# Patient Record
Sex: FEMALE | Race: WHITE | HISPANIC OR LATINO
[De-identification: names, ages, dates, MRNs, and addresses within clinical notes are randomized per-mention and may not be internally consistent; named-entity substitution may affect disease eponyms.]

---

## 2017-02-06 ENCOUNTER — APPOINTMENT (OUTPATIENT)
Dept: PULMONOLOGY | Facility: CLINIC | Age: 82
End: 2017-02-06

## 2017-02-06 VITALS
SYSTOLIC BLOOD PRESSURE: 180 MMHG | WEIGHT: 182 LBS | OXYGEN SATURATION: 95 % | RESPIRATION RATE: 14 BRPM | DIASTOLIC BLOOD PRESSURE: 74 MMHG | BODY MASS INDEX: 35.73 KG/M2 | HEART RATE: 90 BPM | HEIGHT: 60 IN

## 2017-03-30 ENCOUNTER — APPOINTMENT (OUTPATIENT)
Dept: PULMONOLOGY | Facility: CLINIC | Age: 82
End: 2017-03-30

## 2017-03-30 VITALS
DIASTOLIC BLOOD PRESSURE: 72 MMHG | RESPIRATION RATE: 14 BRPM | OXYGEN SATURATION: 97 % | HEART RATE: 80 BPM | WEIGHT: 179 LBS | SYSTOLIC BLOOD PRESSURE: 168 MMHG | HEIGHT: 60 IN | BODY MASS INDEX: 35.14 KG/M2

## 2017-07-20 ENCOUNTER — APPOINTMENT (OUTPATIENT)
Dept: PULMONOLOGY | Facility: CLINIC | Age: 82
End: 2017-07-20

## 2017-07-20 VITALS
DIASTOLIC BLOOD PRESSURE: 76 MMHG | SYSTOLIC BLOOD PRESSURE: 158 MMHG | BODY MASS INDEX: 34.75 KG/M2 | WEIGHT: 177 LBS | TEMPERATURE: 98 F | HEART RATE: 67 BPM | HEIGHT: 60 IN | OXYGEN SATURATION: 96 % | RESPIRATION RATE: 13 BRPM

## 2017-11-20 ENCOUNTER — APPOINTMENT (OUTPATIENT)
Dept: PULMONOLOGY | Facility: CLINIC | Age: 82
End: 2017-11-20

## 2017-12-27 ENCOUNTER — MEDICATION RENEWAL (OUTPATIENT)
Age: 82
End: 2017-12-27

## 2018-01-04 ENCOUNTER — APPOINTMENT (OUTPATIENT)
Dept: PULMONOLOGY | Facility: CLINIC | Age: 83
End: 2018-01-04

## 2018-05-07 ENCOUNTER — NON-APPOINTMENT (OUTPATIENT)
Age: 83
End: 2018-05-07

## 2018-05-07 ENCOUNTER — APPOINTMENT (OUTPATIENT)
Dept: PULMONOLOGY | Facility: CLINIC | Age: 83
End: 2018-05-07
Payer: MEDICARE

## 2018-05-07 VITALS
HEIGHT: 60 IN | BODY MASS INDEX: 36.52 KG/M2 | OXYGEN SATURATION: 95 % | TEMPERATURE: 98 F | HEART RATE: 57 BPM | WEIGHT: 186 LBS | DIASTOLIC BLOOD PRESSURE: 76 MMHG | SYSTOLIC BLOOD PRESSURE: 180 MMHG | RESPIRATION RATE: 12 BRPM

## 2018-05-07 PROCEDURE — 99214 OFFICE O/P EST MOD 30 MIN: CPT | Mod: 25

## 2018-05-07 PROCEDURE — 94060 EVALUATION OF WHEEZING: CPT

## 2019-06-03 ENCOUNTER — MEDICATION RENEWAL (OUTPATIENT)
Age: 84
End: 2019-06-03

## 2019-06-12 ENCOUNTER — APPOINTMENT (OUTPATIENT)
Dept: PULMONOLOGY | Facility: CLINIC | Age: 84
End: 2019-06-12
Payer: MEDICARE

## 2019-06-12 VITALS
OXYGEN SATURATION: 98 % | WEIGHT: 172 LBS | HEART RATE: 60 BPM | DIASTOLIC BLOOD PRESSURE: 74 MMHG | BODY MASS INDEX: 33.77 KG/M2 | HEIGHT: 60 IN | SYSTOLIC BLOOD PRESSURE: 183 MMHG | RESPIRATION RATE: 12 BRPM

## 2019-06-12 PROCEDURE — 94726 PLETHYSMOGRAPHY LUNG VOLUMES: CPT

## 2019-06-12 PROCEDURE — 94060 EVALUATION OF WHEEZING: CPT

## 2019-06-12 PROCEDURE — 99214 OFFICE O/P EST MOD 30 MIN: CPT | Mod: 25

## 2019-06-12 PROCEDURE — 94729 DIFFUSING CAPACITY: CPT

## 2019-06-12 NOTE — ASSESSMENT
[FreeTextEntry1] : In summary the patient is an 83 -year-old female past medical history significant for chronic obstructive pulmonary disease who presents today for followup. The patient's physical exam is significant for mild expiratory wheezing bilaterally.\par \par The patient underwent a Pulmonary function test  which revealed restrictive lung disease.\par \par Prescription renewal performed. Patient instructed to continue current medications and followup in 3 months

## 2019-06-12 NOTE — PHYSICAL EXAM
[Normal Appearance] : normal appearance [General Appearance - Well Developed] : well developed [Well Groomed] : well groomed [No Deformities] : no deformities [General Appearance - Well Nourished] : well nourished [Normal Conjunctiva] : the conjunctiva exhibited no abnormalities [General Appearance - In No Acute Distress] : no acute distress [Eyelids - No Xanthelasma] : the eyelids demonstrated no xanthelasmas [Normal Oropharynx] : normal oropharynx [Neck Appearance] : the appearance of the neck was normal [II] : II [Heart Rate And Rhythm] : heart rate and rhythm were normal [Jugular Venous Distention Increased] : there was no jugular-venous distention [] : no respiratory distress [Heart Sounds] : normal S1 and S2 [Respiration, Rhythm And Depth] : normal respiratory rhythm and effort [Bowel Sounds] : normal bowel sounds [Exaggerated Use Of Accessory Muscles For Inspiration] : no accessory muscle use [Auscultation Breath Sounds / Voice Sounds] : lungs were clear to auscultation bilaterally [Abdomen Soft] : soft [Nail Clubbing] : no clubbing of the fingernails [Abnormal Walk] : normal gait [No Venous Stasis] : no venous stasis [Cyanosis, Localized] : no localized cyanosis [No Focal Deficits] : no focal deficits [Impaired Insight] : insight and judgment were intact [Oriented To Time, Place, And Person] : oriented to person, place, and time [Affect] : the affect was normal [Mood] : the mood was normal

## 2019-06-12 NOTE — REVIEW OF SYSTEMS
[Cough] : no cough [Hemoptysis] : no hemoptysis [Sputum] : not coughing up ~M sputum [Dyspnea] : dyspnea [Chest Tightness] : no chest tightness [Pleuritic Pain] : no pleuritic pain [Frequent URIs] : no frequent upper respiratory infections [Wheezing] : no wheezing [Negative] : Sleep Disorder

## 2019-06-12 NOTE — REASON FOR VISIT
[COPD] : COPD [Follow-Up] : a follow-up visit [Cough] : cough [Shortness of Breath] : shortness of Breath [Wheezing] : wheezing [Spouse] : spouse

## 2019-06-12 NOTE — HISTORY OF PRESENT ILLNESS
[FreeTextEntry1] : Patient is an 83-year-old female with past medical history is significant for asbestosis, chronic obstructive pulmonary disease, hypertension who presents today for followup. She states that her respiratory distress and shortness of breath have improved significantly since the initiation of her bronchodilator therapy.\par \par She denies any fevers chills chest pain weight loss or hemoptysis

## 2019-07-08 ENCOUNTER — APPOINTMENT (OUTPATIENT)
Dept: PULMONOLOGY | Facility: CLINIC | Age: 84
End: 2019-07-08

## 2019-09-26 ENCOUNTER — TRANSCRIPTION ENCOUNTER (OUTPATIENT)
Age: 84
End: 2019-09-26

## 2020-02-03 ENCOUNTER — APPOINTMENT (OUTPATIENT)
Dept: PULMONOLOGY | Facility: CLINIC | Age: 85
End: 2020-02-03

## 2020-06-15 ENCOUNTER — APPOINTMENT (OUTPATIENT)
Dept: PULMONOLOGY | Facility: CLINIC | Age: 85
End: 2020-06-15

## 2020-11-02 ENCOUNTER — APPOINTMENT (OUTPATIENT)
Dept: PULMONOLOGY | Facility: CLINIC | Age: 85
End: 2020-11-02
Payer: MEDICARE

## 2020-11-02 VITALS
OXYGEN SATURATION: 97 % | RESPIRATION RATE: 14 BRPM | WEIGHT: 167 LBS | HEIGHT: 60 IN | HEART RATE: 64 BPM | TEMPERATURE: 97.3 F | DIASTOLIC BLOOD PRESSURE: 82 MMHG | SYSTOLIC BLOOD PRESSURE: 192 MMHG | BODY MASS INDEX: 32.79 KG/M2

## 2020-11-02 DIAGNOSIS — G47.8 OTHER SLEEP DISORDERS: ICD-10-CM

## 2020-11-02 DIAGNOSIS — R06.83 SNORING: ICD-10-CM

## 2020-11-02 PROCEDURE — 99214 OFFICE O/P EST MOD 30 MIN: CPT

## 2020-11-02 PROCEDURE — 99072 ADDL SUPL MATRL&STAF TM PHE: CPT

## 2020-11-02 NOTE — REVIEW OF SYSTEMS
[Fatigue] : fatigue [Cough] : cough [Hemoptysis] : no hemoptysis [Chest Tightness] : no chest tightness [Frequent URIs] : no frequent URIs [Sputum] : no sputum [Dyspnea] : dyspnea [Pleuritic Pain] : pleuritic pain [Wheezing] : wheezing [A.M. Dry Mouth] : a.m. dry mouth [SOB on Exertion] : sob on exertion [Negative] : Endocrine

## 2020-11-02 NOTE — REASON FOR VISIT
[Follow-Up] : a follow-up visit [Sleep Apnea] : sleep apnea [COPD] : COPD [Edema] : edema [Shortness of Breath] : shortness of breath [Wheezing] : wheezing [Family Member] : family member

## 2020-11-02 NOTE — HISTORY OF PRESENT ILLNESS
[TextBox_4] : Patient is an 85-year-old morbidly obese female with past medical history significant for diabetes, hypertension, COPD currently on Anoro, congestive heart failure and pulmonary hypertension who presents today for follow-up.  Patient states that she ran out of her bronchodilator therapy and has developed worsening shortness of breath.  She also complains of dyspnea on exertion.  The daughter states that she snores so loudly that her father needs to sleep in the other room.  The patient also complains of nonrestorative sleep.  She denies fevers chills chest pain weight loss or hemoptysis

## 2020-11-02 NOTE — PHYSICAL EXAM
[No Acute Distress] : no acute distress [Normal Oropharynx] : normal oropharynx [Normal Appearance] : normal appearance [No Neck Mass] : no neck mass [Normal Rate/Rhythm] : normal rate/rhythm [Normal S1, S2] : normal s1, s2 [No Murmurs] : no murmurs [No Resp Distress] : no resp distress [Wheeze] : wheeze [Rales] : rales [No Abnormalities] : no abnormalities [Benign] : benign [Normal Gait] : normal gait [No Clubbing] : no clubbing [No Cyanosis] : no cyanosis [No Edema] : no edema [FROM] : FROM [3+ Pitting] : 3+ pitting [Normal Color/ Pigmentation] : normal color/ pigmentation [No Focal Deficits] : no focal deficits [Oriented x3] : oriented x3 [Normal Affect] : normal affect

## 2020-11-02 NOTE — ASSESSMENT
[FreeTextEntry1] : In summary the patient is an 85-year-old obese female past medical history significant for hypertension, diabetes, high cholesterol, COPD who presents for follow-up.  The patient history and physical is consistent with worsening congestive heart failure and probable obstructive sleep apnea.  A prescription renewal for her bronchodilator therapy has been given.  A home sleep study has been ordered and the patient is instructed to follow-up in 1 month

## 2020-12-02 ENCOUNTER — APPOINTMENT (OUTPATIENT)
Dept: PULMONOLOGY | Facility: CLINIC | Age: 85
End: 2020-12-02
Payer: MEDICARE

## 2020-12-02 VITALS
HEART RATE: 62 BPM | RESPIRATION RATE: 14 BRPM | SYSTOLIC BLOOD PRESSURE: 161 MMHG | OXYGEN SATURATION: 98 % | DIASTOLIC BLOOD PRESSURE: 65 MMHG | TEMPERATURE: 96.9 F

## 2020-12-02 PROCEDURE — 99214 OFFICE O/P EST MOD 30 MIN: CPT

## 2020-12-02 PROCEDURE — 99072 ADDL SUPL MATRL&STAF TM PHE: CPT

## 2020-12-02 NOTE — HISTORY OF PRESENT ILLNESS
[Never] : never [TextBox_4] : Patient is an 85-year-old female with past medical history significant for diabetes, COPD, hypertension, pulmonary hypertension, who presents today for follow-up.  The patient states that her cough shortness of breath and dyspnea on exertion have resolved with the use of Anoro.  He currently denies any fevers chills chest pain weight loss or hemoptysis

## 2020-12-02 NOTE — REASON FOR VISIT
[Follow-Up] : a follow-up visit [Cough] : cough [COPD] : COPD [Shortness of Breath] : shortness of breath [Family Member] : family member

## 2020-12-02 NOTE — ASSESSMENT
[FreeTextEntry1] : In summary the patient is an 85-year-old obese female past medical history significant for hypertension, diabetes, high cholesterol, COPD who presents for follow-up.  Physical exam is significant for good air entry.  Prescription renewal performed and the patient is instructed to follow-up in 3 months

## 2021-03-03 ENCOUNTER — APPOINTMENT (OUTPATIENT)
Dept: PULMONOLOGY | Facility: CLINIC | Age: 86
End: 2021-03-03

## 2021-04-10 ENCOUNTER — INPATIENT (INPATIENT)
Facility: HOSPITAL | Age: 86
LOS: 5 days | Discharge: HOME HEALTH | DRG: 682 | End: 2021-04-16
Attending: STUDENT IN AN ORGANIZED HEALTH CARE EDUCATION/TRAINING PROGRAM | Admitting: HOSPITALIST
Payer: COMMERCIAL

## 2021-04-10 PROCEDURE — 99292 CRITICAL CARE ADDL 30 MIN: CPT

## 2021-04-10 PROCEDURE — 99291 CRITICAL CARE FIRST HOUR: CPT

## 2021-04-10 PROCEDURE — 93010 ELECTROCARDIOGRAM REPORT: CPT

## 2021-04-11 VITALS — RESPIRATION RATE: 26 BRPM | HEART RATE: 74 BPM

## 2021-04-11 DIAGNOSIS — Z90.710 ACQUIRED ABSENCE OF BOTH CERVIX AND UTERUS: Chronic | ICD-10-CM

## 2021-04-11 DIAGNOSIS — D64.9 ANEMIA, UNSPECIFIED: ICD-10-CM

## 2021-04-11 DIAGNOSIS — N17.9 ACUTE KIDNEY FAILURE, UNSPECIFIED: ICD-10-CM

## 2021-04-11 DIAGNOSIS — R00.1 BRADYCARDIA, UNSPECIFIED: ICD-10-CM

## 2021-04-11 DIAGNOSIS — R74.8 ABNORMAL LEVELS OF OTHER SERUM ENZYMES: ICD-10-CM

## 2021-04-11 DIAGNOSIS — E87.5 HYPERKALEMIA: ICD-10-CM

## 2021-04-11 DIAGNOSIS — E87.2 ACIDOSIS: ICD-10-CM

## 2021-04-11 DIAGNOSIS — E11.9 TYPE 2 DIABETES MELLITUS WITHOUT COMPLICATIONS: ICD-10-CM

## 2021-04-11 DIAGNOSIS — R42 DIZZINESS AND GIDDINESS: ICD-10-CM

## 2021-04-11 DIAGNOSIS — I10 ESSENTIAL (PRIMARY) HYPERTENSION: ICD-10-CM

## 2021-04-11 DIAGNOSIS — Z29.9 ENCOUNTER FOR PROPHYLACTIC MEASURES, UNSPECIFIED: ICD-10-CM

## 2021-04-11 DIAGNOSIS — N39.0 URINARY TRACT INFECTION, SITE NOT SPECIFIED: ICD-10-CM

## 2021-04-11 LAB
A1C WITH ESTIMATED AVERAGE GLUCOSE RESULT: 6 % — HIGH (ref 4–5.6)
A1C WITH ESTIMATED AVERAGE GLUCOSE RESULT: 6.3 % — HIGH (ref 4–5.6)
ALBUMIN SERPL ELPH-MCNC: 3.8 G/DL — SIGNIFICANT CHANGE UP (ref 3.3–5)
ALBUMIN SERPL ELPH-MCNC: 4 G/DL — SIGNIFICANT CHANGE UP (ref 3.3–5)
ALP SERPL-CCNC: 193 U/L — HIGH (ref 40–120)
ALP SERPL-CCNC: 201 U/L — HIGH (ref 40–120)
ALT FLD-CCNC: 171 U/L — HIGH (ref 10–45)
ALT FLD-CCNC: 79 U/L — HIGH (ref 10–45)
ANION GAP SERPL CALC-SCNC: 11 MMOL/L — SIGNIFICANT CHANGE UP (ref 5–17)
ANION GAP SERPL CALC-SCNC: 12 MMOL/L — SIGNIFICANT CHANGE UP (ref 5–17)
ANION GAP SERPL CALC-SCNC: 14 MMOL/L — SIGNIFICANT CHANGE UP (ref 5–17)
ANION GAP SERPL CALC-SCNC: 14 MMOL/L — SIGNIFICANT CHANGE UP (ref 5–17)
ANION GAP SERPL CALC-SCNC: 15 MMOL/L — SIGNIFICANT CHANGE UP (ref 5–17)
APPEARANCE UR: ABNORMAL
APTT BLD: 33.9 SEC — SIGNIFICANT CHANGE UP (ref 27.5–35.5)
AST SERPL-CCNC: 211 U/L — HIGH (ref 10–40)
AST SERPL-CCNC: 97 U/L — HIGH (ref 10–40)
BACTERIA # UR AUTO: ABNORMAL
BASE EXCESS BLDV CALC-SCNC: -14 MMOL/L — LOW (ref -2–2)
BASE EXCESS BLDV CALC-SCNC: -6.8 MMOL/L — LOW (ref -2–2)
BASOPHILS # BLD AUTO: 0.04 K/UL — SIGNIFICANT CHANGE UP (ref 0–0.2)
BASOPHILS NFR BLD AUTO: 0.5 % — SIGNIFICANT CHANGE UP (ref 0–2)
BILIRUB SERPL-MCNC: 0.4 MG/DL — SIGNIFICANT CHANGE UP (ref 0.2–1.2)
BILIRUB SERPL-MCNC: 0.4 MG/DL — SIGNIFICANT CHANGE UP (ref 0.2–1.2)
BILIRUB UR-MCNC: NEGATIVE — SIGNIFICANT CHANGE UP
BUN SERPL-MCNC: 66 MG/DL — HIGH (ref 7–23)
BUN SERPL-MCNC: 68 MG/DL — HIGH (ref 7–23)
BUN SERPL-MCNC: 68 MG/DL — HIGH (ref 7–23)
BUN SERPL-MCNC: 72 MG/DL — HIGH (ref 7–23)
BUN SERPL-MCNC: 74 MG/DL — HIGH (ref 7–23)
BURR CELLS BLD QL SMEAR: PRESENT — SIGNIFICANT CHANGE UP
CA-I SERPL-SCNC: 1.22 MMOL/L — SIGNIFICANT CHANGE UP (ref 1.12–1.3)
CA-I SERPL-SCNC: 1.26 MMOL/L — SIGNIFICANT CHANGE UP (ref 1.12–1.3)
CALCIUM SERPL-MCNC: 8.9 MG/DL — SIGNIFICANT CHANGE UP (ref 8.4–10.5)
CALCIUM SERPL-MCNC: 9.1 MG/DL — SIGNIFICANT CHANGE UP (ref 8.4–10.5)
CALCIUM SERPL-MCNC: 9.3 MG/DL — SIGNIFICANT CHANGE UP (ref 8.4–10.5)
CALCIUM SERPL-MCNC: 9.4 MG/DL — SIGNIFICANT CHANGE UP (ref 8.4–10.5)
CALCIUM SERPL-MCNC: 9.7 MG/DL — SIGNIFICANT CHANGE UP (ref 8.4–10.5)
CHLORIDE BLDV-SCNC: 113 MMOL/L — HIGH (ref 96–108)
CHLORIDE BLDV-SCNC: 115 MMOL/L — HIGH (ref 96–108)
CHLORIDE SERPL-SCNC: 106 MMOL/L — SIGNIFICANT CHANGE UP (ref 96–108)
CHLORIDE SERPL-SCNC: 108 MMOL/L — SIGNIFICANT CHANGE UP (ref 96–108)
CHLORIDE SERPL-SCNC: 109 MMOL/L — HIGH (ref 96–108)
CO2 BLDV-SCNC: 14 MMOL/L — LOW (ref 22–30)
CO2 BLDV-SCNC: 18 MMOL/L — LOW (ref 22–30)
CO2 SERPL-SCNC: 11 MMOL/L — LOW (ref 22–31)
CO2 SERPL-SCNC: 13 MMOL/L — LOW (ref 22–31)
CO2 SERPL-SCNC: 14 MMOL/L — LOW (ref 22–31)
CO2 SERPL-SCNC: 17 MMOL/L — LOW (ref 22–31)
CO2 SERPL-SCNC: 17 MMOL/L — LOW (ref 22–31)
COLOR SPEC: SIGNIFICANT CHANGE UP
CREAT ?TM UR-MCNC: 51 MG/DL — SIGNIFICANT CHANGE UP
CREAT SERPL-MCNC: 2.44 MG/DL — HIGH (ref 0.5–1.3)
CREAT SERPL-MCNC: 2.52 MG/DL — HIGH (ref 0.5–1.3)
CREAT SERPL-MCNC: 2.53 MG/DL — HIGH (ref 0.5–1.3)
CREAT SERPL-MCNC: 2.58 MG/DL — HIGH (ref 0.5–1.3)
CREAT SERPL-MCNC: 2.72 MG/DL — HIGH (ref 0.5–1.3)
DIFF PNL FLD: ABNORMAL
ELLIPTOCYTES BLD QL SMEAR: SLIGHT — SIGNIFICANT CHANGE UP
EOSINOPHIL # BLD AUTO: 0.09 K/UL — SIGNIFICANT CHANGE UP (ref 0–0.5)
EOSINOPHIL NFR BLD AUTO: 1.2 % — SIGNIFICANT CHANGE UP (ref 0–6)
EPI CELLS # UR: 4 — SIGNIFICANT CHANGE UP
ESTIMATED AVERAGE GLUCOSE: 126 MG/DL — HIGH (ref 68–114)
ESTIMATED AVERAGE GLUCOSE: 134 MG/DL — HIGH (ref 68–114)
FOLATE SERPL-MCNC: 12.7 NG/ML — SIGNIFICANT CHANGE UP
GAS PNL BLDV: 134 MMOL/L — LOW (ref 135–145)
GAS PNL BLDV: 134 MMOL/L — LOW (ref 135–145)
GAS PNL BLDV: SIGNIFICANT CHANGE UP
GLUCOSE BLDC GLUCOMTR-MCNC: 120 MG/DL — HIGH (ref 70–99)
GLUCOSE BLDC GLUCOMTR-MCNC: 131 MG/DL — HIGH (ref 70–99)
GLUCOSE BLDC GLUCOMTR-MCNC: 137 MG/DL — HIGH (ref 70–99)
GLUCOSE BLDC GLUCOMTR-MCNC: 170 MG/DL — HIGH (ref 70–99)
GLUCOSE BLDV-MCNC: 121 MG/DL — HIGH (ref 70–99)
GLUCOSE BLDV-MCNC: 218 MG/DL — HIGH (ref 70–99)
GLUCOSE SERPL-MCNC: 126 MG/DL — HIGH (ref 70–99)
GLUCOSE SERPL-MCNC: 152 MG/DL — HIGH (ref 70–99)
GLUCOSE SERPL-MCNC: 169 MG/DL — HIGH (ref 70–99)
GLUCOSE SERPL-MCNC: 194 MG/DL — HIGH (ref 70–99)
GLUCOSE SERPL-MCNC: 222 MG/DL — HIGH (ref 70–99)
GLUCOSE UR QL: NEGATIVE — SIGNIFICANT CHANGE UP
HCO3 BLDV-SCNC: 13 MMOL/L — LOW (ref 21–29)
HCO3 BLDV-SCNC: 18 MMOL/L — LOW (ref 21–29)
HCT VFR BLD CALC: 29.4 % — LOW (ref 34.5–45)
HCT VFR BLD CALC: 31.4 % — LOW (ref 34.5–45)
HCT VFR BLDA CALC: 26 % — LOW (ref 39–50)
HCT VFR BLDA CALC: 31 % — LOW (ref 39–50)
HGB BLD CALC-MCNC: 10.1 G/DL — LOW (ref 11.5–15.5)
HGB BLD CALC-MCNC: 8.3 G/DL — LOW (ref 11.5–15.5)
HGB BLD-MCNC: 9.3 G/DL — LOW (ref 11.5–15.5)
HGB BLD-MCNC: 9.6 G/DL — LOW (ref 11.5–15.5)
HOROWITZ INDEX BLDV+IHG-RTO: SIGNIFICANT CHANGE UP
HYALINE CASTS # UR AUTO: 0 /LPF — SIGNIFICANT CHANGE UP (ref 0–7)
IMM GRANULOCYTES NFR BLD AUTO: 0.7 % — SIGNIFICANT CHANGE UP (ref 0–1.5)
INR BLD: 0.96 RATIO — SIGNIFICANT CHANGE UP (ref 0.88–1.16)
KETONES UR-MCNC: NEGATIVE — SIGNIFICANT CHANGE UP
LACTATE BLDV-MCNC: 1.2 MMOL/L — SIGNIFICANT CHANGE UP (ref 0.7–2)
LACTATE BLDV-MCNC: 3.3 MMOL/L — HIGH (ref 0.7–2)
LEUKOCYTE ESTERASE UR-ACNC: ABNORMAL
LYMPHOCYTES # BLD AUTO: 1.28 K/UL — SIGNIFICANT CHANGE UP (ref 1–3.3)
LYMPHOCYTES # BLD AUTO: 17.5 % — SIGNIFICANT CHANGE UP (ref 13–44)
MACROCYTES BLD QL: SIGNIFICANT CHANGE UP
MAGNESIUM SERPL-MCNC: 2.2 MG/DL — SIGNIFICANT CHANGE UP (ref 1.6–2.6)
MANUAL SMEAR VERIFICATION: SIGNIFICANT CHANGE UP
MCHC RBC-ENTMCNC: 30.6 GM/DL — LOW (ref 32–36)
MCHC RBC-ENTMCNC: 31.6 GM/DL — LOW (ref 32–36)
MCHC RBC-ENTMCNC: 32.6 PG — SIGNIFICANT CHANGE UP (ref 27–34)
MCHC RBC-ENTMCNC: 32.7 PG — SIGNIFICANT CHANGE UP (ref 27–34)
MCV RBC AUTO: 103.2 FL — HIGH (ref 80–100)
MCV RBC AUTO: 106.8 FL — HIGH (ref 80–100)
MONOCYTES # BLD AUTO: 0.64 K/UL — SIGNIFICANT CHANGE UP (ref 0–0.9)
MONOCYTES NFR BLD AUTO: 8.7 % — SIGNIFICANT CHANGE UP (ref 2–14)
NEUTROPHILS # BLD AUTO: 5.22 K/UL — SIGNIFICANT CHANGE UP (ref 1.8–7.4)
NEUTROPHILS NFR BLD AUTO: 71.4 % — SIGNIFICANT CHANGE UP (ref 43–77)
NITRITE UR-MCNC: NEGATIVE — SIGNIFICANT CHANGE UP
NRBC # BLD: 0 /100 WBCS — SIGNIFICANT CHANGE UP (ref 0–0)
NRBC # BLD: 0 /100 WBCS — SIGNIFICANT CHANGE UP (ref 0–0)
OSMOLALITY UR: 335 MOS/KG — SIGNIFICANT CHANGE UP (ref 300–900)
OTHER CELLS CSF MANUAL: 6 ML/DL — LOW (ref 18–22)
OVALOCYTES BLD QL SMEAR: SLIGHT — SIGNIFICANT CHANGE UP
PCO2 BLDV: 32 MMHG — LOW (ref 39–42)
PCO2 BLDV: 34 MMHG — LOW (ref 39–42)
PH BLDV: 7.2 — LOW (ref 7.35–7.45)
PH BLDV: 7.35 — SIGNIFICANT CHANGE UP (ref 7.35–7.45)
PH UR: 6 — SIGNIFICANT CHANGE UP (ref 5–8)
PLAT MORPH BLD: NORMAL — SIGNIFICANT CHANGE UP
PLATELET # BLD AUTO: 211 K/UL — SIGNIFICANT CHANGE UP (ref 150–400)
PLATELET # BLD AUTO: 233 K/UL — SIGNIFICANT CHANGE UP (ref 150–400)
PO2 BLDV: 30 MMHG — SIGNIFICANT CHANGE UP (ref 25–45)
PO2 BLDV: 38 MMHG — SIGNIFICANT CHANGE UP (ref 25–45)
POLYCHROMASIA BLD QL SMEAR: SLIGHT — SIGNIFICANT CHANGE UP
POTASSIUM BLDV-SCNC: 4.8 MMOL/L — SIGNIFICANT CHANGE UP (ref 3.5–5.3)
POTASSIUM BLDV-SCNC: 6.4 MMOL/L — CRITICAL HIGH (ref 3.5–5.3)
POTASSIUM SERPL-MCNC: 5 MMOL/L — SIGNIFICANT CHANGE UP (ref 3.5–5.3)
POTASSIUM SERPL-MCNC: 5.1 MMOL/L — SIGNIFICANT CHANGE UP (ref 3.5–5.3)
POTASSIUM SERPL-MCNC: 5.5 MMOL/L — HIGH (ref 3.5–5.3)
POTASSIUM SERPL-MCNC: 6.1 MMOL/L — HIGH (ref 3.5–5.3)
POTASSIUM SERPL-MCNC: 6.6 MMOL/L — CRITICAL HIGH (ref 3.5–5.3)
POTASSIUM SERPL-SCNC: 5 MMOL/L — SIGNIFICANT CHANGE UP (ref 3.5–5.3)
POTASSIUM SERPL-SCNC: 5.1 MMOL/L — SIGNIFICANT CHANGE UP (ref 3.5–5.3)
POTASSIUM SERPL-SCNC: 5.5 MMOL/L — HIGH (ref 3.5–5.3)
POTASSIUM SERPL-SCNC: 6.1 MMOL/L — HIGH (ref 3.5–5.3)
POTASSIUM SERPL-SCNC: 6.6 MMOL/L — CRITICAL HIGH (ref 3.5–5.3)
PROT SERPL-MCNC: 7.2 G/DL — SIGNIFICANT CHANGE UP (ref 6–8.3)
PROT SERPL-MCNC: 7.8 G/DL — SIGNIFICANT CHANGE UP (ref 6–8.3)
PROT UR-MCNC: ABNORMAL
PROTHROM AB SERPL-ACNC: 11.5 SEC — SIGNIFICANT CHANGE UP (ref 10.6–13.6)
RBC # BLD: 2.85 M/UL — LOW (ref 3.8–5.2)
RBC # BLD: 2.94 M/UL — LOW (ref 3.8–5.2)
RBC # FLD: 13.9 % — SIGNIFICANT CHANGE UP (ref 10.3–14.5)
RBC # FLD: 14 % — SIGNIFICANT CHANGE UP (ref 10.3–14.5)
RBC BLD AUTO: ABNORMAL
RBC CASTS # UR COMP ASSIST: 5 /HPF — HIGH (ref 0–4)
SAO2 % BLDV: 42 % — LOW (ref 67–88)
SAO2 % BLDV: 69 % — SIGNIFICANT CHANGE UP (ref 67–88)
SARS-COV-2 RNA SPEC QL NAA+PROBE: SIGNIFICANT CHANGE UP
SMUDGE CELLS # BLD: PRESENT — SIGNIFICANT CHANGE UP
SODIUM SERPL-SCNC: 132 MMOL/L — LOW (ref 135–145)
SODIUM SERPL-SCNC: 135 MMOL/L — SIGNIFICANT CHANGE UP (ref 135–145)
SODIUM SERPL-SCNC: 136 MMOL/L — SIGNIFICANT CHANGE UP (ref 135–145)
SODIUM SERPL-SCNC: 136 MMOL/L — SIGNIFICANT CHANGE UP (ref 135–145)
SODIUM SERPL-SCNC: 138 MMOL/L — SIGNIFICANT CHANGE UP (ref 135–145)
SODIUM UR-SCNC: 57 MMOL/L — SIGNIFICANT CHANGE UP
SP GR SPEC: 1.01 — SIGNIFICANT CHANGE UP (ref 1.01–1.02)
TROPONIN T, HIGH SENSITIVITY RESULT: 28 NG/L — SIGNIFICANT CHANGE UP (ref 0–51)
TROPONIN T, HIGH SENSITIVITY RESULT: 29 NG/L — SIGNIFICANT CHANGE UP (ref 0–51)
TSH SERPL-MCNC: 2.71 UIU/ML — SIGNIFICANT CHANGE UP (ref 0.27–4.2)
UROBILINOGEN FLD QL: NEGATIVE — SIGNIFICANT CHANGE UP
VIT B12 SERPL-MCNC: 437 PG/ML — SIGNIFICANT CHANGE UP (ref 232–1245)
WBC # BLD: 6.73 K/UL — SIGNIFICANT CHANGE UP (ref 3.8–10.5)
WBC # BLD: 7.32 K/UL — SIGNIFICANT CHANGE UP (ref 3.8–10.5)
WBC # FLD AUTO: 6.73 K/UL — SIGNIFICANT CHANGE UP (ref 3.8–10.5)
WBC # FLD AUTO: 7.32 K/UL — SIGNIFICANT CHANGE UP (ref 3.8–10.5)
WBC UR QL: >50 /HPF — HIGH (ref 0–5)

## 2021-04-11 PROCEDURE — 12345: CPT | Mod: NC,GC

## 2021-04-11 PROCEDURE — 99222 1ST HOSP IP/OBS MODERATE 55: CPT

## 2021-04-11 PROCEDURE — 99223 1ST HOSP IP/OBS HIGH 75: CPT

## 2021-04-11 PROCEDURE — 71045 X-RAY EXAM CHEST 1 VIEW: CPT | Mod: 26

## 2021-04-11 RX ORDER — DEXTROSE 50 % IN WATER 50 %
12.5 SYRINGE (ML) INTRAVENOUS ONCE
Refills: 0 | Status: DISCONTINUED | OUTPATIENT
Start: 2021-04-11 | End: 2021-04-16

## 2021-04-11 RX ORDER — INSULIN LISPRO 100/ML
VIAL (ML) SUBCUTANEOUS AT BEDTIME
Refills: 0 | Status: DISCONTINUED | OUTPATIENT
Start: 2021-04-11 | End: 2021-04-16

## 2021-04-11 RX ORDER — CALCIUM GLUCONATE 100 MG/ML
1 VIAL (ML) INTRAVENOUS ONCE
Refills: 0 | Status: COMPLETED | OUTPATIENT
Start: 2021-04-11 | End: 2021-04-11

## 2021-04-11 RX ORDER — ALBUTEROL 90 UG/1
2.5 AEROSOL, METERED ORAL ONCE
Refills: 0 | Status: COMPLETED | OUTPATIENT
Start: 2021-04-11 | End: 2021-04-11

## 2021-04-11 RX ORDER — SODIUM CHLORIDE 9 MG/ML
1000 INJECTION, SOLUTION INTRAVENOUS
Refills: 0 | Status: DISCONTINUED | OUTPATIENT
Start: 2021-04-11 | End: 2021-04-13

## 2021-04-11 RX ORDER — SODIUM CHLORIDE 9 MG/ML
500 INJECTION, SOLUTION INTRAVENOUS ONCE
Refills: 0 | Status: COMPLETED | OUTPATIENT
Start: 2021-04-11 | End: 2021-04-11

## 2021-04-11 RX ORDER — HEPARIN SODIUM 5000 [USP'U]/ML
5000 INJECTION INTRAVENOUS; SUBCUTANEOUS EVERY 8 HOURS
Refills: 0 | Status: DISCONTINUED | OUTPATIENT
Start: 2021-04-11 | End: 2021-04-16

## 2021-04-11 RX ORDER — CEFTRIAXONE 500 MG/1
1000 INJECTION, POWDER, FOR SOLUTION INTRAMUSCULAR; INTRAVENOUS EVERY 24 HOURS
Refills: 0 | Status: COMPLETED | OUTPATIENT
Start: 2021-04-11 | End: 2021-04-13

## 2021-04-11 RX ORDER — SODIUM CHLORIDE 9 MG/ML
1000 INJECTION, SOLUTION INTRAVENOUS
Refills: 0 | Status: DISCONTINUED | OUTPATIENT
Start: 2021-04-11 | End: 2021-04-11

## 2021-04-11 RX ORDER — DEXTROSE 50 % IN WATER 50 %
15 SYRINGE (ML) INTRAVENOUS ONCE
Refills: 0 | Status: DISCONTINUED | OUTPATIENT
Start: 2021-04-11 | End: 2021-04-16

## 2021-04-11 RX ORDER — SODIUM ZIRCONIUM CYCLOSILICATE 10 G/10G
5 POWDER, FOR SUSPENSION ORAL ONCE
Refills: 0 | Status: COMPLETED | OUTPATIENT
Start: 2021-04-11 | End: 2021-04-11

## 2021-04-11 RX ORDER — INSULIN LISPRO 100/ML
VIAL (ML) SUBCUTANEOUS
Refills: 0 | Status: DISCONTINUED | OUTPATIENT
Start: 2021-04-11 | End: 2021-04-16

## 2021-04-11 RX ORDER — INSULIN HUMAN 100 [IU]/ML
5 INJECTION, SOLUTION SUBCUTANEOUS ONCE
Refills: 0 | Status: COMPLETED | OUTPATIENT
Start: 2021-04-11 | End: 2021-04-11

## 2021-04-11 RX ORDER — SODIUM BICARBONATE 1 MEQ/ML
650 SYRINGE (ML) INTRAVENOUS THREE TIMES A DAY
Refills: 0 | Status: DISCONTINUED | OUTPATIENT
Start: 2021-04-11 | End: 2021-04-16

## 2021-04-11 RX ORDER — DEXTROSE 50 % IN WATER 50 %
50 SYRINGE (ML) INTRAVENOUS ONCE
Refills: 0 | Status: COMPLETED | OUTPATIENT
Start: 2021-04-11 | End: 2021-04-11

## 2021-04-11 RX ORDER — GLUCAGON INJECTION, SOLUTION 0.5 MG/.1ML
1 INJECTION, SOLUTION SUBCUTANEOUS ONCE
Refills: 0 | Status: DISCONTINUED | OUTPATIENT
Start: 2021-04-11 | End: 2021-04-16

## 2021-04-11 RX ORDER — SODIUM ZIRCONIUM CYCLOSILICATE 10 G/10G
10 POWDER, FOR SUSPENSION ORAL ONCE
Refills: 0 | Status: COMPLETED | OUTPATIENT
Start: 2021-04-11 | End: 2021-04-11

## 2021-04-11 RX ORDER — SODIUM BICARBONATE 1 MEQ/ML
50 SYRINGE (ML) INTRAVENOUS ONCE
Refills: 0 | Status: COMPLETED | OUTPATIENT
Start: 2021-04-11 | End: 2021-04-11

## 2021-04-11 RX ORDER — DEXTROSE 50 % IN WATER 50 %
25 SYRINGE (ML) INTRAVENOUS ONCE
Refills: 0 | Status: DISCONTINUED | OUTPATIENT
Start: 2021-04-11 | End: 2021-04-16

## 2021-04-11 RX ORDER — ALBUTEROL 90 UG/1
10 AEROSOL, METERED ORAL ONCE
Refills: 0 | Status: COMPLETED | OUTPATIENT
Start: 2021-04-11 | End: 2021-04-11

## 2021-04-11 RX ADMIN — SODIUM CHLORIDE 1000 MILLILITER(S): 9 INJECTION, SOLUTION INTRAVENOUS at 01:19

## 2021-04-11 RX ADMIN — Medication 650 MILLIGRAM(S): at 22:07

## 2021-04-11 RX ADMIN — Medication 50 MILLILITER(S): at 00:51

## 2021-04-11 RX ADMIN — HEPARIN SODIUM 5000 UNIT(S): 5000 INJECTION INTRAVENOUS; SUBCUTANEOUS at 15:14

## 2021-04-11 RX ADMIN — SODIUM CHLORIDE 500 MILLILITER(S): 9 INJECTION, SOLUTION INTRAVENOUS at 02:51

## 2021-04-11 RX ADMIN — SODIUM CHLORIDE 100 MILLILITER(S): 9 INJECTION, SOLUTION INTRAVENOUS at 02:46

## 2021-04-11 RX ADMIN — ALBUTEROL 2.5 MILLIGRAM(S): 90 AEROSOL, METERED ORAL at 08:15

## 2021-04-11 RX ADMIN — Medication 1 GRAM(S): at 01:19

## 2021-04-11 RX ADMIN — SODIUM CHLORIDE 1000 MILLILITER(S): 9 INJECTION, SOLUTION INTRAVENOUS at 00:12

## 2021-04-11 RX ADMIN — HEPARIN SODIUM 5000 UNIT(S): 5000 INJECTION INTRAVENOUS; SUBCUTANEOUS at 22:07

## 2021-04-11 RX ADMIN — Medication 100 GRAM(S): at 00:12

## 2021-04-11 RX ADMIN — CEFTRIAXONE 100 MILLIGRAM(S): 500 INJECTION, POWDER, FOR SOLUTION INTRAMUSCULAR; INTRAVENOUS at 07:18

## 2021-04-11 RX ADMIN — Medication 50 MILLIEQUIVALENT(S): at 00:52

## 2021-04-11 RX ADMIN — ALBUTEROL 10 MILLIGRAM(S): 90 AEROSOL, METERED ORAL at 01:33

## 2021-04-11 RX ADMIN — SODIUM ZIRCONIUM CYCLOSILICATE 5 GRAM(S): 10 POWDER, FOR SUSPENSION ORAL at 08:28

## 2021-04-11 RX ADMIN — SODIUM CHLORIDE 1000 MILLILITER(S): 9 INJECTION, SOLUTION INTRAVENOUS at 01:57

## 2021-04-11 RX ADMIN — SODIUM ZIRCONIUM CYCLOSILICATE 10 GRAM(S): 10 POWDER, FOR SUSPENSION ORAL at 00:52

## 2021-04-11 RX ADMIN — SODIUM CHLORIDE 500 MILLILITER(S): 9 INJECTION, SOLUTION INTRAVENOUS at 01:20

## 2021-04-11 RX ADMIN — INSULIN HUMAN 5 UNIT(S): 100 INJECTION, SOLUTION SUBCUTANEOUS at 00:51

## 2021-04-11 RX ADMIN — Medication 100 GRAM(S): at 00:29

## 2021-04-11 NOTE — PATIENT PROFILE ADULT - FUNCTIONAL SCREEN CURRENT LEVEL: COMMUNICATION, MLM
[facility] [address]    [address]    [address]    [facility] [address]    Phone:  [phone]    Fax:  [phone]       Thank You for choosing us for your health care visit. We are glad to serve you and happy to provide you with this summary of your visit. Please help us to ensure we have accurate records. If you find anything that needs to be changed, please let our staff know as soon as possible.          Your Demographic Information     Patient Name Sex Carrie Underwood Female 1955       Ethnic Group Patient Race    Not of  or  Origin White      Your Visit Details     Date & Time Provider Department    2017 2:00 PM Poly Cheng DPM [facility] [address]      Your Upcoming Appointment*(Max 10)     2017  1:00 PM CST   Behavioral Health Extended Follow-Up with May Torres, PHD   [facility] ([facility])    [address]  [facility] [address] [phone]            2017 11:30 AM CDT   Urodynamic test with Nino Bryson MD   [facility] ([facility])    [address] [facility] [address]   [phone]            2017  1:00 PM CDT   Behavioral Health Extended Follow-Up with May Torres PHD   [facility] ([facility])    [address]  [facility] [address] [phone]            Wednesday May 17, 2017 11:30 AM CDT   Office Visit with Barry Galan MD   [facility] [address] ([facility])    [address]  [address]  [facility] [address]   [phone]            2017 10:00 AM CDT   Office Visit with Vicente Villa MD   [facility]  [address] [facility]    [address]  [address]  [facility] [address]   [phone]            Monday July 10, 2017  3:30 PM CDT   Follow-up Visit with Poly Cheng DPM   [facility] [address] ([facility])    [address]  [address]  [facility] [address]   [phone]              Your To Do List     Follow-Up    Return in about 3 months (around 5/20/2017).      We Ordered or Performed the Following     DEBRIDEMENT OF NAILS, 6 OR MORE       Conditions Discussed Today or Order-Related Diagnoses        Comments    OM (onychomycosis)    -  Primary     Bilateral foot pain           Your Vitals Were     Smoking Status                   Never Smoker           Medications Prescribed or Re-Ordered Today     ammonium lactate (AMLACTIN) 12 % lotion    Sig: Apply to feet daily after bathing.  Avoid putting between toes.    Class: Eprescribe    Pharmacy: [id number] - [place], [place] - [address] #: [phone]      Your Current Medications Are        Disp Refills Start End    ammonium lactate (AMLACTIN) 12 % lotion 400 g 12 2/20/2017     Sig: Apply to feet daily after bathing.  Avoid putting between toes.    Class: Eprescribe    DISPENSE 1 each 1 2/13/2017     Sig: Compression stockings below knee 15 mm hg    cephalexin (KEFLEX) 250 MG capsule 30 capsule 1 2/2/2017     Sig - Route: Take 1 capsule by mouth nightly. - Oral    Class: Eprescribe    Notes to Pharmacy: Pt prefers Rx to be mailed. Start this when finished with Augmentin Rx    Cosign for Ordering: Accepted by Krystal Dorsey PA-C on 2/2/2017  1:36 PM    MetFORMIN ER, osmotic, (FORTAMET) 500 MG extended release 24 hr tablet 30 tablet 2 12/5/2016     Sig - Route: Take 1 tablet by mouth daily (with breakfast). - Oral    Class: Eprescribe    blood glucose test strips 50 strip 12 11/16/2016     Sig: Test blood sugars one time per day. NIDDM, E11.9  One Touch Mini    Class: Eprescribe    rosuvastatin (CRESTOR) 10 MG tablet 30 tablet 3 10/24/2016     Sig - Route: Take 1 tablet by mouth daily. - Oral    Class: Eprescribe    metoPROLOL (TOPROL-XL) 25 MG 24 hr tablet 90 tablet 4 10/24/2016     Sig - Route: Take 1 tablet by mouth daily. - Oral    Class: Eprescribe    traMADOL (ULTRAM) 50 MG tablet 60 tablet 0 10/24/2016     Sig - Route: Take 1-2 tablets by mouth every 6 hours as needed for Pain. - Oral    ONETOUCH DELICA LANCETS 33G Misc 100 each 11 7/20/2016     Sig: Test blood sugars one time per day. NIDDM, E11.9    Class: Eprescribe    Cosign for Ordering: Accepted by Barry Galan MD on 7/20/2016  1:32 PM    lubiprostone (AMITIZA) 8 MCG capsule 60 capsule 2 6/1/2016     Sig - Route: Take 1 capsule by mouth 2 times daily (with meals). - Oral    Class: Eprescribe    ibuprofen (MOTRIN) 200 MG tablet        Sig - Route: Take 200 mg by mouth every 6 hours as needed for Pain. - Oral    Class: Historical Med    Multiple Vitamins-Minerals (MULTIVITAMIN PO)        Sig - Route: Take by mouth every other day. - Oral    Class: Historical Med    polyethylene glycol (MIRALAX) powder 255 g 2 2/23/2015     Sig - Route: Take 17 g by mouth daily. - Oral    Class: Eprescribe    Calcium Carbonate-Vitamin D (CALCIUM 500+D PO)        Sig - Route: Take by mouth every other day. 600 mg - Oral    Class: Historical Med    aspirin 81 MG tablet        Sig - Route: Take 162 mg by mouth daily. 2 tabs = 162 mg  - Oral    Class: Historical Med      Allergies     Bactrim [Sulfamethoxazole W/trimethoprim] Other (See Comments)    Itchy feeling on her chest and hands    Latex RASH      Immunizations History as of 2/20/2017     Name Date    Influenza 12/19/2011, 12/19/2011, 11/22/2004, 11/22/2004, 11/12/2003, 11/12/2003, 11/27/2002, 11/27/2002, 11/14/2001, 11/14/2001      Problem List as of 2/20/2017     Hyperlipidemia on atorvastatin from dr khitha    Constipation and gas, on prn simethicone     Leg edema with stasis dermatitis, cant tolerate compression stockings as cant wear them    Coronary artery disease due to calcified coronary lesion    Obesity    Palpitations    Varicose veins    urinary incontinence & neurogenic blaader, s/p interstim placement which did not help, follows with Dr Webster    Hearing loss, seen ent,audiology eval, normal VNG, thought to be nasal congestion and on fluticasone nose spray, sees dr longo    Left knee DJD s/p cortisone by dr schwartz    Morbid obesity with BMI of 40.0-44.9, adult    Cervical spondylosis without myelopathy    Arthritis of right hip    Arthritis of left knee    Onychomycosis    Onychocryptosis    Toe pain, right    Toe pain, left    Neuroma of second interspace of right foot    Bilateral carpal tunnel syndrome    Depression    Diabetes mellitus type 2, controlled, without complications    Urge incontinence of urine            Patient Instructions     None       0 = understands/communicates without difficulty

## 2021-04-11 NOTE — H&P ADULT - PROBLEM SELECTOR PLAN 7
Hold atenolol and diltiazem due to bradycardia.  Hold losartan for now The patient has lactic acidosis with CKD contributing to acidosis. The patient has lactic acidosis with CKD contributing to acidosis.  Correct renal function as noted above. Treat UTI.

## 2021-04-11 NOTE — PROGRESS NOTE ADULT - PROBLEM SELECTOR PLAN 2
This patient takes atenolol and diltiazem, which could have contributed to her bradycardia. Hold both for now.  Monitor on telemetry. Keeps pads on  Check TTE.  Follow up TSH. s/p  calcium gluconate, sodium bicarbonate, lokelma, humulin 5units + D50 and albuterol nebulizer x1 with improvement  Will monitor K q12

## 2021-04-11 NOTE — ED PROVIDER NOTE - CLINICAL SUMMARY MEDICAL DECISION MAKING FREE TEXT BOX
see MD note    *pt speaks Indonesian primarily, granddaughter easily translating, both decline additional services Pt p/w bradycardia, but BP and mentation ok. Cardiology called on her arrival to ER. EKG sinus emmanuel. WIll obtain labs, put on defib in case of pacing needed, will need admission to hospital. suspicion is for hyperkalemia vs BB or CCB toxicity.     see MD note    *pt speaks Azeri primarily, granddaughter easily translating, both decline additional services

## 2021-04-11 NOTE — CONSULT NOTE ADULT - ASSESSMENT
85F with HTN, CKD here for lightheadedness. Cardiology consulted for bradycardia.    Bradycardia, improved: Patient initially symptomatic with her bradycardia with lightheadedness, however while seated resolved, and HR also improved from 30 to 50s. Likely 2/2 hyperkalemia, however possible also some iatrogenic from too high doses of BB/CCB  - would hold home dilt 240 and atenolol 100 for now  - agree with medical management of hyperkalemia / possible BB/CCB overdose with calcium gluconate, insulin, dextrose, bicarb, lokelma - Neph also consulted for possible HD if refractory hyperkalemia  - TSH pending    Patient can transfer to telemetry floor given improvement of her bradycardia to the 50s    Molly Mays MD  Cardiology Fellow, PGY-4  For all other Cardiology service contact information, go to [email or web address] and use "[email or web address]" to login.

## 2021-04-11 NOTE — H&P ADULT - NSHPLABSRESULTS_GEN_ALL_CORE
Labs, imaging  personally reviewed by me.     This patient is an 86yo lady with PMH of T2DM, COPD, htn, pulmonary htn who presents ot the ED with complaint of dizziness     CBC found anemia with Hgb of 9.6, with macrocytosis MCV of 106.8. Pt has notable abnormal RBC morphology- with bonnie cells, smudge cells, elliptocytes.  Coags are WNL.  Initial CMP found mild hyponatremia 132, severe hyperkalemia 6.6, and LUIS FERNANDO with SCr of 2.72, and elevated BUN of 74. Pt has acidosis with HCO3 of only 11.    Her troponin was 29, repeat of 28.   The patient has elevated liver enzymes- alk phos 201, AST of 97, ALT 79.  Previous labs are not available for comparison.  UA is positive for WBC, bacteria, leukocyte esterase and turbid appearance.    CXR-  ******PRELIMINARY REPORT******          INTERPRETATION:  Diffuse irregular right lung opacification and faint left upper lobe opacification. May represent pleural calcifications.    Follow up official report.    LABS:                        9.6    7.32  )-----------( 233      ( 2021 00:14 )             31.4     Hgb Trend: 9.6<--  04-11    135  |  108  |  72<H>  ----------------------------<  194<H>  6.1<H>   |  13<L>  |  2.58<H>    Ca    9.4      2021 02:19  Mg     2.2     -11    TPro  7.8  /  Alb  4.0  /  TBili  0.4  /  DBili  x   /  AST  97<H>  /  ALT  79<H>  /  AlkPhos  201<H>  04-11    Creatinine Trend: 2.58<--, 2.72<--  PT/INR - ( 2021 00:14 )   PT: 11.5 sec;   INR: 0.96 ratio         PTT - ( 2021 00:14 )  PTT:33.9 sec  Urinalysis Basic - ( 2021 02:11 )    Color: Light Yellow / Appearance: Turbid / S.011 / pH: x  Gluc: x / Ketone: Negative  / Bili: Negative / Urobili: Negative   Blood: x / Protein: 30 mg/dL / Nitrite: Negative   Leuk Esterase: Large / RBC: 5 /hpf / WBC >50 /HPF   Sq Epi: x / Non Sq Epi: 4 / Bacteria: Moderate        Venous Blood Gas:   @ 02:19  7.22/40/20/16/23  VBG Lactate: 3.9  Venous Blood Gas:   @ 00:14  7.20/34/30/13/42  VBG Lactate: 3.3 Labs, imaging  personally reviewed by me.     This patient is an 86yo lady with PMH of T2DM, COPD, htn, pulmonary htn who presents ot the ED with complaint of dizziness     CBC found anemia with Hgb of 9.6, with macrocytosis MCV of 106.8. Pt has notable abnormal RBC morphology- with bonnie cells, smudge cells, elliptocytes.  Coags are WNL.  Initial CMP found mild hyponatremia 132, severe hyperkalemia 6.6, and LUIS FERNANDO with SCr of 2.72, and elevated BUN of 74. Pt has acidosis with HCO3 of only 11.    Her troponin was 29, repeat of 28.   The patient has elevated liver enzymes- alk phos 201, AST of 97, ALT 79.  Previous labs are not available for comparison.  UA is positive for WBC, bacteria, leukocyte esterase and turbid appearance.    EKG - junctional rhythm HR 27, normal axis    CXR-  ******PRELIMINARY REPORT******          INTERPRETATION:  Diffuse irregular right lung opacification and faint left upper lobe opacification. May represent pleural calcifications.    Follow up official report.    LABS:                        9.6    7.32  )-----------( 233      ( 2021 00:14 )             31.4     Hgb Trend: 9.6<--  04-11    135  |  108  |  72<H>  ----------------------------<  194<H>  6.1<H>   |  13<L>  |  2.58<H>    Ca    9.4      2021 02:19  Mg     2.2     -11    TPro  7.8  /  Alb  4.0  /  TBili  0.4  /  DBili  x   /  AST  97<H>  /  ALT  79<H>  /  AlkPhos  201<H>  -11    Creatinine Trend: 2.58<--, 2.72<--  PT/INR - ( 2021 00:14 )   PT: 11.5 sec;   INR: 0.96 ratio         PTT - ( 2021 00:14 )  PTT:33.9 sec  Urinalysis Basic - ( 2021 02:11 )    Color: Light Yellow / Appearance: Turbid / S.011 / pH: x  Gluc: x / Ketone: Negative  / Bili: Negative / Urobili: Negative   Blood: x / Protein: 30 mg/dL / Nitrite: Negative   Leuk Esterase: Large / RBC: 5 /hpf / WBC >50 /HPF   Sq Epi: x / Non Sq Epi: 4 / Bacteria: Moderate        Venous Blood Gas:         @ 02:19           7.22/40/20/16/23           VBG Lactate: 3.9  Venous Blood Gas:        @ 00:14           7.20/34/30//42             VBG Lactate: 3.3

## 2021-04-11 NOTE — H&P ADULT - NSHPPHYSICALEXAM_GEN_ALL_CORE
T(C): 36.4 (04-11-21 @ 05:06), Max: 36.4 (04-11-21 @ 01:00)  HR: 61 (04-11-21 @ 05:06) (32 - 74)  BP: 163/56 (04-11-21 @ 05:06) (90/50 - 163/56)  RR: 20 (04-11-21 @ 05:06) (15 - 26)  SpO2: 96% (04-11-21 @ 05:06) (96% - 100%)  Wt(kg): --    PHYSICAL EXAM:  GENERAL: NAD, well-groomed, well-developed  HEAD:  Atraumatic, Normocephalic  EYES: EOMI, PERRLA, conjunctiva and sclera clear  ENMT: No oropharyngeal exudates, erythema or lesions,  Moist mucous membranes, good dentition  NECK: Supple, no cervical lymphadenopathy, no JVD  NERVOUS SYSTEM:  Alert & Oriented X3, CN II-XII intact, 5/5 BUE and BLE motor strength, full sensation to light touch   CHEST/LUNG: Clear to auscultation bilaterally; No rales, no  rhonchi, no wheezing  HEART: Regular rate and rhythm; No murmurs, rubs, or gallops  ABDOMEN: Soft, Nontender, Nondistended  EXTREMITIES:  2+ radial Pulses, No cyanosis or edema  SKIN: warm, dry T(C): 36.4 (04-11-21 @ 05:06), Max: 36.4 (04-11-21 @ 01:00)  HR: 61 (04-11-21 @ 05:06) (32 - 74)  BP: 163/56 (04-11-21 @ 05:06) (90/50 - 163/56)  RR: 20 (04-11-21 @ 05:06) (15 - 26)  SpO2: 96% (04-11-21 @ 05:06) (96% - 100%)  Wt(kg): --    PHYSICAL EXAM:  GENERAL: NAD, well-groomed, well-developed  HEAD:  Atraumatic, Normocephalic  EYES: EOMI, PERRLA, conjunctiva and sclera clear  ENMT: Moist mucous membranes, good dentition  NECK: Supple, no cervical lymphadenopathy, no JVD  NERVOUS SYSTEM:  Alert & Oriented X3, follows commands appropriately, CN II-XII intact, 5/5 BUE and BLE motor strength, full sensation to light touch   CHEST/LUNG: trace crackles bilaterally; No rhonchi, no wheezing, 3+ bilateral pitting edema  HEART: Regular rate and rhythm; No murmurs  ABDOMEN: Soft, Nontender, Nondistended  EXTREMITIES:  2+ radial Pulses, No cyanosis   SKIN: warm, dry, bilateral lower extremity hemosiderosis with stasis dermatitis and pitting edema

## 2021-04-11 NOTE — CONSULT NOTE ADULT - SUBJECTIVE AND OBJECTIVE BOX
Patient seen and evaluated at bedside    Chief Complaint: lightheadedness    HPI: 85F with HTN, CKD here for lightheadedness.    The patient states that around 9:30 PM on 4/10 she was walking to the bathroom when she started feeling dizzy, lightheaded, and nauseated. Her granddaughter, a [facility] ED RN took her pulse, and it was in the 30s, so called EMS. The patient was feeling fine earlier that day except for a headache in the morning, but the rest of the day she was asymptomatic until nighttime. She does take atenolol 100 QD and dilt 240 QD, which was uptitrated last about 3 months ago for BP control per granddaughter. There is no concern for possible intentional or unintentional overdose of her medications. The patient also denies any chest pain at all preceding or during this episode. She hasn't eaten or drank much today.     In the ED, lowest BP was 90/50, however multiple repeat BPs were in the 120s/70s. Patient's EKG and tele showed sinus bradycardia with HR in the 30s. She was denied any lightheadedness, nausea while on the stretcher. She did however have R shoulder pain, but no back or chest pain.         PMHx:   Hypertension        PSHx:       Allergies:  No Known Allergies      Home Meds:  atenolol 100 QD  dilt 240 QD  lasix 40  metolazone 2.5  losartan 100    Current Medications:       FAMILY HISTORY:      Social History:  Smoking History:  Alcohol Use:  Drug Use:    REVIEW OF SYSTEMS:  CONSTITUTIONAL: No weakness, fevers or chills  EYES/ENT: No visual changes;  No dysphagia  NECK: No pain or stiffness  RESPIRATORY: No cough, wheezing, hemoptysis; No shortness of breath  CARDIOVASCULAR: No chest pain or palpitations; No lower extremity edema  GASTROINTESTINAL: No abdominal or epigastric pain. No nausea, vomiting, or hematemesis; No diarrhea or constipation. No melena or hematochezia.  BACK: No back pain  GENITOURINARY: No dysuria, frequency or hematuria  NEUROLOGICAL: No numbness or weakness  SKIN: No itching, burning, rashes, or lesions   All other review of systems is negative unless indicated above.    Physical Exam:  T(F): 97.5 (04-11), Max: 97.5 (04-11)  HR: 32 (04-11) (32 - 74)  BP: 125/72 (04-11) (90/50 - 125/72)  RR: 16 (04-11)  SpO2: 100% (04-11)  GENERAL: No acute distress, well-developed  HEAD:  Atraumatic, Normocephalic  ENT: EOMI, PERRLA, conjunctiva and sclera clear, Neck supple, No JVD, moist mucosa  CHEST/LUNG: Clear to auscultation bilaterally; No wheeze, equal breath sounds bilaterally   BACK: No spinal tenderness  HEART: Regular rate and rhythm; No murmurs, rubs, or gallops  ABDOMEN: Soft, Nontender, Nondistended; Bowel sounds present  EXTREMITIES:  No clubbing, cyanosis, or edema  PSYCH: Nl behavior, nl affect  NEUROLOGY: AAOx3, non-focal, cranial nerves intact  SKIN: Normal color, No rashes or lesions  LINES:    Cardiovascular Diagnostic Testing:    ECG: Personally reviewed:    Echo: Personally reviewed:    Stress Testing:    Cath:    Imaging:    CXR: Personally reviewed    Labs: Personally reviewed                        9.6    7.32  )-----------( 233      ( 11 Apr 2021 00:14 )             31.4     04-11    132<L>  |  106  |  74<H>  ----------------------------<  222<H>  6.6<HH>   |  11<L>  |  2.72<H>    Ca    9.7      11 Apr 2021 00:14  Mg     2.2     04-11    TPro  7.8  /  Alb  4.0  /  TBili  0.4  /  DBili  x   /  AST  97<H>  /  ALT  79<H>  /  AlkPhos  201<H>  04-11    PT/INR - ( 11 Apr 2021 00:14 )   PT: 11.5 sec;   INR: 0.96 ratio         PTT - ( 11 Apr 2021 00:14 )  PTT:33.9 sec    CARDIAC MARKERS ( 11 Apr 2021 00:14 )  29 ng/L / x     / x     / x     / x     / x                     Patient seen and evaluated at bedside    Chief Complaint: lightheadedness    HPI: 85F with HTN, CKD here for lightheadedness.    The patient states that around 9:30 PM on 4/10 she was walking to the bathroom when she started feeling dizzy, lightheaded, and nauseated. Her granddaughter, a [facility] ED RN took her pulse, and it was in the 30s, so called EMS. The patient was feeling fine earlier that day except for a headache in the morning, but the rest of the day she was asymptomatic until nighttime. She does take atenolol 100 QD and dilt 240 QD, which was uptitrated last about 3 months ago for BP control per granddaughter. There is no concern for possible intentional or unintentional overdose of her medications. The patient also denies any chest pain at all preceding or during this episode. She hasn't eaten or drank much today.     In the ED, lowest BP was 90/50, however multiple repeat BPs were in the 120s/70s. Patient's EKG and tele showed sinus bradycardia with HR in the 30s. She was denied any lightheadedness, nausea while on the stretcher. She did however have R shoulder pain, but no back or chest pain. K in ED initially 6.6. Cr 2.72 (unknown baseline from granddaughter). Meds given: calcium gluconate, insulin, dextrose, bicarb push, lokelma, 1.5 L IVF, and 1/2 NS with 50 mEQ bicarb MIVF. Repeat K 6.1. Neph consulted. HR then improved to the 50s after K improved to 6.1.     PMHx:   Hypertension        PSHx:       Allergies:  No Known Allergies      Home Meds:  atenolol 100 QD  dilt 240 QD  lasix 40  metolazone 2.5  losartan 100    Current Medications:       FAMILY HISTORY:      Social History:  Smoking History:   Alcohol Use:  Drug Use:    REVIEW OF SYSTEMS:  CONSTITUTIONAL: No weakness, fevers or chills  EYES/ENT: No visual changes;  No dysphagia  NECK: No pain or stiffness  RESPIRATORY: No cough, wheezing, hemoptysis; No shortness of breath  CARDIOVASCULAR: No chest pain or palpitations; No lower extremity edema  GASTROINTESTINAL: No abdominal or epigastric pain. No nausea, vomiting, or hematemesis; No diarrhea or constipation. No melena or hematochezia.  BACK: No back pain. + R shoulder pain  GENITOURINARY: No dysuria, frequency or hematuria  NEUROLOGICAL: No numbness or weakness  SKIN: No itching, burning, rashes, or lesions   All other review of systems is negative unless indicated above.    Physical Exam:  T(F): 97.5 (04-11), Max: 97.5 (04-11)  HR: 32 (04-11) (32 - 74)  BP: 125/72 (04-11) (90/50 - 125/72)  RR: 16 (04-11)  SpO2: 100% (04-11)  GENERAL: No acute distress, well-developed  HEAD:  Atraumatic, Normocephalic  ENT: EOMI, PERRLA, conjunctiva and sclera clear, Neck supple, No JVD, moist mucosa  CHEST/LUNG: Clear to auscultation bilaterally; No wheeze, equal breath sounds bilaterally   BACK: No spinal tenderness  HEART: bradycardic; No murmurs, rubs, or gallops  ABDOMEN: Soft, Nontender, Nondistended; Bowel sounds present  EXTREMITIES:  No clubbing, cyanosis. 2+ LE edema bilaterally  PSYCH: Nl behavior, nl affect  NEUROLOGY: AAOx3, non-focal, cranial nerves intact  SKIN: Normal color, No rashes or lesions  LINES:    Cardiovascular Diagnostic Testing:    ECG: Personally reviewed:    sinus bradycardia rate 27, normal intervals. No ST/T wave abnormalities    Echo: Personally reviewed:    Stress Testing:    Cath:    Imaging:    CXR: Personally reviewed    Labs: Personally reviewed                        9.6    7.32  )-----------( 233      ( 11 Apr 2021 00:14 )             31.4     04-11    132<L>  |  106  |  74<H>  ----------------------------<  222<H>  6.6<HH>   |  11<L>  |  2.72<H>    Ca    9.7      11 Apr 2021 00:14  Mg     2.2     04-11    TPro  7.8  /  Alb  4.0  /  TBili  0.4  /  DBili  x   /  AST  97<H>  /  ALT  79<H>  /  AlkPhos  201<H>  04-11    PT/INR - ( 11 Apr 2021 00:14 )   PT: 11.5 sec;   INR: 0.96 ratio         PTT - ( 11 Apr 2021 00:14 )  PTT:33.9 sec    CARDIAC MARKERS ( 11 Apr 2021 00:14 )  29 ng/L / x     / x     / x     / x     / x

## 2021-04-11 NOTE — PROGRESS NOTE ADULT - PROBLEM SELECTOR PLAN 5
Pt has macrocytic anemia with Smudge cells noted.  Repeat CBC  Iron study, folate, B12, haptoglobin, LDH The patient has a positive UA with moderate bacteria, large LE, over 50 WBC suspicious for UTI.  Will start ceftriaxone 1g qdaily x 3 days.  Follow up urine culture result.

## 2021-04-11 NOTE — ED PROVIDER NOTE - PROGRESS NOTE DETAILS
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 Cardiology has seen pt at bedside, given pt is improved in terms of HR, BP, and K levels does not need CCU. Nephro also aware of pt, stated they would not dialyze if pt's numbers are improving which appears to be the case.

## 2021-04-11 NOTE — ED PROVIDER NOTE - CARE PLAN
Principal Discharge DX:	Symptomatic bradycardia  Secondary Diagnosis:	Dehydration, mild   Principal Discharge DX:	Symptomatic bradycardia  Secondary Diagnosis:	Dehydration, mild  Secondary Diagnosis:	Hyperkalemia  Secondary Diagnosis:	ARF (acute renal failure)

## 2021-04-11 NOTE — ED ADULT NURSE NOTE - OBJECTIVE STATEMENT
85F bibems from home, aaox3 ambulatory with h/o HTN and CRF, p/w c/o dizziness, nausea and was bradycardic at home. Patient awake and verbally responsive, bradycardic at 25 bpm, dizziness, resolved, no nausea at the moment. No chills or fever, sob, diaphoresis. Placed on CM with O2 at 3LNC. IV access inserted, labs sent, meds given as ordered. Pending to be seen by Cardiology. 85F bibems from home, aaox3 ambulatory with h/o HTN and CRF, p/w c/o dizziness, nausea and was bradycardic at home. Patient awake and verbally responsive, bradycardic at 25 bpm, dizziness, resolved, no nausea at the moment. No chills or fever, sob, diaphoresis. Placed on CM with O2 at 3LNC. IV access inserted, labs sent, meds given as ordered. Pending to be seen by Cardiology.  On exam, noted chronic ble swelling. No skin breakdown noted.

## 2021-04-11 NOTE — H&P ADULT - PROBLEM SELECTOR PLAN 4
The patient has a positive UA with moderate bacteria, large LE, over 50 WBC suspicious for UTI.  Will start ceftriaxone 1g qdaily x 3 days.  Follow up urine culture result.

## 2021-04-11 NOTE — H&P ADULT - NSHPREVIEWOFSYSTEMS_GEN_ALL_CORE
REVIEW OF SYSTEMS  CONSTITUTIONAL: No fever, no chills,   EYES: No eye pain, wears glasses  ENMT:  No difficulty hearing, no throat pain  RESPIRATORY: occasional cough with clear sputum, no wheezing, No shortness of breath  CARDIOVASCULAR: No chest pain, no palpitations, + leg swelling  GASTROINTESTINAL: No abdominal pain, no nausea, no vomiting, no diarrhea, no constipation, no bloody stool  GENITOURINARY: + occasional dysuria, + frequent urination due to diuretics  NEUROLOGICAL: + dizziness, + headache earlier this morning now resolved, no numbness  SKIN: no rashes, + itching  MUSCULOSKELETAL: No joint pain, no joint swelling; No muscle pain  HEME/LYMPH: No easy bruising, bleeding

## 2021-04-11 NOTE — CONSULT NOTE ADULT - ASSESSMENT
85F PMHx CKD, DM2/HTN who admitted for bradycardic, hyperkalemia, LUIS FERNANDO and UTI.  Nephrology consulted for LUIS FERNANDO on CKD.        # LUIS FERNANDO on CKD  Pt with non-oliguric LUIS FERNANDO on CKD likely 2/2 hemodynamically mediated pre renal in the setting hypotension, bradycardia in the setting taking 2 CCB, overdiuresis?, infection UTI.  On admission sCr elevated at 2.7 (reported baseline 2.4, last known sCr 1.15 in 2018) which improved with IVF to 2.4 on 2/11.  UA showed protein, trace blood, rbc.  Last sCr 2.4  - currently no absolute/urgent indication for dialysis, continue hold losartan/lasix/metolazone for now, monitor volume status closely.  If eGFR<30 then metformin contraindication will likely need change anti-glycemic agent  - f/u urine protein/cr ratio to quantify proteinuria  - please check kidney/bladder ultrasound to assess chronicity, structural and reversible etiology  - monitor BMP, strict I/O, avoid nephrotoxic agents (NSAIDs, PPI, contrast), renally dose medications per GFR. Antibiotic per primary team      # Hyperkalemia  likely 2/2 CCB toxicity, hyperkalemia related.  Triage HR 30-50s, took diltiazem/atenolol, serum K 6.6 s/p medical management w/ improving of bradycardic and hyperkalemia.  Last serum K 5.1  - response well to medical management, recommend continue monitor serum K and if serum K>5.4 can give lokelma 10g x 1 dose then repeat BMP in 2 hours.  Low K diet, monitor serum K       # Acidosis  likely 2/2 LUIS FERNANDO on CKD.  On admission serum bicarb 11, pH 7.22/pCO2 34, pt received sodium bicarb IV for hyperkalemia then 1/2 NS w/ NaHCO3 drip w/ improving acidosis.  Last serum bicarbonate 17  - start sodium bicarbonate  TID for now, can titrate as needed (goal serum bicarb >22), monitor serum bicarb daily

## 2021-04-11 NOTE — ED PROVIDER NOTE - OBJECTIVE STATEMENT
85 F hx of HTN, ?Afib, ?CKD, presenting for light headedness and nausea without vomiting, granddaughter took her pulse was in the 30s so called EMS. Per granddaughter who is a [facility] ED RN pt is reliable at taking her medications and administers them herself. Pt normally urinates by herself, but notes decreased UOP lately. Pt takes metoprolol, diltiazem, metolazone, lasix.    Granddaughter at bedside translating for Armenian. 85 F hx of HTN, PVD w/ chronic LE edema CKD baseline Cr 2.4 DM presenting for light headedness and nausea without vomiting, granddaughter took her pulse was in the 30s so called EMS. Per granddaughter who is a [facility] ED RN pt is reliable at taking her medications and administers them herself. Pt normally urinates by herself, but notes decreased UOP lately. Pt takes metoprolol, diltiazem, metolazone, lasix.    Granddaughter at bedside translating for Guamanian.

## 2021-04-11 NOTE — ED PROVIDER NOTE - ATTENDING CONTRIBUTION TO CARE
------------ATTENDING NOTE------------  pt w/ granddaughter brought to ED by EMSc/o feeling lightheaded / near syncope, found to be bradycardic in 30's but HD stable and mentating well, dehydrated appearing, concerns of dehydration LUIS FERNANDO on CKD and CCB/BB toxicity, IVF and will empiric add Ca for hyperkalemia concerns, emergent Cards consult at presentation, awaiting labs/imaging and close reassessments -->  - Rufino Fournier MD   ----------------------------------------------- ------------ATTENDING NOTE------------  pt w/ granddaughter brought to ED by EMSc/o feeling lightheaded / near syncope, found to be bradycardic in 30's but HD stable and mentating well, dehydrated appearing, concerns of dehydration LUIS FERNANDO on CKD and CCB/BB toxicity, IVF and will empiric add Ca for hyperkalemia concerns, emergent Cards consult at presentation, awaiting labs/imaging and close reassessments --> labs c/w ARF/hyperkalemia, cardiology at bedside, continue tx / close recs, awaiting Nephro consult -->  - Rufino Fournier MD   -----------------------------------------------

## 2021-04-11 NOTE — PROGRESS NOTE ADULT - PROBLEM SELECTOR PLAN 1
s/p  calcium gluconate, sodium bicarbonate, lokelma, humulin 5units + D50 and albuterol nebulizer x1 with improvement  Will monitor K q12 Patient presented with dizziness, likely multifactorial: bradycardia, LUIS FERNANDO  Improved with improvement of bradycardia

## 2021-04-11 NOTE — PROGRESS NOTE ADULT - ATTENDING COMMENTS
84yo lady with PMH of T2DM, COPD not on home 02, HTN presents with lightheaded, dizziness found to be in sinus bradycardia to 37, pacers on patient, improved without any intervention, admitted for LUIS FERNANDO on CKD stage III in the setting of overdiuresis/CCB/BB toxicity?, HAGMA.  pH much improved to 7.35 and lactic acidosis resolved, monitor K, improving likely 2/2 to renal failure, check pvr/ renal and abd sono also with elevated LFTs. LUIS FERNANDO improving s/p 3L bolus. hold off on IVF for now. check 2d echo. patient has had hx of TB so perhaps CXR findings consistent. f/u cards, renal recs. patient much improved this AM. no complaints

## 2021-04-11 NOTE — H&P ADULT - PROBLEM SELECTOR PLAN 2
This patient takes atenolol and diltiazem, which could have contributed to her bradycardia. Hold both for now.  Monitor on telemetry.  Check TTE. This patient takes atenolol and diltiazem, which could have contributed to her bradycardia. Hold both for now.  Monitor on telemetry. Keeps pads on  Check TTE.  Follow up TSH.

## 2021-04-11 NOTE — H&P ADULT - PROBLEM SELECTOR PLAN 1
The patient was medically managed for hyperkalemia with calcium gluconate 1gx 2, sodium bicarbonate 50mEq, lokelma x1, humulin 5units + D50 and albuterol nebulizer x1 with improvement of serum K from 6.6 to 6.1.  Will give additional dose of lokelma The patient was medically managed for hyperkalemia with calcium gluconate 1gx 2, sodium bicarbonate 50mEq, lokelma x1, humulin 5units + D50 and albuterol nebulizer x1 with improvement of serum K from 6.6 to 6.1.  Will give additional dose of lokelma and albuterol.

## 2021-04-11 NOTE — H&P ADULT - PROBLEM SELECTOR PLAN 3
Pt reportedly has baseline SCr of 2.4, however had slightly higher Scr of 2.72 today.   She still produces urine. Pt reportedly has baseline SCr of 2.4, however had slightly higher Scr of 2.72 today.   She still produces urine.   After receiving 3L LR, this patient's SCr is beginning to downtrend.   Monitor UO. Avoid nephrotoxic agents. Trend SCr.   If SCr does not normalize, check renal and bladder US. Patient has SCr of 2.72 today. Her baseline Scr is unknown, per granddaughter.  She still produces urine.   After receiving 3L LR, this patient's SCr is beginning to downtrend.   Monitor UO. Avoid nephrotoxic agents. Trend SCr.   If SCr does not normalize, check renal and bladder US. Patient has SCr of 2.72 today. Her baseline Scr is unknown, per granddaughter.  She still produces urine.   After receiving 3L LR, this patient's SCr is beginning to downtrend.   Monitor UO. Avoid nephrotoxic agents. Trend SCr.   If SCr does not normalize, check renal and bladder US.  Hold losartan due to LUIS FERNANDO. Monitor VS q4h. If hypertensive, start hydralazine. Patient has SCr of 2.72 today. Her baseline Scr is unknown, per granddaughter.  She still produces urine.   After receiving 3L LR, this patient's SCr is beginning to downtrend.   Monitor UO. Avoid nephrotoxic agents. Hold diuretics for now. Trend SCr.   If SCr does not normalize, check renal and bladder US.  Hold losartan due to LUIS FERNANDO. Monitor VS q4h. If hypertensive, start hydralazine.

## 2021-04-11 NOTE — H&P ADULT - PROBLEM SELECTOR PLAN 6
The patient has lactic acidosis with CKD contributing to acidosis. Will hold oral hypoglycemic agents.  Start premeal and qhs fingerstick blood sugar checks.    Start premeal and qhs sliding scale lispro. Adjust based on glycemic requirements.

## 2021-04-11 NOTE — H&P ADULT - PROBLEM SELECTOR PLAN 8
VTE ppx: heparin subQ  Activity: OOB with assistance, fall precautions  Diet:  renal, DASH, consistent carbohydrate diet Hold atenolol and diltiazem due to bradycardia.  Hold losartan for now No recent previous values available for comparison.  Differential includes but is not limited to NAFLD, viral hepatitis, less likely  hypoperfusion.  Will repeat liver enzymes. If persistently elevated, obtain hepatic US.

## 2021-04-11 NOTE — ED ADULT NURSE REASSESSMENT NOTE - NS ED NURSE REASSESS COMMENT FT1
16 Fr Indwelling Urinary Catheter placed with 2 RNs using sterile technique. Patient tolerated well; 30 cc of cloudy sedimented urine drained.

## 2021-04-11 NOTE — PROGRESS NOTE ADULT - PROBLEM SELECTOR PLAN 6
Will hold oral hypoglycemic agents.  Start premeal and qhs fingerstick blood sugar checks.    Start premeal and qhs sliding scale lispro. Adjust based on glycemic requirements. Pt has macrocytic anemia with Smudge cells noted.  Repeat CBC  Iron study, folate, B12, haptoglobin, LDH

## 2021-04-11 NOTE — H&P ADULT - PROBLEM SELECTOR PLAN 10
VTE ppx: heparin subQ  Activity: OOB with assistance, fall precautions  Diet:  renal, DASH, consistent carbohydrate diet

## 2021-04-11 NOTE — PROGRESS NOTE ADULT - PROBLEM SELECTOR PLAN 7
The patient has lactic acidosis with CKD contributing to acidosis.  Correct renal function as noted above. Treat UTI. Will hold oral hypoglycemic agents.  Moderate SSI.

## 2021-04-11 NOTE — PROGRESS NOTE ADULT - PROBLEM SELECTOR PLAN 8
Hold atenolol and diltiazem due to bradycardia.  Hold losartan for now The patient has lactic acidosis with CKD contributing to acidosis.  Correct renal function as noted above. Treat UTI.  trend VBG

## 2021-04-11 NOTE — H&P ADULT - ASSESSMENT
This patient is an 86yo lady with PMH of T2DM, COPD, htn, PVD with bilateral LE edema, CKD who presents to the ED with complaint of dizziness. The patient states that at about 8pm today, she had new onset of dizziness and felt that she was about to fall. Her granddaughter, a [facility] ED RN found the patient to have a heart rate in 30s, thus called EMS. The patient reports she had recently been started on a diuretic.   She denied having any chest pain, palpitations, dyspnea, N/V or abdominal pain.    This patient is an 86yo lady with PMH of T2DM, COPD, htn, PVD with bilateral LE edema, CKD who presents to the ED with complaint of dizziness, found to have bradycardia, LUIS FERNANDO and hyperkalemia.

## 2021-04-11 NOTE — PROGRESS NOTE ADULT - PROBLEM SELECTOR PLAN 3
Patient has SCr of 2.72 today. Cr in 2015 was at 1.13  After receiving 3L LR, this patient's SCr is beginning to downtrend.   Monitor UO. Avoid nephrotoxic agents. Trend SCr.   If SCr does not normalize, check renal and bladder US.  Hold losartan due to LUIS FERNANDO. Monitor VS q4h. If hypertensive, start hydralazine.  Hold IVF for now pending TTE This patient takes atenolol and diltiazem, which could have contributed to her bradycardia. Hold both for now.  Monitor on telemetry. Keeps pads on  Check TTE.  Follow up TSH.

## 2021-04-11 NOTE — H&P ADULT - HISTORY OF PRESENT ILLNESS
This patient is an 86yo lady with PMH of T2DM, COPD, htn, PVD with bilateral LE edema, CKD who presents to the ED with complaint of dizziness. The patient states that at about 8pm today, she had new onset of dizziness and felt that she was about to fall. Her granddaughter, a [facility] ED RN found the patient to have a heart rate in 30s, thus called EMS. The patient reports she had recently been started on a diuretic.   She denied having any chest pain, palpitations, dyspnea, N/V or abdominal pain.  This patient is an 84yo lady with PMH of T2DM, COPD, htn, PVD with bilateral LE edema, CKD who presents to the ED with complaint of dizziness. She communicated with me in Urdu and stated that at about 8pm today, she had new onset of dizziness and felt that she was about to fall. She denied having any chest pain, palpitations, dyspnea, N/V or abdominal pain.  The patient reports she had recently been started on a diuretic.  Her granddaughter, a [facility] ED provided additional information over the phone. The patient had complained of feeling dizzy to her son. Because the patient has a tendency to wear multiple layers and overheat, they helped her remove some extra layers. When the granddaughter came home, she found the patient was too weak to walk and had a heart rate in the 30s. EMS was thus called.    Of note, the patient had an episode of hyperkalemia in 2015, and required hospitalization at [facility]. She had visited her PMD within the last week, and had been asymptomatic at the time. The patient does not have a nephrologist. She was started on a new medication several months ago for persistent hypertension.

## 2021-04-11 NOTE — PROGRESS NOTE ADULT - PROBLEM SELECTOR PLAN 9
VTE ppx: heparin subQ  Activity: OOB with assistance, fall precautions  Diet:  renal, DASH, consistent carbohydrate diet Hold atenolol and diltiazem due to bradycardia.  Hold losartan for now  Lasix held due to LUIS FERNANDO

## 2021-04-11 NOTE — H&P ADULT - PROBLEM SELECTOR PLAN 9
VTE ppx: heparin subQ  Activity: OOB with assistance, fall precautions  Diet:  renal, DASH, consistent carbohydrate diet Hold atenolol and diltiazem due to bradycardia.  Hold losartan for now

## 2021-04-11 NOTE — PROGRESS NOTE ADULT - ASSESSMENT
This patient is an 86yo lady with PMH of T2DM, COPD, htn, PVD with bilateral LE edema, CKD who presents to the ED with complaint of dizziness. The patient states that at about 8pm today, she had new onset of dizziness and felt that she was about to fall. Her granddaughter, a [facility] ED RN found the patient to have a heart rate in 30s, thus called EMS. The patient reports she had recently been started on a diuretic.   She denied having any chest pain, palpitations, dyspnea, N/V or abdominal pain.

## 2021-04-11 NOTE — PROGRESS NOTE ADULT - PROBLEM SELECTOR PLAN 4
The patient has a positive UA with moderate bacteria, large LE, over 50 WBC suspicious for UTI.  Will start ceftriaxone 1g qdaily x 3 days.  Follow up urine culture result. Patient has SCr of 2.72 today. Cr in 2015 was at 1.13  After receiving 3L LR, this patient's SCr is beginning to downtrend.   Monitor UO. Avoid nephrotoxic agents. Trend SCr.   If SCr does not normalize, check renal and bladder US.  Hold losartan due to LUIS FERNANDO. Monitor VS q4h. If hypertensive, start hydralazine.  Hold IVF for now pending TTE, hold lasix metolazone.

## 2021-04-11 NOTE — CONSULT NOTE ADULT - ATTENDING COMMENTS
Patient with sinus bradycardia likely mediated by electrolyte abnormality (hyperkalemia) and medications. With correction of K HR has improved. Patient is on beta blocker and calcium channel blocker at home. Currently on hold. These are for BP management. Monitor BP and resume cautiously. No signs of ischemia or significant conduction system abnormalities. Check echo. Will follow.
I have seen this patient with the fellow and agree with their assessment and plan. In addition,    # LUIS FERNANDO on CKD - likely secondary to volume depletion. Serum creatinine improved from 2.7 to 2.4 after IV LR.     # Hyperkalemia - secondary to LUIS FERNANDO and metabolic acidosis. Improved with lokelma and bicarb drip.     # Metabolic acidosis - secondary to LUIS FERNANDO. Improved with IV bicarb drip. We can switch to oral sodium bicarb now (as per renal fellow's note).     # Medication toxicity Monitoring: medication dose reviewed. Please dose medications to CrCl<15    The rest of the recommendations as per fellow's note.    Shellie Briones MD  Attending Nephrologist  [phone]

## 2021-04-11 NOTE — CONSULT NOTE ADULT - SUBJECTIVE AND OBJECTIVE BOX
[facility] DIVISION OF KIDNEY DISEASES AND HYPERTENSION   -- INITIAL CONSULT NOTE --  Waleska Guillen, Nephrology Fellow     NS Pager: [phone] / ROX Pager: [phone]  After 5pm or on weekend, please page the on-call fellow via [email or web address].  --------------------------------------------------------------------------------    HPI: 85F PMHx CKD, DM2, HTN, COPD, PVD w/ b/l edema LExt who present to ED for dizziness.  Pt report it started prior day in evening, new onset and had presyncope episode. Pt report starting new diuretic recently for hypertension.  Pt denies any chest pain, sob, palpitation, nausea, vomiting, fever, chills, sore throat.      Nephrology consulted for LUIS FERNANDO on CKD.  Upon review of [facility], last known serum creatinine is 1.15 in 2018 however report baseline 2.4.  On admission triage vitals note for hypotensive 90s/50, bradycardic 30-50s, labs noted for sCr elevated at 2.7, hyperkalemia 6.6, bicarb 11, pH 7.22 and UA showed protein/trace blood/rbc, LE/bacteria and Cee 57.  CXR showed probable moderate R pneumothorax with increase airspace R opacities possibly unilateral vascular congestion.  Pt treated for hyperkalemia w/ calcium gluconate IV, insulin/d50, albuterol, bicarb IV and lokelma, LR 500x2 then 1/2 NS w/ bicarb maintenance fluid. Repeat serum K today is 5.1 with improving bicarb level and sCr 2.4. Pt seen and examined at bedside who endorse overall feels much better today.      PAST HISTORY  --------------------------------------------------------------------------------  PAST MEDICAL & SURGICAL HISTORY:  Hypertension  Chronic kidney disease (CKD)  T2DM (type 2 diabetes mellitus)  PVD (peripheral vascular disease)  History of hysterectomy    FAMILY HISTORY:  No pertinent family history in first degree relatives    PAST SOCIAL HISTORY:  ALLERGIES & MEDICATIONS  --------------------------------------------------------------------------------  Allergies  No Known Allergies    Intolerances    Standing Inpatient Medications  cefTRIAXone   IVPB 1000 milliGRAM(s) IV Intermittent every 24 hours  dextrose 40% Gel 15 Gram(s) Oral once  dextrose 5%. 1000 milliLiter(s) IV Continuous <Continuous>  dextrose 5%. 1000 milliLiter(s) IV Continuous <Continuous>  dextrose 50% Injectable 25 Gram(s) IV Push once  dextrose 50% Injectable 12.5 Gram(s) IV Push once  dextrose 50% Injectable 25 Gram(s) IV Push once  glucagon  Injectable 1 milliGRAM(s) IntraMuscular once  heparin   Injectable 5000 Unit(s) SubCutaneous every 8 hours  insulin lispro (ADMELOG) corrective regimen sliding scale   SubCutaneous three times a day before meals  insulin lispro (ADMELOG) corrective regimen sliding scale   SubCutaneous at bedtime  sodium chloride 0.45% 1000 milliLiter(s) IV Continuous <Continuous>    PRN Inpatient Medications    REVIEW OF SYSTEMS  Gen: no fever, chills, weakness  Respiratory: No dyspnea  CV: No chest pain  GI: No abdominal pain, nausea, vomiting, diarrhea  MSK: no edema  Neuro: +Dizziness (resolved)  All other systems were reviewed and are negative, except as noted.    VITALS/PHYSICAL EXAM  --------------------------------------------------------------------------------  T(C): 36.4 (04-11-21 @ 08:23), Max: 36.4 (04-11-21 @ 01:00)  HR: 66 (04-11-21 @ 08:23) (32 - 74)  BP: 148/66 (04-11-21 @ 08:23) (90/50 - 163/56)  RR: 19 (04-11-21 @ 08:23) (15 - 26)  SpO2: 96% (04-11-21 @ 08:23) (96% - 100%)  Wt(kg): --    04-10-21 @ 07:01  -  04-11-21 @ 07:00  --------------------------------------------------------  IN: 0 mL / OUT: 30 mL / NET: -30 mL    04-11-21 @ 07:01  -  04-11-21 @ 14:00  --------------------------------------------------------  IN: 0 mL / OUT: 550 mL / NET: -550 mL    Physical Exam:              Gen: NAD, well-appearing on room air  	Pulm: CTA B/L, no crackles  	CV: RRR, S1S2; no rub/murmur  	GI: +BS, soft, nontender/nondistended  	: douglas catheter in place  	MSK: no edema              Neuro: AAOx3  	Psych: Normal affect and mood  	Skin: chronic venous changes lower ext, warm    LABS/STUDIES  --------------------------------------------------------------------------------                9.3    6.73  >-----------<  211      [04-11-21 @ 08:05]              29.4     136  |  108  |  68  ----------------------------<  126      [04-11-21 @ 12:40]  5.1   |  17  |  2.44        Ca     8.9     [04-11-21 @ 12:40]      Mg     2.2     [04-11-21 @ 00:14]    TPro  7.2  /  Alb  3.8  /  TBili  0.4  /  DBili  x   /  AST  211  /  ALT  171  /  AlkPhos  193  [04-11-21 @ 08:05]    PT/INR: PT 11.5 , INR 0.96       [04-11-21 @ 00:14]  PTT: 33.9       [04-11-21 @ 00:14]    Creatinine Trend:  SCr 2.44 [04-11 @ 12:40]  SCr 2.53 [04-11 @ 08:05]  SCr 2.58 [04-11 @ 02:19]  SCr 2.72 [04-11 @ 00:14]    Urinalysis - [04-11-21 @ 02:11]      Color Light Yellow / Appearance Turbid / SG 1.011 / pH 6.0      Gluc Negative / Ketone Negative  / Bili Negative / Urobili Negative       Blood Trace / Protein 30 mg/dL / Leuk Est Large / Nitrite Negative      RBC 5 / WBC >50 / Hyaline 0 / Gran  / Sq Epi  / Non Sq Epi 4 / Bacteria Moderate    Urine Creatinine 51      [04-11-21 @ 12:40]  Urine Sodium 57      [04-11-21 @ 12:40]  Urine Osmolality 335      [04-11-21 @ 12:40]    TSH 2.71      [04-11-21 @ 04:22]

## 2021-04-11 NOTE — H&P ADULT - NSICDXPASTMEDICALHX_GEN_ALL_CORE_FT
PAST MEDICAL HISTORY:  Chronic kidney disease (CKD)     Hypertension     PVD (peripheral vascular disease)     T2DM (type 2 diabetes mellitus)

## 2021-04-12 LAB
ALBUMIN SERPL ELPH-MCNC: 3.5 G/DL — SIGNIFICANT CHANGE UP (ref 3.3–5)
ALP SERPL-CCNC: 179 U/L — HIGH (ref 40–120)
ALT FLD-CCNC: 109 U/L — HIGH (ref 10–45)
ANION GAP SERPL CALC-SCNC: 13 MMOL/L — SIGNIFICANT CHANGE UP (ref 5–17)
AST SERPL-CCNC: 77 U/L — HIGH (ref 10–40)
BILIRUB SERPL-MCNC: 0.4 MG/DL — SIGNIFICANT CHANGE UP (ref 0.2–1.2)
BUN SERPL-MCNC: 61 MG/DL — HIGH (ref 7–23)
CALCIUM SERPL-MCNC: 9.2 MG/DL — SIGNIFICANT CHANGE UP (ref 8.4–10.5)
CHLORIDE SERPL-SCNC: 110 MMOL/L — HIGH (ref 96–108)
CO2 SERPL-SCNC: 17 MMOL/L — LOW (ref 22–31)
COVID-19 SPIKE DOMAIN AB INTERP: POSITIVE
COVID-19 SPIKE DOMAIN ANTIBODY RESULT: >250 U/ML — HIGH
CREAT ?TM UR-MCNC: 30 MG/DL — SIGNIFICANT CHANGE UP
CREAT SERPL-MCNC: 2.28 MG/DL — HIGH (ref 0.5–1.3)
GLUCOSE BLDC GLUCOMTR-MCNC: 136 MG/DL — HIGH (ref 70–99)
GLUCOSE BLDC GLUCOMTR-MCNC: 144 MG/DL — HIGH (ref 70–99)
GLUCOSE BLDC GLUCOMTR-MCNC: 145 MG/DL — HIGH (ref 70–99)
GLUCOSE BLDC GLUCOMTR-MCNC: 158 MG/DL — HIGH (ref 70–99)
GLUCOSE SERPL-MCNC: 134 MG/DL — HIGH (ref 70–99)
HCT VFR BLD CALC: 27.7 % — LOW (ref 34.5–45)
HGB BLD-MCNC: 9 G/DL — LOW (ref 11.5–15.5)
MAGNESIUM SERPL-MCNC: 2.2 MG/DL — SIGNIFICANT CHANGE UP (ref 1.6–2.6)
MCHC RBC-ENTMCNC: 32.5 GM/DL — SIGNIFICANT CHANGE UP (ref 32–36)
MCHC RBC-ENTMCNC: 33 PG — SIGNIFICANT CHANGE UP (ref 27–34)
MCV RBC AUTO: 101.5 FL — HIGH (ref 80–100)
NRBC # BLD: 0 /100 WBCS — SIGNIFICANT CHANGE UP (ref 0–0)
PHOSPHATE SERPL-MCNC: 3.5 MG/DL — SIGNIFICANT CHANGE UP (ref 2.5–4.5)
PLATELET # BLD AUTO: 208 K/UL — SIGNIFICANT CHANGE UP (ref 150–400)
POTASSIUM SERPL-MCNC: 4.7 MMOL/L — SIGNIFICANT CHANGE UP (ref 3.5–5.3)
POTASSIUM SERPL-SCNC: 4.7 MMOL/L — SIGNIFICANT CHANGE UP (ref 3.5–5.3)
PROT ?TM UR-MCNC: 59 MG/DL — HIGH (ref 0–12)
PROT SERPL-MCNC: 6.9 G/DL — SIGNIFICANT CHANGE UP (ref 6–8.3)
PROT/CREAT UR-RTO: 2 RATIO — HIGH (ref 0–0.2)
RBC # BLD: 2.73 M/UL — LOW (ref 3.8–5.2)
RBC # FLD: 13.9 % — SIGNIFICANT CHANGE UP (ref 10.3–14.5)
SARS-COV-2 IGG+IGM SERPL QL IA: >250 U/ML — HIGH
SARS-COV-2 IGG+IGM SERPL QL IA: POSITIVE
SODIUM SERPL-SCNC: 140 MMOL/L — SIGNIFICANT CHANGE UP (ref 135–145)
WBC # BLD: 6.46 K/UL — SIGNIFICANT CHANGE UP (ref 3.8–10.5)
WBC # FLD AUTO: 6.46 K/UL — SIGNIFICANT CHANGE UP (ref 3.8–10.5)

## 2021-04-12 PROCEDURE — 99233 SBSQ HOSP IP/OBS HIGH 50: CPT

## 2021-04-12 PROCEDURE — 71250 CT THORAX DX C-: CPT | Mod: 26

## 2021-04-12 PROCEDURE — 71045 X-RAY EXAM CHEST 1 VIEW: CPT | Mod: 26

## 2021-04-12 PROCEDURE — 93306 TTE W/DOPPLER COMPLETE: CPT | Mod: 26

## 2021-04-12 RX ORDER — TIOTROPIUM BROMIDE 18 UG/1
1 CAPSULE ORAL; RESPIRATORY (INHALATION) DAILY
Refills: 0 | Status: DISCONTINUED | OUTPATIENT
Start: 2021-04-12 | End: 2021-04-16

## 2021-04-12 RX ORDER — IPRATROPIUM/ALBUTEROL SULFATE 18-103MCG
3 AEROSOL WITH ADAPTER (GRAM) INHALATION EVERY 6 HOURS
Refills: 0 | Status: DISCONTINUED | OUTPATIENT
Start: 2021-04-12 | End: 2021-04-13

## 2021-04-12 RX ORDER — FUROSEMIDE 40 MG
40 TABLET ORAL ONCE
Refills: 0 | Status: COMPLETED | OUTPATIENT
Start: 2021-04-12 | End: 2021-04-12

## 2021-04-12 RX ORDER — HYDRALAZINE HCL 50 MG
5 TABLET ORAL ONCE
Refills: 0 | Status: COMPLETED | OUTPATIENT
Start: 2021-04-12 | End: 2021-04-12

## 2021-04-12 RX ORDER — HYDRALAZINE HCL 50 MG
10 TABLET ORAL ONCE
Refills: 0 | Status: COMPLETED | OUTPATIENT
Start: 2021-04-12 | End: 2021-04-12

## 2021-04-12 RX ORDER — ALBUTEROL 90 UG/1
1 AEROSOL, METERED ORAL EVERY 4 HOURS
Refills: 0 | Status: DISCONTINUED | OUTPATIENT
Start: 2021-04-12 | End: 2021-04-16

## 2021-04-12 RX ADMIN — Medication 650 MILLIGRAM(S): at 06:10

## 2021-04-12 RX ADMIN — Medication 3 MILLILITER(S): at 18:06

## 2021-04-12 RX ADMIN — Medication 40 MILLIGRAM(S): at 13:50

## 2021-04-12 RX ADMIN — Medication 650 MILLIGRAM(S): at 21:24

## 2021-04-12 RX ADMIN — HEPARIN SODIUM 5000 UNIT(S): 5000 INJECTION INTRAVENOUS; SUBCUTANEOUS at 13:50

## 2021-04-12 RX ADMIN — Medication 5 MILLIGRAM(S): at 12:57

## 2021-04-12 RX ADMIN — HEPARIN SODIUM 5000 UNIT(S): 5000 INJECTION INTRAVENOUS; SUBCUTANEOUS at 06:10

## 2021-04-12 RX ADMIN — Medication 650 MILLIGRAM(S): at 13:50

## 2021-04-12 RX ADMIN — Medication 1: at 13:48

## 2021-04-12 RX ADMIN — Medication 10 MILLIGRAM(S): at 09:04

## 2021-04-12 RX ADMIN — HEPARIN SODIUM 5000 UNIT(S): 5000 INJECTION INTRAVENOUS; SUBCUTANEOUS at 21:23

## 2021-04-12 RX ADMIN — CEFTRIAXONE 100 MILLIGRAM(S): 500 INJECTION, POWDER, FOR SOLUTION INTRAMUSCULAR; INTRAVENOUS at 08:05

## 2021-04-12 NOTE — PROGRESS NOTE ADULT - ATTENDING COMMENTS
86 yo female w/pmh HTN, HLD, ?atrial fibrillation, diabetes, anemia, COPD, ?asbestosis, presents to [facility] for symptomatic bradycardia and LUIS FERNANDO with hyperkalemia. LUIS FERNANDO thought to be pre-renal in etiology. Cardiology, nephrology consulted.     -per PCP patient has hx paroxysmal atrial fibrillation - not currently on AC for unclear reasons - CHADsVASc = 6  -holding atenolol, diltiazem for bradycardia to 30s upon arrival - has been in NSR  -home inhalers: ventolin and anoro ellipta - here will continue spiriva and symbicort  -continue bicarb supplementation, potassium levels normal and Cr trending down  -f/u TTE, renal US, chest CT  -start amlodipine for BP control

## 2021-04-12 NOTE — PROGRESS NOTE ADULT - PROBLEM SELECTOR PLAN 2
Patient has SCr of 2.72 today. Cr in 2015 was at 1.13  After receiving 3L LR, this patient's SCr is beginning to downtrend.   Monitor UO. Avoid nephrotoxic agents. Trend SCr.   If SCr does not normalize, check renal and bladder US.  Hold losartan due to LUIS FERNANDO. Monitor VS q4h. If hypertensive, start hydralazine.  Hold IVF for now pending TTE, hold lasix metolazone.

## 2021-04-12 NOTE — PROGRESS NOTE ADULT - PROBLEM SELECTOR PLAN 9
Hold atenolol and diltiazem due to bradycardia.  Hold losartan for now  Lasix held due to LUIS FERNANDO

## 2021-04-12 NOTE — PROGRESS NOTE ADULT - ASSESSMENT
This patient is an 84yo lady with PMH of T2DM, COPD, htn, PVD with bilateral LE edema, CKD who presents to the ED with complaint of dizziness. The patient states that at about 8pm today, she had new onset of dizziness and felt that she was about to fall. Her granddaughter, a [facility] ED RN found the patient to have a heart rate in 30s, thus called EMS. The patient reports she had recently been started on a diuretic.   She denied having any chest pain, palpitations, dyspnea, N/V or abdominal pain.

## 2021-04-12 NOTE — PROGRESS NOTE ADULT - PROBLEM SELECTOR PLAN 6
Pt has macrocytic anemia with Smudge cells noted.  Repeat CBC  Iron study, folate, B12, haptoglobin, LDH

## 2021-04-12 NOTE — PROGRESS NOTE ADULT - ASSESSMENT
85F with HTN, CKD (unknown baseline) here for lightheadedness, found to be bradycardic to the 30s due to likely hyperkalemia, now resolved.    Bradycardia (resolved): most likely related to hyperkalemia, and somewhat from potential BB/CCB toxicity. On atenolol and dilt at home   - continue to monitor K and ensure patient is not hyperkalemic  - no indication for PPM    HTN: uncontrolled, but limited due to potential bradycardia  - ok to start amlodipine 2.5 today and monitor BP    Molly Mays MD  Cardiology Fellow, PGY-4  [phone]  For all other Cardiology service contact information, go to [email or web address] and use "[email or web address]" to login.

## 2021-04-12 NOTE — PROGRESS NOTE ADULT - PROBLEM SELECTOR PLAN 8
The patient has lactic acidosis with CKD contributing to acidosis.  Correct renal function as noted above. Treat UTI.  trend VBG

## 2021-04-12 NOTE — PROGRESS NOTE ADULT - PROBLEM SELECTOR PLAN 1
Patient presented with dizziness, likely multifactorial: bradycardia, LUIS FERNANDO  Improved with improvement of bradycardia

## 2021-04-12 NOTE — PROGRESS NOTE ADULT - PROBLEM SELECTOR PLAN 5
This patient takes atenolol and diltiazem, which could have contributed to her bradycardia. Hold both for now.  Monitor on telemetry. Will talk to PCP regarding medication choice.  Check TTE.  Follow up TSH.

## 2021-04-12 NOTE — PROGRESS NOTE ADULT - PROBLEM SELECTOR PLAN 4
s/p  calcium gluconate, sodium bicarbonate, lokelma, humulin 5units + D50 and albuterol nebulizer x1 with improvement  Will monitor K q12

## 2021-04-13 DIAGNOSIS — I48.0 PAROXYSMAL ATRIAL FIBRILLATION: ICD-10-CM

## 2021-04-13 DIAGNOSIS — J81.0 ACUTE PULMONARY EDEMA: ICD-10-CM

## 2021-04-13 LAB
-  AMIKACIN: SIGNIFICANT CHANGE UP
-  AMIKACIN: SIGNIFICANT CHANGE UP
-  AMOXICILLIN/CLAVULANIC ACID: SIGNIFICANT CHANGE UP
-  AMOXICILLIN/CLAVULANIC ACID: SIGNIFICANT CHANGE UP
-  AMPICILLIN/SULBACTAM: SIGNIFICANT CHANGE UP
-  AMPICILLIN/SULBACTAM: SIGNIFICANT CHANGE UP
-  AMPICILLIN: SIGNIFICANT CHANGE UP
-  AMPICILLIN: SIGNIFICANT CHANGE UP
-  AZTREONAM: SIGNIFICANT CHANGE UP
-  AZTREONAM: SIGNIFICANT CHANGE UP
-  CEFAZOLIN: SIGNIFICANT CHANGE UP
-  CEFAZOLIN: SIGNIFICANT CHANGE UP
-  CEFEPIME: SIGNIFICANT CHANGE UP
-  CEFEPIME: SIGNIFICANT CHANGE UP
-  CEFOXITIN: SIGNIFICANT CHANGE UP
-  CEFOXITIN: SIGNIFICANT CHANGE UP
-  CEFTRIAXONE: SIGNIFICANT CHANGE UP
-  CEFTRIAXONE: SIGNIFICANT CHANGE UP
-  CIPROFLOXACIN: SIGNIFICANT CHANGE UP
-  CIPROFLOXACIN: SIGNIFICANT CHANGE UP
-  ERTAPENEM: SIGNIFICANT CHANGE UP
-  ERTAPENEM: SIGNIFICANT CHANGE UP
-  GENTAMICIN: SIGNIFICANT CHANGE UP
-  GENTAMICIN: SIGNIFICANT CHANGE UP
-  IMIPENEM: SIGNIFICANT CHANGE UP
-  IMIPENEM: SIGNIFICANT CHANGE UP
-  LEVOFLOXACIN: SIGNIFICANT CHANGE UP
-  LEVOFLOXACIN: SIGNIFICANT CHANGE UP
-  MEROPENEM: SIGNIFICANT CHANGE UP
-  MEROPENEM: SIGNIFICANT CHANGE UP
-  NITROFURANTOIN: SIGNIFICANT CHANGE UP
-  NITROFURANTOIN: SIGNIFICANT CHANGE UP
-  PIPERACILLIN/TAZOBACTAM: SIGNIFICANT CHANGE UP
-  PIPERACILLIN/TAZOBACTAM: SIGNIFICANT CHANGE UP
-  TIGECYCLINE: SIGNIFICANT CHANGE UP
-  TIGECYCLINE: SIGNIFICANT CHANGE UP
-  TOBRAMYCIN: SIGNIFICANT CHANGE UP
-  TOBRAMYCIN: SIGNIFICANT CHANGE UP
-  TRIMETHOPRIM/SULFAMETHOXAZOLE: SIGNIFICANT CHANGE UP
-  TRIMETHOPRIM/SULFAMETHOXAZOLE: SIGNIFICANT CHANGE UP
A1AT SERPL-MCNC: 169 MG/DL — SIGNIFICANT CHANGE UP (ref 90–200)
ALBUMIN SERPL ELPH-MCNC: 3.4 G/DL — SIGNIFICANT CHANGE UP (ref 3.3–5)
ALP SERPL-CCNC: 161 U/L — HIGH (ref 40–120)
ALT FLD-CCNC: 71 U/L — HIGH (ref 10–45)
ANION GAP SERPL CALC-SCNC: 13 MMOL/L — SIGNIFICANT CHANGE UP (ref 5–17)
AST SERPL-CCNC: 35 U/L — SIGNIFICANT CHANGE UP (ref 10–40)
BASE EXCESS BLDV CALC-SCNC: -3.6 MMOL/L — LOW (ref -2–2)
BILIRUB SERPL-MCNC: 0.5 MG/DL — SIGNIFICANT CHANGE UP (ref 0.2–1.2)
BUN SERPL-MCNC: 52 MG/DL — HIGH (ref 7–23)
CA-I SERPL-SCNC: 1.17 MMOL/L — SIGNIFICANT CHANGE UP (ref 1.12–1.3)
CALCIUM SERPL-MCNC: 8.7 MG/DL — SIGNIFICANT CHANGE UP (ref 8.4–10.5)
CHLORIDE BLDV-SCNC: 115 MMOL/L — HIGH (ref 96–108)
CHLORIDE SERPL-SCNC: 110 MMOL/L — HIGH (ref 96–108)
CO2 BLDV-SCNC: 22 MMOL/L — SIGNIFICANT CHANGE UP (ref 22–30)
CO2 SERPL-SCNC: 19 MMOL/L — LOW (ref 22–31)
CREAT SERPL-MCNC: 1.92 MG/DL — HIGH (ref 0.5–1.3)
CULTURE RESULTS: SIGNIFICANT CHANGE UP
GAS PNL BLDV: 140 MMOL/L — SIGNIFICANT CHANGE UP (ref 135–145)
GAS PNL BLDV: SIGNIFICANT CHANGE UP
GAS PNL BLDV: SIGNIFICANT CHANGE UP
GLUCOSE BLDC GLUCOMTR-MCNC: 182 MG/DL — HIGH (ref 70–99)
GLUCOSE BLDC GLUCOMTR-MCNC: 197 MG/DL — HIGH (ref 70–99)
GLUCOSE BLDC GLUCOMTR-MCNC: 202 MG/DL — HIGH (ref 70–99)
GLUCOSE BLDV-MCNC: 125 MG/DL — HIGH (ref 70–99)
GLUCOSE SERPL-MCNC: 125 MG/DL — HIGH (ref 70–99)
HCO3 BLDV-SCNC: 20 MMOL/L — LOW (ref 21–29)
HCT VFR BLD CALC: 27.7 % — LOW (ref 34.5–45)
HCT VFR BLDA CALC: 30 % — LOW (ref 39–50)
HGB BLD CALC-MCNC: 9.8 G/DL — LOW (ref 11.5–15.5)
HGB BLD-MCNC: 9 G/DL — LOW (ref 11.5–15.5)
LACTATE BLDV-MCNC: 1.3 MMOL/L — SIGNIFICANT CHANGE UP (ref 0.7–2)
MAGNESIUM SERPL-MCNC: 2.1 MG/DL — SIGNIFICANT CHANGE UP (ref 1.6–2.6)
MCHC RBC-ENTMCNC: 32.5 GM/DL — SIGNIFICANT CHANGE UP (ref 32–36)
MCHC RBC-ENTMCNC: 33.3 PG — SIGNIFICANT CHANGE UP (ref 27–34)
MCV RBC AUTO: 102.6 FL — HIGH (ref 80–100)
METHOD TYPE: SIGNIFICANT CHANGE UP
METHOD TYPE: SIGNIFICANT CHANGE UP
NRBC # BLD: 0 /100 WBCS — SIGNIFICANT CHANGE UP (ref 0–0)
ORGANISM # SPEC MICROSCOPIC CNT: SIGNIFICANT CHANGE UP
OTHER CELLS CSF MANUAL: 13 ML/DL — LOW (ref 18–22)
PCO2 BLDV: 35 MMHG — LOW (ref 39–42)
PH BLDV: 7.38 — SIGNIFICANT CHANGE UP (ref 7.35–7.45)
PHOSPHATE SERPL-MCNC: 3.9 MG/DL — SIGNIFICANT CHANGE UP (ref 2.5–4.5)
PLATELET # BLD AUTO: 185 K/UL — SIGNIFICANT CHANGE UP (ref 150–400)
PO2 BLDV: 70 MMHG — HIGH (ref 25–45)
POTASSIUM BLDV-SCNC: 3.8 MMOL/L — SIGNIFICANT CHANGE UP (ref 3.5–5.3)
POTASSIUM SERPL-MCNC: 4.1 MMOL/L — SIGNIFICANT CHANGE UP (ref 3.5–5.3)
POTASSIUM SERPL-SCNC: 4.1 MMOL/L — SIGNIFICANT CHANGE UP (ref 3.5–5.3)
PROT SERPL-MCNC: 6.7 G/DL — SIGNIFICANT CHANGE UP (ref 6–8.3)
RBC # BLD: 2.7 M/UL — LOW (ref 3.8–5.2)
RBC # FLD: 13.6 % — SIGNIFICANT CHANGE UP (ref 10.3–14.5)
SAO2 % BLDV: 91 % — HIGH (ref 67–88)
SODIUM SERPL-SCNC: 142 MMOL/L — SIGNIFICANT CHANGE UP (ref 135–145)
SPECIMEN SOURCE: SIGNIFICANT CHANGE UP
WBC # BLD: 7.53 K/UL — SIGNIFICANT CHANGE UP (ref 3.8–10.5)
WBC # FLD AUTO: 7.53 K/UL — SIGNIFICANT CHANGE UP (ref 3.8–10.5)

## 2021-04-13 PROCEDURE — 99233 SBSQ HOSP IP/OBS HIGH 50: CPT

## 2021-04-13 PROCEDURE — 93975 VASCULAR STUDY: CPT | Mod: 26

## 2021-04-13 RX ORDER — FUROSEMIDE 40 MG
40 TABLET ORAL DAILY
Refills: 0 | Status: DISCONTINUED | OUTPATIENT
Start: 2021-04-13 | End: 2021-04-13

## 2021-04-13 RX ORDER — FUROSEMIDE 40 MG
40 TABLET ORAL DAILY
Refills: 0 | Status: DISCONTINUED | OUTPATIENT
Start: 2021-04-14 | End: 2021-04-14

## 2021-04-13 RX ORDER — IPRATROPIUM/ALBUTEROL SULFATE 18-103MCG
3 AEROSOL WITH ADAPTER (GRAM) INHALATION EVERY 6 HOURS
Refills: 0 | Status: DISCONTINUED | OUTPATIENT
Start: 2021-04-13 | End: 2021-04-16

## 2021-04-13 RX ORDER — AMLODIPINE BESYLATE 2.5 MG/1
5 TABLET ORAL DAILY
Refills: 0 | Status: DISCONTINUED | OUTPATIENT
Start: 2021-04-13 | End: 2021-04-16

## 2021-04-13 RX ADMIN — Medication 650 MILLIGRAM(S): at 21:56

## 2021-04-13 RX ADMIN — Medication 40 MILLIGRAM(S): at 12:41

## 2021-04-13 RX ADMIN — Medication 1: at 17:44

## 2021-04-13 RX ADMIN — HEPARIN SODIUM 5000 UNIT(S): 5000 INJECTION INTRAVENOUS; SUBCUTANEOUS at 21:56

## 2021-04-13 RX ADMIN — Medication 650 MILLIGRAM(S): at 05:11

## 2021-04-13 RX ADMIN — Medication 1: at 13:19

## 2021-04-13 RX ADMIN — CEFTRIAXONE 100 MILLIGRAM(S): 500 INJECTION, POWDER, FOR SOLUTION INTRAMUSCULAR; INTRAVENOUS at 06:47

## 2021-04-13 RX ADMIN — Medication 650 MILLIGRAM(S): at 14:10

## 2021-04-13 RX ADMIN — Medication 3 MILLILITER(S): at 00:27

## 2021-04-13 RX ADMIN — HEPARIN SODIUM 5000 UNIT(S): 5000 INJECTION INTRAVENOUS; SUBCUTANEOUS at 14:10

## 2021-04-13 RX ADMIN — AMLODIPINE BESYLATE 5 MILLIGRAM(S): 2.5 TABLET ORAL at 12:42

## 2021-04-13 RX ADMIN — HEPARIN SODIUM 5000 UNIT(S): 5000 INJECTION INTRAVENOUS; SUBCUTANEOUS at 05:11

## 2021-04-13 RX ADMIN — Medication 3 MILLILITER(S): at 05:10

## 2021-04-13 NOTE — PROGRESS NOTE ADULT - PROBLEM SELECTOR PLAN 3
The patient has a positive UA with moderate bacteria, large LE, over 50 WBC suspicious for UTI.  Will start ceftriaxone 1g qdaily x 3 days.  Follow up urine culture result. At presentation, patient with bradycardia, LUIS FERNANDO, hyperkalemia, consistent with BRASH syndrome  held atenolol and diltiazem as patient is rate controlled CKD stage 4  per PCP baseline Cr ~2.2, here has gone down to 1.92  LUIS FERNANDO due to volume depletion, improved after getting IVF

## 2021-04-13 NOTE — PROGRESS NOTE ADULT - PROBLEM SELECTOR PLAN 6
Will hold oral hypoglycemic agents.  Moderate SSI. Pt has macrocytic anemia with Smudge cells noted.  Repeat CBC  Iron study, folate, B12, haptoglobin, LDH s/p  calcium gluconate, sodium bicarbonate, lokelma, humulin 5units + D50 and albuterol nebulizer x1 with improvement  was due to LUIS FERNANDO, resolved

## 2021-04-13 NOTE — PROGRESS NOTE ADULT - PROBLEM SELECTOR PLAN 5
Pt has macrocytic anemia with Smudge cells noted.  Repeat CBC  Iron study, folate, B12, haptoglobin, LDH s/p  calcium gluconate, sodium bicarbonate, lokelma, humulin 5units + D50 and albuterol nebulizer x1 with improvement  Will monitor K q12 The patient has a positive UA with moderate bacteria, large LE, over 50 WBC suspicious for UTI.  Will start ceftriaxone 1g qdaily x 3 days.  urine culture growing E coli

## 2021-04-13 NOTE — PROGRESS NOTE ADULT - ATTENDING COMMENTS
84 yo female w/pmh HTN, HLD, atrial fibrillation, diabetes, anemia, COPD, ?asbestosis, presents to [facility] for symptomatic bradycardia and LUIS FERNANDO on CKD with hyperkalemia. LUIS FERNANDO thought to be pre-renal in etiology. Cardiology, nephrology consulted.     -per PCP patient has hx paroxysmal atrial fibrillation - not currently on AC but has no C/I - CHADsVASc = 5 (age, female, HTN, DM), will see what DOAC is covered by insurance  -holding atenolol, diltiazem for bradycardia to 30s upon arrival, is rate controlled on her own  -home inhalers: ventolin and anoro ellipta - here will continue spiriva, symbicort, and prn albuterol  -continue bicarb supplementation, potassium levels normal and Cr trending down to baseline  -f/u renal US to look for renal artery stenosis  -start amlodipine for BP control  -restart home dose diuretics  -remove JOYCE douglas today 86 yo female w/pmh HTN, HLD, atrial fibrillation, diabetes, anemia, CKD stage 4, COPD, ?asbestosis, presents to [facility] for symptomatic bradycardia and LUIS FERNANDO on CKD with hyperkalemia. LUIS FERNANDO thought to be pre-renal in etiology. Cardiology, nephrology consulted.     -per PCP patient has hx paroxysmal atrial fibrillation - not currently on AC but has no C/I - CHADsVASc = 5 (age, female, HTN, DM), will see what DOAC is covered by insurance  -holding atenolol, diltiazem for bradycardia to 30s upon arrival, is rate controlled on her own  -home inhalers: ventolin and anoro ellipta - here will continue spiriva, symbicort, and prn albuterol  -continue bicarb supplementation, potassium levels normal and Cr trending down to baseline  -f/u renal US to look for renal artery stenosis  -start amlodipine for BP control  -restart home dose diuretics  -remove JOYCE douglas today

## 2021-04-13 NOTE — PROGRESS NOTE ADULT - ASSESSMENT
85F with HTN, CKD (unknown baseline) here for lightheadedness, found to be bradycardic to the 30s due to likely hyperkalemia, now resolved.    Bradycardia (resolved): most likely related to hyperkalemia, and somewhat from potential BB/CCB toxicity. On atenolol and dilt at home   - continue to monitor K and ensure patient is not hyperkalemic  - no indication for PPM at this time    Afib: [id number] (HTN, agex2, female) however not on AC  - no AC  - no BB/CCB for now given bradycardia    HTN: uncontrolled, but limited due to potential bradycardia  - would hold off on any BB/CCB due to bradycardia  - for BP control, can start hydralazine 25 TID for now    Molly Mays MD  Cardiology Fellow, PGY-4  [phone]  For all other Cardiology service contact information, go to [email or web address] and use "cardfellows" to login.

## 2021-04-13 NOTE — PROGRESS NOTE ADULT - ASSESSMENT
This patient is an 84yo lady with PMH of T2DM, COPD, htn, PVD with bilateral LE edema, CKD who presents to the ED with complaint of dizziness. The patient states that at about 8pm today, she had new onset of dizziness and felt that she was about to fall. Her granddaughter, a [facility] ED RN found the patient to have a heart rate in 30s, thus called EMS. The patient reports she had recently been started on a diuretic.   She denied having any chest pain, palpitations, dyspnea, N/V or abdominal pain.

## 2021-04-13 NOTE — PROGRESS NOTE ADULT - PROBLEM SELECTOR PLAN 7
Hold atenolol and diltiazem due to bradycardia.  Hold losartan for now  Lasix held due to LUIS FERNANDO Will hold oral hypoglycemic agents.  Moderate SSI. Pt has macrocytic anemia with Smudge cells noted.  Repeat CBC  Iron study, folate, B12, haptoglobin, LDH

## 2021-04-13 NOTE — PROGRESS NOTE ADULT - PROBLEM SELECTOR PLAN 1
2/2 HTN and pulmonary hypertension 2/2 COPD  BP control, with amlodipine 5, restart home lasix and metolazone. 2/2 HTN and pulmonary hypertension 2/2 COPD/emphysema  BP control, with amlodipine 5, restart home lasix and metolazone.

## 2021-04-13 NOTE — PROGRESS NOTE ADULT - PROBLEM SELECTOR PLAN 4
s/p  calcium gluconate, sodium bicarbonate, lokelma, humulin 5units + D50 and albuterol nebulizer x1 with improvement  Will monitor K q12 The patient has a positive UA with moderate bacteria, large LE, over 50 WBC suspicious for UTI.  Will start ceftriaxone 1g qdaily x 3 days.  Follow up urine culture result. At presentation, patient with bradycardia, LUIS FERNANDO, hyperkalemia, consistent with BRASH syndrome  held atenolol and diltiazem as patient is rate controlled

## 2021-04-13 NOTE — PROGRESS NOTE ADULT - PROBLEM SELECTOR PLAN 2
At presentation, patient with bradycardia, LUIS FERNANDO, hyperkalemia, consistent with BRASH syndrome  held atenolol and diltiazem as patient is rate controlled Per PCP, patient with afib  most time in sinus on tele. Brenton paroxysmal  rate controlled, CHADSVASc score 6  Will start on eliquis Per PCP, patient with afib  most time in sinus on tele. Brenton paroxysmal  rate controlled, CHADSVASc score 6  Will see if we can start DOAC renally dosed since there are no contraindications - no history of GI bleeding. No falls.

## 2021-04-13 NOTE — PROGRESS NOTE ADULT - PROBLEM SELECTOR PLAN 8
VTE ppx: heparin subQ  Activity: OOB with assistance, fall precautions  Diet:  renal, DASH, consistent carbohydrate diet Hold atenolol and diltiazem due to bradycardia.  Hold losartan for now  Lasix held due to LUIS FERNANDO Will hold oral hypoglycemic agents.  Moderate SSI.

## 2021-04-14 ENCOUNTER — TRANSCRIPTION ENCOUNTER (OUTPATIENT)
Age: 86
End: 2021-04-14

## 2021-04-14 LAB
ALBUMIN SERPL ELPH-MCNC: 3.1 G/DL — LOW (ref 3.3–5)
ALP SERPL-CCNC: 143 U/L — HIGH (ref 40–120)
ALT FLD-CCNC: 49 U/L — HIGH (ref 10–45)
ANION GAP SERPL CALC-SCNC: 13 MMOL/L — SIGNIFICANT CHANGE UP (ref 5–17)
AST SERPL-CCNC: 26 U/L — SIGNIFICANT CHANGE UP (ref 10–40)
BILIRUB SERPL-MCNC: 0.4 MG/DL — SIGNIFICANT CHANGE UP (ref 0.2–1.2)
BUN SERPL-MCNC: 49 MG/DL — HIGH (ref 7–23)
CALCIUM SERPL-MCNC: 8.3 MG/DL — LOW (ref 8.4–10.5)
CHLORIDE SERPL-SCNC: 110 MMOL/L — HIGH (ref 96–108)
CO2 SERPL-SCNC: 21 MMOL/L — LOW (ref 22–31)
CREAT SERPL-MCNC: 2.11 MG/DL — HIGH (ref 0.5–1.3)
GLUCOSE BLDC GLUCOMTR-MCNC: 153 MG/DL — HIGH (ref 70–99)
GLUCOSE BLDC GLUCOMTR-MCNC: 190 MG/DL — HIGH (ref 70–99)
GLUCOSE BLDC GLUCOMTR-MCNC: 218 MG/DL — HIGH (ref 70–99)
GLUCOSE BLDC GLUCOMTR-MCNC: 237 MG/DL — HIGH (ref 70–99)
GLUCOSE SERPL-MCNC: 140 MG/DL — HIGH (ref 70–99)
HCT VFR BLD CALC: 26.4 % — LOW (ref 34.5–45)
HGB BLD-MCNC: 8.4 G/DL — LOW (ref 11.5–15.5)
MAGNESIUM SERPL-MCNC: 2.1 MG/DL — SIGNIFICANT CHANGE UP (ref 1.6–2.6)
MCHC RBC-ENTMCNC: 31.8 GM/DL — LOW (ref 32–36)
MCHC RBC-ENTMCNC: 32.6 PG — SIGNIFICANT CHANGE UP (ref 27–34)
MCV RBC AUTO: 102.3 FL — HIGH (ref 80–100)
NRBC # BLD: 0 /100 WBCS — SIGNIFICANT CHANGE UP (ref 0–0)
PHOSPHATE SERPL-MCNC: 3.6 MG/DL — SIGNIFICANT CHANGE UP (ref 2.5–4.5)
PLATELET # BLD AUTO: 188 K/UL — SIGNIFICANT CHANGE UP (ref 150–400)
POTASSIUM SERPL-MCNC: 3.5 MMOL/L — SIGNIFICANT CHANGE UP (ref 3.5–5.3)
POTASSIUM SERPL-SCNC: 3.5 MMOL/L — SIGNIFICANT CHANGE UP (ref 3.5–5.3)
PROT SERPL-MCNC: 6.3 G/DL — SIGNIFICANT CHANGE UP (ref 6–8.3)
RBC # BLD: 2.58 M/UL — LOW (ref 3.8–5.2)
RBC # FLD: 13.5 % — SIGNIFICANT CHANGE UP (ref 10.3–14.5)
SARS-COV-2 RNA SPEC QL NAA+PROBE: SIGNIFICANT CHANGE UP
SODIUM SERPL-SCNC: 144 MMOL/L — SIGNIFICANT CHANGE UP (ref 135–145)
WBC # BLD: 7.34 K/UL — SIGNIFICANT CHANGE UP (ref 3.8–10.5)
WBC # FLD AUTO: 7.34 K/UL — SIGNIFICANT CHANGE UP (ref 3.8–10.5)

## 2021-04-14 PROCEDURE — 99233 SBSQ HOSP IP/OBS HIGH 50: CPT

## 2021-04-14 PROCEDURE — 99232 SBSQ HOSP IP/OBS MODERATE 35: CPT | Mod: GC

## 2021-04-14 RX ORDER — APIXABAN 2.5 MG/1
1 TABLET, FILM COATED ORAL
Qty: 60 | Refills: 0
Start: 2021-04-14 | End: 2021-05-13

## 2021-04-14 RX ORDER — DILTIAZEM HCL 120 MG
1 CAPSULE, EXT RELEASE 24 HR ORAL
Qty: 0 | Refills: 0 | DISCHARGE

## 2021-04-14 RX ORDER — ATENOLOL 25 MG/1
1 TABLET ORAL
Qty: 0 | Refills: 0 | DISCHARGE

## 2021-04-14 RX ORDER — POTASSIUM CHLORIDE 20 MEQ
40 PACKET (EA) ORAL ONCE
Refills: 0 | Status: COMPLETED | OUTPATIENT
Start: 2021-04-14 | End: 2021-04-14

## 2021-04-14 RX ORDER — SODIUM BICARBONATE 1 MEQ/ML
1 SYRINGE (ML) INTRAVENOUS
Qty: 0 | Refills: 0 | DISCHARGE
Start: 2021-04-14

## 2021-04-14 RX ORDER — LOSARTAN POTASSIUM 100 MG/1
1 TABLET, FILM COATED ORAL
Qty: 0 | Refills: 0 | DISCHARGE

## 2021-04-14 RX ORDER — AMLODIPINE BESYLATE 2.5 MG/1
1 TABLET ORAL
Qty: 0 | Refills: 0 | DISCHARGE
Start: 2021-04-14

## 2021-04-14 RX ADMIN — AMLODIPINE BESYLATE 5 MILLIGRAM(S): 2.5 TABLET ORAL at 05:52

## 2021-04-14 RX ADMIN — HEPARIN SODIUM 5000 UNIT(S): 5000 INJECTION INTRAVENOUS; SUBCUTANEOUS at 05:51

## 2021-04-14 RX ADMIN — Medication 650 MILLIGRAM(S): at 21:52

## 2021-04-14 RX ADMIN — HEPARIN SODIUM 5000 UNIT(S): 5000 INJECTION INTRAVENOUS; SUBCUTANEOUS at 21:52

## 2021-04-14 RX ADMIN — Medication 1: at 14:14

## 2021-04-14 RX ADMIN — HEPARIN SODIUM 5000 UNIT(S): 5000 INJECTION INTRAVENOUS; SUBCUTANEOUS at 14:41

## 2021-04-14 RX ADMIN — Medication 1: at 09:38

## 2021-04-14 RX ADMIN — Medication 650 MILLIGRAM(S): at 05:51

## 2021-04-14 RX ADMIN — Medication 650 MILLIGRAM(S): at 14:41

## 2021-04-14 RX ADMIN — Medication 40 MILLIGRAM(S): at 05:52

## 2021-04-14 RX ADMIN — Medication 40 MILLIEQUIVALENT(S): at 08:18

## 2021-04-14 RX ADMIN — Medication 2: at 17:55

## 2021-04-14 NOTE — PHYSICAL THERAPY INITIAL EVALUATION ADULT - GAIT DEVIATIONS NOTED, PT EVAL
decreased sari/increased time in double stance/decreased step length/decreased weight-shifting ability

## 2021-04-14 NOTE — PHYSICAL THERAPY INITIAL EVALUATION ADULT - PRECAUTIONS/LIMITATIONS, REHAB EVAL
CXR 4/11/21 Probable moderate sized right pneumothorax. Diffuse increased airspace opacities in the right lung, may represent unilateral pulmonary vascular congestion. CT scan of the chest is suggested for further evaluation.EKG junctional bradycardia 4/11/21; CT CHEST 4/12/21;B interlobular septal thickening w/small bilateral pleural effusions, findings likely representing pulmonary edema.CXR 4/12/21 Extensive R-sided pleural calcifications and overlying external cardiac pacemaker precluding evaluation of the underlying right lung for pathology./fall precautions

## 2021-04-14 NOTE — DISCHARGE NOTE PROVIDER - CARE PROVIDERS DIRECT ADDRESSES
,DirectAddress_Unknown ,DirectAddress_Unknown,[email or web address]

## 2021-04-14 NOTE — PROGRESS NOTE ADULT - PROBLEM SELECTOR PLAN 4
At presentation, patient with bradycardia, LUIS FERNANDO, hyperkalemia, consistent with BRASH syndrome  held atenolol and diltiazem as patient is rate controlled

## 2021-04-14 NOTE — PROGRESS NOTE ADULT - ASSESSMENT
85F with HTN, CKD (unknown baseline) here for lightheadedness, found to be bradycardic to the 30s due to likely hyperkalemia, now resolved.    Bradycardia (resolved): most likely related to hyperkalemia, and somewhat from potential BB/CCB toxicity. On atenolol and dilt at home   - continue to monitor K and ensure patient is not hyperkalemic  - no indication for PPM at this time    Afib: [id number] 4 (HTN, agex2, female) however not on AC  - no AC    HTN: uncontrolled, but limited due to potential bradycardia  - pt on amlod 5, tolerating well  - for BP control, can start hydralazine 25 TID if needed    Cardiology will sign off. Please reach out if further questions.     Molly Mays MD  Cardiology Fellow, PGY-4  [phone]  For all other Cardiology service contact information, go to [email or web address] and use "cardfellows" to login. 85F with HTN, CKD (unknown baseline) here for lightheadedness, found to be bradycardic to the 30s due to likely hyperkalemia, now resolved.    Bradycardia (resolved): most likely related to hyperkalemia, and somewhat from potential BB/CCB toxicity. On atenolol and dilt at home   - continue to monitor K and ensure patient is not hyperkalemic  - no indication for PPM at this time    Afib: [id number] 4 (HTN, agex2, female) however not on AC  - no AC    HTN: uncontrolled, but limited due to potential bradycardia  - pt on amlod 5, tolerating well  - for BP control, can start hydralazine 25 TID if needed        Molly Mays MD  Cardiology Fellow, PGY-4  [phone]  For all other Cardiology service contact information, go to [email or web address] and use "cardfellows" to login.

## 2021-04-14 NOTE — DISCHARGE NOTE PROVIDER - NSFOLLOWUPCLINICS_GEN_ALL_ED_FT
[facility]  Nephrology  [address] [place] [address], [address] [place] [address]  Phone: [phone]  Fax:

## 2021-04-14 NOTE — PROGRESS NOTE ADULT - ASSESSMENT
85F PMHx CKD, DM2/HTN who admitted for bradycardic, hyperkalemia, LUIS FERNANDO and UTI.  Nephrology consulted for LUIS FERNANDO on CKD.        # LUIS FERNANDO on CKD  Pt with non-oliguric LUIS FERNANDO on CKD likely 2/2 hemodynamically mediated pre renal in the setting hypotension, bradycardia in the setting taking 2 CCB, overdiuresis?, infection UTI.  On admission sCr elevated at 2.7 (reported baseline 2.4, last known sCr 1.15 in 2018) which improved with IVF to 2.4 on 2/11.  UA showed protein/trace blood/rbc  with 1.9g proteinuria. Duplex kidney show patent vessels w/ CKD changes. Last sCr 2.1  - consider resuming home med losartan given sCr at baseline.  Upon discharge avoid metformin given GFR<30.   - continue hold lasix/metolazone for now as pt is euvolemic on exam, will need to be resume outpatient   - monitor BMP, strict I/O, avoid nephrotoxic agents (NSAIDs, PPI, contrast), renally dose medications per GFR. Antibiotic per primary team    # Hyperkalemia  likely 2/2 CCB toxicity, hyperkalemia related.  Triage HR 30-50s, took diltiazem/atenolol, serum K 6.6 s/p medical management w/ improving of bradycardic and hyperkalemia.  Last serum K 3.5  - Low K diet, monitor serum K     # Acidosis  likely 2/2 LUIS FERNANDO on CKD.  On admission serum bicarb 11, pH 7.22/pCO2 34, pt received sodium bicarb IV then bicarb drip with resolve acidosis.  Last serum bicarbonate 21  - continue sodium bicarbonate  TID, monitor serum bicarb daily    # HTN  BP in acceptable range.   - Monitor BP on current BP medications. Low salt diet advised

## 2021-04-14 NOTE — PHYSICAL THERAPY INITIAL EVALUATION ADULT - TRANSFER SKILLS, REHAB EVAL
There are no preventive care reminders to display for this patient.    Patient is up to date, no discussion needed.             std cane/independent/needs device

## 2021-04-14 NOTE — PROGRESS NOTE ADULT - PROBLEM SELECTOR PLAN 1
2/2 HTN and pulmonary hypertension 2/2 COPD/emphysema  BP control, with amlodipine 5, restart home lasix and metolazone.

## 2021-04-14 NOTE — DISCHARGE NOTE PROVIDER - PROVIDER TOKENS
PROVIDER:[TOKEN:[[id number]],FOLLOWUP:[1 week],ESTABLISHEDPATIENT:[T]] PROVIDER:[TOKEN:[[id number]],FOLLOWUP:[1 week],ESTABLISHEDPATIENT:[T]],PROVIDER:[TOKEN:[[id number]],ESTABLISHEDPATIENT:[T]]

## 2021-04-14 NOTE — PROGRESS NOTE ADULT - PROBLEM SELECTOR PLAN 5
The patient has a positive UA with moderate bacteria, large LE, over 50 WBC suspicious for UTI.  Will start ceftriaxone 1g qdaily x 3 days.  urine culture growing E coli

## 2021-04-14 NOTE — PROGRESS NOTE ADULT - ATTENDING COMMENTS
84 yo female w/pmh HTN, HLD, atrial fibrillation, diabetes, anemia, CKD stage 4, COPD, ?asbestosis, presents to [facility] for symptomatic bradycardia and LUIS FERNANDO on CKD with hyperkalemia. LUIS FERNANDO thought to be pre-renal in etiology. Cardiology, nephrology consulted.     -patient has hx paroxysmal atrial fibrillation - not currently on AC but has no C/I - CHADsVASc = 5 (age, female, HTN, DM), will see what DOAC is covered by insurance  -holding atenolol, diltiazem for bradycardia to 30s upon arrival, is rate controlled on her own  -home inhalers: ventolin and anoro ellipta - here will continue spiriva, symbicort, and prn albuterol  -continue bicarb supplementation, potassium levels normal and Cr trending down to baseline  -amlodipine for BP control  -restart home dose diuretics    Likely discharge tomorrow. Pending PT recs today. Check ambulatory SpO2.

## 2021-04-14 NOTE — DISCHARGE NOTE PROVIDER - CARE PROVIDER_API CALL
FELICITAS BANKSARD  Internal Medicine  [address] [id number]  Phone: [phone]  Fax: [phone]  Established Patient  Follow Up Time: 1 week   ANA BANKS  Internal Medicine  [address] [place] [address]  Phone: [phone]  Fax: [phone]  Established Patient  Follow Up Time: 1 week    Shanell Spencer)  Critical Care Medicine  [address] [place] [address], [address] [place] [address]  Phone: [phone]  Fax: [phone]  Established Patient  Follow Up Time:

## 2021-04-14 NOTE — PROGRESS NOTE ADULT - ASSESSMENT
This patient is an 86yo lady with PMH of T2DM, COPD, htn, PVD with bilateral LE edema, CKD who presents to the ED with complaint of dizziness. The patient states that at about 8pm today, she had new onset of dizziness and felt that she was about to fall. Her granddaughter, a [facility] ED RN found the patient to have a heart rate in 30s, thus called EMS. The patient reports she had recently been started on a diuretic.   She denied having any chest pain, palpitations, dyspnea, N/V or abdominal pain.

## 2021-04-14 NOTE — PHYSICAL THERAPY INITIAL EVALUATION ADULT - PERTINENT HX OF CURRENT PROBLEM, REHAB EVAL
86yo lady with PMH of T2DM, COPD, htn, PVD with bilateral LE edema, CKD who presents to the ED with complaint of dizziness, found to have bradycardia, LUIS FERNANDO and hyperkalemia. + UTI 4/11/21 , COVID antibody + 4/12/21 ; H/h 8.4/26.4  recent 4/14/21 ; EKG 4/11/21 junctional bradycardia

## 2021-04-14 NOTE — PHYSICAL THERAPY INITIAL EVALUATION ADULT - ADDITIONAL COMMENTS
pt lives with family in house 15 steps with HR inside ; pt has a std cane uses for amb per cm note independent , independent in ADL"s , has functioning glucometer and indep in its use ; has GRand dtr Aylin [phone] is emergency contact

## 2021-04-14 NOTE — PROGRESS NOTE ADULT - PROBLEM SELECTOR PLAN 6
s/p  calcium gluconate, sodium bicarbonate, lokelma, humulin 5units + D50 and albuterol nebulizer x1 with improvement  was due to LUIS FERNANDO, resolved

## 2021-04-14 NOTE — DISCHARGE NOTE PROVIDER - NSDCMRMEDTOKEN_GEN_ALL_CORE_FT
amLODIPine 5 mg oral tablet: 1 tab(s) orally once a day  ANORO ELLIPTA 62.5-25 MCG INH: TAKE 1 PUFF BY MOUTH EVERY DAY  furosemide 40 mg oral tablet: 1 tab(s) orally once a day  metFORMIN 1000 mg oral tablet: 1 tab(s) orally 2 times a day  metOLazone 2.5 mg oral tablet: 1 tab(s) orally once a day  sodium bicarbonate 650 mg oral tablet: 1 tab(s) orally 3 times a day   amLODIPine 5 mg oral tablet: 1 tab(s) orally once a day  ANORO ELLIPTA 62.5-25 MCG INH: TAKE 1 PUFF BY MOUTH EVERY DAY  apixaban 2.5 mg oral tablet: 1 tab(s) orally 2 times a day   furosemide 40 mg oral tablet: 1 tab(s) orally once a day  metFORMIN 1000 mg oral tablet: 1 tab(s) orally 2 times a day  metOLazone 2.5 mg oral tablet: 1 tab(s) orally every 48 hours  rolling walker: Rolling Walker  ICD10: J44.1 and I73.9  sodium bicarbonate 650 mg oral tablet: 1 tab(s) orally 3 times a day

## 2021-04-14 NOTE — DISCHARGE NOTE PROVIDER - HOSPITAL COURSE
This patient is an 86yo lady with PMH of T2DM, COPD, htn, PVD with bilateral LE edema, CKD who presents to the ED with complaint of dizziness. She communicated with me in [place]iny[place] and stated that at about 8pm today, she had new onset of dizziness and felt that she was about to fall. She denied having any chest pain, palpitations, dyspnea, N/V or abdominal pain.  The patient reports she had recently been started on a diuretic.  Her granddaughter, a [facility] ED provided additional information over the phone. The patient had complained of feeling dizzy to her son. Because the patient has a tendency to wear multiple layers and overheat, they helped her remove some extra layers. When the granddaughter came home, she found the patient was too weak to walk and had a heart rate in the 30s. EMS was thus called.    Of note, the patient had an episode of hyperkalemia in 2015, and required hospitalization at [facility]. She had visited her PMD within the last week, and had been asymptomatic at the time. The patient does not have a nephrologist. She was started on a new medication several months ago for persistent hypertension.     Hospital course: Patient's diltiazem and atenolol was held. Temporizing measures including lokelma, insulin with D50, was given. Patient's potassium went back to normal. HR went back to 60s.    This patient is an 86yo lady with PMH of T2DM, COPD, htn, PVD with bilateral LE edema, CKD who presents to the ED with complaint of dizziness. She communicated with me in Nepali and stated that at about 8pm today, she had new onset of dizziness and felt that she was about to fall. She denied having any chest pain, palpitations, dyspnea, N/V or abdominal pain.  The patient reports she had recently been started on a diuretic.  Her granddaughter, a [facility] ED provided additional information over the phone. The patient had complained of feeling dizzy to her son. Because the patient has a tendency to wear multiple layers and overheat, they helped her remove some extra layers. When the granddaughter came home, she found the patient was too weak to walk and had a heart rate in the 30s. EMS was thus called.    Of note, the patient had an episode of hyperkalemia in 2015, and required hospitalization at [facility]. She had visited her PMD within the last week, and had been asymptomatic at the time. The patient does not have a nephrologist. She was started on a new medication several months ago for persistent hypertension.     Hospital course: Pt with constellation of hyperkalemia, bradycardia, while on beta blocker, with renal failure consistent with BRASH syndrome while on diltiazem, losartan, and atenolol, unclear etiology of inciting factor. Patient's diltiazem and atenolol was held. Temporizing measures including lokelma, insulin with D50, was given.  Diuretics held due to LUIS FERNANDO on likely CKD. Patient's potassium normalized, bradycardia resolved, renal function improved. Pt’s course complicated by hypertension and flash pulmonary edema, requiring bipap. Pt was restarted on home diuretics with improvement and setting well on RA.     Pt medically stable for discharge with close PCP followup.

## 2021-04-14 NOTE — PROGRESS NOTE ADULT - ATTENDING COMMENTS
Agree with plan as outlined above.  Impression and plan as outlined previously with any changes from initial plan as outlined by fellow's note.

## 2021-04-14 NOTE — PROGRESS NOTE ADULT - PROBLEM SELECTOR PLAN 2
Per PCP, patient with afib  most time in sinus on tele. Brenton paroxysmal  rate controlled, CHADSVASc score 6  Will see if we can start DOAC renally dosed since there are no contraindications - no history of GI bleeding. No falls.

## 2021-04-14 NOTE — DISCHARGE NOTE PROVIDER - NSDCCPCAREPLAN_GEN_ALL_CORE_FT
PRINCIPAL DISCHARGE DIAGNOSIS  Diagnosis: Symptomatic bradycardia  Assessment and Plan of Treatment:       SECONDARY DISCHARGE DIAGNOSES  Diagnosis: Dehydration, mild  Assessment and Plan of Treatment:     Diagnosis: Hyperkalemia  Assessment and Plan of Treatment:     Diagnosis: ARF (acute renal failure)  Assessment and Plan of Treatment:      PRINCIPAL DISCHARGE DIAGNOSIS  Diagnosis: Symptomatic bradycardia  Assessment and Plan of Treatment: You came in with very low heart rate and renal failure while on certain medications. This is called BRASH syndrome. We stopped the offending medications and have placed you on alternate blood pressure medications. Please follow-up with your PCP regarding controlling your blood pressure.      SECONDARY DISCHARGE DIAGNOSES  Diagnosis: Hyperkalemia  Assessment and Plan of Treatment: You developed    Diagnosis: ARF (acute renal failure)  Assessment and Plan of Treatment:      PRINCIPAL DISCHARGE DIAGNOSIS  Diagnosis: Symptomatic bradycardia  Assessment and Plan of Treatment: You came in with very low heart rate and renal failure while on certain medications. This is called BRASH syndrome. We stopped the offending medications and have placed you on alternate blood pressure medications. Please follow-up with your PCP regarding controlling your blood pressure.      SECONDARY DISCHARGE DIAGNOSES  Diagnosis: Hyperkalemia  Assessment and Plan of Treatment: You came in with very low heart rate and renal failure with high potassum while on certain medications. This is called BRASH syndrome. We stopped the offending medications and have placed you on alternate blood pressure medications. Please follow-up with your PCP and see a kidney doctor.    Diagnosis: Pulmonary hypertension  Assessment and Plan of Treatment: We did a ultrasound on your heart and you were noticed to have a high blood pressure. It is likely from your COPD. Please follow up with your lung doctor for that.    Diagnosis: Acute pulmonary edema  Assessment and Plan of Treatment: You had an episode of pulmonary edema, which is fluid accumulate in your lung. We restart your water pills, and it is improved. Please continue to take your diuretics (water pill) as prescribed.    Diagnosis: ARF (acute renal failure)  Assessment and Plan of Treatment:      PRINCIPAL DISCHARGE DIAGNOSIS  Diagnosis: Symptomatic bradycardia  Assessment and Plan of Treatment: You came in with very low heart rate and renal failure while on certain medications. This is called BRASH syndrome. We stopped the offending medications and have placed you on alternate blood pressure medications. Please follow-up with your PCP regarding controlling your blood pressure.      SECONDARY DISCHARGE DIAGNOSES  Diagnosis: Hyperkalemia  Assessment and Plan of Treatment: You came in with very low heart rate and renal failure with high potassum while on certain medications. This is called BRASH syndrome. We stopped the offending medications and have placed you on alternate blood pressure medications. Please follow-up with your PCP and see a kidney doctor.    Diagnosis: Pulmonary hypertension  Assessment and Plan of Treatment: We did a ultrasound on your heart and you were noticed to have a high blood pressure. It is likely from your COPD. Please follow up with your lung doctor for that.    Diagnosis: Acute pulmonary edema  Assessment and Plan of Treatment: You had an episode of pulmonary edema, which is fluid accumulate in your lung. We restart your water pills, and it is improved. Please continue to take your diuretics (water pill) as prescribed.    Diagnosis: Paroxysmal atrial fibrillation  Assessment and Plan of Treatment: You have atrial fibrillation. We started a blood thinner called eliquis for you. Please continue to take it and follow up with your primary care doctor. if you notice excessive bleeding, please come to ER.     PRINCIPAL DISCHARGE DIAGNOSIS  Diagnosis: Symptomatic bradycardia  Assessment and Plan of Treatment: You came in with very low heart rate and renal failure while on certain medications. This is called BRASH syndrome. We stopped the offending medications and have placed you on alternate blood pressure medications. Please continue taking these medications and please follow-up with your PCP regarding controlling your blood pressure.      SECONDARY DISCHARGE DIAGNOSES  Diagnosis: Hyperkalemia  Assessment and Plan of Treatment: You came in with very low heart rate and renal failure with high potassum while on certain medications. This is called BRASH syndrome. We stopped the offending medications and have placed you on alternate blood pressure medications. Please follow-up with your PCP and see a kidney doctor.    Diagnosis: Acute pulmonary edema  Assessment and Plan of Treatment: You had an episode of pulmonary edema, which is fluid accumulate in your lung. We restart your water pills, and it is improved. Please continue to take your diuretics (water pill) as prescribed.    Diagnosis: Pulmonary hypertension  Assessment and Plan of Treatment: We did a ultrasound on your heart and you were noticed to have a high blood pressure. It is likely from your COPD. Please follow up with your lung doctor for that.    Diagnosis: Paroxysmal atrial fibrillation  Assessment and Plan of Treatment: You have atrial fibrillation. We started a blood thinner called eliquis for you to reduce your risk of stroke from afib. Please continue to take it and follow up with your primary care doctor. If you notice any significant bleeding, please stop taking this medication and come to ER immediately.     PRINCIPAL DISCHARGE DIAGNOSIS  Diagnosis: Symptomatic bradycardia  Assessment and Plan of Treatment: You came in with very low heart rate and renal failure while on certain medications. This is called BRASH syndrome. We stopped the offending medications and have placed you on alternate blood pressure medications. Please continue taking these medications and please follow-up with your PCP regarding controlling your blood pressure.      SECONDARY DISCHARGE DIAGNOSES  Diagnosis: Hyperkalemia  Assessment and Plan of Treatment: You came in with very low heart rate and renal failure with high potassum while on certain medications. This is called BRASH syndrome. We stopped the offending medications and have placed you on alternate blood pressure medications. Please follow-up with your PCP and see a kidney doctor.    Diagnosis: Acute pulmonary edema  Assessment and Plan of Treatment: You had an episode of pulmonary edema, which is fluid accumulate in your lung. We restart your water pills, and it is improved. Please continue to take your diuretics (water pill) as prescribed.    Diagnosis: Pulmonary hypertension  Assessment and Plan of Treatment: We did a ultrasound on your heart and you were noticed to have a high blood pressure. It is likely from your COPD. Please follow up with your lung doctor for that.    Diagnosis: Paroxysmal atrial fibrillation  Assessment and Plan of Treatment: You have atrial fibrillation. We started a blood thinner called eliquis for you to reduce your risk of stroke from afib. Please continue to take it and follow up with your primary care doctor. If you notice any significant bleeding, please stop taking this medication and come to ER immediately.    Diagnosis: Elevated liver enzymes  Assessment and Plan of Treatment: You had intermittent elevated liver enzymes. This is likely due to gallstones (seen previously in your records). Please follow-up with your PCP in 1-2 weeks to repeat a blood test called a comprehensive metabolic panel (CMP) to ensure that this has resolved. Please discuss with your PCP what can be done about your gallstones.

## 2021-04-14 NOTE — PROGRESS NOTE ADULT - PROBLEM SELECTOR PLAN 3
CKD stage 4  per PCP baseline Cr ~2.2, now at baseline  LUIS FERNANDO due to volume depletion, improved after getting IVF wife

## 2021-04-15 DIAGNOSIS — N17.9 ACUTE KIDNEY FAILURE, UNSPECIFIED: ICD-10-CM

## 2021-04-15 LAB
ALBUMIN SERPL ELPH-MCNC: 3.2 G/DL — LOW (ref 3.3–5)
ALP SERPL-CCNC: 195 U/L — HIGH (ref 40–120)
ALT FLD-CCNC: 134 U/L — HIGH (ref 10–45)
ANION GAP SERPL CALC-SCNC: 13 MMOL/L — SIGNIFICANT CHANGE UP (ref 5–17)
AST SERPL-CCNC: 127 U/L — HIGH (ref 10–40)
BILIRUB SERPL-MCNC: 0.5 MG/DL — SIGNIFICANT CHANGE UP (ref 0.2–1.2)
BUN SERPL-MCNC: 52 MG/DL — HIGH (ref 7–23)
CALCIUM SERPL-MCNC: 8.8 MG/DL — SIGNIFICANT CHANGE UP (ref 8.4–10.5)
CHLORIDE SERPL-SCNC: 108 MMOL/L — SIGNIFICANT CHANGE UP (ref 96–108)
CO2 SERPL-SCNC: 22 MMOL/L — SIGNIFICANT CHANGE UP (ref 22–31)
CREAT SERPL-MCNC: 1.9 MG/DL — HIGH (ref 0.5–1.3)
GLUCOSE BLDC GLUCOMTR-MCNC: 143 MG/DL — HIGH (ref 70–99)
GLUCOSE BLDC GLUCOMTR-MCNC: 189 MG/DL — HIGH (ref 70–99)
GLUCOSE BLDC GLUCOMTR-MCNC: 223 MG/DL — HIGH (ref 70–99)
GLUCOSE BLDC GLUCOMTR-MCNC: 262 MG/DL — HIGH (ref 70–99)
GLUCOSE SERPL-MCNC: 132 MG/DL — HIGH (ref 70–99)
HCT VFR BLD CALC: 29.5 % — LOW (ref 34.5–45)
HGB BLD-MCNC: 9.4 G/DL — LOW (ref 11.5–15.5)
MAGNESIUM SERPL-MCNC: 2.1 MG/DL — SIGNIFICANT CHANGE UP (ref 1.6–2.6)
MCHC RBC-ENTMCNC: 31.9 GM/DL — LOW (ref 32–36)
MCHC RBC-ENTMCNC: 32.6 PG — SIGNIFICANT CHANGE UP (ref 27–34)
MCV RBC AUTO: 102.4 FL — HIGH (ref 80–100)
NRBC # BLD: 0 /100 WBCS — SIGNIFICANT CHANGE UP (ref 0–0)
PHOSPHATE SERPL-MCNC: 3.9 MG/DL — SIGNIFICANT CHANGE UP (ref 2.5–4.5)
PLATELET # BLD AUTO: 208 K/UL — SIGNIFICANT CHANGE UP (ref 150–400)
POTASSIUM SERPL-MCNC: 3.6 MMOL/L — SIGNIFICANT CHANGE UP (ref 3.5–5.3)
POTASSIUM SERPL-SCNC: 3.6 MMOL/L — SIGNIFICANT CHANGE UP (ref 3.5–5.3)
PROT SERPL-MCNC: 6.7 G/DL — SIGNIFICANT CHANGE UP (ref 6–8.3)
RBC # BLD: 2.88 M/UL — LOW (ref 3.8–5.2)
RBC # FLD: 13.4 % — SIGNIFICANT CHANGE UP (ref 10.3–14.5)
SODIUM SERPL-SCNC: 143 MMOL/L — SIGNIFICANT CHANGE UP (ref 135–145)
WBC # BLD: 5.99 K/UL — SIGNIFICANT CHANGE UP (ref 3.8–10.5)
WBC # FLD AUTO: 5.99 K/UL — SIGNIFICANT CHANGE UP (ref 3.8–10.5)

## 2021-04-15 PROCEDURE — 99232 SBSQ HOSP IP/OBS MODERATE 35: CPT | Mod: GC

## 2021-04-15 RX ORDER — POTASSIUM CHLORIDE 20 MEQ
40 PACKET (EA) ORAL ONCE
Refills: 0 | Status: COMPLETED | OUTPATIENT
Start: 2021-04-15 | End: 2021-04-15

## 2021-04-15 RX ORDER — AMLODIPINE BESYLATE 2.5 MG/1
1 TABLET ORAL
Qty: 30 | Refills: 0
Start: 2021-04-15 | End: 2021-05-14

## 2021-04-15 RX ORDER — SODIUM BICARBONATE 1 MEQ/ML
1 SYRINGE (ML) INTRAVENOUS
Qty: 90 | Refills: 0
Start: 2021-04-15 | End: 2021-05-14

## 2021-04-15 RX ORDER — FUROSEMIDE 40 MG
40 TABLET ORAL DAILY
Refills: 0 | Status: DISCONTINUED | OUTPATIENT
Start: 2021-04-15 | End: 2021-04-16

## 2021-04-15 RX ORDER — APIXABAN 2.5 MG/1
1 TABLET, FILM COATED ORAL
Qty: 60 | Refills: 0
Start: 2021-04-15 | End: 2021-05-14

## 2021-04-15 RX ADMIN — Medication 650 MILLIGRAM(S): at 21:30

## 2021-04-15 RX ADMIN — HEPARIN SODIUM 5000 UNIT(S): 5000 INJECTION INTRAVENOUS; SUBCUTANEOUS at 05:45

## 2021-04-15 RX ADMIN — Medication 40 MILLIEQUIVALENT(S): at 11:07

## 2021-04-15 RX ADMIN — Medication 650 MILLIGRAM(S): at 13:27

## 2021-04-15 RX ADMIN — Medication 40 MILLIGRAM(S): at 13:52

## 2021-04-15 RX ADMIN — Medication 650 MILLIGRAM(S): at 05:44

## 2021-04-15 RX ADMIN — Medication 3: at 13:28

## 2021-04-15 RX ADMIN — Medication 1: at 18:25

## 2021-04-15 RX ADMIN — HEPARIN SODIUM 5000 UNIT(S): 5000 INJECTION INTRAVENOUS; SUBCUTANEOUS at 21:30

## 2021-04-15 RX ADMIN — HEPARIN SODIUM 5000 UNIT(S): 5000 INJECTION INTRAVENOUS; SUBCUTANEOUS at 13:28

## 2021-04-15 RX ADMIN — AMLODIPINE BESYLATE 5 MILLIGRAM(S): 2.5 TABLET ORAL at 05:44

## 2021-04-15 NOTE — CHART NOTE - NSCHARTNOTEFT_GEN_A_CORE
Talked with Dr. Vipin Porter [phone], patient's PCP. Per PCP, patient on diltiazem and atenolol the same time due to afib. Patient Cr is at baseline.  During patient's this hospital course, patient has been in sinus rhythm. CHADSVASc score is 6. Will defer start of AC to PCP.   Dawit Zarate, PGY1  Internal Medicine  [id number]/[phone]
Nephrology signing off the case given resolve LUIS FERNANDO and now patient is back to baseline sCr 1.9-2.0  please re-consult if needed  thank you

## 2021-04-15 NOTE — PROGRESS NOTE ADULT - PROBLEM SELECTOR PLAN 9
Hold atenolol and diltiazem due to bradycardia.  Hold losartan for now  Lasix held due to LUIS FERNANDO Hold atenolol and diltiazem due to bradycardia.  Hold losartan for now

## 2021-04-15 NOTE — PROVIDER CONTACT NOTE (OTHER) - ACTION/TREATMENT ORDERED:
None ordered, will continue to monitor
Dr. Piedra aware. No immediate interventions at this time. Will continue to monitor pt.

## 2021-04-15 NOTE — PROGRESS NOTE ADULT - ATTENDING COMMENTS
86 yo female w/pmh HTN, HLD, atrial fibrillation, diabetes, anemia, CKD stage 4, COPD, ?asbestosis, presents to [facility] for symptomatic bradycardia and LUIS FERNANDO on CKD with hyperkalemia. LUIS FERNANDO thought to be pre-renal in etiology. Cardiology, nephrology consulted.     -patient has hx paroxysmal atrial fibrillation - not currently on AC but has no C/I - CHADsVASc = 5 (age, female, HTN, DM), eliquis is covered by insurance  -holding atenolol, diltiazem for bradycardia to 30s upon arrival, is rate controlled on her own  -home inhalers: ventolin and anoro ellipta - here will continue spiriva, symbicort, and prn albuterol  -continue bicarb supplementation  -amlodipine for BP control  -restart home dose diuretics  -rise in AST/ALT likely due to recent ceftriaxone administration, needs f/u with outpatient PCP for repeat CMP    Awaiting bed at [facility]. Medically stable for discharge. 35 minutes spent coordinating discharge.

## 2021-04-15 NOTE — PROGRESS NOTE ADULT - ASSESSMENT
This patient is an 86yo lady with PMH of T2DM, COPD, htn, PVD with bilateral LE edema, CKD who presents to the ED with complaint of dizziness. The patient states that at about 8pm today, she had new onset of dizziness and felt that she was about to fall. Her granddaughter, a [facility] ED RN found the patient to have a heart rate in 30s, thus called EMS. The patient reports she had recently been started on a diuretic.   She denied having any chest pain, palpitations, dyspnea, N/V or abdominal pain.

## 2021-04-15 NOTE — PROGRESS NOTE ADULT - PROBLEM SELECTOR PLAN 2
Per PCP, patient with afib  most time in sinus on tele. Brenton paroxysmal  rate controlled, CHADSVASc score 6  Will see if we can start DOAC renally dosed since there are no contraindications - no history of GI bleeding. No falls. Per PCP, patient with afib  most time in sinus on tele. Brenton paroxysmal  rate controlled, CHADSVASc score 5  will start eliquis renally dosed since there are no contraindications - no history of GI bleeding. No falls.

## 2021-04-15 NOTE — PROGRESS NOTE ADULT - PROBLEM SELECTOR PLAN 3
CKD stage 4  per PCP baseline Cr ~2.2, now at baseline  LUIS FERNANDO due to volume depletion, improved after getting IVF

## 2021-04-15 NOTE — PROVIDER CONTACT NOTE (OTHER) - ASSESSMENT
pt is asleep; On BIPAP
Pt is A&O x 4, denies chest pain, SOB, dizziness. VS: Temp 97.9, /70, RR 19, O2 sat 97%. On tele pt Afib in 50s.

## 2021-04-15 NOTE — PROVIDER CONTACT NOTE (OTHER) - BACKGROUND
Patient with admitting diagnosis of bradycardia. History of COPD, HTN, PVD, T2DM
admitted for bradycardia , ARF, UTI, HTN, pulmonary edema. pleural effusion

## 2021-04-16 ENCOUNTER — TRANSCRIPTION ENCOUNTER (OUTPATIENT)
Age: 86
End: 2021-04-16

## 2021-04-16 VITALS — WEIGHT: 151.46 LBS

## 2021-04-16 LAB
ALBUMIN SERPL ELPH-MCNC: 2.9 G/DL — LOW (ref 3.3–5)
ALP SERPL-CCNC: 184 U/L — HIGH (ref 40–120)
ALT FLD-CCNC: 105 U/L — HIGH (ref 10–45)
ANION GAP SERPL CALC-SCNC: 14 MMOL/L — SIGNIFICANT CHANGE UP (ref 5–17)
AST SERPL-CCNC: 71 U/L — HIGH (ref 10–40)
BASOPHILS # BLD AUTO: 0.03 K/UL — SIGNIFICANT CHANGE UP (ref 0–0.2)
BASOPHILS NFR BLD AUTO: 0.5 % — SIGNIFICANT CHANGE UP (ref 0–2)
BILIRUB SERPL-MCNC: 0.4 MG/DL — SIGNIFICANT CHANGE UP (ref 0.2–1.2)
BUN SERPL-MCNC: 59 MG/DL — HIGH (ref 7–23)
CALCIUM SERPL-MCNC: 8.8 MG/DL — SIGNIFICANT CHANGE UP (ref 8.4–10.5)
CHLORIDE SERPL-SCNC: 108 MMOL/L — SIGNIFICANT CHANGE UP (ref 96–108)
CO2 SERPL-SCNC: 21 MMOL/L — LOW (ref 22–31)
CREAT SERPL-MCNC: 1.98 MG/DL — HIGH (ref 0.5–1.3)
EOSINOPHIL # BLD AUTO: 0.23 K/UL — SIGNIFICANT CHANGE UP (ref 0–0.5)
EOSINOPHIL NFR BLD AUTO: 3.9 % — SIGNIFICANT CHANGE UP (ref 0–6)
GLUCOSE BLDC GLUCOMTR-MCNC: 178 MG/DL — HIGH (ref 70–99)
GLUCOSE SERPL-MCNC: 147 MG/DL — HIGH (ref 70–99)
HCT VFR BLD CALC: 29 % — LOW (ref 34.5–45)
HGB BLD-MCNC: 9.3 G/DL — LOW (ref 11.5–15.5)
IMM GRANULOCYTES NFR BLD AUTO: 0.2 % — SIGNIFICANT CHANGE UP (ref 0–1.5)
LYMPHOCYTES # BLD AUTO: 1.13 K/UL — SIGNIFICANT CHANGE UP (ref 1–3.3)
LYMPHOCYTES # BLD AUTO: 19.3 % — SIGNIFICANT CHANGE UP (ref 13–44)
MAGNESIUM SERPL-MCNC: 2.1 MG/DL — SIGNIFICANT CHANGE UP (ref 1.6–2.6)
MCHC RBC-ENTMCNC: 32.1 GM/DL — SIGNIFICANT CHANGE UP (ref 32–36)
MCHC RBC-ENTMCNC: 33.1 PG — SIGNIFICANT CHANGE UP (ref 27–34)
MCV RBC AUTO: 103.2 FL — HIGH (ref 80–100)
MONOCYTES # BLD AUTO: 0.77 K/UL — SIGNIFICANT CHANGE UP (ref 0–0.9)
MONOCYTES NFR BLD AUTO: 13.1 % — SIGNIFICANT CHANGE UP (ref 2–14)
NEUTROPHILS # BLD AUTO: 3.7 K/UL — SIGNIFICANT CHANGE UP (ref 1.8–7.4)
NEUTROPHILS NFR BLD AUTO: 63 % — SIGNIFICANT CHANGE UP (ref 43–77)
NRBC # BLD: 0 /100 WBCS — SIGNIFICANT CHANGE UP (ref 0–0)
PHOSPHATE SERPL-MCNC: 4.2 MG/DL — SIGNIFICANT CHANGE UP (ref 2.5–4.5)
PLATELET # BLD AUTO: 221 K/UL — SIGNIFICANT CHANGE UP (ref 150–400)
POTASSIUM SERPL-MCNC: 3.8 MMOL/L — SIGNIFICANT CHANGE UP (ref 3.5–5.3)
POTASSIUM SERPL-SCNC: 3.8 MMOL/L — SIGNIFICANT CHANGE UP (ref 3.5–5.3)
PROT SERPL-MCNC: 6.6 G/DL — SIGNIFICANT CHANGE UP (ref 6–8.3)
RBC # BLD: 2.81 M/UL — LOW (ref 3.8–5.2)
RBC # FLD: 13.1 % — SIGNIFICANT CHANGE UP (ref 10.3–14.5)
SARS-COV-2 RNA SPEC QL NAA+PROBE: SIGNIFICANT CHANGE UP
SODIUM SERPL-SCNC: 143 MMOL/L — SIGNIFICANT CHANGE UP (ref 135–145)
WBC # BLD: 5.87 K/UL — SIGNIFICANT CHANGE UP (ref 3.8–10.5)
WBC # FLD AUTO: 5.87 K/UL — SIGNIFICANT CHANGE UP (ref 3.8–10.5)

## 2021-04-16 PROCEDURE — 82330 ASSAY OF CALCIUM: CPT

## 2021-04-16 PROCEDURE — 96365 THER/PROPH/DIAG IV INF INIT: CPT

## 2021-04-16 PROCEDURE — 84100 ASSAY OF PHOSPHORUS: CPT

## 2021-04-16 PROCEDURE — 71250 CT THORAX DX C-: CPT

## 2021-04-16 PROCEDURE — 82103 ALPHA-1-ANTITRYPSIN TOTAL: CPT

## 2021-04-16 PROCEDURE — 83036 HEMOGLOBIN GLYCOSYLATED A1C: CPT

## 2021-04-16 PROCEDURE — 94660 CPAP INITIATION&MGMT: CPT

## 2021-04-16 PROCEDURE — 82746 ASSAY OF FOLIC ACID SERUM: CPT

## 2021-04-16 PROCEDURE — 94640 AIRWAY INHALATION TREATMENT: CPT

## 2021-04-16 PROCEDURE — 96375 TX/PRO/DX INJ NEW DRUG ADDON: CPT

## 2021-04-16 PROCEDURE — 86769 SARS-COV-2 COVID-19 ANTIBODY: CPT

## 2021-04-16 PROCEDURE — 99233 SBSQ HOSP IP/OBS HIGH 50: CPT

## 2021-04-16 PROCEDURE — 81001 URINALYSIS AUTO W/SCOPE: CPT

## 2021-04-16 PROCEDURE — U0005: CPT

## 2021-04-16 PROCEDURE — 99291 CRITICAL CARE FIRST HOUR: CPT

## 2021-04-16 PROCEDURE — 85730 THROMBOPLASTIN TIME PARTIAL: CPT

## 2021-04-16 PROCEDURE — 80048 BASIC METABOLIC PNL TOTAL CA: CPT

## 2021-04-16 PROCEDURE — 93306 TTE W/DOPPLER COMPLETE: CPT

## 2021-04-16 PROCEDURE — 83935 ASSAY OF URINE OSMOLALITY: CPT

## 2021-04-16 PROCEDURE — 83735 ASSAY OF MAGNESIUM: CPT

## 2021-04-16 PROCEDURE — U0003: CPT

## 2021-04-16 PROCEDURE — 85610 PROTHROMBIN TIME: CPT

## 2021-04-16 PROCEDURE — 87635 SARS-COV-2 COVID-19 AMP PRB: CPT

## 2021-04-16 PROCEDURE — 87077 CULTURE AEROBIC IDENTIFY: CPT

## 2021-04-16 PROCEDURE — 84132 ASSAY OF SERUM POTASSIUM: CPT

## 2021-04-16 PROCEDURE — 85025 COMPLETE CBC W/AUTO DIFF WBC: CPT

## 2021-04-16 PROCEDURE — 87086 URINE CULTURE/COLONY COUNT: CPT

## 2021-04-16 PROCEDURE — 71045 X-RAY EXAM CHEST 1 VIEW: CPT

## 2021-04-16 PROCEDURE — 80053 COMPREHEN METABOLIC PANEL: CPT

## 2021-04-16 PROCEDURE — 85027 COMPLETE CBC AUTOMATED: CPT

## 2021-04-16 PROCEDURE — 99239 HOSP IP/OBS DSCHRG MGMT >30: CPT | Mod: GC

## 2021-04-16 PROCEDURE — 82803 BLOOD GASES ANY COMBINATION: CPT

## 2021-04-16 PROCEDURE — 87186 SC STD MICRODIL/AGAR DIL: CPT

## 2021-04-16 PROCEDURE — 84295 ASSAY OF SERUM SODIUM: CPT

## 2021-04-16 PROCEDURE — 97161 PT EVAL LOW COMPLEX 20 MIN: CPT

## 2021-04-16 PROCEDURE — 93975 VASCULAR STUDY: CPT

## 2021-04-16 PROCEDURE — 85018 HEMOGLOBIN: CPT

## 2021-04-16 PROCEDURE — 82962 GLUCOSE BLOOD TEST: CPT

## 2021-04-16 PROCEDURE — 82435 ASSAY OF BLOOD CHLORIDE: CPT

## 2021-04-16 PROCEDURE — 82607 VITAMIN B-12: CPT

## 2021-04-16 PROCEDURE — 84300 ASSAY OF URINE SODIUM: CPT

## 2021-04-16 PROCEDURE — 84156 ASSAY OF PROTEIN URINE: CPT

## 2021-04-16 PROCEDURE — 84484 ASSAY OF TROPONIN QUANT: CPT

## 2021-04-16 PROCEDURE — 99292 CRITICAL CARE ADDL 30 MIN: CPT

## 2021-04-16 PROCEDURE — 96361 HYDRATE IV INFUSION ADD-ON: CPT

## 2021-04-16 PROCEDURE — 82570 ASSAY OF URINE CREATININE: CPT

## 2021-04-16 PROCEDURE — 82947 ASSAY GLUCOSE BLOOD QUANT: CPT

## 2021-04-16 PROCEDURE — 84443 ASSAY THYROID STIM HORMONE: CPT

## 2021-04-16 PROCEDURE — 85014 HEMATOCRIT: CPT

## 2021-04-16 PROCEDURE — 83605 ASSAY OF LACTIC ACID: CPT

## 2021-04-16 RX ADMIN — AMLODIPINE BESYLATE 5 MILLIGRAM(S): 2.5 TABLET ORAL at 06:11

## 2021-04-16 RX ADMIN — Medication 650 MILLIGRAM(S): at 06:11

## 2021-04-16 RX ADMIN — Medication 40 MILLIGRAM(S): at 06:11

## 2021-04-16 RX ADMIN — Medication 1: at 09:36

## 2021-04-16 RX ADMIN — HEPARIN SODIUM 5000 UNIT(S): 5000 INJECTION INTRAVENOUS; SUBCUTANEOUS at 06:12

## 2021-04-16 NOTE — DIETITIAN INITIAL EVALUATION ADULT. - OTHER INFO
pt speaks Egyptian and refused translation services/interview  Intake : % of meals as per flow sheet  Denies nausea/vomit :?  Denies difficulty chewing /swallow :?  Denies diarrhea/constipation:?  Last BM : 11/13  NKFA  IBW +/- 10%= 110pounds  Ht: 62"  Ht taken from EMR  Dosing ht: 157.5cm  Usual Weight PTA: 11/2/2020: 167pounds  Dosing wt: N/A  BMI: 27.6  BMI calculated using wt from flow sheet  BMI calculated using ht from EMR  wt used to calculate needs: current  Education Provided : N/A  pressure injury: none  edema: +1 generalized, +3 left leg, right leg

## 2021-04-16 NOTE — DIETITIAN INITIAL EVALUATION ADULT. - PROBLEM SELECTOR PLAN 8
No recent previous values available for comparison.  Differential includes but is not limited to NAFLD, viral hepatitis, less likely  hypoperfusion.  Will repeat liver enzymes. If persistently elevated, obtain hepatic US.

## 2021-04-16 NOTE — DIETITIAN INITIAL EVALUATION ADULT. - PROBLEM SELECTOR PLAN 3
Patient has SCr of 2.72 today. Her baseline Scr is unknown, per granddaughter.  She still produces urine.   After receiving 3L LR, this patient's SCr is beginning to downtrend.   Monitor UO. Avoid nephrotoxic agents. Hold diuretics for now. Trend SCr.   If SCr does not normalize, check renal and bladder US.  Hold losartan due to LUIS FERNANDO. Monitor VS q4h. If hypertensive, start hydralazine.

## 2021-04-16 NOTE — PROGRESS NOTE ADULT - PROBLEM SELECTOR PLAN 1
2/2 HTN and pulmonary hypertension 2/2 COPD/emphysema  BP control, with amlodipine 5, restart home lasix and metolazone.  Now sating well on RA

## 2021-04-16 NOTE — DIETITIAN INITIAL EVALUATION ADULT. - PROBLEM SELECTOR PLAN 7
The patient has lactic acidosis with CKD contributing to acidosis.  Correct renal function as noted above. Treat UTI.

## 2021-04-16 NOTE — DIETITIAN INITIAL EVALUATION ADULT. - PROBLEM SELECTOR PLAN 2
This patient takes atenolol and diltiazem, which could have contributed to her bradycardia. Hold both for now.  Monitor on telemetry. Keeps pads on  Check TTE.  Follow up TSH.

## 2021-04-16 NOTE — PROGRESS NOTE ADULT - PROBLEM SELECTOR PLAN 3
RESOLVED  CKD stage 4  per PCP baseline Cr ~2.2, now at baseline  LUIS FERNANDO due to volume depletion, improved after getting IVF

## 2021-04-16 NOTE — DIETITIAN INITIAL EVALUATION ADULT. - PROBLEM SELECTOR PLAN 1
The patient was medically managed for hyperkalemia with calcium gluconate 1gx 2, sodium bicarbonate 50mEq, lokelma x1, humulin 5units + D50 and albuterol nebulizer x1 with improvement of serum K from 6.6 to 6.1.  Will give additional dose of lokelma and albuterol.

## 2021-04-16 NOTE — PROGRESS NOTE ADULT - ASSESSMENT
This patient is an 84yo lady with PMH of T2DM, COPD, htn, PVD with bilateral LE edema, CKD who presents to the ED with complaint of dizziness. The patient states that at about 8pm today, she had new onset of dizziness and felt that she was about to fall. Her granddaughter, a [facility] ED RN found the patient to have a heart rate in 30s, thus called EMS. The patient reports she had recently been started on a diuretic.   She denied having any chest pain, palpitations, dyspnea, N/V or abdominal pain.

## 2021-04-16 NOTE — DIETITIAN INITIAL EVALUATION ADULT. - PERTINENT MEDS FT
MEDICATIONS  (STANDING):  ALBUTerol    90 MICROgram(s) HFA Inhaler 1 Puff(s) Inhalation every 4 hours  amLODIPine   Tablet 5 milliGRAM(s) Oral daily  dextrose 40% Gel 15 Gram(s) Oral once  dextrose 50% Injectable 25 Gram(s) IV Push once  dextrose 50% Injectable 12.5 Gram(s) IV Push once  dextrose 50% Injectable 25 Gram(s) IV Push once  furosemide    Tablet 40 milliGRAM(s) Oral daily  glucagon  Injectable 1 milliGRAM(s) IntraMuscular once  heparin   Injectable 5000 Unit(s) SubCutaneous every 8 hours  insulin lispro (ADMELOG) corrective regimen sliding scale   SubCutaneous three times a day before meals  insulin lispro (ADMELOG) corrective regimen sliding scale   SubCutaneous at bedtime  metolazone 2.5 milliGRAM(s) Oral once  sodium bicarbonate 650 milliGRAM(s) Oral three times a day  tiotropium 18 MICROgram(s) Capsule 1 Capsule(s) Inhalation daily    MEDICATIONS  (PRN):  albuterol/ipratropium for Nebulization 3 milliLiter(s) Nebulizer every 6 hours PRN Shortness of Breath and/or Wheezing

## 2021-04-16 NOTE — PROGRESS NOTE ADULT - PROVIDER SPECIALTY LIST ADULT
Patient:   OPHELIA ROSA            MRN: [facility]-[id number]            FIN: [id number]              Age:   66 years     Sex:  MALE     :  55   Associated Diagnoses:   None   Author:   SEYMOUR STEVEN     Cardiology Service New Consult Note  HPI:  Patient is a 66Mwith history of ascending aortic aneurysm s/p repair, 1v CABG (SVG-LCx), and JOSÉ ligation 10/8/2019 c/b post-op AF on xarelto, AAA s/p repair 3/2018, COPD, HTN, HLD who presented from HF clinic yesterday due to worsening lower extremity edema, dyspnea and orthopnea. Patient was recently hospitalized for PNA and was discharged on . At time of discharge, patient was on Lasix 20mg daily. He had increased leg swelling and his  diuretics were increased to 40mg BID without improvement. Patient was seen in HF clinic and was sent to ER for decompensated heart failure. In the ER, patient tachycardic and mildly hypertensive. Labs notable for BUN 20, Cr 0.77, Bicarb 41, BNP 6500, Troponin <0.02. CXR with small to moderate right pleural effusion and trace left pleural effusion. He was given IV Bumex in the ED and susbequently started on IV Lasix 80mg BID and admitted to  medicine service. Patient reports improvement in dyspnea and leg swelling since yesterday. He denies any chest pain, palpitations, dizziness. No new complaints today.  ROS  Gen: No weight loss, fevers, chills, weakness or fatigue  HEENT: No visual loss, blurred vision, double vision, cough, congestion, runny nose, sore throat  CV: No chest pain, chest pressure, palpitations, + edema  Resp: + shortness of breath, cough, sputum production, hemoptysis  GI: No nausea, vomiting, diarrhea, abdominal pain or hematochezia/melena  :No hematuria, burning, frequency, urgency  Neuro: No headache, dizzines, syncope, paralysis, hx of CVA, numbness or tingling  Heme: No hx of easy bruising, bleeding  Psych: No hx of depression  Past Medical History  Abdominal aortic aneurysm  Aneurysm of thoracic 
Nephrology
aorta  Anxiety  Asthenia  Asthma  Bronchitis  COPD (chronic obstructive pulmonary disease)  Chronic pain  Depression  Elevated cholesterol/high density lipoprotein ratio  Emphysema lung  Esophageal reflux  GERD - Gastro-esophageal reflux disease  H/O: substance abuse  HLD (hyperlipidemia)  HTN (hypertension)  Hemorrhoids  Hernia  Hypertension  Osteoarthritis  PAD (peripheral artery disease)  PVD (peripheral vascular disease)  Pulmonary nodule  Risk factors for obstructive sleep apnea  Rotator cuff tear, left  Rotator cuff tear, right  Snoring  Syncope  Visual impairment  Past Surgical History  AAA - Repair of abdominal aortic aneurysm using straight graft: 10/08/19  Bronchoscopy: 08/30/18  Insertion of abdominal aorta stent: 03/22/18  Hip replacement, Left  Rotator cuff repair, Bilateral  Carpal tunnel release, Bilateral  Arthroscopy of knee, Left  Nerve transposition, Right  Hemorrhoidectomy  Testicular ablation, Left  Medications (9) Active  Medications (12) Active  Scheduled: (9)  AMIODArone 200 mg tab  200 mg 1 tab, Oral, QAM  Aspirin 81 mg chew tab  81 mg 1 tab, Oral, Daily  Atorvastatin 80 mg tab  80 mg 1 tab, Oral, Q Bedtime  DULoxetine 20 mg DR cap  20 mg 1 cap, Oral, QAM  Ferrous sulfate 325 mg tab [Fe 65 mg]  325 mg 1 tab, Oral, BID  Furosemide 40 mg/4 mL inj  80 mg 8 mL, Slow IV Push, BID  Potassium CHLORIDE 20 mEq ER tab  20 mEq 1 tab, Oral, BID  Rivaroxaban 20 mg tab  20 mg 1 tab, Oral, Q Evening  Senna-docusate sodium 8.6-50 mg tab  1 tab, Oral, Daily  Continuous: (0)  PRN: (3)  Acetaminophen 325 mg tab  650 mg 2 tab, Oral, Q6H  Albuterol-ipratropium 2.5-0.5 mg/3 mL nebulizer soln  3 mL, Nebulizer, Q4H  Hydrocodone-acetaminophen  mg tab  1 tab, Oral, Q4H  Home Medications (12) Active  AMIODArone oral 200 mg tablet (Cordarone) 200 mg = 1 tab, Oral, QAM  aspirin 81 mg oral tablet 81 mg = 1 tab, Oral, Daily  atorvastatin oral 80 mg tablet 80 mg = 1 tab, Oral, Q Bedtime  diazePAM oral 5 mg tablet 5 
mg = 1 tab, Oral, BID  DULoxetine oral 20 mg DR capsule 20 mg = 1 cap, Oral, QAM  ferrous sulfate 325 mg (65 mg elemental iron) oral delayed release tablet 325 mg = 1 tab, Oral, BID  furosemide oral 20 mg tablet 20 mg = 1 tab, Oral, Daily  Incruse Ellipta inhaler oral 62.5 mcg/puff powder 1 puff, Inhaled, Daily  metoprolol succinate oral 50 mg XL tablet 150 mg = 3 tab, Oral, Daily  omeprazole oral 20 mg DR capsule 20 mg = 1 cap, Oral, QAM  ProAir HFA 90 mcg/inh inhalation aerosol 2 puff, PRN, Inhaled, Q4H  Xarelto oral 20 mg tablet 20 mg = 1 tab, Oral, Q Evening  Allergies (1) Active Reaction  NKA None Documented  MOTHER: Hypertension  FATHER: Alcoholism; Hypertension  Social History  Alcohol  Details: Alcohol Abuse in Household: No.  Use: None.  If current Alcohol user: More than 5 (M) or 4 (F) drinks within a couple of hours? No.  IF YES, have you consumed this quantity 5 times or more in the last 30 Days? No.  Details: Alcohol Abuse in Household: No.  Details: Use: Past.  Exercise  Details: Exercise: Never.  Sexual  Details: Sexual orientation: Straight or heterosexual.  Gender Identity: Identifies as male.  Gender on Ins: Male.  Substance Abuse  Details: Use: Current.  Type: Marijuana.  Details: Substance Abuse in Household: Yes.  Use: cocaine in the past .  Type: Marijuana.  Details: Substance Abuse in Household: No.  Use: Current.  Type: Marijuana.  Details: Use: Current.  Type: Marijuana.  Route: Inhaled.  Frequency: Daily.  Tobacco  Details: Smoked/Smokeless Tobacco Last 30 Days: Yes.  Smoking Tobacco Use: Current some day smoker.  Smokeless Tobacco Use Never.  Type: Cigarettes.  Cigarette Packs/Day: 4 Cigarettes or Less Per Day.  Details: Smoked/Smokeless Tobacco Last 30 Days: Yes.  Smoking Tobacco Use: Current every day smoker.  Smokeless Tobacco Use Never.  Type: Cigarettes.  Cigarette Packs/Day: 1 Pack Per Day.; Comment(s): 10-12 cigarettes /day  Details: Smoker in [place]: Yes.  Smoked/Smokeless 
Cardiology
Tobacco Last 30 Days: Yes.  Smoking Tobacco Use: Current every day smoker.  Smokeless Tobacco Use Never.  Type: Cigarettes.; Comment(s): 5-8 cigarettes/day 50 year history  Details: Smoker in [facility]: Yes.  Smoked/Smokeless Tobacco Last 30 Days: Yes.  Smoking Tobacco Use: Current every day smoker.  Smokeless Tobacco Use Never.  Type: Cigarettes.  Cigars/Pipes How Often: Daily.  Details: Smoker in [facility]: No.  Smoked/Smokeless Tobacco Last 30 Days: No.  Smoking Tobacco Use: Former smoker.  Smokeless Tobacco Use Never.  Details: Smoked/Smokeless Tobacco Last 30 Days: Yes.  Use: Current every day smoker.  Type: Cigarettes.  Cigarette Packs/Day: 5 or More Cigarettes <1 Pack Per Day.  Cultural/Cheondoism Practices  Details: Cheondoism or Cultural Practices While in Hospital: No.  Details: Cheondoism or Cultural Practices: None.  Physical Examination:  Vitals between:   29-NOV-2019 18:21:53   TO   30-NOV-2019 18:21:53                   LAST RESULT MINIMUM MAXIMUM  Temperature 36.7 36.5 37.0  Heart Rate 101 81 110  Respiratory Rate 18 18 20  NISBP           111 108 132  NIDBP           65 64 90  NIMBP           80 79 102  SpO2                    93 91 97  I & O between:  29-NOV-2019 18:21 TO 30-NOV-2019 18:21  Med Dosing Weight:  82.6  kg   30-NOV-2019  24 Hour Intake:   322.00  ( 3.90 mL/kg )  24 Hour Output:   3850.00           24 Hour Urine/Stool Output:   0.0  24 Hour Balance:   -3528.00           24 Hour Urine Output:   3850.00  ( 1.94 mL/kg/hr )  Exam:   GEN: in no apparent distress  HEENT: Pupils equal, no icterus  CARDIAC: Irregularly irregular, normal S1, S2, no murmurs or rubs  VASCULAR: no JVD  RESP: Absent breath sounds R lung base, crackles in left lung base  ABDOMEN:  Soft, non-distended  EXTREMITIES: 2+ LE edema  NEUROLOGIC: No focal weakness or tremor  Laboratory Evaluation  CBC  WBC: 12.4 THOUSAND/mcL High (11/30/19 05:10:11)  Hemoglobin: 9.7 gm/dL Low (11/30/19 05:10:11)  Hematocrit: 34 % Low (11/30/19 
05:10:11)  Platelet: 283 THOUSAND/mcL (11/30/19 05:10:11)  MCV: 89.7 fL (11/30/19 05:10:11)  RDW-CV: 23.9 % High (11/30/19 05:10:11)  Chemistry  Sodium: 143 mmol/L (11/30/19 05:32:33)  Chloride: 97 mmol/L Low (11/30/19 05:32:33)  BUN: 18 mg/dL (11/30/19 05:32:33)  Glucose Level: 105 mg/dL High (11/30/19 05:32:33)  Potassium: 3.2 mmol/L Low (11/30/19 05:32:33)  Carbon Dioxide (CO2): 43 mmol/L Critical (11/30/19 05:32:33)  Creatinine: 0.78 mg/dL (11/30/19 05:32:33)  Calcium: 8.8 mg/dL (11/30/19 05:32:33)  Magnesium: 1.8 mg/dL (11/30/19 05:32:34)  No qualifying data available.  Cardiovascular Labs    NT proBNP 6598 pg/mL (11/29/19 13:39)    Troponin <0.02 ng/mL (11/29/19 13:39)   Echocardiogram Results  STUDY CONCLUSIONSSUMMARY:1. Left ventricle: The cavity size is normal. Wall thickness is mildly   increased. Hypertrophy is noted. Systolic function is normal. The   estimated ejection fraction is 55-60%, by visual assessment. Although   no diagnostic regional wall motion abnormality is identified, this   possibility cannot be completely excluded on the basis of this study.2. Aortic valve: Mild regurgitation.3. Aorta: The aortic root internal  end-diastolic diameter is 4.7cm.4. Aortic root: The aortic root is moderately dilated.5. Ascending aorta: The ascending aorta is moderately dilated.6. Right ventricle: Systolic pressure is mildly increased. The estimated   peak pressure is 41mm Hg.7. Right atrium: The atrium is moderately dilated.      Result title:  CT ABDOMEN AND PELVIS WO CON  Result status:  Final  Verified by:  YFN SANDOVAL on 11/17/2019 9:01  IMPRESSION:Status post infrarenal abdominal aortic aneurysm repair with stent graft placement.  Native aneurysm sac is decreased in size now measuring 3.8 cm as compared to 4.3 cm as previously.  No retroperitoneal hematoma.Gallbladder sludge.Diffuse body wall edema with mild nonspecific thickening/fluid throughout the abdomen most pronounced along the janae 
Cardiology
Internal Medicine
hepatis and presacral space, suggestive of anasarca and 3rd spacing.  Result title:  CT CHEST WO CON  Result status:  Final  Verified by:  YFN SANDOVAL on 11/17/2019 9:37  IMPRESSION:Consolidations with air bronchograms in the right middle and lower lobes with endoluminal debris in the lower lobe bronchi suggestive of aspiration and/or infectious pneumonia. Interval increase in size of moderate right pleural effusion.Multifocal dilation of the thoracic aorta, unchanged.*Please see separately dictated CT of abdomen and pelvis report for findings.IYFN MD, have reviewed the images and report and  concur with these findings interpreted by KARTHIKEYAN REYES MD.   Assessment:  Decompensated HFpEF  Paroxysmal AF/AFL - post- op, currently AFL rate controlled, s/p JOSÉ ligation 10/8/2019, on xarelto, CHADSVasc (chf, htn, age-1, cad)  R pleural effusion  s/p AAA repair 3/2018  s/p ascending aortic aneurysm repair 10/8/2019   s/p 1v CABG (SVG-LCx)   COPD  HTN  HLD  Metabolic Alkalosis  Recommendations:  Continue diuresis with IV Lasix 80mg BID, will closely monitor alkalosis with repeat BMP in AM  Continue to monitor daily weights and strict I/O's  Please replete K to >4 and Mag to >2 during diuresis  For AFL resume amiodarone and Xarelto  Continue home BB, asa and atorvastatin  Patient seen and discussed with attending, Dr. Tripp Mas MD  Cardiology Fellow, PGY4  Patient was seen, examined and interviewed by me personally along with the CV team. Agree with above. Data is accurate and updated. My recommendations are incorporated. Case discussed with the primary team and other consultants.  Cedric Almaguer MD  Int Cardiology  
Internal Medicine

## 2021-04-16 NOTE — DIETITIAN INITIAL EVALUATION ADULT. - PROBLEM SELECTOR PLAN 6
Will hold oral hypoglycemic agents.  Start premeal and qhs fingerstick blood sugar checks.    Start premeal and qhs sliding scale lispro. Adjust based on glycemic requirements.

## 2021-04-16 NOTE — DIETITIAN INITIAL EVALUATION ADULT. - OTHER CALCULATIONS
needs based on current weight. lower range of calorie needs due to overweight BMI. fluid deferred to Nephrology due to LUIS FERNANDO on CKD. 0.8grams protein/kg due to LUIS FERNANDO on CKD.

## 2021-04-16 NOTE — PROGRESS NOTE ADULT - PROBLEM SELECTOR PLAN 2
Per PCP, patient with afib  most time in sinus on tele. Brenton paroxysmal  rate controlled, CHADSVASc score 5  will start eliquis renally dosed since there are no contraindications - no history of GI bleeding. No falls.

## 2021-04-16 NOTE — PROGRESS NOTE ADULT - NSICDXPILOT_GEN_ALL_CORE
[place]

## 2021-04-16 NOTE — DISCHARGE NOTE NURSING/CASE MANAGEMENT/SOCIAL WORK - PATIENT PORTAL LINK FT
You can access the FollowMyHealth Patient Portal offered by [facility] by registering at the following website: [email or web address]. By joining [employer]’s FollowMyHealth portal, you will also be able to view your health information using other applications (apps) compatible with our system.

## 2021-04-16 NOTE — PROGRESS NOTE ADULT - SUBJECTIVE AND OBJECTIVE BOX
Overnight Events: Patient denies CP or SOB. Norv 5 started yesterday, pt tolerated.     Current Meds:  ALBUTerol    90 MICROgram(s) HFA Inhaler 1 Puff(s) Inhalation every 4 hours  albuterol/ipratropium for Nebulization 3 milliLiter(s) Nebulizer every 6 hours PRN  amLODIPine   Tablet 5 milliGRAM(s) Oral daily  dextrose 40% Gel 15 Gram(s) Oral once  dextrose 50% Injectable 25 Gram(s) IV Push once  dextrose 50% Injectable 12.5 Gram(s) IV Push once  dextrose 50% Injectable 25 Gram(s) IV Push once  furosemide    Tablet 40 milliGRAM(s) Oral daily  glucagon  Injectable 1 milliGRAM(s) IntraMuscular once  heparin   Injectable 5000 Unit(s) SubCutaneous every 8 hours  insulin lispro (ADMELOG) corrective regimen sliding scale   SubCutaneous three times a day before meals  insulin lispro (ADMELOG) corrective regimen sliding scale   SubCutaneous at bedtime  metolazone 2.5 milliGRAM(s) Oral daily  sodium bicarbonate 650 milliGRAM(s) Oral three times a day  tiotropium 18 MICROgram(s) Capsule 1 Capsule(s) Inhalation daily      Vitals:  T(F): 98.5 (04-14), Max: 99 (04-13)  HR: 86 (04-14) (75 - 94)  BP: 163/63 (04-14) (140/77 - 163/63)  RR: 18 (04-14)  SpO2: 93% (04-14)  I&O's Summary    13 Apr 2021 07:01  -  14 Apr 2021 07:00  --------------------------------------------------------  IN: 540 mL / OUT: 3150 mL / NET: -2610 mL    Physical Exam:  Appearance: No acute distress; well appearing  Eyes:  EOMI, pink conjunctiva  HENT: Normal oral mucosa  Cardiovascular: bradycardic, irregular, S1, S2, no murmurs, rubs, or gallops; no edema; no JVD  Respiratory: Clear to auscultation bilaterally  Gastrointestinal: soft, non-tender, non-distended with normal bowel sounds  Musculoskeletal: No clubbing; no joint deformity   Neurologic: Non-focal  Lymphatic: No lymphadenopathy  Psychiatry: AAOx3, mood & affect appropriate  Skin: No rashes                          8.4    7.34  )-----------( 188      ( 14 Apr 2021 06:46 )             26.4     04-14    144  |  110<H>  |  49<H>  ----------------------------<  140<H>  3.5   |  21<L>  |  2.11<H>    Ca    8.3<L>      14 Apr 2021 06:46  Phos  3.6     04-14  Mg     2.1     04-14    TPro  6.3  /  Alb  3.1<L>  /  TBili  0.4  /  DBili  x   /  AST  26  /  ALT  49<H>  /  AlkPhos  143<H>  04-14    CARDIAC MARKERS ( 11 Apr 2021 08:05 )  28 ng/L / x     / x     / x     / x     / x      CARDIAC MARKERS ( 11 Apr 2021 02:19 )  28 ng/L / x     / x     / x     / x     / x      CARDIAC MARKERS ( 11 Apr 2021 00:14 )  29 ng/L / x     / x     / x     / x     / x        New ECG(s): Personally reviewed    Echo:  < from: Transthoracic Echocardiogram (09.11.13 @ 14:34) >  CONCLUSIONS:  Technically difficult study.  1. Endocardium not well visualized; grossly preserved left  ventricular systolic function.  2. Mild diastolic dysfunction (Stage I).  3. The right ventricle is not well visualized; grossly  normal right ventricular systolic function.  *** Compared with echocardiogram of 6/10/2012, no  significant changes noted.  ------------------------------------------------------------------------  Confirmed on  9/11/2013 - 17:26:41 by Bipin Deutsch MD, [facility]  ------------------------------------------------------------------------    < end of copied text >    Stress Testing:     Cath:    Imaging:    Interpretation of Telemetry: emmanuel with PACs    Assessment and Plan:   · Assessment	  85F with HTN, CKD (unknown baseline) here for lightheadedness, found to be bradycardic to the 30s due to likely hyperkalemia, now resolved.    Bradycardia (resolved): most likely related to hyperkalemia, and somewhat from potential BB/CCB toxicity. On atenolol and dilt at home   - continue to monitor K and ensure patient is not hyperkalemic  - no indication for PPM at this time    Afib: [id number] 4 (HTN, agex2, female) however not on AC  - no AC    HTN: uncontrolled, but limited due to potential bradycardia  - pt on amlod 5, tolerating well  - for BP control, can start hydralazine 25 TID if needed    'ok to dc 
Patient seen and examined at bedside.    Overnight Events: patient without any chest pain, SOB. Tele showed NSR in the 80s    Current Meds:  cefTRIAXone   IVPB 1000 milliGRAM(s) IV Intermittent every 24 hours  dextrose 40% Gel 15 Gram(s) Oral once  dextrose 5%. 1000 milliLiter(s) IV Continuous <Continuous>  dextrose 5%. 1000 milliLiter(s) IV Continuous <Continuous>  dextrose 50% Injectable 25 Gram(s) IV Push once  dextrose 50% Injectable 12.5 Gram(s) IV Push once  dextrose 50% Injectable 25 Gram(s) IV Push once  glucagon  Injectable 1 milliGRAM(s) IntraMuscular once  heparin   Injectable 5000 Unit(s) SubCutaneous every 8 hours  insulin lispro (ADMELOG) corrective regimen sliding scale   SubCutaneous three times a day before meals  insulin lispro (ADMELOG) corrective regimen sliding scale   SubCutaneous at bedtime  sodium bicarbonate 650 milliGRAM(s) Oral three times a day  sodium chloride 0.45% 1000 milliLiter(s) IV Continuous <Continuous>      Vitals:  T(F): 98.7 (04-12), Max: 98.7 (04-12)  HR: 81 (04-12) (68 - 84)  BP: 177/72 (04-12) (147/67 - 179/75)  RR: 18 (04-12)  SpO2: 92% (04-12)  I&O's Summary    11 Apr 2021 07:01  -  12 Apr 2021 07:00  --------------------------------------------------------  IN: 840 mL / OUT: 2150 mL / NET: -1310 mL    Physical Exam:  Appearance: No acute distress; well appearing  Eyes:  EOMI, pink conjunctiva  HENT: Normal oral mucosa  Cardiovascular: RRR, S1, S2, no murmurs, rubs, or gallops; no edema; no JVD  Respiratory: Clear to auscultation bilaterally  Gastrointestinal: soft, non-tender, non-distended with normal bowel sounds  Musculoskeletal: No clubbing; no joint deformity   Neurologic: Non-focal  Lymphatic: No lymphadenopathy  Psychiatry: AAOx3, mood & affect appropriate  Skin: No rashes                  9.0    6.46  )-----------( 208      ( 12 Apr 2021 07:28 )             27.7     04-12    140  |  110<H>  |  61<H>  ----------------------------<  134<H>  4.7   |  17<L>  |  2.28<H>    Ca    9.2      12 Apr 2021 07:27  Phos  3.5     04-12  Mg     2.2     04-12    TPro  6.9  /  Alb  3.5  /  TBili  0.4  /  DBili  x   /  AST  77<H>  /  ALT  109<H>  /  AlkPhos  179<H>  04-12    PT/INR - ( 11 Apr 2021 00:14 )   PT: 11.5 sec;   INR: 0.96 ratio      PTT - ( 11 Apr 2021 00:14 )  PTT:33.9 sec  CARDIAC MARKERS ( 11 Apr 2021 08:05 )  28 ng/L / x     / x     / x     / x     / x      CARDIAC MARKERS ( 11 Apr 2021 02:19 )  28 ng/L / x     / x     / x     / x     / x      CARDIAC MARKERS ( 11 Apr 2021 00:14 )  29 ng/L / x     / x     / x     / x     / x        New ECG(s): Personally reviewed    Echo:  < from: Transthoracic Echocardiogram (09.11.13 @ 14:34) >  CONCLUSIONS:  Technically difficult study.  1. Endocardium not well visualized; grossly preserved left  ventricular systolic function.  2. Mild diastolic dysfunction (Stage I).  3. The right ventricle is not well visualized; grossly  normal right ventricular systolic function.  *** Compared with echocardiogram of 6/10/2012, no  significant changes noted.  ------------------------------------------------------------------------  Confirmed on  9/11/2013 - 17:26:41 by Bipin Deutsch MD, [facility]  ------------------------------------------------------------------------    < end of copied text >      Stress Testing:     Cath:    Imaging:    Interpretation of Telemetry: NSR 80s, PVC    
Patient seen and examined at bedside.    Overnight Events: Patient asymptomatic, no lightheadedness. Of note, primary team spoke with PCP, and patient does have afib, but not on AC, and was on dilt and atenolol for rate control. Patient confirms this.      Current Meds:  ALBUTerol    90 MICROgram(s) HFA Inhaler 1 Puff(s) Inhalation every 4 hours  albuterol/ipratropium for Nebulization 3 milliLiter(s) Nebulizer every 6 hours PRN  dextrose 40% Gel 15 Gram(s) Oral once  dextrose 50% Injectable 25 Gram(s) IV Push once  dextrose 50% Injectable 12.5 Gram(s) IV Push once  dextrose 50% Injectable 25 Gram(s) IV Push once  glucagon  Injectable 1 milliGRAM(s) IntraMuscular once  heparin   Injectable 5000 Unit(s) SubCutaneous every 8 hours  insulin lispro (ADMELOG) corrective regimen sliding scale   SubCutaneous three times a day before meals  insulin lispro (ADMELOG) corrective regimen sliding scale   SubCutaneous at bedtime  sodium bicarbonate 650 milliGRAM(s) Oral three times a day  tiotropium 18 MICROgram(s) Capsule 1 Capsule(s) Inhalation daily      Vitals:  T(F): 97.8 (04-13), Max: 99.8 (04-12)  HR: 87 (04-13) (62 - 92)  BP: 158/69 (04-13) (152/56 - 180/76)  RR: 18 (04-13)  SpO2: 100% (04-13)  I&O's Summary    12 Apr 2021 07:01  -  13 Apr 2021 07:00  --------------------------------------------------------  IN: 270 mL / OUT: 3300 mL / NET: -3030 mL        Physical Exam:  Appearance: No acute distress; well appearing  Eyes:  EOMI, pink conjunctiva  HENT: Normal oral mucosa  Cardiovascular: bradycardic, irregular, S1, S2, no murmurs, rubs, or gallops; no edema; no JVD  Respiratory: Clear to auscultation bilaterally  Gastrointestinal: soft, non-tender, non-distended with normal bowel sounds  Musculoskeletal: No clubbing; no joint deformity   Neurologic: Non-focal  Lymphatic: No lymphadenopathy  Psychiatry: AAOx3, mood & affect appropriate  Skin: No rashes                          9.0    7.53  )-----------( 185      ( 13 Apr 2021 09:29 )             27.7     04-13    142  |  110<H>  |  52<H>  ----------------------------<  125<H>  4.1   |  19<L>  |  1.92<H>    Ca    8.7      13 Apr 2021 06:55  Phos  3.9     04-13  Mg     2.1     04-13    TPro  6.7  /  Alb  3.4  /  TBili  0.5  /  DBili  x   /  AST  35  /  ALT  71<H>  /  AlkPhos  161<H>  04-13      CARDIAC MARKERS ( 11 Apr 2021 08:05 )  28 ng/L / x     / x     / x     / x     / x      CARDIAC MARKERS ( 11 Apr 2021 02:19 )  28 ng/L / x     / x     / x     / x     / x      CARDIAC MARKERS ( 11 Apr 2021 00:14 )  29 ng/L / x     / x     / x     / x     / x        New ECG(s): Personally reviewed    Echo:    < from: Transthoracic Echocardiogram (09.11.13 @ 14:34) >  CONCLUSIONS:  Technically difficult study.  1. Endocardium not well visualized; grossly preserved left  ventricular systolic function.  2. Mild diastolic dysfunction (Stage I).  3. The right ventricle is not well visualized; grossly  normal right ventricular systolic function.  *** Compared with echocardiogram of 6/10/2012, no  significant changes noted.  ------------------------------------------------------------------------  Confirmed on  9/11/2013 - 17:26:41 by Bipin Deutsch MD, [facility]  ------------------------------------------------------------------------    < end of copied text >    Stress Testing:     Cath:    Imaging:    Interpretation of Telemetry: Sinus with PACs and minimal compensatory pauses, HR in 40s      
PROGRESS NOTE:   Authored by Dr. Dawit Zarate MD  Pager # [phone]    Patient is a 85y old  Female who presents with a chief complaint of dizziness (14 Apr 2021 14:30)      SUBJECTIVE / OVERNIGHT EVENTS:   NAEO. Patient feels fine, sating 100% on 1L o2    REVIEW OF SYSTEMS:    CONSTITUTIONAL: No weakness, fevers or chills  EYES/ENT: No visual changes;  No vertigo or throat pain   NECK: No pain or stiffness  RESPIRATORY: No cough, wheezing, hemoptysis; No shortness of breath  CARDIOVASCULAR: No chest pain or palpitations  GASTROINTESTINAL: No abdominal or epigastric pain. No nausea, vomiting, or hematemesis; No diarrhea or constipation. No melena or hematochezia.  GENITOURINARY: No dysuria, frequency or hematuria  NEUROLOGICAL: No numbness or weakness  SKIN: No itching, rashes    MEDICATIONS  (STANDING):  ALBUTerol    90 MICROgram(s) HFA Inhaler 1 Puff(s) Inhalation every 4 hours  amLODIPine   Tablet 5 milliGRAM(s) Oral daily  dextrose 40% Gel 15 Gram(s) Oral once  dextrose 50% Injectable 25 Gram(s) IV Push once  dextrose 50% Injectable 12.5 Gram(s) IV Push once  dextrose 50% Injectable 25 Gram(s) IV Push once  glucagon  Injectable 1 milliGRAM(s) IntraMuscular once  heparin   Injectable 5000 Unit(s) SubCutaneous every 8 hours  insulin lispro (ADMELOG) corrective regimen sliding scale   SubCutaneous three times a day before meals  insulin lispro (ADMELOG) corrective regimen sliding scale   SubCutaneous at bedtime  sodium bicarbonate 650 milliGRAM(s) Oral three times a day  tiotropium 18 MICROgram(s) Capsule 1 Capsule(s) Inhalation daily    MEDICATIONS  (PRN):  albuterol/ipratropium for Nebulization 3 milliLiter(s) Nebulizer every 6 hours PRN Shortness of Breath and/or Wheezing      CAPILLARY BLOOD GLUCOSE      POCT Blood Glucose.: 143 mg/dL (15 Apr 2021 09:01)  POCT Blood Glucose.: 218 mg/dL (14 Apr 2021 21:30)  POCT Blood Glucose.: 237 mg/dL (14 Apr 2021 17:24)  POCT Blood Glucose.: 190 mg/dL (14 Apr 2021 13:51)    I&O's Summary    14 Apr 2021 07:01  -  15 Apr 2021 07:00  --------------------------------------------------------  IN: 720 mL / OUT: 2150 mL / NET: -1430 mL    15 Apr 2021 07:01  -  15 Apr 2021 11:46  --------------------------------------------------------  IN: 480 mL / OUT: 0 mL / NET: 480 mL        PHYSICAL EXAM:  Vital Signs Last 24 Hrs  T(C): 36.6 (15 Apr 2021 05:04), Max: 36.8 (14 Apr 2021 20:24)  T(F): 97.9 (15 Apr 2021 05:04), Max: 98.2 (14 Apr 2021 20:24)  HR: 68 (15 Apr 2021 05:04) (68 - 88)  BP: 150/75 (15 Apr 2021 05:04) (119/63 - 158/70)  BP(mean): --  RR: 18 (15 Apr 2021 05:04) (18 - 19)  SpO2: 98% (15 Apr 2021 05:04) (92% - 100%)    CONSTITUTIONAL: NAD, well-developed  RESPIRATORY: Normal respiratory effort; occasional bibasilar crackles.  CARDIOVASCULAR: Regular rate and rhythm, normal S1 and S2, no murmur/rub/gallop; No lower extremity edema; Peripheral pulses are 2+ bilaterally  ABDOMEN: Nontender to palpation, normoactive bowel sounds, no rebound/guarding; No hepatosplenomegaly  MUSCLOSKELETAL: no clubbing or cyanosis of digits; no joint swelling or tenderness to palpation  PSYCH: A+O to person, place, and time; affect appropriate  NEURO: Non-focal, no tremors  SKIN: No rashes    LABS:                        9.4    5.99  )-----------( 208      ( 15 Apr 2021 06:28 )             29.5     04-15    143  |  108  |  52<H>  ----------------------------<  132<H>  3.6   |  22  |  1.90<H>    Ca    8.8      15 Apr 2021 06:28  Phos  3.9     04-15  Mg     2.1     04-15    TPro  6.7  /  Alb  3.2<L>  /  TBili  0.5  /  DBili  x   /  AST  127<H>  /  ALT  134<H>  /  AlkPhos  195<H>  04-15                RADIOLOGY & ADDITIONAL TESTS:  No new imaging or tests    COORDINATION OF CARE:  Care Discussed with Consultants/Other Providers [Y/N]:  Prior or Outpatient Records Reviewed [Y/N]:  
Patient seen and examined at bedside.    Overnight Events: Patient denies CP or SOB. Norv 5 started yesterday, pt tolerated.     Current Meds:  ALBUTerol    90 MICROgram(s) HFA Inhaler 1 Puff(s) Inhalation every 4 hours  albuterol/ipratropium for Nebulization 3 milliLiter(s) Nebulizer every 6 hours PRN  amLODIPine   Tablet 5 milliGRAM(s) Oral daily  dextrose 40% Gel 15 Gram(s) Oral once  dextrose 50% Injectable 25 Gram(s) IV Push once  dextrose 50% Injectable 12.5 Gram(s) IV Push once  dextrose 50% Injectable 25 Gram(s) IV Push once  furosemide    Tablet 40 milliGRAM(s) Oral daily  glucagon  Injectable 1 milliGRAM(s) IntraMuscular once  heparin   Injectable 5000 Unit(s) SubCutaneous every 8 hours  insulin lispro (ADMELOG) corrective regimen sliding scale   SubCutaneous three times a day before meals  insulin lispro (ADMELOG) corrective regimen sliding scale   SubCutaneous at bedtime  metolazone 2.5 milliGRAM(s) Oral daily  sodium bicarbonate 650 milliGRAM(s) Oral three times a day  tiotropium 18 MICROgram(s) Capsule 1 Capsule(s) Inhalation daily      Vitals:  T(F): 98.5 (04-14), Max: 99 (04-13)  HR: 86 (04-14) (75 - 94)  BP: 163/63 (04-14) (140/77 - 163/63)  RR: 18 (04-14)  SpO2: 93% (04-14)  I&O's Summary    13 Apr 2021 07:01  -  14 Apr 2021 07:00  --------------------------------------------------------  IN: 540 mL / OUT: 3150 mL / NET: -2610 mL    Physical Exam:  Appearance: No acute distress; well appearing  Eyes:  EOMI, pink conjunctiva  HENT: Normal oral mucosa  Cardiovascular: bradycardic, irregular, S1, S2, no murmurs, rubs, or gallops; no edema; no JVD  Respiratory: Clear to auscultation bilaterally  Gastrointestinal: soft, non-tender, non-distended with normal bowel sounds  Musculoskeletal: No clubbing; no joint deformity   Neurologic: Non-focal  Lymphatic: No lymphadenopathy  Psychiatry: AAOx3, mood & affect appropriate  Skin: No rashes                          8.4    7.34  )-----------( 188      ( 14 Apr 2021 06:46 )             26.4     04-14    144  |  110<H>  |  49<H>  ----------------------------<  140<H>  3.5   |  21<L>  |  2.11<H>    Ca    8.3<L>      14 Apr 2021 06:46  Phos  3.6     04-14  Mg     2.1     04-14    TPro  6.3  /  Alb  3.1<L>  /  TBili  0.4  /  DBili  x   /  AST  26  /  ALT  49<H>  /  AlkPhos  143<H>  04-14    CARDIAC MARKERS ( 11 Apr 2021 08:05 )  28 ng/L / x     / x     / x     / x     / x      CARDIAC MARKERS ( 11 Apr 2021 02:19 )  28 ng/L / x     / x     / x     / x     / x      CARDIAC MARKERS ( 11 Apr 2021 00:14 )  29 ng/L / x     / x     / x     / x     / x        New ECG(s): Personally reviewed    Echo:  < from: Transthoracic Echocardiogram (09.11.13 @ 14:34) >  CONCLUSIONS:  Technically difficult study.  1. Endocardium not well visualized; grossly preserved left  ventricular systolic function.  2. Mild diastolic dysfunction (Stage I).  3. The right ventricle is not well visualized; grossly  normal right ventricular systolic function.  *** Compared with echocardiogram of 6/10/2012, no  significant changes noted.  ------------------------------------------------------------------------  Confirmed on  9/11/2013 - 17:26:41 by Bipin Deutsch MD, [facility]  ------------------------------------------------------------------------    < end of copied text >    Stress Testing:     Cath:    Imaging:    Interpretation of Telemetry: emmanuel with PACs
[facility] DIVISION OF KIDNEY DISEASES AND HYPERTENSION   -- FOLLOW UP NOTE --   Waleska Guillen  Nephrology Fellow  Pager [facility]: [phone]   /  Pager [facility]: [phone]  (after 5pm or weekend please page the on-call fellow via [email or web address])  ======================================================  24 hour events/subjective:  - overnight no events reported, vitals afebrile no hypotensive episode, total UOP 3.1 liters in the past 24hr  - patient seen and examined at bedside this morning without complaints report no shortness of breath, chest pain, nausea, vomiting  - vitals/lab/medications reviewed, noted for sCr stable between 1.9 to 2.1      PAST HISTORY  --------------------------------------------------------------------------------  No significant changes to PMH, PSH, FHx, SHx, unless otherwise noted    ALLERGIES & MEDICATIONS  --------------------------------------------------------------------------------  Allergies  Levaquin (Rash)    Intolerances    Standing Inpatient Medications  ALBUTerol    90 MICROgram(s) HFA Inhaler 1 Puff(s) Inhalation every 4 hours  amLODIPine   Tablet 5 milliGRAM(s) Oral daily  dextrose 40% Gel 15 Gram(s) Oral once  dextrose 50% Injectable 25 Gram(s) IV Push once  dextrose 50% Injectable 12.5 Gram(s) IV Push once  dextrose 50% Injectable 25 Gram(s) IV Push once  glucagon  Injectable 1 milliGRAM(s) IntraMuscular once  heparin   Injectable 5000 Unit(s) SubCutaneous every 8 hours  insulin lispro (ADMELOG) corrective regimen sliding scale   SubCutaneous three times a day before meals  insulin lispro (ADMELOG) corrective regimen sliding scale   SubCutaneous at bedtime  sodium bicarbonate 650 milliGRAM(s) Oral three times a day  tiotropium 18 MICROgram(s) Capsule 1 Capsule(s) Inhalation daily    PRN Inpatient Medications  albuterol/ipratropium for Nebulization 3 milliLiter(s) Nebulizer every 6 hours PRN    REVIEW OF SYSTEMS  --------------------------------------------------------------------------------  Gen: no fever, chills, weakness  Respiratory: No dyspnea, cough  CV: No chest pain  GI: No abdominal pain, nausea, vomiting, diarrhea  MSK: no edema  Neuro: No dizziness, lightheadedness  All other systems were reviewed and are negative, except as noted.    VITALS/PHYSICAL EXAM  --------------------------------------------------------------------------------  T(C): 36.9 (04-14-21 @ 09:00), Max: 37.2 (04-13-21 @ 16:52)  HR: 91 (04-14-21 @ 09:00) (75 - 94)  BP: 147/65 (04-14-21 @ 09:00) (140/77 - 163/63)  RR: 18 (04-14-21 @ 09:00) (18 - 18)  SpO2: 92% (04-14-21 @ 09:00) (91% - 94%)  Wt(kg): --    04-13-21 @ 07:01  -  04-14-21 @ 07:00  --------------------------------------------------------  IN: 540 mL / OUT: 3150 mL / NET: -2610 mL    Physical Exam:  	Gen: NAD, well-appearing on room air  	Pulm: CTA B/L, no crackles  	CV: RRR, S1S2; no rub/murmur  	GI: +BS, soft, nontender/nondistended  	: ext female catheter  	MSK: chronic venous skin changes lower ext              Neuro: AAOx3  	Psych: Normal affect and mood  	Skin: Warm    LABS/STUDIES  --------------------------------------------------------------------------------              8.4    7.34  >-----------<  188      [04-14-21 @ 06:46]              26.4     144  |  110  |  49  ----------------------------<  140      [04-14-21 @ 06:46]  3.5   |  21  |  2.11        Ca     8.3     [04-14-21 @ 06:46]      Mg     2.1     [04-14-21 @ 06:46]      Phos  3.6     [04-14-21 @ 06:46]    TPro  6.3  /  Alb  3.1  /  TBili  0.4  /  DBili  x   /  AST  26  /  ALT  49  /  AlkPhos  143  [04-14-21 @ 06:46]    Creatinine Trend:  SCr 2.11 [04-14 @ 06:46]  SCr 1.92 [04-13 @ 06:55]  SCr 2.28 [04-12 @ 07:27]  SCr 2.52 [04-11 @ 19:43]  SCr 2.44 [04-11 @ 12:40]    Urinalysis - [04-11-21 @ 02:11]      Color Light Yellow / Appearance Turbid / SG 1.011 / pH 6.0      Gluc Negative / Ketone Negative  / Bili Negative / Urobili Negative       Blood Trace / Protein 30 mg/dL / Leuk Est Large / Nitrite Negative      RBC 5 / WBC >50 / Hyaline 0 / Gran  / Sq Epi  / Non Sq Epi 4 / Bacteria Moderate    Urine Creatinine 30      [04-12-21 @ 20:34]  Urine Protein 59      [04-12-21 @ 20:34]  Urine Sodium 57      [04-11-21 @ 12:40]  Urine Osmolality 335      [04-11-21 @ 12:40]    TSH 2.71      [04-11-21 @ 04:22]      
PROGRESS NOTE:   Authored by Dr. Dawit Zarate MD  Pager # [phone]    Patient is a 85y old  Female who presents with a chief complaint of dizziness (13 Apr 2021 09:57)      SUBJECTIVE / OVERNIGHT EVENTS:   NAEO. Patient felt less SOB    REVIEW OF SYSTEMS:    CONSTITUTIONAL: No weakness, fevers or chills  EYES/ENT: No visual changes;  No vertigo or throat pain   NECK: No pain or stiffness  RESPIRATORY: No cough, wheezing, hemoptysis; No shortness of breath  CARDIOVASCULAR: No chest pain or palpitations  GASTROINTESTINAL: No abdominal or epigastric pain. No nausea, vomiting, or hematemesis; No diarrhea or constipation. No melena or hematochezia.  GENITOURINARY: No dysuria, frequency or hematuria  NEUROLOGICAL: No numbness or weakness  SKIN: No itching, rashes    MEDICATIONS  (STANDING):  ALBUTerol    90 MICROgram(s) HFA Inhaler 1 Puff(s) Inhalation every 4 hours  dextrose 40% Gel 15 Gram(s) Oral once  dextrose 50% Injectable 25 Gram(s) IV Push once  dextrose 50% Injectable 12.5 Gram(s) IV Push once  dextrose 50% Injectable 25 Gram(s) IV Push once  glucagon  Injectable 1 milliGRAM(s) IntraMuscular once  heparin   Injectable 5000 Unit(s) SubCutaneous every 8 hours  insulin lispro (ADMELOG) corrective regimen sliding scale   SubCutaneous three times a day before meals  insulin lispro (ADMELOG) corrective regimen sliding scale   SubCutaneous at bedtime  sodium bicarbonate 650 milliGRAM(s) Oral three times a day  tiotropium 18 MICROgram(s) Capsule 1 Capsule(s) Inhalation daily    MEDICATIONS  (PRN):  albuterol/ipratropium for Nebulization 3 milliLiter(s) Nebulizer every 6 hours PRN Shortness of Breath and/or Wheezing      CAPILLARY BLOOD GLUCOSE      POCT Blood Glucose.: 136 mg/dL (12 Apr 2021 21:31)  POCT Blood Glucose.: 145 mg/dL (12 Apr 2021 17:49)  POCT Blood Glucose.: 158 mg/dL (12 Apr 2021 13:06)    I&O's Summary    12 Apr 2021 07:01  -  13 Apr 2021 07:00  --------------------------------------------------------  IN: 270 mL / OUT: 3300 mL / NET: -3030 mL    13 Apr 2021 07:01  -  13 Apr 2021 11:46  --------------------------------------------------------  IN: 0 mL / OUT: 700 mL / NET: -700 mL        PHYSICAL EXAM:  Vital Signs Last 24 Hrs  T(C): 36.6 (13 Apr 2021 04:46), Max: 37.7 (12 Apr 2021 20:28)  T(F): 97.8 (13 Apr 2021 04:46), Max: 99.8 (12 Apr 2021 20:28)  HR: 87 (13 Apr 2021 06:07) (62 - 92)  BP: 158/69 (13 Apr 2021 06:07) (152/56 - 180/76)  BP(mean): --  RR: 18 (13 Apr 2021 04:46) (18 - 30)  SpO2: 100% (13 Apr 2021 06:02) (95% - 100%)    CONSTITUTIONAL: NAD, well-developed  RESPIRATORY: Bilateral basilar crackles  CARDIOVASCULAR: bilateral 2+ edema with discoloration  ABDOMEN: Nontender to palpation, normoactive bowel sounds, no rebound/guarding; No hepatosplenomegaly  MUSCLOSKELETAL: no clubbing or cyanosis of digits; no joint swelling or tenderness to palpation  PSYCH: A+O to person, place, and time; affect appropriate  NEURO: Non-focal, no tremors  SKIN: No rashes    LABS:                        9.0    7.53  )-----------( 185      ( 13 Apr 2021 09:29 )             27.7     04-13    142  |  110<H>  |  52<H>  ----------------------------<  125<H>  4.1   |  19<L>  |  1.92<H>    Ca    8.7      13 Apr 2021 06:55  Phos  3.9     04-13  Mg     2.1     04-13    TPro  6.7  /  Alb  3.4  /  TBili  0.5  /  DBili  x   /  AST  35  /  ALT  71<H>  /  AlkPhos  161<H>  04-13              Culture - Urine (collected 11 Apr 2021 16:30)  Source: .Urine Catheterized  Preliminary Report (12 Apr 2021 17:43):    >100,000 CFU/ml Escherichia coli        RADIOLOGY & ADDITIONAL TESTS:  No new imaging or tests    COORDINATION OF CARE:  Care Discussed with Consultants/Other Providers [Y/N]:  Prior or Outpatient Records Reviewed [Y/N]:  
PROGRESS NOTE:   Authored by Dr. Dawit Zarate MD  Pager # [phone]    Patient is a 85y old  Female who presents with a chief complaint of dizziness (2021 09:58)      SUBJECTIVE / OVERNIGHT EVENTS:     NAEO. Patient feels fine, no cp, sob, n/v/d.    REVIEW OF SYSTEMS:    CONSTITUTIONAL: No weakness, fevers or chills  EYES/ENT: No visual changes;  No vertigo or throat pain   NECK: No pain or stiffness  RESPIRATORY: No cough, wheezing, hemoptysis; No shortness of breath  CARDIOVASCULAR: No chest pain or palpitations  GASTROINTESTINAL: No abdominal or epigastric pain. No nausea, vomiting, or hematemesis; No diarrhea or constipation. No melena or hematochezia.  GENITOURINARY: No dysuria, frequency or hematuria  NEUROLOGICAL: No numbness or weakness  SKIN: No itching, rashes    MEDICATIONS  (STANDING):  cefTRIAXone   IVPB 1000 milliGRAM(s) IV Intermittent every 24 hours  dextrose 40% Gel 15 Gram(s) Oral once  dextrose 5%. 1000 milliLiter(s) (50 mL/Hr) IV Continuous <Continuous>  dextrose 5%. 1000 milliLiter(s) (100 mL/Hr) IV Continuous <Continuous>  dextrose 50% Injectable 25 Gram(s) IV Push once  dextrose 50% Injectable 12.5 Gram(s) IV Push once  dextrose 50% Injectable 25 Gram(s) IV Push once  glucagon  Injectable 1 milliGRAM(s) IntraMuscular once  heparin   Injectable 5000 Unit(s) SubCutaneous every 8 hours  insulin lispro (ADMELOG) corrective regimen sliding scale   SubCutaneous three times a day before meals  insulin lispro (ADMELOG) corrective regimen sliding scale   SubCutaneous at bedtime  sodium bicarbonate 650 milliGRAM(s) Oral three times a day  sodium chloride 0.45% 1000 milliLiter(s) (100 mL/Hr) IV Continuous <Continuous>    MEDICATIONS  (PRN):      CAPILLARY BLOOD GLUCOSE      POCT Blood Glucose.: 144 mg/dL (2021 09:04)  POCT Blood Glucose.: 170 mg/dL (2021 21:30)  POCT Blood Glucose.: 137 mg/dL (2021 17:49)  POCT Blood Glucose.: 120 mg/dL (2021 12:58)    I&O's Summary    2021 07:01  -  2021 07:00  --------------------------------------------------------  IN: 840 mL / OUT: 2150 mL / NET: -1310 mL        PHYSICAL EXAM:  Vital Signs Last 24 Hrs  T(C): 37.1 (2021 08:37), Max: 37.1 (2021 08:37)  T(F): 98.7 (2021 08:37), Max: 98.7 (2021 08:37)  HR: 81 (2021 08:37) (68 - 84)  BP: 177/72 (2021 08:37) (147/67 - 179/75)  BP(mean): --  RR: 18 (2021 08:37) (17 - 19)  SpO2: 92% (2021 08:37) (91% - 98%)    CONSTITUTIONAL: NAD, well-developed  RESPIRATORY: Normal respiratory effort; lungs are clear to auscultation bilaterally  CARDIOVASCULAR: Regular rate and rhythm, normal S1 and S2, no murmur/rub/gallop; No lower extremity edema; Peripheral pulses are 2+ bilaterally  ABDOMEN: Nontender to palpation, normoactive bowel sounds, no rebound/guarding; No hepatosplenomegaly  MUSCLOSKELETAL: no clubbing or cyanosis of digits; no joint swelling or tenderness to palpation  PSYCH: A+O to person, place, and time; affect appropriate  NEURO: Non-focal, no tremors  SKIN: No rashes    LABS:                        9.0    6.46  )-----------( 208      ( 2021 07:28 )             27.7     04-12    140  |  110<H>  |  61<H>  ----------------------------<  134<H>  4.7   |  17<L>  |  2.28<H>    Ca    9.2      2021 07:27  Phos  3.5     04-12  Mg     2.2     04-12    TPro  6.9  /  Alb  3.5  /  TBili  0.4  /  DBili  x   /  AST  77<H>  /  ALT  109<H>  /  AlkPhos  179<H>  04-12    PT/INR - ( 2021 00:14 )   PT: 11.5 sec;   INR: 0.96 ratio         PTT - ( 2021 00:14 )  PTT:33.9 sec      Urinalysis Basic - ( 2021 02:11 )    Color: Light Yellow / Appearance: Turbid / S.011 / pH: x  Gluc: x / Ketone: Negative  / Bili: Negative / Urobili: Negative   Blood: x / Protein: 30 mg/dL / Nitrite: Negative   Leuk Esterase: Large / RBC: 5 /hpf / WBC >50 /HPF   Sq Epi: x / Non Sq Epi: 4 / Bacteria: Moderate          RADIOLOGY & ADDITIONAL TESTS:  No new imaging or tests    COORDINATION OF CARE:  Care Discussed with Consultants/Other Providers [Y/N]:  Prior or Outpatient Records Reviewed [Y/N]:  
PROGRESS NOTE:   Authored by Dr. Dawit Zarate MD  Pager # [phone]    Patient is a 85y old  Female who presents with a chief complaint of dizziness (2021 04:22)      SUBJECTIVE / OVERNIGHT EVENTS:   NAEO. Patient feels fine this morning. Still has mild generalized weakness    REVIEW OF SYSTEMS:    CONSTITUTIONAL: No weakness, fevers or chills  EYES/ENT: No visual changes;  No vertigo or throat pain   NECK: No pain or stiffness  RESPIRATORY: No cough, wheezing, hemoptysis; No shortness of breath  CARDIOVASCULAR: No chest pain or palpitations  GASTROINTESTINAL: No abdominal or epigastric pain. No nausea, vomiting, or hematemesis; No diarrhea or constipation. No melena or hematochezia.  GENITOURINARY: +dysuria, frequency  NEUROLOGICAL: No numbness or weakness  SKIN: No itching, rashes    MEDICATIONS  (STANDING):  cefTRIAXone   IVPB 1000 milliGRAM(s) IV Intermittent every 24 hours  dextrose 40% Gel 15 Gram(s) Oral once  dextrose 5%. 1000 milliLiter(s) (50 mL/Hr) IV Continuous <Continuous>  dextrose 5%. 1000 milliLiter(s) (100 mL/Hr) IV Continuous <Continuous>  dextrose 50% Injectable 25 Gram(s) IV Push once  dextrose 50% Injectable 12.5 Gram(s) IV Push once  dextrose 50% Injectable 25 Gram(s) IV Push once  glucagon  Injectable 1 milliGRAM(s) IntraMuscular once  heparin   Injectable 5000 Unit(s) SubCutaneous every 8 hours  insulin lispro (ADMELOG) corrective regimen sliding scale   SubCutaneous three times a day before meals  insulin lispro (ADMELOG) corrective regimen sliding scale   SubCutaneous at bedtime  sodium chloride 0.45% 1000 milliLiter(s) (100 mL/Hr) IV Continuous <Continuous>    MEDICATIONS  (PRN):      CAPILLARY BLOOD GLUCOSE      POCT Blood Glucose.: 270 mg/dL (2021 01:18)  POCT Blood Glucose.: 189 mg/dL (2021 00:06)    I&O's Summary    10 Apr 2021 07:01  -  2021 07:00  --------------------------------------------------------  IN: 0 mL / OUT: 30 mL / NET: -30 mL        PHYSICAL EXAM:  Vital Signs Last 24 Hrs  T(C): 36.4 (2021 08:23), Max: 36.4 (2021 01:00)  T(F): 97.6 (2021 08:23), Max: 97.6 (2021 05:06)  HR: 66 (:) (32 - 74)  BP: 148/66 (2021 08:23) (90/50 - 163/56)  BP(mean): 75 (2021 03:15) (75 - 87)  RR: 19 (:) (15 - 26)  SpO2: 96% (:) (96% - 100%)    CONSTITUTIONAL: NAD, well-developed  RESPIRATORY: Normal respiratory effort; trace crackles bilaterally  CARDIOVASCULAR: Regular rate and rhythm, normal S1 and S2, no murmur/rub/gallop; 2+ edema bilaterally  ABDOMEN: Nontender to palpation, normoactive bowel sounds, no rebound/guarding; No hepatosplenomegaly  MUSCLOSKELETAL: no clubbing or cyanosis of digits; no joint swelling or tenderness to palpation  PSYCH: A+O to person, place, and time; affect appropriate  NEURO: Non-focal, no tremors  SKIN: No rashes    LABS:                        9.3    6.73  )-----------( 211      ( 2021 08:05 )             29.4     04-11    136  |  108  |  68<H>  ----------------------------<  152<H>  5.5<H>   |  14<L>  |  2.53<H>    Ca    9.1      2021 08:05  Mg     2.2     04-11    TPro  7.2  /  Alb  3.8  /  TBili  0.4  /  DBili  x   /  AST  211<H>  /  ALT  171<H>  /  AlkPhos  193<H>  04-11    PT/INR - ( 2021 00:14 )   PT: 11.5 sec;   INR: 0.96 ratio         PTT - ( 2021 00:14 )  PTT:33.9 sec      Urinalysis Basic - ( 2021 02:11 )    Color: Light Yellow / Appearance: Turbid / S.011 / pH: x  Gluc: x / Ketone: Negative  / Bili: Negative / Urobili: Negative   Blood: x / Protein: 30 mg/dL / Nitrite: Negative   Leuk Esterase: Large / RBC: 5 /hpf / WBC >50 /HPF   Sq Epi: x / Non Sq Epi: 4 / Bacteria: Moderate          RADIOLOGY & ADDITIONAL TESTS:  No new imaging or tests    COORDINATION OF CARE:  Care Discussed with Consultants/Other Providers [Y/N]:  Prior or Outpatient Records Reviewed [Y/N]:  
PROGRESS NOTE:   Authored by Dr. Dawit Zarate MD  Pager # [phone]    Patient is a 85y old  Female who presents with a chief complaint of dizziness (16 Apr 2021 09:27)      SUBJECTIVE / OVERNIGHT EVENTS:   NAEO. Patient sating well on RA, planning for DC today    REVIEW OF SYSTEMS:    CONSTITUTIONAL: No weakness, fevers or chills  EYES/ENT: No visual changes;  No vertigo or throat pain   NECK: No pain or stiffness  RESPIRATORY: No cough, wheezing, hemoptysis; No shortness of breath  CARDIOVASCULAR: No chest pain or palpitations  GASTROINTESTINAL: No abdominal or epigastric pain. No nausea, vomiting, or hematemesis; No diarrhea or constipation. No melena or hematochezia.  GENITOURINARY: No dysuria, frequency or hematuria  NEUROLOGICAL: No numbness or weakness  SKIN: No itching, rashes    MEDICATIONS  (STANDING):  ALBUTerol    90 MICROgram(s) HFA Inhaler 1 Puff(s) Inhalation every 4 hours  amLODIPine   Tablet 5 milliGRAM(s) Oral daily  dextrose 40% Gel 15 Gram(s) Oral once  dextrose 50% Injectable 25 Gram(s) IV Push once  dextrose 50% Injectable 12.5 Gram(s) IV Push once  dextrose 50% Injectable 25 Gram(s) IV Push once  furosemide    Tablet 40 milliGRAM(s) Oral daily  glucagon  Injectable 1 milliGRAM(s) IntraMuscular once  heparin   Injectable 5000 Unit(s) SubCutaneous every 8 hours  insulin lispro (ADMELOG) corrective regimen sliding scale   SubCutaneous three times a day before meals  insulin lispro (ADMELOG) corrective regimen sliding scale   SubCutaneous at bedtime  metolazone 2.5 milliGRAM(s) Oral once  sodium bicarbonate 650 milliGRAM(s) Oral three times a day  tiotropium 18 MICROgram(s) Capsule 1 Capsule(s) Inhalation daily    MEDICATIONS  (PRN):  albuterol/ipratropium for Nebulization 3 milliLiter(s) Nebulizer every 6 hours PRN Shortness of Breath and/or Wheezing      CAPILLARY BLOOD GLUCOSE      POCT Blood Glucose.: 178 mg/dL (16 Apr 2021 09:11)  POCT Blood Glucose.: 223 mg/dL (15 Apr 2021 21:40)  POCT Blood Glucose.: 189 mg/dL (15 Apr 2021 17:23)  POCT Blood Glucose.: 262 mg/dL (15 Apr 2021 12:58)    I&O's Summary    15 Apr 2021 07:01  -  16 Apr 2021 07:00  --------------------------------------------------------  IN: 1080 mL / OUT: 1500 mL / NET: -420 mL        PHYSICAL EXAM:  Vital Signs Last 24 Hrs  T(C): 36.6 (16 Apr 2021 08:33), Max: 37.3 (15 Apr 2021 23:53)  T(F): 97.9 (16 Apr 2021 08:33), Max: 99.1 (15 Apr 2021 23:53)  HR: 98 (16 Apr 2021 08:33) (64 - 98)  BP: 152/76 (16 Apr 2021 08:33) (137/77 - 152/76)  BP(mean): --  RR: 18 (16 Apr 2021 08:33) (18 - 18)  SpO2: 92% (16 Apr 2021 08:33) (91% - 97%)    CONSTITUTIONAL: NAD, well-developed  RESPIRATORY: Normal respiratory effort; bibasliar crackles  CARDIOVASCULAR: Regular rate and rhythm, normal S1 and S2, no murmur/rub/gallop; No lower extremity edema; Peripheral pulses are 2+ bilaterally  ABDOMEN: Nontender to palpation, normoactive bowel sounds, no rebound/guarding; No hepatosplenomegaly  MUSCLOSKELETAL: no clubbing or cyanosis of digits; no joint swelling or tenderness to palpation  PSYCH: A+O to person, place, and time; affect appropriate  NEURO: Non-focal, no tremors  SKIN: No rashes    LABS:                        9.3    5.87  )-----------( 221      ( 16 Apr 2021 06:29 )             29.0     04-16    143  |  108  |  59<H>  ----------------------------<  147<H>  3.8   |  21<L>  |  1.98<H>    Ca    8.8      16 Apr 2021 06:29  Phos  4.2     04-16  Mg     2.1     04-16    TPro  6.6  /  Alb  2.9<L>  /  TBili  0.4  /  DBili  x   /  AST  71<H>  /  ALT  105<H>  /  AlkPhos  184<H>  04-16                RADIOLOGY & ADDITIONAL TESTS:  No new imaging or tests    COORDINATION OF CARE:  Care Discussed with Consultants/Other Providers [Y/N]:  Prior or Outpatient Records Reviewed [Y/N]:  
PROGRESS NOTE:   Authored by Dr. Dawit Zarate MD  Pager # [phone]    Patient is a 85y old  Female who presents with a chief complaint of dizziness (14 Apr 2021 13:29)      SUBJECTIVE / OVERNIGHT EVENTS:   NAEO. Patient sating well on RA. no sob.    REVIEW OF SYSTEMS:    CONSTITUTIONAL: No weakness, fevers or chills  EYES/ENT: No visual changes;  No vertigo or throat pain   NECK: No pain or stiffness  RESPIRATORY: No cough, wheezing, hemoptysis; No shortness of breath  CARDIOVASCULAR: No chest pain or palpitations  GASTROINTESTINAL: No abdominal or epigastric pain. No nausea, vomiting, or hematemesis; No diarrhea or constipation. No melena or hematochezia.  GENITOURINARY: No dysuria, frequency or hematuria  NEUROLOGICAL: No numbness or weakness  SKIN: No itching, rashes    MEDICATIONS  (STANDING):  ALBUTerol    90 MICROgram(s) HFA Inhaler 1 Puff(s) Inhalation every 4 hours  amLODIPine   Tablet 5 milliGRAM(s) Oral daily  dextrose 40% Gel 15 Gram(s) Oral once  dextrose 50% Injectable 25 Gram(s) IV Push once  dextrose 50% Injectable 12.5 Gram(s) IV Push once  dextrose 50% Injectable 25 Gram(s) IV Push once  glucagon  Injectable 1 milliGRAM(s) IntraMuscular once  heparin   Injectable 5000 Unit(s) SubCutaneous every 8 hours  insulin lispro (ADMELOG) corrective regimen sliding scale   SubCutaneous three times a day before meals  insulin lispro (ADMELOG) corrective regimen sliding scale   SubCutaneous at bedtime  sodium bicarbonate 650 milliGRAM(s) Oral three times a day  tiotropium 18 MICROgram(s) Capsule 1 Capsule(s) Inhalation daily    MEDICATIONS  (PRN):  albuterol/ipratropium for Nebulization 3 milliLiter(s) Nebulizer every 6 hours PRN Shortness of Breath and/or Wheezing      CAPILLARY BLOOD GLUCOSE      POCT Blood Glucose.: 190 mg/dL (14 Apr 2021 13:51)  POCT Blood Glucose.: 153 mg/dL (14 Apr 2021 08:44)  POCT Blood Glucose.: 202 mg/dL (13 Apr 2021 21:52)  POCT Blood Glucose.: 197 mg/dL (13 Apr 2021 17:15)    I&O's Summary    13 Apr 2021 07:01  -  14 Apr 2021 07:00  --------------------------------------------------------  IN: 540 mL / OUT: 3150 mL / NET: -2610 mL    14 Apr 2021 07:01  -  14 Apr 2021 14:30  --------------------------------------------------------  IN: 360 mL / OUT: 1100 mL / NET: -740 mL        PHYSICAL EXAM:  Vital Signs Last 24 Hrs  T(C): 36.7 (14 Apr 2021 13:05), Max: 37.2 (13 Apr 2021 16:52)  T(F): 98.1 (14 Apr 2021 13:05), Max: 99 (13 Apr 2021 21:01)  HR: 84 (14 Apr 2021 13:05) (75 - 94)  BP: 119/63 (14 Apr 2021 13:05) (119/63 - 163/63)  BP(mean): --  RR: 18 (14 Apr 2021 13:05) (18 - 18)  SpO2: 92% (14 Apr 2021 13:05) (91% - 94%)    CONSTITUTIONAL: NAD, well-developed  RESPIRATORY: Normal respiratory effort; lungs are clear to auscultation bilaterally  CARDIOVASCULAR: Regular rate and rhythm, normal S1 and S2, no murmur/rub/gallop; No lower extremity edema; Peripheral pulses are 2+ bilaterally  ABDOMEN: Nontender to palpation, normoactive bowel sounds, no rebound/guarding; No hepatosplenomegaly  MUSCLOSKELETAL: no clubbing or cyanosis of digits; no joint swelling or tenderness to palpation  PSYCH: A+O to person, place, and time; affect appropriate  NEURO: Non-focal, no tremors  SKIN: No rashes    LABS:                        8.4    7.34  )-----------( 188      ( 14 Apr 2021 06:46 )             26.4     04-14    144  |  110<H>  |  49<H>  ----------------------------<  140<H>  3.5   |  21<L>  |  2.11<H>    Ca    8.3<L>      14 Apr 2021 06:46  Phos  3.6     04-14  Mg     2.1     04-14    TPro  6.3  /  Alb  3.1<L>  /  TBili  0.4  /  DBili  x   /  AST  26  /  ALT  49<H>  /  AlkPhos  143<H>  04-14              Culture - Urine (collected 11 Apr 2021 16:30)  Source: .Urine Catheterized  Final Report (13 Apr 2021 15:33):    >100,000 CFU/ml Escherichia coli Multiple Morphological Strains  Organism: Escherichia coli  Escherichia coli (13 Apr 2021 15:34)  Organism: Escherichia coli (13 Apr 2021 15:34)  Organism: Escherichia coli (13 Apr 2021 15:34)        RADIOLOGY & ADDITIONAL TESTS:  No new imaging or tests    COORDINATION OF CARE:  Care Discussed with Consultants/Other Providers [Y/N]:  Prior or Outpatient Records Reviewed [Y/N]:

## 2021-04-16 NOTE — PROGRESS NOTE ADULT - ATTENDING COMMENTS
86 yo female w/pmh HTN, HLD, atrial fibrillation, diabetes, anemia, CKD stage 4, COPD, ?asbestosis, presents to [facility] for symptomatic bradycardia and LUIS FERNANDO on CKD with hyperkalemia. LUIS FERNANDO thought to be pre-renal in etiology. Cardiology, nephrology consulted.     -patient has hx paroxysmal atrial fibrillation - not currently on AC but has no C/I - CHADsVASc = 5 (age, female, HTN, DM), eliquis is covered by insurance  -holding atenolol, diltiazem for bradycardia to 30s upon arrival, is rate controlled on her own  -home inhalers: ventolin and anoro ellipta - resume upon DC  -continue bicarb supplementation  -amlodipine for BP control  -restart home dose diuretics  -rise in AST/ALT likely due to recent ceftriaxone administration, needs f/u with outpatient PCP for repeat CMP and work-up of chronic liver disease (has had elevated AST/ALT for years)    Recommended for TAI however patient and family prefer to go home. Medically stable for discharge. 35 minutes spent coordinating discharge.

## 2021-04-16 NOTE — DIETITIAN INITIAL EVALUATION ADULT. - PERTINENT LABORATORY DATA
04-16 @ 06:29: Na 143, BUN 59<H>, Cr 1.98<H>, <H>, K+ 3.8, Phos 4.2, Mg 2.1, Alk Phos 184<H>, ALT/SGPT 105<H>, AST/SGOT 71<H>  4/11: A1c 6.0

## 2021-06-18 ENCOUNTER — INPATIENT (INPATIENT)
Facility: HOSPITAL | Age: 86
LOS: 3 days | Discharge: HOME HEALTH | DRG: 641 | End: 2021-06-22
Attending: HOSPITALIST | Admitting: HOSPITALIST
Payer: MEDICARE

## 2021-06-18 VITALS
DIASTOLIC BLOOD PRESSURE: 80 MMHG | RESPIRATION RATE: 19 BRPM | SYSTOLIC BLOOD PRESSURE: 175 MMHG | OXYGEN SATURATION: 100 % | TEMPERATURE: 98 F | HEART RATE: 71 BPM | WEIGHT: 153 LBS | HEIGHT: 62 IN

## 2021-06-18 DIAGNOSIS — Z90.710 ACQUIRED ABSENCE OF BOTH CERVIX AND UTERUS: Chronic | ICD-10-CM

## 2021-06-18 LAB
ALBUMIN SERPL ELPH-MCNC: 3.9 G/DL — SIGNIFICANT CHANGE UP (ref 3.3–5)
ALP SERPL-CCNC: 188 U/L — HIGH (ref 40–120)
ALT FLD-CCNC: 11 U/L — SIGNIFICANT CHANGE UP (ref 10–45)
ANION GAP SERPL CALC-SCNC: 15 MMOL/L — SIGNIFICANT CHANGE UP (ref 5–17)
APPEARANCE UR: CLEAR — SIGNIFICANT CHANGE UP
AST SERPL-CCNC: 17 U/L — SIGNIFICANT CHANGE UP (ref 10–40)
BACTERIA # UR AUTO: NEGATIVE — SIGNIFICANT CHANGE UP
BASOPHILS # BLD AUTO: 0.01 K/UL — SIGNIFICANT CHANGE UP (ref 0–0.2)
BASOPHILS NFR BLD AUTO: 0.1 % — SIGNIFICANT CHANGE UP (ref 0–2)
BILIRUB SERPL-MCNC: 0.4 MG/DL — SIGNIFICANT CHANGE UP (ref 0.2–1.2)
BILIRUB UR-MCNC: NEGATIVE — SIGNIFICANT CHANGE UP
BUN SERPL-MCNC: 82 MG/DL — HIGH (ref 7–23)
CALCIUM SERPL-MCNC: 9.2 MG/DL — SIGNIFICANT CHANGE UP (ref 8.4–10.5)
CHLORIDE SERPL-SCNC: 89 MMOL/L — LOW (ref 96–108)
CO2 SERPL-SCNC: 17 MMOL/L — LOW (ref 22–31)
COLOR SPEC: COLORLESS — SIGNIFICANT CHANGE UP
CREAT ?TM UR-MCNC: 12 MG/DL — SIGNIFICANT CHANGE UP
CREAT SERPL-MCNC: 1.95 MG/DL — HIGH (ref 0.5–1.3)
DIFF PNL FLD: NEGATIVE — SIGNIFICANT CHANGE UP
EOSINOPHIL # BLD AUTO: 0.14 K/UL — SIGNIFICANT CHANGE UP (ref 0–0.5)
EOSINOPHIL NFR BLD AUTO: 1.8 % — SIGNIFICANT CHANGE UP (ref 0–6)
EPI CELLS # UR: 0 /HPF — SIGNIFICANT CHANGE UP
GLUCOSE SERPL-MCNC: 193 MG/DL — HIGH (ref 70–99)
GLUCOSE UR QL: NEGATIVE — SIGNIFICANT CHANGE UP
HCT VFR BLD CALC: 29 % — LOW (ref 34.5–45)
HGB BLD-MCNC: 10 G/DL — LOW (ref 11.5–15.5)
IMM GRANULOCYTES NFR BLD AUTO: 0.7 % — SIGNIFICANT CHANGE UP (ref 0–1.5)
KETONES UR-MCNC: NEGATIVE — SIGNIFICANT CHANGE UP
LEUKOCYTE ESTERASE UR-ACNC: NEGATIVE — SIGNIFICANT CHANGE UP
LYMPHOCYTES # BLD AUTO: 0.99 K/UL — LOW (ref 1–3.3)
LYMPHOCYTES # BLD AUTO: 12.9 % — LOW (ref 13–44)
MAGNESIUM SERPL-MCNC: 2 MG/DL — SIGNIFICANT CHANGE UP (ref 1.6–2.6)
MCHC RBC-ENTMCNC: 32.6 PG — SIGNIFICANT CHANGE UP (ref 27–34)
MCHC RBC-ENTMCNC: 34.5 GM/DL — SIGNIFICANT CHANGE UP (ref 32–36)
MCV RBC AUTO: 94.5 FL — SIGNIFICANT CHANGE UP (ref 80–100)
MONOCYTES # BLD AUTO: 0.92 K/UL — HIGH (ref 0–0.9)
MONOCYTES NFR BLD AUTO: 12 % — SIGNIFICANT CHANGE UP (ref 2–14)
NEUTROPHILS # BLD AUTO: 5.58 K/UL — SIGNIFICANT CHANGE UP (ref 1.8–7.4)
NEUTROPHILS NFR BLD AUTO: 72.5 % — SIGNIFICANT CHANGE UP (ref 43–77)
NITRITE UR-MCNC: NEGATIVE — SIGNIFICANT CHANGE UP
NRBC # BLD: 0 /100 WBCS — SIGNIFICANT CHANGE UP (ref 0–0)
PH UR: 6 — SIGNIFICANT CHANGE UP (ref 5–8)
PHOSPHATE SERPL-MCNC: 4.1 MG/DL — SIGNIFICANT CHANGE UP (ref 2.5–4.5)
PLATELET # BLD AUTO: 269 K/UL — SIGNIFICANT CHANGE UP (ref 150–400)
POTASSIUM SERPL-MCNC: 5.3 MMOL/L — SIGNIFICANT CHANGE UP (ref 3.5–5.3)
POTASSIUM SERPL-SCNC: 5.3 MMOL/L — SIGNIFICANT CHANGE UP (ref 3.5–5.3)
PROT SERPL-MCNC: 7.5 G/DL — SIGNIFICANT CHANGE UP (ref 6–8.3)
PROT UR-MCNC: SIGNIFICANT CHANGE UP
RBC # BLD: 3.07 M/UL — LOW (ref 3.8–5.2)
RBC # FLD: 12 % — SIGNIFICANT CHANGE UP (ref 10.3–14.5)
RBC CASTS # UR COMP ASSIST: 1 /HPF — SIGNIFICANT CHANGE UP (ref 0–4)
SODIUM SERPL-SCNC: 121 MMOL/L — LOW (ref 135–145)
SODIUM UR-SCNC: 58 MMOL/L — SIGNIFICANT CHANGE UP
SP GR SPEC: 1.01 — LOW (ref 1.01–1.02)
UROBILINOGEN FLD QL: NEGATIVE — SIGNIFICANT CHANGE UP
WBC # BLD: 7.69 K/UL — SIGNIFICANT CHANGE UP (ref 3.8–10.5)
WBC # FLD AUTO: 7.69 K/UL — SIGNIFICANT CHANGE UP (ref 3.8–10.5)
WBC UR QL: 0 /HPF — SIGNIFICANT CHANGE UP (ref 0–5)

## 2021-06-18 PROCEDURE — 99285 EMERGENCY DEPT VISIT HI MDM: CPT

## 2021-06-18 PROCEDURE — 93010 ELECTROCARDIOGRAM REPORT: CPT

## 2021-06-18 PROCEDURE — 71045 X-RAY EXAM CHEST 1 VIEW: CPT | Mod: 26

## 2021-06-18 NOTE — ED PROVIDER NOTE - ATTENDING CONTRIBUTION TO CARE
85F hx Afib, DM, HTN, chronic le edema on lasix and metolazone presents to ED with low Na on OP labs. Las done per routine by PCP yesterday. Called today and told low sodium. Last labs drawn ~1 month ago and were reportedly normal. Pt has been eating and drinking normally, no notable weight loss or gain. Taking meds as Rx'd. No inc in BL LE edema. Pt does have mild cough. No fevers or SOB. No abd pain or distension. No dizziness, unsteadiness or falls. PE Well appearing NAD, irreg, lungs w faint crackle at BL bases, abd soft nt, ext wwp, w chronic venous changes and BL LE pitting edema. Suspect low sodium is subacute in nature as pt asx. Pt does not appear grossly volume overloaded on exam, has LE edema but no worse than baseline. Will repeat labs, check urine, pt stable at this time, hold off on IVF awaiting labs. Will maria need admission for electrolyte mgmt.

## 2021-06-18 NOTE — ED PROVIDER NOTE - PHYSICAL EXAMINATION
General: well-appearing elderly woman in no acute distress  Head: normocephalic, atraumatic  Eyes: PERRL  Mouth: moist mucous membranes  Neck: supple neck  CV: normal rate and rhythm, +bilateral LE pitting edema, peripheral pulses 2+ bilateral LE  Respiratory: clear to auscultation bilaterally, no crackles  Abdomen: soft, nontender, nondistended  MSK: no joint deformities  Neuro: alert and oriented x3, speech clear, moving all extremities without difficulty  Skin: chronic skin changes 2/2 venous stasis/edema on bilateral LE with no warmth or tenderness to palpation

## 2021-06-18 NOTE — ED ADULT TRIAGE NOTE - BP NONINVASIVE DIASTOLIC (MM HG)
CC:  Caro Butterfield is here today for right foot fracture.    Referring MD   PCP Jennifer Smith MD  Medications: medications verified, no change  Refills needed today?  NO  denies known Latex allergy or symptoms of Latex sensitivity.  Patient would like communication of their results via:    Home Phone: [phone] (home)  Okay to leave a message containing results? Yes  Tobacco history: verified                 80

## 2021-06-18 NOTE — ED ADULT NURSE NOTE - OBJECTIVE STATEMENT
85 year old female presents to ED via walk-in complaining of abnormal lab result. PMH type 2 diabetes on metformin, HTN, peripheral edema. Per daughter at bedside pt went for primary care routine appointment and received a call today with high BUN and low sodium and was told to come back into emergency room. Upon assessment pt ambulatory with walker, [place] and well appearing. Pt denies pain, and denies any new symptoms that she has noticed in the past week. denies chest pian, dizziness, lightheadedness, HA. Baseline pitting edema noted on bilateral lower extremities. Abdomen distended and soft, normal for pt. IV placed, blood work sent to lab. Bed locked and lowered. Comfort and safety measures maintained.

## 2021-06-18 NOTE — ED ADULT NURSE REASSESSMENT NOTE - NS ED NURSE REASSESS COMMENT FT1
Pt resting comfortably in bed. Provided with pillows and blankets. VS as documented. Pending lab results. Bed locked and lowered. Comfort and safety measures maintained. Family at bedside.

## 2021-06-18 NOTE — ED PROVIDER NOTE - RAPID ASSESSMENT
85y F presents to the ED c/o abnormal labs. Sodium was 118. Pt well appearing, nontoxic.     IJessica (Darryl), have documented this rapid assessment note under the dictation of Kayli Mendez (PA) , which has been reviewed and affirmed to be accurate.  Patient was seen as a QPA patient. The patient will be seen and further worked up in the main emergency department and their care will be completed by the main emergency department team along with a thorough physical exam. Receiving team will follow up on labs, analgesia, any clinical imaging, reassess and disposition as clinically indicated, all decisions regarding the progression of care will be made at their discretion. 85y F presents to the ED c/o abnormal labs. Sodium was 118. Pt well appearing, nontoxic.     I, Jessica Szymanski (Darryl), have documented this rapid assessment note under the dictation of Kayli Mendez (PA) , which has been reviewed and affirmed to be accurate.  Patient was seen as a QPA patient. The patient will be seen and further worked up in the main emergency department and their care will be completed by the main emergency department team along with a thorough physical exam. Receiving team will follow up on labs, analgesia, any clinical imaging, reassess and disposition as clinically indicated, all decisions regarding the progression of care will be made at their discretion.    Rapid assessment by Kayli Mendez PA-C full eval to be performed in ED. Above documentation completed by scribe above. I was present for and agree with documentation.   Kayli Mendez PA-C

## 2021-06-18 NOTE — ED PROVIDER NOTE - CLINICAL SUMMARY MEDICAL DECISION MAKING FREE TEXT BOX
84yo woman presents for evaluation of hyponatremia found on routine outpatient blood work with no associated symptoms. Pt appears to have chronic edema and is on diuretics. Possibly 2/2 diuretic use vs fluid overload. Blood work rechecked and interventions were held until results were back as pt is asymptomatic. Will require admission for further monitoring or sodium.

## 2021-06-18 NOTE — ED ADULT NURSE NOTE - NSIMPLEMENTINTERV_GEN_ALL_ED
Implemented All Fall Risk Interventions:  [place] to call system. Call bell, personal items and telephone within reach. Instruct patient to call for assistance. Room bathroom lighting operational. Non-slip footwear when patient is off stretcher. Physically safe environment: no spills, clutter or unnecessary equipment. Stretcher in lowest position, wheels locked, appropriate side rails in place. Provide visual cue, wrist band, yellow gown, etc. Monitor gait and stability. Monitor for mental status changes and reorient to person, place, and time. Review medications for side effects contributing to fall risk. Reinforce activity limits and safety measures with patient and family.

## 2021-06-18 NOTE — ED ADULT TRIAGE NOTE - BRAND OF COVID-19 VACCINATION
Assessment/Plan:             Problem List Items Addressed This Visit        Digestive    Chronic superficial gastritis - Primary     Using omeprazole  - had prior ercp previously            Cardiovascular and Mediastinum    Benign essential HTN     Doing well on current amlodipine            Musculoskeletal and Integument    Spondylosis of lumbar region without myelopathy or radiculopathy     Doing new job  At [employer]- does ok with walking with her job  Continue on gabapentin and meloxicam            Other    Anxiety     Owes significant amount for her hospital bills         Hiatal hernia    Tobacco abuse     Still smoking 1ppd- discussed trying the patch again           Other Visit Diagnoses     Acute pharyngitis, unspecified etiology                Subjective:      Patient ID: Grady Daley is a 63 y o  female    1 here for annual physical- on new insurance and working at [employer]-  To set up for dental care in near future- has  Plate with some irritation  Walking 15- 30,000 [place] [facility]  Sleep- getting 4-6 hours at night- feels wound up on getting home- uses occasional melatonin 10 mg- discussed avoiding caffeine at work    2  [place] Started over a week a go with sore throat and then had drainage from left ear  No fever but has chills at times  Has some yellow disocoloration to phlegmn      The following portions of the patient's history were reviewed and updated as appropriate: allergies, current medications and problem list      Review of Systems   Constitutional: Positive for chills  Negative for fever  HENT: Positive for congestion and postnasal drip  Respiratory: Positive for cough and shortness of breath  Gastrointestinal: Positive for diarrhea  Genitourinary: Negative for dysuria  All other systems reviewed and are negative          Objective:      Current Outpatient Medications:     amLODIPine (NORVASC) 10 mg tablet, Take 1 tablet (10 mg total) by mouth daily Decreased dose, Disp: 90 tablet, Rfl: 3    Cholecalciferol (VITAMIN D3) 5000 units CAPS, Take by mouth daily, Disp: , Rfl:     ciclopirox (PENLAC) 8 % solution, Apply topically daily at bedtime, Disp: 6 6 mL, Rfl: 5    clobetasol (TEMOVATE) 0 05 % cream, , Disp: , Rfl: 0    Cranberry 200 MG CAPS, Take 1 capsule by mouth daily, Disp: , Rfl:     fluocinonide (LIDEX) 0 05 % ointment, Apply topically 2 (two) times a day, Disp: , Rfl:     fluticasone (FLONASE) 50 mcg/act nasal spray, 2 Squirts into each nostril, Disp: , Rfl:     gabapentin (NEURONTIN) 300 mg capsule, take 1 capsule by mouth daily in AM 1 at noon  and 2 capsules by mouth every PM, Disp: 120 capsule, Rfl: 1    lisinopril (ZESTRIL) 10 mg tablet, Take 1 tablet (10 mg total) by mouth daily, Disp: 90 tablet, Rfl: 3    loratadine-pseudoephedrine (CLARITIN-D 12 HOUR) 5-120 mg per tablet, Take 1 tablet by mouth every 12 (twelve) hours as needed, Disp: , Rfl:     meloxicam (MOBIC) 15 mg tablet, Take 1 tablet (15 mg total) by mouth daily, Disp: 30 tablet, Rfl: 5    Multiple Vitamin tablet, Take 1 tablet by mouth daily, Disp: , Rfl:     omeprazole (PriLOSEC) 40 MG capsule, take 1 capsule by mouth daily, Disp: 30 capsule, Rfl: 5    Blood pressure 128/70, pulse 80, temperature (!) 97 2 °F (36 2 °C), temperature source Tympanic, height 5' 1" (1 549 m), weight 62 9 kg (138 lb 9 6 oz), SpO2 97 %, not currently breastfeeding  Physical Exam   Constitutional: She appears well-developed and well-nourished  She appears distressed  Harsh cough at times and hoarseness   HENT:   Head: Normocephalic  Right Ear: External ear normal    Scarred left tm- no active drainage noted- some redness and dullness  Mild pharyngeal injection   Eyes: Right eye exhibits no discharge  Left eye exhibits no discharge  No scleral icterus  Neck: Normal range of motion  Neck supple  No thyromegaly present  Cardiovascular: Normal rate and regular rhythm  Exam reveals no friction rub  No murmur heard  Pulmonary/Chest: Effort normal  No respiratory distress  She has wheezes  Harsh upper airway cough  -end exp wheezes   Abdominal: Soft  Bowel sounds are normal  She exhibits no distension  There is no tenderness  There is no guarding  Musculoskeletal: She exhibits no edema or tenderness  Lymphadenopathy:     She has no cervical adenopathy  Skin:   Cracking and dried skin on fingertips and palmar region  Minimal redness on lateral lower legs   Psychiatric: She has a normal mood and affect  Her behavior is normal  Judgment and thought content normal    Nursing note and vitals reviewed  BMI Counseling: Body mass index is 26 19 kg/m²  The BMI is above normal  Nutrition recommendations include 3-5 servings of fruits/vegetables daily  Exercise recommendations include exercising 3-5 times per week  continue to follow with [facility] pt unsure which vaccine pt unsure which vaccine/Moderna dose 1 and 2

## 2021-06-18 NOTE — ED PROVIDER NOTE - PROGRESS NOTE DETAILS
Marianne Burton, resident MD: pt with hyponatremia but asymptomatic on exam. uosms are low with high urine sodium. will give small dose of fluids and recheck blood work. will admit for further monitoring.

## 2021-06-18 NOTE — ED PROVIDER NOTE - PMH
Benign essential hypertension    Chronic kidney disease (CKD)    Degenerative arthritis of left knee    Degenerative arthritis of right knee    Diabetes mellitus    DM (diabetes mellitus)    HTN (hypertension)    Hypertension    KENNETH (obstructive sleep apnea)    Osteoporosis    Peripheral edema    Peripheral vascular disease    Personal history of asbestosis    Pulmonary fibrosis    PVD (peripheral vascular disease)    Spinal stenosis    T2DM (type 2 diabetes mellitus)    TIA (transient ischemic attack)

## 2021-06-18 NOTE — ED PROVIDER NOTE - OBJECTIVE STATEMENT
85y F w/ PMHx of T2DM, HTN, peripheral edema, spinal stenosis presents to the ED w/ daughter at bedside c/o abnormal labs. As per daughter, pt went to PCP for routine checkup when told to come to the ED today because pt's sodium was 118. Pt doesn't feel different from baseline. Endorses "having a cold", usual SOB with walking and coughing at baseline. Denies diarrhea, vomiting, burning w/ urination. Allergic to Levaquin w/ rash.    Rx: Metformin, Eliquis, Furosemide, Atenolol, Metolazone, Anoro.

## 2021-06-18 NOTE — ED PROVIDER NOTE - PSH
H/O abdominal hysterectomy    History of hysterectomy    S/P cataract surgery  Right eye  S/P hysterectomy    S/P spinal fusion

## 2021-06-19 DIAGNOSIS — E87.1 HYPO-OSMOLALITY AND HYPONATREMIA: ICD-10-CM

## 2021-06-19 DIAGNOSIS — I48.0 PAROXYSMAL ATRIAL FIBRILLATION: ICD-10-CM

## 2021-06-19 PROBLEM — I10 ESSENTIAL (PRIMARY) HYPERTENSION: Chronic | Status: ACTIVE | Noted: 2021-04-11

## 2021-06-19 PROBLEM — E11.9 TYPE 2 DIABETES MELLITUS WITHOUT COMPLICATIONS: Chronic | Status: ACTIVE | Noted: 2021-04-11

## 2021-06-19 PROBLEM — N18.9 CHRONIC KIDNEY DISEASE, UNSPECIFIED: Chronic | Status: ACTIVE | Noted: 2021-04-11

## 2021-06-19 PROBLEM — I73.9 PERIPHERAL VASCULAR DISEASE, UNSPECIFIED: Chronic | Status: ACTIVE | Noted: 2021-04-11

## 2021-06-19 LAB
ALBUMIN SERPL ELPH-MCNC: 3.4 G/DL — SIGNIFICANT CHANGE UP (ref 3.3–5)
ALP SERPL-CCNC: 153 U/L — HIGH (ref 40–120)
ALT FLD-CCNC: 11 U/L — SIGNIFICANT CHANGE UP (ref 10–45)
ANION GAP SERPL CALC-SCNC: 15 MMOL/L — SIGNIFICANT CHANGE UP (ref 5–17)
ANION GAP SERPL CALC-SCNC: 16 MMOL/L — SIGNIFICANT CHANGE UP (ref 5–17)
AST SERPL-CCNC: 14 U/L — SIGNIFICANT CHANGE UP (ref 10–40)
BASOPHILS # BLD AUTO: 0.01 K/UL — SIGNIFICANT CHANGE UP (ref 0–0.2)
BASOPHILS NFR BLD AUTO: 0.1 % — SIGNIFICANT CHANGE UP (ref 0–2)
BILIRUB SERPL-MCNC: 0.4 MG/DL — SIGNIFICANT CHANGE UP (ref 0.2–1.2)
BUN SERPL-MCNC: 77 MG/DL — HIGH (ref 7–23)
BUN SERPL-MCNC: 79 MG/DL — HIGH (ref 7–23)
CALCIUM SERPL-MCNC: 9.1 MG/DL — SIGNIFICANT CHANGE UP (ref 8.4–10.5)
CALCIUM SERPL-MCNC: 9.6 MG/DL — SIGNIFICANT CHANGE UP (ref 8.4–10.5)
CHLORIDE SERPL-SCNC: 93 MMOL/L — LOW (ref 96–108)
CHLORIDE SERPL-SCNC: 95 MMOL/L — LOW (ref 96–108)
CO2 SERPL-SCNC: 15 MMOL/L — LOW (ref 22–31)
CO2 SERPL-SCNC: 17 MMOL/L — LOW (ref 22–31)
CREAT SERPL-MCNC: 1.78 MG/DL — HIGH (ref 0.5–1.3)
CREAT SERPL-MCNC: 1.85 MG/DL — HIGH (ref 0.5–1.3)
EOSINOPHIL # BLD AUTO: 0.14 K/UL — SIGNIFICANT CHANGE UP (ref 0–0.5)
EOSINOPHIL NFR BLD AUTO: 2.1 % — SIGNIFICANT CHANGE UP (ref 0–6)
GLUCOSE BLDC GLUCOMTR-MCNC: 143 MG/DL — HIGH (ref 70–99)
GLUCOSE BLDC GLUCOMTR-MCNC: 150 MG/DL — HIGH (ref 70–99)
GLUCOSE BLDC GLUCOMTR-MCNC: 162 MG/DL — HIGH (ref 70–99)
GLUCOSE BLDC GLUCOMTR-MCNC: 212 MG/DL — HIGH (ref 70–99)
GLUCOSE SERPL-MCNC: 157 MG/DL — HIGH (ref 70–99)
GLUCOSE SERPL-MCNC: 165 MG/DL — HIGH (ref 70–99)
HCT VFR BLD CALC: 28.4 % — LOW (ref 34.5–45)
HGB BLD-MCNC: 9.7 G/DL — LOW (ref 11.5–15.5)
IMM GRANULOCYTES NFR BLD AUTO: 0.3 % — SIGNIFICANT CHANGE UP (ref 0–1.5)
LYMPHOCYTES # BLD AUTO: 1.18 K/UL — SIGNIFICANT CHANGE UP (ref 1–3.3)
LYMPHOCYTES # BLD AUTO: 17.5 % — SIGNIFICANT CHANGE UP (ref 13–44)
MCHC RBC-ENTMCNC: 32.3 PG — SIGNIFICANT CHANGE UP (ref 27–34)
MCHC RBC-ENTMCNC: 34.2 GM/DL — SIGNIFICANT CHANGE UP (ref 32–36)
MCV RBC AUTO: 94.7 FL — SIGNIFICANT CHANGE UP (ref 80–100)
MONOCYTES # BLD AUTO: 1.1 K/UL — HIGH (ref 0–0.9)
MONOCYTES NFR BLD AUTO: 16.3 % — HIGH (ref 2–14)
NEUTROPHILS # BLD AUTO: 4.31 K/UL — SIGNIFICANT CHANGE UP (ref 1.8–7.4)
NEUTROPHILS NFR BLD AUTO: 63.7 % — SIGNIFICANT CHANGE UP (ref 43–77)
NRBC # BLD: 0 /100 WBCS — SIGNIFICANT CHANGE UP (ref 0–0)
OSMOLALITY SERPL: 292 MOSMOL/KG — SIGNIFICANT CHANGE UP (ref 280–301)
OSMOLALITY UR: 219 MOS/KG — LOW (ref 300–900)
PLATELET # BLD AUTO: 250 K/UL — SIGNIFICANT CHANGE UP (ref 150–400)
POTASSIUM SERPL-MCNC: 4.6 MMOL/L — SIGNIFICANT CHANGE UP (ref 3.5–5.3)
POTASSIUM SERPL-MCNC: 4.7 MMOL/L — SIGNIFICANT CHANGE UP (ref 3.5–5.3)
POTASSIUM SERPL-SCNC: 4.6 MMOL/L — SIGNIFICANT CHANGE UP (ref 3.5–5.3)
POTASSIUM SERPL-SCNC: 4.7 MMOL/L — SIGNIFICANT CHANGE UP (ref 3.5–5.3)
PROT SERPL-MCNC: 6.7 G/DL — SIGNIFICANT CHANGE UP (ref 6–8.3)
RBC # BLD: 3 M/UL — LOW (ref 3.8–5.2)
RBC # FLD: 11.9 % — SIGNIFICANT CHANGE UP (ref 10.3–14.5)
SARS-COV-2 RNA SPEC QL NAA+PROBE: SIGNIFICANT CHANGE UP
SODIUM SERPL-SCNC: 125 MMOL/L — LOW (ref 135–145)
SODIUM SERPL-SCNC: 126 MMOL/L — LOW (ref 135–145)
WBC # BLD: 6.76 K/UL — SIGNIFICANT CHANGE UP (ref 3.8–10.5)
WBC # FLD AUTO: 6.76 K/UL — SIGNIFICANT CHANGE UP (ref 3.8–10.5)

## 2021-06-19 PROCEDURE — 99223 1ST HOSP IP/OBS HIGH 75: CPT

## 2021-06-19 PROCEDURE — 12345: CPT | Mod: NC

## 2021-06-19 RX ORDER — GLUCAGON INJECTION, SOLUTION 0.5 MG/.1ML
1 INJECTION, SOLUTION SUBCUTANEOUS ONCE
Refills: 0 | Status: DISCONTINUED | OUTPATIENT
Start: 2021-06-19 | End: 2021-06-22

## 2021-06-19 RX ORDER — DEXTROSE 50 % IN WATER 50 %
15 SYRINGE (ML) INTRAVENOUS ONCE
Refills: 0 | Status: DISCONTINUED | OUTPATIENT
Start: 2021-06-19 | End: 2021-06-22

## 2021-06-19 RX ORDER — SODIUM CHLORIDE 9 MG/ML
1000 INJECTION, SOLUTION INTRAVENOUS
Refills: 0 | Status: DISCONTINUED | OUTPATIENT
Start: 2021-06-19 | End: 2021-06-22

## 2021-06-19 RX ORDER — SODIUM CHLORIDE 9 MG/ML
250 INJECTION INTRAMUSCULAR; INTRAVENOUS; SUBCUTANEOUS ONCE
Refills: 0 | Status: COMPLETED | OUTPATIENT
Start: 2021-06-19 | End: 2021-06-19

## 2021-06-19 RX ORDER — ATENOLOL 25 MG/1
25 TABLET ORAL DAILY
Refills: 0 | Status: DISCONTINUED | OUTPATIENT
Start: 2021-06-19 | End: 2021-06-21

## 2021-06-19 RX ORDER — DEXTROSE 50 % IN WATER 50 %
25 SYRINGE (ML) INTRAVENOUS ONCE
Refills: 0 | Status: DISCONTINUED | OUTPATIENT
Start: 2021-06-19 | End: 2021-06-22

## 2021-06-19 RX ORDER — ACETAMINOPHEN 500 MG
650 TABLET ORAL EVERY 4 HOURS
Refills: 0 | Status: DISCONTINUED | OUTPATIENT
Start: 2021-06-19 | End: 2021-06-22

## 2021-06-19 RX ORDER — DEXTROSE 50 % IN WATER 50 %
12.5 SYRINGE (ML) INTRAVENOUS ONCE
Refills: 0 | Status: DISCONTINUED | OUTPATIENT
Start: 2021-06-19 | End: 2021-06-22

## 2021-06-19 RX ORDER — BUDESONIDE AND FORMOTEROL FUMARATE DIHYDRATE 160; 4.5 UG/1; UG/1
2 AEROSOL RESPIRATORY (INHALATION)
Refills: 0 | Status: DISCONTINUED | OUTPATIENT
Start: 2021-06-19 | End: 2021-06-22

## 2021-06-19 RX ORDER — INSULIN LISPRO 100/ML
VIAL (ML) SUBCUTANEOUS AT BEDTIME
Refills: 0 | Status: DISCONTINUED | OUTPATIENT
Start: 2021-06-19 | End: 2021-06-22

## 2021-06-19 RX ORDER — APIXABAN 2.5 MG/1
2.5 TABLET, FILM COATED ORAL
Refills: 0 | Status: DISCONTINUED | OUTPATIENT
Start: 2021-06-19 | End: 2021-06-22

## 2021-06-19 RX ORDER — INSULIN LISPRO 100/ML
VIAL (ML) SUBCUTANEOUS
Refills: 0 | Status: DISCONTINUED | OUTPATIENT
Start: 2021-06-19 | End: 2021-06-22

## 2021-06-19 RX ORDER — SENNA PLUS 8.6 MG/1
2 TABLET ORAL AT BEDTIME
Refills: 0 | Status: DISCONTINUED | OUTPATIENT
Start: 2021-06-19 | End: 2021-06-22

## 2021-06-19 RX ORDER — SODIUM BICARBONATE 1 MEQ/ML
650 SYRINGE (ML) INTRAVENOUS EVERY 8 HOURS
Refills: 0 | Status: DISCONTINUED | OUTPATIENT
Start: 2021-06-19 | End: 2021-06-22

## 2021-06-19 RX ADMIN — Medication 650 MILLIGRAM(S): at 21:56

## 2021-06-19 RX ADMIN — BUDESONIDE AND FORMOTEROL FUMARATE DIHYDRATE 2 PUFF(S): 160; 4.5 AEROSOL RESPIRATORY (INHALATION) at 06:45

## 2021-06-19 RX ADMIN — Medication 650 MILLIGRAM(S): at 13:07

## 2021-06-19 RX ADMIN — ATENOLOL 25 MILLIGRAM(S): 25 TABLET ORAL at 06:44

## 2021-06-19 RX ADMIN — SODIUM CHLORIDE 250 MILLILITER(S): 9 INJECTION INTRAMUSCULAR; INTRAVENOUS; SUBCUTANEOUS at 01:43

## 2021-06-19 RX ADMIN — APIXABAN 2.5 MILLIGRAM(S): 2.5 TABLET, FILM COATED ORAL at 06:44

## 2021-06-19 RX ADMIN — APIXABAN 2.5 MILLIGRAM(S): 2.5 TABLET, FILM COATED ORAL at 17:44

## 2021-06-19 RX ADMIN — Medication 2: at 17:52

## 2021-06-19 RX ADMIN — SENNA PLUS 2 TABLET(S): 8.6 TABLET ORAL at 21:56

## 2021-06-19 RX ADMIN — BUDESONIDE AND FORMOTEROL FUMARATE DIHYDRATE 2 PUFF(S): 160; 4.5 AEROSOL RESPIRATORY (INHALATION) at 17:44

## 2021-06-19 NOTE — H&P ADULT - PROBLEM SELECTOR PLAN 2
- Hold oral hypoglycemics  - insulin correction scale  - check fsg qac/qhs  - check a1c in am  - consistent carb diet

## 2021-06-19 NOTE — H&P ADULT - NSHPPHYSICALEXAM_GEN_ALL_CORE
Vital Signs Last 24 Hrs  T(C): 36.7 (19 Jun 2021 02:31), Max: 36.8 (18 Jun 2021 21:33)  T(F): 98 (19 Jun 2021 02:31), Max: 98.2 (18 Jun 2021 21:33)  HR: 66 (19 Jun 2021 02:31) (66 - 71)  BP: 158/55 (19 Jun 2021 02:31) (158/55 - 175/80)  BP(mean): --  RR: 19 (19 Jun 2021 02:31) (17 - 19)  SpO2: 100% (19 Jun 2021 02:31) (100% - 100%) Vital Signs Last 24 Hrs  T(C): 36.7 (19 Jun 2021 02:31), Max: 36.8 (18 Jun 2021 21:33)  T(F): 98 (19 Jun 2021 02:31), Max: 98.2 (18 Jun 2021 21:33)  HR: 66 (19 Jun 2021 02:31) (66 - 71)  BP: 158/55 (19 Jun 2021 02:31) (158/55 - 175/80)  BP(mean): --  RR: 19 (19 Jun 2021 02:31) (17 - 19)  SpO2: 100% (19 Jun 2021 02:31) (100% - 100%)    GENERAL: No acute distress, well-developed, obese , mild resting tremor   HEAD:  Atraumatic, Normocephalic  ENT: EOMI, PERRLA, conjunctiva and sclera clear,  moist mucosa no pharyngeal erythema or exudates   NECK: supple , no JVD   CHEST/LUNG: Clear to auscultation bilaterally; No wheeze, equal breath sounds bilaterally   BACK: No spinal tenderness,  No CVA tenderness   HEART: Regular rate and rhythm; No murmurs, rubs, or gallops  ABDOMEN: Soft, Nontender, Nondistended; Bowel sounds present  EXTREMITIES:  No clubbing, cyanosis, + pitting edema b/l LE in compression wraps  MSK: No joint swelling or effusions, ROM intact   PSYCH: Normal behavior/affect  NEUROLOGY: AAOx3, non-focal, cranial nerves intact  SKIN: Normal color, No rashes or lesions

## 2021-06-19 NOTE — H&P ADULT - NSHPLABSRESULTS_GEN_ALL_CORE
Labs personally reviewed:                          10.0   7.69  )-----------( 269      ( 2021 22:50 )             29.0     06-19    125<L>  |  93<L>  |  77<H>  ----------------------------<  165<H>  4.7   |  17<L>  |  1.78<H>    Ca    9.6      2021 02:48  Phos  4.1     -  Mg     2.0     -    TPro  7.5  /  Alb  3.9  /  TBili  0.4  /  DBili  x   /  AST  17  /  ALT  11  /  AlkPhos  188<H>          LIVER FUNCTIONS - ( 2021 22:50 )  Alb: 3.9 g/dL / Pro: 7.5 g/dL / ALK PHOS: 188 U/L / ALT: 11 U/L / AST: 17 U/L / GGT: x             Urinalysis Basic - ( 2021 23:21 )    Color: Colorless / Appearance: Clear / S.007 / pH: x  Gluc: x / Ketone: Negative  / Bili: Negative / Urobili: Negative   Blood: x / Protein: Trace / Nitrite: Negative   Leuk Esterase: Negative / RBC: 1 /hpf / WBC 0 /HPF   Sq Epi: x / Non Sq Epi: 0 /hpf / Bacteria: Negative      CAPILLARY BLOOD GLUCOSE          Imaging:  CXR personally reviewed: no focal opacity    EKG personally reviewed: Normal sinus rhythm at 69 bpm ,  APCs present , no s-t elevation or depressions

## 2021-06-19 NOTE — H&P ADULT - PROBLEM SELECTOR PLAN 5
- holding metolazone and lasix in setting of hyponatremia, can resume lasix once serum sodium improves

## 2021-06-19 NOTE — H&P ADULT - HISTORY OF PRESENT ILLNESS
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   Patient reports she followed up with her PMD on 6/18 , labs at the visit returned with sodium around 118 , and she was notified to com to the hospital. She reports no specific symptoms at this time. She reports no headache , no visual changes , no seizures. She reports eating and drinking a normal amount. No excessive water. She reports taking her medications as prescribed.

## 2021-06-19 NOTE — H&P ADULT - NSICDXPASTMEDICALHX_GEN_ALL_CORE_FT
77m s/p recent extensive thoracic surgery at [facility] on 9/12 by Dr. 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 Post-op course complicated by A.fib and CHF requiring initiation of Amiodarone and Lasix. No Lasix prescribed on discharge, now p/w SOB.   CXR shows congestion and small effusions.  In ER, given Lasix 40 iv, Solumedrol and nebs.  Seen by cardio/pulm.  Clinically improved with diuretics and right thoracentesis.  A.fib recurred requiring cardioversion 9/26.  Meds adjusted in d/w Dr. Palla.  Patient feels a lot better since admission and is eager to go home.  INR monitoring Dr. Lopez's office. PAST MEDICAL HISTORY:  Chronic kidney disease (CKD)     Hypertension     PVD (peripheral vascular disease)     T2DM (type 2 diabetes mellitus)

## 2021-06-19 NOTE — H&P ADULT - PROBLEM SELECTOR PLAN 1
decreased urine osmol and increased urine specific gravity , however, difficult to interpret as patient is on lasix  and therefore excretion of urine sodium is increased , patient edematous on exam , and does not appear hypovolemic , hyponatremia may be multifactorial and may have a component of polydipsia  possibly from free water replacement of diuresis ,  and possibly 2/2 to metolazone  - monitor urine sodium   - free water restrict   - Hold lasix and metolazone

## 2021-06-19 NOTE — ED ADULT NURSE REASSESSMENT NOTE - NS ED NURSE REASSESS COMMENT FT1
Pt taken to bathroom in wheelchair. Updated on plan of care. Admitted to medicine. Plan to repeat blood work after fluid bolus of 250cc. Bed locked and lowered. Comfort and safety measures maintained.

## 2021-06-19 NOTE — H&P ADULT - ASSESSMENT
85F w/PMH of T2DM, COPD, htn, PVD with bilateral LE edema, CKD,  afib on eliquis , recently admitted ( 4/11/21 – 4/16/21) for BRASH syndrome ,  presents for hyponatremia , seen on outpatient labs.

## 2021-06-20 DIAGNOSIS — N18.4 CHRONIC KIDNEY DISEASE, STAGE 4 (SEVERE): ICD-10-CM

## 2021-06-20 LAB
A1C WITH ESTIMATED AVERAGE GLUCOSE RESULT: 6.5 % — HIGH (ref 4–5.6)
ANION GAP SERPL CALC-SCNC: 12 MMOL/L — SIGNIFICANT CHANGE UP (ref 5–17)
BUN SERPL-MCNC: 74 MG/DL — HIGH (ref 7–23)
CALCIUM SERPL-MCNC: 9.5 MG/DL — SIGNIFICANT CHANGE UP (ref 8.4–10.5)
CHLORIDE SERPL-SCNC: 100 MMOL/L — SIGNIFICANT CHANGE UP (ref 96–108)
CO2 SERPL-SCNC: 18 MMOL/L — LOW (ref 22–31)
COVID-19 SPIKE DOMAIN AB INTERP: POSITIVE
COVID-19 SPIKE DOMAIN ANTIBODY RESULT: >250 U/ML — HIGH
CREAT SERPL-MCNC: 1.93 MG/DL — HIGH (ref 0.5–1.3)
ESTIMATED AVERAGE GLUCOSE: 140 MG/DL — HIGH (ref 68–114)
GLUCOSE BLDC GLUCOMTR-MCNC: 142 MG/DL — HIGH (ref 70–99)
GLUCOSE BLDC GLUCOMTR-MCNC: 244 MG/DL — HIGH (ref 70–99)
GLUCOSE BLDC GLUCOMTR-MCNC: 261 MG/DL — HIGH (ref 70–99)
GLUCOSE BLDC GLUCOMTR-MCNC: 270 MG/DL — HIGH (ref 70–99)
GLUCOSE SERPL-MCNC: 140 MG/DL — HIGH (ref 70–99)
POTASSIUM SERPL-MCNC: 4.6 MMOL/L — SIGNIFICANT CHANGE UP (ref 3.5–5.3)
POTASSIUM SERPL-SCNC: 4.6 MMOL/L — SIGNIFICANT CHANGE UP (ref 3.5–5.3)
SARS-COV-2 IGG+IGM SERPL QL IA: >250 U/ML — HIGH
SARS-COV-2 IGG+IGM SERPL QL IA: POSITIVE
SODIUM SERPL-SCNC: 130 MMOL/L — LOW (ref 135–145)

## 2021-06-20 PROCEDURE — 99233 SBSQ HOSP IP/OBS HIGH 50: CPT

## 2021-06-20 RX ORDER — FUROSEMIDE 40 MG
40 TABLET ORAL DAILY
Refills: 0 | Status: DISCONTINUED | OUTPATIENT
Start: 2021-06-20 | End: 2021-06-22

## 2021-06-20 RX ADMIN — Medication 40 MILLIGRAM(S): at 17:35

## 2021-06-20 RX ADMIN — APIXABAN 2.5 MILLIGRAM(S): 2.5 TABLET, FILM COATED ORAL at 17:35

## 2021-06-20 RX ADMIN — Medication 650 MILLIGRAM(S): at 13:07

## 2021-06-20 RX ADMIN — Medication 3: at 13:07

## 2021-06-20 RX ADMIN — APIXABAN 2.5 MILLIGRAM(S): 2.5 TABLET, FILM COATED ORAL at 06:42

## 2021-06-20 RX ADMIN — Medication 3: at 17:36

## 2021-06-20 RX ADMIN — SENNA PLUS 2 TABLET(S): 8.6 TABLET ORAL at 21:45

## 2021-06-20 RX ADMIN — ATENOLOL 25 MILLIGRAM(S): 25 TABLET ORAL at 06:42

## 2021-06-20 RX ADMIN — BUDESONIDE AND FORMOTEROL FUMARATE DIHYDRATE 2 PUFF(S): 160; 4.5 AEROSOL RESPIRATORY (INHALATION) at 17:35

## 2021-06-20 RX ADMIN — Medication 650 MILLIGRAM(S): at 21:45

## 2021-06-20 RX ADMIN — Medication 650 MILLIGRAM(S): at 06:42

## 2021-06-20 RX ADMIN — BUDESONIDE AND FORMOTEROL FUMARATE DIHYDRATE 2 PUFF(S): 160; 4.5 AEROSOL RESPIRATORY (INHALATION) at 06:43

## 2021-06-21 DIAGNOSIS — Z29.9 ENCOUNTER FOR PROPHYLACTIC MEASURES, UNSPECIFIED: ICD-10-CM

## 2021-06-21 LAB
ANION GAP SERPL CALC-SCNC: 14 MMOL/L — SIGNIFICANT CHANGE UP (ref 5–17)
BUN SERPL-MCNC: 66 MG/DL — HIGH (ref 7–23)
CALCIUM SERPL-MCNC: 9.3 MG/DL — SIGNIFICANT CHANGE UP (ref 8.4–10.5)
CHLORIDE SERPL-SCNC: 101 MMOL/L — SIGNIFICANT CHANGE UP (ref 96–108)
CO2 SERPL-SCNC: 18 MMOL/L — LOW (ref 22–31)
CREAT SERPL-MCNC: 1.74 MG/DL — HIGH (ref 0.5–1.3)
GLUCOSE BLDC GLUCOMTR-MCNC: 187 MG/DL — HIGH (ref 70–99)
GLUCOSE BLDC GLUCOMTR-MCNC: 222 MG/DL — HIGH (ref 70–99)
GLUCOSE BLDC GLUCOMTR-MCNC: 250 MG/DL — HIGH (ref 70–99)
GLUCOSE BLDC GLUCOMTR-MCNC: 264 MG/DL — HIGH (ref 70–99)
GLUCOSE SERPL-MCNC: 157 MG/DL — HIGH (ref 70–99)
POTASSIUM SERPL-MCNC: 4.1 MMOL/L — SIGNIFICANT CHANGE UP (ref 3.5–5.3)
POTASSIUM SERPL-SCNC: 4.1 MMOL/L — SIGNIFICANT CHANGE UP (ref 3.5–5.3)
SODIUM SERPL-SCNC: 133 MMOL/L — LOW (ref 135–145)

## 2021-06-21 PROCEDURE — 99232 SBSQ HOSP IP/OBS MODERATE 35: CPT

## 2021-06-21 RX ORDER — ATENOLOL 25 MG/1
25 TABLET ORAL ONCE
Refills: 0 | Status: COMPLETED | OUTPATIENT
Start: 2021-06-21 | End: 2021-06-21

## 2021-06-21 RX ORDER — ATENOLOL 25 MG/1
50 TABLET ORAL DAILY
Refills: 0 | Status: DISCONTINUED | OUTPATIENT
Start: 2021-06-22 | End: 2021-06-22

## 2021-06-21 RX ADMIN — APIXABAN 2.5 MILLIGRAM(S): 2.5 TABLET, FILM COATED ORAL at 05:50

## 2021-06-21 RX ADMIN — ATENOLOL 25 MILLIGRAM(S): 25 TABLET ORAL at 05:50

## 2021-06-21 RX ADMIN — BUDESONIDE AND FORMOTEROL FUMARATE DIHYDRATE 2 PUFF(S): 160; 4.5 AEROSOL RESPIRATORY (INHALATION) at 17:18

## 2021-06-21 RX ADMIN — ATENOLOL 25 MILLIGRAM(S): 25 TABLET ORAL at 14:02

## 2021-06-21 RX ADMIN — Medication 40 MILLIGRAM(S): at 05:58

## 2021-06-21 RX ADMIN — BUDESONIDE AND FORMOTEROL FUMARATE DIHYDRATE 2 PUFF(S): 160; 4.5 AEROSOL RESPIRATORY (INHALATION) at 05:51

## 2021-06-21 RX ADMIN — Medication 650 MILLIGRAM(S): at 14:13

## 2021-06-21 RX ADMIN — Medication 650 MILLIGRAM(S): at 22:10

## 2021-06-21 RX ADMIN — Medication 3: at 12:38

## 2021-06-21 RX ADMIN — APIXABAN 2.5 MILLIGRAM(S): 2.5 TABLET, FILM COATED ORAL at 17:18

## 2021-06-21 RX ADMIN — Medication 650 MILLIGRAM(S): at 05:50

## 2021-06-21 RX ADMIN — Medication 2: at 17:18

## 2021-06-21 RX ADMIN — Medication 1: at 08:29

## 2021-06-21 NOTE — PHYSICAL THERAPY INITIAL EVALUATION ADULT - GENERAL OBSERVATIONS, REHAB EVAL
pt received in bed nad all siderails up pt [preference HOB 30 bed alarm active bed in lowest position , rn Zbigniew in with pt when received assessing pt , heels pink L > R but blanchable per rn , gluteal fold stage II rn aware

## 2021-06-21 NOTE — PHYSICAL THERAPY INITIAL EVALUATION ADULT - IMPAIRMENTS CONTRIBUTING TO GAIT DEVIATIONS, PT EVAL
decreased endurance tires easily , pt intermittent min unsteady min to cg of 1/impaired balance/impaired postural control/decreased strength

## 2021-06-21 NOTE — PHYSICAL THERAPY INITIAL EVALUATION ADULT - PLANNED THERAPY INTERVENTIONS, PT EVAL
GOAL stair train : pt will negotiate a FOS with HR with cg of 1 in 2 weeks/balance training/bed mobility training/gait training/strengthening/transfer training

## 2021-06-21 NOTE — PHYSICAL THERAPY INITIAL EVALUATION ADULT - BED MOBILITY LIMITATIONS, REHAB EVAL
therex seated : KF/KE x 10 arom , marching in sitting arom x 10/decreased ability to use arms for pushing/pulling/decreased ability to use legs for bridging/pushing

## 2021-06-21 NOTE — DIETITIAN INITIAL EVALUATION ADULT. - PERTINENT MEDS FT
MEDICATIONS  (STANDING):  apixaban 2.5 milliGRAM(s) Oral two times a day  ATENolol  Tablet 25 milliGRAM(s) Oral once  budesonide 160 MICROgram(s)/formoterol 4.5 MICROgram(s) Inhaler 2 Puff(s) Inhalation two times a day  dextrose 40% Gel 15 Gram(s) Oral once  dextrose 5%. 1000 milliLiter(s) (50 mL/Hr) IV Continuous <Continuous>  dextrose 5%. 1000 milliLiter(s) (100 mL/Hr) IV Continuous <Continuous>  dextrose 50% Injectable 25 Gram(s) IV Push once  dextrose 50% Injectable 12.5 Gram(s) IV Push once  furosemide    Tablet 40 milliGRAM(s) Oral daily  glucagon  Injectable 1 milliGRAM(s) IntraMuscular once  insulin lispro (ADMELOG) corrective regimen sliding scale   SubCutaneous three times a day before meals  insulin lispro (ADMELOG) corrective regimen sliding scale   SubCutaneous at bedtime  sodium bicarbonate 650 milliGRAM(s) Oral every 8 hours

## 2021-06-21 NOTE — DIETITIAN INITIAL EVALUATION ADULT. - OTHER INFO
patient counseled on Fluid restriction, 1500ml daily, related to hyponatremia upon admission. Examples of container sizes discussed. Patient does not drink any supplement s or take MVI at home.

## 2021-06-21 NOTE — PHYSICAL THERAPY INITIAL EVALUATION ADULT - IMPAIRMENTS CONTRIBUTING IMPAIRED BED MOBILITY, REHAB EVAL
sat EOB x 6 min close supervision balance fair with feet support on floor and using ue's on bed/impaired postural control/decreased strength

## 2021-06-21 NOTE — PHYSICAL THERAPY INITIAL EVALUATION ADULT - ASR EQUIP NEEDS DISCH PT EVAL
pt owns a tripod std cane , rolling walker with a seat , shower seat with grab bars in shower ,fxl glucometer ; pt will need transport w/c for longer distances if does not own and transportation into home as pt has 15 steps and is unable to perform at this time

## 2021-06-21 NOTE — PHYSICAL THERAPY INITIAL EVALUATION ADULT - DISCHARGE DISPOSITION, PT EVAL
PT recommend [facility] pt decline pt wish to return home with Home PT and assist  , home PT for increase fxl mobility , strength, balance, endurance, fall prevention education , pt report dtr can assist as needed; pt understood needs assist all fxl activity and adl's/rehabilitation facility

## 2021-06-21 NOTE — DIETITIAN INITIAL EVALUATION ADULT. - EDUCATION DIETARY MODIFICATIONS
Consistent carbohydratre diet with 1500ml fluid restriction/teach back/(2) meets goals/outcomes/verbalization

## 2021-06-21 NOTE — PHYSICAL THERAPY INITIAL EVALUATION ADULT - IMPAIRED TRANSFERS: SIT/STAND, REHAB EVAL
decreased endurance , sit-stand x2 at RW min to cg of1 then cg of 1 vc for proper hand placement with transfer to and from walker pt self correct/impaired balance/impaired postural control/decreased strength

## 2021-06-21 NOTE — DIETITIAN INITIAL EVALUATION ADULT. - ORAL INTAKE PTA/DIET HISTORY
Pt requested medication for anxiety. Pt given Atarax 50mg PO at 0855.    patient follows a consistent carbohydrate diet at home. She lives with her daughter who shops and cooks meals. Patient checks her fingersticks x2 daily, usually 130mg/dl in am as stated. Patient denies weight loss and expresses surprise that on admission she had a    st II pressure ulcer. Pt states she has upper partial dentures and denies chewing or swallowing difficulty, denies food allergies, no n,v,d. Patient states her last BM was Friday, 3 days ago. Will inform RNc

## 2021-06-21 NOTE — DIETITIAN INITIAL EVALUATION ADULT. - REASON INDICATOR FOR ASSESSMENT
Pressure Ulcer > st II   "85F w/PMH of T2DM, COPD, htn, PVD with bilateral LE edema, CKD,  afib on eliquis , recently admitted ( 4/11/21 – 4/16/21) for BRASH syndrome ,  presents for hyponatremia , seen on outpatient labs."

## 2021-06-21 NOTE — PHYSICAL THERAPY INITIAL EVALUATION ADULT - ADDITIONAL COMMENTS
pt lives with daughter in house with 15 steps to enter with HR ; pt uses Rolling walker or cane to amb PTA ; pt owns a fxl glucometer and is independent in its use ; jeaneth Johnson assist with personal care and adl's ; jeaneth Szymanski ID as caregiver [phone] pt lives with daughter Elizabeth in house with 15 steps to enter with HR ; pt uses Rolling walker or cane to amb PTA mostly Rolling walker per pt  ; pt owns a fxl glucometer and is independent in its use ; dtr Elizabeth assist with personal care and adl's ; jeaneth Szymanski ID as caregiver [phone]; pt has a walk-in shower with shower seat and 2 grab bars

## 2021-06-21 NOTE — DIETITIAN INITIAL EVALUATION ADULT. - ADD RECOMMEND
add MVI, Vitamin C; Reinforce diet adherence/diet education. add MVI, Vitamin C; Add glucerna x1 daily; Reinforce diet adherence/diet education.

## 2021-06-21 NOTE — PHYSICAL THERAPY INITIAL EVALUATION ADULT - GAIT DEVIATIONS NOTED, PT EVAL
decreased sari/increased time in double stance/decreased step length/decreased stride length/decreased weight-shifting ability

## 2021-06-21 NOTE — PHYSICAL THERAPY INITIAL EVALUATION ADULT - PERTINENT HX OF CURRENT PROBLEM, REHAB EVAL
86yo lady with PMH of T2DM, COPD, htn, PVD with bilateral LE edema, CKD, afib on eliquis , recently admitted ( 4/11/21 – 4/16/21) for BRASH syndrome ,  presents for hyponatremia , seen on outpatient labs.,  now Na 133 ; + UA ; EKG : SR with PAC's ; COVID 19(-) 6/18/21 but COVID antibody + > 250 6/20/21

## 2021-06-22 ENCOUNTER — TRANSCRIPTION ENCOUNTER (OUTPATIENT)
Age: 86
End: 2021-06-22

## 2021-06-22 VITALS — SYSTOLIC BLOOD PRESSURE: 132 MMHG | DIASTOLIC BLOOD PRESSURE: 61 MMHG

## 2021-06-22 LAB
-  AMIKACIN: SIGNIFICANT CHANGE UP
-  AMOXICILLIN/CLAVULANIC ACID: SIGNIFICANT CHANGE UP
-  AMPICILLIN/SULBACTAM: SIGNIFICANT CHANGE UP
-  AMPICILLIN: SIGNIFICANT CHANGE UP
-  AZTREONAM: SIGNIFICANT CHANGE UP
-  CEFAZOLIN: SIGNIFICANT CHANGE UP
-  CEFEPIME: SIGNIFICANT CHANGE UP
-  CEFOXITIN: SIGNIFICANT CHANGE UP
-  CEFTRIAXONE: SIGNIFICANT CHANGE UP
-  CIPROFLOXACIN: SIGNIFICANT CHANGE UP
-  ERTAPENEM: SIGNIFICANT CHANGE UP
-  GENTAMICIN: SIGNIFICANT CHANGE UP
-  IMIPENEM: SIGNIFICANT CHANGE UP
-  LEVOFLOXACIN: SIGNIFICANT CHANGE UP
-  MEROPENEM: SIGNIFICANT CHANGE UP
-  NITROFURANTOIN: SIGNIFICANT CHANGE UP
-  PIPERACILLIN/TAZOBACTAM: SIGNIFICANT CHANGE UP
-  TIGECYCLINE: SIGNIFICANT CHANGE UP
-  TOBRAMYCIN: SIGNIFICANT CHANGE UP
-  TRIMETHOPRIM/SULFAMETHOXAZOLE: SIGNIFICANT CHANGE UP
ANION GAP SERPL CALC-SCNC: 16 MMOL/L — SIGNIFICANT CHANGE UP (ref 5–17)
BUN SERPL-MCNC: 64 MG/DL — HIGH (ref 7–23)
CALCIUM SERPL-MCNC: 9.1 MG/DL — SIGNIFICANT CHANGE UP (ref 8.4–10.5)
CHLORIDE SERPL-SCNC: 99 MMOL/L — SIGNIFICANT CHANGE UP (ref 96–108)
CO2 SERPL-SCNC: 18 MMOL/L — LOW (ref 22–31)
CREAT SERPL-MCNC: 1.86 MG/DL — HIGH (ref 0.5–1.3)
CULTURE RESULTS: SIGNIFICANT CHANGE UP
GLUCOSE BLDC GLUCOMTR-MCNC: 188 MG/DL — HIGH (ref 70–99)
GLUCOSE BLDC GLUCOMTR-MCNC: 267 MG/DL — HIGH (ref 70–99)
GLUCOSE BLDC GLUCOMTR-MCNC: 376 MG/DL — HIGH (ref 70–99)
GLUCOSE SERPL-MCNC: 157 MG/DL — HIGH (ref 70–99)
HCT VFR BLD CALC: 28.7 % — LOW (ref 34.5–45)
HGB BLD-MCNC: 9.7 G/DL — LOW (ref 11.5–15.5)
MCHC RBC-ENTMCNC: 32.3 PG — SIGNIFICANT CHANGE UP (ref 27–34)
MCHC RBC-ENTMCNC: 33.8 GM/DL — SIGNIFICANT CHANGE UP (ref 32–36)
MCV RBC AUTO: 95.7 FL — SIGNIFICANT CHANGE UP (ref 80–100)
METHOD TYPE: SIGNIFICANT CHANGE UP
NRBC # BLD: 0 /100 WBCS — SIGNIFICANT CHANGE UP (ref 0–0)
ORGANISM # SPEC MICROSCOPIC CNT: SIGNIFICANT CHANGE UP
ORGANISM # SPEC MICROSCOPIC CNT: SIGNIFICANT CHANGE UP
PLATELET # BLD AUTO: 234 K/UL — SIGNIFICANT CHANGE UP (ref 150–400)
POTASSIUM SERPL-MCNC: 4.6 MMOL/L — SIGNIFICANT CHANGE UP (ref 3.5–5.3)
POTASSIUM SERPL-SCNC: 4.6 MMOL/L — SIGNIFICANT CHANGE UP (ref 3.5–5.3)
RBC # BLD: 3 M/UL — LOW (ref 3.8–5.2)
RBC # FLD: 12.3 % — SIGNIFICANT CHANGE UP (ref 10.3–14.5)
SODIUM SERPL-SCNC: 133 MMOL/L — LOW (ref 135–145)
SPECIMEN SOURCE: SIGNIFICANT CHANGE UP
WBC # BLD: 8.56 K/UL — SIGNIFICANT CHANGE UP (ref 3.8–10.5)
WBC # FLD AUTO: 8.56 K/UL — SIGNIFICANT CHANGE UP (ref 3.8–10.5)

## 2021-06-22 PROCEDURE — 84300 ASSAY OF URINE SODIUM: CPT

## 2021-06-22 PROCEDURE — 99239 HOSP IP/OBS DSCHRG MGMT >30: CPT

## 2021-06-22 PROCEDURE — 80053 COMPREHEN METABOLIC PANEL: CPT

## 2021-06-22 PROCEDURE — U0003: CPT

## 2021-06-22 PROCEDURE — 99285 EMERGENCY DEPT VISIT HI MDM: CPT | Mod: 25

## 2021-06-22 PROCEDURE — 97162 PT EVAL MOD COMPLEX 30 MIN: CPT

## 2021-06-22 PROCEDURE — 83935 ASSAY OF URINE OSMOLALITY: CPT

## 2021-06-22 PROCEDURE — 85025 COMPLETE CBC W/AUTO DIFF WBC: CPT

## 2021-06-22 PROCEDURE — 83930 ASSAY OF BLOOD OSMOLALITY: CPT

## 2021-06-22 PROCEDURE — 81001 URINALYSIS AUTO W/SCOPE: CPT

## 2021-06-22 PROCEDURE — 82962 GLUCOSE BLOOD TEST: CPT

## 2021-06-22 PROCEDURE — 83036 HEMOGLOBIN GLYCOSYLATED A1C: CPT

## 2021-06-22 PROCEDURE — 80048 BASIC METABOLIC PNL TOTAL CA: CPT

## 2021-06-22 PROCEDURE — 71045 X-RAY EXAM CHEST 1 VIEW: CPT

## 2021-06-22 PROCEDURE — 97530 THERAPEUTIC ACTIVITIES: CPT

## 2021-06-22 PROCEDURE — 94640 AIRWAY INHALATION TREATMENT: CPT

## 2021-06-22 PROCEDURE — 87186 SC STD MICRODIL/AGAR DIL: CPT

## 2021-06-22 PROCEDURE — 97116 GAIT TRAINING THERAPY: CPT

## 2021-06-22 PROCEDURE — 86769 SARS-COV-2 COVID-19 ANTIBODY: CPT

## 2021-06-22 PROCEDURE — U0005: CPT

## 2021-06-22 PROCEDURE — 83735 ASSAY OF MAGNESIUM: CPT

## 2021-06-22 PROCEDURE — 85027 COMPLETE CBC AUTOMATED: CPT

## 2021-06-22 PROCEDURE — 82570 ASSAY OF URINE CREATININE: CPT

## 2021-06-22 PROCEDURE — 87086 URINE CULTURE/COLONY COUNT: CPT

## 2021-06-22 PROCEDURE — 84100 ASSAY OF PHOSPHORUS: CPT

## 2021-06-22 RX ORDER — SENNA PLUS 8.6 MG/1
2 TABLET ORAL
Qty: 0 | Refills: 0 | DISCHARGE
Start: 2021-06-22

## 2021-06-22 RX ORDER — ATENOLOL 25 MG/1
1 TABLET ORAL
Qty: 30 | Refills: 0
Start: 2021-06-22 | End: 2021-07-21

## 2021-06-22 RX ORDER — SODIUM BICARBONATE 1 MEQ/ML
1 SYRINGE (ML) INTRAVENOUS
Qty: 0 | Refills: 0 | DISCHARGE
Start: 2021-06-22

## 2021-06-22 RX ORDER — HYDRALAZINE HCL 50 MG
5 TABLET ORAL
Refills: 0 | Status: COMPLETED | OUTPATIENT
Start: 2021-06-22 | End: 2021-06-22

## 2021-06-22 RX ORDER — ATENOLOL 25 MG/1
1 TABLET ORAL
Qty: 0 | Refills: 0 | DISCHARGE

## 2021-06-22 RX ORDER — ACETAMINOPHEN 500 MG
2 TABLET ORAL
Qty: 0 | Refills: 0 | DISCHARGE
Start: 2021-06-22

## 2021-06-22 RX ORDER — APIXABAN 2.5 MG/1
1 TABLET, FILM COATED ORAL
Qty: 0 | Refills: 0 | DISCHARGE
Start: 2021-06-22

## 2021-06-22 RX ORDER — ATENOLOL 25 MG/1
1 TABLET ORAL
Qty: 0 | Refills: 0 | DISCHARGE
Start: 2021-06-22

## 2021-06-22 RX ADMIN — APIXABAN 2.5 MILLIGRAM(S): 2.5 TABLET, FILM COATED ORAL at 05:14

## 2021-06-22 RX ADMIN — BUDESONIDE AND FORMOTEROL FUMARATE DIHYDRATE 2 PUFF(S): 160; 4.5 AEROSOL RESPIRATORY (INHALATION) at 18:09

## 2021-06-22 RX ADMIN — ATENOLOL 50 MILLIGRAM(S): 25 TABLET ORAL at 05:14

## 2021-06-22 RX ADMIN — Medication 3: at 13:01

## 2021-06-22 RX ADMIN — Medication 1: at 08:37

## 2021-06-22 RX ADMIN — Medication 5: at 18:09

## 2021-06-22 RX ADMIN — Medication 5 MILLIGRAM(S): at 18:08

## 2021-06-22 RX ADMIN — APIXABAN 2.5 MILLIGRAM(S): 2.5 TABLET, FILM COATED ORAL at 18:09

## 2021-06-22 RX ADMIN — Medication 650 MILLIGRAM(S): at 13:13

## 2021-06-22 RX ADMIN — Medication 5 MILLIGRAM(S): at 18:10

## 2021-06-22 RX ADMIN — Medication 650 MILLIGRAM(S): at 05:14

## 2021-06-22 RX ADMIN — BUDESONIDE AND FORMOTEROL FUMARATE DIHYDRATE 2 PUFF(S): 160; 4.5 AEROSOL RESPIRATORY (INHALATION) at 05:15

## 2021-06-22 RX ADMIN — Medication 40 MILLIGRAM(S): at 05:14

## 2021-06-22 NOTE — PROGRESS NOTE ADULT - SUBJECTIVE AND OBJECTIVE BOX
Patient is a 85y old  Female who presents with a chief complaint of hyponatremia on outpatient labs (22 Jun 2021 12:47)      SUBJECTIVE / OVERNIGHT EVENTS: Pt seen and examined. No acute events overnight. She denies dizziness, lethargy, cp, sob.      MEDICATIONS  (STANDING):  apixaban 2.5 milliGRAM(s) Oral two times a day  ATENolol  Tablet 50 milliGRAM(s) Oral daily  budesonide 160 MICROgram(s)/formoterol 4.5 MICROgram(s) Inhaler 2 Puff(s) Inhalation two times a day  dextrose 40% Gel 15 Gram(s) Oral once  dextrose 5%. 1000 milliLiter(s) (50 mL/Hr) IV Continuous <Continuous>  dextrose 5%. 1000 milliLiter(s) (100 mL/Hr) IV Continuous <Continuous>  dextrose 50% Injectable 25 Gram(s) IV Push once  dextrose 50% Injectable 12.5 Gram(s) IV Push once  furosemide    Tablet 40 milliGRAM(s) Oral daily  glucagon  Injectable 1 milliGRAM(s) IntraMuscular once  insulin lispro (ADMELOG) corrective regimen sliding scale   SubCutaneous three times a day before meals  insulin lispro (ADMELOG) corrective regimen sliding scale   SubCutaneous at bedtime  sodium bicarbonate 650 milliGRAM(s) Oral every 8 hours    MEDICATIONS  (PRN):  acetaminophen   Tablet .. 650 milliGRAM(s) Oral every 4 hours PRN Mild Pain (1 - 3)  senna 2 Tablet(s) Oral at bedtime PRN Constipation      Vital Signs Last 24 Hrs  T(C): 37.1 (22 Jun 2021 12:10), Max: 37.3 (21 Jun 2021 15:50)  T(F): 98.7 (22 Jun 2021 12:10), Max: 99.1 (21 Jun 2021 15:50)  HR: 68 (22 Jun 2021 12:38) (58 - 68)  BP: 146/61 (22 Jun 2021 12:38) (144/70 - 162/69)  BP(mean): --  RR: 18 (22 Jun 2021 12:38) (18 - 19)  SpO2: 96% (22 Jun 2021 12:38) (95% - 98%)  CAPILLARY BLOOD GLUCOSE      POCT Blood Glucose.: 267 mg/dL (22 Jun 2021 12:03)  POCT Blood Glucose.: 188 mg/dL (22 Jun 2021 08:17)  POCT Blood Glucose.: 222 mg/dL (21 Jun 2021 22:09)  POCT Blood Glucose.: 250 mg/dL (21 Jun 2021 17:07)    I&O's Summary    21 Jun 2021 07:01  -  22 Jun 2021 07:00  --------------------------------------------------------  IN: 1200 mL / OUT: 1700 mL / NET: -500 mL    22 Jun 2021 07:01  -  22 Jun 2021 15:03  --------------------------------------------------------  IN: 480 mL / OUT: 1650 mL / NET: -1170 mL        PHYSICAL EXAM:  GENERAL: NAD,anicteric, afebrile  HEAD:  Atraumatic, Normocephalic  EYES: conjunctiva and sclera clear  NECK: Supple, No JVD  CHEST/LUNG: Clear to auscultation bilaterally; No wheeze  HEART: Regular rate and rhythm; No murmurs, rubs, or gallops  ABDOMEN: Soft, Nontender, Nondistended; Bowel sounds present  EXTREMITIES:  2+ Peripheral Pulses, No clubbing, cyanosis, or edema  PSYCH: AAOx3  NEUROLOGY: non-focal  SKIN: No rashes or lesions    LABS:                        9.7    8.56  )-----------( 234      ( 22 Jun 2021 06:56 )             28.7     06-22    133<L>  |  99  |  64<H>  ----------------------------<  157<H>  4.6   |  18<L>  |  1.86<H>    Ca    9.1      22 Jun 2021 06:56                
Fortunato Park MD  Division of Hospital Medicine  Pager [phone]  If no response or off hours page: [phone]  ---------------------------------------------------------    ANI CHESTER  85y  Female      Patient is a 85y old  Female who presents with a chief complaint of hyponatremia on outpatient labs (2021 03:36)      INTERVAL HPI/OVERNIGHT EVENTS:  Seen at bedside. Overall feels okay. Denies nausea, vomiting, headache. Sodium improving      REVIEW OF SYSTEMS: 10 point ROS negative unless listed above    T(C): 36.7 (21 @ 04:36), Max: 36.7 (21 @ 04:36)  HR: 79 (21 @ 04:36) (64 - 79)  BP: 162/78 (21 @ 04:36) (137/61 - 162/78)  RR: 18 (21 @ 04:36) (18 - 18)  SpO2: 99% (21 @ 04:36) (99% - 100%)  Wt(kg): --Vital Signs Last 24 Hrs  T(C): 36.7 (2021 04:36), Max: 36.7 (2021 04:36)  T(F): 98.1 (2021 04:36), Max: 98.1 (2021 04:36)  HR: 79 (2021 04:36) (64 - 79)  BP: 162/78 (2021 04:36) (137/61 - 162/78)  BP(mean): --  RR: 18 (2021 04:36) (18 - 18)  SpO2: 99% (2021 04:36) (99% - 100%)    PHYSICAL EXAM:  GENERAL: No acute distress, well-developed, obese , mild resting tremor   HEAD:  Atraumatic, Normocephalic  NECK: supple , no JVD   CHEST/LUNG: Clear to auscultation bilaterally; No wheeze, equal breath sounds bilaterally   BACK: No spinal tenderness,  No CVA tenderness   HEART: Regular rate and rhythm; No murmurs, rubs, or gallops  ABDOMEN: Soft, Nontender, Nondistended; Bowel sounds present  EXTREMITIES:  No clubbing, cyanosis, + pitting edema b/l LE in compression wraps  NEUROLOGY: AAOx3, non-focal, cranial nerves intact  SKIN: Normal color, No rashes or lesions    Consultant(s) Notes Reviewed:  [x ] YES  [ ] NO  Care Discussed with Consultants/Other Providers [ x] YES  [ ] NO    LABS:                        9.7    6.76  )-----------( 250      ( 2021 07:01 )             28.4     06-20    130<L>  |  100  |  74<H>  ----------------------------<  140<H>  4.6   |  18<L>  |  1.93<H>    Ca    9.5      2021 07:22  Phos  4.1     06-18  Mg     2.0     06-18    TPro  6.7  /  Alb  3.4  /  TBili  0.4  /  DBili  x   /  AST  14  /  ALT  11  /  AlkPhos  153<H>  06-19      Urinalysis Basic - ( 2021 23:21 )    Color: Colorless / Appearance: Clear / S.007 / pH: x  Gluc: x / Ketone: Negative  / Bili: Negative / Urobili: Negative   Blood: x / Protein: Trace / Nitrite: Negative   Leuk Esterase: Negative / RBC: 1 /hpf / WBC 0 /HPF   Sq Epi: x / Non Sq Epi: 0 /hpf / Bacteria: Negative      CAPILLARY BLOOD GLUCOSE      POCT Blood Glucose.: 142 mg/dL (2021 08:34)  POCT Blood Glucose.: 162 mg/dL (2021 21:54)  POCT Blood Glucose.: 212 mg/dL (2021 17:11)  POCT Blood Glucose.: 150 mg/dL (2021 12:22)        Urinalysis Basic - ( 2021 23:21 )    Color: Colorless / Appearance: Clear / S.007 / pH: x  Gluc: x / Ketone: Negative  / Bili: Negative / Urobili: Negative   Blood: x / Protein: Trace / Nitrite: Negative   Leuk Esterase: Negative / RBC: 1 /hpf / WBC 0 /HPF   Sq Epi: x / Non Sq Epi: 0 /hpf / Bacteria: Negative        RADIOLOGY & ADDITIONAL TESTS:    Imaging Personally Reviewed:  [x ] YES  [ ] NO
Patient is a 85y old  Female who presents with a chief complaint of hyponatremia on outpatient labs (21 Jun 2021 12:27)      SUBJECTIVE / OVERNIGHT EVENTS: Pt seen and examined. No acute events overnight. She denies dizziness, lethargy, cp, sob.    MEDICATIONS  (STANDING):  apixaban 2.5 milliGRAM(s) Oral two times a day  ATENolol  Tablet 25 milliGRAM(s) Oral once  budesonide 160 MICROgram(s)/formoterol 4.5 MICROgram(s) Inhaler 2 Puff(s) Inhalation two times a day  dextrose 40% Gel 15 Gram(s) Oral once  dextrose 5%. 1000 milliLiter(s) (50 mL/Hr) IV Continuous <Continuous>  dextrose 5%. 1000 milliLiter(s) (100 mL/Hr) IV Continuous <Continuous>  dextrose 50% Injectable 25 Gram(s) IV Push once  dextrose 50% Injectable 12.5 Gram(s) IV Push once  furosemide    Tablet 40 milliGRAM(s) Oral daily  glucagon  Injectable 1 milliGRAM(s) IntraMuscular once  insulin lispro (ADMELOG) corrective regimen sliding scale   SubCutaneous three times a day before meals  insulin lispro (ADMELOG) corrective regimen sliding scale   SubCutaneous at bedtime  sodium bicarbonate 650 milliGRAM(s) Oral every 8 hours    MEDICATIONS  (PRN):  acetaminophen   Tablet .. 650 milliGRAM(s) Oral every 4 hours PRN Mild Pain (1 - 3)  senna 2 Tablet(s) Oral at bedtime PRN Constipation      Vital Signs Last 24 Hrs  T(C): 36.9 (21 Jun 2021 10:47), Max: 37.2 (20 Jun 2021 17:34)  T(F): 98.4 (21 Jun 2021 10:47), Max: 99 (20 Jun 2021 17:34)  HR: 70 (21 Jun 2021 11:20) (61 - 79)  BP: 173/67 (21 Jun 2021 11:20) (163/67 - 173/67)  BP(mean): --  RR: 18 (21 Jun 2021 11:20) (18 - 18)  SpO2: 98% (21 Jun 2021 11:20) (96% - 99%)  CAPILLARY BLOOD GLUCOSE      POCT Blood Glucose.: 264 mg/dL (21 Jun 2021 12:25)  POCT Blood Glucose.: 187 mg/dL (21 Jun 2021 08:13)  POCT Blood Glucose.: 244 mg/dL (20 Jun 2021 21:46)  POCT Blood Glucose.: 270 mg/dL (20 Jun 2021 17:06)    I&O's Summary    20 Jun 2021 07:01  -  21 Jun 2021 07:00  --------------------------------------------------------  IN: 1210 mL / OUT: 2350 mL / NET: -1140 mL    21 Jun 2021 07:01  -  21 Jun 2021 13:17  --------------------------------------------------------  IN: 240 mL / OUT: 900 mL / NET: -660 mL        PHYSICAL EXAM:  GENERAL: NAD,anicteric, afebrile  HEAD:  Atraumatic, Normocephalic  EYES: conjunctiva and sclera clear  NECK: Supple, No JVD  CHEST/LUNG: Clear to auscultation bilaterally; No wheeze  HEART: Regular rate and rhythm; No murmurs, rubs, or gallops  ABDOMEN: Soft, Nontender, Nondistended; Bowel sounds present  EXTREMITIES:  2+ Peripheral Pulses, No clubbing, cyanosis, or edema  PSYCH: AAOx3  NEUROLOGY: non-focal  SKIN: No rashes or lesions    LABS:    06-21    133<L>  |  101  |  66<H>  ----------------------------<  157<H>  4.1   |  18<L>  |  1.74<H>    Ca    9.3      21 Jun 2021 07:04

## 2021-06-22 NOTE — PROGRESS NOTE ADULT - TIME BILLING
case complexity and discharge planning. Pt is clinically stable for discharge home to follow up with PCP within 1 week.    Pt's daughter Elizabeth updated on plan of care.    Case d/w MARY Hackett.

## 2021-06-22 NOTE — PROGRESS NOTE ADULT - PROBLEM SELECTOR PLAN 5
- Resume Lasix  - holding metolazone in setting of hyponatremia
- c/w  Lasix  - holding metolazone in setting of hyponatremia
- c/w  Lasix  - holding metolazone in setting of hyponatremia

## 2021-06-22 NOTE — DISCHARGE NOTE PROVIDER - HOSPITAL COURSE
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     Course: Patient is an 86yo lady with PMH of T2DM, COPD, htn, PVD with bilateral LE edema, CKD, afib on eliquis , recently admitted ( 4/11/21 – 4/16/21) for BRASH syndrome ,  presents for hyponatremia , seen on outpatient labs.     Course:  Hyponatremia: Hyponatremia may be multifactorial and may have a component of polydipsia  possibly from free water replacement of diuresis and possibly 2/2 to metolazone; Na+ improved; Metolazone restarted on dc.  Medically cleared for dc today by Dr Leo

## 2021-06-22 NOTE — DISCHARGE NOTE PROVIDER - NSDCCPCAREPLAN_GEN_ALL_CORE_FT
PRINCIPAL DISCHARGE DIAGNOSIS  Diagnosis: Hyponatremia  Assessment and Plan of Treatment: Your sodium level today 133 (122 on admission); Normal range 135-145  Restrict your fluid intake to 1500ML daily until follow up  Hyponatremia means blood sodium level is below the normal range.  Follow up with your healthcare provider in 1 week.  Call for an appointment.  You should have your sodium level checked at your next appointment.  Your last sodium level is   Signs of hyponatremia includes headache, nausea, vomiting, lethargy, and confusion.  If dehydration is associated with hyponatremia you may have generalized weakness, muscle aches, and cramps.      SECONDARY DISCHARGE DIAGNOSES  Diagnosis: Acute kidney injury superimposed on CKD  Assessment and Plan of Treatment: Condition back to baseline.  Avoid taking (NSAIDs) - (ex: Ibuprofen, Advil, Celebrex, Naprosyn)  Avoid taking any nephrotoxic agents (can harm kidneys) - Intravenous contrast for diagnostic testing, combination cold medications.  Have all medications adjusted for your renal function by your Health Care Provider.  Blood pressure control is important.  Take all medication as prescribed.    Diagnosis: Paroxysmal atrial fibrillation  Assessment and Plan of Treatment: Continue with your blood thinner (Eliquis) as prescribed     PRINCIPAL DISCHARGE DIAGNOSIS  Diagnosis: Hyponatremia  Assessment and Plan of Treatment: Your sodium level today 133 (122 on admission); Normal range 135-145  Restrict your fluid intake to 1500ML daily until follow up  Hyponatremia means blood sodium level is below the normal range.  Follow up with your healthcare provider in 1 week.  Call for an appointment.  You should have your sodium level checked at your next appointment.    Signs of hyponatremia includes headache, nausea, vomiting, lethargy, and confusion.  If dehydration is associated with hyponatremia you may have generalized weakness, muscle aches, and cramps.      SECONDARY DISCHARGE DIAGNOSES  Diagnosis: Acute kidney injury superimposed on CKD  Assessment and Plan of Treatment: Condition back to baseline.  Avoid taking (NSAIDs) - (ex: Ibuprofen, Advil, Celebrex, Naprosyn)  Avoid taking any nephrotoxic agents (can harm kidneys) - Intravenous contrast for diagnostic testing, combination cold medications.  Have all medications adjusted for your renal function by your Health Care Provider.  Blood pressure control is important.  Take all medication as prescribed.    Diagnosis: Paroxysmal atrial fibrillation  Assessment and Plan of Treatment: Continue with your blood thinner (Eliquis) as prescribed

## 2021-06-22 NOTE — PROGRESS NOTE ADULT - PROBLEM SELECTOR PLAN 2
- Hold oral hypoglycemics  - insulin correction scale  - Monitor fsg qac/qhs  - f/u a1c  - consistent carb diet
- Hold oral hypoglycemics  - insulin correction scale  - Monitor fsg qac/qhs  - A1C 6.5  - consistent carb diet
-  oral hypoglycemics on hold  - insulin correction scale  - Monitor fsg qac/qhs  - A1C 6.5  - consistent carb diet

## 2021-06-22 NOTE — DISCHARGE NOTE NURSING/CASE MANAGEMENT/SOCIAL WORK - PATIENT PORTAL LINK FT
You can access the FollowMyHealth Patient Portal offered by [facility] by registering at the following website: [email or web address]. By joining [facility]’s FollowMyHealth portal, you will also be able to view your health information using other applications (apps) compatible with our system. complains of pain/discomfort

## 2021-06-22 NOTE — PROGRESS NOTE ADULT - NSICDXPILOT_GEN_ALL_CORE
"Saeid Gray's goals for this visit include: 4 month NICU  He requests these members of his care team be copied on today's visit information: yes    PCP: Justin Escamilla    Referring Provider:  Justin Escamilla DO  [facility]  [address] [place] [address]    Chief Complaint   Patient presents with     Consult     4 month NICU       Initial BP 93/80  Pulse 140  Resp 30  Ht 0.695 m (2' 3.36\")  Wt 8.945 kg (19 lb 11.5 oz)  HC 16.85\" (42.8 cm)  BMI 18.52 kg/m2 Estimated body mass index is 18.52 kg/(m^2) as calculated from the following:    Height as of this encounter: 0.695 m (2' 3.36\").    Weight as of this encounter: 8.945 kg (19 lb 11.5 oz).  Medication Reconciliation: complete    "
[place]

## 2021-06-22 NOTE — DISCHARGE NOTE PROVIDER - CARE PROVIDER_API CALL
DARREN ANA  Internal Medicine  [address] [id number]  Phone: [phone]  Fax: [phone]  Follow Up Time:

## 2021-06-22 NOTE — PROGRESS NOTE ADULT - PROBLEM SELECTOR PLAN 3
Patient reports she is no longer taking amlodipine   - continue Atenolol  - Monitor BP
Patient reports she is no longer taking amlodipine   - BP uptending; SBP 170s  - will increase Atenolol to 50mg po daily  - Monitor BP
- increased Atenolol to 50mg po daily ; BP control improving  - Monitor BP

## 2021-06-22 NOTE — PROGRESS NOTE ADULT - PROBLEM SELECTOR PLAN 4
- Symbicort for Anoro Ellipta (therapeutic equivalent)

## 2021-06-22 NOTE — DISCHARGE NOTE PROVIDER - NSDCMRMEDTOKEN_GEN_ALL_CORE_FT
acetaminophen 325 mg oral tablet: 2 tab(s) orally every 6 hours, As Needed for Mild and Moderate pain  amLODIPine 5 mg oral tablet: 1 tab(s) orally once a day  ANORO ELLIPTA 62.5-25 MCG INH: TAKE 1 PUFF BY MOUTH EVERY DAY  apixaban 2.5 mg oral tablet: 1 tab(s) orally 2 times a day  atenolol 50 mg oral tablet: 1 tab(s) orally once a day  furosemide 40 mg oral tablet: 1 tab(s) orally once a day  metFORMIN 1000 mg oral tablet: 1 tab(s) orally 2 times a day  metOLazone 2.5 mg oral tablet: 1 tab(s) orally every 48 hours  senna oral tablet: 2 tab(s) orally once a day (at bedtime), As needed, Constipation  sodium bicarbonate 650 mg oral tablet: 1 tab(s) orally every 8 hours   acetaminophen 325 mg oral tablet: 2 tab(s) orally every 6 hours, As Needed for Mild and Moderate pain  ANORO ELLIPTA 62.5-25 MCG INH: TAKE 1 PUFF BY MOUTH EVERY DAY  apixaban 2.5 mg oral tablet: 1 tab(s) orally 2 times a day  atenolol 50 mg oral tablet: 1 tab(s) orally once a day  furosemide 40 mg oral tablet: 1 tab(s) orally once a day  metFORMIN 1000 mg oral tablet: 1 tab(s) orally 2 times a day  senna oral tablet: 2 tab(s) orally once a day (at bedtime), As needed, Constipation  sodium bicarbonate 650 mg oral tablet: 1 tab(s) orally every 8 hours

## 2021-06-22 NOTE — PROGRESS NOTE ADULT - PROBLEM SELECTOR PLAN 8
DVT ppx : On Eliquis  Dispo : PT reccs [facility] ; family would prefer home ; CM to facilitate with home services, DME etc
DVT ppx : On Eliquis  Dispo : PT reccs [facility] ; family would prefer home ; CM to facilitate with home services. Pt is clinically stable for discharge home.

## 2021-06-22 NOTE — PROGRESS NOTE ADULT - PROBLEM SELECTOR PLAN 7
- On Atenolol for rate control  - c/w Eliquis

## 2021-06-22 NOTE — CHART NOTE - NSCHARTNOTEFT_GEN_A_CORE
85F w/PMH of T2DM, COPD, htn, PVD with bilateral LE edema, CKD,  afib on eliquis , recently admitted ( 4/11/21 – 4/16/21) for BRASH syndrome ,  presents for hyponatremia , seen on outpatient labs.   Patient states that she feels well, denies any complaints at this time. States that she took NaHCo3 after d/c from the hospital but didn't follow up with nephrologist for refills subsequently.   vitals stable     - hyponatremia thought to be multifactorial   - continue to hold lasix & metolazone for today,   - restart lasix tomorrow if Na continues to improve as tolerated pt has PHTN w/ hx of acute pulmonary edema  - Na improving 126,   - monitor urine sodium   - free water restrict   - CKD: noted to have acidemia bicarb 15, restart NaBicarb 650 mg TID   - continue Atenolol.   - c/w Symbicort for Anoro Ellipta (therapeutic equivalent).   - c/w Eliquis.    d/w Medicine ACP Sheri Vo   Division of Hospital Medicine     Pager: [phone]
Patient for dc today; s/p tx for hyponatremia & CKD which are now resolved/ stable  Await transport but found to have elevated BP as follow:    Vital Signs Last 24 Hrs  T(C): 37.1 (22 Jun 2021 16:16), Max: 37.3 (22 Jun 2021 00:34)  T(F): 98.8 (22 Jun 2021 16:16), Max: 99.1 (22 Jun 2021 00:34)  HR: 70 (22 Jun 2021 17:45) (58 - 70)  BP: 132/61 (22 Jun 2021 18:33) (132/61 - 196/84)  BP(mean): --  RR: 18 (22 Jun 2021 16:16) (18 - 19)  SpO2: 100% (22 Jun 2021 17:45) (95% - 100%)    Patient asymptomatic; responded well to 2 doses 5mg hydralazine IVP; BP now 132/61  Ok for dc home    DAVID Bravo [id number]

## 2021-06-22 NOTE — PROGRESS NOTE ADULT - PROBLEM SELECTOR PLAN 6
baseline Cr ~2.2 at baseline per chart review  - Renally dose medications  - Trend BMP

## 2021-06-22 NOTE — PROGRESS NOTE ADULT - PROBLEM SELECTOR PLAN 1
Hyponatremia may be multifactorial and may have a component of polydipsia  possibly from free water replacement of diuresis and possibly 2/2 to metolazone  - Sodium improving, 133 today  - c/w free water restrict   - c/w Lasix  - Metolazone on hold for now ; can resume on discharge
Hyponatremia may be multifactorial and may have a component of polydipsia  possibly from free water replacement of diuresis and possibly 2/2 to metolazone  - Sodium improving, stable at 133 today  - c/w free water restrict   - c/w Lasix  - Metolazone on hold for now ; can resume at follow up with PCP
Hyponatremia may be multifactorial and may have a component of polydipsia  possibly from free water replacement of diuresis and possibly 2/2 to metolazone  - Sodium improving, 130 today  - c/w free water restrict   - Resume lasix  - Hold metolazone for now

## 2021-09-17 ENCOUNTER — APPOINTMENT (OUTPATIENT)
Dept: INTERNAL MEDICINE | Facility: CLINIC | Age: 86
End: 2021-09-17
Payer: MEDICARE

## 2021-09-17 ENCOUNTER — NON-APPOINTMENT (OUTPATIENT)
Age: 86
End: 2021-09-17

## 2021-09-17 VITALS
BODY MASS INDEX: 32.03 KG/M2 | SYSTOLIC BLOOD PRESSURE: 131 MMHG | HEIGHT: 60 IN | HEART RATE: 70 BPM | OXYGEN SATURATION: 95 % | TEMPERATURE: 96.5 F | WEIGHT: 163.14 LBS | DIASTOLIC BLOOD PRESSURE: 67 MMHG

## 2021-09-17 PROCEDURE — G0439: CPT

## 2021-09-17 PROCEDURE — 99214 OFFICE O/P EST MOD 30 MIN: CPT | Mod: 25

## 2021-09-17 PROCEDURE — 36415 COLL VENOUS BLD VENIPUNCTURE: CPT

## 2021-09-17 RX ORDER — AZITHROMYCIN 250 MG/1
250 TABLET, FILM COATED ORAL
Qty: 6 | Refills: 0 | Status: DISCONTINUED | COMMUNITY
Start: 2021-06-01

## 2021-09-17 RX ORDER — PREDNISONE 20 MG/1
20 TABLET ORAL
Qty: 20 | Refills: 0 | Status: DISCONTINUED | COMMUNITY
Start: 2021-06-09

## 2021-09-17 RX ORDER — METOLAZONE 2.5 MG/1
2.5 TABLET ORAL
Qty: 30 | Refills: 0 | Status: DISCONTINUED | COMMUNITY
Start: 2021-05-05

## 2021-09-20 NOTE — PHYSICAL EXAM
[Normal] : no joint swelling and grossly normal strength and tone [de-identified] : lower extremity non pitting edema

## 2021-09-20 NOTE — HISTORY OF PRESENT ILLNESS
[FreeTextEntry1] : Patient presents for subsequent Medicare annual wellness visit and chronic disease management [de-identified] : Has history of hyponatremia was at the hospital receiving IV sodium treatment\par Has history of CKD unsure of previous creatinine results denies any decreased urination abdominal pain\par Denies any orthopnea paroxysmal nocturnal dyspnea\par Does have chronic lower extremity swelling following with wound care in the past secondary to venous insufficiency\par Denies any worsening shortness of breath

## 2021-09-20 NOTE — ASSESSMENT
[FreeTextEntry1] : Pressure is adequate today\par Not follow-up with cardiology for A. fib, name provided\par We will check electrolytes kidney function considering EGFR will probably discontinue Metformin\par Check hemoglobin A1c urine microalbumin

## 2021-09-20 NOTE — HEALTH RISK ASSESSMENT
[Fair] :  ~his/her~ mood as fair [FreeTextEntry1] : Chronic kidney disease [] : No [No] : No [No falls in past year] : Patient reported no falls in the past year [0] : 2) Feeling down, depressed, or hopeless: Not at all (0) [Change in mental status noted] : No change in mental status noted [Language] : denies difficulty with language [Learning/Retaining New Information] : denies difficulty learning/retaining new information [Handling Complex Tasks] : denies difficulty handling complex tasks [Reasoning] : denies difficulty with reasoning [Spatial Ability and Orientation] : denies difficulty with spatial ability and orientation [None] : None [With Family] : lives with family [Some assistance needed] : feeding [Full assistance needed] : managing finances [Reports changes in hearing] : Reports changes in hearing [Reports changes in vision] : Reports changes in vision [Reports normal functional visual acuity (ie: able to read med bottle)] : Reports normal functional visual acuity [de-identified] : follows for hearing test [de-identified] : follows with opthalmology [With Patient/Caregiver] : , with patient/caregiver [Relationship: ___] : Relationship: [unfilled] [AdvancecareDate] : 9/21

## 2021-09-22 ENCOUNTER — NON-APPOINTMENT (OUTPATIENT)
Age: 86
End: 2021-09-22

## 2021-09-22 LAB
24R-OH-CALCIDIOL SERPL-MCNC: 42.8 PG/ML
25(OH)D3 SERPL-MCNC: 30.9 NG/ML
ALBUMIN SERPL ELPH-MCNC: 4 G/DL
ALP BLD-CCNC: 211 U/L
ALT SERPL-CCNC: 18 U/L
ANION GAP SERPL CALC-SCNC: 14 MMOL/L
APPEARANCE: CLEAR
APPEARANCE: CLEAR
AST SERPL-CCNC: 18 U/L
BACTERIA: NEGATIVE
BASOPHILS # BLD AUTO: 0.05 K/UL
BASOPHILS NFR BLD AUTO: 1 %
BILIRUB SERPL-MCNC: 0.3 MG/DL
BILIRUBIN URINE: NEGATIVE
BILIRUBIN URINE: NEGATIVE
BLOOD URINE: NEGATIVE
BLOOD URINE: NEGATIVE
BUN SERPL-MCNC: 52 MG/DL
CALCIUM SERPL-MCNC: 9.3 MG/DL
CALCIUM SERPL-MCNC: 9.3 MG/DL
CHLORIDE SERPL-SCNC: 105 MMOL/L
CHOLEST SERPL-MCNC: 168 MG/DL
CO2 SERPL-SCNC: 15 MMOL/L
COLOR: NORMAL
COLOR: NORMAL
CREAT SERPL-MCNC: 2.35 MG/DL
CREAT SPEC-SCNC: 55 MG/DL
EOSINOPHIL # BLD AUTO: 0.18 K/UL
EOSINOPHIL NFR BLD AUTO: 3.6 %
ESTIMATED AVERAGE GLUCOSE: 143 MG/DL
GLUCOSE QUALITATIVE U: NEGATIVE
GLUCOSE QUALITATIVE U: NEGATIVE
GLUCOSE SERPL-MCNC: 146 MG/DL
HBA1C MFR BLD HPLC: 6.6 %
HCT VFR BLD CALC: 30.5 %
HDLC SERPL-MCNC: 69 MG/DL
HGB BLD-MCNC: 10.1 G/DL
HYALINE CASTS: 2 /LPF
IMM GRANULOCYTES NFR BLD AUTO: 0.4 %
KETONES URINE: NEGATIVE
KETONES URINE: NEGATIVE
LDLC SERPL CALC-MCNC: 87 MG/DL
LEUKOCYTE ESTERASE URINE: NEGATIVE
LEUKOCYTE ESTERASE URINE: NEGATIVE
LYMPHOCYTES # BLD AUTO: 0.89 K/UL
LYMPHOCYTES NFR BLD AUTO: 17.6 %
MAGNESIUM SERPL-MCNC: 2 MG/DL
MAN DIFF?: NORMAL
MCHC RBC-ENTMCNC: 33.1 GM/DL
MCHC RBC-ENTMCNC: 33.9 PG
MCV RBC AUTO: 102.3 FL
MICROALBUMIN 24H UR DL<=1MG/L-MCNC: 6.5 MG/DL
MICROALBUMIN/CREAT 24H UR-RTO: 117 MG/G
MICROSCOPIC-UA: NORMAL
MONOCYTES # BLD AUTO: 0.66 K/UL
MONOCYTES NFR BLD AUTO: 13 %
NEUTROPHILS # BLD AUTO: 3.26 K/UL
NEUTROPHILS NFR BLD AUTO: 64.4 %
NITRITE URINE: NEGATIVE
NITRITE URINE: NEGATIVE
NONHDLC SERPL-MCNC: 100 MG/DL
PARATHYROID HORMONE INTACT: 116 PG/ML
PH URINE: 6
PH URINE: 6
PHOSPHATE SERPL-MCNC: 4.3 MG/DL
PLATELET # BLD AUTO: 306 K/UL
POTASSIUM SERPL-SCNC: 5.7 MMOL/L
PROT SERPL-MCNC: 7.6 G/DL
PROTEIN URINE: NORMAL
PROTEIN URINE: NORMAL
RBC # BLD: 2.98 M/UL
RBC # FLD: 13.2 %
RED BLOOD CELLS URINE: 2 /HPF
SODIUM SERPL-SCNC: 134 MMOL/L
SPECIFIC GRAVITY URINE: 1.01
SPECIFIC GRAVITY URINE: 1.01
SQUAMOUS EPITHELIAL CELLS: 3 /HPF
TRIGL SERPL-MCNC: 67 MG/DL
TSH SERPL-ACNC: 1.3 UIU/ML
URATE SERPL-MCNC: 7.4 MG/DL
UROBILINOGEN URINE: NORMAL
UROBILINOGEN URINE: NORMAL
VIT B12 SERPL-MCNC: 233 PG/ML
WBC # FLD AUTO: 5.06 K/UL
WHITE BLOOD CELLS URINE: 1 /HPF

## 2021-09-27 ENCOUNTER — APPOINTMENT (OUTPATIENT)
Dept: INTERNAL MEDICINE | Facility: CLINIC | Age: 86
End: 2021-09-27
Payer: MEDICARE

## 2021-09-27 PROCEDURE — 36415 COLL VENOUS BLD VENIPUNCTURE: CPT

## 2021-10-01 LAB
ANION GAP SERPL CALC-SCNC: 14 MMOL/L
BUN SERPL-MCNC: 46 MG/DL
CALCIUM SERPL-MCNC: 9.4 MG/DL
CHLORIDE SERPL-SCNC: 107 MMOL/L
CO2 SERPL-SCNC: 18 MMOL/L
CREAT SERPL-MCNC: 1.88 MG/DL
GLUCOSE SERPL-MCNC: 177 MG/DL
POTASSIUM SERPL-SCNC: 4.4 MMOL/L
SODIUM SERPL-SCNC: 140 MMOL/L

## 2021-10-11 ENCOUNTER — INPATIENT (INPATIENT)
Facility: HOSPITAL | Age: 86
LOS: 11 days | Discharge: NURSING, REHAB OR LONG-TERM CARE | DRG: 286 | End: 2021-10-23
Attending: HOSPITALIST | Admitting: STUDENT IN AN ORGANIZED HEALTH CARE EDUCATION/TRAINING PROGRAM
Payer: MEDICARE

## 2021-10-11 VITALS
OXYGEN SATURATION: 92 % | TEMPERATURE: 98 F | HEIGHT: 62 IN | SYSTOLIC BLOOD PRESSURE: 177 MMHG | RESPIRATION RATE: 22 BRPM | DIASTOLIC BLOOD PRESSURE: 87 MMHG | WEIGHT: 154.98 LBS | HEART RATE: 75 BPM

## 2021-10-11 DIAGNOSIS — I10 ESSENTIAL (PRIMARY) HYPERTENSION: ICD-10-CM

## 2021-10-11 DIAGNOSIS — N18.30 CHRONIC KIDNEY DISEASE, STAGE 3 UNSPECIFIED: ICD-10-CM

## 2021-10-11 DIAGNOSIS — R09.89 OTHER SPECIFIED SYMPTOMS AND SIGNS INVOLVING THE CIRCULATORY AND RESPIRATORY SYSTEMS: ICD-10-CM

## 2021-10-11 DIAGNOSIS — E11.9 TYPE 2 DIABETES MELLITUS WITHOUT COMPLICATIONS: ICD-10-CM

## 2021-10-11 DIAGNOSIS — I73.9 PERIPHERAL VASCULAR DISEASE, UNSPECIFIED: ICD-10-CM

## 2021-10-11 DIAGNOSIS — Z29.9 ENCOUNTER FOR PROPHYLACTIC MEASURES, UNSPECIFIED: ICD-10-CM

## 2021-10-11 DIAGNOSIS — E55.9 VITAMIN D DEFICIENCY, UNSPECIFIED: ICD-10-CM

## 2021-10-11 DIAGNOSIS — D64.9 ANEMIA, UNSPECIFIED: ICD-10-CM

## 2021-10-11 DIAGNOSIS — I48.0 PAROXYSMAL ATRIAL FIBRILLATION: ICD-10-CM

## 2021-10-11 DIAGNOSIS — Z90.710 ACQUIRED ABSENCE OF BOTH CERVIX AND UTERUS: Chronic | ICD-10-CM

## 2021-10-11 DIAGNOSIS — I50.9 HEART FAILURE, UNSPECIFIED: ICD-10-CM

## 2021-10-11 DIAGNOSIS — J90 PLEURAL EFFUSION, NOT ELSEWHERE CLASSIFIED: ICD-10-CM

## 2021-10-11 LAB
ALBUMIN SERPL ELPH-MCNC: 4 G/DL — SIGNIFICANT CHANGE UP (ref 3.3–5)
ALP SERPL-CCNC: 184 U/L — HIGH (ref 40–120)
ALT FLD-CCNC: 14 U/L — SIGNIFICANT CHANGE UP (ref 10–45)
ANION GAP SERPL CALC-SCNC: 15 MMOL/L — SIGNIFICANT CHANGE UP (ref 5–17)
AST SERPL-CCNC: 20 U/L — SIGNIFICANT CHANGE UP (ref 10–40)
BASE EXCESS BLDV CALC-SCNC: -3 MMOL/L — LOW (ref -2–2)
BASOPHILS # BLD AUTO: 0.04 K/UL — SIGNIFICANT CHANGE UP (ref 0–0.2)
BASOPHILS NFR BLD AUTO: 0.8 % — SIGNIFICANT CHANGE UP (ref 0–2)
BILIRUB SERPL-MCNC: 0.3 MG/DL — SIGNIFICANT CHANGE UP (ref 0.2–1.2)
BUN SERPL-MCNC: 39 MG/DL — HIGH (ref 7–23)
CA-I SERPL-SCNC: 1.22 MMOL/L — SIGNIFICANT CHANGE UP (ref 1.15–1.33)
CALCIUM SERPL-MCNC: 9.4 MG/DL — SIGNIFICANT CHANGE UP (ref 8.4–10.5)
CHLORIDE BLDV-SCNC: 107 MMOL/L — SIGNIFICANT CHANGE UP (ref 96–108)
CHLORIDE SERPL-SCNC: 105 MMOL/L — SIGNIFICANT CHANGE UP (ref 96–108)
CO2 BLDV-SCNC: 24 MMOL/L — SIGNIFICANT CHANGE UP (ref 22–26)
CO2 SERPL-SCNC: 19 MMOL/L — LOW (ref 22–31)
CREAT SERPL-MCNC: 1.93 MG/DL — HIGH (ref 0.5–1.3)
EOSINOPHIL # BLD AUTO: 0.19 K/UL — SIGNIFICANT CHANGE UP (ref 0–0.5)
EOSINOPHIL NFR BLD AUTO: 4 % — SIGNIFICANT CHANGE UP (ref 0–6)
GAS PNL BLDV: 139 MMOL/L — SIGNIFICANT CHANGE UP (ref 136–145)
GAS PNL BLDV: SIGNIFICANT CHANGE UP
GLUCOSE BLDV-MCNC: 170 MG/DL — HIGH (ref 70–99)
GLUCOSE SERPL-MCNC: 167 MG/DL — HIGH (ref 70–99)
HCO3 BLDV-SCNC: 23 MMOL/L — SIGNIFICANT CHANGE UP (ref 22–29)
HCT VFR BLD CALC: 33 % — LOW (ref 34.5–45)
HCT VFR BLDA CALC: 32 % — LOW (ref 34.5–46.5)
HGB BLD CALC-MCNC: 10.8 G/DL — LOW (ref 11.7–16.1)
HGB BLD-MCNC: 10.3 G/DL — LOW (ref 11.5–15.5)
IMM GRANULOCYTES NFR BLD AUTO: 0.2 % — SIGNIFICANT CHANGE UP (ref 0–1.5)
LACTATE BLDV-MCNC: 1 MMOL/L — SIGNIFICANT CHANGE UP (ref 0.7–2)
LIDOCAIN IGE QN: 38 U/L — SIGNIFICANT CHANGE UP (ref 7–60)
LYMPHOCYTES # BLD AUTO: 0.89 K/UL — LOW (ref 1–3.3)
LYMPHOCYTES # BLD AUTO: 18.5 % — SIGNIFICANT CHANGE UP (ref 13–44)
MAGNESIUM SERPL-MCNC: 2.1 MG/DL — SIGNIFICANT CHANGE UP (ref 1.6–2.6)
MCHC RBC-ENTMCNC: 31.2 GM/DL — LOW (ref 32–36)
MCHC RBC-ENTMCNC: 31.7 PG — SIGNIFICANT CHANGE UP (ref 27–34)
MCV RBC AUTO: 101.5 FL — HIGH (ref 80–100)
MONOCYTES # BLD AUTO: 0.62 K/UL — SIGNIFICANT CHANGE UP (ref 0–0.9)
MONOCYTES NFR BLD AUTO: 12.9 % — SIGNIFICANT CHANGE UP (ref 2–14)
NEUTROPHILS # BLD AUTO: 3.06 K/UL — SIGNIFICANT CHANGE UP (ref 1.8–7.4)
NEUTROPHILS NFR BLD AUTO: 63.6 % — SIGNIFICANT CHANGE UP (ref 43–77)
NRBC # BLD: 0 /100 WBCS — SIGNIFICANT CHANGE UP (ref 0–0)
NT-PROBNP SERPL-SCNC: 1760 PG/ML — HIGH (ref 0–300)
PCO2 BLDV: 42 MMHG — SIGNIFICANT CHANGE UP (ref 39–42)
PH BLDV: 7.34 — SIGNIFICANT CHANGE UP (ref 7.32–7.43)
PLATELET # BLD AUTO: 230 K/UL — SIGNIFICANT CHANGE UP (ref 150–400)
PO2 BLDV: 37 MMHG — SIGNIFICANT CHANGE UP (ref 25–45)
POTASSIUM BLDV-SCNC: 3.6 MMOL/L — SIGNIFICANT CHANGE UP (ref 3.5–5.1)
POTASSIUM SERPL-MCNC: 3.7 MMOL/L — SIGNIFICANT CHANGE UP (ref 3.5–5.3)
POTASSIUM SERPL-SCNC: 3.7 MMOL/L — SIGNIFICANT CHANGE UP (ref 3.5–5.3)
PROT SERPL-MCNC: 7.8 G/DL — SIGNIFICANT CHANGE UP (ref 6–8.3)
RBC # BLD: 3.25 M/UL — LOW (ref 3.8–5.2)
RBC # FLD: 13.3 % — SIGNIFICANT CHANGE UP (ref 10.3–14.5)
SAO2 % BLDV: 64.1 % — LOW (ref 67–88)
SARS-COV-2 RNA SPEC QL NAA+PROBE: SIGNIFICANT CHANGE UP
SODIUM SERPL-SCNC: 139 MMOL/L — SIGNIFICANT CHANGE UP (ref 135–145)
TROPONIN T, HIGH SENSITIVITY RESULT: 27 NG/L — SIGNIFICANT CHANGE UP (ref 0–51)
WBC # BLD: 4.81 K/UL — SIGNIFICANT CHANGE UP (ref 3.8–10.5)
WBC # FLD AUTO: 4.81 K/UL — SIGNIFICANT CHANGE UP (ref 3.8–10.5)

## 2021-10-11 PROCEDURE — 93010 ELECTROCARDIOGRAM REPORT: CPT

## 2021-10-11 PROCEDURE — 99285 EMERGENCY DEPT VISIT HI MDM: CPT

## 2021-10-11 PROCEDURE — 93308 TTE F-UP OR LMTD: CPT | Mod: 26

## 2021-10-11 PROCEDURE — 99223 1ST HOSP IP/OBS HIGH 75: CPT

## 2021-10-11 PROCEDURE — 71045 X-RAY EXAM CHEST 1 VIEW: CPT | Mod: 26

## 2021-10-11 RX ORDER — CEPHALEXIN 500 MG
1 CAPSULE ORAL
Qty: 0 | Refills: 0 | DISCHARGE

## 2021-10-11 RX ORDER — APIXABAN 2.5 MG/1
2.5 TABLET, FILM COATED ORAL
Refills: 0 | Status: DISCONTINUED | OUTPATIENT
Start: 2021-10-11 | End: 2021-10-14

## 2021-10-11 RX ORDER — ONDANSETRON 8 MG/1
4 TABLET, FILM COATED ORAL EVERY 8 HOURS
Refills: 0 | Status: DISCONTINUED | OUTPATIENT
Start: 2021-10-11 | End: 2021-10-23

## 2021-10-11 RX ORDER — FUROSEMIDE 40 MG
40 TABLET ORAL ONCE
Refills: 0 | Status: COMPLETED | OUTPATIENT
Start: 2021-10-11 | End: 2021-10-11

## 2021-10-11 RX ORDER — FUROSEMIDE 40 MG
40 TABLET ORAL
Refills: 0 | Status: DISCONTINUED | OUTPATIENT
Start: 2021-10-11 | End: 2021-10-16

## 2021-10-11 RX ORDER — LANOLIN ALCOHOL/MO/W.PET/CERES
3 CREAM (GRAM) TOPICAL AT BEDTIME
Refills: 0 | Status: DISCONTINUED | OUTPATIENT
Start: 2021-10-11 | End: 2021-10-23

## 2021-10-11 RX ORDER — ATENOLOL 25 MG/1
25 TABLET ORAL DAILY
Refills: 0 | Status: DISCONTINUED | OUTPATIENT
Start: 2021-10-11 | End: 2021-10-15

## 2021-10-11 RX ORDER — CHOLECALCIFEROL (VITAMIN D3) 125 MCG
1000 CAPSULE ORAL
Refills: 0 | Status: DISCONTINUED | OUTPATIENT
Start: 2021-10-11 | End: 2021-10-23

## 2021-10-11 RX ORDER — ACETAMINOPHEN 500 MG
650 TABLET ORAL EVERY 6 HOURS
Refills: 0 | Status: DISCONTINUED | OUTPATIENT
Start: 2021-10-11 | End: 2021-10-23

## 2021-10-11 RX ORDER — METFORMIN HYDROCHLORIDE 850 MG/1
1 TABLET ORAL
Qty: 0 | Refills: 0 | DISCHARGE

## 2021-10-11 RX ADMIN — Medication 40 MILLIGRAM(S): at 18:50

## 2021-10-11 NOTE — ED PROVIDER NOTE - NSICDXPASTSURGICALHX_GEN_ALL_CORE_FT
PAST SURGICAL HISTORY:  H/O abdominal hysterectomy     History of hysterectomy     S/P cataract surgery Right eye    S/P hysterectomy     S/P spinal fusion

## 2021-10-11 NOTE — H&P ADULT - PROBLEM SELECTOR PLAN 5
Has weeping bandaged area in RLE in shin area.   -Wound care consult  -Cont. empiric keflex? Has weeping bandaged area in RLE in shin area.   -Wound care consult  -Pt states she was on keflex in past for leg wound but stopped long time ago Has weeping bandaged area in RLE in shin area.   -Wound care consult  -Pt states she was on keflex in past for leg wound but stopped long time ago  -Compression stockings as needed

## 2021-10-11 NOTE — H&P ADULT - PROBLEM SELECTOR PLAN 2
Crt relatively stable compared to prior admission. Known hx of CKD  -Trend BMP  -Renal dose meds  -Needs f/u with nephrology outpatient

## 2021-10-11 NOTE — ED PROVIDER NOTE - OBJECTIVE STATEMENT
85yo lady with PMH of T2DM, COPD, htn, PVD with bilateral LE edema, CKD, afib on eliquis , recently admitted ( 4/11/21 – 4/16/21) for BRASH syndrome coming in with Sob. 85yo lady with PMH of T2DM, htn, PVD with bilateral LE edema, CKD, afib on eliquis , CHF takes 40 of lasix daily coming in with worsening swelling of LE and SoB / Orthopnea over the past 24 hours.  Has been compliant with all of her medications.  NO cough, no fevers, no chills.  Normal appetite bowel and bladder habits.  No chest pain, no palp.  No other complaints at this time.  Symptoms worse with laying flat, better with sitting upright.

## 2021-10-11 NOTE — ED PROVIDER NOTE - ATTENDING CONTRIBUTION TO CARE
Attending Statement (RIC Casas MD):    HPI: 85y/o F HTN, CKD, DM, HTN, KENNETH, pulmonary fibrosis, prior TTE showing pulmonary htn,     Review of Systems:  -General: no fever or chills  -ENT: no congestion, no difficulty swallowing  -Pulmonary: no cough, no shortness of breath  -Cardiac: no chest pain, no palpitations  -Gastrointestinal: no abdominal pain, no nausea, no vomiting, and no diarrhea.  -Genitourinary: no blood or pain with urination  -Musculoskeletal: no back or neck pain  -Skin: no rashes  -Endocrine: No h/o diabetes or thyroid disease  -Neurologic: No new weakness or numbness in extremities    All else negative unless otherwise specified elsewhere in this note.    PSH/PMH as noted above    On Physical Exam:  General: well appearing, in NAD, speaking clearly in full sentences and without difficulty; cooperative with exam  HEENT: PERRL, MMM  Neck: no neck tenderness, no nuchal rigidity  Cardiac: normal s1, s2; RRR; no MGR  Lungs: CTABL  Abdomen: soft nontender/nondistended  : no bladder tenderness or distension  Skin: intact, no rash  Extremities: no peripheral edema, no gross deformities  Neuro: no gross neurologic deficits    MDM: Attending Statement (RIC Casas MD):    HPI: 85y/o F HTN, CKD, DM, HTN, KENNETH, pulmonary fibrosis, prior TTE showing pulmonary htn, who presents with worsening shortness of breath and lower extremity swelling (both legs) over the past 2 days; no chest pain, no fever, no cough, no abdominal pain.  On eliquis.    Review of Systems:  -General: no fever  -ENT: no congestion  -Pulmonary: no cough, +shortness of breath  -Cardiac: no chest pain  -Gastrointestinal: no abdominal pain, no nausea, no vomiting, and no diarrhea.  -Genitourinary: no blood or pain with urination  -Musculoskeletal: no back or neck pain  -Skin: no rashes  -Endocrine: +h/o diabetes  -Neurologic: No new weakness or numbness in extremities    All else negative unless otherwise specified elsewhere in this note.    PSH/PMH as noted above    On Physical Exam:  General: awake/alert, O2 sat on 2L oxygen 96% (reportedly decreasing to 88% on RA)  HEENT: anicteric sclera; airway patent  Neck: no neck tenderness, no nuchal rigidity  Cardiac: irregular, s1s2 + mumur  Lungs: diffusely decreased obi at bases bilaterally; mild accessory muscle use but able to speak in full sentences  Abdomen: soft nontender  : no bladder tenderness or distension  Skin: intact, no rash  Extremities: 3+ pitting peripheral edema    ECG not consistent with acute ischemia/infarct  POCUS shows b lines (L>R) and bilateral pleural effusions; LV systolic function does not appear depressed; RV appears enlarged    MDM: Worsening LE edema and shortness of breath; concern for possible chf, no obvious signs/symptoms that suggest an acute infectious etiology; start iv lasix, obtain screening labs: cbc (to evaluate for leukocytosis or anemia), CMP (to evaluate for electrolyte abnormalities or renal/liver dysfunction), troponin/proBNP; check screening CXR and covid pcr, likely need for admission given ongoing oxygen requirement.

## 2021-10-11 NOTE — H&P ADULT - NSHPPHYSICALEXAM_GEN_ALL_CORE
Vital Signs Last 24 Hrs  T(C): 36.4 (10-11-21 @ 18:58), Max: 36.9 (10-11-21 @ 18:10)  T(F): 97.6 (10-11-21 @ 18:58), Max: 98.5 (10-11-21 @ 18:10)  HR: 75 (10-11-21 @ 21:05) (75 - 82)  BP: 147/65 (10-11-21 @ 21:05) (147/65 - 177/87)  BP(mean): 94 (10-11-21 @ 18:58) (94 - 94)  RR: 24 (10-11-21 @ 21:05) (22 - 26)  SpO2: 96% (10-11-21 @ 21:05) (92% - 96%)

## 2021-10-11 NOTE — ED PROVIDER NOTE - CARE PLAN
Principal Discharge DX:	Pleural effusion  Secondary Diagnosis:	Acute respiratory failure with hypoxia   1

## 2021-10-11 NOTE — H&P ADULT - HISTORY OF PRESENT ILLNESS
86F w/ hx of afib on eliquis, CKD, PVD, CHF, PVD, DM2, HTN, BRASH syndrome p/w SOB and LE edema. Pt started to experience significant SOB last night which was much worse while lying down. Pt's daughter state pulse oximeter at home showed readings in 80s. Also noticed worsening LE edema over the past 4 days with increased weeping from old wound in RLE. Denies any fevers, chills or cough. Has chronic palpitations unchanged in frequency or severity. Denies any recent chest pain. Pt denies missing any medications but daughter has noticed some extra pills in pill box recently.    In ER: Given lasix 40mg IV

## 2021-10-11 NOTE — ED PROVIDER NOTE - PROGRESS NOTE DETAILS
Attending note (Augusto): patient awaiting bed; appears much more comfortable; urinated >1L s/p lasix. O2 sat now 100% on 2L.  respiratory rate (~18).

## 2021-10-11 NOTE — ED PROVIDER NOTE - NSICDXPASTMEDICALHX_GEN_ALL_CORE_FT
PAST MEDICAL HISTORY:  Benign essential hypertension     Chronic kidney disease (CKD)     Degenerative arthritis of left knee     Degenerative arthritis of right knee     Diabetes mellitus     DM (diabetes mellitus)     HTN (hypertension)     Hypertension     KENNETH (obstructive sleep apnea)     Osteoporosis     Peripheral edema     Peripheral vascular disease     Personal history of asbestosis     Pulmonary fibrosis     PVD (peripheral vascular disease)     Spinal stenosis     T2DM (type 2 diabetes mellitus)     TIA (transient ischemic attack)

## 2021-10-11 NOTE — H&P ADULT - PROBLEM SELECTOR PLAN 1
Symptoms suspicious for HF. elevated BNP in setting of CKD. TTE 06/2021 with preserved EF. Pt's daughter states they are trying to follow up with Dr. Fall for cardiology outpt at request of PMD but not yet able to make appointment. maybe missed some meds recently? not sure.  -Cont. lasix 40mg IV BID  -Telemetry  -Consider cardiology consult inpatient  -Strict I/Os and daily weights

## 2021-10-11 NOTE — H&P ADULT - NEGATIVE GENERAL SYMPTOMS
2019    [address]  [facility] [address]      [facility]  Clinic Note    Polo Matthew  : 1964  PCP: Eliezer Goodrich MD    Reason for Visit:     Chief Complaint   Patient presents with   • Follow-up     6 month       History of Present Illness:   Polo Matthew is a 55 year old man with PMHx of coronary artery disease, Alport syndrome, end-stage renal disease, dialysis through a left AV fistula.    He has undergone PCI of the obtuse marginal and left posterior lateral branch with recent negative stress testing as part of his renal transplant evaluation.    He has no symptoms of chest pain.  His LDL is at goal.      PMHx:     Past Medical History:   Diagnosis Date   • Anemia    • Back pain    • Blood transfusion without reported diagnosis    • Chronic kidney disease    • Colitis    • Coronary artery disease    • Essential (primary) hypertension    • Hearing aid worn     bilaterally   • MRSA (methicillin resistant Staphylococcus aureus)    • Rheumatoid arthritis (CMS/HCC)    • Thyroid cancer (CMS/HCC)        PSHx:     Past Surgical History:   Procedure Laterality Date   • Anterior cervical discectomy w/ fusion     • Appendectomy     • Carpal tunnel release     • Dialysis fistula creation     • Dorsal compartment release     • Finger surgery     • Gallbladder surgery     • Hb stent insertion cardiac     • Kidney transplant      10/97   • Knee surgery     • Minimally invasive foraminotomy cervical spine     • Permacath     • Plantar fascia surgery     • Ptca with stent     • Thyroidectomy     • Total knee replacement         Family Hx:     Family History   Problem Relation Age of Onset   • Cancer Mother    • Kidney disease Mother    • Coronary Artery Disease Neg Hx         Negative for premature CAD   • Aneurysm Neg Hx         Negative for AAA       Social Hx:   he  reports that he has never smoked. He has never used smokeless tobacco. He reports that he does not drink alcohol  or use drugs.    Allergies:      ALLERGIES:   Allergen Reactions   • Carvedilol HIVES, RASH and NAUSEA   • Ciprofloxacin HIVES, RASH and NAUSEA   • Duloxetine HIVES, RASH and NAUSEA   • Fosinopril HIVES, RASH and NAUSEA   • Gabapentin HIVES, RASH and NAUSEA   • Hydralazine HIVES, RASH and NAUSEA   • Metoprolol Succinate Er HIVES     Oral   • Minoxidil HIVES, RASH and NAUSEA   • Nortriptyline HIVES, RASH and NAUSEA   • Pravastatin HIVES, RASH and NAUSEA       Medications:     Current Outpatient Medications   Medication Sig Dispense Refill   • oxyCODONE HCl 10 MG immediate release tablet   0   • levothyroxine (SYNTHROID, LEVOTHROID) 175 MCG tablet Take 175 mcg by mouth.     • TACROlimus (PROGRAF) 1 MG capsule Take 2 mg by mouth.     • atorvastatin (LIPITOR) 10 MG tablet TAKE 1 TABLET BY MOUTH DAILY AT BEDTIME 90 tablet 3   • losartan-hydrochlorothiazide (HYZAAR) 100-25 MG per tablet Take 1 tablet by mouth daily.     • Buprenorphine HCl-Naloxone HCl (ZUBSOLV SL) Place under the tongue 3 times daily.      • LABETALOL HCL PO Take 200 mg by mouth 2 times daily.      • ALLOPURINOL PO Take 100 mg by mouth daily.      • DOXAZOSIN MESYLATE PO      • POTASSIUM CHLORIDE PO Take 20 mEq by mouth 2 times daily.      • RANITIDINE HCL PO Take 150 mg by mouth daily.      • FUROSEMIDE PO Take 80 mg by mouth 2 times daily.      • PREDNISONE PO Take 7.5 mg by mouth daily.      • DOCUSATE SODIUM PO Take 100 mg by mouth 2 times daily as needed.      • BABY ASPIRIN PO Take 81 mg by mouth daily.      • Ergocalciferol (VITAMIN D2 PO) Take 50,000 Units by mouth every 30 days.      • OXYCODONE HCL PO Take 5 mg by mouth 3 times daily.        No current facility-administered medications for this visit.        Review of Systems:   Review of Systems   Constitution: Negative for chills, fever, weight gain and weight loss.   HENT: Negative for hearing loss.    Eyes:        Patient denies significant visual changes   Cardiovascular: Negative for  chest pain and claudication.        Negative except for what's indicated in HPI   Respiratory: Negative for cough and hemoptysis.    Hematologic/Lymphatic: Does not bruise/bleed easily.   Skin: Negative for rash and suspicious lesions.   Musculoskeletal: Negative for arthritis.   Gastrointestinal: Negative for hematochezia and melena.   Genitourinary: Negative for hematuria.   Neurological:        No localized deficits   Allergic/Immunologic: Negative for environmental allergies.        No new food allergies       Physical Exam:   Vitals:   Visit Vitals  /76   Pulse 78   Ht 6' 2\" (1.88 m)   Wt 110.2 kg (243 lb)   BMI 31.20 kg/m²      General:  No acute distress.   HEENT: Mucosa moist, no scleral icterus   Lungs: Cear to auscultation   Cardiac: JVP normal. No carotid bruit.    RRR, will S1-S2 no murmur no rub   No edema   Pulses:  Pedal pulses nl     Femoral pulses nl    Carotid pulses nl   Abdomen: Soft, non tender, difficult to assess abd aorta   Musculoskeletal: Gait normal. No tendon xanthoma.    Skin: Warm, no cyanosis.   Neuro: A & O   Psychiatric: Normal affect.  Left AV fistula  Labs:     CHOLESTEROL (no units)   Date Value   07/16/2019 156        CALCULATED LDL (no units)   Date Value   07/16/2019 85        HDL (no units)   Date Value   07/16/2019 53        TRIGLYCERIDE (no units)   Date Value   07/16/2019 88        AST/SGOT (no units)   Date Value   07/16/2019 13      No results found for: ALT    No results for input(s): CHOLESTEROL, CALCLDL, HDL, TRIGLYCERIDE, AST, ALT in the last 72 hours.      Impression:         1. Presence of stent in coronary artery    2. Pericardial effusion    3. Preoperative cardiovascular examination    4. Dialysis patient (CMS/[facility])         Very nice 55-year-old gentleman with coronary artery disease, end-stage renal disease, Alport syndrome.    Status post multivessel PCI with recent negative stress test as part of his transplant evaluation    Left AV fistula for  dialysis    Plan:     1.  Continue current medical regimen for coronary disease  2.  Follow-up with us in 6 months,, follow-up with Dr. Shay Heath MD  08/06/19   no fever/no chills

## 2021-10-11 NOTE — H&P ADULT - NSHPLABSRESULTS_GEN_ALL_CORE
I have reviewed the labs, imaging and ekg. EKG with sinus arrhythima, HR 78, QTc 446 non-specific ST segment findings

## 2021-10-11 NOTE — ED ADULT NURSE NOTE - NSIMPLEMENTINTERV_GEN_ALL_ED
Implemented All Fall with Harm Risk Interventions:  [place] to call system. Call bell, personal items and telephone within reach. Instruct patient to call for assistance. Room bathroom lighting operational. Non-slip footwear when patient is off stretcher. Physically safe environment: no spills, clutter or unnecessary equipment. Stretcher in lowest position, wheels locked, appropriate side rails in place. Provide visual cue, wrist band, yellow gown, etc. Monitor gait and stability. Monitor for mental status changes and reorient to person, place, and time. Review medications for side effects contributing to fall risk. Reinforce activity limits and safety measures with patient and family. Provide visual clues: red socks.

## 2021-10-11 NOTE — ED ADULT NURSE NOTE - OBJECTIVE STATEMENT
86yF presents to the ED from home with complaints of increased SOB and increased BL LE swelling. 86yF presents to the ED from home with complaints of increased SOB and increased BL LE swelling. PMH of T2DM, htn, PVD with bilateral LE edema, CKD, afib on eliquis , CHF takes 40 of lasix daily. Pt reports x2days worsening BL LE swelling and SOB. SOB worse with exertion and lying flat. Pt reports she has taken all of her medications. Pt AAOx4, VS as documented. Pt gross neuro intact. Pt is tachypneic, has increased WOB and retractions. Pt abdomen soft and nontender to palpation. Pt has + pulses in UE and LE BL. Pt has no edema in LE BL. Pt able to ambulate with steady gait %. Pt denies chest pain, HA, N/V/D, abdominal pain, back pain, fever/chills, urinary symptoms, hematuria, weakness, dizziness, numbness, tinging, loss of taste, loss of smell. Pt continued on cardiac and spo2 monitor. Pt placed in position of comfort. Pt educated on call bell system and provided call bell. Bed in lowest position, wheels locked, appropriate side rails raised. Pt denies needs at this time. Pt aware of plan to await lab results. 86yF presents to the ED from home with complaints of increased SOB and increased BL LE swelling. PMH of T2DM, htn, PVD with bilateral LE edema, CKD, afib on eliquis , CHF takes 40 of lasix daily. Pt reports x2days worsening BL LE swelling and SOB. SOB worse with exertion and lying flat. Pt reports she has taken all of her medications. Pt AAOx4, VS as documented. Pt gross neuro intact. Pt is tachypneic, has increased WOB and retractions. Pt abdomen soft and nontender to palpation. Pt has + pulses in UE and LE BL. Pt has +3 edema of BL LE with compression stockings in place. Pt able to ambulate with steady gait %. Pt denies chest pain, HA, N/V/D, abdominal pain, back pain, fever/chills, urinary symptoms, hematuria, weakness, dizziness, numbness, tinging, loss of taste, loss of smell. Pt continued on cardiac and spo2 monitor. Pt placed in position of comfort. Pt educated on call bell system and provided call bell. Bed in lowest position, wheels locked, appropriate side rails raised. Pt denies needs at this time. Pt aware of plan to await lab results.

## 2021-10-12 ENCOUNTER — NON-APPOINTMENT (OUTPATIENT)
Age: 86
End: 2021-10-12

## 2021-10-12 DIAGNOSIS — N18.4 CHRONIC KIDNEY DISEASE, STAGE 4 (SEVERE): ICD-10-CM

## 2021-10-12 DIAGNOSIS — I50.33 ACUTE ON CHRONIC DIASTOLIC (CONGESTIVE) HEART FAILURE: ICD-10-CM

## 2021-10-12 LAB
ALBUMIN SERPL ELPH-MCNC: 3.5 G/DL — SIGNIFICANT CHANGE UP (ref 3.3–5)
ALP SERPL-CCNC: 154 U/L — HIGH (ref 40–120)
ALT FLD-CCNC: 11 U/L — SIGNIFICANT CHANGE UP (ref 10–45)
ANION GAP SERPL CALC-SCNC: 13 MMOL/L — SIGNIFICANT CHANGE UP (ref 5–17)
ANION GAP SERPL CALC-SCNC: 13 MMOL/L — SIGNIFICANT CHANGE UP (ref 5–17)
APPEARANCE UR: CLEAR — SIGNIFICANT CHANGE UP
APTT BLD: 40.1 SEC — HIGH (ref 27.5–35.5)
AST SERPL-CCNC: 16 U/L — SIGNIFICANT CHANGE UP (ref 10–40)
BACTERIA # UR AUTO: NEGATIVE — SIGNIFICANT CHANGE UP
BASOPHILS # BLD AUTO: 0.03 K/UL — SIGNIFICANT CHANGE UP (ref 0–0.2)
BASOPHILS NFR BLD AUTO: 0.7 % — SIGNIFICANT CHANGE UP (ref 0–2)
BILIRUB SERPL-MCNC: 0.2 MG/DL — SIGNIFICANT CHANGE UP (ref 0.2–1.2)
BILIRUB UR-MCNC: NEGATIVE — SIGNIFICANT CHANGE UP
BUN SERPL-MCNC: 39 MG/DL — HIGH (ref 7–23)
BUN SERPL-MCNC: 40 MG/DL — HIGH (ref 7–23)
CALCIUM SERPL-MCNC: 9.1 MG/DL — SIGNIFICANT CHANGE UP (ref 8.4–10.5)
CALCIUM SERPL-MCNC: 9.5 MG/DL — SIGNIFICANT CHANGE UP (ref 8.4–10.5)
CHLORIDE SERPL-SCNC: 106 MMOL/L — SIGNIFICANT CHANGE UP (ref 96–108)
CHLORIDE SERPL-SCNC: 107 MMOL/L — SIGNIFICANT CHANGE UP (ref 96–108)
CO2 SERPL-SCNC: 21 MMOL/L — LOW (ref 22–31)
CO2 SERPL-SCNC: 22 MMOL/L — SIGNIFICANT CHANGE UP (ref 22–31)
COLOR SPEC: COLORLESS — SIGNIFICANT CHANGE UP
COVID-19 SPIKE DOMAIN AB INTERP: POSITIVE
COVID-19 SPIKE DOMAIN ANTIBODY RESULT: >250 U/ML — HIGH
CREAT SERPL-MCNC: 1.84 MG/DL — HIGH (ref 0.5–1.3)
CREAT SERPL-MCNC: 1.9 MG/DL — HIGH (ref 0.5–1.3)
DIFF PNL FLD: NEGATIVE — SIGNIFICANT CHANGE UP
EOSINOPHIL # BLD AUTO: 0.25 K/UL — SIGNIFICANT CHANGE UP (ref 0–0.5)
EOSINOPHIL NFR BLD AUTO: 5.9 % — SIGNIFICANT CHANGE UP (ref 0–6)
EPI CELLS # UR: 1 /HPF — SIGNIFICANT CHANGE UP
GLUCOSE BLDC GLUCOMTR-MCNC: 142 MG/DL — HIGH (ref 70–99)
GLUCOSE BLDC GLUCOMTR-MCNC: 195 MG/DL — HIGH (ref 70–99)
GLUCOSE SERPL-MCNC: 125 MG/DL — HIGH (ref 70–99)
GLUCOSE SERPL-MCNC: 181 MG/DL — HIGH (ref 70–99)
GLUCOSE UR QL: NEGATIVE — SIGNIFICANT CHANGE UP
HCT VFR BLD CALC: 28.8 % — LOW (ref 34.5–45)
HGB BLD-MCNC: 9 G/DL — LOW (ref 11.5–15.5)
INR BLD: 1.31 RATIO — HIGH (ref 0.88–1.16)
KETONES UR-MCNC: NEGATIVE — SIGNIFICANT CHANGE UP
LACTATE SERPL-SCNC: 0.9 MMOL/L — SIGNIFICANT CHANGE UP (ref 0.7–2)
LEUKOCYTE ESTERASE UR-ACNC: ABNORMAL
LYMPHOCYTES # BLD AUTO: 1.16 K/UL — SIGNIFICANT CHANGE UP (ref 1–3.3)
LYMPHOCYTES # BLD AUTO: 27.2 % — SIGNIFICANT CHANGE UP (ref 13–44)
MAGNESIUM SERPL-MCNC: 2.2 MG/DL — SIGNIFICANT CHANGE UP (ref 1.6–2.6)
MCHC RBC-ENTMCNC: 31.3 GM/DL — LOW (ref 32–36)
MCHC RBC-ENTMCNC: 32 PG — SIGNIFICANT CHANGE UP (ref 27–34)
MCV RBC AUTO: 102.5 FL — HIGH (ref 80–100)
MONOCYTES # BLD AUTO: 0.57 K/UL — SIGNIFICANT CHANGE UP (ref 0–0.9)
MONOCYTES NFR BLD AUTO: 13.4 % — SIGNIFICANT CHANGE UP (ref 2–14)
NEUTROPHILS # BLD AUTO: 2.25 K/UL — SIGNIFICANT CHANGE UP (ref 1.8–7.4)
NEUTROPHILS NFR BLD AUTO: 52.8 % — SIGNIFICANT CHANGE UP (ref 43–77)
NITRITE UR-MCNC: NEGATIVE — SIGNIFICANT CHANGE UP
NRBC # BLD: 0 /100 WBCS — SIGNIFICANT CHANGE UP (ref 0–0)
NT-PROBNP SERPL-SCNC: 2239 PG/ML — HIGH (ref 0–300)
PH UR: 6.5 — SIGNIFICANT CHANGE UP (ref 5–8)
PLATELET # BLD AUTO: 208 K/UL — SIGNIFICANT CHANGE UP (ref 150–400)
POTASSIUM SERPL-MCNC: 3.4 MMOL/L — LOW (ref 3.5–5.3)
POTASSIUM SERPL-MCNC: 4 MMOL/L — SIGNIFICANT CHANGE UP (ref 3.5–5.3)
POTASSIUM SERPL-SCNC: 3.4 MMOL/L — LOW (ref 3.5–5.3)
POTASSIUM SERPL-SCNC: 4 MMOL/L — SIGNIFICANT CHANGE UP (ref 3.5–5.3)
PROT SERPL-MCNC: 6.8 G/DL — SIGNIFICANT CHANGE UP (ref 6–8.3)
PROT UR-MCNC: NEGATIVE — SIGNIFICANT CHANGE UP
PROTHROM AB SERPL-ACNC: 15.5 SEC — HIGH (ref 10.6–13.6)
RBC # BLD: 2.81 M/UL — LOW (ref 3.8–5.2)
RBC # FLD: 13.4 % — SIGNIFICANT CHANGE UP (ref 10.3–14.5)
RBC CASTS # UR COMP ASSIST: 0 /HPF — SIGNIFICANT CHANGE UP (ref 0–4)
SARS-COV-2 IGG+IGM SERPL QL IA: >250 U/ML — HIGH
SARS-COV-2 IGG+IGM SERPL QL IA: POSITIVE
SODIUM SERPL-SCNC: 141 MMOL/L — SIGNIFICANT CHANGE UP (ref 135–145)
SODIUM SERPL-SCNC: 141 MMOL/L — SIGNIFICANT CHANGE UP (ref 135–145)
SP GR SPEC: 1.01 — LOW (ref 1.01–1.02)
UROBILINOGEN FLD QL: NEGATIVE — SIGNIFICANT CHANGE UP
WBC # BLD: 4.26 K/UL — SIGNIFICANT CHANGE UP (ref 3.8–10.5)
WBC # FLD AUTO: 4.26 K/UL — SIGNIFICANT CHANGE UP (ref 3.8–10.5)
WBC UR QL: 7 /HPF — HIGH (ref 0–5)

## 2021-10-12 PROCEDURE — 99291 CRITICAL CARE FIRST HOUR: CPT | Mod: 25

## 2021-10-12 PROCEDURE — 93306 TTE W/DOPPLER COMPLETE: CPT | Mod: 26

## 2021-10-12 PROCEDURE — 71045 X-RAY EXAM CHEST 1 VIEW: CPT | Mod: 26

## 2021-10-12 PROCEDURE — 99233 SBSQ HOSP IP/OBS HIGH 50: CPT

## 2021-10-12 RX ORDER — INSULIN LISPRO 100/ML
VIAL (ML) SUBCUTANEOUS
Refills: 0 | Status: DISCONTINUED | OUTPATIENT
Start: 2021-10-12 | End: 2021-10-23

## 2021-10-12 RX ORDER — INSULIN LISPRO 100/ML
VIAL (ML) SUBCUTANEOUS AT BEDTIME
Refills: 0 | Status: DISCONTINUED | OUTPATIENT
Start: 2021-10-12 | End: 2021-10-23

## 2021-10-12 RX ORDER — SODIUM CHLORIDE 9 MG/ML
1000 INJECTION, SOLUTION INTRAVENOUS
Refills: 0 | Status: DISCONTINUED | OUTPATIENT
Start: 2021-10-12 | End: 2021-10-23

## 2021-10-12 RX ORDER — DEXTROSE 50 % IN WATER 50 %
12.5 SYRINGE (ML) INTRAVENOUS ONCE
Refills: 0 | Status: DISCONTINUED | OUTPATIENT
Start: 2021-10-12 | End: 2021-10-23

## 2021-10-12 RX ORDER — DEXTROSE 50 % IN WATER 50 %
15 SYRINGE (ML) INTRAVENOUS ONCE
Refills: 0 | Status: DISCONTINUED | OUTPATIENT
Start: 2021-10-12 | End: 2021-10-23

## 2021-10-12 RX ORDER — POTASSIUM CHLORIDE 20 MEQ
40 PACKET (EA) ORAL ONCE
Refills: 0 | Status: COMPLETED | OUTPATIENT
Start: 2021-10-12 | End: 2021-10-12

## 2021-10-12 RX ORDER — DEXTROSE 50 % IN WATER 50 %
25 SYRINGE (ML) INTRAVENOUS ONCE
Refills: 0 | Status: DISCONTINUED | OUTPATIENT
Start: 2021-10-12 | End: 2021-10-23

## 2021-10-12 RX ORDER — ALBUTEROL 90 UG/1
2.5 AEROSOL, METERED ORAL ONCE
Refills: 0 | Status: COMPLETED | OUTPATIENT
Start: 2021-10-12 | End: 2021-10-12

## 2021-10-12 RX ORDER — FUROSEMIDE 40 MG
40 TABLET ORAL ONCE
Refills: 0 | Status: COMPLETED | OUTPATIENT
Start: 2021-10-12 | End: 2021-10-12

## 2021-10-12 RX ORDER — HYDRALAZINE HCL 50 MG
10 TABLET ORAL EVERY 8 HOURS
Refills: 0 | Status: DISCONTINUED | OUTPATIENT
Start: 2021-10-12 | End: 2021-10-12

## 2021-10-12 RX ORDER — POTASSIUM CHLORIDE 20 MEQ
40 PACKET (EA) ORAL ONCE
Refills: 0 | Status: DISCONTINUED | OUTPATIENT
Start: 2021-10-12 | End: 2021-10-12

## 2021-10-12 RX ORDER — HYDRALAZINE HCL 50 MG
25 TABLET ORAL EVERY 8 HOURS
Refills: 0 | Status: DISCONTINUED | OUTPATIENT
Start: 2021-10-12 | End: 2021-10-13

## 2021-10-12 RX ORDER — INFLUENZA VIRUS VACCINE 15; 15; 15; 15 UG/.5ML; UG/.5ML; UG/.5ML; UG/.5ML
0.5 SUSPENSION INTRAMUSCULAR ONCE
Refills: 0 | Status: DISCONTINUED | OUTPATIENT
Start: 2021-10-12 | End: 2021-10-23

## 2021-10-12 RX ORDER — HYDRALAZINE HCL 50 MG
5 TABLET ORAL ONCE
Refills: 0 | Status: COMPLETED | OUTPATIENT
Start: 2021-10-12 | End: 2021-10-12

## 2021-10-12 RX ORDER — GLUCAGON INJECTION, SOLUTION 0.5 MG/.1ML
1 INJECTION, SOLUTION SUBCUTANEOUS ONCE
Refills: 0 | Status: DISCONTINUED | OUTPATIENT
Start: 2021-10-12 | End: 2021-10-23

## 2021-10-12 RX ADMIN — APIXABAN 2.5 MILLIGRAM(S): 2.5 TABLET, FILM COATED ORAL at 05:56

## 2021-10-12 RX ADMIN — ATENOLOL 25 MILLIGRAM(S): 25 TABLET ORAL at 05:56

## 2021-10-12 RX ADMIN — ALBUTEROL 2.5 MILLIGRAM(S): 90 AEROSOL, METERED ORAL at 16:08

## 2021-10-12 RX ADMIN — Medication 2: at 17:18

## 2021-10-12 RX ADMIN — Medication 5 MILLIGRAM(S): at 17:39

## 2021-10-12 RX ADMIN — Medication 1000 UNIT(S): at 06:48

## 2021-10-12 RX ADMIN — Medication 40 MILLIGRAM(S): at 05:56

## 2021-10-12 RX ADMIN — Medication 0: at 21:17

## 2021-10-12 RX ADMIN — Medication 40 MILLIGRAM(S): at 17:38

## 2021-10-12 RX ADMIN — Medication 25 MILLIGRAM(S): at 21:51

## 2021-10-12 RX ADMIN — Medication 40 MILLIGRAM(S): at 17:17

## 2021-10-12 RX ADMIN — APIXABAN 2.5 MILLIGRAM(S): 2.5 TABLET, FILM COATED ORAL at 17:18

## 2021-10-12 RX ADMIN — Medication 40 MILLIEQUIVALENT(S): at 15:52

## 2021-10-12 NOTE — PROVIDER CONTACT NOTE (OTHER) - ACTION/TREATMENT ORDERED:
Increase to 6L nc sat 94%
NP assessed patient at bedside. Continuous bipap ordered. Total of 80mg IV lasix given. 5mg Iv push hydralazine ordered. CXR, labs. Repeat /83

## 2021-10-12 NOTE — CONSULT NOTE ADULT - SUBJECTIVE AND OBJECTIVE BOX
CCU consult for Acute Decompensated Heart Failure     This is a 87 y/o F with PMHx HTN, DM II, COPD, pHTN, Afib on Eliquis, CHF, PVD, CKD, admission for BRASH syndrome (4/2021) who presented w/ shortness of breath and worsening LE edema. History obtained from patient and family at bedside. Report two days of worsening shortness of breath with laying flat, improves with sitting up. Her daughter noted her pulse oximetry to be in 80s on room air at home. This prompted then to bring her in to the ED yesterday for further evaluation. Patient also reports worsening lower extremity swelling for past 3-4 days and a nonproductive cough. Admission labs Hgb 10 (at baseline), Cr 1.93 (at baseline), LFTs wnl, lactate 1.0. On arrival to ED, pt was noted to be hypertensive 170s/80s, HR 75, RR 22, 92% on RA. CXR with pulmonary edema. Pt was given Lasix 40mg IV x 1 and admitted to floors, started on Lasix 40mg IV BID. Seen by General Cardiology team earlier today,      Echo from 4/2021, nml LV systolic function (EF 70%), no segmental wall motion abnormalities, elevated PA systolic pressure (49) consistent w/ mild pulmonary HTN.  Conclusions:  1. Normal left ventricular internal dimensions and wall  thicknesses.  2. Normal left ventricular systolic function. No segmental  wall motion abnormalities.  3. Normal right ventricular size and function.  4. Estimated pulmonary artery systolic pressure equals 49  mm Hg,assuming right atrial pressure equals 8 mm Hg,  consistent with mild pulmonary pressures.  *** No previous Echo exam.    Asked by Medicine team this evening to evaluate for CCU transfer. Patient with worsening hypoxia to 80s requiring 6L O2 via non-rebreather, tachypneic, BPs 180s/90s    Patient evaluated at bedside, family at bedside. She reports that her shortness of breath at the present moment is a little worse from past few days but she is feeling better after placed on the nonrebreather mask. She denies chest pain, palpitations. Attempted to verify code status and patient and family want her to be DNR/DNI, she is currently full code. Discussed this with the medicine NP to please re-discuss code status/goals of care with patient and family given there seems to be some confusion.    Physical Exam:   Gen: NAD, sitting up in bed  HEENT: EOMI, anicteric sclera, elevated JVD  CV: RRR, S1 S2 nl, no m/r/g  Resp: crackles bilaterally, no wheezing  Abd: soft, non-distended, non-tender, normoactive bowel sounds   Ext: warm, 2+ pitting edema bilaterally, wrapped in compression bandage   Neuro: Awake, alert, oriented to person and place, hard of hearing, no gross focal deficits  Psych: pleasant, cooperative with exam     Telemetry: sinus rhythm, rates 70s    This is a 87 y/o F with PMHx HTN, DM II, COPD, pHTN, Afib on Eliquis, CHF, PVD, CKD, admission for BRASH syndrome (4/2021) who presented w/ shortness of breath and worsening LE edema, admitted for CHF exacerbation. CCU consult this evening for worsening hypoxia and tachypnea.    CCU consult for Acute Decompensated Heart Failure     This is a 85 y/o F with PMHx HTN, DM II, COPD, pHTN, Afib on Eliquis, CHF, PVD, CKD, admission for BRASH syndrome (4/2021) who presented w/ shortness of breath and worsening LE edema. History obtained from patient and family at bedside. Report two days of worsening shortness of breath with laying flat, improves with sitting up. Her daughter noted her pulse oximetry to be in 80s on room air at home. This prompted then to bring her in to the ED yesterday for further evaluation. Patient also reports worsening lower extremity swelling for past 3-4 days and a nonproductive cough. Admission labs Hgb 10 (at baseline), Cr 1.93 (at baseline), LFTs wnl, lactate 1.0. On arrival to ED, pt was noted to be hypertensive 170s/80s, HR 75, RR 22, 92% on RA. CXR with pulmonary edema. Pt was given Lasix 40mg IV x 1 and admitted to floors, started on Lasix 40mg IV BID. Seen by General Cardiology team earlier today,      Echo from 4/2021, nml LV systolic function (EF 70%), no segmental wall motion abnormalities, elevated PA systolic pressure (49) consistent w/ mild pulmonary HTN.  Conclusions:  1. Normal left ventricular internal dimensions and wall  thicknesses.  2. Normal left ventricular systolic function. No segmental  wall motion abnormalities.  3. Normal right ventricular size and function.  4. Estimated pulmonary artery systolic pressure equals 49  mm Hg,assuming right atrial pressure equals 8 mm Hg,  consistent with mild pulmonary pressures.  *** No previous Echo exam.    Asked by Medicine team this evening to evaluate for CCU transfer. Patient with worsening hypoxia to 80s requiring 6L O2 via non-rebreather, tachypneic, BPs 180s/90s    Patient evaluated at bedside, family at bedside. She reports that her shortness of breath at the present moment is a little worse from past few days but she is feeling better after placed on the nonrebreather mask. She denies chest pain, palpitations. Attempted to verify code status and patient and family want her to be DNR/DNI, she is currently full code. Discussed this with the medicine NP to please re-discuss code status/goals of care with patient and family given there seems to be some confusion.    Physical Exam:   Gen: NAD, sitting up in bed  HEENT: EOMI, anicteric sclera, elevated JVD  CV: RRR, S1 S2 nl, no m/r/g  Resp: crackles bilaterally, no wheezing  Abd: soft, non-distended, non-tender, normoactive bowel sounds   Ext: warm, 2+ pitting edema bilaterally, wrapped in compression bandage   Neuro: Awake, alert, oriented to person and place, hard of hearing, no gross focal deficits  Psych: pleasant, cooperative with exam     Telemetry: sinus rhythm, rates 70s    Assessment:  This is a 85 y/o F with PMHx HTN, DM II, COPD, pHTN, Afib on Eliquis, CHF, PVD, CKD, admission for BRASH syndrome (4/2021) who presented w/ shortness of breath and worsening LE edema, admitted for CHF exacerbation. CCU consult this evening for worsening hypoxia and tachypnea. She is requiring 6L on non-rebreather with SpO2 94%, dyspnea has improved a little with the non-rebreather. Her elevated BPs are likely due to her acute heart failure. Warm on exam. Her change in clinical status is likely due to her underlying CHF.      Plan:  - Give additional Lasix 40mg IV for total dose this evening of 80mg IV  - Place on BIPAP, make NPO  - Repeat CXR, CBC, CMP, Lactate  - Please clarify code status with patient family (discussed with medicine NP). Patient and family had mentioned DNR/DNI and she is currently FULL code   - TTE tonight (discussed with echo tech)   - No indication for CCU transfer at this time    Jorgito Cisneros MD  Cardiology Fellow - PGY 4  Text or Call: [phone]  For all New Consults and Questions:  [email or web address]   Login: [email or web address]   CCU consult for Acute Decompensated Heart Failure     This is a 87 y/o F with PMHx HTN, DM II, COPD, pHTN, Afib on Eliquis, CHF, PVD, CKD, admission for BRASH syndrome (4/2021) who presented w/ shortness of breath and worsening LE edema. History obtained from patient and family at bedside. Report two days of worsening shortness of breath with laying flat, improves with sitting up. Her daughter noted her pulse oximetry to be in 80s on room air at home. This prompted then to bring her in to the ED yesterday for further evaluation. Patient also reports worsening lower extremity swelling for past 3-4 days and a nonproductive cough. Admission labs Hgb 10 (at baseline), Cr 1.93 (at baseline), LFTs wnl, lactate 1.0. On arrival to ED, pt was noted to be hypertensive 170s/80s, HR 75, RR 22, 92% on RA. CXR with pulmonary edema. Pt was given Lasix 40mg IV x 1 and admitted to floors, started on Lasix 40mg IV BID. Seen by General Cardiology team earlier today,      Echo from 4/2021, nml LV systolic function (EF 70%), no segmental wall motion abnormalities, elevated PA systolic pressure (49) consistent w/ mild pulmonary HTN.  Conclusions:  1. Normal left ventricular internal dimensions and wall  thicknesses.  2. Normal left ventricular systolic function. No segmental  wall motion abnormalities.  3. Normal right ventricular size and function.  4. Estimated pulmonary artery systolic pressure equals 49  mm Hg,assuming right atrial pressure equals 8 mm Hg,  consistent with mild pulmonary pressures.  *** No previous Echo exam.    Asked by Medicine team this evening to evaluate for CCU transfer. Patient with worsening hypoxia to 80s requiring 6L O2 via non-rebreather, tachypneic, BPs 180s/90s    Patient evaluated at bedside, family at bedside. She reports that her shortness of breath at the present moment is a little worse from past few days but she is feeling better after placed on the nonrebreather mask. She denies chest pain, palpitations. Attempted to verify code status and patient and family want her to be DNR/DNI, she is currently full code. Discussed this with the medicine NP to please re-discuss code status/goals of care with patient and family given there seems to be some confusion.    Physical Exam:   Gen: NAD, sitting up in bed  HEENT: EOMI, anicteric sclera, elevated JVD  CV: RRR, S1 S2 nl, no m/r/g  Resp: crackles bilaterally, no wheezing  Abd: soft, non-distended, non-tender, normoactive bowel sounds   Ext: warm, 2+ pitting edema bilaterally, wrapped in compression bandage   Neuro: Awake, alert, oriented to person and place, hard of hearing, no gross focal deficits  Psych: pleasant, cooperative with exam     Telemetry: sinus rhythm, rates 70s    Assessment:  This is a 87 y/o F with PMHx HTN, DM II, COPD, pHTN, Afib on Eliquis, CHF, PVD, CKD, admission for BRASH syndrome (4/2021) who presented w/ shortness of breath and worsening LE edema, admitted for CHF exacerbation. CCU consult this evening for worsening hypoxia and tachypnea. She is requiring 6L on non-rebreather with SpO2 94%, dyspnea has improved a little with the non-rebreather. Warm on exam. Her change in clinical status is likely due to her underlying CHF. It is possible that she had some flash pulmonary edema from her elevated BPs.     Plan:  - Give additional Lasix 40mg IV for total dose this evening of 80mg IV  - Place on BIPAP, make NPO  - Repeat CXR, CBC, CMP, Lactate  - Please clarify code status with patient family (discussed with medicine NP). Patient and family had mentioned DNR/DNI and she is currently FULL code   - TTE tonight (discussed with echo tech)   - Continue Atenolol and Hydralazine for her HTN. Reassess her BPs after BIPAP (suspect they are elevated at the present moment due to her dyspnea)  - No indication for CCU transfer at this time    Jorgito Cisneros MD  Cardiology Fellow - PGY 4  Text or Call: [phone]  For all New Consults and Questions:  [email or web address]   Login: [email or web address]   CCU consult for Acute Decompensated Heart Failure     This is a 87 y/o F with PMHx HTN, DM II, COPD, pHTN, Afib on Eliquis, CHF, PVD, CKD, admission for BRASH syndrome (4/2021) who presented w/ shortness of breath and worsening LE edema. History obtained from patient and family at bedside. Report two days of worsening shortness of breath with laying flat, improves with sitting up. Her daughter noted her pulse oximetry to be in 80s on room air at home. This prompted then to bring her in to the ED yesterday for further evaluation. Patient also reports worsening lower extremity swelling for past 3-4 days and a nonproductive cough. Admission labs Hgb 10 (at baseline), Cr 1.93 (at baseline), LFTs wnl, lactate 1.0. On arrival to ED, pt was noted to be hypertensive 170s/80s, HR 75, RR 22, 92% on RA. CXR with pulmonary edema. Pt was given Lasix 40mg IV x 1 and admitted to floors, started on Lasix 40mg IV BID. Seen by General Cardiology team earlier today,      Echo from 4/2021, nml LV systolic function (EF 70%), no segmental wall motion abnormalities, elevated PA systolic pressure (49) consistent w/ mild pulmonary HTN.  Conclusions:  1. Normal left ventricular internal dimensions and wall  thicknesses.  2. Normal left ventricular systolic function. No segmental  wall motion abnormalities.  3. Normal right ventricular size and function.  4. Estimated pulmonary artery systolic pressure equals 49  mm Hg,assuming right atrial pressure equals 8 mm Hg,  consistent with mild pulmonary pressures.  *** No previous Echo exam.    Asked by Medicine team this evening to evaluate for CCU transfer. Patient with worsening hypoxia to 80s requiring 6L O2 via non-rebreather, tachypneic, BPs 180s/90s    Patient evaluated at bedside, family at bedside. She reports that her shortness of breath at the present moment is a little worse from past few days but she is feeling better after placed on the nonrebreather mask. She denies chest pain, palpitations. Attempted to verify code status and patient and family want her to be DNR/DNI, she is currently full code. Discussed this with the medicine NP to please re-discuss code status/goals of care with patient and family given there seems to be some confusion.    Physical Exam:   Gen: NAD, sitting up in bed  HEENT: EOMI, anicteric sclera, elevated JVD  CV: RRR, S1 S2 nl, no m/r/g  Resp: crackles bilaterally, no wheezing  Abd: soft, non-distended, non-tender, normoactive bowel sounds   Ext: warm, 2+ pitting edema bilaterally, wrapped in compression bandage   Neuro: Awake, alert, oriented to person and place, hard of hearing, no gross focal deficits  Psych: pleasant, cooperative with exam     Telemetry: sinus rhythm, rates 70s    Assessment:  This is a 87 y/o F with PMHx HTN, DM II, COPD, pHTN, Afib on Eliquis, CHF, PVD, CKD, admission for BRASH syndrome (4/2021) who presented w/ shortness of breath and worsening LE edema, admitted for CHF exacerbation. CCU consult this evening for worsening hypoxia and tachypnea. She is requiring 6L on non-rebreather with SpO2 94%, dyspnea has improved a little with the non-rebreather. Warm on exam. Her change in clinical status is likely due to her underlying CHF and elevated BPs. Suspect her respiratory symptoms and hypoxia will improve with better control of her BPs.     Plan:  - Give additional Lasix 40mg IV for total dose this evening of 80mg IV  - Place on BIPAP, NPO while on BIPAP  - Please clarify code status with patient family (discussed with medicine NP). Patient and family had mentioned DNR/DNI and she is currently FULL code   - TTE tonight   - Continue Atenolol and increase Hydralazine to 25mg q8h  - No indication for CCU transfer at this time    Jorgito Cisneros MD  Cardiology Fellow - PGY 4  Text or Call: [phone]  For all New Consults and Questions:  [email or web address]   Login: [email or web address]   CCU consult for Acute Decompensated Heart Failure     This is a 85 y/o F with PMHx HTN, DM II, COPD, pHTN, Afib on Eliquis, CHF, PVD, CKD, admission for BRASH syndrome (4/2021) who presented w/ shortness of breath and worsening LE edema. History obtained from patient and family at bedside. Report two days of worsening shortness of breath with laying flat, improves with sitting up. Her daughter noted her pulse oximetry to be in 80s on room air at home. This prompted then to bring her in to the ED yesterday for further evaluation. Patient also reports worsening lower extremity swelling for past 3-4 days and a nonproductive cough. Admission labs Hgb 10 (at baseline), Cr 1.93 (at baseline), LFTs wnl, lactate 1.0. On arrival to ED, pt was noted to be hypertensive 170s/80s, HR 75, RR 22, 92% on RA. CXR with pulmonary edema. Pt was given Lasix 40mg IV x 1 and admitted to floors, started on Lasix 40mg IV BID. Seen by General Cardiology team earlier today,      Echo from 4/2021, nml LV systolic function (EF 70%), no segmental wall motion abnormalities, elevated PA systolic pressure (49) consistent w/ mild pulmonary HTN.  Conclusions:  1. Normal left ventricular internal dimensions and wall  thicknesses.  2. Normal left ventricular systolic function. No segmental  wall motion abnormalities.  3. Normal right ventricular size and function.  4. Estimated pulmonary artery systolic pressure equals 49  mm Hg,assuming right atrial pressure equals 8 mm Hg,  consistent with mild pulmonary pressures.  *** No previous Echo exam.    Asked by Medicine team this evening to evaluate for CCU transfer. Patient with worsening hypoxia to 80s requiring 6L O2 via non-rebreather, tachypneic, BPs 180s/90s    Patient evaluated at bedside, family at bedside. She reports that her shortness of breath at the present moment is a little worse from past few days but she is feeling better after placed on the nonrebreather mask. She denies chest pain, palpitations.    Physical Exam:   Gen: NAD, sitting up in bed  HEENT: EOMI, anicteric sclera, elevated JVD  CV: RRR, S1 S2 nl, no m/r/g  Resp: crackles bilaterally, no wheezing  Abd: soft, non-distended, non-tender, normoactive bowel sounds   Ext: warm, 2+ pitting edema bilaterally, wrapped in compression bandage   Neuro: Awake, alert, oriented to person and place, hard of hearing, no gross focal deficits  Psych: pleasant, cooperative with exam     Telemetry: sinus rhythm, rates 70s    Assessment:  This is a 85 y/o F with PMHx HTN, DM II, COPD, pHTN, Afib on Eliquis, CHF, PVD, CKD, admission for BRASH syndrome (4/2021) who presented w/ shortness of breath and worsening LE edema, admitted for CHF exacerbation. CCU consult this evening for worsening hypoxia and tachypnea. She is requiring 6L on non-rebreather with SpO2 94%, dyspnea has improved a little with the non-rebreather. Warm on exam. Her change in clinical status is likely due to her underlying CHF and elevated BPs. Suspect her respiratory symptoms and hypoxia will improve with better control of her BPs.     Plan:  - Give additional Lasix 40mg IV for total dose this evening of 80mg IV  - Place on BIPAP, NPO while on BIPAP  - Please clarify code status with patient and family (discussed with medicine NP to please go discuss with family given there seems to be confusion from patient and family).   - TTE tonight   - Continue Atenolol and increase Hydralazine to 25mg q8h. She needs better BP management   - No indication for CCU transfer at this time    Jorgito Cisneros MD  Cardiology Fellow - PGY 4  Text or Call: [phone]  For all New Consults and Questions:  [email or web address]   Login: [email or web address]   CCU consult for Acute Decompensated Heart Failure     This is a 87 y/o F with PMHx HTN, DM II, COPD, pHTN, Afib on Eliquis, CHF, PVD, CKD, admission for BRASH syndrome (4/2021) who presented w/ shortness of breath and worsening LE edema. History obtained from patient and family at bedside. Report two days of worsening shortness of breath with laying flat, improves with sitting up. Her daughter noted her pulse oximetry to be in 80s on room air at home. This prompted then to bring her in to the ED yesterday for further evaluation. Patient also reports worsening lower extremity swelling for past 3-4 days and a nonproductive cough. Admission labs Hgb 10 (at baseline), Cr 1.93 (at baseline), LFTs wnl, lactate 1.0. On arrival to ED, pt was noted to be hypertensive 170s/80s, HR 75, RR 22, 92% on RA. CXR with pulmonary edema. Pt was given Lasix 40mg IV x 1 and admitted to floors, started on Lasix 40mg IV BID. Seen by General Cardiology team earlier today,      Echo from 4/2021, nml LV systolic function (EF 70%), no segmental wall motion abnormalities, elevated PA systolic pressure (49) consistent w/ mild pulmonary HTN.  Conclusions:  1. Normal left ventricular internal dimensions and wall  thicknesses.  2. Normal left ventricular systolic function. No segmental  wall motion abnormalities.  3. Normal right ventricular size and function.  4. Estimated pulmonary artery systolic pressure equals 49  mm Hg,assuming right atrial pressure equals 8 mm Hg,  consistent with mild pulmonary pressures.  *** No previous Echo exam.    Asked by Medicine team this evening to evaluate for CCU transfer. Patient with worsening hypoxia to 80s requiring 6L O2 via non-rebreather, tachypneic, BPs 180s/90s    Patient evaluated at bedside, family at bedside. She reports that her shortness of breath at the present moment is a little worse from past few days but she is feeling better after placed on the nonrebreather mask. She denies chest pain, palpitations.    Physical Exam:   Gen: NAD, sitting up in bed  HEENT: EOMI, anicteric sclera, elevated JVD  CV: RRR, S1 S2 nl, no m/r/g  Resp: crackles bilaterally, no wheezing  Abd: soft, non-distended, non-tender, normoactive bowel sounds   Ext: warm, 2+ pitting edema bilaterally, wrapped in compression bandage   Neuro: Awake, alert, oriented to person and place, hard of hearing, no gross focal deficits  Psych: pleasant, cooperative with exam     Telemetry: sinus rhythm, rates 70s    Assessment:  This is a 87 y/o F with PMHx HTN, DM II, COPD, pHTN, Afib on Eliquis, CHF, PVD, CKD, admission for BRASH syndrome (4/2021) who presented w/ shortness of breath and worsening LE edema, admitted for CHF exacerbation. CCU consult this evening for worsening hypoxia and tachypnea. She is requiring 6L on non-rebreather with SpO2 94%, dyspnea has improved a little with the non-rebreather. Warm on exam. Her change in clinical status is likely due to her underlying CHF and elevated BPs. Suspect her respiratory symptoms and hypoxia will improve with better control of her BPs.     Plan:  - Give additional Lasix 40mg IV for total dose this evening of 80mg IV  - Place on BIPAP, NPO while on BIPAP  - Please clarify code status (spoke to medicine NP to please go discuss with patient and family given there seems to be confusion)  - TTE tonight   - Continue Atenolol and increase Hydralazine to 25mg q8h. She needs better BP management   - No indication for CCU transfer at this time    Jorgito Cisneros MD  Cardiology Fellow - PGY 4  Text or Call: [phone]  For all New Consults and Questions:  [email or web address]   Login: [email or web address]   CCU consult for Acute Decompensated Heart Failure     This is a 87 y/o F with PMHx HTN, DM II, COPD, pHTN, Afib on Eliquis, CHF, PVD, CKD, admission for BRASH syndrome (4/2021) who presented w/ shortness of breath and worsening LE edema. History obtained from patient and family at bedside. Report two days of worsening shortness of breath with laying flat, improves with sitting up. Her daughter noted her pulse oximetry to be in 80s on room air at home. This prompted then to bring her in to the ED yesterday for further evaluation. Patient also reports worsening lower extremity swelling for past 3-4 days and a nonproductive cough. Admission labs Hgb 10 (at baseline), Cr 1.93 (at baseline), LFTs wnl, lactate 1.0. On arrival to ED, pt was noted to be hypertensive 170s/80s, HR 75, RR 22, 92% on RA. CXR with pulmonary edema. Pt was given Lasix 40mg IV x 1 and admitted to floors, started on Lasix 40mg IV BID. Seen by General Cardiology team earlier today,      Echo from 4/2021, nml LV systolic function (EF 70%), no segmental wall motion abnormalities, elevated PA systolic pressure (49) consistent w/ mild pulmonary HTN.  Conclusions:  1. Normal left ventricular internal dimensions and wall  thicknesses.  2. Normal left ventricular systolic function. No segmental  wall motion abnormalities.  3. Normal right ventricular size and function.  4. Estimated pulmonary artery systolic pressure equals 49  mm Hg,assuming right atrial pressure equals 8 mm Hg,  consistent with mild pulmonary pressures.  *** No previous Echo exam.    Asked by Medicine team this evening to evaluate for CCU transfer. Patient with worsening hypoxia to 80s requiring 6L O2 via non-rebreather, tachypneic, BPs 180s/90s    Patient evaluated at bedside, family at bedside. She reports that her shortness of breath at the present moment is a little worse from past few days but she is feeling better after placed on the nonrebreather mask. She denies chest pain, palpitations.    Physical Exam:   Gen: NAD, sitting up in bed  HEENT: EOMI, anicteric sclera, elevated JVD  CV: RRR, S1 S2 nl, no m/r/g  Resp: crackles bilaterally, no wheezing  Abd: soft, non-distended, non-tender, normoactive bowel sounds   Ext: warm, 2+ pitting edema bilaterally, wrapped in compression bandage   Neuro: Awake, alert, oriented to person and place, hard of hearing, no gross focal deficits  Psych: pleasant, cooperative with exam     Telemetry: sinus rhythm, rates 70s    Assessment:  This is a 87 y/o F with PMHx HTN, DM II, COPD, pHTN, Afib on Eliquis, CHF, PVD, CKD, admission for BRASH syndrome (4/2021) who presented w/ shortness of breath and worsening LE edema, admitted for CHF exacerbation. CCU consult this evening for worsening hypoxia and tachypnea. She is requiring 6L on non-rebreather with SpO2 94%, dyspnea has improved a little with the non-rebreather. Warm on exam. Her change in clinical status is likely due to her underlying CHF and elevated BPs. Suspect her respiratory symptoms and hypoxia will improve with better control of her BPs.     Plan:  - Give additional Lasix 40mg IV for total dose this evening of 80mg IV  - Place on BIPAP, NPO while on BIPAP  - Please clarify code status (spoke to medicine NP to please go discuss with patient and family given there seems to be confusion)  - TTE   - Continue Atenolol and increase Hydralazine to 25mg q8h. She needs better BP management   - No indication for CCU transfer at this time    Jorgito Cisneros MD  Cardiology Fellow - PGY 4  Text or Call: [phone]  For all New Consults and Questions:  [email or web address]   Login: missael

## 2021-10-12 NOTE — PROVIDER CONTACT NOTE (OTHER) - SITUATION
Pt. was on 2L nc desating at 86% increased to 3, 4, and 5 L O2sat 88%
/66 Increased WOB tachypneic O2 sat 86% on 6L

## 2021-10-12 NOTE — CHART NOTE - NSCHARTNOTEFT_GEN_A_CORE
Medicine NP : worsening HF, hypoxia and hypertensive urgency.    CCU consulted for worsening hypoxia and tachypnea. On 6L NC saturating 85-86%.  She was placed on  non-rebreather with SpO2 94%, dyspnea has improved a little with the non-rebreather.   As per CCU fellow Her elevated BPs are likely due to her acute heart failure.     Plan:  - Give additional Lasix 40mg IV for total dose this evening of 80mg IV  - Placed on BIPAP, make NPO  - Repeat CXR, CBC, CMP, Lactate  - clarified code status with patients daughter Antoinette Szymanski and grand daughter who works as an RN in ED ([employer]).  discussed the patients condition in length, wants patient to be full code and if conditions worsens to contact Antoinette ( [phone])  to discuss on DNR/DNI.  - TTE tonight (discussed with echo tech)   - Not a candidate for CCU transfer at this time as per CCU fellow. Medicine NP : worsening HF, hypoxia and hypertensive urgency.    CCU consulted for worsening hypoxia and tachypnea. On 6L NC saturating 85-86%.  She was placed on  non-rebreather with SpO2 94%, dyspnea has improved a little with the non-rebreather.   As per CCU fellow Her elevated BPs are likely due to her acute heart failure.     Plan:  - Give additional Lasix 40mg IV for total dose this evening of 80mg IV  - Placed on BIPAP, make NPO  - Repeat CXR, CBC, CMP, Lactate  - clarified code status with patients daughter Antoinette Szymanski and grand daughter who works as an RN in ED ([facility]).  discussed the patients condition in length, wants patient to be full code and if conditions worsens to contact Antoinette ( [phone])  to discuss on DNR/DNI.  - TTE tonight (discussed with echo tech)   - Not a candidate for CCU transfer at this time as per CCU fellow.  - made aware. Medicine NP : worsening HF, hypoxia and hypertensive urgency.    CCU consulted for worsening hypoxia and tachypnea. On 6L NC saturating 85-86%.  She was placed on  non-rebreather with SpO2 94%, dyspnea has improved a little with the non-rebreather.   As per CCU fellow Her elevated BPs are likely due to her acute heart failure.     Plan:  - Give additional Lasix 40mg IV for total dose this evening of 80mg IV  - Placed on BIPAP, make NPO  - Repeat CXR, CBC, CMP, Lactate  - clarified code status with patients daughter Antoinette Szymanski and grand daughter who works as an RN in ED ([facility]).  discussed the patients condition in length, wants patient to be full code for now and if conditions worsens overnight to contact Antoinette ( [phone])  to discuss on DNR/DNI.  - TTE tonight (discussed with echo tech)   - Not a candidate for CCU transfer at this time as per CCU fellow.  - made aware.

## 2021-10-12 NOTE — PROGRESS NOTE ADULT - SUBJECTIVE AND OBJECTIVE BOX
[facility] Division of Hospital Medicine  Valentin Allen MD, MICHAEL  I'm reachable on [employer]   Off hours:  [phone] ([facility] pager)    Patient is a 86y old  Female who presents with a chief complaint of Shortness of breath (12 Oct 2021 11:42)      SUBJECTIVE / OVERNIGHT EVENTS:  Complaining of dyspnea after having standing weights done this morning.     MEDICATIONS  (STANDING):  apixaban 2.5 milliGRAM(s) Oral two times a day  ATENolol  Tablet 25 milliGRAM(s) Oral daily  cholecalciferol 1000 Unit(s) Oral two times a day  furosemide   Injectable 40 milliGRAM(s) IV Push two times a day  influenza   Vaccine 0.5 milliLiter(s) IntraMuscular once    MEDICATIONS  (PRN):  acetaminophen   Tablet .. 650 milliGRAM(s) Oral every 6 hours PRN Temp greater or equal to 38C (100.4F), Mild Pain (1 - 3)  melatonin 3 milliGRAM(s) Oral at bedtime PRN Insomnia  ondansetron Injectable 4 milliGRAM(s) IV Push every 8 hours PRN Nausea and/or Vomiting    CAPILLARY BLOOD GLUCOSE        I&O's Summary    11 Oct 2021 07:  -  12 Oct 2021 07:00  --------------------------------------------------------  IN: 600 mL / OUT: 1000 mL / NET: -400 mL    12 Oct 2021 07:01  -  12 Oct 2021 14:54  --------------------------------------------------------  IN: 120 mL / OUT: 1200 mL / NET: -1080 mL        PHYSICAL EXAM:  Vital Signs Last 24 Hrs  T(C): 36.6 (12 Oct 2021 11:46), Max: 36.9 (11 Oct 2021 18:10)  T(F): 97.9 (12 Oct 2021 11:46), Max: 98.5 (11 Oct 2021 18:10)  HR: 69 (12 Oct 2021 11:46) (63 - 86)  BP: 164/76 (12 Oct 2021 11:46) (147/62 - 177/87)  BP(mean): 94 (11 Oct 2021 18:58) (94 - 94)  RR: 16 (12 Oct 2021 11:55) (16 - 26)  SpO2: 94% (12 Oct 2021 11:55) (89% - 96%)    CONSTITUTIONAL: Mild respiratory distress, elderly female  EYES: EOMI, conjunctiva and sclera clear  NECK: Supple, JVD  RESPIRATORY: bilateral rales and wheezing present  CARDIOVASCULAR: Regular rate and rhythm, normal S1 and S2, no murmur/rub/gallop; 2+ pitting edema  ABDOMEN: normoactive bowel sounds, soft, nontender to palpation, no rebound/guarding; no distension   PSYCH: A+O to person, place, and time; affect appropriate      LABS:                        9.0    4.26  )-----------( 208      ( 12 Oct 2021 07:15 )             28.8     10-12    141  |  107  |  39<H>  ----------------------------<  125<H>  3.4<L>   |  21<L>  |  1.90<H>    Ca    9.1      12 Oct 2021 07:15  Mg     2.2     10-12    TPro  6.8  /  Alb  3.5  /  TBili  0.2  /  DBili  x   /  AST  16  /  ALT  11  /  AlkPhos  154<H>  10-12    PT/INR - ( 12 Oct 2021 07:15 )   PT: 15.5 sec;   INR: 1.31 ratio         PTT - ( 12 Oct 2021 07:15 )  PTT:40.1 sec      Urinalysis Basic - ( 12 Oct 2021 06:40 )    Color: Colorless / Appearance: Clear / S.009 / pH: x  Gluc: x / Ketone: Negative  / Bili: Negative / Urobili: Negative   Blood: x / Protein: Negative / Nitrite: Negative   Leuk Esterase: Moderate / RBC: 0 /hpf / WBC 7 /HPF   Sq Epi: x / Non Sq Epi: 1 /hpf / Bacteria: Negative          RADIOLOGY & ADDITIONAL TESTS:    Lab Results Reviewed: Cr 1.9 (CKD stage 4)- around baseline. proBNP elevated, hypokalemia    Imaging Personally Reviewed: reviewed old echo (no systolic disfunction)      COORDINATION OF CARE:  Care Discussed with Consultants/Other Providers:

## 2021-10-12 NOTE — CONSULT NOTE ADULT - SUBJECTIVE AND OBJECTIVE BOX
Patient seen and evaluated at bedside    Chief Complaint:    HPI:  85 y/o F with PMHx HTN, DM II, COPD, pHTN, Afib on Eliquis, CHF, PVD, CKD, admission for BRASH syndrome (4/2021) presenting w/ shortness of breat and worsening LE edema. The pt states yesterday AM while doing work around the house she started to feel short of breath which progressively worsened throughout the course of the day, worse when lying down. The pt's daughter checked her O2 saturation on pulse ox last night which was reportedly in the 80s prompting pt's daughter to bring her to the ED for evaluation. The pt also notes worsening b/l LE edema over the past 3 days as well. Also reporting new nonproductive cough this AM. Denies chest pain, dizziness or lightheadedness syncope. Reports intermittent palpitations d/t Afib. Of note, per admission HPI pt's daughter reports extra Lasix pills in her pill box but pt herself reports medication adherence.     On arrival to ED, pt was noted to be hypertensive 170s/80s, HR 75, RR 22, 92% on RA. Pt was given Lasix 40mg IV x 1 and admitted to floors, started on Lasix 40mg IV BID.     Cardiology consulted for management of acute CHF exacerbation.    Echo from 4/2021, nml LV systolic function (EF 70%), no segmental wall motion abnormalities, elevated PA systolic pressure (49) consistent w/ mild pulmonary HTN      PMHx:   Benign essential hypertension  Diabetes mellitus  Peripheral vascular disease  Chronic kidney disease (CKD)  COPD  Atrial Fibrillation   CHF    PSHx:   Abdominal hysterectomy  Spinal fusion  Cataract surgery    Allergies:  Levaquin (Rash)    Home Meds:    Current Medications:   acetaminophen   Tablet .. 650 milliGRAM(s) Oral every 6 hours PRN  apixaban 2.5 milliGRAM(s) Oral two times a day  ATENolol  Tablet 25 milliGRAM(s) Oral daily  cholecalciferol 1000 Unit(s) Oral two times a day  furosemide   Injectable 40 milliGRAM(s) IV Push two times a day  influenza   Vaccine 0.5 milliLiter(s) IntraMuscular once  melatonin 3 milliGRAM(s) Oral at bedtime PRN  ondansetron Injectable 4 milliGRAM(s) IV Push every 8 hours PRN    Family History:      Social History:  Smoking History: Denies  Alcohol Use: Denies  Drug Use: Denies    REVIEW OF SYSTEMS:  Constitutional:     [x ] negative [ ] fevers [ ] chills [ ] weight loss [ ] weight gain  HEENT:               [x ] negative [ ] dry eyes [ ] eye irritation [ ] postnasal drip [ ] nasal congestion  CV:                     [ ] negative  [ ] chest pain [X] orthopnea [X] palpitations [ ] murmur  Resp:                  [ ] negative [X] cough [X] shortness of breath [ ] dyspnea [ ] wheezing [ ] sputum [ ]hemoptysis  GI:                       [ x] negative [ ] nausea [ ] vomiting [ ] diarrhea [ ] constipation [ ] abd pain [ ] dysphagia   :                     [ x] negative [ ] dysuria [ ] nocturia [ ] hematuria [ ] increased urinary frequency  Musculoskeletal:   [x ] negative [ ] back pain [ ] myalgias [ ] arthralgias [ ] fracture  Skin:                    [ x] negative [ ] rash [ ] itch  Neurological:       [ x] negative [ ] headache [ ] dizziness [ ] syncope [ ] weakness [ ] numbness  Psychiatric:           [ x] negative [ ] anxiety [ ] depression  Endocrine:           [ x] negative [ ] diabetes [ ] thyroid problem  Heme/Lymph:      [ x] negative [ ] anemia [ ] bleeding problem  Allergic/Immune:  [ x] negative [ ] itchy eyes [ ] nasal discharge [ ] hives [ ] angioedema    [ x] All other systems negative      Physical Exam:  T(F): 97.6 (10-12), Max: 98.5 (10-11)  HR: 63 (10-12) (63 - 86)  BP: 170/51 (10-12) (147/62 - 177/87)  RR: 20 (10-12)  SpO2: 92% (10-12)  GENERAL: Lying upright in bed, appears in mild respiratory distress  HEENT:  EOMI, PERRLA, conjunctiva and sclera clear,   NECK: (+) JVD  CHEST/LUNG: Appears in mild respiratory distress, equal breath sounds b/l, fine crackles at bases b/l, diffuse wheezing  HEART: Irregularly irregular; No murmurs, rubs, or gallops  ABDOMEN: Soft, Nontender, Nondistended; Bowel sounds present  EXTREMITIES: B/l LE edema up to hips, lower legs in compression stockings   SKIN: B/l LE chronic venous stasis changes  PSYCH: Nl behavior, nl affect  NEUROLOGY: AAOx3, non-focal, cranial nerves intact      Cardiovascular Diagnostic Testing:    ECG: Personally reviewed:      LABS:                    9.0    4.26  )-----------( 208      ( 12 Oct 2021 07:15 )             28.8     10-12    141  |  107  |  39<H>  ----------------------------<  125<H>  3.4<L>   |  21<L>  |  1.90<H>    Ca    9.1      12 Oct 2021 07:15  Mg     2.2     10-12    TPro  6.8  /  Alb  3.5  /  TBili  0.2  /  DBili  x   /  AST  16  /  ALT  11  /  AlkPhos  154<H>  10-12    PT/INR - ( 12 Oct 2021 07:15 )   PT: 15.5 sec;   INR: 1.31 ratio    PTT - ( 12 Oct 2021 07:15 )  PTT:40.1 sec    Serum Pro-Brain Natriuretic Peptide: 2239 pg/mL (10-12 @ 07:15)  Serum Pro-Brain Natriuretic Peptide: 1760 pg/mL (10-11 @ 18:50)         Patient seen and evaluated at bedside    Chief Complaint:    HPI:  87 y/o F with PMHx HTN, DM II, COPD, pHTN, Afib on Eliquis, CHF, PVD, CKD, admission for BRASH syndrome (4/2021) presenting w/ shortness of breat and worsening LE edema. The pt states yesterday AM while doing work around the house she started to feel short of breath which progressively worsened throughout the course of the day, worse when lying down. The pt's daughter checked her O2 saturation on pulse ox last night which was reportedly in the 80s prompting pt's daughter to bring her to the ED for evaluation. The pt also notes worsening b/l LE edema over the past 3 days as well. Also reporting new nonproductive cough this AM. Denies chest pain, dizziness or lightheadedness syncope. Reports intermittent palpitations d/t Afib. Of note, per admission HPI pt's daughter reports extra Lasix pills in her pill box but pt herself reports medication adherence.     On arrival to ED, pt was noted to be hypertensive 170s/80s, HR 75, RR 22, 92% on RA. Pt was given Lasix 40mg IV x 1 and admitted to floors, started on Lasix 40mg IV BID.     Cardiology consulted for management of acute CHF exacerbation. Echo from 4/2021, nml LV systolic function (EF 70%), no segmental wall motion abnormalities, elevated PA systolic pressure (49) consistent w/ mild pulmonary HTN      PMHx:   Benign essential hypertension  Diabetes mellitus  Peripheral vascular disease  Chronic kidney disease (CKD)  COPD  Atrial Fibrillation   CHF    PSHx:   Abdominal hysterectomy  Spinal fusion  Cataract surgery    Allergies:  Levaquin (Rash)    Home Meds:  Anoro Ellipta 62.5-25 Mcg, 1 puff daily  Apixaban 2.5mg oral two times a day  Atenolol 25mg oral daily  Ferrous Sulfate 324mg oral daily  Furosemide 40mg oral daily  Senna 2 tabs oral at bedtime PRN  Vitamin B12  Vitamin D3    Current Medications:   acetaminophen   Tablet .. 650 milliGRAM(s) Oral every 6 hours PRN  apixaban 2.5 milliGRAM(s) Oral two times a day  ATENolol  Tablet 25 milliGRAM(s) Oral daily  cholecalciferol 1000 Unit(s) Oral two times a day  furosemide   Injectable 40 milliGRAM(s) IV Push two times a day  influenza   Vaccine 0.5 milliLiter(s) IntraMuscular once  melatonin 3 milliGRAM(s) Oral at bedtime PRN  ondansetron Injectable 4 milliGRAM(s) IV Push every 8 hours PRN    Family History:      Social History:  Smoking History: Denies  Alcohol Use: Denies  Drug Use: Denies    REVIEW OF SYSTEMS:  Constitutional:     [x ] negative [ ] fevers [ ] chills [ ] weight loss [ ] weight gain  HEENT:               [x ] negative [ ] dry eyes [ ] eye irritation [ ] postnasal drip [ ] nasal congestion  CV:                     [ ] negative  [ ] chest pain [X] orthopnea [X] palpitations [ ] murmur  Resp:                  [ ] negative [X] cough [X] shortness of breath [ ] dyspnea [ ] wheezing [ ] sputum [ ]hemoptysis  GI:                       [ x] negative [ ] nausea [ ] vomiting [ ] diarrhea [ ] constipation [ ] abd pain [ ] dysphagia   :                     [ x] negative [ ] dysuria [ ] nocturia [ ] hematuria [ ] increased urinary frequency  Musculoskeletal:   [x ] negative [ ] back pain [ ] myalgias [ ] arthralgias [ ] fracture  Skin:                    [ x] negative [ ] rash [ ] itch  Neurological:       [ x] negative [ ] headache [ ] dizziness [ ] syncope [ ] weakness [ ] numbness  Psychiatric:           [ x] negative [ ] anxiety [ ] depression  Endocrine:           [ x] negative [ ] diabetes [ ] thyroid problem  Heme/Lymph:      [ x] negative [ ] anemia [ ] bleeding problem  Allergic/Immune:  [ x] negative [ ] itchy eyes [ ] nasal discharge [ ] hives [ ] angioedema    [ x] All other systems negative      Physical Exam:  T(F): 97.6 (10-12), Max: 98.5 (10-11)  HR: 63 (10-12) (63 - 86)  BP: 170/51 (10-12) (147/62 - 177/87)  RR: 20 (10-12)  SpO2: 92% (10-12)  GENERAL: Lying upright in bed, appears in mild respiratory distress  HEENT:  EOMI, PERRLA, conjunctiva and sclera clear,   NECK: (+) JVD  CHEST/LUNG: Appears in mild respiratory distress, equal breath sounds b/l, fine crackles at bases b/l, diffuse wheezing  HEART: Irregularly irregular; No murmurs, rubs, or gallops  ABDOMEN: Soft, Nontender, Nondistended; Bowel sounds present  EXTREMITIES: B/l LE edema up to hips, lower legs in compression stockings   SKIN: B/l LE chronic venous stasis changes  PSYCH: Nl behavior, nl affect  NEUROLOGY: AAOx3, non-focal, cranial nerves intact      ECG: Personally reviewed:      LABS:                    9.0    4.26  )-----------( 208      ( 12 Oct 2021 07:15 )             28.8     10-12    141  |  107  |  39<H>  ----------------------------<  125<H>  3.4<L>   |  21<L>  |  1.90<H>    Ca    9.1      12 Oct 2021 07:15  Mg     2.2     10-12    TPro  6.8  /  Alb  3.5  /  TBili  0.2  /  DBili  x   /  AST  16  /  ALT  11  /  AlkPhos  154<H>  10-12    PT/INR - ( 12 Oct 2021 07:15 )   PT: 15.5 sec;   INR: 1.31 ratio    PTT - ( 12 Oct 2021 07:15 )  PTT:40.1 sec    Serum Pro-Brain Natriuretic Peptide: 2239 pg/mL (10-12 @ 07:15)  Serum Pro-Brain Natriuretic Peptide: 1760 pg/mL (10-11 @ 18:50)         Patient seen and evaluated at bedside    Chief Complaint:    HPI:  87 y/o F with PMHx HTN, DM II, COPD, pHTN, Afib on Eliquis, CHF, PVD, CKD, admission for BRASH syndrome (4/2021) presenting w/ shortness of breat and worsening LE edema. The pt states yesterday AM while doing work around the house she started to feel short of breath which progressively worsened throughout the course of the day, worse when lying down. The pt's daughter checked her O2 saturation on pulse ox last night which was reportedly in the 80s prompting pt's daughter to bring her to the ED for evaluation. The pt also notes worsening b/l LE edema over the past 3 days as well. Also reporting new nonproductive cough this AM. Denies chest pain, dizziness or lightheadedness syncope. Reports intermittent palpitations d/t Afib. Of note, per admission HPI pt's daughter reports extra Lasix pills in her pill box but pt herself reports medication adherence.     On arrival to ED, pt was noted to be hypertensive 170s/80s, HR 75, RR 22, 92% on RA. Pt was given Lasix 40mg IV x 1 and admitted to floors, started on Lasix 40mg IV BID.     Cardiology consulted for management of acute CHF exacerbation. Echo from 4/2021, nml LV systolic function (EF 70%), no segmental wall motion abnormalities, elevated PA systolic pressure (49) consistent w/ mild pulmonary HTN    PMHx:   Benign essential hypertension  Diabetes mellitus  Peripheral vascular disease  Chronic kidney disease (CKD)  COPD  Atrial Fibrillation   CHF    PSHx:   Abdominal hysterectomy  Spinal fusion  Cataract surgery    Allergies:  Levaquin (Rash)    Home Meds:  Anoro Ellipta 62.5-25 Mcg, 1 puff daily  Apixaban 2.5mg oral two times a day  Atenolol 25mg oral daily  Ferrous Sulfate 324mg oral daily  Furosemide 40mg oral daily  Senna 2 tabs oral at bedtime PRN  Vitamin B12  Vitamin D3    Current Medications:   acetaminophen   Tablet .. 650 milliGRAM(s) Oral every 6 hours PRN  apixaban 2.5 milliGRAM(s) Oral two times a day  ATENolol  Tablet 25 milliGRAM(s) Oral daily  cholecalciferol 1000 Unit(s) Oral two times a day  furosemide   Injectable 40 milliGRAM(s) IV Push two times a day  influenza   Vaccine 0.5 milliLiter(s) IntraMuscular once  melatonin 3 milliGRAM(s) Oral at bedtime PRN  ondansetron Injectable 4 milliGRAM(s) IV Push every 8 hours PRN    Family History:  noncontributory     Social History:  Smoking History: Denies  Alcohol Use: Denies  Drug Use: Denies    REVIEW OF SYSTEMS:  Constitutional:     [x ] negative [ ] fevers [ ] chills [ ] weight loss [ ] weight gain  HEENT:               [x ] negative [ ] dry eyes [ ] eye irritation [ ] postnasal drip [ ] nasal congestion  CV:                     [ ] negative  [ ] chest pain [X] orthopnea [X] palpitations [ ] murmur  Resp:                  [ ] negative [X] cough [X] shortness of breath [ ] dyspnea [ ] wheezing [ ] sputum [ ]hemoptysis  GI:                       [ x] negative [ ] nausea [ ] vomiting [ ] diarrhea [ ] constipation [ ] abd pain [ ] dysphagia   :                     [ x] negative [ ] dysuria [ ] nocturia [ ] hematuria [ ] increased urinary frequency  Musculoskeletal:   [x ] negative [ ] back pain [ ] myalgias [ ] arthralgias [ ] fracture  Skin:                    [ x] negative [ ] rash [ ] itch  Neurological:       [ x] negative [ ] headache [ ] dizziness [ ] syncope [ ] weakness [ ] numbness  Psychiatric:           [ x] negative [ ] anxiety [ ] depression  Endocrine:           [ x] negative [ ] diabetes [ ] thyroid problem  Heme/Lymph:      [ x] negative [ ] anemia [ ] bleeding problem  Allergic/Immune:  [ x] negative [ ] itchy eyes [ ] nasal discharge [ ] hives [ ] angioedema    [ x] All other systems negative    Physical Exam:  T(F): 97.6 (10-12), Max: 98.5 (10-11)  HR: 63 (10-12) (63 - 86)  BP: 170/51 (10-12) (147/62 - 177/87)  RR: 20 (10-12)  SpO2: 92% (10-12)    GENERAL: Lying upright in bed, appears in mild respiratory distress  HEENT: conjunctiva and sclera clear,   NECK: (+) JVD  CHEST/LUNG: Appears in mild respiratory distress, equal breath sounds b/l, fine crackles at bases b/l, diffuse wheezing  HEART: Irregularly irregular; No murmurs, rubs, or gallops  ABDOMEN: Soft, Nontender, Nondistended; Bowel sounds present  EXTREMITIES: B/l LE edema up to hips, lower legs in compression stockings   SKIN: B/l LE chronic venous stasis changes  PSYCH: Nl behavior, nl affect  NEUROLOGY: AAOx3, non-focal    ECG: Personally reviewed:    LABS:                    9.0    4.26  )-----------( 208      ( 12 Oct 2021 07:15 )             28.8     10-12    141  |  107  |  39<H>  ----------------------------<  125<H>  3.4<L>   |  21<L>  |  1.90<H>    Ca    9.1      12 Oct 2021 07:15  Mg     2.2     10-12    TPro  6.8  /  Alb  3.5  /  TBili  0.2  /  DBili  x   /  AST  16  /  ALT  11  /  AlkPhos  154<H>  10-12    PT/INR - ( 12 Oct 2021 07:15 )   PT: 15.5 sec;   INR: 1.31 ratio    PTT - ( 12 Oct 2021 07:15 )  PTT:40.1 sec    Serum Pro-Brain Natriuretic Peptide: 2239 pg/mL (10-12 @ 07:15)  Serum Pro-Brain Natriuretic Peptide: 1760 pg/mL (10-11 @ 18:50)

## 2021-10-12 NOTE — PROGRESS NOTE ADULT - SUBJECTIVE AND OBJECTIVE BOX
[facility] Division of Hospital Medicine  Valentin Allne MD, MICHAEL  I'm reachable on [facility] Teams   Off hours:  [phone] ([facility] pager)    Patient is a 86y old  Female who presents with a chief complaint of Shortness of breath (12 Oct 2021 14:54)      SUBJECTIVE / OVERNIGHT EVENTS:  No events overnight. This morning she was complaining of acute dyspnea after having done standing weights. Improving now that she's back in bed.     MEDICATIONS  (STANDING):  apixaban 2.5 milliGRAM(s) Oral two times a day  ATENolol  Tablet 25 milliGRAM(s) Oral daily  cholecalciferol 1000 Unit(s) Oral two times a day  furosemide   Injectable 40 milliGRAM(s) IV Push two times a day  influenza   Vaccine 0.5 milliLiter(s) IntraMuscular once  potassium chloride    Tablet ER 40 milliEquivalent(s) Oral once    MEDICATIONS  (PRN):  acetaminophen   Tablet .. 650 milliGRAM(s) Oral every 6 hours PRN Temp greater or equal to 38C (100.4F), Mild Pain (1 - 3)  melatonin 3 milliGRAM(s) Oral at bedtime PRN Insomnia  ondansetron Injectable 4 milliGRAM(s) IV Push every 8 hours PRN Nausea and/or Vomiting    CAPILLARY BLOOD GLUCOSE        I&O's Summary    11 Oct 2021 07:  -  12 Oct 2021 07:00  --------------------------------------------------------  IN: 600 mL / OUT: 1000 mL / NET: -400 mL    12 Oct 2021 07:01  -  12 Oct 2021 15:17  --------------------------------------------------------  IN: 120 mL / OUT: 1200 mL / NET: -1080 mL        PHYSICAL EXAM:  Vital Signs Last 24 Hrs  T(C): 36.6 (12 Oct 2021 11:46), Max: 36.9 (11 Oct 2021 18:10)  T(F): 97.9 (12 Oct 2021 11:46), Max: 98.5 (11 Oct 2021 18:10)  HR: 69 (12 Oct 2021 11:46) (63 - 86)  BP: 164/76 (12 Oct 2021 11:46) (147/62 - 177/87)  BP(mean): 94 (11 Oct 2021 18:58) (94 - 94)  RR: 16 (12 Oct 2021 11:55) (16 - 26)  SpO2: 94% (12 Oct 2021 11:55) (89% - 96%)    CONSTITUTIONAL: mild respiratory distress, elderly female  EYES: EOMI, conjunctiva and sclera clear  ENMT: Moist oral mucosa  NECK: Supple, +JVD  RESPIRATORY: bilateral rales with scant wheezing  CARDIOVASCULAR: Regular rate and rhythm, normal S1 and S2, no murmur/rub/gallop; 2+ pitting edema  ABDOMEN: normoactive bowel sounds, soft, nontender to palpation, no rebound/guarding; no distension   MUSCULOSKELETAL:  Normal gait; no clubbing or cyanosis of digits; no joint swelling or tenderness to palpation  PSYCH: A+O to person, place, and time; affect appropriate      LABS:                        9.0    4.26  )-----------( 208      ( 12 Oct 2021 07:15 )             28.8     10-12    141  |  107  |  39<H>  ----------------------------<  125<H>  3.4<L>   |  21<L>  |  1.90<H>    Ca    9.1      12 Oct 2021 07:15  Mg     2.2     10-12    TPro  6.8  /  Alb  3.5  /  TBili  0.2  /  DBili  x   /  AST  16  /  ALT  11  /  AlkPhos  154<H>  10-12    PT/INR - ( 12 Oct 2021 07:15 )   PT: 15.5 sec;   INR: 1.31 ratio    PTT - ( 12 Oct 2021 07:15 )  PTT:40.1 sec    Urinalysis Basic - ( 12 Oct 2021 06:40 )    Color: Colorless / Appearance: Clear / S.009 / pH: x  Gluc: x / Ketone: Negative  / Bili: Negative / Urobili: Negative   Blood: x / Protein: Negative / Nitrite: Negative   Leuk Esterase: Moderate / RBC: 0 /hpf / WBC 7 /HPF   Sq Epi: x / Non Sq Epi: 1 /hpf / Bacteria: Negative          RADIOLOGY & ADDITIONAL TESTS:    Lab Results Reviewed: mild anemia (macrocytic), Cr 1.9 (stable baseline, CKD stage 4), hypokalemia increased proBNP

## 2021-10-12 NOTE — CONSULT NOTE ADULT - ASSESSMENT
87 y/o F with PMHx HTN, DM II, COPD, pHTN, Afib on Eliquis, CHF, PVD, CKD, admission for BRASH syndrome (4/2021) presenting w/ shortness of breat and worsening LE edema    RECS: 85 y/o F with PMHx HTN, DM II, COPD, pHTN, Afib on Eliquis, CHF, PVD, CKD, admission for BRASH syndrome (4/2021) presenting w/ shortness of breath and worsening LE edema. Appears in mild respiratory distress w/ elevated BNP but not clearly volume overloaded on exam and imaging. Cardiology consulted for guidance regarding possible acute heart failure exacerbation.    RECS:    #1: Heart failure   Echo 4/2021 nml LV systolic function (EF 70%), no segmental wall motion abnormalities  -Continue monitoring on telemetry  -Maintain K >4, Mg >2, Phos >3  -Continue w/ Lasix 40mg IV BID, titrate as necessary for goal UOP neg 500-1000cc  -Strict IOs and daily weights  -Repeat echo  -Consider RHC to further assess hemodynamic status and pressures    #2: Afib  -Continue monitoring on telemetry  -C/w home Eliquis 2.5mg PO daily  -C/w home Atenolol 25mg PO daily           87 y/o F with PMHx HTN, DM II, COPD, pHTN, Afib on Eliquis, CHF, PVD, CKD, admission for BRASH syndrome (4/2021) presenting w/ shortness of breath and worsening LE edema. Appears in mild respiratory distress w/ elevated BNP but not clearly volume overloaded on exam and imaging. Cardiology consulted for guidance regarding possible acute heart failure exacerbation.    RECS:    #1: Heart failure   Echo 4/2021 nml LV systolic function (EF 70%), no segmental wall motion abnormalities  -Continue monitoring on telemetry  -Maintain K >4, Mg >2, Phos >3  -Continue w/ Lasix 40mg IV BID, titrate as necessary for goal UOP neg 500-1000cc  -Strict IOs and daily weights  -Repeat echo  -Pending results of the above, consider RHC to further assess hemodynamic status and cardiac pressures    #2: Afib  -Continue monitoring on telemetry  -C/w home Eliquis 2.5mg PO daily  -C/w home Atenolol 25mg PO daily    #3: HTN   -C/w home Atenolol 25mg PO daily as above  -Can add Hydralazine 10mg TID, titrate as necessary to maintain stable BP

## 2021-10-13 LAB
A1C WITH ESTIMATED AVERAGE GLUCOSE RESULT: 6.4 % — HIGH (ref 4–5.6)
ANION GAP SERPL CALC-SCNC: 11 MMOL/L — SIGNIFICANT CHANGE UP (ref 5–17)
BASOPHILS # BLD AUTO: 0.04 K/UL — SIGNIFICANT CHANGE UP (ref 0–0.2)
BASOPHILS NFR BLD AUTO: 0.8 % — SIGNIFICANT CHANGE UP (ref 0–2)
BUN SERPL-MCNC: 39 MG/DL — HIGH (ref 7–23)
CALCIUM SERPL-MCNC: 9.2 MG/DL — SIGNIFICANT CHANGE UP (ref 8.4–10.5)
CHLORIDE SERPL-SCNC: 108 MMOL/L — SIGNIFICANT CHANGE UP (ref 96–108)
CO2 SERPL-SCNC: 22 MMOL/L — SIGNIFICANT CHANGE UP (ref 22–31)
CREAT SERPL-MCNC: 1.94 MG/DL — HIGH (ref 0.5–1.3)
EOSINOPHIL # BLD AUTO: 0.23 K/UL — SIGNIFICANT CHANGE UP (ref 0–0.5)
EOSINOPHIL NFR BLD AUTO: 4.4 % — SIGNIFICANT CHANGE UP (ref 0–6)
ESTIMATED AVERAGE GLUCOSE: 137 MG/DL — HIGH (ref 68–114)
FOLATE SERPL-MCNC: 14.2 NG/ML — SIGNIFICANT CHANGE UP
GLUCOSE BLDC GLUCOMTR-MCNC: 116 MG/DL — HIGH (ref 70–99)
GLUCOSE BLDC GLUCOMTR-MCNC: 194 MG/DL — HIGH (ref 70–99)
GLUCOSE BLDC GLUCOMTR-MCNC: 199 MG/DL — HIGH (ref 70–99)
GLUCOSE BLDC GLUCOMTR-MCNC: 223 MG/DL — HIGH (ref 70–99)
GLUCOSE SERPL-MCNC: 99 MG/DL — SIGNIFICANT CHANGE UP (ref 70–99)
HCT VFR BLD CALC: 31.3 % — LOW (ref 34.5–45)
HGB BLD-MCNC: 10.1 G/DL — LOW (ref 11.5–15.5)
IMM GRANULOCYTES NFR BLD AUTO: 0.2 % — SIGNIFICANT CHANGE UP (ref 0–1.5)
LYMPHOCYTES # BLD AUTO: 1.45 K/UL — SIGNIFICANT CHANGE UP (ref 1–3.3)
LYMPHOCYTES # BLD AUTO: 27.5 % — SIGNIFICANT CHANGE UP (ref 13–44)
MCHC RBC-ENTMCNC: 32.3 GM/DL — SIGNIFICANT CHANGE UP (ref 32–36)
MCHC RBC-ENTMCNC: 33.1 PG — SIGNIFICANT CHANGE UP (ref 27–34)
MCV RBC AUTO: 102.6 FL — HIGH (ref 80–100)
MONOCYTES # BLD AUTO: 0.72 K/UL — SIGNIFICANT CHANGE UP (ref 0–0.9)
MONOCYTES NFR BLD AUTO: 13.7 % — SIGNIFICANT CHANGE UP (ref 2–14)
NEUTROPHILS # BLD AUTO: 2.82 K/UL — SIGNIFICANT CHANGE UP (ref 1.8–7.4)
NEUTROPHILS NFR BLD AUTO: 53.4 % — SIGNIFICANT CHANGE UP (ref 43–77)
NRBC # BLD: 0 /100 WBCS — SIGNIFICANT CHANGE UP (ref 0–0)
PLATELET # BLD AUTO: 213 K/UL — SIGNIFICANT CHANGE UP (ref 150–400)
POTASSIUM SERPL-MCNC: 3.9 MMOL/L — SIGNIFICANT CHANGE UP (ref 3.5–5.3)
POTASSIUM SERPL-SCNC: 3.9 MMOL/L — SIGNIFICANT CHANGE UP (ref 3.5–5.3)
RBC # BLD: 3.05 M/UL — LOW (ref 3.8–5.2)
RBC # FLD: 13.3 % — SIGNIFICANT CHANGE UP (ref 10.3–14.5)
SODIUM SERPL-SCNC: 141 MMOL/L — SIGNIFICANT CHANGE UP (ref 135–145)
TSH SERPL-MCNC: 2.8 UIU/ML — SIGNIFICANT CHANGE UP (ref 0.27–4.2)
VIT B12 SERPL-MCNC: 397 PG/ML — SIGNIFICANT CHANGE UP (ref 232–1245)
WBC # BLD: 5.27 K/UL — SIGNIFICANT CHANGE UP (ref 3.8–10.5)
WBC # FLD AUTO: 5.27 K/UL — SIGNIFICANT CHANGE UP (ref 3.8–10.5)

## 2021-10-13 PROCEDURE — 99233 SBSQ HOSP IP/OBS HIGH 50: CPT

## 2021-10-13 RX ORDER — HYDRALAZINE HCL 50 MG
50 TABLET ORAL EVERY 8 HOURS
Refills: 0 | Status: DISCONTINUED | OUTPATIENT
Start: 2021-10-13 | End: 2021-10-14

## 2021-10-13 RX ADMIN — Medication 25 MILLIGRAM(S): at 11:40

## 2021-10-13 RX ADMIN — ATENOLOL 25 MILLIGRAM(S): 25 TABLET ORAL at 06:47

## 2021-10-13 RX ADMIN — Medication 2: at 11:55

## 2021-10-13 RX ADMIN — Medication 1000 UNIT(S): at 17:10

## 2021-10-13 RX ADMIN — Medication 40 MILLIGRAM(S): at 17:10

## 2021-10-13 RX ADMIN — Medication 1000 UNIT(S): at 06:47

## 2021-10-13 RX ADMIN — Medication 25 MILLIGRAM(S): at 06:49

## 2021-10-13 RX ADMIN — Medication 0: at 21:38

## 2021-10-13 RX ADMIN — APIXABAN 2.5 MILLIGRAM(S): 2.5 TABLET, FILM COATED ORAL at 06:49

## 2021-10-13 RX ADMIN — Medication 2: at 17:04

## 2021-10-13 RX ADMIN — Medication 40 MILLIGRAM(S): at 06:46

## 2021-10-13 RX ADMIN — Medication 50 MILLIGRAM(S): at 21:31

## 2021-10-13 RX ADMIN — APIXABAN 2.5 MILLIGRAM(S): 2.5 TABLET, FILM COATED ORAL at 17:10

## 2021-10-13 NOTE — PROGRESS NOTE ADULT - SUBJECTIVE AND OBJECTIVE BOX
Patient seen and examined at bedside.    OVERNIGHT EVENTS: Overnight pt noted to be in worsening respiratory distress w/ desaturations to 80s on NC, placed on NRB w/ persistent difficulty breathing. CICU consulted for acute hypoxic respiratory failure, patient placed on BiPAP. Given additional Lasix 40mg IVP, Hydralazine also increased to 25mg q8hr given BPs in systolic 180s. This AM pt still on BiPAP, breathing feels better than last night. No chest pain, palpiations.            VITALS:  T(F): 97.6 (10-13), Max: 97.9 (10-12)  HR: 69 (10-13) (65 - 83)  BP: 161/64 (10-13) (161/64 - 188/97)  RR: 16 (10-13)  SpO2: 96% (10-13)  I&O's Summary    12 Oct 2021 07:01  -  13 Oct 2021 07:00  --------------------------------------------------------  IN: 360 mL / OUT: 3900 mL / NET: -3540 mL      PHYSICAL EXAM:  GENERAL: Lying upright in bed, in BiPAP  HEENT:  EOMI, PERRLA, conjunctiva and sclera clear,   NECK: (+) JVD  CHEST/LUNG: Increased WOB, fine bibasilar crackles, no wheezes  HEART: RRR; No murmurs, rubs, or gallops  ABDOMEN: Soft, Nontender, Nondistended; Bowel sounds present  EXTREMITIES: B/l LE edema up to hips, lower legs in compression stockings   SKIN: B/l LE chronic venous stasis changes  PSYCH: Nl behavior, nl affect  NEUROLOGY: AAOx3, non-focal, cranial nerves intact    LABS:                        10.1   5.27  )-----------( 213      ( 13 Oct 2021 06:18 )             31.3     10-13    141  |  108  |  39<H>  ----------------------------<  99  3.9   |  22  |  1.94<H>    Ca    9.2      13 Oct 2021 06:20  Mg     2.2     10-12    TPro  6.8  /  Alb  3.5  /  TBili  0.2  /  DBili  x   /  AST  16  /  ALT  11  /  AlkPhos  154<H>  10-12    PT/INR - ( 12 Oct 2021 07:15 )   PT: 15.5 sec;   INR: 1.31 ratio         PTT - ( 12 Oct 2021 07:15 )  PTT:40.1 sec  CARDIAC MARKERS ( 11 Oct 2021 18:50 )  27 ng/L / x     / x     / x     / x     / x          Serum Pro-Brain Natriuretic Peptide: 2239 pg/mL (10-12 @ 07:15)  Serum Pro-Brain Natriuretic Peptide: 1760 pg/mL (10-11 @ 18:50)      Echo: Pending  Interpretation of Telemetry: Sinus 60s, occasional PACs and atrial trigeminy     CURRENT MEDS:  acetaminophen   Tablet .. 650 milliGRAM(s) Oral every 6 hours PRN  apixaban 2.5 milliGRAM(s) Oral two times a day  ATENolol  Tablet 25 milliGRAM(s) Oral daily  cholecalciferol 1000 Unit(s) Oral two times a day  dextrose 40% Gel 15 Gram(s) Oral once  dextrose 5%. 1000 milliLiter(s) IV Continuous <Continuous>  dextrose 5%. 1000 milliLiter(s) IV Continuous <Continuous>  dextrose 50% Injectable 25 Gram(s) IV Push once  dextrose 50% Injectable 12.5 Gram(s) IV Push once  dextrose 50% Injectable 25 Gram(s) IV Push once  furosemide   Injectable 40 milliGRAM(s) IV Push two times a day  glucagon  Injectable 1 milliGRAM(s) IntraMuscular once  hydrALAZINE 25 milliGRAM(s) Oral every 8 hours  influenza   Vaccine 0.5 milliLiter(s) IntraMuscular once  insulin lispro (ADMELOG) corrective regimen sliding scale   SubCutaneous three times a day before meals  insulin lispro (ADMELOG) corrective regimen sliding scale   SubCutaneous at bedtime  melatonin 3 milliGRAM(s) Oral at bedtime PRN  ondansetron Injectable 4 milliGRAM(s) IV Push every 8 hours PRN     Patient seen and examined at bedside.    OVERNIGHT EVENTS: Overnight pt noted to be in worsening respiratory distress w/ desaturations to 80s on NC, placed on NRB w/ persistent difficulty breathing. CICU consulted for acute hypoxic respiratory failure, patient placed on BiPAP. Given additional Lasix 40mg IVP, Hydralazine also increased to 25mg q8hr given BPs in systolic 180s. This AM pt still on BiPAP, breathing feels better than last night. No chest pain, palpiations.    VITALS:  T(F): 97.6 (10-13), Max: 97.9 (10-12)  HR: 69 (10-13) (65 - 83)  BP: 161/64 (10-13) (161/64 - 188/97)  RR: 16 (10-13)  SpO2: 96% (10-13)  I&O's Summary    12 Oct 2021 07:01  -  13 Oct 2021 07:00  --------------------------------------------------------  IN: 360 mL / OUT: 3900 mL / NET: -3540 mL      PHYSICAL EXAM:  GENERAL: Lying upright in bed, in BiPAP  HEENT:  EOMI, PERRLA, conjunctiva and sclera clear,   NECK: (+) JVD  CHEST/LUNG: Increased WOB, fine bibasilar crackles, no wheezes  HEART: RRR; No murmurs, rubs, or gallops  ABDOMEN: Soft, Nontender, Nondistended; Bowel sounds present  EXTREMITIES: B/l LE edema up to hips, lower legs in compression stockings   SKIN: B/l LE chronic venous stasis changes  PSYCH: Nl behavior, nl affect  NEUROLOGY: AAOx3, non-focal, cranial nerves intact    LABS:                        10.1   5.27  )-----------( 213      ( 13 Oct 2021 06:18 )             31.3     10-13    141  |  108  |  39<H>  ----------------------------<  99  3.9   |  22  |  1.94<H>    Ca    9.2      13 Oct 2021 06:20  Mg     2.2     10-12    TPro  6.8  /  Alb  3.5  /  TBili  0.2  /  DBili  x   /  AST  16  /  ALT  11  /  AlkPhos  154<H>  10-12    PT/INR - ( 12 Oct 2021 07:15 )   PT: 15.5 sec;   INR: 1.31 ratio         PTT - ( 12 Oct 2021 07:15 )  PTT:40.1 sec  CARDIAC MARKERS ( 11 Oct 2021 18:50 )  27 ng/L / x     / x     / x     / x     / x          Serum Pro-Brain Natriuretic Peptide: 2239 pg/mL (10-12 @ 07:15)  Serum Pro-Brain Natriuretic Peptide: 1760 pg/mL (10-11 @ 18:50)      Echo: Pending  Interpretation of Telemetry: Sinus 60s, occasional PACs and atrial trigeminy     CURRENT MEDS:  acetaminophen   Tablet .. 650 milliGRAM(s) Oral every 6 hours PRN  apixaban 2.5 milliGRAM(s) Oral two times a day  ATENolol  Tablet 25 milliGRAM(s) Oral daily  cholecalciferol 1000 Unit(s) Oral two times a day  dextrose 40% Gel 15 Gram(s) Oral once  dextrose 5%. 1000 milliLiter(s) IV Continuous <Continuous>  dextrose 5%. 1000 milliLiter(s) IV Continuous <Continuous>  dextrose 50% Injectable 25 Gram(s) IV Push once  dextrose 50% Injectable 12.5 Gram(s) IV Push once  dextrose 50% Injectable 25 Gram(s) IV Push once  furosemide   Injectable 40 milliGRAM(s) IV Push two times a day  glucagon  Injectable 1 milliGRAM(s) IntraMuscular once  hydrALAZINE 25 milliGRAM(s) Oral every 8 hours  influenza   Vaccine 0.5 milliLiter(s) IntraMuscular once  insulin lispro (ADMELOG) corrective regimen sliding scale   SubCutaneous three times a day before meals  insulin lispro (ADMELOG) corrective regimen sliding scale   SubCutaneous at bedtime  melatonin 3 milliGRAM(s) Oral at bedtime PRN  ondansetron Injectable 4 milliGRAM(s) IV Push every 8 hours PRN

## 2021-10-13 NOTE — PHYSICAL THERAPY INITIAL EVALUATION ADULT - WEIGHT-BEARING RESTRICTIONS: SIT/STAND, REHAB EVAL
"Called Kerri. Kerri states that pt was having increase SOB yesterday but it is better today. Her legs \"are swollen. I took off her wraps and she has an open area on her R foot that is draining a little serous fluid.   Pt will be getting iron infusions monthly, next on is 6/15 and they will check HGB then. HGB checks will be changed from monthly to every 3 weeks. Kerri instructed her to take an extra torsemide, 20mg for total of 40mg, this afternoon.    **Pt is prescribed torsemide 40 mg in morning and 20 mg in afternoon. She is also taking Spironolactone 50 mg daily.     Spoke to pt and daughter. Wt is 145 lbs today (window set at 137-148 lbs). Breathing is at baseline today. Legs are swollen. She is not elevating. Instructed her to elevate legs and explained why. Reminded her to call in MHT especially if she has SOB, wt gain or other sx.   Spoke to daughter. She asked if pt should take extra torsemide this wkend as her legs \"are quite swollen.\" I advised her to have pt to elevate legs as she tends not to.   Pt has missed her morning torsemide (40 mg) and spironolactone (50 mg) Monday and Wednesday as she had morning appts. Pt has only been taking torsemide 20 mg in morning and 20 mg in afternoon. Did let Kerri know she should be taking 40 mg in morning. Pt did take her afternoon 20 mg of torsemide. Did let daughter know pt can take spironolactone after she is back from her appts.     Will review with Suad about plan for wkend. Kerri and daughter both asked if pt should ave a diuretic plan when she gets her iron infusions.   Charity Garcia, RN 2:21 PM 06/12/20    " full weight-bearing

## 2021-10-13 NOTE — PHYSICAL THERAPY INITIAL EVALUATION ADULT - ADDITIONAL COMMENTS
pt lives with daughter Elizabeth in house with 15 steps to enter with HR ; pt uses Rolling walker or cane to amb PTA mostly Rolling walker per pt  ; pt owns a fxl glucometer and is independent in its use ; dtr Elizabeth assist with personal care and adl's ; jeaneth Szymanski ID as caregiver [phone]; pt has a walk-in shower with shower seat and 2 grab bars Pt lives with daughter Elizabeth in house with 15 steps to enter with HR; pt uses Rolling walker or cane to amb PTA mostly RW, required assistance from daughter for bathing.

## 2021-10-13 NOTE — PHYSICAL THERAPY INITIAL EVALUATION ADULT - PATIENT/FAMILY AGREES WITH PLAN
Arterial Line  Reason for Procedure: hemodynamic monitoring  Patient location during procedure: Pre-op  Start time: 8/7/2020 12:15 PM  End time: 8/7/2020 12:19 PM  Staffing:  Performing  Anesthesiologist: Shant Suarez MD  Sterile Precautions:  sterile barriers used during insertion: cap, mask, sterile gloves, large sheet, and hand hygiene used.  Arterial Line:   Immediately prior to procedure a time out was called to verify the correct patient, procedure, equipment, support staff and site/side marked as required  Laterality: left  Location: radial  Prepped with: ChloroPrep    Needle gauge: 20 G  Number of Attempts: 1  Secured with: tape, transparent dressing and pressure dressing  Flushed with: saline  1% lidocaine local anesthesia used for skin prep.   See MAR for additional medications given.  Ultrasound evaluation of access site: yes  Vessel patent by US exam    Concurrent real time visualization of needle entry    Permanent ultrasound image captured           yes

## 2021-10-13 NOTE — PHYSICAL THERAPY INITIAL EVALUATION ADULT - PERTINENT HX OF CURRENT PROBLEM, REHAB EVAL
87 y/o F with PMHx HTN, DM II, COPD, pHTN, Afib on Eliquis, CHF, PVD, CKD, admission for BRASH syndrome (4/2021) presenting w/ shortness of breath and worsening LE edema. Cardiology consulted for acute heart failure exacerbation.

## 2021-10-13 NOTE — PROGRESS NOTE ADULT - SUBJECTIVE AND OBJECTIVE BOX
[facility] Division of Hospital Medicine  Valentin Allen MD, MICHAEL  I'm reachable on [employer] Teams   Off hours:  [phone] ([facility] pager)    Patient is a 86y old  Female who presents with a chief complaint of Shortness of breath (13 Oct 2021 08:34)      SUBJECTIVE / OVERNIGHT EVENTS:  No events overnight. Overnight remained stable on BiPAP. This morning tolerated coming off BiPAP to NC.     MEDICATIONS  (STANDING):  apixaban 2.5 milliGRAM(s) Oral two times a day  ATENolol  Tablet 25 milliGRAM(s) Oral daily  cholecalciferol 1000 Unit(s) Oral two times a day  dextrose 40% Gel 15 Gram(s) Oral once  dextrose 5%. 1000 milliLiter(s) (50 mL/Hr) IV Continuous <Continuous>  dextrose 5%. 1000 milliLiter(s) (100 mL/Hr) IV Continuous <Continuous>  dextrose 50% Injectable 25 Gram(s) IV Push once  dextrose 50% Injectable 12.5 Gram(s) IV Push once  dextrose 50% Injectable 25 Gram(s) IV Push once  furosemide   Injectable 40 milliGRAM(s) IV Push two times a day  glucagon  Injectable 1 milliGRAM(s) IntraMuscular once  hydrALAZINE 50 milliGRAM(s) Oral every 8 hours  influenza   Vaccine 0.5 milliLiter(s) IntraMuscular once  insulin lispro (ADMELOG) corrective regimen sliding scale   SubCutaneous three times a day before meals  insulin lispro (ADMELOG) corrective regimen sliding scale   SubCutaneous at bedtime    MEDICATIONS  (PRN):  acetaminophen   Tablet .. 650 milliGRAM(s) Oral every 6 hours PRN Temp greater or equal to 38C (100.4F), Mild Pain (1 - 3)  melatonin 3 milliGRAM(s) Oral at bedtime PRN Insomnia  ondansetron Injectable 4 milliGRAM(s) IV Push every 8 hours PRN Nausea and/or Vomiting    CAPILLARY BLOOD GLUCOSE      POCT Blood Glucose.: 194 mg/dL (13 Oct 2021 11:49)  POCT Blood Glucose.: 116 mg/dL (13 Oct 2021 07:55)  POCT Blood Glucose.: 142 mg/dL (12 Oct 2021 21:07)  POCT Blood Glucose.: 195 mg/dL (12 Oct 2021 16:39)    I&O's Summary    12 Oct 2021 07:01  -  13 Oct 2021 07:00  --------------------------------------------------------  IN: 360 mL / OUT: 3900 mL / NET: -3540 mL    13 Oct 2021 07:01  -  13 Oct 2021 15:18  --------------------------------------------------------  IN: 240 mL / OUT: 500 mL / NET: -260 mL        PHYSICAL EXAM:  Vital Signs Last 24 Hrs  T(C): 36.5 (13 Oct 2021 10:57), Max: 36.6 (12 Oct 2021 20:56)  T(F): 97.7 (13 Oct 2021 10:57), Max: 97.9 (12 Oct 2021 20:56)  HR: 78 (13 Oct 2021 14:25) (65 - 83)  BP: 161/72 (13 Oct 2021 10:57) (161/64 - 188/97)  BP(mean): --  RR: 18 (13 Oct 2021 10:57) (16 - 18)  SpO2: 93% (13 Oct 2021 14:25) (85% - 100%)    CONSTITUTIONAL: no respiratory distress on BiPAP, elderly female  EYES: EOMI, conjunctiva and sclera clear  ENMT: Moist oral mucosa  NECK: Supple  RESPIRATORY: bilateral rales mid way up the lung fields bilaterally, no wheezing  CARDIOVASCULAR: Regular rate and rhythm, normal S1 and S2, no murmur/rub/gallop; 2+ pitting edema  ABDOMEN: normoactive bowel sounds, soft, nontender to palpation, no rebound/guarding; no distension   MUSCULOSKELETAL:  Normal gait; no clubbing or cyanosis of digits; no joint swelling or tenderness to palpation  PSYCH: A+O to person, place, and time; affect appropriate      LABS:                        10.1   5.27  )-----------( 213      ( 13 Oct 2021 06:18 )             31.3     10-13    141  |  108  |  39<H>  ----------------------------<  99  3.9   |  22  |  1.94<H>    Ca    9.2      13 Oct 2021 06:20  Mg     2.2     10-12    TPro  6.8  /  Alb  3.5  /  TBili  0.2  /  DBili  x   /  AST  16  /  ALT  11  /  AlkPhos  154<H>  10-12    PT/INR - ( 12 Oct 2021 07:15 )   PT: 15.5 sec;   INR: 1.31 ratio         PTT - ( 12 Oct 2021 07:15 )  PTT:40.1 sec      Urinalysis Basic - ( 12 Oct 2021 06:40 )    Color: Colorless / Appearance: Clear / S.009 / pH: x  Gluc: x / Ketone: Negative  / Bili: Negative / Urobili: Negative   Blood: x / Protein: Negative / Nitrite: Negative   Leuk Esterase: Moderate / RBC: 0 /hpf / WBC 7 /HPF   Sq Epi: x / Non Sq Epi: 1 /hpf / Bacteria: Negative          RADIOLOGY & ADDITIONAL TESTS:    Lab Results Reviewed: Cr stable

## 2021-10-13 NOTE — PROGRESS NOTE ADULT - ASSESSMENT
*********UPDATED PLAN TO FOLLOW***********    85 y/o F with PMHx HTN, DM II, COPD, pHTN, Afib on Eliquis, CHF, PVD, CKD, admission for BRASH syndrome (4/2021) presenting w/ shortness of breath and worsening LE edema. Cardiology consulted for acute heart failure exacerbation.    RECS:    #1: Heart failure   Echo 4/2021 nml LV systolic function (EF 70%), no segmental wall motion abnormalities  -Continue monitoring on telemetry  -Maintain K >4, Mg >2, Phos >3  -Continue w/ Lasix 40mg IV BID, titrate as necessary for goal UOP neg 500-1000cc  -Strict IOs and daily weights  -Repeat echo pending  -Pending results of the above, consider RHC to further assess hemodynamic status and cardiac pressures    #2: Afib  -Continue monitoring on telemetry  -C/w home Eliquis 2.5mg PO daily  -C/w home Atenolol 25mg PO daily    #3: HTN   -C/w home Atenolol 25mg PO daily as above  -C/w Hydralazine 25mg TID, titrate as necessary to maintain normotensive BP *********PLAN DISCUSSED WITH ATTENDING, AWAITING ATTESTATION***********    87 y/o F with PMHx HTN, DM II, COPD, pHTN, Afib on Eliquis, CHF, PVD, CKD, admission for BRASH syndrome (4/2021) presenting w/ shortness of breath and worsening LE edema. Cardiology consulted for acute heart failure exacerbation.    RECS:    #1: Heart failure   Echo 4/2021 nml LV systolic function (EF 70%), no segmental wall motion abnormalities  -Continue monitoring on telemetry  -Maintain K >4, Mg >2, Phos >3  -Continue w/ Lasix 40mg IV BID, titrate as necessary for goal UOP neg 500-1000cc  -Strict IOs and daily weights  -Repeat echo pending  -Pending results of the above, consider RHC to further assess hemodynamic status and cardiac pressures    #2: Afib  -Continue monitoring on telemetry  -C/w home Eliquis 2.5mg PO daily  -C/w home Atenolol 25mg PO daily    #3: HTN   Hypertensive to systolic 160s here  -C/w home Atenolol 25mg PO daily as above  -Can increase to Hydralazine 50mg TID, titrate as necessary to maintain normotensive BP *********PLAN DISCUSSED WITH ATTENDING, ATTESTATION TO FOLLOW***********    87 y/o F with PMHx HTN, DM II, COPD, pHTN, Afib on Eliquis, CHF, PVD, CKD, admission for BRASH syndrome (4/2021) presenting w/ shortness of breath and worsening LE edema. Cardiology consulted for acute heart failure exacerbation.    RECS:    #1: Heart failure   Echo 4/2021 nml LV systolic function (EF 70%), no segmental wall motion abnormalities  -Continue monitoring on telemetry  -Maintain K >4, Mg >2, Phos >3  -Continue w/ Lasix 40mg IV BID, titrate as necessary for goal UOP neg 500-1000cc  -Strict IOs and daily weights  -Repeat echo pending  -Pending results of the above, consider RHC to further assess hemodynamic status and cardiac pressures    #2: Afib  -Continue monitoring on telemetry  -C/w home Eliquis 2.5mg PO daily  -C/w home Atenolol 25mg PO daily    #3: HTN   Hypertensive to systolic 160s here  -C/w home Atenolol 25mg PO daily as above  -Can increase to Hydralazine 50mg TID, titrate as necessary to maintain normotensive BP 85 y/o F with PMHx HTN, DM II, COPD, pHTN, Afib on Eliquis, CHF, PVD, CKD, admission for BRASH syndrome (4/2021) presenting w/ shortness of breath and worsening LE edema. Cardiology consulted for acute heart failure exacerbation.    RECS:  #1: Heart failure   Echo 4/2021 nml LV systolic function (EF 70%), no segmental wall motion abnormalities  -Continue monitoring on telemetry  -Maintain K >4, Mg >2, Phos >3  -Continue w/ Lasix 40mg IV BID, titrate as necessary for goal UOP neg 500-1000cc  -Strict IOs and daily weights  -Repeat echo pending  -Pending results of the above, consider RHC to further assess hemodynamic status and cardiac pressures    #2: Afib  -Continue monitoring on telemetry  -C/w home Eliquis 2.5mg PO daily  -C/w home Atenolol 25mg PO daily    #3: HTN   Hypertensive to systolic 160s here  -C/w home Atenolol 25mg PO daily as above  -Can increase to Hydralazine 50mg TID, titrate as necessary to maintain normotensive BP

## 2021-10-14 LAB
ALBUMIN SERPL ELPH-MCNC: 3.4 G/DL — SIGNIFICANT CHANGE UP (ref 3.3–5)
ALP SERPL-CCNC: 152 U/L — HIGH (ref 40–120)
ALT FLD-CCNC: 6 U/L — LOW (ref 10–45)
ANION GAP SERPL CALC-SCNC: 17 MMOL/L — SIGNIFICANT CHANGE UP (ref 5–17)
AST SERPL-CCNC: 13 U/L — SIGNIFICANT CHANGE UP (ref 10–40)
BASOPHILS # BLD AUTO: 0.04 K/UL — SIGNIFICANT CHANGE UP (ref 0–0.2)
BASOPHILS NFR BLD AUTO: 0.6 % — SIGNIFICANT CHANGE UP (ref 0–2)
BILIRUB SERPL-MCNC: 0.3 MG/DL — SIGNIFICANT CHANGE UP (ref 0.2–1.2)
BUN SERPL-MCNC: 44 MG/DL — HIGH (ref 7–23)
CALCIUM SERPL-MCNC: 8.8 MG/DL — SIGNIFICANT CHANGE UP (ref 8.4–10.5)
CHLORIDE SERPL-SCNC: 105 MMOL/L — SIGNIFICANT CHANGE UP (ref 96–108)
CO2 SERPL-SCNC: 20 MMOL/L — LOW (ref 22–31)
CREAT SERPL-MCNC: 1.85 MG/DL — HIGH (ref 0.5–1.3)
EOSINOPHIL # BLD AUTO: 0.16 K/UL — SIGNIFICANT CHANGE UP (ref 0–0.5)
EOSINOPHIL NFR BLD AUTO: 2.5 % — SIGNIFICANT CHANGE UP (ref 0–6)
GLUCOSE BLDC GLUCOMTR-MCNC: 151 MG/DL — HIGH (ref 70–99)
GLUCOSE BLDC GLUCOMTR-MCNC: 156 MG/DL — HIGH (ref 70–99)
GLUCOSE BLDC GLUCOMTR-MCNC: 232 MG/DL — HIGH (ref 70–99)
GLUCOSE BLDC GLUCOMTR-MCNC: 242 MG/DL — HIGH (ref 70–99)
GLUCOSE SERPL-MCNC: 122 MG/DL — HIGH (ref 70–99)
HCT VFR BLD CALC: 30.5 % — LOW (ref 34.5–45)
HGB BLD-MCNC: 9.8 G/DL — LOW (ref 11.5–15.5)
IMM GRANULOCYTES NFR BLD AUTO: 0.6 % — SIGNIFICANT CHANGE UP (ref 0–1.5)
LYMPHOCYTES # BLD AUTO: 1.11 K/UL — SIGNIFICANT CHANGE UP (ref 1–3.3)
LYMPHOCYTES # BLD AUTO: 17.1 % — SIGNIFICANT CHANGE UP (ref 13–44)
MAGNESIUM SERPL-MCNC: 2 MG/DL — SIGNIFICANT CHANGE UP (ref 1.6–2.6)
MCHC RBC-ENTMCNC: 32.1 GM/DL — SIGNIFICANT CHANGE UP (ref 32–36)
MCHC RBC-ENTMCNC: 33 PG — SIGNIFICANT CHANGE UP (ref 27–34)
MCV RBC AUTO: 102.7 FL — HIGH (ref 80–100)
MONOCYTES # BLD AUTO: 0.97 K/UL — HIGH (ref 0–0.9)
MONOCYTES NFR BLD AUTO: 15 % — HIGH (ref 2–14)
NEUTROPHILS # BLD AUTO: 4.16 K/UL — SIGNIFICANT CHANGE UP (ref 1.8–7.4)
NEUTROPHILS NFR BLD AUTO: 64.2 % — SIGNIFICANT CHANGE UP (ref 43–77)
NRBC # BLD: 0 /100 WBCS — SIGNIFICANT CHANGE UP (ref 0–0)
NT-PROBNP SERPL-SCNC: 3209 PG/ML — HIGH (ref 0–300)
PHOSPHATE SERPL-MCNC: 3.5 MG/DL — SIGNIFICANT CHANGE UP (ref 2.5–4.5)
PLATELET # BLD AUTO: 207 K/UL — SIGNIFICANT CHANGE UP (ref 150–400)
POTASSIUM SERPL-MCNC: 3.9 MMOL/L — SIGNIFICANT CHANGE UP (ref 3.5–5.3)
POTASSIUM SERPL-SCNC: 3.9 MMOL/L — SIGNIFICANT CHANGE UP (ref 3.5–5.3)
PROT SERPL-MCNC: 6.7 G/DL — SIGNIFICANT CHANGE UP (ref 6–8.3)
RBC # BLD: 2.97 M/UL — LOW (ref 3.8–5.2)
RBC # FLD: 13.3 % — SIGNIFICANT CHANGE UP (ref 10.3–14.5)
SODIUM SERPL-SCNC: 142 MMOL/L — SIGNIFICANT CHANGE UP (ref 135–145)
WBC # BLD: 6.48 K/UL — SIGNIFICANT CHANGE UP (ref 3.8–10.5)
WBC # FLD AUTO: 6.48 K/UL — SIGNIFICANT CHANGE UP (ref 3.8–10.5)

## 2021-10-14 PROCEDURE — 99232 SBSQ HOSP IP/OBS MODERATE 35: CPT

## 2021-10-14 PROCEDURE — 99233 SBSQ HOSP IP/OBS HIGH 50: CPT

## 2021-10-14 RX ORDER — POLYETHYLENE GLYCOL 3350 17 G/17G
17 POWDER, FOR SOLUTION ORAL DAILY
Refills: 0 | Status: DISCONTINUED | OUTPATIENT
Start: 2021-10-14 | End: 2021-10-23

## 2021-10-14 RX ORDER — HYDRALAZINE HCL 50 MG
75 TABLET ORAL EVERY 8 HOURS
Refills: 0 | Status: DISCONTINUED | OUTPATIENT
Start: 2021-10-14 | End: 2021-10-15

## 2021-10-14 RX ADMIN — APIXABAN 2.5 MILLIGRAM(S): 2.5 TABLET, FILM COATED ORAL at 05:33

## 2021-10-14 RX ADMIN — Medication 50 MILLIGRAM(S): at 05:33

## 2021-10-14 RX ADMIN — Medication 4: at 12:04

## 2021-10-14 RX ADMIN — Medication 75 MILLIGRAM(S): at 13:25

## 2021-10-14 RX ADMIN — Medication 40 MILLIGRAM(S): at 05:32

## 2021-10-14 RX ADMIN — Medication 40 MILLIGRAM(S): at 17:41

## 2021-10-14 RX ADMIN — Medication 1000 UNIT(S): at 05:33

## 2021-10-14 RX ADMIN — POLYETHYLENE GLYCOL 3350 17 GRAM(S): 17 POWDER, FOR SOLUTION ORAL at 17:45

## 2021-10-14 RX ADMIN — Medication 4: at 17:15

## 2021-10-14 RX ADMIN — Medication 2: at 08:19

## 2021-10-14 RX ADMIN — ATENOLOL 25 MILLIGRAM(S): 25 TABLET ORAL at 05:33

## 2021-10-14 RX ADMIN — Medication 1000 UNIT(S): at 17:41

## 2021-10-14 RX ADMIN — Medication 75 MILLIGRAM(S): at 23:05

## 2021-10-14 NOTE — PROGRESS NOTE ADULT - SUBJECTIVE AND OBJECTIVE BOX
Patient seen and examined at bedside.    OVERNIGHT EVENTS: No acute events overnight. Pt feels breathing is improved from yesterday, comfortable on NC. Diuresing well.      VITALS:  T(F): 98.3 (10-14), Max: 99.4 (10-13)  HR: 102 (10-14) (66 - 102)  BP: 149/70 (10-14) (144/65 - 161/72)  RR: 18 (10-14)  SpO2: 92% (10-14)    I&O's Summary    13 Oct 2021 07:01  -  14 Oct 2021 07:00  --------------------------------------------------------  IN: 600 mL / OUT: 2600 mL / NET: -2000 mL      PHYSICAL EXAM:  GENERAL: Lying upright in bed,  HEENT:  EOMI, PERRLA, conjunctiva and sclera clear,   NECK: (+) JVD  CHEST/LUNG: Bibasilar crackles, no wheezes  HEART: RRR; No murmurs, rubs, or gallops  ABDOMEN: Soft, Nontender, Nondistended; Bowel sounds present  EXTREMITIES: B/l LE edema up to hips, lower legs in compression stockings   SKIN: B/l LE chronic venous stasis changes  PSYCH: Normal affect  NEUROLOGY: AAOx3, non-focal    LABS:                      9.8    6.48  )-----------( 207      ( 14 Oct 2021 07:11 )             30.5     10-14    142  |  105  |  44<H>  ----------------------------<  122<H>  3.9   |  20<L>  |  1.85<H>    Ca    8.8      14 Oct 2021 07:11  Phos  3.5     10-14  Mg     2.0     10-14    TPro  6.7  /  Alb  3.4  /  TBili  0.3  /  DBili  x   /  AST  13  /  ALT  6<L>  /  AlkPhos  152<H>  10-14      CARDIAC MARKERS ( 11 Oct 2021 18:50 )  27 ng/L / x     / x     / x     / x     / x          Serum Pro-Brain Natriuretic Peptide: 3209 pg/mL (10-14 @ 07:11)  Serum Pro-Brain Natriuretic Peptide: 2239 pg/mL (10-12 @ 07:15)  Serum Pro-Brain Natriuretic Peptide: 1760 pg/mL (10-11 @ 18:50)      Interpretation of Telemetry: Sinus 70s w/ PVCs      CURRENT MEDS:  acetaminophen   Tablet .. 650 milliGRAM(s) Oral every 6 hours PRN  apixaban 2.5 milliGRAM(s) Oral two times a day  ATENolol  Tablet 25 milliGRAM(s) Oral daily  cholecalciferol 1000 Unit(s) Oral two times a day  dextrose 40% Gel 15 Gram(s) Oral once  dextrose 5%. 1000 milliLiter(s) IV Continuous <Continuous>  dextrose 5%. 1000 milliLiter(s) IV Continuous <Continuous>  dextrose 50% Injectable 25 Gram(s) IV Push once  dextrose 50% Injectable 12.5 Gram(s) IV Push once  dextrose 50% Injectable 25 Gram(s) IV Push once  furosemide   Injectable 40 milliGRAM(s) IV Push two times a day  glucagon  Injectable 1 milliGRAM(s) IntraMuscular once  hydrALAZINE 50 milliGRAM(s) Oral every 8 hours  influenza   Vaccine 0.5 milliLiter(s) IntraMuscular once  insulin lispro (ADMELOG) corrective regimen sliding scale   SubCutaneous three times a day before meals  insulin lispro (ADMELOG) corrective regimen sliding scale   SubCutaneous at bedtime  melatonin 3 milliGRAM(s) Oral at bedtime PRN  ondansetron Injectable 4 milliGRAM(s) IV Push every 8 hours PRN

## 2021-10-14 NOTE — PROGRESS NOTE ADULT - ASSESSMENT
*********PLAN NOT UPDATED***********    87 y/o F with PMHx HTN, DM II, COPD, pHTN, Afib on Eliquis, CHF, PVD, CKD, admission for BRASH syndrome (4/2021) presenting w/ shortness of breath and worsening LE edema. Cardiology consulted for acute heart failure exacerbation.    RECS:    #1: Heart failure   Echo 4/2021 nml LV systolic function (EF 70%), no segmental wall motion abnormalities  -Continue monitoring on telemetry  -Maintain K >4, Mg >2, Phos >3  -Continue w/ Lasix 40mg IV BID, titrate as necessary for goal UOP neg 500-1000cc  -Strict IOs and daily weights  -Repeat echo pending  -Pending results of the above, consider RHC to further assess hemodynamic status and cardiac pressures    #2: Afib  -Continue monitoring on telemetry  -C/w home Eliquis 2.5mg PO daily  -C/w home Atenolol 25mg PO daily    #3: HTN   Hypertensive to systolic 160s here  -C/w home Atenolol 25mg PO daily as above  -C/wHydralazine 50mg TID, titrate as necessary to maintain normotensive BP 85 y/o F with PMHx HTN, DM II, COPD, pHTN, Afib on Eliquis, CHF, PVD, CKD, admission for BRASH syndrome (4/2021) presenting w/ shortness of breath and worsening LE edema. Cardiology consulted for acute heart failure exacerbation.    RECS:    #1: Heart failure   Echo 4/2021 nml LV systolic function (EF 70%), no segmental wall motion abnormalities  -Continue monitoring on telemetry  -Maintain K >4, Mg >2, Phos >3  -Continue w/ Lasix 40mg IV BID, titrate as necessary for goal UOP neg 500-1000cc  -Strict IOs and daily weights  -Repeat echo pending, please try to expedite   -Pending results of the above, consider RHC to further assess hemodynamic status and cardiac pressures    #2: Afib  -Continue monitoring on telemetry  -C/w home Eliquis 2.5mg PO daily  -C/w home Atenolol 25mg PO daily    #3: HTN   Hypertensive to systolic 160s here  -C/w home Atenolol 25mg PO daily as above  -Can increase hydralazine 76mg TID, titrate as necessary to maintain normotensive BP    Plan discussed, awaiting attending attestation.  87 y/o F with PMHx HTN, DM II, COPD, pHTN, Afib on Eliquis, CHF, PVD, CKD, admission for BRASH syndrome (4/2021) presenting w/ shortness of breath and worsening LE edema. Cardiology consulted for acute heart failure exacerbation.    RECS:    #1: Heart failure   Echo 4/2021 nml LV systolic function (EF 70%), no segmental wall motion abnormalities  -Continue monitoring on telemetry  -Maintain K >4, Mg >2, Phos >3  -Continue w/ Lasix 40mg IV BID, titrate as necessary for goal UOP neg 500-1000cc  -Strict I&Os and daily weights  -Repeat echo here w/ nml LV systolic function, grade II LV diastolic dysfunction, nml RV size and function, mild pulm HTN,  -Will plan for RHC tomorrow (10/15) to further assess hemodynamic status and cardiac pressures    #2: Afib  -Continue monitoring on telemetry  -Please hold Eliquis for RHC tomorrow  -C/w home Atenolol 25mg PO daily    #3: HTN   Hypertensive to systolic 160s here  -C/w home Atenolol 25mg PO daily as above  -Can increase hydralazine 75mg TID, titrate as necessary to maintain normotensive BP    Plan discussed w/ attending, awaiting attestation.

## 2021-10-14 NOTE — PROGRESS NOTE ADULT - SUBJECTIVE AND OBJECTIVE BOX
[facility] Division of Hospital Medicine  Valentin Allen MD, MICHAEL  I'm reachable on [employer] Teams   Off hours:  [phone] ([facility] pager)    Patient is a 86y old  Female who presents with a chief complaint of Shortness of breath (14 Oct 2021 08:55)      SUBJECTIVE / OVERNIGHT EVENTS:  No events overnight. States that her breathing has improved today.     MEDICATIONS  (STANDING):  apixaban 2.5 milliGRAM(s) Oral two times a day  ATENolol  Tablet 25 milliGRAM(s) Oral daily  cholecalciferol 1000 Unit(s) Oral two times a day  dextrose 40% Gel 15 Gram(s) Oral once  dextrose 5%. 1000 milliLiter(s) (50 mL/Hr) IV Continuous <Continuous>  dextrose 5%. 1000 milliLiter(s) (100 mL/Hr) IV Continuous <Continuous>  dextrose 50% Injectable 25 Gram(s) IV Push once  dextrose 50% Injectable 12.5 Gram(s) IV Push once  dextrose 50% Injectable 25 Gram(s) IV Push once  furosemide   Injectable 40 milliGRAM(s) IV Push two times a day  glucagon  Injectable 1 milliGRAM(s) IntraMuscular once  hydrALAZINE 75 milliGRAM(s) Oral every 8 hours  influenza   Vaccine 0.5 milliLiter(s) IntraMuscular once  insulin lispro (ADMELOG) corrective regimen sliding scale   SubCutaneous three times a day before meals  insulin lispro (ADMELOG) corrective regimen sliding scale   SubCutaneous at bedtime  polyethylene glycol 3350 17 Gram(s) Oral daily    MEDICATIONS  (PRN):  acetaminophen   Tablet .. 650 milliGRAM(s) Oral every 6 hours PRN Temp greater or equal to 38C (100.4F), Mild Pain (1 - 3)  melatonin 3 milliGRAM(s) Oral at bedtime PRN Insomnia  ondansetron Injectable 4 milliGRAM(s) IV Push every 8 hours PRN Nausea and/or Vomiting    CAPILLARY BLOOD GLUCOSE      POCT Blood Glucose.: 242 mg/dL (14 Oct 2021 11:22)  POCT Blood Glucose.: 151 mg/dL (14 Oct 2021 07:41)  POCT Blood Glucose.: 223 mg/dL (13 Oct 2021 21:34)  POCT Blood Glucose.: 199 mg/dL (13 Oct 2021 16:33)    I&O's Summary    13 Oct 2021 07:01  -  14 Oct 2021 07:00  --------------------------------------------------------  IN: 600 mL / OUT: 2600 mL / NET: -2000 mL    14 Oct 2021 07:01  -  14 Oct 2021 13:33  --------------------------------------------------------  IN: 360 mL / OUT: 0 mL / NET: 360 mL        PHYSICAL EXAM:  Vital Signs Last 24 Hrs  T(C): 37 (14 Oct 2021 11:51), Max: 37.4 (13 Oct 2021 20:30)  T(F): 98.6 (14 Oct 2021 11:51), Max: 99.4 (13 Oct 2021 20:30)  HR: 101 (14 Oct 2021 11:51) (71 - 102)  BP: 152/73 (14 Oct 2021 11:51) (144/65 - 153/64)  BP(mean): --  RR: 18 (14 Oct 2021 11:51) (18 - 18)  SpO2: 95% (14 Oct 2021 11:51) (87% - 95%)    CONSTITUTIONAL: no respiratory distress on BiPAP, elderly female  EYES: EOMI, conjunctiva and sclera clear  ENMT: Moist oral mucosa  NECK: Supple  RESPIRATORY: mild bibasilar rales (improved from yesterday)   CARDIOVASCULAR: Regular rate and rhythm, normal S1 and S2, no murmur/rub/gallop; 2+ pitting edema  ABDOMEN: normoactive bowel sounds, soft, nontender to palpation, no rebound/guarding; no distension   MUSCULOSKELETAL:  Normal gait; no clubbing or cyanosis of digits; no joint swelling or tenderness to palpation  PSYCH: A+O to person, place, and time; affect appropriate      LABS:                        9.8    6.48  )-----------( 207      ( 14 Oct 2021 07:11 )             30.5     10-14    142  |  105  |  44<H>  ----------------------------<  122<H>  3.9   |  20<L>  |  1.85<H>    Ca    8.8      14 Oct 2021 07:11  Phos  3.5     10-14  Mg     2.0     10-14    TPro  6.7  /  Alb  3.4  /  TBili  0.3  /  DBili  x   /  AST  13  /  ALT  6<L>  /  AlkPhos  152<H>  10-14          RADIOLOGY & ADDITIONAL TESTS:    Lab Results Reviewed: Cr stable.

## 2021-10-15 LAB
ALBUMIN SERPL ELPH-MCNC: 3.3 G/DL — SIGNIFICANT CHANGE UP (ref 3.3–5)
ALP SERPL-CCNC: 152 U/L — HIGH (ref 40–120)
ALT FLD-CCNC: 9 U/L — LOW (ref 10–45)
ANION GAP SERPL CALC-SCNC: 15 MMOL/L — SIGNIFICANT CHANGE UP (ref 5–17)
AST SERPL-CCNC: 17 U/L — SIGNIFICANT CHANGE UP (ref 10–40)
BILIRUB DIRECT SERPL-MCNC: 0.1 MG/DL — SIGNIFICANT CHANGE UP (ref 0–0.2)
BILIRUB INDIRECT FLD-MCNC: 0.2 MG/DL — SIGNIFICANT CHANGE UP (ref 0.2–1)
BILIRUB SERPL-MCNC: 0.3 MG/DL — SIGNIFICANT CHANGE UP (ref 0.2–1.2)
BUN SERPL-MCNC: 50 MG/DL — HIGH (ref 7–23)
CALCIUM SERPL-MCNC: 8.6 MG/DL — SIGNIFICANT CHANGE UP (ref 8.4–10.5)
CHLORIDE SERPL-SCNC: 103 MMOL/L — SIGNIFICANT CHANGE UP (ref 96–108)
CO2 SERPL-SCNC: 21 MMOL/L — LOW (ref 22–31)
CREAT SERPL-MCNC: 2.09 MG/DL — HIGH (ref 0.5–1.3)
GLUCOSE BLDC GLUCOMTR-MCNC: 126 MG/DL — HIGH (ref 70–99)
GLUCOSE BLDC GLUCOMTR-MCNC: 155 MG/DL — HIGH (ref 70–99)
GLUCOSE BLDC GLUCOMTR-MCNC: 188 MG/DL — HIGH (ref 70–99)
GLUCOSE BLDC GLUCOMTR-MCNC: 241 MG/DL — HIGH (ref 70–99)
GLUCOSE SERPL-MCNC: 106 MG/DL — HIGH (ref 70–99)
HCT VFR BLD CALC: 29.1 % — LOW (ref 34.5–45)
HGB BLD-MCNC: 9.5 G/DL — LOW (ref 11.5–15.5)
MAGNESIUM SERPL-MCNC: 2.1 MG/DL — SIGNIFICANT CHANGE UP (ref 1.6–2.6)
MCHC RBC-ENTMCNC: 32.6 GM/DL — SIGNIFICANT CHANGE UP (ref 32–36)
MCHC RBC-ENTMCNC: 33.5 PG — SIGNIFICANT CHANGE UP (ref 27–34)
MCV RBC AUTO: 102.5 FL — HIGH (ref 80–100)
NRBC # BLD: 0 /100 WBCS — SIGNIFICANT CHANGE UP (ref 0–0)
PHOSPHATE SERPL-MCNC: 4 MG/DL — SIGNIFICANT CHANGE UP (ref 2.5–4.5)
PLATELET # BLD AUTO: 212 K/UL — SIGNIFICANT CHANGE UP (ref 150–400)
POTASSIUM SERPL-MCNC: 3.9 MMOL/L — SIGNIFICANT CHANGE UP (ref 3.5–5.3)
POTASSIUM SERPL-SCNC: 3.9 MMOL/L — SIGNIFICANT CHANGE UP (ref 3.5–5.3)
PROT SERPL-MCNC: 6.3 G/DL — SIGNIFICANT CHANGE UP (ref 6–8.3)
RBC # BLD: 2.84 M/UL — LOW (ref 3.8–5.2)
RBC # FLD: 13.2 % — SIGNIFICANT CHANGE UP (ref 10.3–14.5)
SODIUM SERPL-SCNC: 139 MMOL/L — SIGNIFICANT CHANGE UP (ref 135–145)
WBC # BLD: 6.34 K/UL — SIGNIFICANT CHANGE UP (ref 3.8–10.5)
WBC # FLD AUTO: 6.34 K/UL — SIGNIFICANT CHANGE UP (ref 3.8–10.5)

## 2021-10-15 PROCEDURE — 99233 SBSQ HOSP IP/OBS HIGH 50: CPT

## 2021-10-15 RX ORDER — CARVEDILOL PHOSPHATE 80 MG/1
6.25 CAPSULE, EXTENDED RELEASE ORAL EVERY 12 HOURS
Refills: 0 | Status: DISCONTINUED | OUTPATIENT
Start: 2021-10-15 | End: 2021-10-19

## 2021-10-15 RX ORDER — APIXABAN 2.5 MG/1
2.5 TABLET, FILM COATED ORAL
Refills: 0 | Status: DISCONTINUED | OUTPATIENT
Start: 2021-10-15 | End: 2021-10-20

## 2021-10-15 RX ORDER — HYDRALAZINE HCL 50 MG
100 TABLET ORAL EVERY 8 HOURS
Refills: 0 | Status: DISCONTINUED | OUTPATIENT
Start: 2021-10-15 | End: 2021-10-23

## 2021-10-15 RX ADMIN — Medication 2: at 17:37

## 2021-10-15 RX ADMIN — ATENOLOL 25 MILLIGRAM(S): 25 TABLET ORAL at 05:25

## 2021-10-15 RX ADMIN — Medication 1000 UNIT(S): at 05:26

## 2021-10-15 RX ADMIN — APIXABAN 2.5 MILLIGRAM(S): 2.5 TABLET, FILM COATED ORAL at 23:06

## 2021-10-15 RX ADMIN — Medication 75 MILLIGRAM(S): at 05:25

## 2021-10-15 RX ADMIN — POLYETHYLENE GLYCOL 3350 17 GRAM(S): 17 POWDER, FOR SOLUTION ORAL at 12:37

## 2021-10-15 RX ADMIN — Medication 100 MILLIGRAM(S): at 23:06

## 2021-10-15 RX ADMIN — Medication 75 MILLIGRAM(S): at 14:29

## 2021-10-15 RX ADMIN — Medication 40 MILLIGRAM(S): at 05:25

## 2021-10-15 RX ADMIN — Medication 40 MILLIGRAM(S): at 17:37

## 2021-10-15 RX ADMIN — Medication 1000 UNIT(S): at 17:44

## 2021-10-15 RX ADMIN — CARVEDILOL PHOSPHATE 6.25 MILLIGRAM(S): 80 CAPSULE, EXTENDED RELEASE ORAL at 23:06

## 2021-10-15 NOTE — PROGRESS NOTE ADULT - SUBJECTIVE AND OBJECTIVE BOX
[facility] Division of Hospital Medicine  Valentin Allen MD, MICHAEL  I'm reachable on [employer] Teams   Off hours:  [phone] ([facility] pager)    Patient is a 86y old  Female who presents with a chief complaint of Shortness of breath (15 Oct 2021 10:12)      SUBJECTIVE / OVERNIGHT EVENTS:  No events overnight. Overall has improved since admission, but still has dyspnea.     MEDICATIONS  (STANDING):  ATENolol  Tablet 25 milliGRAM(s) Oral daily  cholecalciferol 1000 Unit(s) Oral two times a day  dextrose 40% Gel 15 Gram(s) Oral once  dextrose 5%. 1000 milliLiter(s) (50 mL/Hr) IV Continuous <Continuous>  dextrose 5%. 1000 milliLiter(s) (100 mL/Hr) IV Continuous <Continuous>  dextrose 50% Injectable 25 Gram(s) IV Push once  dextrose 50% Injectable 12.5 Gram(s) IV Push once  dextrose 50% Injectable 25 Gram(s) IV Push once  furosemide   Injectable 40 milliGRAM(s) IV Push two times a day  glucagon  Injectable 1 milliGRAM(s) IntraMuscular once  hydrALAZINE 75 milliGRAM(s) Oral every 8 hours  influenza   Vaccine 0.5 milliLiter(s) IntraMuscular once  insulin lispro (ADMELOG) corrective regimen sliding scale   SubCutaneous three times a day before meals  insulin lispro (ADMELOG) corrective regimen sliding scale   SubCutaneous at bedtime  polyethylene glycol 3350 17 Gram(s) Oral daily    MEDICATIONS  (PRN):  acetaminophen   Tablet .. 650 milliGRAM(s) Oral every 6 hours PRN Temp greater or equal to 38C (100.4F), Mild Pain (1 - 3)  melatonin 3 milliGRAM(s) Oral at bedtime PRN Insomnia  ondansetron Injectable 4 milliGRAM(s) IV Push every 8 hours PRN Nausea and/or Vomiting    CAPILLARY BLOOD GLUCOSE      POCT Blood Glucose.: 155 mg/dL (15 Oct 2021 16:36)  POCT Blood Glucose.: 241 mg/dL (15 Oct 2021 11:43)  POCT Blood Glucose.: 126 mg/dL (15 Oct 2021 07:56)  POCT Blood Glucose.: 156 mg/dL (14 Oct 2021 21:46)    I&O's Summary    14 Oct 2021 07:01  -  15 Oct 2021 07:00  --------------------------------------------------------  IN: 360 mL / OUT: 1600 mL / NET: -1240 mL    15 Oct 2021 07:01  -  15 Oct 2021 18:15  --------------------------------------------------------  IN: 640 mL / OUT: 400 mL / NET: 240 mL        PHYSICAL EXAM:  Vital Signs Last 24 Hrs  T(C): 36.9 (15 Oct 2021 12:01), Max: 36.9 (15 Oct 2021 12:01)  T(F): 98.4 (15 Oct 2021 12:01), Max: 98.4 (15 Oct 2021 12:01)  HR: 76 (15 Oct 2021 12:01) (69 - 80)  BP: 148/70 (15 Oct 2021 12:01) (146/66 - 161/71)  BP(mean): --  RR: 18 (15 Oct 2021 12:01) (18 - 20)  SpO2: 93% (15 Oct 2021 12:01) (93% - 96%)    CONSTITUTIONAL: no respiratory distress on BiPAP, elderly female  EYES: EOMI, conjunctiva and sclera clear  ENMT: Moist oral mucosa  NECK: Supple  RESPIRATORY: mild bibasilar rales (improved from yesterday)   CARDIOVASCULAR: Regular rate and rhythm, normal S1 and S2, no murmur/rub/gallop; 2+ pitting edema  ABDOMEN: normoactive bowel sounds, soft, nontender to palpation, no rebound/guarding; no distension   PSYCH: A+O to person, place, and time; affect appropriate      LABS:                        9.5    6.34  )-----------( 212      ( 15 Oct 2021 07:24 )             29.1     10-15    139  |  103  |  50<H>  ----------------------------<  106<H>  3.9   |  21<L>  |  2.09<H>    Ca    8.6      15 Oct 2021 07:18  Phos  4.0     10-15  Mg     2.1     10-15    TPro  6.3  /  Alb  3.3  /  TBili  0.3  /  DBili  0.1  /  AST  17  /  ALT  9<L>  /  AlkPhos  152<H>  10-15          RADIOLOGY & ADDITIONAL TESTS:    Lab Results Reviewed: mild anemia, CKD- Cr mildly increased but not significantly

## 2021-10-15 NOTE — PROGRESS NOTE ADULT - SUBJECTIVE AND OBJECTIVE BOX
Patient seen and examined at bedside.    Overnight Events:     Review Of Systems: No chest pain, shortness of breath, or palpitations            Current Meds:  acetaminophen   Tablet .. 650 milliGRAM(s) Oral every 6 hours PRN  ATENolol  Tablet 25 milliGRAM(s) Oral daily  cholecalciferol 1000 Unit(s) Oral two times a day  dextrose 40% Gel 15 Gram(s) Oral once  dextrose 5%. 1000 milliLiter(s) IV Continuous <Continuous>  dextrose 5%. 1000 milliLiter(s) IV Continuous <Continuous>  dextrose 50% Injectable 25 Gram(s) IV Push once  dextrose 50% Injectable 12.5 Gram(s) IV Push once  dextrose 50% Injectable 25 Gram(s) IV Push once  furosemide   Injectable 40 milliGRAM(s) IV Push two times a day  glucagon  Injectable 1 milliGRAM(s) IntraMuscular once  hydrALAZINE 75 milliGRAM(s) Oral every 8 hours  influenza   Vaccine 0.5 milliLiter(s) IntraMuscular once  insulin lispro (ADMELOG) corrective regimen sliding scale   SubCutaneous three times a day before meals  insulin lispro (ADMELOG) corrective regimen sliding scale   SubCutaneous at bedtime  melatonin 3 milliGRAM(s) Oral at bedtime PRN  ondansetron Injectable 4 milliGRAM(s) IV Push every 8 hours PRN  polyethylene glycol 3350 17 Gram(s) Oral daily      Vitals:  T(F): 98 (10-15), Max: 98.6 (10-14)  HR: 69 (10-15) (69 - 101)  BP: 146/66 (10-15) (146/66 - 163/62)  RR: 20 (10-15)  SpO2: 94% (10-15)  I&O's Summary    14 Oct 2021 07:01  -  15 Oct 2021 07:00  --------------------------------------------------------  IN: 360 mL / OUT: 1600 mL / NET: -1240 mL    15 Oct 2021 07:01  -  15 Oct 2021 10:13  --------------------------------------------------------  IN: 360 mL / OUT: 0 mL / NET: 360 mL        Physical Exam:  Appearance: No acute distress; well appearing  Eyes: PERRL, EOMI, pink conjunctiva  HEENT: Normal oral mucosa  Cardiovascular: RRR, S1, S2, no murmurs, rubs, or gallops; no edema; no JVD  Respiratory: Clear to auscultation bilaterally  Gastrointestinal: soft, non-tender, non-distended with normal bowel sounds  Musculoskeletal: No clubbing; no joint deformity   Neurologic: Non-focal  Lymphatic: No lymphadenopathy  Psychiatry: AAOx3, mood & affect appropriate  Skin: No rashes, ecchymoses, or cyanosis                          9.5    6.34  )-----------( 212      ( 15 Oct 2021 07:24 )             29.1     10-15    139  |  103  |  50<H>  ----------------------------<  106<H>  3.9   |  21<L>  |  2.09<H>    Ca    8.6      15 Oct 2021 07:18  Phos  4.0     10-15  Mg     2.1     10-15    TPro  6.3  /  Alb  3.3  /  TBili  0.3  /  DBili  0.1  /  AST  17  /  ALT  9<L>  /  AlkPhos  152<H>  10-15      CARDIAC MARKERS ( 11 Oct 2021 18:50 )  27 ng/L / x     / x     / x     / x     / x          Serum Pro-Brain Natriuretic Peptide: 3209 pg/mL (10-14 @ 07:11)  Serum Pro-Brain Natriuretic Peptide: 2239 pg/mL (10-12 @ 07:15)  Serum Pro-Brain Natriuretic Peptide: 1760 pg/mL (10-11 @ 18:50)          New ECG(s): Personally reviewed    < from: Transthoracic Echocardiogram (10.12.21 @ 19:06) >  1. Mitral annular calcification, otherwise normal mitral  valve. Mild mitral regurgitation.  2. Calcified trileaflet aortic valve with decreased  opening. No aortic valve regurgitation seen.  3. Moderately dilated left atrium.  LA volume index = 42  cc/m2.  4. Normal left ventricular systolic function. No segmental  wall motionabnormalities.  5. Grade II diastolic dysfunction,  Increased E/e'  is  consistent with elevated left ventricular filling pressure.  6. Normal right ventricular size and function.  7. Estimated right ventricular systolic pressure equals 40  mm Hg, assuming right atrial pressure equals 8 mm Hg,  consistent with mild pulmonary hypertension.    < end of copied text >

## 2021-10-15 NOTE — PROGRESS NOTE ADULT - ASSESSMENT
87 y/o F with PMHx HTN, DM II, COPD, pHTN, Afib on Eliquis, CHF, PVD, CKD, admission for BRASH syndrome (4/2021) presenting w/ shortness of breath and worsening LE edema. Cardiology consulted for acute heart failure exacerbation.    RECS:    #1: Heart failure   -Continue monitoring on telemetry  -Maintain K >4, Mg >2, Phos >3  -Continue w/ Lasix 40mg IV BID, titrate as necessary for goal UOP neg 500-1000cc  -Strict I&Os and daily weights  -Repeat echo here w/ nml LV systolic function, grade II LV diastolic dysfunction, nml RV size and function, mild pulm HTN,  -Will plan for RHC today (10/15) to further assess hemodynamic status and cardiac pressures    #2: Afib  -Continue monitoring on telemetry  - hold Eliquis until after RHC  -C/w home Atenolol 25mg PO daily    #3: HTN   Hypertensive to systolic 160s here  -switch from atenolol to carvedilol 6.25 BID  -Can increase hydralazine 100mg TID, titrate as necessary to maintain normotensive BP

## 2021-10-16 LAB
ALBUMIN SERPL ELPH-MCNC: 3 G/DL — LOW (ref 3.3–5)
ALP SERPL-CCNC: 151 U/L — HIGH (ref 40–120)
ALT FLD-CCNC: 12 U/L — SIGNIFICANT CHANGE UP (ref 10–45)
ANION GAP SERPL CALC-SCNC: 17 MMOL/L — SIGNIFICANT CHANGE UP (ref 5–17)
AST SERPL-CCNC: 18 U/L — SIGNIFICANT CHANGE UP (ref 10–40)
BILIRUB DIRECT SERPL-MCNC: 0.1 MG/DL — SIGNIFICANT CHANGE UP (ref 0–0.2)
BILIRUB INDIRECT FLD-MCNC: 0.2 MG/DL — SIGNIFICANT CHANGE UP (ref 0.2–1)
BILIRUB SERPL-MCNC: 0.3 MG/DL — SIGNIFICANT CHANGE UP (ref 0.2–1.2)
BUN SERPL-MCNC: 55 MG/DL — HIGH (ref 7–23)
CALCIUM SERPL-MCNC: 8.4 MG/DL — SIGNIFICANT CHANGE UP (ref 8.4–10.5)
CHLORIDE SERPL-SCNC: 102 MMOL/L — SIGNIFICANT CHANGE UP (ref 96–108)
CO2 SERPL-SCNC: 20 MMOL/L — LOW (ref 22–31)
CREAT SERPL-MCNC: 2.27 MG/DL — HIGH (ref 0.5–1.3)
GLUCOSE BLDC GLUCOMTR-MCNC: 141 MG/DL — HIGH (ref 70–99)
GLUCOSE BLDC GLUCOMTR-MCNC: 193 MG/DL — HIGH (ref 70–99)
GLUCOSE BLDC GLUCOMTR-MCNC: 226 MG/DL — HIGH (ref 70–99)
GLUCOSE BLDC GLUCOMTR-MCNC: 253 MG/DL — HIGH (ref 70–99)
GLUCOSE SERPL-MCNC: 126 MG/DL — HIGH (ref 70–99)
HCT VFR BLD CALC: 28 % — LOW (ref 34.5–45)
HGB BLD-MCNC: 8.6 G/DL — LOW (ref 11.5–15.5)
MAGNESIUM SERPL-MCNC: 2.1 MG/DL — SIGNIFICANT CHANGE UP (ref 1.6–2.6)
MCHC RBC-ENTMCNC: 30.7 GM/DL — LOW (ref 32–36)
MCHC RBC-ENTMCNC: 31.9 PG — SIGNIFICANT CHANGE UP (ref 27–34)
MCV RBC AUTO: 103.7 FL — HIGH (ref 80–100)
NRBC # BLD: 0 /100 WBCS — SIGNIFICANT CHANGE UP (ref 0–0)
PLATELET # BLD AUTO: 204 K/UL — SIGNIFICANT CHANGE UP (ref 150–400)
POTASSIUM SERPL-MCNC: 3.9 MMOL/L — SIGNIFICANT CHANGE UP (ref 3.5–5.3)
POTASSIUM SERPL-SCNC: 3.9 MMOL/L — SIGNIFICANT CHANGE UP (ref 3.5–5.3)
PROT SERPL-MCNC: 6 G/DL — SIGNIFICANT CHANGE UP (ref 6–8.3)
RBC # BLD: 2.7 M/UL — LOW (ref 3.8–5.2)
RBC # FLD: 13.1 % — SIGNIFICANT CHANGE UP (ref 10.3–14.5)
SODIUM SERPL-SCNC: 139 MMOL/L — SIGNIFICANT CHANGE UP (ref 135–145)
WBC # BLD: 5.44 K/UL — SIGNIFICANT CHANGE UP (ref 3.8–10.5)
WBC # FLD AUTO: 5.44 K/UL — SIGNIFICANT CHANGE UP (ref 3.8–10.5)

## 2021-10-16 PROCEDURE — 99233 SBSQ HOSP IP/OBS HIGH 50: CPT

## 2021-10-16 RX ORDER — FUROSEMIDE 40 MG
40 TABLET ORAL DAILY
Refills: 0 | Status: DISCONTINUED | OUTPATIENT
Start: 2021-10-17 | End: 2021-10-17

## 2021-10-16 RX ADMIN — Medication 1000 UNIT(S): at 18:21

## 2021-10-16 RX ADMIN — APIXABAN 2.5 MILLIGRAM(S): 2.5 TABLET, FILM COATED ORAL at 18:21

## 2021-10-16 RX ADMIN — POLYETHYLENE GLYCOL 3350 17 GRAM(S): 17 POWDER, FOR SOLUTION ORAL at 14:12

## 2021-10-16 RX ADMIN — Medication 40 MILLIGRAM(S): at 05:06

## 2021-10-16 RX ADMIN — Medication 100 MILLIGRAM(S): at 14:12

## 2021-10-16 RX ADMIN — APIXABAN 2.5 MILLIGRAM(S): 2.5 TABLET, FILM COATED ORAL at 05:06

## 2021-10-16 RX ADMIN — Medication 2: at 17:00

## 2021-10-16 RX ADMIN — Medication 100 MILLIGRAM(S): at 05:05

## 2021-10-16 RX ADMIN — CARVEDILOL PHOSPHATE 6.25 MILLIGRAM(S): 80 CAPSULE, EXTENDED RELEASE ORAL at 05:06

## 2021-10-16 RX ADMIN — Medication 1000 UNIT(S): at 05:06

## 2021-10-16 RX ADMIN — Medication 6: at 13:25

## 2021-10-16 RX ADMIN — Medication 100 MILLIGRAM(S): at 21:47

## 2021-10-16 RX ADMIN — CARVEDILOL PHOSPHATE 6.25 MILLIGRAM(S): 80 CAPSULE, EXTENDED RELEASE ORAL at 18:21

## 2021-10-16 NOTE — PROGRESS NOTE ADULT - SUBJECTIVE AND OBJECTIVE BOX
[facility] Division of Hospital Medicine  Tiburcio Quach MD  Pager (SUKUMAR-F, 2O-5P): [phone]  Other Times:  [phone]    Patient is a 86y old  Female who presents with a chief complaint of Shortness of breath (15 Oct 2021 18:15)      SUBJECTIVE / OVERNIGHT EVENTS:    Patient was examined this morning. Reports having some dyspnea, dry cough. Rest of 10 point ROS negative.       ADDITIONAL REVIEW OF SYSTEMS:    MEDICATIONS  (STANDING):  apixaban 2.5 milliGRAM(s) Oral two times a day  carvedilol 6.25 milliGRAM(s) Oral every 12 hours  cholecalciferol 1000 Unit(s) Oral two times a day  dextrose 40% Gel 15 Gram(s) Oral once  dextrose 5%. 1000 milliLiter(s) (50 mL/Hr) IV Continuous <Continuous>  dextrose 5%. 1000 milliLiter(s) (100 mL/Hr) IV Continuous <Continuous>  dextrose 50% Injectable 25 Gram(s) IV Push once  dextrose 50% Injectable 25 Gram(s) IV Push once  dextrose 50% Injectable 12.5 Gram(s) IV Push once  glucagon  Injectable 1 milliGRAM(s) IntraMuscular once  hydrALAZINE 100 milliGRAM(s) Oral every 8 hours  influenza   Vaccine 0.5 milliLiter(s) IntraMuscular once  insulin lispro (ADMELOG) corrective regimen sliding scale   SubCutaneous three times a day before meals  insulin lispro (ADMELOG) corrective regimen sliding scale   SubCutaneous at bedtime  polyethylene glycol 3350 17 Gram(s) Oral daily    MEDICATIONS  (PRN):  acetaminophen   Tablet .. 650 milliGRAM(s) Oral every 6 hours PRN Temp greater or equal to 38C (100.4F), Mild Pain (1 - 3)  melatonin 3 milliGRAM(s) Oral at bedtime PRN Insomnia  ondansetron Injectable 4 milliGRAM(s) IV Push every 8 hours PRN Nausea and/or Vomiting      CAPILLARY BLOOD GLUCOSE      POCT Blood Glucose.: 193 mg/dL (16 Oct 2021 16:40)  POCT Blood Glucose.: 253 mg/dL (16 Oct 2021 13:23)  POCT Blood Glucose.: 141 mg/dL (16 Oct 2021 07:54)  POCT Blood Glucose.: 188 mg/dL (15 Oct 2021 21:30)    I&O's Summary    15 Oct 2021 07:01  -  16 Oct 2021 07:00  --------------------------------------------------------  IN: 640 mL / OUT: 1200 mL / NET: -560 mL        PHYSICAL EXAM:  Vital Signs Last 24 Hrs  T(C): 36.7 (16 Oct 2021 11:51), Max: 36.9 (15 Oct 2021 20:06)  T(F): 98.1 (16 Oct 2021 11:51), Max: 98.5 (15 Oct 2021 20:06)  HR: 72 (16 Oct 2021 11:51) (69 - 80)  BP: 149/63 (16 Oct 2021 11:51) (145/67 - 161/70)  BP(mean): --  RR: 18 (16 Oct 2021 11:51) (18 - 20)  SpO2: 95% (16 Oct 2021 11:51) (95% - 95%)      CONSTITUTIONAL: no respiratory distress on NC, elderly female  EYES: EOMI, PERRL, conjunctiva and sclera clear  ENMT: Moist oral mucosa  NECK: Supple  RESPIRATORY: lungs CTABL, normal respiratory effort   CARDIOVASCULAR: Regular rate and rhythm, normal S1 and S2, no murmur/rub/gallop; 2+ pitting edema BL LE  ABDOMEN: normoactive bowel sounds, soft, nontender to palpation, no rebound/guarding; no distension   PSYCH: A+O to person, place, and time; affect appropriate        LABS:                        8.6    5.44  )-----------( 204      ( 16 Oct 2021 06:19 )             28.0     10-16    139  |  102  |  55<H>  ----------------------------<  126<H>  3.9   |  20<L>  |  2.27<H>    Ca    8.4      16 Oct 2021 06:19  Phos  4.0     10-15  Mg     2.1     10-16    TPro  6.0  /  Alb  3.0<L>  /  TBili  0.3  /  DBili  0.1  /  AST  18  /  ALT  12  /  AlkPhos  151<H>  10-16                RADIOLOGY & ADDITIONAL TESTS:  Results Reviewed:   Imaging Personally Reviewed:  Electrocardiogram Personally Reviewed:    COORDINATION OF CARE:  Care Discussed with Consultants/Other Providers [Y/N]:  Prior or Outpatient Records Reviewed [Y/N]:

## 2021-10-17 LAB
ANION GAP SERPL CALC-SCNC: 13 MMOL/L — SIGNIFICANT CHANGE UP (ref 5–17)
APPEARANCE UR: CLEAR — SIGNIFICANT CHANGE UP
BACTERIA # UR AUTO: NEGATIVE — SIGNIFICANT CHANGE UP
BILIRUB UR-MCNC: NEGATIVE — SIGNIFICANT CHANGE UP
BUN SERPL-MCNC: 74 MG/DL — HIGH (ref 7–23)
CALCIUM SERPL-MCNC: 8.6 MG/DL — SIGNIFICANT CHANGE UP (ref 8.4–10.5)
CHLORIDE SERPL-SCNC: 104 MMOL/L — SIGNIFICANT CHANGE UP (ref 96–108)
CO2 SERPL-SCNC: 21 MMOL/L — LOW (ref 22–31)
COLOR SPEC: SIGNIFICANT CHANGE UP
CREAT ?TM UR-MCNC: 90 MG/DL — SIGNIFICANT CHANGE UP
CREAT SERPL-MCNC: 2.58 MG/DL — HIGH (ref 0.5–1.3)
DIFF PNL FLD: NEGATIVE — SIGNIFICANT CHANGE UP
EPI CELLS # UR: 0 /HPF — SIGNIFICANT CHANGE UP
GLUCOSE BLDC GLUCOMTR-MCNC: 151 MG/DL — HIGH (ref 70–99)
GLUCOSE BLDC GLUCOMTR-MCNC: 228 MG/DL — HIGH (ref 70–99)
GLUCOSE BLDC GLUCOMTR-MCNC: 235 MG/DL — HIGH (ref 70–99)
GLUCOSE BLDC GLUCOMTR-MCNC: 264 MG/DL — HIGH (ref 70–99)
GLUCOSE SERPL-MCNC: 138 MG/DL — HIGH (ref 70–99)
GLUCOSE UR QL: NEGATIVE — SIGNIFICANT CHANGE UP
HCT VFR BLD CALC: 29.4 % — LOW (ref 34.5–45)
HGB BLD-MCNC: 9 G/DL — LOW (ref 11.5–15.5)
HYALINE CASTS # UR AUTO: 0 /LPF — SIGNIFICANT CHANGE UP (ref 0–2)
KETONES UR-MCNC: NEGATIVE — SIGNIFICANT CHANGE UP
LEUKOCYTE ESTERASE UR-ACNC: NEGATIVE — SIGNIFICANT CHANGE UP
MCHC RBC-ENTMCNC: 30.6 GM/DL — LOW (ref 32–36)
MCHC RBC-ENTMCNC: 31.8 PG — SIGNIFICANT CHANGE UP (ref 27–34)
MCV RBC AUTO: 103.9 FL — HIGH (ref 80–100)
NITRITE UR-MCNC: NEGATIVE — SIGNIFICANT CHANGE UP
NRBC # BLD: 0 /100 WBCS — SIGNIFICANT CHANGE UP (ref 0–0)
OSMOLALITY UR: 486 MOS/KG — SIGNIFICANT CHANGE UP (ref 300–900)
PH UR: 5.5 — SIGNIFICANT CHANGE UP (ref 5–8)
PLATELET # BLD AUTO: 224 K/UL — SIGNIFICANT CHANGE UP (ref 150–400)
POTASSIUM SERPL-MCNC: 4 MMOL/L — SIGNIFICANT CHANGE UP (ref 3.5–5.3)
POTASSIUM SERPL-SCNC: 4 MMOL/L — SIGNIFICANT CHANGE UP (ref 3.5–5.3)
PROT UR-MCNC: ABNORMAL
RBC # BLD: 2.83 M/UL — LOW (ref 3.8–5.2)
RBC # FLD: 12.8 % — SIGNIFICANT CHANGE UP (ref 10.3–14.5)
RBC CASTS # UR COMP ASSIST: 1 /HPF — SIGNIFICANT CHANGE UP (ref 0–4)
SODIUM SERPL-SCNC: 138 MMOL/L — SIGNIFICANT CHANGE UP (ref 135–145)
SODIUM UR-SCNC: 49 MMOL/L — SIGNIFICANT CHANGE UP
SP GR SPEC: 1.02 — SIGNIFICANT CHANGE UP (ref 1.01–1.02)
UROBILINOGEN FLD QL: NEGATIVE — SIGNIFICANT CHANGE UP
UUN UR-MCNC: 768 MG/DL — SIGNIFICANT CHANGE UP
WBC # BLD: 5.13 K/UL — SIGNIFICANT CHANGE UP (ref 3.8–10.5)
WBC # FLD AUTO: 5.13 K/UL — SIGNIFICANT CHANGE UP (ref 3.8–10.5)
WBC UR QL: 1 /HPF — SIGNIFICANT CHANGE UP (ref 0–5)

## 2021-10-17 PROCEDURE — 99233 SBSQ HOSP IP/OBS HIGH 50: CPT

## 2021-10-17 PROCEDURE — 99222 1ST HOSP IP/OBS MODERATE 55: CPT

## 2021-10-17 RX ORDER — FUROSEMIDE 40 MG
60 TABLET ORAL
Refills: 0 | Status: DISCONTINUED | OUTPATIENT
Start: 2021-10-17 | End: 2021-10-19

## 2021-10-17 RX ADMIN — Medication 2: at 07:55

## 2021-10-17 RX ADMIN — APIXABAN 2.5 MILLIGRAM(S): 2.5 TABLET, FILM COATED ORAL at 17:56

## 2021-10-17 RX ADMIN — CARVEDILOL PHOSPHATE 6.25 MILLIGRAM(S): 80 CAPSULE, EXTENDED RELEASE ORAL at 17:56

## 2021-10-17 RX ADMIN — APIXABAN 2.5 MILLIGRAM(S): 2.5 TABLET, FILM COATED ORAL at 05:33

## 2021-10-17 RX ADMIN — CARVEDILOL PHOSPHATE 6.25 MILLIGRAM(S): 80 CAPSULE, EXTENDED RELEASE ORAL at 05:33

## 2021-10-17 RX ADMIN — POLYETHYLENE GLYCOL 3350 17 GRAM(S): 17 POWDER, FOR SOLUTION ORAL at 12:57

## 2021-10-17 RX ADMIN — Medication 40 MILLIGRAM(S): at 05:33

## 2021-10-17 RX ADMIN — Medication 100 MILLIGRAM(S): at 13:47

## 2021-10-17 RX ADMIN — Medication 1000 UNIT(S): at 17:56

## 2021-10-17 RX ADMIN — Medication 1: at 21:59

## 2021-10-17 RX ADMIN — Medication 4: at 17:03

## 2021-10-17 RX ADMIN — Medication 100 MILLIGRAM(S): at 21:27

## 2021-10-17 RX ADMIN — Medication 60 MILLIGRAM(S): at 17:56

## 2021-10-17 RX ADMIN — Medication 100 MILLIGRAM(S): at 05:33

## 2021-10-17 RX ADMIN — Medication 1000 UNIT(S): at 05:34

## 2021-10-17 RX ADMIN — Medication 4: at 11:53

## 2021-10-17 NOTE — CONSULT NOTE ADULT - SUBJECTIVE AND OBJECTIVE BOX
[facility] DIVISION OF KIDNEY DISEASES AND HYPERTENSION -- [phone]  -- INITIAL CONSULT NOTE  --------------------------------------------------------------------------------  HPI:    Pt. is an 86 y.o. F w/ PMHx. of DM2, PVD, HTN, Hx. of TIA, BRASH Syndrome and CKD presents for worsening shortness of breath and lower extremity edema. Nephrology consulted for rising Cr. in the setting of IV diuretic use. Pt. denies seeing outpatient Nephrologist but has appointment with Dr. Galarza in December 2021. Pt. unclear of baseline Cr. but per chart review on discharges in April and June of 2021 Pt. discharged with Cr. in the 1.7-1.9 range. Pt. admitted here with Cr. of 1.9 which has now trended to 2.5. Pt. with daughter and son at bedside endorse that Pt. has orthopnea and sleep in recliner, dyspnea on exertion and at time of admission with simple conversation. Pt. denies any difficulty passing urine and Echo shows pulmonary hypertension and diastolic heart failure. Pt. evalauated by cardiology and planned for RHC. Pt. states that now after 6 days of reeving BID Lasix ( until 10/16 when Lasix was decreased to Daily) Pt. feels better with her breathing. Pt. denies any thirst or constipation.      PAST HISTORY  --------------------------------------------------------------------------------  PAST MEDICAL & SURGICAL HISTORY:  Benign essential hypertension    Diabetes mellitus    Peripheral vascular disease    Personal history of asbestosis    Spinal stenosis    HTN (hypertension)    DM (diabetes mellitus)    TIA (transient ischemic attack)    Pulmonary fibrosis    Peripheral edema    Degenerative arthritis of right knee    Degenerative arthritis of left knee    Osteoporosis    KENNETH (obstructive sleep apnea)    Hypertension    Chronic kidney disease (CKD)    T2DM (type 2 diabetes mellitus)    PVD (peripheral vascular disease)    H/O abdominal hysterectomy    S/P hysterectomy    S/P spinal fusion    S/P cataract surgery  Right eye    History of hysterectomy      FAMILY HISTORY:    PAST SOCIAL HISTORY:    ALLERGIES & MEDICATIONS  --------------------------------------------------------------------------------  Allergies    Levaquin (Rash)    Intolerances      Standing Inpatient Medications  apixaban 2.5 milliGRAM(s) Oral two times a day  carvedilol 6.25 milliGRAM(s) Oral every 12 hours  cholecalciferol 1000 Unit(s) Oral two times a day  dextrose 40% Gel 15 Gram(s) Oral once  dextrose 5%. 1000 milliLiter(s) IV Continuous <Continuous>  dextrose 5%. 1000 milliLiter(s) IV Continuous <Continuous>  dextrose 50% Injectable 25 Gram(s) IV Push once  dextrose 50% Injectable 12.5 Gram(s) IV Push once  dextrose 50% Injectable 25 Gram(s) IV Push once  furosemide   Injectable 40 milliGRAM(s) IV Push daily  glucagon  Injectable 1 milliGRAM(s) IntraMuscular once  hydrALAZINE 100 milliGRAM(s) Oral every 8 hours  influenza   Vaccine 0.5 milliLiter(s) IntraMuscular once  insulin lispro (ADMELOG) corrective regimen sliding scale   SubCutaneous three times a day before meals  insulin lispro (ADMELOG) corrective regimen sliding scale   SubCutaneous at bedtime  polyethylene glycol 3350 17 Gram(s) Oral daily    PRN Inpatient Medications  acetaminophen   Tablet .. 650 milliGRAM(s) Oral every 6 hours PRN  melatonin 3 milliGRAM(s) Oral at bedtime PRN  ondansetron Injectable 4 milliGRAM(s) IV Push every 8 hours PRN      REVIEW OF SYSTEMS  --------------------------------------------------------------------------------  Gen: No fevers/chills  Skin: No rashes  Head/Eyes/Ears: Normal hearing,   Respiratory: No dyspnea, cough  CV: No chest pain  GI: No abdominal pain, diarrhea  : No dysuria, hematuria  MSK: No  edema  Heme: No easy bruising or bleeding  Psych: No significant depression    All other systems were reviewed and are negative, except as noted.    VITALS/PHYSICAL EXAM  --------------------------------------------------------------------------------  T(C): 36.7 (10-17-21 @ 11:45), Max: 36.7 (10-16-21 @ 21:01)  HR: 74 (10-17-21 @ 11:45) (60 - 74)  BP: 162/70 (10-17-21 @ 11:45) (129/63 - 162/70)  RR: 18 (10-17-21 @ 11:45) (18 - 18)  SpO2: 93% (10-17-21 @ 11:45) (93% - 96%)  Wt(kg): --        10-16-21 @ 07:01  -  10-17-21 @ 07:00  --------------------------------------------------------  IN: 0 mL / OUT: 490 mL / NET: -490 mL      Physical Exam:  	Gen: NAD  	HEENT: MMM  	Pulm: Diffuse ronchi most prominent at the bases   	CV: S1S2  	Abd: Soft, +BS, Presacral edema +  	Ext: 3+LE edema B/L  	Neuro: Awake  	Skin: Warm and dry  	Vascular access: Peripheral IV    LABS/STUDIES  --------------------------------------------------------------------------------              9.0    5.13  >-----------<  224      [10-17-21 @ 06:52]              29.4     138  |  104  |  74  ----------------------------<  138      [10-17-21 @ 06:52]  4.0   |  21  |  2.58        Ca     8.6     [10-17-21 @ 06:52]      Mg     2.1     [10-16-21 @ 06:19]    TPro  6.0  /  Alb  3.0  /  TBili  0.3  /  DBili  0.1  /  AST  18  /  ALT  12  /  AlkPhos  151  [10-16-21 @ 06:19]      Creatinine Trend:  SCr 2.58 [10-17 @ 06:52]  SCr 2.27 [10-16 @ 06:19]  SCr 2.09 [10-15 @ 07:18]  SCr 1.85 [10-14 @ 07:11]  SCr 1.94 [10-13 @ 06:20]    Urinalysis - [10-17-21 @ 06:47]      Color Light Yellow / Appearance Clear / SG 1.018 / pH 5.5      Gluc Negative / Ketone Negative  / Bili Negative / Urobili Negative       Blood Negative / Protein Trace / Leuk Est Negative / Nitrite Negative      RBC 1 / WBC 1 / Hyaline 0 / Gran  / Sq Epi  / Non Sq Epi 0 / Bacteria Negative    Urine Creatinine 90      [10-17-21 @ 06:48]  Urine Sodium 49      [10-17-21 @ 06:48]  Urine Urea Nitrogen 768      [10-17-21 @ 10:14]  Urine Osmolality 486      [10-17-21 @ 06:47]    TSH 2.80      [10-13-21 @ 13:13]   [facility] DIVISION OF KIDNEY DISEASES AND HYPERTENSION -- [phone]  -- INITIAL CONSULT NOTE  --------------------------------------------------------------------------------  HPI:    Pt. is an 86 y.o. F w/ PMHx. of DM2, PVD, HTN, Hx. of TIA, BRASH Syndrome and CKD presents for worsening shortness of breath and lower extremity edema. Nephrology consulted for rising Cr. in the setting of IV diuretic use. Pt. denies seeing outpatient Nephrologist but has appointment with Dr. Galarza in December 2021. Pt. unclear of baseline Cr. but per chart review on discharges in April and June of 2021 Pt. discharged with Cr. in the 1.7-1.9 range. Pt. admitted here with Cr. of 1.9 which has now trended to 2.5. Pt. with daughter and son at bedside endorse that Pt. has orthopnea and sleeps in recliner, dyspnea on exertion and at time of admission with simple conversation. Pt. denies any difficulty passing urine and Echo shows pulmonary hypertension and diastolic heart failure. Pt. evalauated by cardiology and planned for RHC. Pt. states that now after 6 days of receving BID Lasix ( until 10/16 when Lasix was decreased to Daily) Pt. feels better with her breathing. Pt. denies any thirst or constipation.      PAST HISTORY  --------------------------------------------------------------------------------  PAST MEDICAL & SURGICAL HISTORY:  Benign essential hypertension    Diabetes mellitus    Peripheral vascular disease    Personal history of asbestosis    Spinal stenosis    HTN (hypertension)    DM (diabetes mellitus)    TIA (transient ischemic attack)    Pulmonary fibrosis    Peripheral edema    Degenerative arthritis of right knee    Degenerative arthritis of left knee    Osteoporosis    KENNETH (obstructive sleep apnea)    Hypertension    Chronic kidney disease (CKD)    T2DM (type 2 diabetes mellitus)    PVD (peripheral vascular disease)    H/O abdominal hysterectomy    S/P hysterectomy    S/P spinal fusion    S/P cataract surgery  Right eye    History of hysterectomy      FAMILY HISTORY:    PAST SOCIAL HISTORY:    ALLERGIES & MEDICATIONS  --------------------------------------------------------------------------------  Allergies    Levaquin (Rash)    Intolerances      Standing Inpatient Medications  apixaban 2.5 milliGRAM(s) Oral two times a day  carvedilol 6.25 milliGRAM(s) Oral every 12 hours  cholecalciferol 1000 Unit(s) Oral two times a day  dextrose 40% Gel 15 Gram(s) Oral once  dextrose 5%. 1000 milliLiter(s) IV Continuous <Continuous>  dextrose 5%. 1000 milliLiter(s) IV Continuous <Continuous>  dextrose 50% Injectable 25 Gram(s) IV Push once  dextrose 50% Injectable 12.5 Gram(s) IV Push once  dextrose 50% Injectable 25 Gram(s) IV Push once  furosemide   Injectable 40 milliGRAM(s) IV Push daily  glucagon  Injectable 1 milliGRAM(s) IntraMuscular once  hydrALAZINE 100 milliGRAM(s) Oral every 8 hours  influenza   Vaccine 0.5 milliLiter(s) IntraMuscular once  insulin lispro (ADMELOG) corrective regimen sliding scale   SubCutaneous three times a day before meals  insulin lispro (ADMELOG) corrective regimen sliding scale   SubCutaneous at bedtime  polyethylene glycol 3350 17 Gram(s) Oral daily    PRN Inpatient Medications  acetaminophen   Tablet .. 650 milliGRAM(s) Oral every 6 hours PRN  melatonin 3 milliGRAM(s) Oral at bedtime PRN  ondansetron Injectable 4 milliGRAM(s) IV Push every 8 hours PRN      REVIEW OF SYSTEMS  --------------------------------------------------------------------------------  Gen: No fevers/chills  Skin: No rashes  Head/Eyes/Ears: Normal hearing,   Respiratory: No dyspnea, cough  CV: No chest pain  GI: No abdominal pain, diarrhea  : No dysuria, hematuria  MSK: No  edema  Heme: No easy bruising or bleeding  Psych: No significant depression    All other systems were reviewed and are negative, except as noted.    VITALS/PHYSICAL EXAM  --------------------------------------------------------------------------------  T(C): 36.7 (10-17-21 @ 11:45), Max: 36.7 (10-16-21 @ 21:01)  HR: 74 (10-17-21 @ 11:45) (60 - 74)  BP: 162/70 (10-17-21 @ 11:45) (129/63 - 162/70)  RR: 18 (10-17-21 @ 11:45) (18 - 18)  SpO2: 93% (10-17-21 @ 11:45) (93% - 96%)  Wt(kg): --        10-16-21 @ 07:01  -  10-17-21 @ 07:00  --------------------------------------------------------  IN: 0 mL / OUT: 490 mL / NET: -490 mL      Physical Exam:  	Gen: NAD  	HEENT: MMM  	Pulm: Diffuse ronchi/crackles most prominent at the bases   	CV: S1S2  	Abd: Soft, +BS, Presacral edema +  	Ext: 3+LE edema B/L  	Neuro: Awake  	Skin: Warm and dry  	Vascular access: Peripheral IV    LABS/STUDIES  --------------------------------------------------------------------------------              9.0    5.13  >-----------<  224      [10-17-21 @ 06:52]              29.4     138  |  104  |  74  ----------------------------<  138      [10-17-21 @ 06:52]  4.0   |  21  |  2.58        Ca     8.6     [10-17-21 @ 06:52]      Mg     2.1     [10-16-21 @ 06:19]    TPro  6.0  /  Alb  3.0  /  TBili  0.3  /  DBili  0.1  /  AST  18  /  ALT  12  /  AlkPhos  151  [10-16-21 @ 06:19]      Creatinine Trend:  SCr 2.58 [10-17 @ 06:52]  SCr 2.27 [10-16 @ 06:19]  SCr 2.09 [10-15 @ 07:18]  SCr 1.85 [10-14 @ 07:11]  SCr 1.94 [10-13 @ 06:20]    Urinalysis - [10-17-21 @ 06:47]      Color Light Yellow / Appearance Clear / SG 1.018 / pH 5.5      Gluc Negative / Ketone Negative  / Bili Negative / Urobili Negative       Blood Negative / Protein Trace / Leuk Est Negative / Nitrite Negative      RBC 1 / WBC 1 / Hyaline 0 / Gran  / Sq Epi  / Non Sq Epi 0 / Bacteria Negative    Urine Creatinine 90      [10-17-21 @ 06:48]  Urine Sodium 49      [10-17-21 @ 06:48]  Urine Urea Nitrogen 768      [10-17-21 @ 10:14]  Urine Osmolality 486      [10-17-21 @ 06:47]    TSH 2.80      [10-13-21 @ 13:13]

## 2021-10-17 NOTE — PROGRESS NOTE ADULT - SUBJECTIVE AND OBJECTIVE BOX
[facility] Division of Hospital Medicine  Tiburcio Quach MD  Pager (SUKUMAR-F, 8A-5P): [phone]  Other Times:  [phone]    Patient is a 86y old  Female who presents with a chief complaint of Shortness of breath (16 Oct 2021 17:58)      SUBJECTIVE / OVERNIGHT EVENTS:    Patient was examined this morning. Reports feeling the same as yesterday - has some dyspnea, dry cough, pain and swelling in legs. Rest of 10 point ROS negative.       ADDITIONAL REVIEW OF SYSTEMS:    MEDICATIONS  (STANDING):  apixaban 2.5 milliGRAM(s) Oral two times a day  carvedilol 6.25 milliGRAM(s) Oral every 12 hours  cholecalciferol 1000 Unit(s) Oral two times a day  dextrose 40% Gel 15 Gram(s) Oral once  dextrose 5%. 1000 milliLiter(s) (50 mL/Hr) IV Continuous <Continuous>  dextrose 5%. 1000 milliLiter(s) (100 mL/Hr) IV Continuous <Continuous>  dextrose 50% Injectable 25 Gram(s) IV Push once  dextrose 50% Injectable 12.5 Gram(s) IV Push once  dextrose 50% Injectable 25 Gram(s) IV Push once  furosemide   Injectable 40 milliGRAM(s) IV Push daily  glucagon  Injectable 1 milliGRAM(s) IntraMuscular once  hydrALAZINE 100 milliGRAM(s) Oral every 8 hours  influenza   Vaccine 0.5 milliLiter(s) IntraMuscular once  insulin lispro (ADMELOG) corrective regimen sliding scale   SubCutaneous three times a day before meals  insulin lispro (ADMELOG) corrective regimen sliding scale   SubCutaneous at bedtime  polyethylene glycol 3350 17 Gram(s) Oral daily    MEDICATIONS  (PRN):  acetaminophen   Tablet .. 650 milliGRAM(s) Oral every 6 hours PRN Temp greater or equal to 38C (100.4F), Mild Pain (1 - 3)  melatonin 3 milliGRAM(s) Oral at bedtime PRN Insomnia  ondansetron Injectable 4 milliGRAM(s) IV Push every 8 hours PRN Nausea and/or Vomiting      CAPILLARY BLOOD GLUCOSE      POCT Blood Glucose.: 228 mg/dL (17 Oct 2021 11:33)  POCT Blood Glucose.: 151 mg/dL (17 Oct 2021 07:45)  POCT Blood Glucose.: 226 mg/dL (16 Oct 2021 21:15)  POCT Blood Glucose.: 193 mg/dL (16 Oct 2021 16:40)    I&O's Summary    16 Oct 2021 07:01  -  17 Oct 2021 07:00  --------------------------------------------------------  IN: 0 mL / OUT: 490 mL / NET: -490 mL        PHYSICAL EXAM:  Vital Signs Last 24 Hrs  T(C): 36.7 (17 Oct 2021 11:45), Max: 36.7 (16 Oct 2021 21:01)  T(F): 98.1 (17 Oct 2021 11:45), Max: 98.1 (17 Oct 2021 00:57)  HR: 74 (17 Oct 2021 11:45) (60 - 74)  BP: 162/70 (17 Oct 2021 11:45) (129/63 - 162/70)  BP(mean): --  RR: 18 (17 Oct 2021 11:45) (18 - 18)  SpO2: 93% (17 Oct 2021 11:45) (93% - 96%)      CONSTITUTIONAL: no respiratory distress on NC, elderly female  EYES: EOMI, PERRL, conjunctiva and sclera clear  ENMT: Moist oral mucosa  NECK: Supple  RESPIRATORY: faint rales BL, normal respiratory effort   CARDIOVASCULAR: Regular rate and rhythm, normal S1 and S2, no murmur/rub/gallop; 2+ pitting edema BL LE, tender to touch  ABDOMEN: normoactive bowel sounds, soft, nontender to palpation, no rebound/guarding; no distension   PSYCH: A+O to person, place, and time; affect appropriate        LABS:                          9.0    5.13  )-----------( 224      ( 17 Oct 2021 06:52 )             29.4     10-17    138  |  104  |  74<H>  ----------------------------<  138<H>  4.0   |  21<L>  |  2.58<H>    Ca    8.6      17 Oct 2021 06:52  Mg     2.1     10-16    TPro  6.0  /  Alb  3.0<L>  /  TBili  0.3  /  DBili  0.1  /  AST  18  /  ALT  12  /  AlkPhos  151<H>  10-16          Urinalysis Basic - ( 17 Oct 2021 06:47 )    Color: Light Yellow / Appearance: Clear / S.018 / pH: x  Gluc: x / Ketone: Negative  / Bili: Negative / Urobili: Negative   Blood: x / Protein: Trace / Nitrite: Negative   Leuk Esterase: Negative / RBC: 1 /hpf / WBC 1 /HPF   Sq Epi: x / Non Sq Epi: 0 /hpf / Bacteria: Negative          RADIOLOGY & ADDITIONAL TESTS:  Results Reviewed:   Imaging Personally Reviewed:  Electrocardiogram Personally Reviewed:    COORDINATION OF CARE:  Care Discussed with Consultants/Other Providers [Y/N]:  Prior or Outpatient Records Reviewed [Y/N]:

## 2021-10-17 NOTE — CONSULT NOTE ADULT - ASSESSMENT
Pt. is an 86 y.o. F w/ PMHx. of DM2, PVD, HTN, Hx. of TIA, BRASH Syndrome and CKD presents for worsening shortness of breath and lower extremity edema. Nephrology consulted for rising Cr. in the setting of IV diuretic use.     #LUIS FERNANDO on CKD  #HTN  Per chart review baseline Cr. 1.7-1.9 Cr, on admission 1.9, now trending up to 2.58 10/17.   Pt. hypertensive on supplemetal O2, with 3+ edema, presacral edema, and orthopnea  LUIS FERNANDO likely in setting of increased venous congestion as Cr. rising even though Lasix decreased to 40 IV Daily. Would advise increasing Lasix to 60mg IV BID, Please monitor strict Is and Os, with low threshold to place douglas if Cr. continues to rise. Discussed with family at bedside that they witnessed Pt. put out 800cc of urine over 3 hours even though Pt. did not receive any PM doses of Lasix (only received 40mg IV at 3 AM), retaining 2/2 DM? Agree with Cardiology plan for RHC. Joseph's shows 57% risk of ALE and 12.6% risk of requiring Dialysis. FUrea shows pre-renal etiology.   Increase in diuresis will aid in BP control  Avoid nephrotoxins, Hold ACE/ARB, and NSAIDs   Fluid restriction to 1.5L    #Anemia   Trend CBC, please obtain Iron studies and calculate TSAT for iron repletion.   Transfuse for HgB less than 8    If you have any questions, please feel free to contact me  Rufino Kimble  Nephrology Fellow  [phone]  (After 5pm or on weekends please page the on-call fellow)   Pt. is an 86 y.o. F w/ PMHx. of DM2, PVD, HTN, Hx. of TIA, BRASH Syndrome and CKD presents for worsening shortness of breath and lower extremity edema. Nephrology consulted for rising Cr. in the setting of IV diuretic use.     #LUIS FERNANDO on CKD  #HTN  Per chart review baseline Cr. 1.7-1.9 Cr, on admission 1.9, now trending up to 2.58 10/17.   Pt. hypertensive on supplemetal O2, with 3+ edema, presacral edema, and orthopnea  LUIS FERNANDO likely in setting of increased venous congestion as Cr. rising even though Lasix decreased to 40 IV Daily. Would advise increasing Lasix to 60mg IV BID, Please monitor strict Is and Os, with low threshold to place douglas if Cr. continues to rise. Discussed with family at bedside that they witnessed Pt. put out 800cc of urine over 3 hours even though Pt. did not receive any PM doses of Lasix (only received 40mg IV at 3 AM), retaining 2/2 DM? Agree with Cardiology plan for RHC.     Increase in diuresis will aid in BP control  Avoid nephrotoxins, Hold ACE/ARB, and NSAIDs   Fluid restriction to 1.5L    #Anemia   Trend CBC, please obtain Iron studies and calculate TSAT for iron repletion.   Transfuse for HgB less than 8    If you have any questions, please feel free to contact me  Rufino Kimble  Nephrology Fellow  [phone]  (After 5pm or on weekends please page the on-call fellow)

## 2021-10-18 LAB
ANION GAP SERPL CALC-SCNC: 15 MMOL/L — SIGNIFICANT CHANGE UP (ref 5–17)
BUN SERPL-MCNC: 76 MG/DL — HIGH (ref 7–23)
CALCIUM SERPL-MCNC: 9 MG/DL — SIGNIFICANT CHANGE UP (ref 8.4–10.5)
CHLORIDE SERPL-SCNC: 100 MMOL/L — SIGNIFICANT CHANGE UP (ref 96–108)
CO2 SERPL-SCNC: 22 MMOL/L — SIGNIFICANT CHANGE UP (ref 22–31)
CREAT SERPL-MCNC: 2.37 MG/DL — HIGH (ref 0.5–1.3)
FERRITIN SERPL-MCNC: 231 NG/ML — HIGH (ref 15–150)
FOLATE SERPL-MCNC: 11.2 NG/ML — SIGNIFICANT CHANGE UP
GLUCOSE BLDC GLUCOMTR-MCNC: 148 MG/DL — HIGH (ref 70–99)
GLUCOSE BLDC GLUCOMTR-MCNC: 171 MG/DL — HIGH (ref 70–99)
GLUCOSE BLDC GLUCOMTR-MCNC: 276 MG/DL — HIGH (ref 70–99)
GLUCOSE BLDC GLUCOMTR-MCNC: 292 MG/DL — HIGH (ref 70–99)
GLUCOSE SERPL-MCNC: 234 MG/DL — HIGH (ref 70–99)
HCT VFR BLD CALC: 30.8 % — LOW (ref 34.5–45)
HGB BLD-MCNC: 9.9 G/DL — LOW (ref 11.5–15.5)
IRON SATN MFR SERPL: 28 % — SIGNIFICANT CHANGE UP (ref 14–50)
IRON SATN MFR SERPL: 62 UG/DL — SIGNIFICANT CHANGE UP (ref 30–160)
MAGNESIUM SERPL-MCNC: 2.3 MG/DL — SIGNIFICANT CHANGE UP (ref 1.6–2.6)
MCHC RBC-ENTMCNC: 32.1 GM/DL — SIGNIFICANT CHANGE UP (ref 32–36)
MCHC RBC-ENTMCNC: 33 PG — SIGNIFICANT CHANGE UP (ref 27–34)
MCV RBC AUTO: 102.7 FL — HIGH (ref 80–100)
NRBC # BLD: 0 /100 WBCS — SIGNIFICANT CHANGE UP (ref 0–0)
PLATELET # BLD AUTO: 243 K/UL — SIGNIFICANT CHANGE UP (ref 150–400)
POTASSIUM SERPL-MCNC: 4.3 MMOL/L — SIGNIFICANT CHANGE UP (ref 3.5–5.3)
POTASSIUM SERPL-SCNC: 4.3 MMOL/L — SIGNIFICANT CHANGE UP (ref 3.5–5.3)
RBC # BLD: 2.95 M/UL — LOW (ref 3.8–5.2)
RBC # BLD: 3 M/UL — LOW (ref 3.8–5.2)
RBC # FLD: 12.5 % — SIGNIFICANT CHANGE UP (ref 10.3–14.5)
RETICS #: 60.2 K/UL — SIGNIFICANT CHANGE UP (ref 25–125)
RETICS/RBC NFR: 2 % — SIGNIFICANT CHANGE UP (ref 0.5–2.5)
SARS-COV-2 RNA SPEC QL NAA+PROBE: SIGNIFICANT CHANGE UP
SODIUM SERPL-SCNC: 137 MMOL/L — SIGNIFICANT CHANGE UP (ref 135–145)
TIBC SERPL-MCNC: 225 UG/DL — SIGNIFICANT CHANGE UP (ref 220–430)
UIBC SERPL-MCNC: 163 UG/DL — SIGNIFICANT CHANGE UP (ref 110–370)
VIT B12 SERPL-MCNC: 326 PG/ML — SIGNIFICANT CHANGE UP (ref 232–1245)
WBC # BLD: 4.56 K/UL — SIGNIFICANT CHANGE UP (ref 3.8–10.5)
WBC # FLD AUTO: 4.56 K/UL — SIGNIFICANT CHANGE UP (ref 3.8–10.5)

## 2021-10-18 PROCEDURE — 99233 SBSQ HOSP IP/OBS HIGH 50: CPT | Mod: GC

## 2021-10-18 PROCEDURE — 99233 SBSQ HOSP IP/OBS HIGH 50: CPT

## 2021-10-18 RX ADMIN — CARVEDILOL PHOSPHATE 6.25 MILLIGRAM(S): 80 CAPSULE, EXTENDED RELEASE ORAL at 18:08

## 2021-10-18 RX ADMIN — POLYETHYLENE GLYCOL 3350 17 GRAM(S): 17 POWDER, FOR SOLUTION ORAL at 12:29

## 2021-10-18 RX ADMIN — Medication 100 MILLIGRAM(S): at 06:26

## 2021-10-18 RX ADMIN — APIXABAN 2.5 MILLIGRAM(S): 2.5 TABLET, FILM COATED ORAL at 18:08

## 2021-10-18 RX ADMIN — Medication 1000 UNIT(S): at 18:08

## 2021-10-18 RX ADMIN — Medication 6: at 12:26

## 2021-10-18 RX ADMIN — Medication 60 MILLIGRAM(S): at 06:27

## 2021-10-18 RX ADMIN — Medication 1000 UNIT(S): at 06:26

## 2021-10-18 RX ADMIN — Medication 100 MILLIGRAM(S): at 22:16

## 2021-10-18 RX ADMIN — Medication 60 MILLIGRAM(S): at 18:07

## 2021-10-18 RX ADMIN — APIXABAN 2.5 MILLIGRAM(S): 2.5 TABLET, FILM COATED ORAL at 06:26

## 2021-10-18 RX ADMIN — Medication 100 MILLIGRAM(S): at 16:48

## 2021-10-18 RX ADMIN — Medication 6: at 17:12

## 2021-10-18 RX ADMIN — CARVEDILOL PHOSPHATE 6.25 MILLIGRAM(S): 80 CAPSULE, EXTENDED RELEASE ORAL at 06:26

## 2021-10-18 NOTE — PROGRESS NOTE ADULT - SUBJECTIVE AND OBJECTIVE BOX
[facility] DIVISION OF KIDNEY DISEASES AND HYPERTENSION -- FOLLOW UP NOTE  --------------------------------------------------------------------------------  Chief Complaint:    24 hour events/subjective:    seen and examined this morning   reports feeling better when she presented to the hospital   no acute issues noted overnight       PAST HISTORY  --------------------------------------------------------------------------------  No significant changes to PMH, PSH, FHx, SHx, unless otherwise noted    ALLERGIES & MEDICATIONS  --------------------------------------------------------------------------------  Allergies    Levaquin (Rash)    Intolerances      Standing Inpatient Medications  apixaban 2.5 milliGRAM(s) Oral two times a day  carvedilol 6.25 milliGRAM(s) Oral every 12 hours  cholecalciferol 1000 Unit(s) Oral two times a day  dextrose 40% Gel 15 Gram(s) Oral once  dextrose 5%. 1000 milliLiter(s) IV Continuous <Continuous>  dextrose 5%. 1000 milliLiter(s) IV Continuous <Continuous>  dextrose 50% Injectable 25 Gram(s) IV Push once  dextrose 50% Injectable 12.5 Gram(s) IV Push once  dextrose 50% Injectable 25 Gram(s) IV Push once  furosemide   Injectable 60 milliGRAM(s) IV Push two times a day  glucagon  Injectable 1 milliGRAM(s) IntraMuscular once  hydrALAZINE 100 milliGRAM(s) Oral every 8 hours  influenza   Vaccine 0.5 milliLiter(s) IntraMuscular once  insulin lispro (ADMELOG) corrective regimen sliding scale   SubCutaneous three times a day before meals  insulin lispro (ADMELOG) corrective regimen sliding scale   SubCutaneous at bedtime  polyethylene glycol 3350 17 Gram(s) Oral daily    PRN Inpatient Medications  acetaminophen   Tablet .. 650 milliGRAM(s) Oral every 6 hours PRN  melatonin 3 milliGRAM(s) Oral at bedtime PRN  ondansetron Injectable 4 milliGRAM(s) IV Push every 8 hours PRN      REVIEW OF SYSTEMS    All other systems were reviewed and are negative, except as noted.    VITALS/PHYSICAL EXAM  --------------------------------------------------------------------------------  T(C): 36.7 (10-18-21 @ 10:55), Max: 37 (10-18-21 @ 02:15)  HR: 88 (10-18-21 @ 12:04) (65 - 88)  BP: 161/64 (10-18-21 @ 10:55) (147/64 - 165/67)  RR: 20 (10-18-21 @ 13:12) (18 - 20)  SpO2: 92% (10-18-21 @ 13:12) (92% - 97%)  Wt(kg): --        10-17-21 @ 07:01  -  10-18-21 @ 07:00  --------------------------------------------------------  IN: 240 mL / OUT: 1000 mL / NET: -760 mL    10-18-21 @ 07:01  -  10-18-21 @ 13:56  --------------------------------------------------------  IN: 240 mL / OUT: 0 mL / NET: 240 mL        Physical Exam:  	Gen: NAD  	HEENT: MMM  	Pulm: CTA B/L  	CV: S1S2  	Abd: Soft, +BS   	Ext: + LE edema B/L  	Neuro: Awake  	Skin: Warm and dry  	Vascular access: N/A      LABS/STUDIES  --------------------------------------------------------------------------------              9.9    4.56  >-----------<  243      [10-18-21 @ 09:59]              30.8     137  |  100  |  76  ----------------------------<  234      [10-18-21 @ 09:59]  4.3   |  22  |  2.37        Ca     9.0     [10-18-21 @ 09:59]      Mg     2.3     [10-18-21 @ 09:59]            Creatinine Trend:  SCr 2.37 [10-18 @ 09:59]  SCr 2.58 [10-17 @ 06:52]  SCr 2.27 [10-16 @ 06:19]  SCr 2.09 [10-15 @ 07:18]  SCr 1.85 [10-14 @ 07:11]    Urinalysis - [10-17-21 @ 06:47]      Color Light Yellow / Appearance Clear / SG 1.018 / pH 5.5      Gluc Negative / Ketone Negative  / Bili Negative / Urobili Negative       Blood Negative / Protein Trace / Leuk Est Negative / Nitrite Negative      RBC 1 / WBC 1 / Hyaline 0 / Gran  / Sq Epi  / Non Sq Epi 0 / Bacteria Negative    Urine Creatinine 90      [10-17-21 @ 06:48]  Urine Sodium 49      [10-17-21 @ 06:48]  Urine Urea Nitrogen 768      [10-17-21 @ 10:14]  Urine Osmolality 486      [10-17-21 @ 06:47]    Iron 62, TIBC 225, %sat 28      [10-18-21 @ 10:45]  Ferritin 231      [10-18-21 @ 13:01]  TSH 2.80      [10-13-21 @ 13:13]

## 2021-10-18 NOTE — PROGRESS NOTE ADULT - ASSESSMENT
Pt. is an 86 y.o. F w/ PMHx. of DM2, PVD, HTN, Hx. of TIA, BRASH Syndrome and CKD presents for worsening shortness of breath and lower extremity edema. Nephrology consulted for rising Cr. in the setting of IV diuretic use.     LUIS FERNANDO on CKD  HTN  - baseline Cr. 1.7-1.9 Cr, on admission 1.9, peaked to 2.58 10/17.   --- mild improvement in creatinine this morning; continue to monitor  Pt. hypertensive on supplemental O2, with 3+ edema, presacral edema, and orthopnea  LUIS FERNANDO likely in setting of increased venous congestion  continue with Lasix to 60mg IV BID; Please monitor strict Is and Os.   Agree with Cardiology plan for R[facility].     Avoid nephrotoxins, Hold ACE/ARB, and NSAIDs   monitor and replete electrolytes PRN   Fluid restriction to 1.5L    Anemia   Trend CBC,   order iron studies including ferritin and TIBC  Transfuse for HgB less than 8    If any questions, please feel free to contact me     Ernesto Tillman  Nephrology Fellow  [facility] Pager: [phone]   Pt. is an 86 y.o. F w/ PMHx. of DM2, PVD, HTN, Hx. of TIA, BRASH Syndrome and CKD presents for worsening shortness of breath and lower extremity edema. Nephrology consulted for rising Cr. in the setting of IV diuretic use.     LUIS FERNANDO on CKD  HTN  - baseline Cr. 1.7-1.9 Cr, on admission 1.9, peaked to 2.58 10/17.   --- mild improvement in creatinine this morning; continue to monitor  Pt. hypertensive on supplemental O2, with 3+ edema, presacral edema, and orthopnea  LUIS FERNANDO likely in setting of increased venous congestion/ cardiorenal syndrome  continue with Lasix 60mg IV BID; Please monitor strict Is and Os.   Check UA and spot urine TP/Cr  Avoid nephrotoxins  Fluid restriction       If any questions, please feel free to contact me     Ernesto Tillman  Nephrology Fellow  [facility] Pager: [phone]

## 2021-10-18 NOTE — PROGRESS NOTE ADULT - SUBJECTIVE AND OBJECTIVE BOX
[facility] Division of Hospital Medicine  Valentin Allen MD, MICHAEL  I'm reachable on Microsoft Teams   Off hours:  [phone] ([facility] pager)    Patient is a 86y old  Female who presents with a chief complaint of Shortness of breath (18 Oct 2021 13:56)      SUBJECTIVE / OVERNIGHT EVENTS:  No events overnight. No new complaints.     MEDICATIONS  (STANDING):  apixaban 2.5 milliGRAM(s) Oral two times a day  carvedilol 6.25 milliGRAM(s) Oral every 12 hours  cholecalciferol 1000 Unit(s) Oral two times a day  dextrose 40% Gel 15 Gram(s) Oral once  dextrose 5%. 1000 milliLiter(s) (50 mL/Hr) IV Continuous <Continuous>  dextrose 5%. 1000 milliLiter(s) (100 mL/Hr) IV Continuous <Continuous>  dextrose 50% Injectable 25 Gram(s) IV Push once  dextrose 50% Injectable 12.5 Gram(s) IV Push once  dextrose 50% Injectable 25 Gram(s) IV Push once  furosemide   Injectable 60 milliGRAM(s) IV Push two times a day  glucagon  Injectable 1 milliGRAM(s) IntraMuscular once  hydrALAZINE 100 milliGRAM(s) Oral every 8 hours  influenza   Vaccine 0.5 milliLiter(s) IntraMuscular once  insulin lispro (ADMELOG) corrective regimen sliding scale   SubCutaneous three times a day before meals  insulin lispro (ADMELOG) corrective regimen sliding scale   SubCutaneous at bedtime  polyethylene glycol 3350 17 Gram(s) Oral daily    MEDICATIONS  (PRN):  acetaminophen   Tablet .. 650 milliGRAM(s) Oral every 6 hours PRN Temp greater or equal to 38C (100.4F), Mild Pain (1 - 3)  melatonin 3 milliGRAM(s) Oral at bedtime PRN Insomnia  ondansetron Injectable 4 milliGRAM(s) IV Push every 8 hours PRN Nausea and/or Vomiting    CAPILLARY BLOOD GLUCOSE      POCT Blood Glucose.: 292 mg/dL (18 Oct 2021 16:37)  POCT Blood Glucose.: 276 mg/dL (18 Oct 2021 11:49)  POCT Blood Glucose.: 148 mg/dL (18 Oct 2021 07:32)  POCT Blood Glucose.: 264 mg/dL (17 Oct 2021 21:05)    I&O's Summary    17 Oct 2021 07:01  -  18 Oct 2021 07:00  --------------------------------------------------------  IN: 240 mL / OUT: 1000 mL / NET: -760 mL    18 Oct 2021 07:01  -  18 Oct 2021 20:07  --------------------------------------------------------  IN: 600 mL / OUT: 1200 mL / NET: -600 mL        PHYSICAL EXAM:  Vital Signs Last 24 Hrs  T(C): 36.7 (18 Oct 2021 10:55), Max: 37 (18 Oct 2021 02:15)  T(F): 98 (18 Oct 2021 10:55), Max: 98.6 (18 Oct 2021 02:15)  HR: 70 (18 Oct 2021 18:13) (65 - 88)  BP: 152/68 (18 Oct 2021 18:13) (135/61 - 165/67)  BP(mean): 92 (18 Oct 2021 02:15) (92 - 92)  RR: 18 (18 Oct 2021 18:13) (18 - 20)  SpO2: 95% (18 Oct 2021 18:13) (92% - 97%)    CONSTITUTIONAL: no respiratory distress on NC, elderly female  EYES: EOMI, conjunctiva and sclera clear  ENMT: Moist oral mucosa  NECK: Supple  RESPIRATORY: faint rales BL, normal respiratory effort   CARDIOVASCULAR: Regular rate and rhythm, normal S1 and S2, no murmur/rub/gallop; 2+ pitting edema BL LE, tender to touch  ABDOMEN: normoactive bowel sounds, soft, nontender to palpation, no rebound/guarding; no distension   PSYCH: A+O to person, place, and time; affect appropriate        LABS:                        9.9    4.56  )-----------( 243      ( 18 Oct 2021 09:59 )             30.8     10-18    137  |  100  |  76<H>  ----------------------------<  234<H>  4.3   |  22  |  2.37<H>    Ca    9.0      18 Oct 2021 09:59  Mg     2.3     10-18            Urinalysis Basic - ( 17 Oct 2021 06:47 )    Color: Light Yellow / Appearance: Clear / S.018 / pH: x  Gluc: x / Ketone: Negative  / Bili: Negative / Urobili: Negative   Blood: x / Protein: Trace / Nitrite: Negative   Leuk Esterase: Negative / RBC: 1 /hpf / WBC 1 /HPF   Sq Epi: x / Non Sq Epi: 0 /hpf / Bacteria: Negative          RADIOLOGY & ADDITIONAL TESTS:    Lab Results Reviewed: Hg 9.9 stable, no leukocytosis, Cr increased from baseline but holding steady

## 2021-10-19 DIAGNOSIS — J96.01 ACUTE RESPIRATORY FAILURE WITH HYPOXIA: ICD-10-CM

## 2021-10-19 DIAGNOSIS — J43.9 EMPHYSEMA, UNSPECIFIED: ICD-10-CM

## 2021-10-19 DIAGNOSIS — Z87.09 PERSONAL HISTORY OF OTHER DISEASES OF THE RESPIRATORY SYSTEM: ICD-10-CM

## 2021-10-19 LAB
ALBUMIN SERPL ELPH-MCNC: 3 G/DL — LOW (ref 3.3–5)
ALP SERPL-CCNC: 170 U/L — HIGH (ref 40–120)
ALT FLD-CCNC: 17 U/L — SIGNIFICANT CHANGE UP (ref 10–45)
ANION GAP SERPL CALC-SCNC: 15 MMOL/L — SIGNIFICANT CHANGE UP (ref 5–17)
AST SERPL-CCNC: 19 U/L — SIGNIFICANT CHANGE UP (ref 10–40)
BILIRUB SERPL-MCNC: 0.2 MG/DL — SIGNIFICANT CHANGE UP (ref 0.2–1.2)
BUN SERPL-MCNC: 80 MG/DL — HIGH (ref 7–23)
CALCIUM SERPL-MCNC: 8.9 MG/DL — SIGNIFICANT CHANGE UP (ref 8.4–10.5)
CHLORIDE SERPL-SCNC: 102 MMOL/L — SIGNIFICANT CHANGE UP (ref 96–108)
CO2 SERPL-SCNC: 20 MMOL/L — LOW (ref 22–31)
CREAT SERPL-MCNC: 2.36 MG/DL — HIGH (ref 0.5–1.3)
GLUCOSE BLDC GLUCOMTR-MCNC: 130 MG/DL — HIGH (ref 70–99)
GLUCOSE BLDC GLUCOMTR-MCNC: 225 MG/DL — HIGH (ref 70–99)
GLUCOSE BLDC GLUCOMTR-MCNC: 238 MG/DL — HIGH (ref 70–99)
GLUCOSE BLDC GLUCOMTR-MCNC: 273 MG/DL — HIGH (ref 70–99)
GLUCOSE SERPL-MCNC: 126 MG/DL — HIGH (ref 70–99)
HCT VFR BLD CALC: 30 % — LOW (ref 34.5–45)
HGB BLD-MCNC: 9.4 G/DL — LOW (ref 11.5–15.5)
MAGNESIUM SERPL-MCNC: 2.5 MG/DL — SIGNIFICANT CHANGE UP (ref 1.6–2.6)
MCHC RBC-ENTMCNC: 31.3 GM/DL — LOW (ref 32–36)
MCHC RBC-ENTMCNC: 32.5 PG — SIGNIFICANT CHANGE UP (ref 27–34)
MCV RBC AUTO: 103.8 FL — HIGH (ref 80–100)
NRBC # BLD: 0 /100 WBCS — SIGNIFICANT CHANGE UP (ref 0–0)
PLATELET # BLD AUTO: 231 K/UL — SIGNIFICANT CHANGE UP (ref 150–400)
POTASSIUM SERPL-MCNC: 4.3 MMOL/L — SIGNIFICANT CHANGE UP (ref 3.5–5.3)
POTASSIUM SERPL-SCNC: 4.3 MMOL/L — SIGNIFICANT CHANGE UP (ref 3.5–5.3)
PROT SERPL-MCNC: 6.2 G/DL — SIGNIFICANT CHANGE UP (ref 6–8.3)
RBC # BLD: 2.89 M/UL — LOW (ref 3.8–5.2)
RBC # FLD: 12.3 % — SIGNIFICANT CHANGE UP (ref 10.3–14.5)
SODIUM SERPL-SCNC: 137 MMOL/L — SIGNIFICANT CHANGE UP (ref 135–145)
WBC # BLD: 4.51 K/UL — SIGNIFICANT CHANGE UP (ref 3.8–10.5)
WBC # FLD AUTO: 4.51 K/UL — SIGNIFICANT CHANGE UP (ref 3.8–10.5)

## 2021-10-19 PROCEDURE — 99223 1ST HOSP IP/OBS HIGH 75: CPT | Mod: GC

## 2021-10-19 PROCEDURE — 99233 SBSQ HOSP IP/OBS HIGH 50: CPT

## 2021-10-19 PROCEDURE — 99233 SBSQ HOSP IP/OBS HIGH 50: CPT | Mod: GC

## 2021-10-19 PROCEDURE — 71045 X-RAY EXAM CHEST 1 VIEW: CPT | Mod: 26

## 2021-10-19 PROCEDURE — 99223 1ST HOSP IP/OBS HIGH 75: CPT

## 2021-10-19 RX ORDER — SODIUM CHLORIDE 0.65 %
2 AEROSOL, SPRAY (ML) NASAL
Refills: 0 | Status: DISCONTINUED | OUTPATIENT
Start: 2021-10-19 | End: 2021-10-23

## 2021-10-19 RX ORDER — TIOTROPIUM BROMIDE AND OLODATEROL 3.124; 2.736 UG/1; UG/1
2 SPRAY, METERED RESPIRATORY (INHALATION) DAILY
Refills: 0 | Status: DISCONTINUED | OUTPATIENT
Start: 2021-10-19 | End: 2021-10-23

## 2021-10-19 RX ORDER — CARVEDILOL PHOSPHATE 80 MG/1
12.5 CAPSULE, EXTENDED RELEASE ORAL EVERY 12 HOURS
Refills: 0 | Status: DISCONTINUED | OUTPATIENT
Start: 2021-10-19 | End: 2021-10-23

## 2021-10-19 RX ADMIN — Medication 60 MILLIGRAM(S): at 05:55

## 2021-10-19 RX ADMIN — Medication 100 MILLIGRAM(S): at 05:54

## 2021-10-19 RX ADMIN — CARVEDILOL PHOSPHATE 12.5 MILLIGRAM(S): 80 CAPSULE, EXTENDED RELEASE ORAL at 18:34

## 2021-10-19 RX ADMIN — Medication 2 SPRAY(S): at 18:35

## 2021-10-19 RX ADMIN — TIOTROPIUM BROMIDE AND OLODATEROL 2 PUFF(S): 3.124; 2.736 SPRAY, METERED RESPIRATORY (INHALATION) at 18:35

## 2021-10-19 RX ADMIN — APIXABAN 2.5 MILLIGRAM(S): 2.5 TABLET, FILM COATED ORAL at 05:54

## 2021-10-19 RX ADMIN — APIXABAN 2.5 MILLIGRAM(S): 2.5 TABLET, FILM COATED ORAL at 18:35

## 2021-10-19 RX ADMIN — Medication 4: at 12:58

## 2021-10-19 RX ADMIN — Medication 0: at 21:25

## 2021-10-19 RX ADMIN — Medication 100 MILLIGRAM(S): at 22:32

## 2021-10-19 RX ADMIN — POLYETHYLENE GLYCOL 3350 17 GRAM(S): 17 POWDER, FOR SOLUTION ORAL at 14:28

## 2021-10-19 RX ADMIN — CARVEDILOL PHOSPHATE 6.25 MILLIGRAM(S): 80 CAPSULE, EXTENDED RELEASE ORAL at 05:54

## 2021-10-19 RX ADMIN — Medication 1000 UNIT(S): at 18:34

## 2021-10-19 RX ADMIN — Medication 100 MILLIGRAM(S): at 14:28

## 2021-10-19 RX ADMIN — Medication 1000 UNIT(S): at 05:54

## 2021-10-19 RX ADMIN — Medication 6: at 17:24

## 2021-10-19 NOTE — DIETITIAN INITIAL EVALUATION ADULT. - LITERATURE/VIDEOS GIVEN
Heart failure nutrition therapy, low sodium foods list, heart healthy shopping tips and label reading, by The Academy of [employer] Dietetics and CKD nutrition therapy by NIDDK

## 2021-10-19 NOTE — CONSULT NOTE ADULT - ATTENDING COMMENTS
Patient seen and examined, agree with above.  85 yo F lifelong nonsmoker, asbestosis, COPD/emphysema, HFpEF, HTN, CKD, significant 2nd hand smoke exposure who initially   presented with volume overload, acute on chronic HFpEF and acute hypoxemic respiratory failure.   Has been diuresing over the past week with IV Lasix and clinically improving, however remains with mild hypoxemic respiratory   failure.  Consulted for hypoxemia. On exam, patient with trace bipedal edema. Chest imaging reviewed from over the past year including previous   CT chest from 4/2021 which shows evidence of multiple calcified pleural plaques on the right and areas of emphysema throughout   both lungs. CXR initially with superimposed pulmonary edema, pleural effusions which have improved.  Blood pressure remains elevated with SBP in the 150-160s.  Recommend:  Continued diuresis with strict ins/outs  Cardiac optimization - HTN remains an issue and given her significant diastolic heart failure and mild pulmonary hypertension,   would benefit from better control.   Please restart the patient's bronchodilators - at home is on Anoro Ellipta- can order Stiolto as equivalent while in the hospital  Out of bed to chair, supplemental O2, goal O2 sats 92-96%  Patient may require supplemetnal Oxygen on discharge, would evaluate with RA SpO2 and ambulatory SpO2.  DVT ppx    Thank you for the consult.  Above was discussed with the patient and family at bedside, all questions answered
#ricardo on ckd stage 1v  a/w volume overload  cr was stable during admission then trended up  pt however still with significant volume overload-crackles, edema, symptoms  ricardo likley cardiorenal syndrome  please  increase diuretics to Lasix 60mg IV bid  may need to uptitrate dose in upcoming days depending on R HC  #Hypervolemia/anasarca +sacral edema/ 3+ LE edema  -cont diuresis  -agree with right heart cath  #met acidosis- trend bicarb  #hypoalbuminemia  check ua, check urine prot/cr  needs CKD serologic workup    discussed plan in detail with patient, her son and daughter
87 y/o F with PMHx HTN, DM II, COPD, pHTN, Afib on Eliquis, CHF, PVD, CKD, admission for BRASH syndrome (4/2021) presenting decompensated diastolic heart failure. Starting Iv diuretics and monitoring. Pending ECHO. Strict I/O. Blood pressure control.     Addis Mancini MD, MPH, MICHAEL, RPVI, FACC  Inpatient Cardiovascular Specialist; Adelina Garcia [facility], [facility] ([facility])  ; [employer] at hospitals/[facility]  message: [employer] [phone] or Microsoft Teams (text preferred and/or call)  email: [email or web address] Card[facility] and Cardiovascular [facility] on-service contact/call information, go to [email or web address] and use "[email or web address]" to login.  Outpatient Cardiology appointments, call  [phone] to arrange with a colleague; I do not have outpatient Cardiology clinic.
Admitted with CHF exacerbation  SBP 160s on admission, increased to 180s-190s with worsening respiratory status  On Atenolol and Hydralazine; Lasix IV also given  CICU consulted for acute hypoxic respiratory failure requiring Bipap    On my exam:  SBP 160s  SpO2 high 90s, RR 20s on Bipap 10/5, 40%  No accessory muscle use or increased work of breathing  JVP 12 cm H2O    Subjectively, the patient notes improved respiratory status with Bipap and improved blood pressure. I am concerned that the patient's worsening hypertension is the main  of her respiratory failure.     Recommend:  Continue diuresis with IV Lasix, increase as needed to target 1L negative   Increase Hydralazine for improved BP control   Check TTE  Maintain Bipap overnight and attempt to wean in the AM once she has been diuresed    Given the patient's clinical improvement, she is safe to remain on the telemetry floor. CICU admission will be deferred.

## 2021-10-19 NOTE — DIETITIAN INITIAL EVALUATION ADULT. - ORAL INTAKE PTA/DIET HISTORY
Pt reports good PO intake PTA, reports her daughter cooks for her with minimal salt though also endorses eating canned soups- assume gaps of knowledge in low sodium diet. Pt with T2DM and A1c 6.4%, reports SMBG at home with BG within desire range.

## 2021-10-19 NOTE — DIETITIAN INITIAL EVALUATION ADULT. - OTHER INFO
This admission: Pt with good PO intake, 50-75%. Pt aware of 1.5L fluid restriction though also seems a bit confused.    Confirms NKFA, no nausea/vomiting, no difficulty chewing/swallowing, no GI distress, no micronutrient supplementation PTA, last BM 10/18.    Weight Hx: Denies weight changes. Pt is 155 lbs dosing wt (10/11), 158.7 lbs (10/15). Pt admitted with edema, undergoing diuresis. Wt from 6/21/21 is 153 lbs. Pt with stable weight besides fluid retention.

## 2021-10-19 NOTE — DIETITIAN INITIAL EVALUATION ADULT. - PERTINENT LABORATORY DATA
10-19 Na137 mmol/L Glu 126 mg/dL<H> K+ 4.3 mmol/L Cr  2.36 mg/dL<H> BUN 80 mg/dL<H> Phos n/a   Alb 3.0 g/dL<L> PAB n/a       A1C with Estimated Average Glucose Result: 6.4 % (10-13-21 @ 09:00)  A1C with Estimated Average Glucose Result: 6.5 % (06-20-21 @ 09:36)

## 2021-10-19 NOTE — DIETITIAN INITIAL EVALUATION ADULT. - ADD RECOMMEND
Would not add renal restriction to diet at this time. Discussed recommendations for pt to follow at home. Monitor electrolytes. Monitor PO intake, GI tolerance, skin integrity and labs. RD remains available if needed, pt is aware.

## 2021-10-19 NOTE — DIETITIAN INITIAL EVALUATION ADULT. - PROBLEM SELECTOR PLAN 5
Has weeping bandaged area in RLE in shin area.   -Wound care consult  -Pt states she was on keflex in past for leg wound but stopped long time ago  -Compression stockings as needed

## 2021-10-19 NOTE — PROGRESS NOTE ADULT - SUBJECTIVE AND OBJECTIVE BOX
[facility] Division of Hospital Medicine  Valentin Allen MD, MICHAEL  I'm reachable on [email or web address] Teams   Off hours:  [phone] ([facility] pager)    Patient is a 86y old  Female who presents with a chief complaint of Shortness of breath (19 Oct 2021 15:19)      SUBJECTIVE / OVERNIGHT EVENTS:  No events overnight. The patient continues to c/o dyspnea with minimal activity.     MEDICATIONS  (STANDING):  apixaban 2.5 milliGRAM(s) Oral two times a day  carvedilol 6.25 milliGRAM(s) Oral every 12 hours  cholecalciferol 1000 Unit(s) Oral two times a day  furosemide   Injectable 60 milliGRAM(s) IV Push two times a day  glucagon  Injectable 1 milliGRAM(s) IntraMuscular once  hydrALAZINE 100 milliGRAM(s) Oral every 8 hours  influenza   Vaccine 0.5 milliLiter(s) IntraMuscular once  insulin lispro (ADMELOG) corrective regimen sliding scale   SubCutaneous three times a day before meals  insulin lispro (ADMELOG) corrective regimen sliding scale   SubCutaneous at bedtime  polyethylene glycol 3350 17 Gram(s) Oral daily  tiotropium 2.5 MICROgram(s)/olodaterol 2.5 MICROgram(s) (STIOLTO) Inhaler 2 Puff(s) Inhalation daily    MEDICATIONS  (PRN):  acetaminophen   Tablet .. 650 milliGRAM(s) Oral every 6 hours PRN Temp greater or equal to 38C (100.4F), Mild Pain (1 - 3)  melatonin 3 milliGRAM(s) Oral at bedtime PRN Insomnia  ondansetron Injectable 4 milliGRAM(s) IV Push every 8 hours PRN Nausea and/or Vomiting  sodium chloride 0.65% Nasal 2 Spray(s) Both Nostrils four times a day PRN Nasal Congestion    CAPILLARY BLOOD GLUCOSE  POCT Blood Glucose.: 273 mg/dL (19 Oct 2021 16:21)  POCT Blood Glucose.: 238 mg/dL (19 Oct 2021 12:06)  POCT Blood Glucose.: 130 mg/dL (19 Oct 2021 07:54)  POCT Blood Glucose.: 171 mg/dL (18 Oct 2021 21:14)    I&O's Summary    18 Oct 2021 07:01  -  19 Oct 2021 07:00  --------------------------------------------------------  IN: 1080 mL / OUT: 2000 mL / NET: -920 mL    19 Oct 2021 07:01  -  19 Oct 2021 17:50  --------------------------------------------------------  IN: 0 mL / OUT: 1000 mL / NET: -1000 mL        PHYSICAL EXAM:  Vital Signs Last 24 Hrs  T(C): 36.4 (19 Oct 2021 10:43), Max: 36.6 (19 Oct 2021 03:49)  T(F): 97.6 (19 Oct 2021 10:43), Max: 97.8 (19 Oct 2021 03:49)  HR: 75 (19 Oct 2021 10:43) (70 - 91)  BP: 165/74 (19 Oct 2021 10:43) (137/64 - 165/74)  BP(mean): --  RR: 22 (19 Oct 2021 10:43) (18 - 22)  SpO2: 94% (19 Oct 2021 10:43) (94% - 95%)    CONSTITUTIONAL: no respiratory distress on NC, elderly female  EYES: EOMI, conjunctiva and sclera clear  ENMT: Moist oral mucosa  NECK: Supple  RESPIRATORY: faint rales BL, normal respiratory effort   CARDIOVASCULAR: Regular rate and rhythm, normal S1 and S2, no murmur/rub/gallop; trace edema BL LE, tender to touch  ABDOMEN: normoactive bowel sounds, soft, nontender to palpation, no rebound/guarding; no distension   PSYCH: A+O to person, place, and time; affect appropriate      LABS:                        9.4    4.51  )-----------( 231      ( 19 Oct 2021 05:32 )             30.0     10-19    137  |  102  |  80<H>  ----------------------------<  126<H>  4.3   |  20<L>  |  2.36<H>    Ca    8.9      19 Oct 2021 05:34  Mg     2.5     10-19    TPro  6.2  /  Alb  3.0<L>  /  TBili  0.2  /  DBili  x   /  AST  19  /  ALT  17  /  AlkPhos  170<H>  10-19      RADIOLOGY & ADDITIONAL TESTS:    Lab Results Reviewed: Cr elevated but stable    Imaging Personally Reviewed: Unchanged small right pleural effusion. Right pleural calcifications and thickening.    COORDINATION OF CARE:  Care Discussed with Consultants/Other Providers:  Cardiology and pulmonology re: plan of care

## 2021-10-19 NOTE — DIETITIAN INITIAL EVALUATION ADULT. - CHIEF COMPLAINT
"86F w/ hx of afib on eliquis, CKD, PVD, CHF, PVD, DM2, HTN, BRASH syndrome p/w SOB and LE edema" Pt with acute on chronic CHF and CKD 4

## 2021-10-19 NOTE — CONSULT NOTE ADULT - SUBJECTIVE AND OBJECTIVE BOX
86F w/ hx of afib on eliquis, CKD, PVD, CHF, PVD, DM2, HTN, BRASH syndrome p/w SOB and LE edema. Pt started to experience significant SOB last night which was much worse while lying down. Pt's daughter state pulse oximeter at home showed readings in 80s. Also noticed worsening LE edema over the past 4 days with increased weeping from old wound in RLE. Denies any fevers, chills or cough. Has chronic palpitations unchanged in frequency or severity. Denies any recent chest pain. Pt denies missing any medications but daughter has noticed some extra pills in pill box recently.    Patient admitted to the hospital with ADHF. She was given IV diuresis with improvement in oxygenation. Despite aggressive diuresis, patient remains hypoxemic. Pulmonary consulted for management of hypoxemia.      PAST MEDICAL & SURGICAL HISTORY:  Benign essential hypertension    Diabetes mellitus    Peripheral vascular disease    Personal history of asbestosis    Spinal stenosis    HTN (hypertension)    DM (diabetes mellitus)    TIA (transient ischemic attack)    Pulmonary fibrosis    Peripheral edema    Degenerative arthritis of right knee    Degenerative arthritis of left knee    Osteoporosis    KENNETH (obstructive sleep apnea)    Hypertension    Chronic kidney disease (CKD)    T2DM (type 2 diabetes mellitus)    PVD (peripheral vascular disease)    H/O abdominal hysterectomy    S/P hysterectomy    S/P spinal fusion    S/P cataract surgery  Right eye    History of hysterectomy        FAMILY HISTORY:      SOCIAL HISTORY:  Smoking: [ X] Never Smoked [ ] Former Smoker (__ packs x ___ years) [ ] Current Smoker  (__ packs x ___ years)  * marked second hand exposure--> spouse smoked 2PPD  Substance Use: [ ] Never Used [ ] Used ____  EtOH Use:  Marital Status: [ ] Single [ ]  [ ]  [ ]   Sexual History:   Occupation: worked in [employer] in the [employer] on highest floor--> exposure to asbestos  Recent Travel:  Country of Birth:  Advance Directives:    Allergies    Levaquin (Rash)    Intolerances        HOME MEDICATIONS:    REVIEW OF SYSTEMS:  Constitutional: [ X] negative [ ] fevers [ ] chills [ ] weight loss [ ] weight gain  HEENT: [ X] negative [ ] dry eyes [ ] eye irritation [ ] postnasal drip [ ] nasal congestion  CV: [X ] negative  [ ] chest pain [ ] orthopnea [ ] palpitations [ ] murmur  Resp: [ ] negative [ ] cough [ ] shortness of breath [ ] dyspnea [ ] wheezing [ ] sputum [ ] hemoptysis [x] orthopnea  GI: [ ] negative [ ] nausea [ ] vomiting [ ] diarrhea [ ] constipation [ ] abd pain [ ] dysphagia   : [X ] negative [ ] dysuria [ ] nocturia [ ] hematuria [ ] increased urinary frequency  Musculoskeletal: [ ] negative [X ] back pain [ ] myalgias [ ] arthralgias [ ] fracture  Skin: [ ] negative [ ] rash [ ] itch  Neurological: [ ] negative [ ] headache [ ] dizziness [ ] syncope [ ] weakness [ ] numbness  Psychiatric: [ ] negative [ ] anxiety [ ] depression  Endocrine: [ ] negative [ ] diabetes [ ] thyroid problem  Hematologic/Lymphatic: [ ] negative [ ] anemia [ ] bleeding problem  Allergic/Immunologic: [ ] negative [ ] itchy eyes [ ] nasal discharge [ ] hives [ ] angioedema  [ ] All other systems negative  [ ] Unable to assess ROS because ________    OBJECTIVE:  ICU Vital Signs Last 24 Hrs  T(C): 36.4 (19 Oct 2021 10:43), Max: 36.6 (19 Oct 2021 03:49)  T(F): 97.6 (19 Oct 2021 10:43), Max: 97.8 (19 Oct 2021 03:49)  HR: 75 (19 Oct 2021 10:43) (70 - 91)  BP: 165/74 (19 Oct 2021 10:43) (135/61 - 165/74)  BP(mean): --  ABP: --  ABP(mean): --  RR: 22 (19 Oct 2021 10:43) (18 - 22)  SpO2: 94% (19 Oct 2021 10:43) (94% - 97%)        10-18 @ 07:01  -  10-19 @ 07:00  --------------------------------------------------------  IN: 1080 mL / OUT: 2000 mL / NET: -920 mL    10-19 @ 07:01  -  10-19 @ 16:31  --------------------------------------------------------  IN: 0 mL / OUT: 1000 mL / NET: -1000 mL      CAPILLARY BLOOD GLUCOSE      POCT Blood Glucose.: 273 mg/dL (19 Oct 2021 16:21)      PHYSICAL EXAM:  General: elderly female, sitting in chair, with NC, eating grapes  HEENT: no scleral icterus  Lymph Nodes: no palpable cervical LAD  Respiratory:  clear to auscultation in bilateral upper lung fields; mild crackles mid  lung field on right and decreased breath sounds lower lung field on right; mild crackles in LLL  Cardiovascular: S1/S2, RRR  Abdomen: soft nontender  Extremities: 2+ pitting edema below knees; 1+ pitting edema above knees with marked skin tenting  Skin: warm and dry  Neurological: AxOx3  Psychiatry: normal mood and affect    HOSPITAL MEDICATIONS:  apixaban 2.5 milliGRAM(s) Oral two times a day      carvedilol 6.25 milliGRAM(s) Oral every 12 hours  furosemide   Injectable 60 milliGRAM(s) IV Push two times a day  hydrALAZINE 100 milliGRAM(s) Oral every 8 hours    dextrose 40% Gel 15 Gram(s) Oral once  dextrose 50% Injectable 25 Gram(s) IV Push once  dextrose 50% Injectable 12.5 Gram(s) IV Push once  dextrose 50% Injectable 25 Gram(s) IV Push once  glucagon  Injectable 1 milliGRAM(s) IntraMuscular once  insulin lispro (ADMELOG) corrective regimen sliding scale   SubCutaneous three times a day before meals  insulin lispro (ADMELOG) corrective regimen sliding scale   SubCutaneous at bedtime    tiotropium 2.5 MICROgram(s)/olodaterol 2.5 MICROgram(s) (STIOLTO) Inhaler 2 Puff(s) Inhalation daily    acetaminophen   Tablet .. 650 milliGRAM(s) Oral every 6 hours PRN  melatonin 3 milliGRAM(s) Oral at bedtime PRN  ondansetron Injectable 4 milliGRAM(s) IV Push every 8 hours PRN    polyethylene glycol 3350 17 Gram(s) Oral daily        cholecalciferol 1000 Unit(s) Oral two times a day  dextrose 5%. 1000 milliLiter(s) IV Continuous <Continuous>  dextrose 5%. 1000 milliLiter(s) IV Continuous <Continuous>    influenza   Vaccine 0.5 milliLiter(s) IntraMuscular once    sodium chloride 0.65% Nasal 2 Spray(s) Both Nostrils four times a day PRN        LABS:                        9.4    4.51  )-----------( 231      ( 19 Oct 2021 05:32 )             30.0     Hgb Trend: 9.4<--, 9.9<--, 9.0<--, 8.6<--, 9.5<--  10-19    137  |  102  |  80<H>  ----------------------------<  126<H>  4.3   |  20<L>  |  2.36<H>    Ca    8.9      19 Oct 2021 05:34  Mg     2.5     10-19    TPro  6.2  /  Alb  3.0<L>  /  TBili  0.2  /  DBili  x   /  AST  19  /  ALT  17  /  AlkPhos  170<H>  10-19    Creatinine Trend: 2.36<--, 2.37<--, 2.58<--, 2.27<--, 2.09<--, 1.85<--            MICROBIOLOGY:     RADIOLOGY:  [ ] Reviewed and interpreted by me    PULMONARY FUNCTION TESTS:    EKG:

## 2021-10-19 NOTE — DIETITIAN INITIAL EVALUATION ADULT. - PERTINENT MEDS FT
MEDICATIONS  (STANDING):  apixaban 2.5 milliGRAM(s) Oral two times a day  carvedilol 6.25 milliGRAM(s) Oral every 12 hours  cholecalciferol 1000 Unit(s) Oral two times a day  dextrose 40% Gel 15 Gram(s) Oral once  dextrose 5%. 1000 milliLiter(s) (50 mL/Hr) IV Continuous <Continuous>  dextrose 5%. 1000 milliLiter(s) (100 mL/Hr) IV Continuous <Continuous>  dextrose 50% Injectable 25 Gram(s) IV Push once  dextrose 50% Injectable 12.5 Gram(s) IV Push once  dextrose 50% Injectable 25 Gram(s) IV Push once  furosemide   Injectable 60 milliGRAM(s) IV Push two times a day  glucagon  Injectable 1 milliGRAM(s) IntraMuscular once  hydrALAZINE 100 milliGRAM(s) Oral every 8 hours  influenza   Vaccine 0.5 milliLiter(s) IntraMuscular once  insulin lispro (ADMELOG) corrective regimen sliding scale   SubCutaneous three times a day before meals  insulin lispro (ADMELOG) corrective regimen sliding scale   SubCutaneous at bedtime  polyethylene glycol 3350 17 Gram(s) Oral daily

## 2021-10-19 NOTE — PROGRESS NOTE ADULT - ASSESSMENT
Pt. is an 86 y.o. F w/ PMHx. of DM2, PVD, HTN, Hx. of TIA, BRASH Syndrome and CKD presents for worsening shortness of breath and lower extremity edema. Nephrology consulted for rising Cr. in the setting of IV diuretic use.     LUIS FERNANDO on CKD  HTN  - baseline Cr. 1.7-1.9 Cr, on admission 1.9, peaked to 2.58 10/17.   --- mild improvement in creatinine this morning; continue to monitor  Pt. hypertensive on supplemental O2, with 3+ edema, presacral edema, and orthopnea  LUIS FERNANDO likely in setting of increased venous congestion/ cardiorenal syndrome  continue with Lasix 60mg IV BID; Please monitor strict Is and Os.   --- improved volume status however remains volume overloaded still with persistent wheezing and LE edema  please check UA and spot urine TP/Cr  Avoid nephrotoxins  Fluid restriction     If any questions, please feel free to contact me     Ernesto Tillman  Nephrology Fellow  [facility] Pager: [phone]

## 2021-10-19 NOTE — PROGRESS NOTE ADULT - SUBJECTIVE AND OBJECTIVE BOX
[facility] DIVISION OF KIDNEY DISEASES AND HYPERTENSION -- FOLLOW UP NOTE  --------------------------------------------------------------------------------  Chief Complaint:    24 hour events/subjective:    seen and examined this morning   continues to report shortness of breath and LE swelling   no acute issues noted overnight     PAST HISTORY  --------------------------------------------------------------------------------  No significant changes to PMH, PSH, FHx, SHx, unless otherwise noted    ALLERGIES & MEDICATIONS  --------------------------------------------------------------------------------  Allergies    Levaquin (Rash)    Intolerances      Standing Inpatient Medications  apixaban 2.5 milliGRAM(s) Oral two times a day  carvedilol 6.25 milliGRAM(s) Oral every 12 hours  cholecalciferol 1000 Unit(s) Oral two times a day  dextrose 40% Gel 15 Gram(s) Oral once  dextrose 5%. 1000 milliLiter(s) IV Continuous <Continuous>  dextrose 5%. 1000 milliLiter(s) IV Continuous <Continuous>  dextrose 50% Injectable 25 Gram(s) IV Push once  dextrose 50% Injectable 12.5 Gram(s) IV Push once  dextrose 50% Injectable 25 Gram(s) IV Push once  furosemide   Injectable 60 milliGRAM(s) IV Push two times a day  glucagon  Injectable 1 milliGRAM(s) IntraMuscular once  hydrALAZINE 100 milliGRAM(s) Oral every 8 hours  influenza   Vaccine 0.5 milliLiter(s) IntraMuscular once  insulin lispro (ADMELOG) corrective regimen sliding scale   SubCutaneous three times a day before meals  insulin lispro (ADMELOG) corrective regimen sliding scale   SubCutaneous at bedtime  polyethylene glycol 3350 17 Gram(s) Oral daily    PRN Inpatient Medications  acetaminophen   Tablet .. 650 milliGRAM(s) Oral every 6 hours PRN  melatonin 3 milliGRAM(s) Oral at bedtime PRN  ondansetron Injectable 4 milliGRAM(s) IV Push every 8 hours PRN  sodium chloride 0.65% Nasal 2 Spray(s) Both Nostrils four times a day PRN      REVIEW OF SYSTEMS      All other systems were reviewed and are negative, except as noted.    VITALS/PHYSICAL EXAM  --------------------------------------------------------------------------------  T(C): 36.4 (10-19-21 @ 10:43), Max: 36.6 (10-19-21 @ 03:49)  HR: 75 (10-19-21 @ 10:43) (70 - 91)  BP: 165/74 (10-19-21 @ 10:43) (135/61 - 165/74)  RR: 22 (10-19-21 @ 10:43) (18 - 22)  SpO2: 94% (10-19-21 @ 10:43) (92% - 97%)  Wt(kg): --        10-18-21 @ 07:01  -  10-19-21 @ 07:00  --------------------------------------------------------  IN: 1080 mL / OUT: 2000 mL / NET: -920 mL        Physical Exam:  	Gen: NAD  	HEENT: MMM  	Pulm: wheezing in all lung fields  	CV: S1S2  	Abd: Soft, +BS   	Ext: + LE edema B/L  	Neuro: Awake  	Skin: Warm and dry  	Vascular access: N/A      LABS/STUDIES  --------------------------------------------------------------------------------              9.4    4.51  >-----------<  231      [10-19-21 @ 05:32]              30.0     137  |  102  |  80  ----------------------------<  126      [10-19-21 @ 05:34]  4.3   |  20  |  2.36        Ca     8.9     [10-19-21 @ 05:34]      Mg     2.5     [10-19-21 @ 05:34]    TPro  6.2  /  Alb  3.0  /  TBili  0.2  /  DBili  x   /  AST  19  /  ALT  17  /  AlkPhos  170  [10-19-21 @ 05:34]          Creatinine Trend:  SCr 2.36 [10-19 @ 05:34]  SCr 2.37 [10-18 @ 09:59]  SCr 2.58 [10-17 @ 06:52]  SCr 2.27 [10-16 @ 06:19]  SCr 2.09 [10-15 @ 07:18]    Urinalysis - [10-17-21 @ 06:47]      Color Light Yellow / Appearance Clear / SG 1.018 / pH 5.5      Gluc Negative / Ketone Negative  / Bili Negative / Urobili Negative       Blood Negative / Protein Trace / Leuk Est Negative / Nitrite Negative      RBC 1 / WBC 1 / Hyaline 0 / Gran  / Sq Epi  / Non Sq Epi 0 / Bacteria Negative    Urine Creatinine 90      [10-17-21 @ 06:48]  Urine Sodium 49      [10-17-21 @ 06:48]  Urine Urea Nitrogen 768      [10-17-21 @ 10:14]  Urine Osmolality 486      [10-17-21 @ 06:47]    Iron 62, TIBC 225, %sat 28      [10-18-21 @ 10:45]  Ferritin 231      [10-18-21 @ 13:01]  TSH 2.80      [10-13-21 @ 13:13]

## 2021-10-19 NOTE — CONSULT NOTE ADULT - ASSESSMENT
86F with PMHX of Afib on eliquis, CKD, PVD, chronic diastolic heart failure,  DM2, HTN, BRASH syndrome and COPD presents to the hospital with hypoxia and LE edema, admitted for acute decompensated heart failure receiving IV diuresis. Pulmonary consulted for hypoxemia.    Hypoxemic Respiratory Failure: multifactorial in nature    1. Acute decompensated heart failure:  - patient presented with pulmonary edema and volume overload--> now improving  - continues to have orthopnea crackles in the lung bases as well as lower extremity edema (marked skin tenting suggestive of successful diuresis)  - continue with IV diuresis  - ensure adequate BP control (current BP systolic 150s-170s) as patient with stage II diastolic dysfunction    2. COPD  - mosaic attenuation noted on CT chest which is likely in the setting of air trapping from emphysema from second hand smoke exposure  - patient on Anoro at home and at this time is not on any bronchodilators  - will start Stiolto inpatient (tiotropium and olodaterol)     3. Asbestosis  - patient with history of asbestos exposure and CT chest with findings of pleural plaques and calcifications  - likely leading to restrictive lung disease  - no acute interventions    Pulmonary service to follow. Plan discussed with Dr. Jameson    86F with PMHX of Afib on eliquis, CKD, PVD, chronic diastolic heart failure,  DM2, HTN, BRASH syndrome and COPD presents to the hospital with hypoxia and LE edema, admitted for acute decompensated heart failure receiving IV diuresis. Pulmonary consulted for hypoxemia.    Hypoxemic Respiratory Failure: multifactorial in nature    1. Acute decompensated heart failure:  - patient presented with pulmonary edema and volume overload--> now improving  - continues to have orthopnea crackles in the lung bases as well as lower extremity edema (skin tenting suggestive of improvement in edema)  - continue with IV diuresis  - ensure adequate BP control (current BP systolic 150s-170s) as patient with stage II diastolic dysfunction    2. COPD  - mosaic attenuation noted on CT chest which is likely in the setting of air trapping from emphysema from second hand smoke exposure  - patient on Anoro at home and at this time is not on any bronchodilators  - will start Stiolto inpatient (tiotropium and olodaterol)     3. Asbestosis  - patient with history of asbestos exposure and CT chest with findings of pleural plaques and calcifications  - likely leading to restrictive lung disease  - no acute interventions    Pulmonary service to follow. Plan discussed with Dr. Jameson

## 2021-10-20 LAB
ALBUMIN SERPL ELPH-MCNC: 3 G/DL — LOW (ref 3.3–5)
ALP SERPL-CCNC: 168 U/L — HIGH (ref 40–120)
ALT FLD-CCNC: 16 U/L — SIGNIFICANT CHANGE UP (ref 10–45)
ANION GAP SERPL CALC-SCNC: 15 MMOL/L — SIGNIFICANT CHANGE UP (ref 5–17)
AST SERPL-CCNC: 16 U/L — SIGNIFICANT CHANGE UP (ref 10–40)
BILIRUB SERPL-MCNC: 0.2 MG/DL — SIGNIFICANT CHANGE UP (ref 0.2–1.2)
BUN SERPL-MCNC: 86 MG/DL — HIGH (ref 7–23)
CALCIUM SERPL-MCNC: 9 MG/DL — SIGNIFICANT CHANGE UP (ref 8.4–10.5)
CHLORIDE SERPL-SCNC: 100 MMOL/L — SIGNIFICANT CHANGE UP (ref 96–108)
CO2 SERPL-SCNC: 21 MMOL/L — LOW (ref 22–31)
CREAT ?TM UR-MCNC: 73 MG/DL — SIGNIFICANT CHANGE UP
CREAT SERPL-MCNC: 2.6 MG/DL — HIGH (ref 0.5–1.3)
GLUCOSE BLDC GLUCOMTR-MCNC: 161 MG/DL — HIGH (ref 70–99)
GLUCOSE BLDC GLUCOMTR-MCNC: 227 MG/DL — HIGH (ref 70–99)
GLUCOSE BLDC GLUCOMTR-MCNC: 237 MG/DL — HIGH (ref 70–99)
GLUCOSE BLDC GLUCOMTR-MCNC: 280 MG/DL — HIGH (ref 70–99)
GLUCOSE SERPL-MCNC: 153 MG/DL — HIGH (ref 70–99)
HCT VFR BLD CALC: 29.4 % — LOW (ref 34.5–45)
HGB BLD-MCNC: 9.2 G/DL — LOW (ref 11.5–15.5)
MCHC RBC-ENTMCNC: 31.3 GM/DL — LOW (ref 32–36)
MCHC RBC-ENTMCNC: 32.1 PG — SIGNIFICANT CHANGE UP (ref 27–34)
MCV RBC AUTO: 102.4 FL — HIGH (ref 80–100)
NRBC # BLD: 0 /100 WBCS — SIGNIFICANT CHANGE UP (ref 0–0)
NT-PROBNP SERPL-SCNC: 1446 PG/ML — HIGH (ref 0–300)
PLATELET # BLD AUTO: 236 K/UL — SIGNIFICANT CHANGE UP (ref 150–400)
POTASSIUM SERPL-MCNC: 4.5 MMOL/L — SIGNIFICANT CHANGE UP (ref 3.5–5.3)
POTASSIUM SERPL-SCNC: 4.5 MMOL/L — SIGNIFICANT CHANGE UP (ref 3.5–5.3)
PROT ?TM UR-MCNC: 16 MG/DL — HIGH (ref 0–12)
PROT SERPL-MCNC: 6.3 G/DL — SIGNIFICANT CHANGE UP (ref 6–8.3)
PROT/CREAT UR-RTO: 0.2 RATIO — SIGNIFICANT CHANGE UP (ref 0–0.2)
RBC # BLD: 2.87 M/UL — LOW (ref 3.8–5.2)
RBC # FLD: 12.5 % — SIGNIFICANT CHANGE UP (ref 10.3–14.5)
SODIUM SERPL-SCNC: 136 MMOL/L — SIGNIFICANT CHANGE UP (ref 135–145)
WBC # BLD: 4.95 K/UL — SIGNIFICANT CHANGE UP (ref 3.8–10.5)
WBC # FLD AUTO: 4.95 K/UL — SIGNIFICANT CHANGE UP (ref 3.8–10.5)

## 2021-10-20 PROCEDURE — 99233 SBSQ HOSP IP/OBS HIGH 50: CPT | Mod: GC

## 2021-10-20 PROCEDURE — 99233 SBSQ HOSP IP/OBS HIGH 50: CPT

## 2021-10-20 RX ORDER — NIFEDIPINE 30 MG
30 TABLET, EXTENDED RELEASE 24 HR ORAL DAILY
Refills: 0 | Status: DISCONTINUED | OUTPATIENT
Start: 2021-10-20 | End: 2021-10-23

## 2021-10-20 RX ADMIN — Medication 650 MILLIGRAM(S): at 15:05

## 2021-10-20 RX ADMIN — APIXABAN 2.5 MILLIGRAM(S): 2.5 TABLET, FILM COATED ORAL at 06:58

## 2021-10-20 RX ADMIN — Medication 100 MILLIGRAM(S): at 06:58

## 2021-10-20 RX ADMIN — Medication 650 MILLIGRAM(S): at 17:21

## 2021-10-20 RX ADMIN — Medication 2: at 08:15

## 2021-10-20 RX ADMIN — Medication 100 MILLIGRAM(S): at 21:35

## 2021-10-20 RX ADMIN — Medication 4: at 17:10

## 2021-10-20 RX ADMIN — CARVEDILOL PHOSPHATE 12.5 MILLIGRAM(S): 80 CAPSULE, EXTENDED RELEASE ORAL at 06:58

## 2021-10-20 RX ADMIN — Medication 100 MILLIGRAM(S): at 15:05

## 2021-10-20 RX ADMIN — TIOTROPIUM BROMIDE AND OLODATEROL 2 PUFF(S): 3.124; 2.736 SPRAY, METERED RESPIRATORY (INHALATION) at 12:04

## 2021-10-20 RX ADMIN — Medication 4: at 12:00

## 2021-10-20 RX ADMIN — Medication 650 MILLIGRAM(S): at 21:36

## 2021-10-20 RX ADMIN — Medication 30 MILLIGRAM(S): at 16:20

## 2021-10-20 RX ADMIN — Medication 1: at 21:35

## 2021-10-20 RX ADMIN — Medication 1000 UNIT(S): at 18:33

## 2021-10-20 RX ADMIN — Medication 1000 UNIT(S): at 06:58

## 2021-10-20 RX ADMIN — CARVEDILOL PHOSPHATE 12.5 MILLIGRAM(S): 80 CAPSULE, EXTENDED RELEASE ORAL at 18:34

## 2021-10-20 NOTE — PROGRESS NOTE ADULT - ASSESSMENT
86F w/ hx of chronic Afib on Eliquis, CKD stage 4, PVD, chronic diastolic CHF, DM type 2, HTN, BRASH syndrome (Bradycardia, Renal Failure, AV blockade, Shock, and Hyperkalamia) p/w SOB and LE edema.

## 2021-10-20 NOTE — PROGRESS NOTE ADULT - SUBJECTIVE AND OBJECTIVE BOX
[facility] DIVISION OF KIDNEY DISEASES AND HYPERTENSION -- FOLLOW UP NOTE  --------------------------------------------------------------------------------  Chief Complaint:    24 hour events/subjective:  seen and examined this morning   reports feeling a little better in terms of her breathing   continues to have LE swelling         PAST HISTORY  --------------------------------------------------------------------------------  No significant changes to PMH, PSH, FHx, SHx, unless otherwise noted    ALLERGIES & MEDICATIONS  --------------------------------------------------------------------------------  Allergies    Levaquin (Rash)    Intolerances      Standing Inpatient Medications  carvedilol 12.5 milliGRAM(s) Oral every 12 hours  cholecalciferol 1000 Unit(s) Oral two times a day  dextrose 40% Gel 15 Gram(s) Oral once  dextrose 5%. 1000 milliLiter(s) IV Continuous <Continuous>  dextrose 5%. 1000 milliLiter(s) IV Continuous <Continuous>  dextrose 50% Injectable 25 Gram(s) IV Push once  dextrose 50% Injectable 12.5 Gram(s) IV Push once  dextrose 50% Injectable 25 Gram(s) IV Push once  glucagon  Injectable 1 milliGRAM(s) IntraMuscular once  hydrALAZINE 100 milliGRAM(s) Oral every 8 hours  influenza   Vaccine 0.5 milliLiter(s) IntraMuscular once  insulin lispro (ADMELOG) corrective regimen sliding scale   SubCutaneous three times a day before meals  insulin lispro (ADMELOG) corrective regimen sliding scale   SubCutaneous at bedtime  polyethylene glycol 3350 17 Gram(s) Oral daily  tiotropium 2.5 MICROgram(s)/olodaterol 2.5 MICROgram(s) (STIOLTO) Inhaler 2 Puff(s) Inhalation daily    PRN Inpatient Medications  acetaminophen   Tablet .. 650 milliGRAM(s) Oral every 6 hours PRN  melatonin 3 milliGRAM(s) Oral at bedtime PRN  ondansetron Injectable 4 milliGRAM(s) IV Push every 8 hours PRN  sodium chloride 0.65% Nasal 2 Spray(s) Both Nostrils four times a day PRN      REVIEW OF SYSTEMS      All other systems were reviewed and are negative, except as noted.    VITALS/PHYSICAL EXAM  --------------------------------------------------------------------------------  T(C): 36.5 (10-20-21 @ 11:16), Max: 36.5 (10-20-21 @ 11:16)  HR: 64 (10-20-21 @ 11:16) (64 - 76)  BP: 144/60 (10-20-21 @ 11:16) (139/66 - 148/60)  RR: 18 (10-20-21 @ 11:16) (18 - 18)  SpO2: 96% (10-20-21 @ 11:16) (96% - 98%)  Wt(kg): --        10-19-21 @ 07:01  -  10-20-21 @ 07:00  --------------------------------------------------------  IN: 800 mL / OUT: 2600 mL / NET: -1800 mL    10-20-21 @ 07:01  -  10-20-21 @ 13:50  --------------------------------------------------------  IN: 240 mL / OUT: 0 mL / NET: 240 mL        Physical Exam:  	Gen: NAD  	HEENT: MMM  	Pulm: CTA B/L  	CV: S1S2  	Abd: Soft, +BS   	Ext: + LE edema B/L  	Neuro: Awake  	Skin: Warm and dry  	Vascular access: N/A      LABS/STUDIES  --------------------------------------------------------------------------------              9.2    4.95  >-----------<  236      [10-20-21 @ 06:13]              29.4     136  |  100  |  86  ----------------------------<  153      [10-20-21 @ 06:13]  4.5   |  21  |  2.60        Ca     9.0     [10-20-21 @ 06:13]      Mg     2.5     [10-19-21 @ 05:34]    TPro  6.3  /  Alb  3.0  /  TBili  0.2  /  DBili  x   /  AST  16  /  ALT  16  /  AlkPhos  168  [10-20-21 @ 06:13]          Creatinine Trend:  SCr 2.60 [10-20 @ 06:13]  SCr 2.36 [10-19 @ 05:34]  SCr 2.37 [10-18 @ 09:59]  SCr 2.58 [10-17 @ 06:52]  SCr 2.27 [10-16 @ 06:19]    Urinalysis - [10-17-21 @ 06:47]      Color Light Yellow / Appearance Clear / SG 1.018 / pH 5.5      Gluc Negative / Ketone Negative  / Bili Negative / Urobili Negative       Blood Negative / Protein Trace / Leuk Est Negative / Nitrite Negative      RBC 1 / WBC 1 / Hyaline 0 / Gran  / Sq Epi  / Non Sq Epi 0 / Bacteria Negative    Urine Creatinine 90      [10-17-21 @ 06:48]  Urine Sodium 49      [10-17-21 @ 06:48]  Urine Urea Nitrogen 768      [10-17-21 @ 10:14]  Urine Osmolality 486      [10-17-21 @ 06:47]    Iron 62, TIBC 225, %sat 28      [10-18-21 @ 10:45]  Ferritin 231      [10-18-21 @ 13:01]  TSH 2.80      [10-13-21 @ 13:13]

## 2021-10-20 NOTE — PROGRESS NOTE ADULT - ASSESSMENT
Pt. is an 86 y.o. F w/ PMHx. of DM2, PVD, HTN, Hx. of TIA, BRASH Syndrome and CKD presents for worsening shortness of breath and lower extremity edema. Nephrology consulted for rising Cr. in the setting of IV diuretic use.     LUIS FERNANDO on CKD  HTN  - baseline Cr. 1.7-1.9 Cr, on admission 1.9, peaked to 2.58 10/17.   --- mild improvement in creatinine this morning; continue to monitor  Pt. hypertensive on supplemental O2, with 3+ edema, presacral edema, and orthopnea  LUIS FERNANDO likely in setting of increased venous congestion/ cardiorenal syndrome  would continue diuresis a lower dose with Lasix 40mg IV BID; Please monitor strict Is and Os.   --- improved volume status however remains volume overloaded still with persistent wheezing and LE edema  Avoid nephrotoxins  Fluid restriction     If any questions, please feel free to contact me     Ernesto Tillman  Nephrology Fellow  [facility] Pager: [phone]   Pt. is an 86 y.o. F w/ PMHx. of DM2, PVD, HTN, Hx. of TIA, BRASH Syndrome and CKD presents for worsening shortness of breath and lower extremity edema. Nephrology consulted for rising Cr. in the setting of IV diuretic use.     LUIS FERNANDO on CKD  HTN  - baseline Cr. 1.7-1.9 Cr, on admission 1.9, peaked to 2.58 10/17.   --- mild improvement in creatinine this morning; continue to monitor  Pt. hypertensive on supplemental O2, with 3+ edema, presacral edema, and orthopnea  LUIS FERNANDO likely in setting of increased venous congestion/ cardiorenal syndrome  -- noted rising creatinine  would continue diuresis a lower dose with Lasix 40mg IV BID; Please monitor strict Is and Os.   --- improved volume status however remains volume overloaded still with persistent wheezing and LE edema  Avoid nephrotoxins  Fluid restriction     If any questions, please feel free to contact me     Ernesto Tillman  Nephrology Fellow  [facility] Pager: [phone]

## 2021-10-20 NOTE — PROGRESS NOTE ADULT - SUBJECTIVE AND OBJECTIVE BOX
CHIEF COMPLAINT: sob and le edema    Interval Events: Patient seen and examined at bedside. She reports her breathing is stable. She has no SOB at rest, but endorses some SOB on exertion and when laying flat. No other complaints.     REVIEW OF SYSTEMS:  Constitutional: [ X] negative [ ] fevers [ ] chills [ ] weight loss [ ] weight gain  HEENT: [ X] negative [ ] dry eyes [ ] eye irritation [ ] postnasal drip [ ] nasal congestion  CV: [ X] negative  [ ] chest pain [ ] orthopnea [ ] palpitations [ ] murmur  Resp: [ ] negative [ ] cough [ ] shortness of breath [X ] dyspnea [ ] wheezing [ ] sputum [ ] hemoptysis  GI: [X ] negative [ ] nausea [ ] vomiting [ ] diarrhea [ ] constipation [ ] abd pain [ ] dysphagia   : [ X] negative [ ] dysuria [ ] nocturia [ ] hematuria [ ] increased urinary frequency  Musculoskeletal: [X ] negative [ ] back pain [ ] myalgias [ ] arthralgias [ ] fracture  Skin: [ X] negative [ ] rash [ ] itch  Neurological: [ X] negative [ ] headache [ ] dizziness [ ] syncope [ ] weakness [ ] numbness  Psychiatric: [ ] negative [ ] anxiety [ ] depression  Endocrine: [ ] negative [ ] diabetes [ ] thyroid problem  Hematologic/Lymphatic: [ ] negative [ ] anemia [ ] bleeding problem  Allergic/Immunologic: [ ] negative [ ] itchy eyes [ ] nasal discharge [ ] hives [ ] angioedema  [ ] All other systems negative  [ ] Unable to assess ROS because ________    OBJECTIVE:  ICU Vital Signs Last 24 Hrs  T(C): 36.4 (20 Oct 2021 04:17), Max: 36.4 (20 Oct 2021 04:17)  T(F): 97.6 (20 Oct 2021 04:17), Max: 97.6 (20 Oct 2021 04:17)  HR: 74 (20 Oct 2021 06:56) (69 - 76)  BP: 140/68 (20 Oct 2021 06:56) (139/66 - 148/60)  BP(mean): --  ABP: --  ABP(mean): --  RR: 18 (20 Oct 2021 04:17) (18 - 18)  SpO2: 98% (20 Oct 2021 04:17) (96% - 98%)        10-19 @ 07:01  -  10-20 @ 07:00  --------------------------------------------------------  IN: 800 mL / OUT: 2600 mL / NET: -1800 mL    10-20 @ 07:01  -  10-20 @ 11:01  --------------------------------------------------------  IN: 240 mL / OUT: 0 mL / NET: 240 mL      CAPILLARY BLOOD GLUCOSE      POCT Blood Glucose.: 161 mg/dL (20 Oct 2021 07:30)      PHYSICAL EXAM:  General: elderly female, sitting in chair, on 3L NC , NAD  HEENT: no scleral icterus  Lymph Nodes: no palpable cervical LAD  Respiratory:  clear to auscultation in bilateral upper lung fields; mild crackles mid lung field on right and decreased breath sounds lower lung field on right; mild crackles in LLL  Cardiovascular: S1/S2, RRR  Abdomen: soft nontender  Extremities: 2+ pitting edema below knees; 1+ pitting edema above knees with marked skin tenting  Skin: warm and dry  Neurological: AxOx3  Psychiatry: normal mood and affect      HOSPITAL MEDICATIONS:  apixaban 2.5 milliGRAM(s) Oral two times a day      carvedilol 12.5 milliGRAM(s) Oral every 12 hours  hydrALAZINE 100 milliGRAM(s) Oral every 8 hours    dextrose 40% Gel 15 Gram(s) Oral once  dextrose 50% Injectable 25 Gram(s) IV Push once  dextrose 50% Injectable 12.5 Gram(s) IV Push once  dextrose 50% Injectable 25 Gram(s) IV Push once  glucagon  Injectable 1 milliGRAM(s) IntraMuscular once  insulin lispro (ADMELOG) corrective regimen sliding scale   SubCutaneous three times a day before meals  insulin lispro (ADMELOG) corrective regimen sliding scale   SubCutaneous at bedtime    tiotropium 2.5 MICROgram(s)/olodaterol 2.5 MICROgram(s) (STIOLTO) Inhaler 2 Puff(s) Inhalation daily    acetaminophen   Tablet .. 650 milliGRAM(s) Oral every 6 hours PRN  melatonin 3 milliGRAM(s) Oral at bedtime PRN  ondansetron Injectable 4 milliGRAM(s) IV Push every 8 hours PRN    polyethylene glycol 3350 17 Gram(s) Oral daily        cholecalciferol 1000 Unit(s) Oral two times a day  dextrose 5%. 1000 milliLiter(s) IV Continuous <Continuous>  dextrose 5%. 1000 milliLiter(s) IV Continuous <Continuous>    influenza   Vaccine 0.5 milliLiter(s) IntraMuscular once    sodium chloride 0.65% Nasal 2 Spray(s) Both Nostrils four times a day PRN        LABS:                        9.2    4.95  )-----------( 236      ( 20 Oct 2021 06:13 )             29.4     Hgb Trend: 9.2<--, 9.4<--, 9.9<--, 9.0<--, 8.6<--  10-20    136  |  100  |  86<H>  ----------------------------<  153<H>  4.5   |  21<L>  |  2.60<H>    Ca    9.0      20 Oct 2021 06:13  Mg     2.5     10-19    TPro  6.3  /  Alb  3.0<L>  /  TBili  0.2  /  DBili  x   /  AST  16  /  ALT  16  /  AlkPhos  168<H>  10-20    Creatinine Trend: 2.60<--, 2.36<--, 2.37<--, 2.58<--, 2.27<--, 2.09<--            MICROBIOLOGY:     RADIOLOGY:  [X] Reviewed and interpreted by me    PULMONARY FUNCTION TESTS: restrictive disease on PFTs.   Sleep study in 2020- no sleep apnea.

## 2021-10-20 NOTE — PROGRESS NOTE ADULT - SUBJECTIVE AND OBJECTIVE BOX
SUBJ:    Home Medications:  ANORO ELLIPTA 62.5-25 MCG INH: TAKE 1 PUFF BY MOUTH EVERY DAY (11 Oct 2021 22:07)  apixaban 2.5 mg oral tablet: 1 tab(s) orally 2 times a day (11 Oct 2021 22:07)  atenolol 25 mg oral tablet: 1 tab(s) orally once a day (11 Oct 2021 22:07)  felodipine:  (11 Oct 2021 22:07)  ferrous sulfate 324 mg (65 mg elemental iron) oral delayed release tablet: 1 tab(s) orally once a day (11 Oct 2021 22:07)  furosemide 40 mg oral tablet: 1 tab(s) orally once a day (11 Oct 2021 22:07)  senna oral tablet: 2 tab(s) orally once a day (at bedtime), As needed, Constipation (11 Oct 2021 22:07)  Vitamin B12:  (11 Oct 2021 22:07)  Vitamin D3 25 mcg (1000 intl units) oral tablet: 1 tab(s) orally once a day (11 Oct 2021 22:07)      MEDICATIONS  (STANDING):  apixaban 2.5 milliGRAM(s) Oral two times a day  carvedilol 12.5 milliGRAM(s) Oral every 12 hours  cholecalciferol 1000 Unit(s) Oral two times a day  dextrose 40% Gel 15 Gram(s) Oral once  dextrose 5%. 1000 milliLiter(s) (50 mL/Hr) IV Continuous <Continuous>  dextrose 5%. 1000 milliLiter(s) (100 mL/Hr) IV Continuous <Continuous>  dextrose 50% Injectable 25 Gram(s) IV Push once  dextrose 50% Injectable 12.5 Gram(s) IV Push once  dextrose 50% Injectable 25 Gram(s) IV Push once  glucagon  Injectable 1 milliGRAM(s) IntraMuscular once  hydrALAZINE 100 milliGRAM(s) Oral every 8 hours  influenza   Vaccine 0.5 milliLiter(s) IntraMuscular once  insulin lispro (ADMELOG) corrective regimen sliding scale   SubCutaneous three times a day before meals  insulin lispro (ADMELOG) corrective regimen sliding scale   SubCutaneous at bedtime  polyethylene glycol 3350 17 Gram(s) Oral daily  tiotropium 2.5 MICROgram(s)/olodaterol 2.5 MICROgram(s) (STIOLTO) Inhaler 2 Puff(s) Inhalation daily    MEDICATIONS  (PRN):  acetaminophen   Tablet .. 650 milliGRAM(s) Oral every 6 hours PRN Temp greater or equal to 38C (100.4F), Mild Pain (1 - 3)  melatonin 3 milliGRAM(s) Oral at bedtime PRN Insomnia  ondansetron Injectable 4 milliGRAM(s) IV Push every 8 hours PRN Nausea and/or Vomiting  sodium chloride 0.65% Nasal 2 Spray(s) Both Nostrils four times a day PRN Nasal Congestion      Vital Signs Last 24 Hrs  T(C): 36.4 (20 Oct 2021 04:17), Max: 36.4 (19 Oct 2021 10:43)  T(F): 97.6 (20 Oct 2021 04:17), Max: 97.6 (19 Oct 2021 10:43)  HR: 74 (20 Oct 2021 06:56) (69 - 76)  BP: 140/68 (20 Oct 2021 06:56) (139/66 - 165/74)  BP(mean): --  RR: 18 (20 Oct 2021 04:17) (18 - 22)  SpO2: 98% (20 Oct 2021 04:17) (94% - 98%)    REVIEW OF SYSTEMS:  As per HPI, otherwise unremarkable.     PHYSICAL EXAM:  Constitutional/Appearance: Normal, Well-developed  HEENT:   Normal oral mucosa, no drainage or redness, supple neck  Lymphatic: No lymphadenopathy  Cardiovascular: Normal S1 S2, No edema, RRR  Respiratory: Lungs clear to auscultation, respirations non-labored  Psychiatry: A & O x 3, appropriate affect.   Gastrointestinal:  Soft, Non-tender, no distention  Skin: No rashes, No ecchymoses, No cyanosis	  Neurologic: Non-focal, Alert and oriented x 3  Extremities: Normal range of motion  Vascular: Peripheral pulses palpable 2+ bilaterally (radial)    LABS:  CBC Full  -  ( 20 Oct 2021 06:13 )  WBC Count : 4.95 K/uL  RBC Count : 2.87 M/uL  Hemoglobin : 9.2 g/dL  Hematocrit : 29.4 %  Platelet Count - Automated : 236 K/uL  Mean Cell Volume : 102.4 fl  Mean Cell Hemoglobin : 32.1 pg  Mean Cell Hemoglobin Concentration : 31.3 gm/dL  Auto Neutrophil # : x  Auto Lymphocyte # : x  Auto Monocyte # : x  Auto Eosinophil # : x  Auto Basophil # : x  Auto Neutrophil % : x  Auto Lymphocyte % : x  Auto Monocyte % : x  Auto Eosinophil % : x  Auto Basophil % : x      10-20    136  |  100  |  86<H>  ----------------------------<  153<H>  4.5   |  21<L>  |  2.60<H>    Ca    9.0      20 Oct 2021 06:13  Mg     2.5     10-19    TPro  6.3  /  Alb  3.0<L>  /  TBili  0.2  /  DBili  x   /  AST  16  /  ALT  16  /  AlkPhos  168<H>  10-20            RADIOLOGY & ADDITIONAL STUDIES:  TELEMETRY:  ECG:  TTE:    IMPRESSION AND PLAN:    ***    Addis Mancini MD, MPH, MICHAEL, RPVI, [facility]  Inpatient Cardiovascular Specialist; [facility], [facility] ([facility])  ; [employer] at [facility]  message: [employer] [phone] or Microsoft Teams (text preferred and/or call)  email: [email or web address] Cardiology and Cardiovascular Surgery on-service contact/call information, go to [email or web address] and use "[email or web address]" to login.  Outpatient Cardiology appointments, call  [phone] to arrange with a colleague; I do not have outpatient Cardiology clinic.    SUBJ:  Continues to have dyspnea with hypoxia despite IV diuresis.     Home Medications:  ANORO ELLIPTA 62.5-25 MCG INH: TAKE 1 PUFF BY MOUTH EVERY DAY (11 Oct 2021 22:07)  apixaban 2.5 mg oral tablet: 1 tab(s) orally 2 times a day (11 Oct 2021 22:07)  atenolol 25 mg oral tablet: 1 tab(s) orally once a day (11 Oct 2021 22:07)  felodipine:  (11 Oct 2021 22:07)  ferrous sulfate 324 mg (65 mg elemental iron) oral delayed release tablet: 1 tab(s) orally once a day (11 Oct 2021 22:07)  furosemide 40 mg oral tablet: 1 tab(s) orally once a day (11 Oct 2021 22:07)  senna oral tablet: 2 tab(s) orally once a day (at bedtime), As needed, Constipation (11 Oct 2021 22:07)  Vitamin B12:  (11 Oct 2021 22:07)  Vitamin D3 25 mcg (1000 intl units) oral tablet: 1 tab(s) orally once a day (11 Oct 2021 22:07)    MEDICATIONS  (STANDING):  apixaban 2.5 milliGRAM(s) Oral two times a day  carvedilol 12.5 milliGRAM(s) Oral every 12 hours  cholecalciferol 1000 Unit(s) Oral two times a day  dextrose 40% Gel 15 Gram(s) Oral once  dextrose 5%. 1000 milliLiter(s) (50 mL/Hr) IV Continuous <Continuous>  dextrose 5%. 1000 milliLiter(s) (100 mL/Hr) IV Continuous <Continuous>  dextrose 50% Injectable 25 Gram(s) IV Push once  dextrose 50% Injectable 12.5 Gram(s) IV Push once  dextrose 50% Injectable 25 Gram(s) IV Push once  glucagon  Injectable 1 milliGRAM(s) IntraMuscular once  hydrALAZINE 100 milliGRAM(s) Oral every 8 hours  influenza   Vaccine 0.5 milliLiter(s) IntraMuscular once  insulin lispro (ADMELOG) corrective regimen sliding scale   SubCutaneous three times a day before meals  insulin lispro (ADMELOG) corrective regimen sliding scale   SubCutaneous at bedtime  polyethylene glycol 3350 17 Gram(s) Oral daily  tiotropium 2.5 MICROgram(s)/olodaterol 2.5 MICROgram(s) (STIOLTO) Inhaler 2 Puff(s) Inhalation daily    MEDICATIONS  (PRN):  acetaminophen   Tablet .. 650 milliGRAM(s) Oral every 6 hours PRN Temp greater or equal to 38C (100.4F), Mild Pain (1 - 3)  melatonin 3 milliGRAM(s) Oral at bedtime PRN Insomnia  ondansetron Injectable 4 milliGRAM(s) IV Push every 8 hours PRN Nausea and/or Vomiting  sodium chloride 0.65% Nasal 2 Spray(s) Both Nostrils four times a day PRN Nasal Congestion      Vital Signs Last 24 Hrs  T(C): 36.4 (20 Oct 2021 04:17), Max: 36.4 (19 Oct 2021 10:43)  T(F): 97.6 (20 Oct 2021 04:17), Max: 97.6 (19 Oct 2021 10:43)  HR: 74 (20 Oct 2021 06:56) (69 - 76)  BP: 140/68 (20 Oct 2021 06:56) (139/66 - 165/74)  BP(mean): --  RR: 18 (20 Oct 2021 04:17) (18 - 22)  SpO2: 98% (20 Oct 2021 04:17) (94% - 98%)    REVIEW OF SYSTEMS:  As per HPI, otherwise unremarkable.     PHYSICAL EXAM:  Constitutional/Appearance: Normal, Well-developed  HEENT:   Normal oral mucosa, no drainage or redness, supple neck  Lymphatic: No lymphadenopathy  Cardiovascular: Normal S1 S2, No edema  Respiratory: Lungs clear to auscultation  Psychiatry: A & O x 2-3  Gastrointestinal:  Soft, Non-tender, no distention  Skin: No rashes, No ecchymoses  Neurologic: Non-focal  Extremities: Normal range of motion  Vascular: Peripheral pulses palpable 2+ bilaterally (radial)    LABS:  CBC Full  -  ( 20 Oct 2021 06:13 )  WBC Count : 4.95 K/uL  RBC Count : 2.87 M/uL  Hemoglobin : 9.2 g/dL  Hematocrit : 29.4 %  Platelet Count - Automated : 236 K/uL  Mean Cell Volume : 102.4 fl  Mean Cell Hemoglobin : 32.1 pg  Mean Cell Hemoglobin Concentration : 31.3 gm/dL  Auto Neutrophil # : x  Auto Lymphocyte # : x  Auto Monocyte # : x  Auto Eosinophil # : x  Auto Basophil # : x  Auto Neutrophil % : x  Auto Lymphocyte % : x  Auto Monocyte % : x  Auto Eosinophil % : x  Auto Basophil % : x      10-20    136  |  100  |  86<H>  ----------------------------<  153<H>  4.5   |  21<L>  |  2.60<H>    Ca    9.0      20 Oct 2021 06:13  Mg     2.5     10-19    TPro  6.3  /  Alb  3.0<L>  /  TBili  0.2  /  DBili  x   /  AST  16  /  ALT  16  /  AlkPhos  168<H>  10-20    RADIOLOGY & ADDITIONAL STUDIES:  CXR 10/19/2021  IMPRESSION:  Unchanged small right pleural effusion.  Right pleural calcifications and thickening.    TTE 10/12/2021  Conclusions:  1. Mitral annular calcification, otherwise normal mitral  valve. Mild mitral regurgitation.  2. Calcified trileaflet aortic valve with decreased  opening. No aortic valve regurgitation seen.  3. Moderately dilated left atrium.  LA volume index = 42  cc/m2.  4. Normal left ventricular systolic function. No segmental  wall motion abnormalities.  5. Grade II diastolic dysfunction,  Increased E/e'  is  consistent with elevated left ventricular filling pressure.  6. Normal right ventricular size and function.  7. Estimated right ventricular systolic pressure equals 40  mm Hg, assuming right atrial pressure equals 8 mm Hg,  consistent with mild pulmonary hypertension.  *** Compared with echocardiogram of 4/12/2021, no  significant changes noted.    IMPRESSION AND PLAN:  85 y/o F with PMHx HTN, DM II, COPD, pHTN, Afib on Eliquis, CHF, PVD, CKD, presenting with acute diastolic heart failure exacerbation.    1) Acute diastolic Heart failure   Decompensated on admission, improving   pBNP 1700, 2200, 3200, 1500 (most recent)  Repeat echo here w/ nml LV systolic function, grade II LV diastolic dysfunction, nml RV size and function, mild pulm HTN,    ·	Maintain K >4, Mg >2, Phos >3  ·	I/O, daily weights   ·	Persistent symptoms despite improved pBNP and rising creatinine.   ·	Arranging RHC with interventional cardiology.   ·	Holding diuresis given LUIS FERNANDO on CKD.     2) Afib, paroxysmal  Elevated CHADS2; indicated for A/C  Rate comtrolled     ·	Holding Eliquis until after RHC (Discontinued pending RHC; will see if can be done this afternoon)  ·	Continuing carvedilol 12.5 mg BID.     3) HTN   Suboptimal control     ·	Continue hydralazine 100mg TID  ·	Consider addition of CCB such as amlodipine or verapamil.     Addis Mancini MD, MPH, MICHAEL, RPVI, [facility]  Inpatient Cardiovascular Specialist; [facility], [facility] ([facility])  ; [employer] at [facility]  message: [employer] [phone] or Microsoft Teams (text preferred and/or call)  email: [email or web address] Cardiology and Cardiovascular Surgery on-service contact/call information, go to [email or web address] and use [email or web address]" to login.  Outpatient Cardiology appointments, call  [phone] to arrange with a colleague; I do not have outpatient Cardiology clinic.

## 2021-10-20 NOTE — PROGRESS NOTE ADULT - SUBJECTIVE AND OBJECTIVE BOX
[facility] Division of Hospital Medicine  Valentin Allen MD, MICHAEL  I'm reachable on Microsoft Teams   Off hours:  [phone] ([facility] pager)    Patient is a 86y old  Female who presents with a chief complaint of Shortness of breath (20 Oct 2021 13:50)      SUBJECTIVE / OVERNIGHT EVENTS:  No events overnight. Still c/o dyspnea even at rest. Asking to maintain O2 supplementation. Today dropped to 89% on RA at rest.     MEDICATIONS  (STANDING):  carvedilol 12.5 milliGRAM(s) Oral every 12 hours  cholecalciferol 1000 Unit(s) Oral two times a day  glucagon  Injectable 1 milliGRAM(s) IntraMuscular once  hydrALAZINE 100 milliGRAM(s) Oral every 8 hours  influenza   Vaccine 0.5 milliLiter(s) IntraMuscular once  insulin lispro (ADMELOG) corrective regimen sliding scale   SubCutaneous three times a day before meals  insulin lispro (ADMELOG) corrective regimen sliding scale   SubCutaneous at bedtime  NIFEdipine XL 30 milliGRAM(s) Oral daily  polyethylene glycol 3350 17 Gram(s) Oral daily  tiotropium 2.5 MICROgram(s)/olodaterol 2.5 MICROgram(s) (STIOLTO) Inhaler 2 Puff(s) Inhalation daily    MEDICATIONS  (PRN):  acetaminophen   Tablet .. 650 milliGRAM(s) Oral every 6 hours PRN Temp greater or equal to 38C (100.4F), Mild Pain (1 - 3)  melatonin 3 milliGRAM(s) Oral at bedtime PRN Insomnia  ondansetron Injectable 4 milliGRAM(s) IV Push every 8 hours PRN Nausea and/or Vomiting  sodium chloride 0.65% Nasal 2 Spray(s) Both Nostrils four times a day PRN Nasal Congestion    CAPILLARY BLOOD GLUCOSE      POCT Blood Glucose.: 227 mg/dL (20 Oct 2021 11:52)  POCT Blood Glucose.: 161 mg/dL (20 Oct 2021 07:30)  POCT Blood Glucose.: 225 mg/dL (19 Oct 2021 21:21)  POCT Blood Glucose.: 273 mg/dL (19 Oct 2021 16:21)    I&O's Summary    19 Oct 2021 07:01  -  20 Oct 2021 07:00  --------------------------------------------------------  IN: 800 mL / OUT: 2600 mL / NET: -1800 mL    20 Oct 2021 07:01  -  20 Oct 2021 15:48  --------------------------------------------------------  IN: 720 mL / OUT: 300 mL / NET: 420 mL        PHYSICAL EXAM:  Vital Signs Last 24 Hrs  T(C): 36.5 (20 Oct 2021 11:16), Max: 36.5 (20 Oct 2021 11:16)  T(F): 97.7 (20 Oct 2021 11:16), Max: 97.7 (20 Oct 2021 11:16)  HR: 76 (20 Oct 2021 15:03) (64 - 76)  BP: 160/61 (20 Oct 2021 15:03) (139/66 - 160/61)  BP(mean): --  RR: 18 (20 Oct 2021 11:16) (18 - 18)  SpO2: 94% (20 Oct 2021 14:40) (89% - 98%)    CONSTITUTIONAL: no respiratory distress on NC, elderly female  EYES: EOMI, conjunctiva and sclera clear  ENMT: Moist oral mucosa  NECK: Supple  RESPIRATORY: faint rales BL, normal respiratory effort   CARDIOVASCULAR: Regular rate and rhythm, normal S1 and S2, no murmur/rub/gallop; trace edema BL LE, tender to touch  ABDOMEN: normoactive bowel sounds, soft, nontender to palpation, no rebound/guarding; no distension   PSYCH: A+O to person, place, and time; affect appropriate    LABS:                        9.2    4.95  )-----------( 236      ( 20 Oct 2021 06:13 )             29.4     10-20    136  |  100  |  86<H>  ----------------------------<  153<H>  4.5   |  21<L>  |  2.60<H>    Ca    9.0      20 Oct 2021 06:13  Mg     2.5     10-19    TPro  6.3  /  Alb  3.0<L>  /  TBili  0.2  /  DBili  x   /  AST  16  /  ALT  16  /  AlkPhos  168<H>  10-20      RADIOLOGY & ADDITIONAL TESTS:    Lab Results Reviewed:   Cr increased today

## 2021-10-20 NOTE — PROGRESS NOTE ADULT - ASSESSMENT
86F with PMHX of Afib on eliquis, CKD, PVD, chronic diastolic heart failure,  DM2, HTN, BRASH syndrome and COPD presents to the hospital with hypoxia and LE edema, admitted for acute decompensated heart failure receiving IV diuresis. Pulmonary consulted for hypoxemia.    Hypoxemic Respiratory Failure: multifactorial in nature    1. Acute decompensated heart failure:  - patient presented with pulmonary edema and volume overload--> now improving  - continues to have orthopnea crackles in the lung bases as well as lower extremity edema (skin tenting suggestive of improvement in edema)  - IV diuresis on hold today as patient with worsening LUIS FERNANDO  - ensure adequate BP control (current BP systolic 150s-170s) as patient with stage II diastolic dysfunction    2. COPD  - mosaic attenuation noted on CT chest which is likely in the setting of air trapping from emphysema from second hand smoke exposure  - restarted bronchodilators on 10/19  - c/w  [facility] (tiotropium and olodaterol)     3. Asbestosis  - patient with history of asbestos exposure and CT chest with findings of pleural plaques and calcifications  - likely leading to restrictive lung disease  - no acute interventions          86F with PMHX of Afib on eliquis, CKD, PVD, chronic diastolic heart failure,  DM2, HTN, BRASH syndrome and COPD presents to the hospital with hypoxia and LE edema, admitted for acute decompensated heart failure receiving IV diuresis. Pulmonary consulted for hypoxemia.    Hypoxemic Respiratory Failure: multifactorial in nature    1. Acute decompensated heart failure:  - patient presented with pulmonary edema and volume overload--> now improving  - continues to have orthopnea crackles in the lung bases as well as lower extremity edema (skin tenting suggestive of improvement in edema)  - IV diuresis on hold today as patient with worsening LUIS FERNANDO; patient continues to be overloaded, would diurese as tolerated  - ensure adequate BP control (current BP systolic 150s-170s) as patient with stage II diastolic dysfunction    2. COPD  - mosaic attenuation noted on CT chest which is likely in the setting of air trapping from emphysema from second hand smoke exposure  - restarted bronchodilators on 10/19  - c/w  [facility] inpatient (tiotropium and olodaterol)     3. 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

## 2021-10-21 LAB
ANION GAP SERPL CALC-SCNC: 15 MMOL/L — SIGNIFICANT CHANGE UP (ref 5–17)
BUN SERPL-MCNC: 94 MG/DL — HIGH (ref 7–23)
CALCIUM SERPL-MCNC: 9.1 MG/DL — SIGNIFICANT CHANGE UP (ref 8.4–10.5)
CHLORIDE SERPL-SCNC: 101 MMOL/L — SIGNIFICANT CHANGE UP (ref 96–108)
CO2 SERPL-SCNC: 21 MMOL/L — LOW (ref 22–31)
CREAT SERPL-MCNC: 2.7 MG/DL — HIGH (ref 0.5–1.3)
GLUCOSE BLDC GLUCOMTR-MCNC: 184 MG/DL — HIGH (ref 70–99)
GLUCOSE BLDC GLUCOMTR-MCNC: 186 MG/DL — HIGH (ref 70–99)
GLUCOSE BLDC GLUCOMTR-MCNC: 216 MG/DL — HIGH (ref 70–99)
GLUCOSE BLDC GLUCOMTR-MCNC: 228 MG/DL — HIGH (ref 70–99)
GLUCOSE SERPL-MCNC: 152 MG/DL — HIGH (ref 70–99)
HCT VFR BLD CALC: 30.2 % — LOW (ref 34.5–45)
HGB BLD-MCNC: 9.5 G/DL — LOW (ref 11.5–15.5)
MCHC RBC-ENTMCNC: 31.5 GM/DL — LOW (ref 32–36)
MCHC RBC-ENTMCNC: 32 PG — SIGNIFICANT CHANGE UP (ref 27–34)
MCV RBC AUTO: 101.7 FL — HIGH (ref 80–100)
NRBC # BLD: 0 /100 WBCS — SIGNIFICANT CHANGE UP (ref 0–0)
PLATELET # BLD AUTO: 264 K/UL — SIGNIFICANT CHANGE UP (ref 150–400)
POTASSIUM SERPL-MCNC: 4.7 MMOL/L — SIGNIFICANT CHANGE UP (ref 3.5–5.3)
POTASSIUM SERPL-SCNC: 4.7 MMOL/L — SIGNIFICANT CHANGE UP (ref 3.5–5.3)
RBC # BLD: 2.97 M/UL — LOW (ref 3.8–5.2)
RBC # FLD: 12.5 % — SIGNIFICANT CHANGE UP (ref 10.3–14.5)
SODIUM SERPL-SCNC: 137 MMOL/L — SIGNIFICANT CHANGE UP (ref 135–145)
WBC # BLD: 5.02 K/UL — SIGNIFICANT CHANGE UP (ref 3.8–10.5)
WBC # FLD AUTO: 5.02 K/UL — SIGNIFICANT CHANGE UP (ref 3.8–10.5)

## 2021-10-21 PROCEDURE — 99233 SBSQ HOSP IP/OBS HIGH 50: CPT

## 2021-10-21 PROCEDURE — 99152 MOD SED SAME PHYS/QHP 5/>YRS: CPT

## 2021-10-21 PROCEDURE — 93451 RIGHT HEART CATH: CPT | Mod: 26

## 2021-10-21 RX ORDER — PREGABALIN 225 MG/1
0 CAPSULE ORAL
Qty: 0 | Refills: 0 | DISCHARGE

## 2021-10-21 RX ORDER — FERROUS SULFATE 325(65) MG
1 TABLET ORAL
Qty: 0 | Refills: 0 | DISCHARGE

## 2021-10-21 RX ORDER — FELODIPINE 5 MG/1
0 TABLET, FILM COATED, EXTENDED RELEASE ORAL
Qty: 0 | Refills: 0 | DISCHARGE

## 2021-10-21 RX ORDER — UMECLIDINIUM BROMIDE AND VILANTEROL TRIFENATATE 62.5; 25 UG/1; UG/1
0 POWDER RESPIRATORY (INHALATION)
Qty: 0 | Refills: 2 | DISCHARGE

## 2021-10-21 RX ADMIN — Medication 100 MILLIGRAM(S): at 05:45

## 2021-10-21 RX ADMIN — Medication 2: at 18:31

## 2021-10-21 RX ADMIN — TIOTROPIUM BROMIDE AND OLODATEROL 2 PUFF(S): 3.124; 2.736 SPRAY, METERED RESPIRATORY (INHALATION) at 11:15

## 2021-10-21 RX ADMIN — Medication 2: at 08:23

## 2021-10-21 RX ADMIN — POLYETHYLENE GLYCOL 3350 17 GRAM(S): 17 POWDER, FOR SOLUTION ORAL at 11:36

## 2021-10-21 RX ADMIN — Medication 30 MILLIGRAM(S): at 05:44

## 2021-10-21 RX ADMIN — Medication 100 MILLIGRAM(S): at 14:07

## 2021-10-21 RX ADMIN — Medication 650 MILLIGRAM(S): at 23:00

## 2021-10-21 RX ADMIN — Medication 4: at 14:02

## 2021-10-21 RX ADMIN — CARVEDILOL PHOSPHATE 12.5 MILLIGRAM(S): 80 CAPSULE, EXTENDED RELEASE ORAL at 05:45

## 2021-10-21 RX ADMIN — Medication 3 MILLIGRAM(S): at 21:45

## 2021-10-21 RX ADMIN — Medication 1000 UNIT(S): at 05:45

## 2021-10-21 RX ADMIN — Medication 100 MILLIGRAM(S): at 21:45

## 2021-10-21 RX ADMIN — Medication 650 MILLIGRAM(S): at 21:46

## 2021-10-21 RX ADMIN — Medication 650 MILLIGRAM(S): at 16:21

## 2021-10-21 RX ADMIN — Medication 1000 UNIT(S): at 18:40

## 2021-10-21 RX ADMIN — CARVEDILOL PHOSPHATE 12.5 MILLIGRAM(S): 80 CAPSULE, EXTENDED RELEASE ORAL at 18:40

## 2021-10-21 NOTE — PROGRESS NOTE ADULT - SUBJECTIVE AND OBJECTIVE BOX
[facility] Division of Hospital Medicine  Valentin Allen MD, MICHAEL  I'm reachable on [employer] Teams   Off hours:  [phone] ([facility] pager)    Patient is a 86y old  Female who presents with a chief complaint of Shortness of breath (21 Oct 2021 12:52)      SUBJECTIVE / OVERNIGHT EVENTS:  No events overnight. No new complaints.     MEDICATIONS  (STANDING):  carvedilol 12.5 milliGRAM(s) Oral every 12 hours  cholecalciferol 1000 Unit(s) Oral two times a day  dextrose 40% Gel 15 Gram(s) Oral once  dextrose 5%. 1000 milliLiter(s) (50 mL/Hr) IV Continuous <Continuous>  dextrose 5%. 1000 milliLiter(s) (100 mL/Hr) IV Continuous <Continuous>  dextrose 50% Injectable 25 Gram(s) IV Push once  dextrose 50% Injectable 12.5 Gram(s) IV Push once  dextrose 50% Injectable 25 Gram(s) IV Push once  glucagon  Injectable 1 milliGRAM(s) IntraMuscular once  hydrALAZINE 100 milliGRAM(s) Oral every 8 hours  influenza   Vaccine 0.5 milliLiter(s) IntraMuscular once  insulin lispro (ADMELOG) corrective regimen sliding scale   SubCutaneous three times a day before meals  insulin lispro (ADMELOG) corrective regimen sliding scale   SubCutaneous at bedtime  NIFEdipine XL 30 milliGRAM(s) Oral daily  polyethylene glycol 3350 17 Gram(s) Oral daily  tiotropium 2.5 MICROgram(s)/olodaterol 2.5 MICROgram(s) (STIOLTO) Inhaler 2 Puff(s) Inhalation daily    MEDICATIONS  (PRN):  acetaminophen   Tablet .. 650 milliGRAM(s) Oral every 6 hours PRN Temp greater or equal to 38C (100.4F), Mild Pain (1 - 3)  melatonin 3 milliGRAM(s) Oral at bedtime PRN Insomnia  ondansetron Injectable 4 milliGRAM(s) IV Push every 8 hours PRN Nausea and/or Vomiting  sodium chloride 0.65% Nasal 2 Spray(s) Both Nostrils four times a day PRN Nasal Congestion    CAPILLARY BLOOD GLUCOSE      POCT Blood Glucose.: 228 mg/dL (21 Oct 2021 11:39)  POCT Blood Glucose.: 184 mg/dL (21 Oct 2021 07:28)  POCT Blood Glucose.: 280 mg/dL (20 Oct 2021 21:10)    I&O's Summary    20 Oct 2021 07:01  -  21 Oct 2021 07:00  --------------------------------------------------------  IN: 1260 mL / OUT: 1200 mL / NET: 60 mL    21 Oct 2021 07:01  -  21 Oct 2021 17:32  --------------------------------------------------------  IN: 540 mL / OUT: 200 mL / NET: 340 mL        PHYSICAL EXAM:  Vital Signs Last 24 Hrs  T(C): 36.4 (21 Oct 2021 14:10), Max: 36.6 (20 Oct 2021 20:44)  T(F): 97.5 (21 Oct 2021 14:10), Max: 97.9 (20 Oct 2021 20:44)  HR: 76 (21 Oct 2021 14:10) (71 - 79)  BP: 124/57 (21 Oct 2021 14:10) (124/57 - 151/66)  BP(mean): --  RR: 18 (21 Oct 2021 14:10) (18 - 18)  SpO2: 94% (21 Oct 2021 14:10) (94% - 96%)    CONSTITUTIONAL: no respiratory distress on NC, elderly female  EYES: EOMI, conjunctiva and sclera clear  ENMT: Moist oral mucosa  NECK: Supple  RESPIRATORY: faint rales BL, normal respiratory effort   CARDIOVASCULAR: Regular rate and rhythm, normal S1 and S2, no murmur/rub/gallop; trace edema BL LE, tender to touch  ABDOMEN: normoactive bowel sounds, soft, nontender to palpation, no rebound/guarding; no distension   PSYCH: A+O to person, place, and time; affect appropriate      LABS:                        9.5    5.02  )-----------( 264      ( 21 Oct 2021 06:02 )             30.2     10-21    137  |  101  |  94<H>  ----------------------------<  152<H>  4.7   |  21<L>  |  2.70<H>    Ca    9.1      21 Oct 2021 06:00    TPro  6.3  /  Alb  3.0<L>  /  TBili  0.2  /  DBili  x   /  AST  16  /  ALT  16  /  AlkPhos  168<H>  10-20        RADIOLOGY & ADDITIONAL TESTS:    Lab Results Reviewed: Cr still uptrending     Discussed with cardiologist - awaiting RHC

## 2021-10-21 NOTE — PROGRESS NOTE ADULT - SUBJECTIVE AND OBJECTIVE BOX
SUBJ:    Home Medications:  amLODIPine 5 mg oral tablet: 1 tab(s) orally once a day (21 Oct 2021 15:47)  ANORO ELLIPTA 62.5-25 MCG INH: TAKE 1 PUFF BY MOUTH EVERY DAY (21 Oct 2021 15:47)  apixaban 2.5 mg oral tablet: 1 tab(s) orally 2 times a day (21 Oct 2021 15:47)  atenolol 25 mg oral tablet: 1 tab(s) orally once a day (21 Oct 2021 15:47)  ferrous sulfate 324 mg (65 mg elemental iron) oral delayed release tablet: 1 tab(s) orally once a day (21 Oct 2021 15:47)  furosemide 40 mg oral tablet: 1 tab(s) orally once a day (21 Oct 2021 15:47)  senna oral tablet: 2 tab(s) orally once a day (at bedtime), As needed, Constipation (21 Oct 2021 15:47)  Vitamin D3 25 mcg (1000 intl units) oral tablet: 1 tab(s) orally once a day (21 Oct 2021 15:47)      MEDICATIONS  (STANDING):  carvedilol 12.5 milliGRAM(s) Oral every 12 hours  cholecalciferol 1000 Unit(s) Oral two times a day  dextrose 40% Gel 15 Gram(s) Oral once  dextrose 5%. 1000 milliLiter(s) (50 mL/Hr) IV Continuous <Continuous>  dextrose 5%. 1000 milliLiter(s) (100 mL/Hr) IV Continuous <Continuous>  dextrose 50% Injectable 25 Gram(s) IV Push once  dextrose 50% Injectable 12.5 Gram(s) IV Push once  dextrose 50% Injectable 25 Gram(s) IV Push once  glucagon  Injectable 1 milliGRAM(s) IntraMuscular once  hydrALAZINE 100 milliGRAM(s) Oral every 8 hours  influenza   Vaccine 0.5 milliLiter(s) IntraMuscular once  insulin lispro (ADMELOG) corrective regimen sliding scale   SubCutaneous three times a day before meals  insulin lispro (ADMELOG) corrective regimen sliding scale   SubCutaneous at bedtime  NIFEdipine XL 30 milliGRAM(s) Oral daily  polyethylene glycol 3350 17 Gram(s) Oral daily  tiotropium 2.5 MICROgram(s)/olodaterol 2.5 MICROgram(s) (STIOLTO) Inhaler 2 Puff(s) Inhalation daily    MEDICATIONS  (PRN):  acetaminophen   Tablet .. 650 milliGRAM(s) Oral every 6 hours PRN Temp greater or equal to 38C (100.4F), Mild Pain (1 - 3)  melatonin 3 milliGRAM(s) Oral at bedtime PRN Insomnia  ondansetron Injectable 4 milliGRAM(s) IV Push every 8 hours PRN Nausea and/or Vomiting  sodium chloride 0.65% Nasal 2 Spray(s) Both Nostrils four times a day PRN Nasal Congestion      Vital Signs Last 24 Hrs  T(C): 36.4 (21 Oct 2021 18:10), Max: 36.6 (20 Oct 2021 20:44)  T(F): 97.6 (21 Oct 2021 18:10), Max: 97.9 (20 Oct 2021 20:44)  HR: 65 (21 Oct 2021 18:40) (64 - 79)  BP: 135/62 (21 Oct 2021 18:40) (124/57 - 152/69)  BP(mean): --  RR: 17 (21 Oct 2021 18:40) (12 - 18)  SpO2: 96% (21 Oct 2021 18:40) (94% - 97%)    REVIEW OF SYSTEMS:  As per HPI, otherwise unremarkable.     PHYSICAL EXAM:  Constitutional/Appearance: Normal, Well-developed  HEENT:   Normal oral mucosa, no drainage or redness, supple neck  Lymphatic: No lymphadenopathy  Cardiovascular: Normal S1 S2, No edema, RRR  Respiratory: Lungs clear to auscultation, respirations non-labored  Psychiatry: A & O x 3, appropriate affect.   Gastrointestinal:  Soft, Non-tender, no distention  Skin: No rashes, No ecchymoses, No cyanosis	  Neurologic: Non-focal, Alert and oriented x 3  Extremities: Normal range of motion  Vascular: Peripheral pulses palpable 2+ bilaterally (radial)    LABS:  CBC Full  -  ( 21 Oct 2021 06:02 )  WBC Count : 5.02 K/uL  RBC Count : 2.97 M/uL  Hemoglobin : 9.5 g/dL  Hematocrit : 30.2 %  Platelet Count - Automated : 264 K/uL  Mean Cell Volume : 101.7 fl  Mean Cell Hemoglobin : 32.0 pg  Mean Cell Hemoglobin Concentration : 31.5 gm/dL  Auto Neutrophil # : x  Auto Lymphocyte # : x  Auto Monocyte # : x  Auto Eosinophil # : x  Auto Basophil # : x  Auto Neutrophil % : x  Auto Lymphocyte % : x  Auto Monocyte % : x  Auto Eosinophil % : x  Auto Basophil % : x      10-21    137  |  101  |  94<H>  ----------------------------<  152<H>  4.7   |  21<L>  |  2.70<H>    Ca    9.1      21 Oct 2021 06:00    TPro  6.3  /  Alb  3.0<L>  /  TBili  0.2  /  DBili  x   /  AST  16  /  ALT  16  /  AlkPhos  168<H>  10-20            RADIOLOGY & ADDITIONAL STUDIES:  TELEMETRY:  ECG:  TTE:    IMPRESSION AND PLAN:    ***    Addis Mancini MD, MPH, MICHAEL, RPVI, [facility]  Inpatient Cardiovascular Specialist; Adelina Garcia [facility] ([facility])  ; [employer] at [facility]  message: [employer] [phone] or Microsoft Teams (text preferred and/or call)  email: [email or web address] Cardiology and Cardiovascular Surgery on-service contact/call information, go to [email or web address] and use "[email or web address]" to login.  Outpatient Cardiology appointments, call  [phone] to arrange with a colleague; I do not have outpatient Cardiology clinic.    SUBJ:  Off diuretics for two days so far with improvement in creatinine. pBNP improved and in fact the lowest its been sine admission suggesting pre-renal etiology rather than cardiorenal. That being said, s/p RHC with hemodynamics suggestive of mild volume overload RA 12 mmHg, elevated wedge 24 mmHg, PAP 35 mmHg, and excellent cardiac output >7.5 l/min. Shunt run was done to rule out any shunts that may contribute to hypoxia and it was negative.     RHC numbers:  RA 12, RV 57/16, PA 59/18 (35), PCWP 24, CO 7.6, CI 4.47.     Home Medications:  amLODIPine 5 mg oral tablet: 1 tab(s) orally once a day (21 Oct 2021 15:47)  ANORO ELLIPTA 62.5-25 MCG INH: TAKE 1 PUFF BY MOUTH EVERY DAY (21 Oct 2021 15:47)  apixaban 2.5 mg oral tablet: 1 tab(s) orally 2 times a day (21 Oct 2021 15:47)  atenolol 25 mg oral tablet: 1 tab(s) orally once a day (21 Oct 2021 15:47)  ferrous sulfate 324 mg (65 mg elemental iron) oral delayed release tablet: 1 tab(s) orally once a day (21 Oct 2021 15:47)  furosemide 40 mg oral tablet: 1 tab(s) orally once a day (21 Oct 2021 15:47)  senna oral tablet: 2 tab(s) orally once a day (at bedtime), As needed, Constipation (21 Oct 2021 15:47)  Vitamin D3 25 mcg (1000 intl units) oral tablet: 1 tab(s) orally once a day (21 Oct 2021 15:47)    MEDICATIONS  (STANDING):  carvedilol 12.5 milliGRAM(s) Oral every 12 hours  cholecalciferol 1000 Unit(s) Oral two times a day  dextrose 40% Gel 15 Gram(s) Oral once  dextrose 5%. 1000 milliLiter(s) (50 mL/Hr) IV Continuous <Continuous>  dextrose 5%. 1000 milliLiter(s) (100 mL/Hr) IV Continuous <Continuous>  dextrose 50% Injectable 25 Gram(s) IV Push once  dextrose 50% Injectable 12.5 Gram(s) IV Push once  dextrose 50% Injectable 25 Gram(s) IV Push once  glucagon  Injectable 1 milliGRAM(s) IntraMuscular once  hydrALAZINE 100 milliGRAM(s) Oral every 8 hours  influenza   Vaccine 0.5 milliLiter(s) IntraMuscular once  insulin lispro (ADMELOG) corrective regimen sliding scale   SubCutaneous three times a day before meals  insulin lispro (ADMELOG) corrective regimen sliding scale   SubCutaneous at bedtime  NIFEdipine XL 30 milliGRAM(s) Oral daily  polyethylene glycol 3350 17 Gram(s) Oral daily  tiotropium 2.5 MICROgram(s)/olodaterol 2.5 MICROgram(s) (STIOLTO) Inhaler 2 Puff(s) Inhalation daily    MEDICATIONS  (PRN):  acetaminophen   Tablet .. 650 milliGRAM(s) Oral every 6 hours PRN Temp greater or equal to 38C (100.4F), Mild Pain (1 - 3)  melatonin 3 milliGRAM(s) Oral at bedtime PRN Insomnia  ondansetron Injectable 4 milliGRAM(s) IV Push every 8 hours PRN Nausea and/or Vomiting  sodium chloride 0.65% Nasal 2 Spray(s) Both Nostrils four times a day PRN Nasal Congestion    Vital Signs Last 24 Hrs  T(C): 36.4 (21 Oct 2021 18:10), Max: 36.6 (20 Oct 2021 20:44)  T(F): 97.6 (21 Oct 2021 18:10), Max: 97.9 (20 Oct 2021 20:44)  HR: 65 (21 Oct 2021 18:40) (64 - 79)  BP: 135/62 (21 Oct 2021 18:40) (124/57 - 152/69)  RR: 17 (21 Oct 2021 18:40) (12 - 18)  SpO2: 96% (21 Oct 2021 18:40) (94% - 97%)    REVIEW OF SYSTEMS:  As per HPI, otherwise unremarkable.     PHYSICAL EXAM:  Constitutional/Appearance: Normal, Well-developed  HEENT:   Normal oral mucosa, no drainage or redness, supple neck  Lymphatic: No lymphadenopathy  Cardiovascular: Normal S1 S2, No edema, RRR  Respiratory: Lungs clear to auscultation, respirations non-labored  Psychiatry: A & O x 3, appropriate affect.   Gastrointestinal:  Soft, Non-tender, no distention  Skin: No rashes, No ecchymoses, No cyanosis	  Neurologic: Non-focal, Alert and oriented x 3  Extremities: Normal range of motion  Vascular: Peripheral pulses palpable 2+ bilaterally (radial)    LABS:  CBC Full  -  ( 21 Oct 2021 06:02 )  WBC Count : 5.02 K/uL  RBC Count : 2.97 M/uL  Hemoglobin : 9.5 g/dL  Hematocrit : 30.2 %  Platelet Count - Automated : 264 K/uL  Mean Cell Volume : 101.7 fl  Mean Cell Hemoglobin : 32.0 pg  Mean Cell Hemoglobin Concentration : 31.5 gm/dL    10-21    137  |  101  |  94<H>  ----------------------------<  152<H>  4.7   |  21<L>  |  2.70<H>    Ca    9.1      21 Oct 2021 06:00    TPro  6.3  /  Alb  3.0<L>  /  TBili  0.2  /  DBili  x   /  AST  16  /  ALT  16  /  AlkPhos  168<H>  10-20    RADIOLOGY & ADDITIONAL STUDIES:  TTE: 10/12/2021  Conclusions:  1. Mitral annular calcification, otherwise normal mitral  valve. Mild mitral regurgitation.  2. Calcified trileaflet aortic valve with decreased  opening. No aortic valve regurgitation seen.  3. Moderately dilated left atrium.  LA volume index = 42  cc/m2.  4. Normal left ventricular systolic function. No segmental  wall motion abnormalities.  5. Grade II diastolic dysfunction,  Increased E/e'  is  consistent with elevated left ventricular filling pressure.  6. Normal right ventricular size and function.  7. Estimated right ventricular systolic pressure equals 40  mm Hg, assuming right atrial pressure equals 8 mm Hg,  consistent with mild pulmonary hypertension.  *** Compared with echocardiogram of 4/12/2021, no  significant changes noted.    IMPRESSION AND PLAN:  87 y/o F with PMHx HTN, DM II, COPD, pHTN, Afib on Eliquis, CHF, PVD, CKD, presenting with acute diastolic heart failure exacerbation. and hypoxic respiratory failure.     1) Acute diastolic Heart failure   Decompensated on admission, improving   pBNP 1700, 2200, 3200, 1500 (most recent)  Repeat echo here w/ nml LV systolic function, grade II LV diastolic dysfunction, nml RV size and function, mild pulm HTN.  RA 12, RV 57/16, PA 59/18 (35), PCWP 24, CO 7.6, CI 4.47 = volume overloaded and would benefit from afterload reduction to optimize hemodynamics.     ·	Maintain K >4, Mg >2, Phos >3  ·	I/O, daily weights   ·	Resume diuresis, consider at a lower frequency, same higher dosage. Given her poor GFR, in oder for any diuresis to occur, the dosage should be high but the frequency can be modified depending on how aggressive the treatment plan is determined to be. I suspect that she has LUIS FERNANDO due to rapid intravascular volume depletion (net negative 12 liters in less than a week), which has now been replenished while on two day diuretic holiday. I think the best course would be a gradual diesis at home.   ·	Consider adding isosorbide dinitrate 10 mg TID to start (synergistic with hydralazine) to reduce SVR and afterload given unable to use ACEi/ARBs at this time due to poor renal function.    2) Afib, paroxysmal  Elevated CHADS2; indicated for A/C  Rate controlled.    ·	Resume Eliquis after RHC   ·	Continuing carvedilol 12.5 mg BID.     3) HTN   Well controlled     ·	Continue hydralazine 100mg TID and diuresis   ·	Consider isosorbide dinitrate 10 mg TID to start (synergistic with hydralazine) to reduce SVR and afterload given unable to use ACEi/ARBs at this time due to poor renal function.    Addis Mancini MD, MPH, MICHAEL, RPVI, [facility]  Inpatient Cardiovascular Specialist; Adelina Garcia [facility], [facility] ([facility])  ; [employer] at [facility]  message: [employer] [phone] or Microsoft Teams (text preferred and/or call)  email: [email or web address] Cardiology and Cardiovascular Surgery on-service contact/call information, go to [email or web address] and use "[email or web address]" to login.  Outpatient Cardiology appointments, call  [phone] to arrange with a colleague; I do not have outpatient Cardiology clinic.

## 2021-10-21 NOTE — PROGRESS NOTE ADULT - ASSESSMENT
Pt. is an 86 y.o. F w/ PMHx. of DM2, PVD, HTN, Hx. of TIA, BRASH Syndrome and CKD presents for worsening shortness of breath and lower extremity edema. Nephrology consulted for rising Cr. in the setting of IV diuretic use.     LUIS FERNANDO on CKD (baseline Cr. 1.7-1.9) - Pt. with CKD in the setting of DM2, now with LUIS FERNANDO on CKD, in the setting of acute decompensated HF.   Pt. non oliguric, with increase in Scr to 2.7 today.   Hypervolemia improved, and Scr continues to rise. UA negative for proteinuria and spot urine TP:Cr WNL.   Check renal US.   Diuretics on hold since yesterday. Awaiting RHC. Will decide about need for further diuresis based on results of RHC.   Monitor BMP.     HTN: BP improved  monitor BP on current meds.       Theresa Bartholomew MD  Cell : [phone]  Pager : [phone]  Office : [phone]

## 2021-10-21 NOTE — PROGRESS NOTE ADULT - SUBJECTIVE AND OBJECTIVE BOX
[facility] DIVISION OF KIDNEY DISEASES AND HYPERTENSION -- PROGRESS NOTE    Chief complaint: decompensated HF    24 hour events/subjective: diuretics held  awaiting [facility]        PAST HISTORY  --------------------------------------------------------------------------------  No significant changes to PMH, PSH, FHx, SHx, unless otherwise noted    ALLERGIES & MEDICATIONS  --------------------------------------------------------------------------------  Allergies    Levaquin (Rash)    Intolerances      Standing Inpatient Medications  carvedilol 12.5 milliGRAM(s) Oral every 12 hours  cholecalciferol 1000 Unit(s) Oral two times a day  dextrose 40% Gel 15 Gram(s) Oral once  dextrose 5%. 1000 milliLiter(s) IV Continuous <Continuous>  dextrose 5%. 1000 milliLiter(s) IV Continuous <Continuous>  dextrose 50% Injectable 25 Gram(s) IV Push once  dextrose 50% Injectable 12.5 Gram(s) IV Push once  dextrose 50% Injectable 25 Gram(s) IV Push once  glucagon  Injectable 1 milliGRAM(s) IntraMuscular once  hydrALAZINE 100 milliGRAM(s) Oral every 8 hours  influenza   Vaccine 0.5 milliLiter(s) IntraMuscular once  insulin lispro (ADMELOG) corrective regimen sliding scale   SubCutaneous three times a day before meals  insulin lispro (ADMELOG) corrective regimen sliding scale   SubCutaneous at bedtime  NIFEdipine XL 30 milliGRAM(s) Oral daily  polyethylene glycol 3350 17 Gram(s) Oral daily  tiotropium 2.5 MICROgram(s)/olodaterol 2.5 MICROgram(s) (STIOLTO) Inhaler 2 Puff(s) Inhalation daily    PRN Inpatient Medications  acetaminophen   Tablet .. 650 milliGRAM(s) Oral every 6 hours PRN  melatonin 3 milliGRAM(s) Oral at bedtime PRN  ondansetron Injectable 4 milliGRAM(s) IV Push every 8 hours PRN  sodium chloride 0.65% Nasal 2 Spray(s) Both Nostrils four times a day PRN      REVIEW OF SYSTEMS  --------------------------------------------------------------------------------  Constitutional: [ ] Fever [ ] Chills [ ] Fatigue [ ] Weight change   HEENT: [ ] Blurred vision [ ] Eye Pain [ ] Headache [ ] Runny nose [ ] Sore Throat   Respiratory: [ ] Cough [ ] Wheezing [x ] Shortness of breath  Cardiovascular: [ ] Chest Pain [ ] Palpitations [ ] THOMPSON [ ] PND [ ] Orthopnea  Gastrointestinal: [ ] Abdominal Pain [ ] Diarrhea [ ] Constipation [ ] Hemorrhoids [ ] Nausea [ ] Vomiting  Genitourinary: [ ] Nocturia [ ] Dysuria [ ] Incontinence  Extremities: [ ] Swelling [ ] Joint Pain  Neurologic: [ ] Focal deficit [ ] Paresthesias [ ] Syncope  Lymphatic: [ ] Swelling [ ] Lymphadenopathy   Skin: [ ] Rash [ ] Ecchymoses [ ] Wounds [ ] Lesions  Psychiatry: [ ] Depression [ ] Suicidal/Homicidal Ideation [ ] Anxiety [ ] Sleep Disturbances  [x ] 10 point review of systems is otherwise negative except as mentioned above              [ ]Unable to obtain due to   All other systems were reviewed and are negative, except as noted.    VITALS/PHYSICAL EXAM  --------------------------------------------------------------------------------  T(C): 36.5 (10-21-21 @ 10:51), Max: 36.6 (10-20-21 @ 20:44)  HR: 71 (10-21-21 @ 10:51) (69 - 79)  BP: 133/57 (10-21-21 @ 10:51) (133/57 - 160/62)  RR: 18 (10-21-21 @ 10:51) (18 - 18)  SpO2: 95% (10-21-21 @ 10:51) (89% - 96%)  Wt(kg): --        10-20-21 @ 07:01  -  10-21-21 @ 07:00  --------------------------------------------------------  IN: 1260 mL / OUT: 1200 mL / NET: 60 mL    10-21-21 @ 07:01  -  10-21-21 @ 12:53  --------------------------------------------------------  IN: 240 mL / OUT: 0 mL / NET: 240 mL      Physical Exam:  	Gen: NAD  	HEENT: on supplemental oxygen, 2liters via NC  	Pulm: rhonchi B/L  	CV: normal S1S2  	Abd: soft                      Back : unable to examine  	: + female urinary catheter attached to suction  	LE: bilateral LE wrapped in bandages  	Skin: Warm,     LABS/STUDIES  --------------------------------------------------------------------------------              9.5    5.02  >-----------<  264      [10-21-21 @ 06:02]              30.2     137  |  101  |  94  ----------------------------<  152      [10-21-21 @ 06:00]  4.7   |  21  |  2.70        Ca     9.1     [10-21-21 @ 06:00]    TPro  6.3  /  Alb  3.0  /  TBili  0.2  /  DBili  x   /  AST  16  /  ALT  16  /  AlkPhos  168  [10-20-21 @ 06:13]          Creatinine Trend:  SCr 2.70 [10-21 @ 06:00]  SCr 2.60 [10-20 @ 06:13]  SCr 2.36 [10-19 @ 05:34]  SCr 2.37 [10-18 @ 09:59]  SCr 2.58 [10-17 @ 06:52]    Urinalysis - [10-17-21 @ 06:47]      Color Light Yellow / Appearance Clear / SG 1.018 / pH 5.5      Gluc Negative / Ketone Negative  / Bili Negative / Urobili Negative       Blood Negative / Protein Trace / Leuk Est Negative / Nitrite Negative      RBC 1 / WBC 1 / Hyaline 0 / Gran  / Sq Epi  / Non Sq Epi 0 / Bacteria Negative    Urine Creatinine 73      [10-20-21 @ 15:38]  Urine Protein 16      [10-20-21 @ 15:38]  Urine Sodium 49      [10-17-21 @ 06:48]  Urine Urea Nitrogen 768      [10-17-21 @ 10:14]  Urine Osmolality 486      [10-17-21 @ 06:47]    Iron 62, TIBC 225, %sat 28      [10-18-21 @ 10:45]  Ferritin 231      [10-18-21 @ 13:01]  TSH 2.80      [10-13-21 @ 13:13]

## 2021-10-22 ENCOUNTER — TRANSCRIPTION ENCOUNTER (OUTPATIENT)
Age: 86
End: 2021-10-22

## 2021-10-22 LAB
ANION GAP SERPL CALC-SCNC: 15 MMOL/L — SIGNIFICANT CHANGE UP (ref 5–17)
BUN SERPL-MCNC: 94 MG/DL — HIGH (ref 7–23)
CALCIUM SERPL-MCNC: 9 MG/DL — SIGNIFICANT CHANGE UP (ref 8.4–10.5)
CHLORIDE SERPL-SCNC: 101 MMOL/L — SIGNIFICANT CHANGE UP (ref 96–108)
CO2 SERPL-SCNC: 20 MMOL/L — LOW (ref 22–31)
CREAT SERPL-MCNC: 2.51 MG/DL — HIGH (ref 0.5–1.3)
GLUCOSE BLDC GLUCOMTR-MCNC: 151 MG/DL — HIGH (ref 70–99)
GLUCOSE BLDC GLUCOMTR-MCNC: 171 MG/DL — HIGH (ref 70–99)
GLUCOSE BLDC GLUCOMTR-MCNC: 227 MG/DL — HIGH (ref 70–99)
GLUCOSE BLDC GLUCOMTR-MCNC: 267 MG/DL — HIGH (ref 70–99)
GLUCOSE SERPL-MCNC: 148 MG/DL — HIGH (ref 70–99)
HCT VFR BLD CALC: 28.9 % — LOW (ref 34.5–45)
HGB BLD-MCNC: 9.3 G/DL — LOW (ref 11.5–15.5)
MAGNESIUM SERPL-MCNC: 2.8 MG/DL — HIGH (ref 1.6–2.6)
MCHC RBC-ENTMCNC: 32.2 GM/DL — SIGNIFICANT CHANGE UP (ref 32–36)
MCHC RBC-ENTMCNC: 32.5 PG — SIGNIFICANT CHANGE UP (ref 27–34)
MCV RBC AUTO: 101 FL — HIGH (ref 80–100)
NRBC # BLD: 0 /100 WBCS — SIGNIFICANT CHANGE UP (ref 0–0)
PHOSPHATE SERPL-MCNC: 5.2 MG/DL — HIGH (ref 2.5–4.5)
PLATELET # BLD AUTO: 242 K/UL — SIGNIFICANT CHANGE UP (ref 150–400)
POTASSIUM SERPL-MCNC: 4.7 MMOL/L — SIGNIFICANT CHANGE UP (ref 3.5–5.3)
POTASSIUM SERPL-SCNC: 4.7 MMOL/L — SIGNIFICANT CHANGE UP (ref 3.5–5.3)
RBC # BLD: 2.86 M/UL — LOW (ref 3.8–5.2)
RBC # FLD: 12.5 % — SIGNIFICANT CHANGE UP (ref 10.3–14.5)
SODIUM SERPL-SCNC: 136 MMOL/L — SIGNIFICANT CHANGE UP (ref 135–145)
WBC # BLD: 4.25 K/UL — SIGNIFICANT CHANGE UP (ref 3.8–10.5)
WBC # FLD AUTO: 4.25 K/UL — SIGNIFICANT CHANGE UP (ref 3.8–10.5)

## 2021-10-22 PROCEDURE — 99233 SBSQ HOSP IP/OBS HIGH 50: CPT

## 2021-10-22 PROCEDURE — 99233 SBSQ HOSP IP/OBS HIGH 50: CPT | Mod: GC

## 2021-10-22 RX ORDER — FUROSEMIDE 40 MG
80 TABLET ORAL DAILY
Refills: 0 | Status: DISCONTINUED | OUTPATIENT
Start: 2021-10-22 | End: 2021-10-23

## 2021-10-22 RX ORDER — APIXABAN 2.5 MG/1
2.5 TABLET, FILM COATED ORAL EVERY 12 HOURS
Refills: 0 | Status: DISCONTINUED | OUTPATIENT
Start: 2021-10-22 | End: 2021-10-23

## 2021-10-22 RX ADMIN — Medication 100 MILLIGRAM(S): at 05:59

## 2021-10-22 RX ADMIN — Medication 2: at 08:18

## 2021-10-22 RX ADMIN — TIOTROPIUM BROMIDE AND OLODATEROL 2 PUFF(S): 3.124; 2.736 SPRAY, METERED RESPIRATORY (INHALATION) at 12:10

## 2021-10-22 RX ADMIN — CARVEDILOL PHOSPHATE 12.5 MILLIGRAM(S): 80 CAPSULE, EXTENDED RELEASE ORAL at 06:00

## 2021-10-22 RX ADMIN — CARVEDILOL PHOSPHATE 12.5 MILLIGRAM(S): 80 CAPSULE, EXTENDED RELEASE ORAL at 17:41

## 2021-10-22 RX ADMIN — Medication 2: at 17:42

## 2021-10-22 RX ADMIN — Medication 4: at 12:12

## 2021-10-22 RX ADMIN — APIXABAN 2.5 MILLIGRAM(S): 2.5 TABLET, FILM COATED ORAL at 12:07

## 2021-10-22 RX ADMIN — Medication 80 MILLIGRAM(S): at 12:07

## 2021-10-22 RX ADMIN — Medication 1000 UNIT(S): at 17:41

## 2021-10-22 RX ADMIN — Medication 1: at 21:39

## 2021-10-22 RX ADMIN — APIXABAN 2.5 MILLIGRAM(S): 2.5 TABLET, FILM COATED ORAL at 17:41

## 2021-10-22 RX ADMIN — Medication 100 MILLIGRAM(S): at 13:05

## 2021-10-22 RX ADMIN — Medication 1000 UNIT(S): at 06:00

## 2021-10-22 RX ADMIN — Medication 30 MILLIGRAM(S): at 06:00

## 2021-10-22 RX ADMIN — Medication 650 MILLIGRAM(S): at 16:51

## 2021-10-22 RX ADMIN — Medication 100 MILLIGRAM(S): at 21:38

## 2021-10-22 NOTE — DISCHARGE NOTE PROVIDER - PROVIDER TOKENS
PROVIDER:[TOKEN:[[id number]],FOLLOWUP:[1 week]],PROVIDER:[TOKEN:[[id number]],FOLLOWUP:[1 week]],PROVIDER:[TOKEN:[[id number]],FOLLOWUP:[1 week]] PROVIDER:[TOKEN:[[id number]],FOLLOWUP:[1 week]],PROVIDER:[TOKEN:[[id number]],FOLLOWUP:[1 week]],PROVIDER:[TOKEN:[[id number]],FOLLOWUP:[1 week]],PROVIDER:[TOKEN:[[id number]],SCHEDULEDAPPT:[10/26/2021],SCHEDULEDAPPTTIME:[04:15 PM]]

## 2021-10-22 NOTE — DISCHARGE NOTE PROVIDER - CARE PROVIDER_API CALL
Gwendolyn Fall)  Cardiovascular Disease; Interventional Cardiology  [address] [place] [address]  Phone: [phone]  Fax: [phone]  Follow Up Time: 1 week    Terence Briggs)  MedicineMed Spec at [facility]  [address] [facility], [address]nd [place] [address]  Phone: [phone]  Fax: [phone]  Follow Up Time: 1 week    Theresa Bartholomew)  Internal Medicine  [address] [place] [address]  Phone: [phone]  Fax: [phone]  Follow Up Time: 1 week   Gwendolyn Fall)  Cardiovascular Disease; Interventional Cardiology  [address] [place] [address]  Phone: [phone]  Fax: [phone]  Follow Up Time: 1 week    Terence Briggs)  MedicineMed Spec at [facility]  [address]nd [place] [address]  Phone: [phone]  Fax: [phone]  Follow Up Time: 1 week    Theresa Bartholomew)  Internal Medicine  [address] [facility], [address]nd [place] [address]  Phone: [phone]  Fax: [phone]  Follow Up Time: 1 week    Shanell Spencer)  Critical Care Medicine  [address] [place] [address], [address]nd [place] [address]  Phone: [phone]  Fax: [phone]  Scheduled Appointment: 10/26/2021 04:15 PM

## 2021-10-22 NOTE — DISCHARGE NOTE PROVIDER - NSDCMRMEDTOKEN_GEN_ALL_CORE_FT
amLODIPine 5 mg oral tablet: 1 tab(s) orally once a day  ANORO ELLIPTA 62.5-25 MCG INH: TAKE 1 PUFF BY MOUTH EVERY DAY  apixaban 2.5 mg oral tablet: 1 tab(s) orally 2 times a day  atenolol 25 mg oral tablet: 1 tab(s) orally once a day  ferrous sulfate 324 mg (65 mg elemental iron) oral delayed release tablet: 1 tab(s) orally once a day  furosemide 40 mg oral tablet: 1 tab(s) orally once a day  Oxygen : 2 Liters via nasal cannula     Dx: CHF I50.33  Inogen setting # 2    senna oral tablet: 2 tab(s) orally once a day (at bedtime), As needed, Constipation  Vitamin D3 25 mcg (1000 intl units) oral tablet: 1 tab(s) orally once a day   amLODIPine 5 mg oral tablet: 1 tab(s) orally once a day  ANORO ELLIPTA 62.5-25 MCG INH: TAKE 1 PUFF BY MOUTH EVERY DAY  apixaban 2.5 mg oral tablet: 1 tab(s) orally 2 times a day  atenolol 25 mg oral tablet: 1 tab(s) orally once a day  ferrous sulfate 324 mg (65 mg elemental iron) oral delayed release tablet: 1 tab(s) orally once a day  furosemide 40 mg oral tablet: 1 tab(s) orally once a day  Oxygen : 2 Liters via nasal cannula     Dx: CHF I50.33  Inogen setting # 2    Rolling walker:   senna oral tablet: 2 tab(s) orally once a day (at bedtime), As needed, Constipation  Transport wheelchair:   Vitamin D3 25 mcg (1000 intl units) oral tablet: 1 tab(s) orally once a day   ANORO ELLIPTA 62.5-25 MCG INH: TAKE 1 PUFF BY MOUTH EVERY DAY  apixaban 2.5 mg oral tablet: 1 tab(s) orally 2 times a day  carvedilol 12.5 mg oral tablet: 1 tab(s) orally every 12 hours  ferrous sulfate 324 mg (65 mg elemental iron) oral delayed release tablet: 1 tab(s) orally once a day  furosemide 80 mg oral tablet: 1 tab(s) orally once a day  hydrALAZINE 100 mg oral tablet: 1 tab(s) orally every 8 hours  NIFEdipine 30 mg oral tablet, extended release: 1 tab(s) orally once a day  Oxygen : 2 Liters via nasal cannula     Dx: CHF I50.33  Inogen setting # 2    Rolling walker:   senna oral tablet: 2 tab(s) orally once a day (at bedtime), As needed, Constipation  sodium chloride 0.65% nasal spray: 1 spray(s) nasal 4 times a day, As Needed  Transport wheelchair:   Vitamin D3 25 mcg (1000 intl units) oral tablet: 2 tab(s) orally once a day

## 2021-10-22 NOTE — DISCHARGE NOTE NURSING/CASE MANAGEMENT/SOCIAL WORK - PATIENT PORTAL LINK FT
You can access the FollowMyHealth Patient Portal offered by [facility] by registering at the following website: [email or web address]. By joining [employer]’s FollowMyHealth portal, you will also be able to view your health information using other applications (apps) compatible with our system.

## 2021-10-22 NOTE — DISCHARGE NOTE PROVIDER - HOSPITAL COURSE
86F w/ hx of chronic Afib on Eliquis, CKD stage 4, PVD, chronic diastolic CHF, DM type 2, HTN, BRASH syndrome (Bradycardia, Renal Failure, AV blockade, Shock, and Hyperkalamia) p/w SOB and LE edema.      Problem/Plan - 1:  ·  Problem: Acute on chronic diastolic congestive heart failure.   ·  Plan: Acute respiratory failure with hypoxia due to acute on chronic diastolic CHF, likely due to CKD stage 4 with volume overload. Additional contributing etiologies are: significant 2nd hand smoke exposure, COPD/emphysema, and asbestosis  TTE with preserved EF.   O2 titrated down to 2L/min. Lungs are mostly clear to auscultation, however still drops to 89% on RA at rest.   Check O2 on ambulation on room air - she may require O2 for home.   Awaiting RHC today per cardiology.   House cardiology consult following (was planning on seeing Dr. Gwendolyn Fall outpatient prior to hospitalization).  Stopped lasix after 1 dose 2 days ago (was on 60mg IV BID) due to euvolemia and rise in Cr.   Monitor labs off diuretics for now. Renal follow up appreciated.   Strict I/Os and daily weights  Continue hydralazine 100 mg PO TID, coreg 12.5mg PO BID, procardia xl 30mg PO daily for HTN control.     Problem/Plan - 2:  ·  Problem: Stage 4 chronic kidney disease.   ·  Plan: LUIS FERNANDO on CKD stage 4 (suspect prerenal azotemia due to diuresis).  Renal following.   Diuresis held for now. Appears close to being optimized for volume status.   Monitor Cr.   Renally dose meds  Nephrology follow up appreciated.     Problem/Plan - 3:  ·  Problem: Diabetes mellitus.   ·  Plan: Not on PO meds for DM  -Fingersticks and sliding scale  -HgA1c 6.4 (preDM range).     Problem/Plan - 4:  ·  Problem: Paroxysmal atrial fibrillation.   ·  Plan: -Continue Eliquis 2.5 mg BID  -Coreg increased to 12.5mg po BID  -On telemetry.     Problem/Plan - 5:  ·  Problem: PVD (peripheral vascular disease).   ·  Plan: Has weeping bandaged area in RLE in shin area.   -Wound care consulted on admission   -Compression stockings as needed.     Problem/Plan - 6:  ·  Problem: Essential hypertension.   ·  Plan: - Monitor BP  - Continue diuretic, Hydralazine, coreg, and procardia XL.     Problem/Plan - 7:  ·  Problem: Anemia.   ·  Plan: Hgb stable compared to prior admission. Possibly due to CKD.   B12, folate, TSH WNL (RBC are macrocytic)   -Trend CBC.     Problem/Plan - 8:  ·  Problem: Vitamin D deficiency.   ·  Plan: -Continue vitamin D.     Problem/Plan - 9:  ·  Problem: Prophylactic measure.   ·  Plan: DVT PPX: Eliquis      Daughter Elizabeth would like to take the patient home (not TAI as being recommended).     86F w/ hx of chronic Afib on Eliquis, CKD stage 4, PVD, chronic diastolic CHF, DM type 2, HTN, BRASH syndrome (Bradycardia, Renal Failure, AV blockade, Shock, and Hyperkalamia) presented with SOB and Lower extremity edema.     She was treated for Acute on chronic diastolic congestive heart failure likely due to CKD stage 4 with volume overload. Additional contributing etiologies are: significant 2nd hand smoke exposure, COPD/emphysema, and asbestosis  TTE shows a preserved EF.   She continued to require require O2 for ___________  RHC performed 10/21/21 showed ____________________  House cardiology consult following (was planning on seeing Dr. Gwendolyn Fall outpatient prior to hospitalization).  She developed and LUIS FERNANDO.  Therefore, Lasix was held until her renal function stablilized.    Hydralazine 100 mg PO TID, coreg 12.5mg PO BID, procardia xl 30mg PO daily for HTN control.    Stage 4 chronic kidney disease and LUIS FERNANDO treated, suspected to be due to prerenal azotemia due to diuresis.   Diuresis held unstil renal function stabilized.   Medications were renally dosed.     Paroxysmal atrial fibrillation remained stable on Eliquis and Coreg.  She has PVD with a weeping wound bandaged on RLE shin area.  Compression stockings were applied and remained stable.    Essential hypertension was treated with diuretics (as above), Hydralazine, coreg, and procardia XL.  Anemia was monitored and remained stable.        Daughter Elizabeth would like to take the patient home (not TAI as being recommended).  was planning on seeing Dr. Gwendolyn Fall outpatient prior to hospitalization).  Follow up with Renal     86F w/ hx of chronic Afib on Eliquis, CKD stage 4, PVD, chronic diastolic CHF, DM type 2, HTN, BRASH syndrome (Bradycardia, Renal Failure, AV blockade, Shock, and Hyperkalamia) presented with SOB and Lower extremity edema.     She was treated for Acute on chronic diastolic congestive heart failure likely due to CKD stage 4 with volume overload. Additional contributing etiologies are: significant 2nd hand smoke exposure, COPD/emphysema, and asbestosis  TTE shows a preserved EF.   She continued to require require O2 for ambulatory hypoxia   RHC performed 10/21/21   House cardiology consult following (was planning on seeing Dr. Gwendolyn Fall outpatient prior to hospitalization).  She developed and LUIS FERNANDO.  Therefore, Lasix was held until her renal function stablilized.    Hydralazine 100 mg PO TID, coreg 12.5mg PO BID, procardia xl 30mg PO daily for HTN control.    Stage 4 chronic kidney disease and LUIS FERNANDO treated, suspected to be due to prerenal azotemia due to diuresis.   Diuresis held unstil renal function stabilized.   Medications were renally dosed.     Paroxysmal atrial fibrillation remained stable on Eliquis and Coreg.  She has PVD with a weeping wound bandaged on RLE shin area.  Compression stockings were applied and remained stable.    Essential hypertension was treated with diuretics (as above), Hydralazine, coreg, and procardia XL.  Anemia was monitored and remained stable.        Daughter Elizabeth would like to take the patient home (not TAI as being recommended).  was planning on seeing Dr. Gwendolyn Fall outpatient prior to hospitalization).  Follow up with Renal    86F w/ hx of chronic Afib on Eliquis, CKD stage 4, PVD, chronic diastolic CHF, DM type 2, HTN, BRASH syndrome (Bradycardia, Renal Failure, AV blockade, Shock, and Hyperkalamia) p/w SOB and LE edema.      Problem/Plan - 1:  ·  Problem: Acute on chronic diastolic congestive heart failure.   ·  Plan: Acute respiratory failure with hypoxia due to acute on chronic diastolic CHF, likely due to CKD stage 4 with volume overload. Additional contributing etiologies are: significant 2nd hand smoke exposure, COPD/emphysema, and asbestosis  - TTE with preserved EF. s/p RHC  - O2 titrated down to 2L/min. Lungs are mostly clear to auscultation, however still drops to 88% on RA at rest.     Will require O2 for home. CM setting this up.     - House cardiology consult following (was planning on seeing Dr. Gwendolyn Fall outpatient prior to hospitalization).  - Now the Cr stabilized, on Lasix 80mg PO daily (was taking 40mg at home).    Continue hydralazine 100 mg PO TID, coreg 12.5mg PO BID, procardia xl 30mg PO daily for HTN control. Would   hold off on initiating isordil at this time given current normal/relatively lower blood pressure readings.   ** Daughter is ayala of the plan. Plan to follow up with outpatient pulmonologist.         Problem/Plan - 7:  ·  Problem: Anemia.   ·  Plan: Hgb stable compared to prior admission. Possibly due to CKD.   B12, folate, TSH WNL (RBC are macrocytic)   -Trend CBC.     Problem/Plan - 8:  ·  Problem: Vitamin D deficiency.   ·  Plan: -Continue vitamin D.     86F w/ hx of chronic Afib on Eliquis, CKD stage 4, PVD, chronic diastolic CHF, DM type 2, HTN, BRASH syndrome (Bradycardia, Renal Failure, AV blockade, Shock, and Hyperkalamia) presented with SOB and Lower extremity edema.     She was treated for Acute on chronic diastolic congestive heart failure likely due to CKD stage 4 with volume overload. Additional contributing etiologies are: significant 2nd hand smoke exposure, COPD/emphysema, and asbestosis  TTE shows a preserved EF.   She continued to require require O2 for ambulatory hypoxia   RHC performed 10/21/21   House cardiology consult following (was planning on seeing Dr. Gwendolyn Fall outpatient prior to hospitalization).  She developed and LUIS FERNANDO.  Therefore, Lasix was held until her renal function stablilized.    Hydralazine 100 mg PO TID, coreg 12.5mg PO BID, procardia xl 30mg PO daily for HTN control.    Stage 4 chronic kidney disease and LUIS FERNANDO treated, suspected to be due to prerenal azotemia due to diuresis.   Diuresis held unstil renal function stabilized.   Medications were renally dosed.     Paroxysmal atrial fibrillation remained stable on Eliquis and Coreg.  She has PVD with a weeping wound bandaged on RLE shin area.  Compression stockings were applied and remained stable.    Essential hypertension was treated with diuretics (as above), Hydralazine, coreg, and procardia XL.  Anemia was monitored and remained stable.        Daughter Elizabeth would like to take the patient home (not TAI as being recommended).  was planning on seeing Dr. Gwendolyn Fall outpatient prior to hospitalization).  Follow up with Renal  Medically cleared for discharge by Dr. Escalante          86F w/ hx of chronic Afib on Eliquis, CKD stage 4, PVD, chronic diastolic CHF, DM type 2, HTN, BRASH syndrome (Bradycardia, Renal Failure, AV blockade, Shock, and Hyperkalamia) presented with SOB and Lower extremity edema.     She was treated for Acute on chronic diastolic congestive heart failure likely due to CKD stage 4 with volume overload. Additional contributing etiologies are: significant 2nd hand smoke exposure, COPD/emphysema, and asbestosis  TTE shows a preserved EF.   She continued to require require O2 for ambulatory hypoxia   RHC performed 10/21/21   House cardiology consult following (was planning on seeing Dr. Gwendolyn Fall outpatient prior to hospitalization).  She developed and LUIS FERNANDO.  Therefore, Lasix was held until her renal function stablilized.    Hydralazine 100 mg PO TID, coreg 12.5mg PO BID, procardia xl 30mg PO daily for HTN control.    Stage 4 chronic kidney disease and LUIS FERNANDO treated, suspected to be due to prerenal azotemia due to diuresis.   Diuresis held unstil renal function stabilized.   Medications were renally dosed.     Paroxysmal atrial fibrillation remained stable on Eliquis and Coreg.  She has PVD with a weeping wound bandaged on RLE shin area.  Compression stockings were applied and remained stable.    Essential hypertension was treated with diuretics (as above), Hydralazine, coreg, and procardia XL.  Anemia was monitored and remained stable.        Daughter Elizabeth would like to take the patient home (not TAI as being recommended).  was planning on seeing Dr. Gwendolyn Fall outpatient prior to hospitalization).  Follow up with Renal  Medically cleared for discharge by Dr. 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

## 2021-10-22 NOTE — PROGRESS NOTE ADULT - ASSESSMENT
86F with PMHX of Afib on eliquis, CKD, PVD, chronic diastolic heart failure,  DM2, HTN, BRASH syndrome and COPD presents to the hospital with hypoxia and LE edema, admitted for acute decompensated heart failure receiving IV diuresis. Pulmonary consulted for hypoxemia.    Hypoxemic Respiratory Failure: multifactorial in nature    1. Acute decompensated heart failure:  - patient presented with pulmonary edema and volume overload--> now improving  - diuretics held past few days 2/2 to LUIS FERNANDO--> would resume as patient with continued LE edema and mild cracles- ensure adequate BP control (current BP systolic 150s-170s) as patient with stage II diastolic dysfunction    2. COPD  - mosaic attenuation noted on CT chest which is likely in the setting of air trapping from emphysema from second hand smoke exposure  - restarted bronchodilators on 10/19  - c/w  Critical access hospital inpatient (tiotropium and olodaterol)     3. 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 Please discharge on home Anoro inhaler. Patient should follow up with Dr. Spencer after dsicharge 86F with PMHX of Afib on eliquis, CKD, PVD, chronic diastolic heart failure,  DM2, HTN, BRASH syndrome and COPD presents to the hospital with hypoxia and LE edema, admitted for acute decompensated heart failure receiving IV diuresis. Pulmonary consulted for hypoxemia.    Hypoxemic Respiratory Failure: multifactorial in nature    1. Acute decompensated heart failure:  - patient presented with pulmonary edema and volume overload, initially improving  - on examination today, patient with worsening LE edema, would restart diuretics  - BP is more controlled after addition of nifedipine    2. COPD  - c/w bronchodilators--> on Anoro at home; c/w Stiolto inpatient    3. 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 Please discharge on home Anoro inhaler. Patient should follow up with Dr. Spencer after discharge.

## 2021-10-22 NOTE — CHART NOTE - NSCHARTNOTEFT_GEN_A_CORE
Patient remains hypoxic on room air both sitting and ambulating.    Oxygen Saturation while sitting on Room Air is 88%  Oxygen saturation on ambulation  on Room Air is 85%  Oxygen Saturation while sitting on 2L Nasal Cannula is 94% The patient is now in a chronic and stable state and  currently takes the following medications:      acetaminophen   Tablet .. 650 milliGRAM(s) Oral every 6 hours PRN  carvedilol 12.5 milliGRAM(s) Oral every 12 hours  cholecalciferol 1000 Unit(s) Oral two times a day  dextrose 40% Gel 15 Gram(s) Oral once  dextrose 5%. 1000 milliLiter(s) IV Continuous <Continuous>  dextrose 5%. 1000 milliLiter(s) IV Continuous <Continuous>  dextrose 50% Injectable 25 Gram(s) IV Push once  dextrose 50% Injectable 12.5 Gram(s) IV Push once  dextrose 50% Injectable 25 Gram(s) IV Push once  glucagon  Injectable 1 milliGRAM(s) IntraMuscular once  hydrALAZINE 100 milliGRAM(s) Oral every 8 hours  influenza   Vaccine 0.5 milliLiter(s) IntraMuscular once  insulin lispro (ADMELOG) corrective regimen sliding scale   SubCutaneous three times a day before meals  insulin lispro (ADMELOG) corrective regimen sliding scale   SubCutaneous at bedtime  melatonin 3 milliGRAM(s) Oral at bedtime PRN  NIFEdipine XL 30 milliGRAM(s) Oral daily  ondansetron Injectable 4 milliGRAM(s) IV Push every 8 hours PRN  polyethylene glycol 3350 17 Gram(s) Oral daily  sodium chloride 0.65% Nasal 2 Spray(s) Both Nostrils four times a day PRN  tiotropium 2.5 MICROgram(s)/olodaterol 2.5 MICROgram(s) (STIOLTO) Inhaler 2 Puff(s) Inhalation daily      These medications are not sufficient in correcting the hypoxemia.      Room air SpO2 = 88%    Ambulatory SpO2 without Supplement = 85%    Ambulatory SpO2 with XX L NC = ___________________________________    2 Liters Nasal Canula of Supplemental Oxygen is required to treat the patient's Congestive Heart Failure and COPD. The patient is now in a chronic and stable state and  currently takes the following medications:      acetaminophen   Tablet .. 650 milliGRAM(s) Oral every 6 hours PRN  carvedilol 12.5 milliGRAM(s) Oral every 12 hours  cholecalciferol 1000 Unit(s) Oral two times a day  dextrose 40% Gel 15 Gram(s) Oral once  dextrose 5%. 1000 milliLiter(s) IV Continuous <Continuous>  dextrose 5%. 1000 milliLiter(s) IV Continuous <Continuous>  dextrose 50% Injectable 25 Gram(s) IV Push once  dextrose 50% Injectable 12.5 Gram(s) IV Push once  dextrose 50% Injectable 25 Gram(s) IV Push once  glucagon  Injectable 1 milliGRAM(s) IntraMuscular once  hydrALAZINE 100 milliGRAM(s) Oral every 8 hours  influenza   Vaccine 0.5 milliLiter(s) IntraMuscular once  insulin lispro (ADMELOG) corrective regimen sliding scale   SubCutaneous three times a day before meals  insulin lispro (ADMELOG) corrective regimen sliding scale   SubCutaneous at bedtime  melatonin 3 milliGRAM(s) Oral at bedtime PRN  NIFEdipine XL 30 milliGRAM(s) Oral daily  ondansetron Injectable 4 milliGRAM(s) IV Push every 8 hours PRN  polyethylene glycol 3350 17 Gram(s) Oral daily  sodium chloride 0.65% Nasal 2 Spray(s) Both Nostrils four times a day PRN  tiotropium 2.5 MICROgram(s)/olodaterol 2.5 MICROgram(s) (STIOLTO) Inhaler 2 Puff(s) Inhalation daily      These medications are not sufficient in correcting the hypoxemia.      At Rest Room air SpO2 = 88%    Ambulatory SpO2 without Supplement = 85%    Ambulatory SpO2 with XX L NC = ___________________________________    2 Liters Nasal Canula of Supplemental Oxygen is required to treat the patient's Congestive Heart Failure and COPD.

## 2021-10-22 NOTE — DISCHARGE NOTE PROVIDER - NSDCHHNEEDSERVICE_GEN_ALL_CORE
Observation and assessment/Rehabilitation services/Wound care and assessment Observation and assessment/Rehabilitation services

## 2021-10-22 NOTE — DISCHARGE NOTE PROVIDER - CARE PROVIDERS DIRECT ADDRESSES
,[email or web address]

## 2021-10-22 NOTE — PROGRESS NOTE ADULT - SUBJECTIVE AND OBJECTIVE BOX
CHIEF COMPLAINT:    Interval Events:    REVIEW OF SYSTEMS:  Constitutional: [ ] negative [ ] fevers [ ] chills [ ] weight loss [ ] weight gain  HEENT: [ ] negative [ ] dry eyes [ ] eye irritation [ ] postnasal drip [ ] nasal congestion  CV: [ ] negative  [ ] chest pain [ ] orthopnea [ ] palpitations [ ] murmur  Resp: [ ] negative [ ] cough [ ] shortness of breath [ ] dyspnea [ ] wheezing [ ] sputum [ ] hemoptysis  GI: [ ] negative [ ] nausea [ ] vomiting [ ] diarrhea [ ] constipation [ ] abd pain [ ] dysphagia   : [ ] negative [ ] dysuria [ ] nocturia [ ] hematuria [ ] increased urinary frequency  Musculoskeletal: [ ] negative [ ] back pain [ ] myalgias [ ] arthralgias [ ] fracture  Skin: [ ] negative [ ] rash [ ] itch  Neurological: [ ] negative [ ] headache [ ] dizziness [ ] syncope [ ] weakness [ ] numbness  Psychiatric: [ ] negative [ ] anxiety [ ] depression  Endocrine: [ ] negative [ ] diabetes [ ] thyroid problem  Hematologic/Lymphatic: [ ] negative [ ] anemia [ ] bleeding problem  Allergic/Immunologic: [ ] negative [ ] itchy eyes [ ] nasal discharge [ ] hives [ ] angioedema  [ ] All other systems negative  [ ] Unable to assess ROS because ________    OBJECTIVE:  ICU Vital Signs Last 24 Hrs  T(C): 36.3 (22 Oct 2021 11:26), Max: 36.4 (21 Oct 2021 14:10)  T(F): 97.4 (22 Oct 2021 11:26), Max: 97.6 (21 Oct 2021 18:10)  HR: 62 (22 Oct 2021 11:26) (62 - 76)  BP: 125/64 (22 Oct 2021 11:26) (110/54 - 152/69)  BP(mean): --  ABP: --  ABP(mean): --  RR: 17 (22 Oct 2021 11:26) (12 - 18)  SpO2: 96% (22 Oct 2021 11:26) (85% - 98%)        10-21 @ 07:01  -  10-22 @ 07:00  --------------------------------------------------------  IN: 660 mL / OUT: 2000 mL / NET: -1340 mL    10-22 @ 07:01  -  10-22 @ 11:54  --------------------------------------------------------  IN: 480 mL / OUT: 0 mL / NET: 480 mL      CAPILLARY BLOOD GLUCOSE      POCT Blood Glucose.: 227 mg/dL (22 Oct 2021 11:30)      PHYSICAL EXAM:  General:   HEENT:   Lymph Nodes:  Neck:   Respiratory:   Cardiovascular:   Abdomen:   Extremities:   Skin:   Neurological:  Psychiatry:    HOSPITAL MEDICATIONS:  MEDICATIONS  (STANDING):  apixaban 2.5 milliGRAM(s) Oral every 12 hours  carvedilol 12.5 milliGRAM(s) Oral every 12 hours  cholecalciferol 1000 Unit(s) Oral two times a day  dextrose 40% Gel 15 Gram(s) Oral once  dextrose 5%. 1000 milliLiter(s) (50 mL/Hr) IV Continuous <Continuous>  dextrose 5%. 1000 milliLiter(s) (100 mL/Hr) IV Continuous <Continuous>  dextrose 50% Injectable 25 Gram(s) IV Push once  dextrose 50% Injectable 12.5 Gram(s) IV Push once  dextrose 50% Injectable 25 Gram(s) IV Push once  glucagon  Injectable 1 milliGRAM(s) IntraMuscular once  hydrALAZINE 100 milliGRAM(s) Oral every 8 hours  influenza   Vaccine 0.5 milliLiter(s) IntraMuscular once  insulin lispro (ADMELOG) corrective regimen sliding scale   SubCutaneous three times a day before meals  insulin lispro (ADMELOG) corrective regimen sliding scale   SubCutaneous at bedtime  NIFEdipine XL 30 milliGRAM(s) Oral daily  polyethylene glycol 3350 17 Gram(s) Oral daily  tiotropium 2.5 MICROgram(s)/olodaterol 2.5 MICROgram(s) (STIOLTO) Inhaler 2 Puff(s) Inhalation daily    MEDICATIONS  (PRN):  acetaminophen   Tablet .. 650 milliGRAM(s) Oral every 6 hours PRN Temp greater or equal to 38C (100.4F), Mild Pain (1 - 3)  melatonin 3 milliGRAM(s) Oral at bedtime PRN Insomnia  ondansetron Injectable 4 milliGRAM(s) IV Push every 8 hours PRN Nausea and/or Vomiting  sodium chloride 0.65% Nasal 2 Spray(s) Both Nostrils four times a day PRN Nasal Congestion      LABS:                        9.3    4.25  )-----------( 242      ( 22 Oct 2021 07:03 )             28.9     Hgb Trend: 9.3<--, 9.5<--, 9.2<--, 9.4<--, 9.9<--  10-22    136  |  101  |  94<H>  ----------------------------<  148<H>  4.7   |  20<L>  |  2.51<H>    Ca    9.0      22 Oct 2021 07:03  Phos  5.2     10-22  Mg     2.8     10-22      Creatinine Trend: 2.51<--, 2.70<--, 2.60<--, 2.36<--, 2.37<--, 2.58<--            MICROBIOLOGY:       RADIOLOGY:  [ ] Reviewed and interpreted by me    PULMONARY FUNCTION TESTS:    EKG: CHIEF COMPLAINT: sob    Interval Events: Patient seen and examined at bedside. Reports some shortness of breath when waking up this morning, but is much improved at this time. Denies any chest pain at this time.     REVIEW OF SYSTEMS:  Constitutional: [X ] negative [ ] fevers [ ] chills [ ] weight loss [ ] weight gain  HEENT: [ X] negative [ ] dry eyes [ ] eye irritation [ ] postnasal drip [ ] nasal congestion  CV: [ X] negative  [ ] chest pain [ ] orthopnea [ ] palpitations [ ] murmur  Resp: [ ] negative [ ] cough [ ] shortness of breath [ ] dyspnea [ ] wheezing [ ] sputum [ ] hemoptysis [x] orthopnea  GI: [X] negative [ ] nausea [ ] vomiting [ ] diarrhea [ ] constipation [ ] abd pain [ ] dysphagia   : [X ] negative [ ] dysuria [ ] nocturia [ ] hematuria [ ] increased urinary frequency  Musculoskeletal: [ ] negative [ ] back pain [ ] myalgias [ ] arthralgias [ ] fracture  Skin: [ ] negative [ ] rash [ ] itch  Neurological: [ ] negative [ ] headache [ ] dizziness [ ] syncope [ ] weakness [ ] numbness  Psychiatric: [ ] negative [ ] anxiety [ ] depression  Endocrine: [ ] negative [ ] diabetes [ ] thyroid problem  Hematologic/Lymphatic: [ ] negative [ ] anemia [ ] bleeding problem  Allergic/Immunologic: [ ] negative [ ] itchy eyes [ ] nasal discharge [ ] hives [ ] angioedema  [ ] All other systems negative  [ ] Unable to assess ROS because ________    OBJECTIVE:  ICU Vital Signs Last 24 Hrs  T(C): 36.3 (22 Oct 2021 11:26), Max: 36.4 (21 Oct 2021 14:10)  T(F): 97.4 (22 Oct 2021 11:26), Max: 97.6 (21 Oct 2021 18:10)  HR: 62 (22 Oct 2021 11:26) (62 - 76)  BP: 125/64 (22 Oct 2021 11:26) (110/54 - 152/69)  BP(mean): --  ABP: --  ABP(mean): --  RR: 17 (22 Oct 2021 11:26) (12 - 18)  SpO2: 96% (22 Oct 2021 11:26) (85% - 98%)        10-21 @ 07:01  -  10-22 @ 07:00  --------------------------------------------------------  IN: 660 mL / OUT: 2000 mL / NET: -1340 mL    10-22 @ 07:01  -  10-22 @ 11:54  --------------------------------------------------------  IN: 480 mL / OUT: 0 mL / NET: 480 mL      CAPILLARY BLOOD GLUCOSE      POCT Blood Glucose.: 227 mg/dL (22 Oct 2021 11:30)      PHYSICAL EXAM:  General: elderly female, sitting in chair, on 2L NC , NAD  HEENT: no scleral icterus  Lymph Nodes: no palpable cervical LAD  Respiratory:  clear to auscultation in bilateral upper lung fields; mild crackles mid lung field on right and decreased breath sounds lower lung field on right; mild crackles in LLL  Cardiovascular: S1/S2, RRR  Abdomen: soft nontender  Extremities: 3+ pitting edema below knees; 1+ pitting edema above knees with marked skin tenting  Skin: warm and dry  Neurological: AxOx3  Psychiatry: normal mood and affect        HOSPITAL MEDICATIONS:  MEDICATIONS  (STANDING):  apixaban 2.5 milliGRAM(s) Oral every 12 hours  carvedilol 12.5 milliGRAM(s) Oral every 12 hours  cholecalciferol 1000 Unit(s) Oral two times a day  dextrose 40% Gel 15 Gram(s) Oral once  dextrose 5%. 1000 milliLiter(s) (50 mL/Hr) IV Continuous <Continuous>  dextrose 5%. 1000 milliLiter(s) (100 mL/Hr) IV Continuous <Continuous>  dextrose 50% Injectable 25 Gram(s) IV Push once  dextrose 50% Injectable 12.5 Gram(s) IV Push once  dextrose 50% Injectable 25 Gram(s) IV Push once  glucagon  Injectable 1 milliGRAM(s) IntraMuscular once  hydrALAZINE 100 milliGRAM(s) Oral every 8 hours  influenza   Vaccine 0.5 milliLiter(s) IntraMuscular once  insulin lispro (ADMELOG) corrective regimen sliding scale   SubCutaneous three times a day before meals  insulin lispro (ADMELOG) corrective regimen sliding scale   SubCutaneous at bedtime  NIFEdipine XL 30 milliGRAM(s) Oral daily  polyethylene glycol 3350 17 Gram(s) Oral daily  tiotropium 2.5 MICROgram(s)/olodaterol 2.5 MICROgram(s) (STIOLTO) Inhaler 2 Puff(s) Inhalation daily    MEDICATIONS  (PRN):  acetaminophen   Tablet .. 650 milliGRAM(s) Oral every 6 hours PRN Temp greater or equal to 38C (100.4F), Mild Pain (1 - 3)  melatonin 3 milliGRAM(s) Oral at bedtime PRN Insomnia  ondansetron Injectable 4 milliGRAM(s) IV Push every 8 hours PRN Nausea and/or Vomiting  sodium chloride 0.65% Nasal 2 Spray(s) Both Nostrils four times a day PRN Nasal Congestion      LABS:                        9.3    4.25  )-----------( 242      ( 22 Oct 2021 07:03 )             28.9     Hgb Trend: 9.3<--, 9.5<--, 9.2<--, 9.4<--, 9.9<--  10-22    136  |  101  |  94<H>  ----------------------------<  148<H>  4.7   |  20<L>  |  2.51<H>    Ca    9.0      22 Oct 2021 07:03  Phos  5.2     10-22  Mg     2.8     10-22      Creatinine Trend: 2.51<--, 2.70<--, 2.60<--, 2.36<--, 2.37<--, 2.58<--            MICROBIOLOGY:       RADIOLOGY:  [ ] Reviewed and interpreted by me    PULMONARY FUNCTION TESTS:    EKG: CHIEF COMPLAINT: sob    Interval Events: Patient seen and examined at bedside. Reports some shortness of breath when waking up this morning, but is much improved at this time. Denies any chest pain at this time.     REVIEW OF SYSTEMS:  Constitutional: [X ] negative [ ] fevers [ ] chills [ ] weight loss [ ] weight gain  HEENT: [ X] negative [ ] dry eyes [ ] eye irritation [ ] postnasal drip [ ] nasal congestion  CV: [ X] negative  [ ] chest pain [ ] orthopnea [ ] palpitations [ ] murmur  Resp: [ ] negative [ ] cough [ ] shortness of breath [ ] dyspnea [ ] wheezing [ ] sputum [ ] hemoptysis [x] orthopnea  GI: [X] negative [ ] nausea [ ] vomiting [ ] diarrhea [ ] constipation [ ] abd pain [ ] dysphagia   : [X ] negative [ ] dysuria [ ] nocturia [ ] hematuria [ ] increased urinary frequency  Musculoskeletal: [ ] negative [ ] back pain [ ] myalgias [ ] arthralgias [ ] fracture  Skin: [ ] negative [ ] rash [ ] itch  Neurological: [ ] negative [ ] headache [ ] dizziness [ ] syncope [ ] weakness [ ] numbness  Psychiatric: [ ] negative [ ] anxiety [ ] depression  Endocrine: [ ] negative [ ] diabetes [ ] thyroid problem  Hematologic/Lymphatic: [ ] negative [ ] anemia [ ] bleeding problem  Allergic/Immunologic: [ ] negative [ ] itchy eyes [ ] nasal discharge [ ] hives [ ] angioedema  [ ] All other systems negative  [ ] Unable to assess ROS because ________    OBJECTIVE:  ICU Vital Signs Last 24 Hrs  T(C): 36.3 (22 Oct 2021 11:26), Max: 36.4 (21 Oct 2021 14:10)  T(F): 97.4 (22 Oct 2021 11:26), Max: 97.6 (21 Oct 2021 18:10)  HR: 62 (22 Oct 2021 11:26) (62 - 76)  BP: 125/64 (22 Oct 2021 11:26) (110/54 - 152/69)  BP(mean): --  ABP: --  ABP(mean): --  RR: 17 (22 Oct 2021 11:26) (12 - 18)  SpO2: 96% (22 Oct 2021 11:26) (85% - 98%)        10-21 @ 07:01  -  10-22 @ 07:00  --------------------------------------------------------  IN: 660 mL / OUT: 2000 mL / NET: -1340 mL    10-22 @ 07:01  -  10-22 @ 11:54  --------------------------------------------------------  IN: 480 mL / OUT: 0 mL / NET: 480 mL      CAPILLARY BLOOD GLUCOSE      POCT Blood Glucose.: 227 mg/dL (22 Oct 2021 11:30)      PHYSICAL EXAM:  General: elderly female, sitting in chair, on 2L NC , NAD  HEENT: no scleral icterus  Lymph Nodes: no palpable cervical LAD  Respiratory:  clear to auscultation in bilateral upper lung fields; mild crackles mid lung field on right and decreased breath sounds lower lung field on right; mild crackles in LLL  Cardiovascular: S1/S2, RRR  Abdomen: soft nontender  Extremities: 3+ pitting edema below knees; 1+ pitting edema above knees with marked skin tenting  Skin: warm and dry  Neurological: AxOx3  Psychiatry: normal mood and affect        HOSPITAL MEDICATIONS:  MEDICATIONS  (STANDING):  apixaban 2.5 milliGRAM(s) Oral every 12 hours  carvedilol 12.5 milliGRAM(s) Oral every 12 hours  cholecalciferol 1000 Unit(s) Oral two times a day  dextrose 40% Gel 15 Gram(s) Oral once  dextrose 5%. 1000 milliLiter(s) (50 mL/Hr) IV Continuous <Continuous>  dextrose 5%. 1000 milliLiter(s) (100 mL/Hr) IV Continuous <Continuous>  dextrose 50% Injectable 25 Gram(s) IV Push once  dextrose 50% Injectable 12.5 Gram(s) IV Push once  dextrose 50% Injectable 25 Gram(s) IV Push once  glucagon  Injectable 1 milliGRAM(s) IntraMuscular once  hydrALAZINE 100 milliGRAM(s) Oral every 8 hours  influenza   Vaccine 0.5 milliLiter(s) IntraMuscular once  insulin lispro (ADMELOG) corrective regimen sliding scale   SubCutaneous three times a day before meals  insulin lispro (ADMELOG) corrective regimen sliding scale   SubCutaneous at bedtime  NIFEdipine XL 30 milliGRAM(s) Oral daily  polyethylene glycol 3350 17 Gram(s) Oral daily  tiotropium 2.5 MICROgram(s)/olodaterol 2.5 MICROgram(s) (STIOLTO) Inhaler 2 Puff(s) Inhalation daily    MEDICATIONS  (PRN):  acetaminophen   Tablet .. 650 milliGRAM(s) Oral every 6 hours PRN Temp greater or equal to 38C (100.4F), Mild Pain (1 - 3)  melatonin 3 milliGRAM(s) Oral at bedtime PRN Insomnia  ondansetron Injectable 4 milliGRAM(s) IV Push every 8 hours PRN Nausea and/or Vomiting  sodium chloride 0.65% Nasal 2 Spray(s) Both Nostrils four times a day PRN Nasal Congestion      LABS:                        9.3    4.25  )-----------( 242      ( 22 Oct 2021 07:03 )             28.9     Hgb Trend: 9.3<--, 9.5<--, 9.2<--, 9.4<--, 9.9<--  10-22    136  |  101  |  94<H>  ----------------------------<  148<H>  4.7   |  20<L>  |  2.51<H>    Ca    9.0      22 Oct 2021 07:03  Phos  5.2     10-22  Mg     2.8     10-22      Creatinine Trend: 2.51<--, 2.70<--, 2.60<--, 2.36<--, 2.37<--, 2.58<--            MICROBIOLOGY:       RADIOLOGY:  x[ ] Reviewed and interpreted by me- 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    PULMONARY FUNCTION TESTS:    EKG:

## 2021-10-22 NOTE — PROGRESS NOTE ADULT - SUBJECTIVE AND OBJECTIVE BOX
[facility] Division of Hospital Medicine  Valentin Allen MD, MICHAEL  I'm reachable on [employer] Teams   Off hours:  [phone] ([facility] pager)    Patient is a 86y old  Female who presents with a chief complaint of Shortness of breath (22 Oct 2021 12:55)      SUBJECTIVE / OVERNIGHT EVENTS:  No events overnight. The patient has no new complaints.     MEDICATIONS  (STANDING):  apixaban 2.5 milliGRAM(s) Oral every 12 hours  carvedilol 12.5 milliGRAM(s) Oral every 12 hours  cholecalciferol 1000 Unit(s) Oral two times a day  dextrose 40% Gel 15 Gram(s) Oral once  dextrose 5%. 1000 milliLiter(s) (50 mL/Hr) IV Continuous <Continuous>  dextrose 5%. 1000 milliLiter(s) (100 mL/Hr) IV Continuous <Continuous>  dextrose 50% Injectable 25 Gram(s) IV Push once  dextrose 50% Injectable 12.5 Gram(s) IV Push once  dextrose 50% Injectable 25 Gram(s) IV Push once  furosemide    Tablet 80 milliGRAM(s) Oral daily  glucagon  Injectable 1 milliGRAM(s) IntraMuscular once  hydrALAZINE 100 milliGRAM(s) Oral every 8 hours  influenza   Vaccine 0.5 milliLiter(s) IntraMuscular once  insulin lispro (ADMELOG) corrective regimen sliding scale   SubCutaneous three times a day before meals  insulin lispro (ADMELOG) corrective regimen sliding scale   SubCutaneous at bedtime  NIFEdipine XL 30 milliGRAM(s) Oral daily  polyethylene glycol 3350 17 Gram(s) Oral daily  tiotropium 2.5 MICROgram(s)/olodaterol 2.5 MICROgram(s) (STIOLTO) Inhaler 2 Puff(s) Inhalation daily    MEDICATIONS  (PRN):  acetaminophen   Tablet .. 650 milliGRAM(s) Oral every 6 hours PRN Temp greater or equal to 38C (100.4F), Mild Pain (1 - 3)  melatonin 3 milliGRAM(s) Oral at bedtime PRN Insomnia  ondansetron Injectable 4 milliGRAM(s) IV Push every 8 hours PRN Nausea and/or Vomiting  sodium chloride 0.65% Nasal 2 Spray(s) Both Nostrils four times a day PRN Nasal Congestion    CAPILLARY BLOOD GLUCOSE      POCT Blood Glucose.: 171 mg/dL (22 Oct 2021 16:30)  POCT Blood Glucose.: 227 mg/dL (22 Oct 2021 11:30)  POCT Blood Glucose.: 151 mg/dL (22 Oct 2021 07:31)  POCT Blood Glucose.: 216 mg/dL (21 Oct 2021 21:22)  POCT Blood Glucose.: 186 mg/dL (21 Oct 2021 18:24)    I&O's Summary    21 Oct 2021 07:01  -  22 Oct 2021 07:00  --------------------------------------------------------  IN: 660 mL / OUT: 2000 mL / NET: -1340 mL    22 Oct 2021 07:01  -  22 Oct 2021 17:47  --------------------------------------------------------  IN: 780 mL / OUT: 200 mL / NET: 580 mL        PHYSICAL EXAM:  Vital Signs Last 24 Hrs  T(C): 36.3 (22 Oct 2021 11:26), Max: 36.4 (21 Oct 2021 18:10)  T(F): 97.4 (22 Oct 2021 11:26), Max: 97.6 (21 Oct 2021 18:10)  HR: 64 (22 Oct 2021 17:08) (62 - 72)  BP: 121/63 (22 Oct 2021 17:08) (110/54 - 152/69)  BP(mean): --  RR: 17 (22 Oct 2021 11:26) (12 - 18)  SpO2: 96% (22 Oct 2021 11:26) (85% - 98%)    CONSTITUTIONAL: no respiratory distress on NC, elderly female  EYES: EOMI, conjunctiva and sclera clear  ENMT: Moist oral mucosa  NECK: Supple  RESPIRATORY: faint rales BL, normal respiratory effort   CARDIOVASCULAR: Regular rate and rhythm, normal S1 and S2, no murmur/rub/gallop; trace edema BL LE, tender to touch  ABDOMEN: normoactive bowel sounds, soft, nontender to palpation, no rebound/guarding; no distension   PSYCH: A+O to person, place, and time; affect appropriate      LABS:                        9.3    4.25  )-----------( 242      ( 22 Oct 2021 07:03 )             28.9     10-22    136  |  101  |  94<H>  ----------------------------<  148<H>  4.7   |  20<L>  |  2.51<H>    Ca    9.0      22 Oct 2021 07:03  Phos  5.2     10-22  Mg     2.8     10-22      RADIOLOGY & ADDITIONAL TESTS:    Lab Results Reviewed: Cr improving a little    COORDINATION OF CARE:  Care Discussed with Consultants/Other Providers:  Dr. Cindy guo plan of care

## 2021-10-22 NOTE — PROGRESS NOTE ADULT - ASSESSMENT
Pt. is an 86 y.o. F w/ PMHx. of DM2, PVD, HTN, Hx. of TIA, BRASH Syndrome and CKD presents for worsening shortness of breath and lower extremity edema. Nephrology consulted for rising Cr. in the setting of IV diuretic use.     LUIS FERNANDO on CKD  HTN  - baseline Cr. 1.7-1.9 Cr, on admission 1.9, peaked to 2.58 10/17.   --- mild improvement in creatinine this morning; continue to monitor  Pt. hypertensive on supplemental O2, with 3+ edema, presacral edema, and orthopnea  LUIS FERNANDO likely in setting of increased venous congestion/ cardiorenal syndrome  patient remains volume overloaded at this time  --- diuretics have been held for past two days to to rising creatinine   pending results of the RHC from 10/21 prior to initiating diuretics   Avoid nephrotoxins  Fluid restriction     If any questions, please feel free to contact me     Ernesto Tillman  Nephrology Fellow  [facility] Pager: [phone]

## 2021-10-22 NOTE — DISCHARGE NOTE PROVIDER - NSDCFUSCHEDAPPT_GEN_ALL_CORE_FT
[facility] ; 11/15/2021 ; NPP IntMed [address]  [facility] ; 12/28/2021 ; NPP Med Neph[address]  [facility] ; 10/26/2021 ; NPP PulmMed [address] [facility] ; 11/15/2021 ; NPP IntMed [address] [facility] ; 12/28/2021 ; NPP Med Nephro [address]

## 2021-10-22 NOTE — CHART NOTE - NSCHARTNOTEFT_GEN_A_CORE
Due to patients deconditioning and generalized weakness, secondary to Congestive Heart failure, patient will require a transport chair.  Patient is unable to self propel in a standard wheelchair.  This is necessary to achieve daily tasks and therapies which cannot be achieved with the use of a cane or rolling walker.  Patient and family are in general agreement with transport chair use at home and assistance will be provided if needed.

## 2021-10-22 NOTE — DISCHARGE NOTE PROVIDER - NSDCCPCAREPLAN_GEN_ALL_CORE_FT
PRINCIPAL DISCHARGE DIAGNOSIS  Diagnosis: Acute on chronic diastolic congestive heart failure  Assessment and Plan of Treatment: You came in with acute respiratory failure with hypoxia due to acute on chronic diastolic CHF, likely due to CKD stage 4 with volume overload.   Echo showed preserved EF. s/p right heart catheterization  Continue Lasix, hydralazine 100 mg PO TID, coreg 12.5mg PO BID, procardia xl 30mg PO daily as directed    Hold off on initiating isordil at this time given current normal/relatively lower blood pressure readings.   Please follow up with your cardiologist in one week   Please follow up with your primary care physician in one week      SECONDARY DISCHARGE DIAGNOSES  Diagnosis: Acute respiratory failure with hypoxia  Assessment and Plan of Treatment: You came in with acute respiratory failure with hypoxia due to acute on chronic diastolic CHF, likely due to CKD stage 4 with volume overload. Additional contributing etiologies are: significant 2nd hand Informed pt of the various negative side effects of smoking including risk of COPD, Lung Ca etc  Smoking cessation strongly recommended  Continue Oxygen 2LPM via NC for now  Please follow up with pulmonologist    Diagnosis: Diabetes mellitus  Assessment and Plan of Treatment: HgA1C this admission 6.4  Make sure you get your HgA1c checked every three months.  If you take oral diabetes medications, check your blood glucose two times a day.  If you take insulin, check your blood glucose before meals and at bedtime.  It's important not to skip any meals.  Keep a log of your blood glucose results and always take it with you to your doctor appointments.  Keep a list of your current medications including injectables and over the counter medications and bring this medication list with you to all your doctor appointments.  If you have not seen your ophthalmologist this year call for appointment.  Check your feet daily for redness, sores, or openings. Do not self treat. If no improvement in two days call your primary care physician for an appointment.  Low blood sugar (hypoglycemia) is a blood sugar below 70mg/dl. Check your blood sugar if you feel signs/symptoms of hypoglycemia. If your blood sugar is below 70 take 15 grams of carbohydrates (ex 4 oz of apple juice, 3-4 glucose tablets, or 4-6 oz of regular soda) wait 15 minutes and repeat blood sugar to make sure it comes up above 70.  If your blood sugar is above 70 and you are due for a meal, have a meal.  If you are not due for a meal have a snack.  This snack helps keeps your blood sugar at a safe range.    Diagnosis: PVD (peripheral vascular disease)  Assessment and Plan of Treatment: Has weeping bandaged area in RLE in shin area.   Wound care consulted on admission   Compression stockings as neede    Diagnosis: Stage 4 chronic kidney disease  Assessment and Plan of Treatment: You were seen by nephrologist   Continue current dose of Lasix   Pleases follow up with your nephrologist        Diagnosis: Paroxysmal atrial fibrillation  Assessment and Plan of Treatment: Continue Eliquis 2.5 mg BID and Coreg as directed   Please follow up with your cardiologist       Diagnosis: Vitamin D deficiency  Assessment and Plan of Treatment: Please continue Vitamin D supplement    Diagnosis: Anemia  Assessment and Plan of Treatment: stable compared to prior admission.   Possibly due to CKD.   B12, folate, TSH WNL   Please follow up with your primray care physician

## 2021-10-22 NOTE — PROGRESS NOTE ADULT - ATTENDING COMMENTS
LUIS FERNANDO on CKD, hypervolemia  reviewed RHC results, consistent with hypervolemia  agree with resumption of diuretics  Monitor UO    Theresa Bartholomew MD  Cell : [phone]  Pager : [phone]  Office : [phone]
Theresa Bartholomew MD  Cell : [phone]  Pager : [phone]  Office : [phone]
Uptitrate medications for improved BP. Discussion with IC recommend furher optimization with BP and diureiss prior to RHC.     Addis Mancini MD, MPH, MICHAEL, RPVI, FACC  Inpatient Cardiovascular Specialist; Adelina Garcia [facility], [facility] ([facility])  ; [employer] [facility]  message: C-sam [phone] or Microsoft Teams (text preferred and/or call)  email: [email or web address] Card[facility] and Cardiovascular Surgery on-service contact/call information, go to [email or web address] and use "[email or web address]" to login.  Outpatient Cardiology appointments, call  [phone] to arrange with a colleague; I do not have outpatient Cardiology clinic.
86F with PMHX of Afib on eliquis, CKD, PVD, chronic diastolic heart failure,  DM2, HTN, BRASH syndrome and COPD presents to the hospital with hypoxia and LE edema, admitted for acute decompensated heart failure receiving IV diuresis. Pulmonary consulted for hypoxemia. Still with signs of volume overload.  would continue diuretics when able to do so from renal standpoint.  Calcifications on right pleura are likely secondary to prior infection.  Would be unusual for asbestos exposure to affect only one lung.  Agree with plan as outlined above.
Agree with RHC for better assessment of volume status. Can hold diuretics, will resume as needed based on RHC results.    Theresa Bartholomew MD  Cell : [phone]  Pager : [phone]  Office : [phone]
Theresa Bartholomew MD  Cell : [phone]  Pager : [phone]  Office : [phone]
Continue IV diuresis but rising pBNP for unclear reasons. BP control. Potentially planning RHC tomorrow.     Addis Mancini MD, MPH, MICHAEL, RPVI, [facility]  Inpatient Cardiovascular Specialist; Adelina Garcia [facility], [facility] ([facility])  ; [employer] at [facility]  message: Patreon [phone] or Microsoft Teams (text preferred and/or call)  email: [email or web address] Cardiovascular Surgery on-service contact/call information, go to [email or web address] and use "[email or web address]" to login.  Outpatient Cardiology appointments, call  [phone] to arrange with a colleague; I do not have outpatient Cardiology clinic.
86F with PMHX of Afib on eliquis, CKD, PVD, chronic diastolic heart failure,  DM2, HTN, BRASH syndrome and COPD presents to the hospital with hypoxia and LE edema, admitted for acute decompensated heart failure receiving IV diuresis. Pulmonary consulted for hypoxemia. she is improved clinically/ Will require oxygen on discharge.  Calcifications on right pleura are likely secondary to prior infection.  Agree with plan as outlined above.
Hypertensive urgency with flash pulm edema. Continue aggressive blood pressure control and IV diuresis as above. ECHO with preserved EF, moderate diastolic dysfunction, elevated filling pressures. Consideration for RHC if minimal clinical improvement. Continue aggressive diuresis and BP control.     Addis Mancini MD, MPH, MICHAEL, RPVI, FACC  Inpatient Cardiovascular Specialist; Adelina Garcia [facility], [facility] ([facility])  ; [employer] at [facility]  message: Realie [phone] or Microsoft Teams (text preferred and/or call)  email: [email or web address] and Cardiovascular Surgery on-service contact/call information, go to [email or web address] and use "[email or web address]" to login.  Outpatient Cardiology appointments, call  [phone] to arrange with a colleague; I do not have outpatient Cardiology clinic.

## 2021-10-22 NOTE — PROGRESS NOTE ADULT - SUBJECTIVE AND OBJECTIVE BOX
[facility] DIVISION OF KIDNEY DISEASES AND HYPERTENSION -- FOLLOW UP NOTE  --------------------------------------------------------------------------------  Chief Complaint: Volume Overload; SOB    24 hour events/subjective:  seen and examined this morning   reports continued SOB and not improving   remains volume overloaded       PAST HISTORY  --------------------------------------------------------------------------------  No significant changes to PMH, PSH, FHx, SHx, unless otherwise noted    ALLERGIES & MEDICATIONS  --------------------------------------------------------------------------------  Allergies    Levaquin (Rash)    Intolerances      Standing Inpatient Medications  apixaban 2.5 milliGRAM(s) Oral every 12 hours  carvedilol 12.5 milliGRAM(s) Oral every 12 hours  cholecalciferol 1000 Unit(s) Oral two times a day  dextrose 40% Gel 15 Gram(s) Oral once  dextrose 5%. 1000 milliLiter(s) IV Continuous <Continuous>  dextrose 5%. 1000 milliLiter(s) IV Continuous <Continuous>  dextrose 50% Injectable 25 Gram(s) IV Push once  dextrose 50% Injectable 12.5 Gram(s) IV Push once  dextrose 50% Injectable 25 Gram(s) IV Push once  furosemide    Tablet 80 milliGRAM(s) Oral daily  glucagon  Injectable 1 milliGRAM(s) IntraMuscular once  hydrALAZINE 100 milliGRAM(s) Oral every 8 hours  influenza   Vaccine 0.5 milliLiter(s) IntraMuscular once  insulin lispro (ADMELOG) corrective regimen sliding scale   SubCutaneous three times a day before meals  insulin lispro (ADMELOG) corrective regimen sliding scale   SubCutaneous at bedtime  NIFEdipine XL 30 milliGRAM(s) Oral daily  polyethylene glycol 3350 17 Gram(s) Oral daily  tiotropium 2.5 MICROgram(s)/olodaterol 2.5 MICROgram(s) (STIOLTO) Inhaler 2 Puff(s) Inhalation daily    PRN Inpatient Medications  acetaminophen   Tablet .. 650 milliGRAM(s) Oral every 6 hours PRN  melatonin 3 milliGRAM(s) Oral at bedtime PRN  ondansetron Injectable 4 milliGRAM(s) IV Push every 8 hours PRN  sodium chloride 0.65% Nasal 2 Spray(s) Both Nostrils four times a day PRN      REVIEW OF SYSTEMS      All other systems were reviewed and are negative, except as noted.    VITALS/PHYSICAL EXAM  --------------------------------------------------------------------------------  T(C): 36.3 (10-22-21 @ 11:26), Max: 36.4 (10-21-21 @ 14:10)  HR: 62 (10-22-21 @ 11:26) (62 - 76)  BP: 125/64 (10-22-21 @ 11:26) (110/54 - 152/69)  RR: 17 (10-22-21 @ 11:26) (12 - 18)  SpO2: 96% (10-22-21 @ 11:26) (85% - 98%)  Wt(kg): --        10-21-21 @ 07:01  -  10-22-21 @ 07:00  --------------------------------------------------------  IN: 660 mL / OUT: 2000 mL / NET: -1340 mL    10-22-21 @ 07:01  -  10-22-21 @ 12:55  --------------------------------------------------------  IN: 480 mL / OUT: 0 mL / NET: 480 mL        Physical Exam:  	Gen: NAD  	HEENT: MMM  	Pulm: expiratory wheezing   	CV: S1S2  	Abd: Soft, +BS   	Ext: + LE edema B/L  	Neuro: Awake  	Skin: Warm and dry  	Vascular access: N/A      LABS/STUDIES  --------------------------------------------------------------------------------              9.3    4.25  >-----------<  242      [10-22-21 @ 07:03]              28.9     136  |  101  |  94  ----------------------------<  148      [10-22-21 @ 07:03]  4.7   |  20  |  2.51        Ca     9.0     [10-22-21 @ 07:03]      Mg     2.8     [10-22-21 @ 07:03]      Phos  5.2     [10-22-21 @ 07:03]            Creatinine Trend:  SCr 2.51 [10-22 @ 07:03]  SCr 2.70 [10-21 @ 06:00]  SCr 2.60 [10-20 @ 06:13]  SCr 2.36 [10-19 @ 05:34]  SCr 2.37 [10-18 @ 09:59]    Urinalysis - [10-17-21 @ 06:47]      Color Light Yellow / Appearance Clear / SG 1.018 / pH 5.5      Gluc Negative / Ketone Negative  / Bili Negative / Urobili Negative       Blood Negative / Protein Trace / Leuk Est Negative / Nitrite Negative      RBC 1 / WBC 1 / Hyaline 0 / Gran  / Sq Epi  / Non Sq Epi 0 / Bacteria Negative    Urine Creatinine 73      [10-20-21 @ 15:38]  Urine Protein 16      [10-20-21 @ 15:38]  Urine Sodium 49      [10-17-21 @ 06:48]  Urine Urea Nitrogen 768      [10-17-21 @ 10:14]  Urine Osmolality 486      [10-17-21 @ 06:47]    Iron 62, TIBC 225, %sat 28      [10-18-21 @ 10:45]  Ferritin 231      [10-18-21 @ 13:01]  TSH 2.80      [10-13-21 @ 13:13]

## 2021-10-23 VITALS
RESPIRATION RATE: 18 BRPM | SYSTOLIC BLOOD PRESSURE: 144 MMHG | OXYGEN SATURATION: 97 % | HEART RATE: 62 BPM | DIASTOLIC BLOOD PRESSURE: 57 MMHG

## 2021-10-23 LAB
ANION GAP SERPL CALC-SCNC: 16 MMOL/L — SIGNIFICANT CHANGE UP (ref 5–17)
BUN SERPL-MCNC: 93 MG/DL — HIGH (ref 7–23)
CALCIUM SERPL-MCNC: 9.2 MG/DL — SIGNIFICANT CHANGE UP (ref 8.4–10.5)
CHLORIDE SERPL-SCNC: 100 MMOL/L — SIGNIFICANT CHANGE UP (ref 96–108)
CO2 SERPL-SCNC: 20 MMOL/L — LOW (ref 22–31)
CREAT SERPL-MCNC: 2.64 MG/DL — HIGH (ref 0.5–1.3)
GLUCOSE BLDC GLUCOMTR-MCNC: 150 MG/DL — HIGH (ref 70–99)
GLUCOSE BLDC GLUCOMTR-MCNC: 243 MG/DL — HIGH (ref 70–99)
GLUCOSE BLDC GLUCOMTR-MCNC: 266 MG/DL — HIGH (ref 70–99)
GLUCOSE SERPL-MCNC: 139 MG/DL — HIGH (ref 70–99)
HCT VFR BLD CALC: 30.8 % — LOW (ref 34.5–45)
HGB BLD-MCNC: 9.4 G/DL — LOW (ref 11.5–15.5)
MAGNESIUM SERPL-MCNC: 2.8 MG/DL — HIGH (ref 1.6–2.6)
MCHC RBC-ENTMCNC: 30.5 GM/DL — LOW (ref 32–36)
MCHC RBC-ENTMCNC: 31.3 PG — SIGNIFICANT CHANGE UP (ref 27–34)
MCV RBC AUTO: 102.7 FL — HIGH (ref 80–100)
NRBC # BLD: 0 /100 WBCS — SIGNIFICANT CHANGE UP (ref 0–0)
PHOSPHATE SERPL-MCNC: 5.2 MG/DL — HIGH (ref 2.5–4.5)
PLATELET # BLD AUTO: 252 K/UL — SIGNIFICANT CHANGE UP (ref 150–400)
POTASSIUM SERPL-MCNC: 4.9 MMOL/L — SIGNIFICANT CHANGE UP (ref 3.5–5.3)
POTASSIUM SERPL-SCNC: 4.9 MMOL/L — SIGNIFICANT CHANGE UP (ref 3.5–5.3)
RBC # BLD: 3 M/UL — LOW (ref 3.8–5.2)
RBC # FLD: 12.3 % — SIGNIFICANT CHANGE UP (ref 10.3–14.5)
SODIUM SERPL-SCNC: 136 MMOL/L — SIGNIFICANT CHANGE UP (ref 135–145)
WBC # BLD: 4.72 K/UL — SIGNIFICANT CHANGE UP (ref 3.8–10.5)
WBC # FLD AUTO: 4.72 K/UL — SIGNIFICANT CHANGE UP (ref 3.8–10.5)

## 2021-10-23 PROCEDURE — 85025 COMPLETE CBC W/AUTO DIFF WBC: CPT

## 2021-10-23 PROCEDURE — 85014 HEMATOCRIT: CPT

## 2021-10-23 PROCEDURE — 97110 THERAPEUTIC EXERCISES: CPT

## 2021-10-23 PROCEDURE — 84484 ASSAY OF TROPONIN QUANT: CPT

## 2021-10-23 PROCEDURE — U0005: CPT

## 2021-10-23 PROCEDURE — 99233 SBSQ HOSP IP/OBS HIGH 50: CPT

## 2021-10-23 PROCEDURE — 85730 THROMBOPLASTIN TIME PARTIAL: CPT

## 2021-10-23 PROCEDURE — U0003: CPT

## 2021-10-23 PROCEDURE — 83550 IRON BINDING TEST: CPT

## 2021-10-23 PROCEDURE — 93308 TTE F-UP OR LMTD: CPT

## 2021-10-23 PROCEDURE — 93005 ELECTROCARDIOGRAM TRACING: CPT

## 2021-10-23 PROCEDURE — 97530 THERAPEUTIC ACTIVITIES: CPT

## 2021-10-23 PROCEDURE — 84443 ASSAY THYROID STIM HORMONE: CPT

## 2021-10-23 PROCEDURE — 82962 GLUCOSE BLOOD TEST: CPT

## 2021-10-23 PROCEDURE — 94660 CPAP INITIATION&MGMT: CPT

## 2021-10-23 PROCEDURE — 83935 ASSAY OF URINE OSMOLALITY: CPT

## 2021-10-23 PROCEDURE — 84540 ASSAY OF URINE/UREA-N: CPT

## 2021-10-23 PROCEDURE — 97116 GAIT TRAINING THERAPY: CPT

## 2021-10-23 PROCEDURE — 82435 ASSAY OF BLOOD CHLORIDE: CPT

## 2021-10-23 PROCEDURE — 80053 COMPREHEN METABOLIC PANEL: CPT

## 2021-10-23 PROCEDURE — 80048 BASIC METABOLIC PNL TOTAL CA: CPT

## 2021-10-23 PROCEDURE — 85027 COMPLETE CBC AUTOMATED: CPT

## 2021-10-23 PROCEDURE — 99239 HOSP IP/OBS DSCHRG MGMT >30: CPT

## 2021-10-23 PROCEDURE — 84295 ASSAY OF SERUM SODIUM: CPT

## 2021-10-23 PROCEDURE — 85018 HEMOGLOBIN: CPT

## 2021-10-23 PROCEDURE — 82728 ASSAY OF FERRITIN: CPT

## 2021-10-23 PROCEDURE — 83540 ASSAY OF IRON: CPT

## 2021-10-23 PROCEDURE — 82803 BLOOD GASES ANY COMBINATION: CPT

## 2021-10-23 PROCEDURE — 80076 HEPATIC FUNCTION PANEL: CPT

## 2021-10-23 PROCEDURE — 99153 MOD SED SAME PHYS/QHP EA: CPT

## 2021-10-23 PROCEDURE — 93306 TTE W/DOPPLER COMPLETE: CPT

## 2021-10-23 PROCEDURE — 83605 ASSAY OF LACTIC ACID: CPT

## 2021-10-23 PROCEDURE — 83880 ASSAY OF NATRIURETIC PEPTIDE: CPT

## 2021-10-23 PROCEDURE — 82607 VITAMIN B-12: CPT

## 2021-10-23 PROCEDURE — C1769: CPT

## 2021-10-23 PROCEDURE — 36415 COLL VENOUS BLD VENIPUNCTURE: CPT

## 2021-10-23 PROCEDURE — 84132 ASSAY OF SERUM POTASSIUM: CPT

## 2021-10-23 PROCEDURE — 85610 PROTHROMBIN TIME: CPT

## 2021-10-23 PROCEDURE — 97162 PT EVAL MOD COMPLEX 30 MIN: CPT

## 2021-10-23 PROCEDURE — 71045 X-RAY EXAM CHEST 1 VIEW: CPT

## 2021-10-23 PROCEDURE — 94640 AIRWAY INHALATION TREATMENT: CPT

## 2021-10-23 PROCEDURE — 99152 MOD SED SAME PHYS/QHP 5/>YRS: CPT

## 2021-10-23 PROCEDURE — 86769 SARS-COV-2 COVID-19 ANTIBODY: CPT

## 2021-10-23 PROCEDURE — 84156 ASSAY OF PROTEIN URINE: CPT

## 2021-10-23 PROCEDURE — 82570 ASSAY OF URINE CREATININE: CPT

## 2021-10-23 PROCEDURE — C1889: CPT

## 2021-10-23 PROCEDURE — 84300 ASSAY OF URINE SODIUM: CPT

## 2021-10-23 PROCEDURE — 82947 ASSAY GLUCOSE BLOOD QUANT: CPT

## 2021-10-23 PROCEDURE — 81001 URINALYSIS AUTO W/SCOPE: CPT

## 2021-10-23 PROCEDURE — 85045 AUTOMATED RETICULOCYTE COUNT: CPT

## 2021-10-23 PROCEDURE — 83036 HEMOGLOBIN GLYCOSYLATED A1C: CPT

## 2021-10-23 PROCEDURE — 96374 THER/PROPH/DIAG INJ IV PUSH: CPT

## 2021-10-23 PROCEDURE — C1894: CPT

## 2021-10-23 PROCEDURE — 83735 ASSAY OF MAGNESIUM: CPT

## 2021-10-23 PROCEDURE — 84100 ASSAY OF PHOSPHORUS: CPT

## 2021-10-23 PROCEDURE — 83690 ASSAY OF LIPASE: CPT

## 2021-10-23 PROCEDURE — 99285 EMERGENCY DEPT VISIT HI MDM: CPT | Mod: 25

## 2021-10-23 PROCEDURE — 82330 ASSAY OF CALCIUM: CPT

## 2021-10-23 PROCEDURE — 93451 RIGHT HEART CATH: CPT

## 2021-10-23 PROCEDURE — 82746 ASSAY OF FOLIC ACID SERUM: CPT

## 2021-10-23 RX ORDER — CARVEDILOL PHOSPHATE 80 MG/1
1 CAPSULE, EXTENDED RELEASE ORAL
Qty: 60 | Refills: 0
Start: 2021-10-23 | End: 2021-11-21

## 2021-10-23 RX ORDER — SODIUM CHLORIDE 0.65 %
1 AEROSOL, SPRAY (ML) NASAL
Qty: 0 | Refills: 0 | DISCHARGE
Start: 2021-10-23

## 2021-10-23 RX ORDER — ATENOLOL 25 MG/1
1 TABLET ORAL
Qty: 0 | Refills: 0 | DISCHARGE

## 2021-10-23 RX ORDER — FUROSEMIDE 40 MG
1 TABLET ORAL
Qty: 0 | Refills: 0 | DISCHARGE

## 2021-10-23 RX ORDER — CHOLECALCIFEROL (VITAMIN D3) 125 MCG
2 CAPSULE ORAL
Qty: 0 | Refills: 0 | DISCHARGE

## 2021-10-23 RX ORDER — FUROSEMIDE 40 MG
1 TABLET ORAL
Qty: 30 | Refills: 0
Start: 2021-10-23 | End: 2021-11-21

## 2021-10-23 RX ORDER — HYDRALAZINE HCL 50 MG
1 TABLET ORAL
Qty: 90 | Refills: 0
Start: 2021-10-23 | End: 2021-11-21

## 2021-10-23 RX ORDER — AMLODIPINE BESYLATE 2.5 MG/1
1 TABLET ORAL
Qty: 0 | Refills: 0 | DISCHARGE

## 2021-10-23 RX ORDER — NIFEDIPINE 30 MG
1 TABLET, EXTENDED RELEASE 24 HR ORAL
Qty: 30 | Refills: 0
Start: 2021-10-23 | End: 2021-11-21

## 2021-10-23 RX ORDER — CHOLECALCIFEROL (VITAMIN D3) 125 MCG
1 CAPSULE ORAL
Qty: 0 | Refills: 0 | DISCHARGE

## 2021-10-23 RX ADMIN — Medication 1000 UNIT(S): at 05:51

## 2021-10-23 RX ADMIN — Medication 650 MILLIGRAM(S): at 00:05

## 2021-10-23 RX ADMIN — Medication 4: at 14:26

## 2021-10-23 RX ADMIN — Medication 650 MILLIGRAM(S): at 09:36

## 2021-10-23 RX ADMIN — Medication 80 MILLIGRAM(S): at 05:52

## 2021-10-23 RX ADMIN — Medication 100 MILLIGRAM(S): at 05:52

## 2021-10-23 RX ADMIN — Medication 30 MILLIGRAM(S): at 05:52

## 2021-10-23 RX ADMIN — CARVEDILOL PHOSPHATE 12.5 MILLIGRAM(S): 80 CAPSULE, EXTENDED RELEASE ORAL at 05:51

## 2021-10-23 RX ADMIN — Medication 100 MILLIGRAM(S): at 14:26

## 2021-10-23 RX ADMIN — APIXABAN 2.5 MILLIGRAM(S): 2.5 TABLET, FILM COATED ORAL at 05:51

## 2021-10-23 RX ADMIN — TIOTROPIUM BROMIDE AND OLODATEROL 2 PUFF(S): 3.124; 2.736 SPRAY, METERED RESPIRATORY (INHALATION) at 11:46

## 2021-10-23 RX ADMIN — POLYETHYLENE GLYCOL 3350 17 GRAM(S): 17 POWDER, FOR SOLUTION ORAL at 11:46

## 2021-10-23 NOTE — PROGRESS NOTE ADULT - PROBLEM SELECTOR PLAN 7
Hgb stable compared to prior admission. Possibly due to CKD.   B12, folate, TSH WNL (RBC are macrocytic)   -Trend CBC
Hgb stable compared to prior admission. Possibly due to CKD.   B12, folate, tsh WNL (RBC are macrocytic)   -Trend cbc  -F/u PMD and nephrology outpatient
Hgb stable compared to prior admission. Possibly due to CKD, but RBC are macrocytic ~102.   Check b12, folate, tsh  -Trend cbc  -F/u PMD and nephrology outpatient
Hgb stable compared to prior admission. Possibly due to CKD.   B12, folate, tsh WNL (RBC are macrocytic)   -Trend cbc  -F/u PMD and nephrology outpatient
Hgb stable compared to prior admission. Possibly due to CKD.   B12, folate, TSH WNL (RBC are macrocytic)   -Trend CBC
Hgb stable compared to prior admission. Possibly due to CKD.   B12, folate, tsh WNL (RBC are macrocytic)   -Trend cbc  -F/u PMD and nephrology outpatient
Hgb stable compared to prior admission. Possibly due to CKD.   B12, folate, TSH WNL (RBC are macrocytic)   -Trend CBC
Hgb stable compared to prior admission. Possibly due to CKD.   B12, folate, TSH WNL (RBC are macrocytic)   -Trend CBC
Hgb stable compared to prior admission. Possibly due to CKD.   B12, folate, tsh WNL (RBC are macrocytic)   -Trend cbc  -F/U PMD and nephrology outpatient
Hgb stable compared to prior admission. Possibly due to CKD.   B12, folate, TSH WNL (RBC are macrocytic)   -Trend CBC

## 2021-10-23 NOTE — PROGRESS NOTE ADULT - PROBLEM SELECTOR PLAN 2
Known hx of CKD stage 4  sCr worsening  Continue diuresis as above  Trend BMP  Renally dose meds  Nephrology follow up appreciated
Cr relatively stable compared to prior admission. Known hx of CKD stage 4  Trend BMP  Renally dose meds  Needs f/u with nephrology outpatient
Cr relatively stable compared to prior admission. Known hx of CKD stage 4  Trend BMP  Renally dose meds  Needs f/u with nephrology outpatient
LUIS FERNANDO on CKD stage 4 (suspect prerenal azotemia due to diuresis).  Renal following.   Resume diuresis as above.   LE edema is mostly likely chronic in nature.   Renally dose meds  Nephrology follow up appreciated
LUIS FERNANDO on CKD stage 4 (suspect prerenal azotemia due to diuresis).  Renal following.   Resume diuresis as above.   LE edema is mostly likely chronic in nature.   Renally dose meds  Nephrology follow up appreciated
LUIS FERNANDO on CKD stage 4 (suspect prerenal azotemia due to diuresis).  Renal following.   Diuresis held for now. Appears close to being optimized for volume status.   Monitor Cr.   Renally dose meds  Nephrology follow up appreciated
Cr relatively stable compared to prior admission. Known hx of CKD stage 4  Trend BMP  Renally dose meds  Needs f/u with nephrology outpatient
Known hx of CKD stage 4  sCr mildly worsening  Diuresis held for now as of today. Appears to be reaching euvolemia.   Check Cr in AM.   Renally dose meds  Nephrology follow up appreciated
Cr relatively stable compared to prior admission. Known hx of CKD stage 4  Trend BMP  Renally dose meds  Needs f/u with nephrology outpatient
Known hx of CKD stage 4  sCr worsening  Decrease Lasix to once daily   Check UA, Ur Cr, na, urea  Trend BMP  Renally dose meds  Needs F/U with nephrology outpatient
LUIS FERNANDO on CKD stage 4 (suspect prerenal azotemia due to diuresis).  Renal following.   Diuresis held for now. Appears close to being optimized for volume status.   Monitor Cr.   Renally dose meds  Nephrology follow up appreciated
Known hx of CKD stage 4  sCr worsening  Continue Lasix 40mg IV once daily  Trend BMP  Renally dose meds  Nephrology team consulted - F/U on recs

## 2021-10-23 NOTE — PROGRESS NOTE ADULT - PROBLEM SELECTOR PLAN 6
- Monitor BP  - Continue diuretic, Hydralazine
- Monitor BP  - Continue diuretic, Hydralazine, coreg, and procardia XL
- Monitor BP  - Continue diuretic, Hydralazine
- Trend vital signs  - Monitor for now as we provide diuresis  - Consider adding other agents as needed
- Monitor BP  - Continue diuretic, Hydralazine
- Trend vital signs  - Monitor for now as we provide diuresis  - Consider adding other agents as needed
- Trend vital signs  - Monitor for now as we provide diuresis  - Consider adding other agents as needed
- Monitor BP  - Continue diuretic, Hydralazine, coreg, and procardia XL
- Monitor BP  - Continue diuretic, Hydralazine, coreg, and procardia (new today)
- Trend vital signs  - Monitor for now as we provide diuresis  - Consider adding other agents as needed
- Trend vital signs  - Monitor for now as we provide diuresis  - Consider adding other agents as needed
BP has been stable  - Continue diuretic, Hydralazine, coreg, and procardia XL

## 2021-10-23 NOTE — PROGRESS NOTE ADULT - PROBLEM SELECTOR PROBLEM 2
Stage 4 chronic kidney disease

## 2021-10-23 NOTE — PROGRESS NOTE ADULT - PROBLEM SELECTOR PLAN 9
DVT PPX  -Pt on systemic AC
DVT PPX: Eliquis      Dispo: Plan of care discussed with daughter Elizabeth. She would like to take the patient home (not TAI as being recommended). spoke to CM, She has a portable O2, will get delivered a concentrator today. EMS transfer was also set up for 2:30p.  ** d/c time 40 min
DVT PPX: Eliquis
DVT PPX  -Pt on systemic AC
DVT PPX: Eliquis
DVT PPX: Eliquis      Dispo: Plan of care discussed with daughter Elizabeth. She would like to take the patient home (not TAI as being recommended). Per CM O2 will be delivered by tomorrow by 2pm. EMS transfer was also set up.
DVT PPX: Eliquis
DVT PPX: Pt on systemic AC
DVT PPX: Eliquis      Dispo: Plan of care discussed with daughter Elizabeth. She would like to take the patient home (not TAI as being recommended).
DVT PPX  -Pt on systemic AC
DVT PPX  -Pt on systemic AC
DVT PPX: Eliquis

## 2021-10-23 NOTE — PROGRESS NOTE ADULT - PROBLEM SELECTOR PLAN 8
-Continue vitamin D
-Continue vitamin D
-Continue vitamin D BID (home med- unusual frequency, but continue)
-Continue vitamin D BID (home med- unusual frequency, but continue)
-cont. vitamin D BID (home med- unusual frequency, but continue)
-cont. vitamin D BID (home med- unusual frequency, but continue)
-Continue vitamin D
-Continue vitamin D BID (home med- unusual frequency, but continue)
-Continue vitamin D BID (home med- unusual frequency, but continue)
-cont. vitamin D BID (home med- unusual frequency, but continue)
-Continue vitamin D BID (home med- unusual frequency, but continue)
-cont. vitamin D BID (home med- unusual frequency, but continue)

## 2021-10-23 NOTE — PROGRESS NOTE ADULT - PROBLEM SELECTOR PROBLEM 1
Acute on chronic diastolic congestive heart failure

## 2021-10-23 NOTE — PROGRESS NOTE ADULT - PROBLEM SELECTOR PLAN 4
-Continue Eliquis 2.5 mg BID  -Switched to Coreg 6.35mg po BID  -On telemetry
-Cont. Eliquis 2.5mg BID  -Cont. atenolol 25mg daily  -On telemetry
-Cont. Eliquis 2.5 mg BID  -Cont. atenolol 25 mg daily  -On telemetry
-Cont. Eliquis 2.5mg BID  -Cont. atenolol 25mg daily  -On telemetry
-Continue Eliquis 2.5 mg BID  -Coreg increased to 12.5mg po BID  -On telemetry
-Continue Eliquis 2.5 mg BID  -Coreg increased to 12.5mg po BID  -On telemetry
-Continue Eliquis 2.5 mg BID  -Switched to Coreg 6.25mg po BID  -On telemetry
-Continue Eliquis 2.5 mg BID  -Coreg increased to 12.5mg po BID  -On telemetry
-Continue Eliquis 2.5 mg BID  -Coreg increased to 12.5mg po BID  -On telemetry
-Continue Eliquis 2.5 mg BID  -Switch to Coreg 6.35mg po BID  -On telemetry
-Cont. Eliquis 2.5 mg BID  -Cont. atenolol 25 mg daily  -On telemetry
Rate controlled.  -Continue Eliquis 2.5 mg BID  -Coreg increased to 12.5mg po BID  -On telemetry

## 2021-10-23 NOTE — PROGRESS NOTE ADULT - PROVIDER SPECIALTY LIST ADULT
Cardiology
Cardiology
Hospitalist
Cardiology
Cardiology
Nephrology
Pulmonology
Cardiology
Nephrology
Pulmonology
Nephrology
Hospitalist
Internal Medicine
Hospitalist

## 2021-10-23 NOTE — PROGRESS NOTE ADULT - PROBLEM SELECTOR PLAN 3
Not on PO meds for DM  -Fingersticks and sliding scale  -HgA1c 6.4 (preDM range)
Not on PO meds for DM  -Fingersticks and sliding scale  -Check HgA1c
Not on PO meds for DM  -Fingersticks and sliding scale  -HgA1c 6.4 (preDM range)
Not on PO meds for DM  -Fingersticks and sliding scale  -HgA1c 6.4 (preDM range)

## 2021-10-23 NOTE — PROGRESS NOTE ADULT - PROBLEM SELECTOR PLAN 5
Has weeping bandaged area in RLE in shin area.   Continue with diuresis  -Wound care consult  -Compression stockings as needed
Has weeping bandaged area in RLE in shin area.   Continue with diuresis  -Wound care consulted on admission   -Compression stockings as needed
Has weeping bandaged area in RLE in shin area.   -Wound care consulted on admission   -Compression stockings as needed
Has weeping bandaged area in RLE in shin area.   -Wound care consulted on admission   -Compression stockings as needed
Has weeping bandaged area in RLE in shin area.   Continue with diuresis  -Wound care consulted on admission   -Compression stockings as needed
Has weeping bandaged area in RLE in shin area.   -Wound care consulted on admission   -Compression stockings as needed
Has weeping bandaged area in RLE in shin area.   Continue with diuresis  -Wound care consulted on admission   -Compression stockings as needed
Has weeping bandaged area in RLE in shin area.   Continue with diuresis  -Wound care consulted on admission   -Compression stockings as needed
Has weeping bandaged area in RLE in shin area.   -Wound care consulted on admission   -Compression stockings as needed

## 2021-10-23 NOTE — PROGRESS NOTE ADULT - PROBLEM SELECTOR PROBLEM 4
Paroxysmal atrial fibrillation

## 2021-10-23 NOTE — PROGRESS NOTE ADULT - PROBLEM SELECTOR PLAN 1
Acute respiratory failure with hypoxia due to acute on chronic diastolic CHF, likely due to CKD stage 4 with volume overload.   TTE 06/2021 with preserved EF.   Continue lasix 40mg IV BID  Strict I/Os and daily weights  Supplement O2 as needed (this afternoon is on 6L/min)  Monitor on Telemetry  House cardiology consulted (was planning on seeing Dr. Gwendolyn Fall outpatient prior to hospitalization)
Acute respiratory failure with hypoxia due to acute on chronic diastolic CHF, likely due to CKD stage 4 with volume overload. Additional contributing etiologies are: significant 2nd hand smoke exposure, COPD/emphysema, and asbestosis  TTE with preserved EF.   O2 titrated down to 2L/min. Titrate as tolerated.   Monitor on Telemetry  House cardiology consult following (was planning on seeing Dr. Gwendolyn Fall outpatient prior to hospitalization).  Discussed with cardiology Dr. Mancini - plan for RHC tomorrow?   Appears to be reaching euvolemia. Stop lasix for now (was on 60mg IV BID). Check Cr in AM.   Strict I/Os and daily weights  Continue hydralazine 100 mg PO TID, increase coreg 6.25mg PO BID for HTN control (consider adding procardia)
Acute respiratory failure with hypoxia due to acute on chronic diastolic CHF, likely due to CKD stage 4 with volume overload.   TTE with preserved EF.   Continue lasix 40mg IV BID  Strict I/Os and daily weights  Doing well without the BiPAP today. Continue NC O2 - titrate down as tolerated.   Monitor on Telemetry  House cardiology consult following (was planning on seeing Dr. Gwendolyn Fall outpatient prior to hospitalization)  Increase hydralazine 75 mg PO TID for HTN control
Acute respiratory failure with hypoxia due to acute on chronic diastolic CHF, likely due to CKD stage 4 with volume overload.   TTE 06/2021 with preserved EF. Repeat TTE pending.   Continue lasix 40mg IV BID  Strict I/Os and daily weights  May take off BiPAP this morning - transition to NC 6L/min - monitor respiratory status closely.   Monitor on Telemetry  House cardiology consult following (was planning on seeing Dr. Gwendolyn Fall outpatient prior to hospitalization)  Added hydralazine 50mg PO TID for HTN control
Acute respiratory failure with hypoxia due to acute on chronic diastolic CHF, likely due to CKD stage 4 with volume overload. Additional contributing etiologies are: significant 2nd hand smoke exposure, COPD/emphysema, and asbestosis  TTE with preserved EF.   O2 titrated down to 2L/min. Lungs are mostly clear to auscultation, however still drops to 89% on RA at rest.   Check O2 on ambulation on room air - she may require O2 for home.   Awaiting RHC tomorrow per cardiology.   House cardiology consult following (was planning on seeing Dr. Gwendolyn Fall outpatient prior to hospitalization).  Stopped lasix after 1 dose yesterday (was on 60mg IV BID) due to euvolemia and today's rise in Cr.   Monitor labs off diuretics for now. Renal follow up appreciated.   Strict I/Os and daily weights  Continue hydralazine 100 mg PO TID, coreg 12.5mg PO BID, add procardia xl 30mg PO daily for HTN control.
Acute respiratory failure with hypoxia due to acute on chronic diastolic CHF, likely due to CKD stage 4 with volume overload. Additional contributing etiologies are: significant 2nd hand smoke exposure, COPD/emphysema, and asbestosis  TTE with preserved EF.   O2 titrated down to 2L/min. Lungs are mostly clear to auscultation, however still drops to 88% on RA at rest.   Will require O2 for home. CM setting this up.   s/p [facility]  House cardiology consult following (was planning on seeing Dr. Gwendolyn Fall outpatient prior to hospitalization).  Now the Cr stabilized, resumed lasix but at higher dose 80mg PO daily (was taking 40mg at home).  Strict I/Os and daily weights  Continue hydralazine 100 mg PO TID, coreg 12.5mg PO BID, procardia xl 30mg PO daily for HTN control. Would hold off on initiating isordil at this time given current normal/relatively lower blood pressure readings.   Discussed with daughter. Plan to follow up with outpatient pulmonologist.
Acute respiratory failure with hypoxia due to acute on chronic diastolic CHF, likely due to CKD stage 4 with volume overload.   TTE with preserved EF.   Continue lasix 40mg IV BID  Strict I/Os and daily weights  Remains off BiPAP. Tolerating NC O2 supplementation. Continue NC O2 - titrate down as tolerated.   Monitor on Telemetry  House cardiology consult following (was planning on seeing Dr. Gwendolyn Fall outpatient prior to hospitalization).  Planned for RHC today 10/15  Increase hydralazine 100 mg PO TID for HTN control
Acute respiratory failure with hypoxia due to acute on chronic diastolic CHF, likely due to CKD stage 4 with volume overload. Additional contributing etiologies are: significant 2nd hand smoke exposure, COPD/emphysema, and asbestosis  - TTE with preserved EF. s/p RHC  - O2 titrated down to 2L/min. Lungs are mostly clear to auscultation, however still drops to 88% on RA at rest.     Will require O2 for home. CM setting this up.     - House cardiology consult following (was planning on seeing Dr. Gwendolyn Fall outpatient prior to hospitalization).  - Now the Cr stabilized, on Lasix 80mg PO daily (was taking 40mg at home).    Continue hydralazine 100 mg PO TID, coreg 12.5mg PO BID, procardia xl 30mg PO daily for HTN control. Would   hold off on initiating isordil at this time given current normal/relatively lower blood pressure readings.   ** Daughter is ayala of the plan. Plan to follow up with outpatient pulmonologist.
Acute respiratory failure with hypoxia due to acute on chronic diastolic CHF, likely due to CKD stage 4 with volume overload. Additional contributing etiologies are: significant 2nd hand smoke exposure, COPD/emphysema, and asbestosis  TTE with preserved EF.   O2 titrated down to 2L/min. Lungs are mostly clear to auscultation, however still drops to 89% on RA at rest.   Check O2 on ambulation on room air - she may require O2 for home.   Awaiting RHC today per cardiology.   House cardiology consult following (was planning on seeing Dr. Gwendolyn Fall outpatient prior to hospitalization).  Stopped lasix after 1 dose 2 days ago (was on 60mg IV BID) due to euvolemia and rise in Cr.   Monitor labs off diuretics for now. Renal follow up appreciated.   Strict I/Os and daily weights  Continue hydralazine 100 mg PO TID, coreg 12.5mg PO BID, procardia xl 30mg PO daily for HTN control.
Acute respiratory failure with hypoxia due to acute on chronic diastolic CHF, likely due to CKD stage 4 with volume overload.   TTE with preserved EF.   On lasix - change dose to 40mg IV qd   Strict I/Os and daily weights  Remains off BiPAP. Tolerating NC O2 supplementation. Continue NC O2 - titrate down as tolerated.   Monitor on Telemetry  House cardiology consult following (was planning on seeing Dr. Gwendolyn Fall outpatient prior to hospitalization).  S/P RHC 10/15  Continue hydralazine 100 mg PO TID for HTN control
Acute respiratory failure with hypoxia due to acute on chronic diastolic CHF, likely due to CKD stage 4 with volume overload.   TTE with preserved EF.   Remains off BiPAP. Tolerating NC O2 supplementation. Titrated down to 2L/min. Continue NC O2 - titrate to RA as tolerated.   Monitor on Telemetry  House cardiology consult following (was planning on seeing Dr. Gwendolyn Fall outpatient prior to hospitalization).  Follow up with cardiology re: need for RHC  On Lasix 60mg IV BID  Strict I/Os and daily weights  Continue hydralazine 100 mg PO TID for HTN control
Acute respiratory failure with hypoxia due to acute on chronic diastolic CHF, likely due to CKD stage 4 with volume overload.   TTE with preserved EF.   Remains off BiPAP. Tolerating NC O2 supplementation. Continue NC O2 - titrate down as tolerated.   Monitor on Telemetry  House cardiology consult following (was planning on seeing Dr. Gwendolyn Fall outpatient prior to hospitalization).  S/P RHC 10/15  On Lasix - continue 40mg IV qd   Strict I/Os and daily weights  Continue hydralazine 100 mg PO TID for HTN control

## 2021-10-23 NOTE — PROGRESS NOTE ADULT - ASSESSMENT
Pt. is an 86 y.o. F w/ PMHx. of DM2, PVD, HTN, Hx. of TIA, BRASH Syndrome and CKD presents for worsening shortness of breath and lower extremity edema. Nephrology consulted for rising Cr. in the setting of IV diuretic use.     LUIS FERNANDO on CKD (baseline Cr. 1.7-1.9):  Cr, on admission 1.9, peaked to 2.58 10/17.   LUIS FERNANDO in the setting of hypervolemia, likely 2/2 increased venous congestion/ cardiorenal syndrome  patient remains volume overloaded at this time  Non oliguric, and reports clinical improvement. Scr and electrolytes stable.  continue diuresis at current dose  Avoid nephrotoxins  Monitor UO    Theresa Bartholomew MD  Cell : [phone]  Pager : [phone]  Office : [phone]

## 2021-10-23 NOTE — PROGRESS NOTE ADULT - PROBLEM SELECTOR PROBLEM 5
PVD (peripheral vascular disease)

## 2021-10-23 NOTE — PROGRESS NOTE ADULT - SUBJECTIVE AND OBJECTIVE BOX
[facility] DIVISION OF KIDNEY DISEASES AND HYPERTENSION -- PROGRESS NOTE    Chief complaint: LUIS FERNANDO, hypervolemia    24 hour events/subjective: states feels better        PAST HISTORY  --------------------------------------------------------------------------------  No significant changes to PMH, PSH, FHx, SHx, unless otherwise noted    ALLERGIES & MEDICATIONS  --------------------------------------------------------------------------------  Allergies    Levaquin (Rash)    Intolerances      Standing Inpatient Medications  apixaban 2.5 milliGRAM(s) Oral every 12 hours  carvedilol 12.5 milliGRAM(s) Oral every 12 hours  cholecalciferol 1000 Unit(s) Oral two times a day  dextrose 40% Gel 15 Gram(s) Oral once  dextrose 5%. 1000 milliLiter(s) IV Continuous <Continuous>  dextrose 5%. 1000 milliLiter(s) IV Continuous <Continuous>  dextrose 50% Injectable 25 Gram(s) IV Push once  dextrose 50% Injectable 12.5 Gram(s) IV Push once  dextrose 50% Injectable 25 Gram(s) IV Push once  furosemide    Tablet 80 milliGRAM(s) Oral daily  glucagon  Injectable 1 milliGRAM(s) IntraMuscular once  hydrALAZINE 100 milliGRAM(s) Oral every 8 hours  influenza   Vaccine 0.5 milliLiter(s) IntraMuscular once  insulin lispro (ADMELOG) corrective regimen sliding scale   SubCutaneous three times a day before meals  insulin lispro (ADMELOG) corrective regimen sliding scale   SubCutaneous at bedtime  NIFEdipine XL 30 milliGRAM(s) Oral daily  polyethylene glycol 3350 17 Gram(s) Oral daily  tiotropium 2.5 MICROgram(s)/olodaterol 2.5 MICROgram(s) (STIOLTO) Inhaler 2 Puff(s) Inhalation daily    PRN Inpatient Medications  acetaminophen   Tablet .. 650 milliGRAM(s) Oral every 6 hours PRN  melatonin 3 milliGRAM(s) Oral at bedtime PRN  ondansetron Injectable 4 milliGRAM(s) IV Push every 8 hours PRN  sodium chloride 0.65% Nasal 2 Spray(s) Both Nostrils four times a day PRN      REVIEW OF SYSTEMS: reports that her breathing has improved  --------------------------------------------------------------------------------  Constitutional: [ ] Fever [ ] Chills [ ] Fatigue [ ] Weight change   HEENT: [ ] Blurred vision [ ] Eye Pain [ ] Headache [ ] Runny nose [ ] Sore Throat   Respiratory: [ ] Cough [ ] Wheezing [ ] Shortness of breath  Cardiovascular: [ ] Chest Pain [ ] Palpitations [ ] THOMPSON [ ] PND [ ] Orthopnea  Gastrointestinal: [ ] Abdominal Pain [ ] Diarrhea [ ] Constipation [ ] Hemorrhoids [ ] Nausea [ ] Vomiting  Genitourinary: [ ] Nocturia [ ] Dysuria [ ] Incontinence  Extremities: [ ] Swelling [ ] Joint Pain  Neurologic: [ ] Focal deficit [ ] Paresthesias [ ] Syncope  Lymphatic: [ ] Swelling [ ] Lymphadenopathy   Skin: [ ] Rash [ ] Ecchymoses [ ] Wounds [ ] Lesions  Psychiatry: [ ] Depression [ ] Suicidal/Homicidal Ideation [ ] Anxiety [ ] Sleep Disturbances  [x ] 10 point review of systems is otherwise negative except as mentioned above              [ ]Unable to obtain due to   All other systems were reviewed and are negative, except as noted.    VITALS/PHYSICAL EXAM  --------------------------------------------------------------------------------  T(C): 36.3 (10-23-21 @ 05:49), Max: 36.8 (10-23-21 @ 04:41)  HR: 67 (10-23-21 @ 05:49) (62 - 109)  BP: 138/65 (10-23-21 @ 05:49) (114/76 - 138/65)  RR: 18 (10-23-21 @ 05:49) (17 - 18)  SpO2: 96% (10-23-21 @ 05:49) (95% - 96%)  Wt(kg): --        10-22-21 @ 07:01  -  10-23-21 @ 07:00  --------------------------------------------------------  IN: 1020 mL / OUT: 1100 mL / NET: -80 mL      Physical Exam:  	Gen: NAD, well-appearing  	HEENT: on nasal cannula (2 L)  	Pulm: decreased rhonchi B/L  	CV: normal S1S2  	Abd: soft                      Back : No sacral edema  	LE: bilateral LE pitting edema  	Skin: Warm, bilateral LE hyperpigmentation      LABS/STUDIES  --------------------------------------------------------------------------------              9.4    4.72  >-----------<  252      [10-23-21 @ 07:15]              30.8     136  |  100  |  93  ----------------------------<  139      [10-23-21 @ 07:13]  4.9   |  20  |  2.64        Ca     9.2     [10-23-21 @ 07:13]      Mg     2.8     [10-23-21 @ 07:13]      Phos  5.2     [10-23-21 @ 07:13]            Creatinine Trend:  SCr 2.64 [10-23 @ 07:13]  SCr 2.51 [10-22 @ 07:03]  SCr 2.70 [10-21 @ 06:00]  SCr 2.60 [10-20 @ 06:13]  SCr 2.36 [10-19 @ 05:34]    Urinalysis - [10-17-21 @ 06:47]      Color Light Yellow / Appearance Clear / SG 1.018 / pH 5.5      Gluc Negative / Ketone Negative  / Bili Negative / Urobili Negative       Blood Negative / Protein Trace / Leuk Est Negative / Nitrite Negative      RBC 1 / WBC 1 / Hyaline 0 / Gran  / Sq Epi  / Non Sq Epi 0 / Bacteria Negative    Urine Creatinine 73      [10-20-21 @ 15:38]  Urine Protein 16      [10-20-21 @ 15:38]  Urine Sodium 49      [10-17-21 @ 06:48]  Urine Urea Nitrogen 768      [10-17-21 @ 10:14]  Urine Osmolality 486      [10-17-21 @ 06:47]    Iron 62, TIBC 225, %sat 28      [10-18-21 @ 10:45]  Ferritin 231      [10-18-21 @ 13:01]  TSH 2.80      [10-13-21 @ 13:13]

## 2021-10-23 NOTE — PROGRESS NOTE ADULT - SUBJECTIVE AND OBJECTIVE BOX
Mohsin Khan, MD  Attending Physician, Division Of Hospital Medicine  Pager: [phone], Office: [phone]  Off hour pager: [phone]    Patient is a 86y old  Female who presents with a chief complaint of Shortness of breath    SUBJECTIVE / OVERNIGHT EVENTS:  Seen, examined the patient this am  Sitting o a chair, on 2L O2, no acute SOB, LE edema improved, no chest pain, fever, feels better, wants to go home to day    MEDICATIONS  (STANDING):  apixaban 2.5 milliGRAM(s) Oral every 12 hours  carvedilol 12.5 milliGRAM(s) Oral every 12 hours  cholecalciferol 1000 Unit(s) Oral two times a day  dextrose 40% Gel 15 Gram(s) Oral once  dextrose 5%. 1000 milliLiter(s) (50 mL/Hr) IV Continuous <Continuous>  dextrose 5%. 1000 milliLiter(s) (100 mL/Hr) IV Continuous <Continuous>  dextrose 50% Injectable 25 Gram(s) IV Push once  dextrose 50% Injectable 12.5 Gram(s) IV Push once  dextrose 50% Injectable 25 Gram(s) IV Push once  furosemide    Tablet 80 milliGRAM(s) Oral daily  glucagon  Injectable 1 milliGRAM(s) IntraMuscular once  hydrALAZINE 100 milliGRAM(s) Oral every 8 hours  influenza   Vaccine 0.5 milliLiter(s) IntraMuscular once  insulin lispro (ADMELOG) corrective regimen sliding scale   SubCutaneous three times a day before meals  insulin lispro (ADMELOG) corrective regimen sliding scale   SubCutaneous at bedtime  NIFEdipine XL 30 milliGRAM(s) Oral daily  polyethylene glycol 3350 17 Gram(s) Oral daily  tiotropium 2.5 MICROgram(s)/olodaterol 2.5 MICROgram(s) (STIOLTO) Inhaler 2 Puff(s) Inhalation daily    MEDICATIONS  (PRN):  acetaminophen   Tablet .. 650 milliGRAM(s) Oral every 6 hours PRN Temp greater or equal to 38C (100.4F), Mild Pain (1 - 3)  melatonin 3 milliGRAM(s) Oral at bedtime PRN Insomnia  ondansetron Injectable 4 milliGRAM(s) IV Push every 8 hours PRN Nausea and/or Vomiting  sodium chloride 0.65% Nasal 2 Spray(s) Both Nostrils four times a day PRN Nasal Congestion      Vital Signs Last 24 Hrs  T(C): 36.3 (23 Oct 2021 05:49), Max: 36.8 (23 Oct 2021 04:41)  T(F): 97.3 (23 Oct 2021 05:49), Max: 98.3 (23 Oct 2021 04:41)  HR: 67 (23 Oct 2021 05:49) (62 - 109)  BP: 138/65 (23 Oct 2021 05:49) (114/76 - 138/65)  BP(mean): --  RR: 18 (23 Oct 2021 05:49) (17 - 18)  SpO2: 96% (23 Oct 2021 05:49) (95% - 96%)  CAPILLARY BLOOD GLUCOSE      POCT Blood Glucose.: 150 mg/dL (23 Oct 2021 07:48)  POCT Blood Glucose.: 267 mg/dL (22 Oct 2021 21:25)  POCT Blood Glucose.: 171 mg/dL (22 Oct 2021 16:30)  POCT Blood Glucose.: 227 mg/dL (22 Oct 2021 11:30)    I&O's Summary    22 Oct 2021 07:01  -  23 Oct 2021 07:00  --------------------------------------------------------  IN: 1020 mL / OUT: 1100 mL / NET: -80 mL        PHYSICAL EXAM:-  GENERAL: NAD, well-developed  EYES: EOMI, PERRLA, conjunctiva and sclera clear  NECK: Supple, No JVD, no thyromegaly  CHEST/LUNG: Clear to auscultation bilaterally; No wheeze  HEART: Regular rate and rhythm; S1, S2 audible, No murmurs, rubs, or gallops  ABDOMEN: Soft, Nontender, Nondistended; Bowel sounds present  EXTREMITIES:  2+ Peripheral Pulses, No clubbing, cyanosis, chronic stasis, 1+LE edema  NEURO: AAOx3, no focal deficit      LABS:                        9.4    4.72  )-----------( 252      ( 23 Oct 2021 07:15 )             30.8     10-23    136  |  100  |  93<H>  ----------------------------<  139<H>  4.9   |  20<L>  |  2.64<H>    Ca    9.2      23 Oct 2021 07:13  Phos  5.2     10-23  Mg     2.8     10-23      RADIOLOGY & ADDITIONAL TESTS:    Imaging Personally Reviewed: CXR, Echo  Consultant(s) Notes Reviewed: Cardiology

## 2021-10-23 NOTE — PROGRESS NOTE ADULT - PROBLEM SELECTOR PROBLEM 8
Vitamin D deficiency

## 2021-10-26 ENCOUNTER — APPOINTMENT (OUTPATIENT)
Dept: PULMONOLOGY | Facility: CLINIC | Age: 86
End: 2021-10-26

## 2021-10-28 ENCOUNTER — NON-APPOINTMENT (OUTPATIENT)
Age: 86
End: 2021-10-28

## 2021-11-09 ENCOUNTER — NON-APPOINTMENT (OUTPATIENT)
Age: 86
End: 2021-11-09

## 2021-11-10 ENCOUNTER — LABORATORY RESULT (OUTPATIENT)
Age: 86
End: 2021-11-10

## 2021-11-15 ENCOUNTER — APPOINTMENT (OUTPATIENT)
Dept: INTERNAL MEDICINE | Facility: CLINIC | Age: 86
End: 2021-11-15
Payer: MEDICARE

## 2021-11-15 VITALS
OXYGEN SATURATION: 98 % | SYSTOLIC BLOOD PRESSURE: 173 MMHG | WEIGHT: 149.34 LBS | HEART RATE: 67 BPM | BODY MASS INDEX: 29.17 KG/M2 | TEMPERATURE: 97.3 F | DIASTOLIC BLOOD PRESSURE: 66 MMHG

## 2021-11-15 VITALS — DIASTOLIC BLOOD PRESSURE: 66 MMHG | SYSTOLIC BLOOD PRESSURE: 160 MMHG

## 2021-11-15 DIAGNOSIS — Z00.00 ENCOUNTER FOR GENERAL ADULT MEDICAL EXAMINATION W/OUT ABNORMAL FINDINGS: ICD-10-CM

## 2021-11-15 PROCEDURE — 99214 OFFICE O/P EST MOD 30 MIN: CPT | Mod: 25

## 2021-11-15 PROCEDURE — G0008: CPT

## 2021-11-15 PROCEDURE — 36415 COLL VENOUS BLD VENIPUNCTURE: CPT

## 2021-11-15 PROCEDURE — 90662 IIV NO PRSV INCREASED AG IM: CPT

## 2021-11-15 RX ORDER — SODIUM POLYSTYRENE SULFONATE 4.1 MEQ/G
POWDER, FOR SUSPENSION ORAL; RECTAL
Qty: 1 | Refills: 0 | Status: DISCONTINUED | COMMUNITY
Start: 2021-09-22 | End: 2021-11-15

## 2021-11-15 RX ORDER — FELODIPINE 10 MG/1
10 TABLET, EXTENDED RELEASE ORAL
Qty: 30 | Refills: 0 | Status: DISCONTINUED | COMMUNITY
Start: 2020-11-09

## 2021-11-15 RX ORDER — BLOOD PRESSURE TEST KIT-MEDIUM
KIT MISCELLANEOUS
Qty: 1 | Refills: 0 | Status: ACTIVE | COMMUNITY
Start: 2021-11-15 | End: 1900-01-01

## 2021-11-15 NOTE — ASSESSMENT
[FreeTextEntry1] : Did advised to take hydralazine today,\par Check A1c\par Blood pressure not stable advised take hydralazine today\par Refills of medications given\par Does have appointment with nephrology for CKD, ultra continuous monitoring of electrolytes and fluid balance.\par Medications renally dosed

## 2021-11-15 NOTE — HISTORY OF PRESENT ILLNESS
[FreeTextEntry1] : Patient presents for follow-up [de-identified] : States that legs have improved\par Denies any shortness of breath\par Has been having visiting nurse blood pressure 120s over 60s-fears that blood pressure is low and did not take hydralazine today\par Has appointment with nephrology and cardiology\par Sugars have improved with the introduction of glipizide

## 2021-11-18 LAB
ANION GAP SERPL CALC-SCNC: 15 MMOL/L
APPEARANCE: CLEAR
APPEARANCE: CLEAR
BACTERIA: NEGATIVE
BASOPHILS # BLD AUTO: 0.05 K/UL
BASOPHILS NFR BLD AUTO: 1.2 %
BILIRUBIN URINE: NEGATIVE
BILIRUBIN URINE: NEGATIVE
BLOOD URINE: NEGATIVE
BLOOD URINE: NEGATIVE
BUN SERPL-MCNC: 69 MG/DL
CALCIUM SERPL-MCNC: 9.9 MG/DL
CHLORIDE SERPL-SCNC: 103 MMOL/L
CO2 SERPL-SCNC: 21 MMOL/L
COLOR: COLORLESS
COLOR: COLORLESS
CREAT SERPL-MCNC: 2.29 MG/DL
EOSINOPHIL # BLD AUTO: 0.27 K/UL
EOSINOPHIL NFR BLD AUTO: 6.5 %
GLUCOSE QUALITATIVE U: NEGATIVE
GLUCOSE QUALITATIVE U: NEGATIVE
GLUCOSE SERPL-MCNC: 125 MG/DL
HCT VFR BLD CALC: 32.4 %
HGB BLD-MCNC: 10.5 G/DL
HYALINE CASTS: 0 /LPF
IMM GRANULOCYTES NFR BLD AUTO: 0.2 %
KETONES URINE: NEGATIVE
KETONES URINE: NEGATIVE
LEUKOCYTE ESTERASE URINE: NEGATIVE
LEUKOCYTE ESTERASE URINE: NEGATIVE
LYMPHOCYTES # BLD AUTO: 1.12 K/UL
LYMPHOCYTES NFR BLD AUTO: 27.1 %
MAN DIFF?: NORMAL
MCHC RBC-ENTMCNC: 32.4 GM/DL
MCHC RBC-ENTMCNC: 33.3 PG
MCV RBC AUTO: 102.9 FL
MICROSCOPIC-UA: NORMAL
MONOCYTES # BLD AUTO: 0.58 K/UL
MONOCYTES NFR BLD AUTO: 14 %
NEUTROPHILS # BLD AUTO: 2.11 K/UL
NEUTROPHILS NFR BLD AUTO: 51 %
NITRITE URINE: NEGATIVE
NITRITE URINE: NEGATIVE
NT-PROBNP SERPL-MCNC: 505 PG/ML
PH URINE: 6
PH URINE: 6
PLATELET # BLD AUTO: 187 K/UL
POTASSIUM SERPL-SCNC: 4.9 MMOL/L
PROTEIN URINE: NEGATIVE
PROTEIN URINE: NEGATIVE
RBC # BLD: 3.15 M/UL
RBC # FLD: 12.8 %
RED BLOOD CELLS URINE: 3 /HPF
SODIUM SERPL-SCNC: 139 MMOL/L
SPECIFIC GRAVITY URINE: 1.01
SPECIFIC GRAVITY URINE: 1.01
SQUAMOUS EPITHELIAL CELLS: 1 /HPF
UROBILINOGEN URINE: NORMAL
UROBILINOGEN URINE: NORMAL
WBC # FLD AUTO: 4.14 K/UL
WHITE BLOOD CELLS URINE: 1 /HPF

## 2021-11-29 ENCOUNTER — APPOINTMENT (OUTPATIENT)
Dept: INTERNAL MEDICINE | Facility: CLINIC | Age: 86
End: 2021-11-29

## 2021-12-02 ENCOUNTER — APPOINTMENT (OUTPATIENT)
Dept: CARDIOLOGY | Facility: CLINIC | Age: 86
End: 2021-12-02
Payer: MEDICARE

## 2021-12-02 ENCOUNTER — NON-APPOINTMENT (OUTPATIENT)
Age: 86
End: 2021-12-02

## 2021-12-02 VITALS
WEIGHT: 150 LBS | OXYGEN SATURATION: 95 % | RESPIRATION RATE: 16 BRPM | HEIGHT: 60 IN | SYSTOLIC BLOOD PRESSURE: 152 MMHG | TEMPERATURE: 97.7 F | HEART RATE: 71 BPM | DIASTOLIC BLOOD PRESSURE: 54 MMHG | BODY MASS INDEX: 29.45 KG/M2

## 2021-12-02 VITALS — DIASTOLIC BLOOD PRESSURE: 50 MMHG | SYSTOLIC BLOOD PRESSURE: 110 MMHG

## 2021-12-02 PROCEDURE — 93000 ELECTROCARDIOGRAM COMPLETE: CPT

## 2021-12-02 PROCEDURE — 99204 OFFICE O/P NEW MOD 45 MIN: CPT

## 2021-12-02 RX ORDER — KETOROLAC TROMETHAMINE 5 MG/ML
0.5 SOLUTION OPHTHALMIC
Qty: 5 | Refills: 0 | Status: DISCONTINUED | COMMUNITY
Start: 2021-08-02

## 2021-12-02 RX ORDER — DORZOLAMIDE HYDROCHLORIDE 20 MG/ML
2 SOLUTION OPHTHALMIC
Qty: 10 | Refills: 0 | Status: DISCONTINUED | COMMUNITY
Start: 2021-03-05

## 2021-12-02 RX ORDER — CEPHALEXIN 500 MG/1
500 CAPSULE ORAL
Qty: 20 | Refills: 0 | Status: DISCONTINUED | COMMUNITY
Start: 2021-08-18

## 2021-12-02 RX ORDER — FUROSEMIDE 40 MG/1
40 TABLET ORAL
Refills: 0 | Status: DISCONTINUED | COMMUNITY
Start: 2021-08-20 | End: 2021-12-02

## 2021-12-02 RX ORDER — ATENOLOL 25 MG/1
25 TABLET ORAL
Qty: 60 | Refills: 0 | Status: DISCONTINUED | COMMUNITY
Start: 2021-07-12

## 2021-12-03 NOTE — HISTORY OF PRESENT ILLNESS
[FreeTextEntry1] : Ryanne is an 86-year-old female COPD, htn, PAF, CKD, s/p hospitalization for dyspnea. Acute diastolic heart failure and LUIS FERNANDO responded to diuresis but still  hypoxic on exertion. Sent home with oxygen. Also RLE wound care which is chronic. She weaned herself off oxygen but was very short of breath walking here.

## 2021-12-03 NOTE — DISCUSSION/SUMMARY
[___ Week(s)] : in [unfilled] week(s) [FreeTextEntry1] : The patient  is an 86-year-old female COPD, htn, PAF, CKD, HFpEF with persistent dyspnea. \par #1 HFpEF- continue lasix 80mg daiy but consider decrease to 60mg, currently on nifedipne, coreg and hydralazine\par #2 PAF- continue eliquis and coreg\par #3 CKD- f/u Dr. Galarza\par #4 COPD- most likely cause of persistent dyspnea, f/u Dr. Spencer, encouraged to use oxygen at night\par records reviewed

## 2021-12-08 ENCOUNTER — APPOINTMENT (OUTPATIENT)
Dept: PULMONOLOGY | Facility: CLINIC | Age: 86
End: 2021-12-08
Payer: MEDICARE

## 2021-12-08 VITALS
TEMPERATURE: 96.9 F | HEIGHT: 60 IN | HEART RATE: 56 BPM | BODY MASS INDEX: 29.45 KG/M2 | OXYGEN SATURATION: 97 % | WEIGHT: 150 LBS | RESPIRATION RATE: 14 BRPM

## 2021-12-08 PROCEDURE — 99214 OFFICE O/P EST MOD 30 MIN: CPT

## 2021-12-08 NOTE — REASON FOR VISIT
[Follow-Up - From Hospitalization] : a follow-up visit after a recent hospitalization [Cough] : cough [COPD] : COPD [Shortness of Breath] : shortness of breath [Wheezing] : wheezing [Family Member] : family member [[id number]] : on home oxygen

## 2021-12-08 NOTE — ASSESSMENT
[FreeTextEntry1] : In summary the patient is an 86-year-old obese female past medical history significant for hypertension, diabetes, high cholesterol, COPD who presents for follow-up.  Physical exam is significant for good air entry.  Prescription renewal performed and the patient is instructed to follow-up in 3 months\par I have instructed the patient to continue using her home oxygen for minimum of 4 hours/day

## 2021-12-14 ENCOUNTER — RX RENEWAL (OUTPATIENT)
Age: 86
End: 2021-12-14

## 2021-12-28 ENCOUNTER — APPOINTMENT (OUTPATIENT)
Dept: NEPHROLOGY | Facility: CLINIC | Age: 86
End: 2021-12-28
Payer: MEDICARE

## 2021-12-28 VITALS — OXYGEN SATURATION: 97 % | RESPIRATION RATE: 18 BRPM | TEMPERATURE: 97.34 F | HEART RATE: 60 BPM

## 2021-12-28 VITALS
SYSTOLIC BLOOD PRESSURE: 118 MMHG | WEIGHT: 156.53 LBS | HEIGHT: 60 IN | DIASTOLIC BLOOD PRESSURE: 56 MMHG | BODY MASS INDEX: 30.73 KG/M2

## 2021-12-28 PROCEDURE — 99215 OFFICE O/P EST HI 40 MIN: CPT

## 2021-12-28 NOTE — PHYSICAL EXAM
[General Appearance - Alert] : alert [General Appearance - In No Acute Distress] : in no acute distress [Sclera] : the sclera and conjunctiva were normal [Outer Ear] : the ears and nose were normal in appearance [Neck Appearance] : the appearance of the neck was normal [Respiration, Rhythm And Depth] : normal respiratory rhythm and effort [Auscultation Breath Sounds / Voice Sounds] : lungs were clear to auscultation bilaterally [Murmurs] : no murmurs [Normal] : the heart rate was normal [Irregularly Irregular] : the rhythm was irregularly irregular [Pitting Edema] : pitting edema present [___ +] : bilateral [unfilled]+ pretibial pitting edema [Bowel Sounds] : normal bowel sounds [No CVA Tenderness] : no ~M costovertebral angle tenderness [Abdomen Soft] : soft [] : no rash [No Focal Deficits] : no focal deficits [Oriented To Time, Place, And Person] : oriented to person, place, and time [Involuntary Movements] : no involuntary movements were seen

## 2021-12-28 NOTE — HISTORY OF PRESENT ILLNESS
[FreeTextEntry1] : ANI CHESTER is a 86 year female with PMH of DM2, PVD, HTN, Afib, TIA, and asbestosis presenting for nephrology post hospital followup for CKD4. Pt arrives with her daughter who she lives with and is primary caregiver. \par \par [facility] admission 10/11-10/23 for hypervolemia- consulted by nephrology Dr Bartholomew had seen.  Admission creatinine 1.93, received IV diuresis and was discharged on Lasix 80mg with creatinine 2.64. \par Right heart cath on 10/21 showed increased filling pressures. Two other hospital admissions this year for hypervolemia/diastolic heart failure.\par \par On discharge pt was advised to maintain 1-2 liter fluid restriction and low Na, low K+ diet\par Daughter reports that patient's lower extremity edema is much improved but still present.  \par DM x approx 35 years, HTN history decades\par Denies history of UTIs, proteinuria, hematuria. \par Appetite ok. \par Weighs herself at home but not consistently- reports 152 as baseline\par Patient worked for many years in [employer] at [employer] and was likely exposed to asbestos at that time. \par Received Covid vaccines x 3 and seasonal flu vaccine\par \par Renal sono on 4/13/2021 at [facility] \par COMPARISON: Correlation is made with right upper quadrant Ultrasound dated 5/31/2015, CT chest dated 4/12/2021.\par Right kidney:  11.6 cm. Multiple cysts, the largest measuring 2.6 x 2.9 cm. Small perinephric fluid. No hydronephrosis.  Increased cortical echogenicity.\par Left kidney:  11.6 cm. Multiple simple cysts, the largest measuring 2.0 x 1.9 cm. Trace perinephric fluid. No hydronephrosis.  Increased cortical echogenicity.\par Urinary bladder: Collapsed, with a douglas catheter.\par Color and spectral Doppler reveals patent renal veins and IVC\par IMPRESSION:\par Patent renal veins bilaterally.\par Bilateral cysts\par Bilateral small perinephric fluid.\par Increased cortical echogenicity bilaterally, which may be secondary to medical renal disease.

## 2021-12-28 NOTE — REASON FOR VISIT
[Other: _____] : [unfilled] [Post Hospitalization] : a post hospitalization visit [FreeTextEntry1] : CKD 4, HFU

## 2021-12-28 NOTE — CONSULT LETTER
[Dear  ___] : Dear  [unfilled], [( Thank you for referring [unfilled] for consultation for _____ )] : Thank you for referring [unfilled] for consultation for [unfilled] [Please see my note below.] : Please see my note below. [DrKiana  ___] : Dr. BABB [FreeTextEntry1] : nephrology consultation.

## 2021-12-28 NOTE — ASSESSMENT
[FreeTextEntry1] : ANI CHESTER is a 86 year female with CKD 4, cardiorenal syndrome, HTN, DM. \par CKD likely a result of longstanding history of DM 2, HTN, complicated by cardiorenal syndrome. \par Renal sonogram reviewed from 4/13 : no hydronephrosis, multiple cysts, increased echogenicity indicative of medical renal disease\par CKD4: Creatinine trend reviewed with patient and daughter- renal panel ordered today to establish baseline.\par HTN: BP @ goal on current doses of Coreg , Furosemide, Nifedipine and Hydralazine. \par Volume status: Cont furosemide 80mg , fluid restriction 1 liter daily and low Na diet.  \par Metabolic Acidosis : check CO2 today\par Anemia Management :  Hgb 10.5 in November, recheck CBC, Iron studies, B12 and folate today\par Diabetes: A1C ordered - Importance of diabetes control stressed\par SGLT2 inhibitor was discussed but GFR prohibitive at this time\par CKD-MBD : check phosphorus levels, PTH, Vit D levels\par Pt not taking Aspirin daily when meds reviewed- discuss with Dr. Fall. \par Weights daily - contact office for weight gain > 2-3 pounds overnight-may need extra diuretic\par Avoid NSAID use. \par \par Importance of BP control, best effort to maintain euvolemia in setting of diastolic dysfunction \par Weigh at least twice a week\par All questions answered\par Follow up 4 months.

## 2021-12-28 NOTE — REVIEW OF SYSTEMS
[Feeling Tired] : feeling tired [Loss Of Hearing] : hearing loss [Lower Ext Edema] : lower extremity edema [SOB on Exertion] : shortness of breath during exertion [Orthopnea] : orthopnea [Limb Swelling] : limb swelling [Negative] : Heme/Lymph [As Noted in HPI] : as noted in HPI [Chest Pain] : no chest pain [Dizziness] : no dizziness [FreeTextEntry2] : sedentary [FreeTextEntry3] : Hx macular degeneration [FreeTextEntry4] : wears hearing aides [FreeTextEntry6] : Discharged with oxygen-only uses occasionally at home, follows with pulm [FreeTextEntry8] : depends for stress incontinence [FreeTextEntry9] : ambulates with walker [de-identified] : chronic RLE wounds now healed

## 2021-12-29 ENCOUNTER — APPOINTMENT (OUTPATIENT)
Dept: CARDIOLOGY | Facility: CLINIC | Age: 86
End: 2021-12-29

## 2022-01-03 LAB
25(OH)D3 SERPL-MCNC: 31.5 NG/ML
ALBUMIN SERPL ELPH-MCNC: 4.4 G/DL
ANION GAP SERPL CALC-SCNC: 21 MMOL/L
BASOPHILS # BLD AUTO: 0.05 K/UL
BASOPHILS NFR BLD AUTO: 1 %
BUN SERPL-MCNC: 75 MG/DL
CALCIUM SERPL-MCNC: 9.8 MG/DL
CALCIUM SERPL-MCNC: 9.8 MG/DL
CHLORIDE SERPL-SCNC: 103 MMOL/L
CO2 SERPL-SCNC: 14 MMOL/L
CREAT SERPL-MCNC: 2.75 MG/DL
EOSINOPHIL # BLD AUTO: 0.13 K/UL
EOSINOPHIL NFR BLD AUTO: 2.6 %
ESTIMATED AVERAGE GLUCOSE: 137 MG/DL
FERRITIN SERPL-MCNC: 157 NG/ML
FOLATE SERPL-MCNC: 18.9 NG/ML
GLUCOSE SERPL-MCNC: 147 MG/DL
HBA1C MFR BLD HPLC: 6.4 %
HCT VFR BLD CALC: 33 %
HGB BLD-MCNC: 10.7 G/DL
IMM GRANULOCYTES NFR BLD AUTO: 0.2 %
IRON SATN MFR SERPL: 40 %
IRON SERPL-MCNC: 137 UG/DL
LYMPHOCYTES # BLD AUTO: 1.31 K/UL
LYMPHOCYTES NFR BLD AUTO: 26.6 %
MAN DIFF?: NORMAL
MCHC RBC-ENTMCNC: 31.9 PG
MCHC RBC-ENTMCNC: 32.4 GM/DL
MCV RBC AUTO: 98.5 FL
MONOCYTES # BLD AUTO: 0.64 K/UL
MONOCYTES NFR BLD AUTO: 13 %
NEUTROPHILS # BLD AUTO: 2.78 K/UL
NEUTROPHILS NFR BLD AUTO: 56.6 %
PARATHYROID HORMONE INTACT: 134 PG/ML
PHOSPHATE SERPL-MCNC: 4.9 MG/DL
PLATELET # BLD AUTO: 203 K/UL
POTASSIUM SERPL-SCNC: 5.8 MMOL/L
RBC # BLD: 3.35 M/UL
RBC # FLD: 13.1 %
SODIUM SERPL-SCNC: 137 MMOL/L
TIBC SERPL-MCNC: 345 UG/DL
UIBC SERPL-MCNC: 208 UG/DL
VIT B12 SERPL-MCNC: 289 PG/ML
WBC # FLD AUTO: 4.92 K/UL

## 2022-01-10 NOTE — PHYSICAL THERAPY INITIAL EVALUATION ADULT - HEALTH SCREEN CRITERIA
Pt progressing towards goals.Telemetry maintained,a-paced at a rate of 60.had an episode of nausea and dry heaving yesterday relieved with Zofran.afebrile.r/a sats 95-97%.No sob noted.safety precautions maintained.pt free of falls or injuries.bed in low position,rails up x3.bed alarm in use for pt safety.continue plan of care.   yes

## 2022-02-01 ENCOUNTER — NON-APPOINTMENT (OUTPATIENT)
Age: 87
End: 2022-02-01

## 2022-02-01 LAB
ALBUMIN SERPL ELPH-MCNC: 4.4 G/DL
ANION GAP SERPL CALC-SCNC: 16 MMOL/L
BUN SERPL-MCNC: 61 MG/DL
CALCIUM SERPL-MCNC: 9.6 MG/DL
CHLORIDE SERPL-SCNC: 104 MMOL/L
CO2 SERPL-SCNC: 22 MMOL/L
CREAT SERPL-MCNC: 2.11 MG/DL
GLUCOSE SERPL-MCNC: 138 MG/DL
PHOSPHATE SERPL-MCNC: 3.8 MG/DL
POTASSIUM SERPL-SCNC: 3.6 MMOL/L
SODIUM SERPL-SCNC: 142 MMOL/L

## 2022-02-03 ENCOUNTER — NON-APPOINTMENT (OUTPATIENT)
Age: 87
End: 2022-02-03

## 2022-02-03 ENCOUNTER — APPOINTMENT (OUTPATIENT)
Dept: CARDIOLOGY | Facility: CLINIC | Age: 87
End: 2022-02-03
Payer: COMMERCIAL

## 2022-02-03 VITALS
SYSTOLIC BLOOD PRESSURE: 154 MMHG | WEIGHT: 156 LBS | HEART RATE: 60 BPM | OXYGEN SATURATION: 97 % | HEIGHT: 60 IN | RESPIRATION RATE: 16 BRPM | DIASTOLIC BLOOD PRESSURE: 90 MMHG | BODY MASS INDEX: 30.63 KG/M2

## 2022-02-03 VITALS — DIASTOLIC BLOOD PRESSURE: 60 MMHG | SYSTOLIC BLOOD PRESSURE: 134 MMHG

## 2022-02-03 PROCEDURE — 93000 ELECTROCARDIOGRAM COMPLETE: CPT

## 2022-02-03 PROCEDURE — 99213 OFFICE O/P EST LOW 20 MIN: CPT

## 2022-02-05 NOTE — REVIEW OF SYSTEMS
[SOB] : shortness of breath [Dyspnea on exertion] : dyspnea during exertion [Negative] : Heme/Lymph [Chest Discomfort] : no chest discomfort [Lower Ext Edema] : no extremity edema [Leg Claudication] : no intermittent leg claudication [Palpitations] : no palpitations [Orthopnea] : no orthopnea [PND] : no PND

## 2022-02-05 NOTE — DISCUSSION/SUMMARY
[___ Month(s)] : in [unfilled] month(s) [FreeTextEntry1] : The patient  is an 86-year-old female COPD, htn, PAF, CKD, HFpEF with persistent dyspnea. \par #1 HFpEF- continue lasix 80mg daily, continue nifedipine, coreg and hydralazine\par #2 PAF- continue eliquis and coreg\par #3 CKD- f/u Dr. Galarza, continue lokelma\par #4 COPD- at baseline, f/u Dr. Spencer, encouraged to use oxygen at night\par

## 2022-02-05 NOTE — HISTORY OF PRESENT ILLNESS
[FreeTextEntry1] : BP log reviewed. Feels better with med changes. Recently daughter sees the legs more swollen but not walking as much. No CP, palpitations, dizziness or increase SOB noted.

## 2022-03-09 ENCOUNTER — APPOINTMENT (OUTPATIENT)
Dept: PULMONOLOGY | Facility: CLINIC | Age: 87
End: 2022-03-09
Payer: MEDICARE

## 2022-03-09 VITALS
RESPIRATION RATE: 12 BRPM | HEART RATE: 61 BPM | OXYGEN SATURATION: 97 % | SYSTOLIC BLOOD PRESSURE: 128 MMHG | HEIGHT: 60 IN | DIASTOLIC BLOOD PRESSURE: 75 MMHG

## 2022-03-09 PROCEDURE — 99214 OFFICE O/P EST MOD 30 MIN: CPT

## 2022-03-09 RX ORDER — TIOTROPIUM BROMIDE INHALATION SPRAY 1.56 UG/1
1.25 SPRAY, METERED RESPIRATORY (INHALATION) DAILY
Qty: 1 | Refills: 6 | Status: DISCONTINUED | COMMUNITY
Start: 2019-06-12 | End: 2022-03-09

## 2022-03-09 RX ORDER — ALBUTEROL SULFATE 90 UG/1
108 (90 BASE) AEROSOL, METERED RESPIRATORY (INHALATION) EVERY 6 HOURS
Qty: 2 | Refills: 6 | Status: DISCONTINUED | COMMUNITY
Start: 2017-03-30 | End: 2022-03-09

## 2022-03-09 NOTE — HISTORY OF PRESENT ILLNESS
[Never] : never [TextBox_4] : Patient is a 86 year old female accompanied by her daughter with history of COPD on home O2, CHF, T2DM, Afib presents for follow-up\par \par Patient reports using the home O2 occasionally at night and maybe twice weekly during the day as needed. Overall she feels well offers no complaints. Denies any CP, SOB, N/V abdominal pain, weight loss or hemoptysis

## 2022-03-09 NOTE — PHYSICAL EXAM
[No Acute Distress] : no acute distress [Normal Oropharynx] : normal oropharynx [Normal Appearance] : normal appearance [No Neck Mass] : no neck mass [Normal Rate/Rhythm] : normal rate/rhythm [Normal S1, S2] : normal s1, s2 [No Murmurs] : no murmurs [No Resp Distress] : no resp distress [Clear to Auscultation Bilaterally] : clear to auscultation bilaterally [No Abnormalities] : no abnormalities [Benign] : benign [No Clubbing] : no clubbing [No Cyanosis] : no cyanosis [FROM] : FROM [Normal Color/ Pigmentation] : normal color/ pigmentation [No Focal Deficits] : no focal deficits [Oriented x3] : oriented x3 [Normal Affect] : normal affect [TextBox_105] : + dependent edema  [TextBox_99] : ambulates with walker

## 2022-03-09 NOTE — REASON FOR VISIT
[Follow-Up] : a follow-up visit [Cough] : cough [COPD] : COPD [Family Member] : family member [TextBox_44] : Home O2

## 2022-04-20 ENCOUNTER — APPOINTMENT (OUTPATIENT)
Dept: INTERNAL MEDICINE | Facility: CLINIC | Age: 87
End: 2022-04-20
Payer: MEDICARE

## 2022-04-20 ENCOUNTER — LABORATORY RESULT (OUTPATIENT)
Age: 87
End: 2022-04-20

## 2022-04-20 VITALS
HEART RATE: 72 BPM | HEIGHT: 60 IN | TEMPERATURE: 97.4 F | OXYGEN SATURATION: 95 % | DIASTOLIC BLOOD PRESSURE: 71 MMHG | BODY MASS INDEX: 28.69 KG/M2 | WEIGHT: 146.15 LBS | SYSTOLIC BLOOD PRESSURE: 139 MMHG

## 2022-04-20 PROCEDURE — 36415 COLL VENOUS BLD VENIPUNCTURE: CPT

## 2022-04-20 PROCEDURE — 99214 OFFICE O/P EST MOD 30 MIN: CPT | Mod: 25

## 2022-04-20 NOTE — ASSESSMENT
[FreeTextEntry1] : -Blood work done today -Check A1c, Kidney function and electrolytes.\par -Blood pressure is stable.\par -COPD is currently stable.\par -Appears to be clinically euvolemic Check pro BMP\par -F/u with ENT for L cerumen impaction.\par -Home physical therapy ordered for deconditioning.\par -RTO in 6 months.\par \par

## 2022-04-20 NOTE — ADDENDUM
[FreeTextEntry1] : I, Isaiah Gallego, acted as a scribe on behalf of Dr. Terence Briggs MD, on 04/20/2022. \par \par All medical entries made by the scribe were at my, Dr. Terence Briggs MD, direction and personally dictated by me on 04/20/2022. I have reviewed the chart and agree that the record accurately reflects my personal performance of the history, physical exam, assessment and plan. I have also personally directed, reviewed, and agreed with the chart.

## 2022-04-20 NOTE — HISTORY OF PRESENT ILLNESS
[Spouse] : spouse [Family Member] : family member [FreeTextEntry1] : Patient presents for follow-up.  [de-identified] : Patient reports of improving macular degeneration and using drops for glaucoma. She notes of teary eyes and itchiness going on all year round. Sometimes use artificial tears in eyes with some alleviation. Denies crustiness.\par Ophthalmologist advised her to put more drops.\par Kidney has been stable, taking bicarbonate. Has an appointment with Nephrologist in 1 week. \par Pt notes of some weight loss.\par Breathing has been stable and using oxygen at night.\par Sugar is currently well-controlled, not over 130. She feels better without the metformin.\par Pt tries to walk as much as she can at home. Interested in Home physical therapy. Denies any fall at home.\par Also c/o R shoulder pain, stressed that she uses R arm more with the walker.\par UTD with ENT every 3 months.

## 2022-04-20 NOTE — PHYSICAL EXAM
[Normal] : the outer ears and nose were normal in appearance and the oropharynx was normal [Normal Appearance] : normal in appearance [No Masses] : no palpable masses [No Nipple Discharge] : no nipple discharge [No Axillary Lymphadenopathy] : no axillary lymphadenopathy [de-identified] : L cerumen Impaction. [de-identified] : Chronic venous stasis changes.

## 2022-04-21 ENCOUNTER — LABORATORY RESULT (OUTPATIENT)
Age: 87
End: 2022-04-21

## 2022-04-26 ENCOUNTER — APPOINTMENT (OUTPATIENT)
Dept: NEPHROLOGY | Facility: CLINIC | Age: 87
End: 2022-04-26
Payer: MEDICARE

## 2022-04-26 ENCOUNTER — LABORATORY RESULT (OUTPATIENT)
Age: 87
End: 2022-04-26

## 2022-04-26 VITALS — HEART RATE: 70 BPM | SYSTOLIC BLOOD PRESSURE: 120 MMHG | DIASTOLIC BLOOD PRESSURE: 60 MMHG

## 2022-04-26 VITALS
OXYGEN SATURATION: 98 % | DIASTOLIC BLOOD PRESSURE: 64 MMHG | TEMPERATURE: 96.2 F | HEIGHT: 60 IN | HEART RATE: 69 BPM | SYSTOLIC BLOOD PRESSURE: 143 MMHG

## 2022-04-26 DIAGNOSIS — N39.0 URINARY TRACT INFECTION, SITE NOT SPECIFIED: ICD-10-CM

## 2022-04-26 LAB
25(OH)D3 SERPL-MCNC: 40.4 NG/ML
ALBUMIN SERPL ELPH-MCNC: 4.2 G/DL
ALP BLD-CCNC: 147 U/L
ALT SERPL-CCNC: 9 U/L
ANION GAP SERPL CALC-SCNC: 17 MMOL/L
APPEARANCE: ABNORMAL
APPEARANCE: ABNORMAL
AST SERPL-CCNC: 14 U/L
BACTERIA: NEGATIVE
BASOPHILS # BLD AUTO: 0.04 K/UL
BASOPHILS NFR BLD AUTO: 0.7 %
BILIRUB SERPL-MCNC: 0.2 MG/DL
BILIRUBIN URINE: NEGATIVE
BILIRUBIN URINE: NEGATIVE
BLOOD URINE: NEGATIVE
BLOOD URINE: NEGATIVE
BUN SERPL-MCNC: 86 MG/DL
CALCIUM SERPL-MCNC: 9.7 MG/DL
CHLORIDE SERPL-SCNC: 101 MMOL/L
CHOLEST SERPL-MCNC: 176 MG/DL
CO2 SERPL-SCNC: 22 MMOL/L
COLOR: YELLOW
COLOR: YELLOW
CREAT SERPL-MCNC: 2.88 MG/DL
CREAT SPEC-SCNC: 61 MG/DL
EGFR: 16 ML/MIN/1.73M2
EOSINOPHIL # BLD AUTO: 0.17 K/UL
EOSINOPHIL NFR BLD AUTO: 3.1 %
ESTIMATED AVERAGE GLUCOSE: 126 MG/DL
FOLATE SERPL-MCNC: 18.3 NG/ML
GLUCOSE QUALITATIVE U: NEGATIVE
GLUCOSE QUALITATIVE U: NEGATIVE
GLUCOSE SERPL-MCNC: 169 MG/DL
HBA1C MFR BLD HPLC: 6 %
HCT VFR BLD CALC: 25.7 %
HDLC SERPL-MCNC: 66 MG/DL
HGB BLD-MCNC: 8.1 G/DL
HYALINE CASTS: 1 /LPF
IMM GRANULOCYTES NFR BLD AUTO: 0.2 %
KETONES URINE: NEGATIVE
KETONES URINE: NEGATIVE
LDLC SERPL CALC-MCNC: 95 MG/DL
LEUKOCYTE ESTERASE URINE: ABNORMAL
LEUKOCYTE ESTERASE URINE: ABNORMAL
LYMPHOCYTES # BLD AUTO: 1.35 K/UL
LYMPHOCYTES NFR BLD AUTO: 24.8 %
MAGNESIUM SERPL-MCNC: 2.4 MG/DL
MAN DIFF?: NORMAL
MCHC RBC-ENTMCNC: 31.5 GM/DL
MCHC RBC-ENTMCNC: 33.5 PG
MCV RBC AUTO: 106.2 FL
MICROALBUMIN 24H UR DL<=1MG/L-MCNC: 4.7 MG/DL
MICROALBUMIN/CREAT 24H UR-RTO: 76 MG/G
MICROSCOPIC-UA: NORMAL
MONOCYTES # BLD AUTO: 0.66 K/UL
MONOCYTES NFR BLD AUTO: 12.1 %
NEUTROPHILS # BLD AUTO: 3.22 K/UL
NEUTROPHILS NFR BLD AUTO: 59.1 %
NITRITE URINE: NEGATIVE
NITRITE URINE: NEGATIVE
NONHDLC SERPL-MCNC: 110 MG/DL
NT-PROBNP SERPL-MCNC: 556 PG/ML
PH URINE: 6
PH URINE: 6
PHOSPHATE SERPL-MCNC: 5.1 MG/DL
PLATELET # BLD AUTO: 225 K/UL
POTASSIUM SERPL-SCNC: 4.9 MMOL/L
PROT SERPL-MCNC: 7.5 G/DL
PROTEIN URINE: NORMAL
PROTEIN URINE: NORMAL
RBC # BLD: 2.42 M/UL
RBC # FLD: 13.7 %
RED BLOOD CELLS URINE: 2 /HPF
SODIUM SERPL-SCNC: 139 MMOL/L
SPECIFIC GRAVITY URINE: 1.01
SPECIFIC GRAVITY URINE: 1.01
SQUAMOUS EPITHELIAL CELLS: 2 /HPF
TRIGL SERPL-MCNC: 75 MG/DL
TSH SERPL-ACNC: 1.47 UIU/ML
UROBILINOGEN URINE: NORMAL
UROBILINOGEN URINE: NORMAL
VIT B12 SERPL-MCNC: 361 PG/ML
WBC # FLD AUTO: 5.45 K/UL
WHITE BLOOD CELLS URINE: 395 /HPF

## 2022-04-26 PROCEDURE — 99214 OFFICE O/P EST MOD 30 MIN: CPT

## 2022-04-26 NOTE — ASSESSMENT
[FreeTextEntry1] : ANI CHESTER is a 86 year female with CKD 4, cardiorenal syndrome, HTN, DM. \par CKD likely a result of longstanding history of DM 2, HTN, complicated by cardiorenal syndrome. \par Renal sonogram reviewed from 4/13 : no hydronephrosis, multiple cysts, increased echogenicity indicative of medical renal disease\par CKD4: Creatinine trend reviewed with patient and daughter- Cr 2.8\par HTN: BP @ goal on current doses of Coreg , Furosemide, Nifedipine and Hydralazine. \par Volume status: Cont furosemide 80mg , fluid restriction 1 liter daily and low Na diet.  \par Metabolic Acidosis : at goal\par Anemia Management :  more anemic- check iron studies; B12 increase to 2000 mcg daily and folate ok \par Diabetes: A1C ordered - Importance of diabetes control stressed\par SGLT2 inhibitor was discussed but GFR prohibitive at this time\par CKD-MBD : phosphorus higher than goal- stop american cheese, PTH, Vit D levels ok\par Pt not taking Aspirin daily when meds reviewed- discuss with Dr. Fall. \par Weights daily - contact office for weight gain > 2-3 pounds overnight-may need extra diuretic\par Avoid NSAID use. \par UTI: will give keflex 250 BID for 7 days\par \par Repeat cbc, iron studies and renal panel- obtain prior auth for linda and recommend followup with Asim for administration of LINDA this or next week\par Will also send off kappa lambda light chains, ALLI\par Pt rather come here than go to hematologist\par Follow up 4 months.

## 2022-04-26 NOTE — HISTORY OF PRESENT ILLNESS
[FreeTextEntry1] : ANI CHESTER is a 86 year female with PMH of DM2, PVD, HTN, Afib, TIA, and asbestosis presenting for followup for CKD4. Pt arrives with her daughter who she lives with and is primary caregiver. \par \par Lasix 80mg with creatinine 2.8 recently\par On discharge pt was advised to maintain 1-2 liter fluid restriction and low Na, low K+ diet\par Daughter reports that patient's lower extremity edema still present.\par Appetite ok. \par Had labs done with PMD and reviewed with pt and daughter\par

## 2022-04-26 NOTE — REVIEW OF SYSTEMS
[Loss Of Hearing] : hearing loss [Lower Ext Edema] : lower extremity edema [SOB on Exertion] : shortness of breath during exertion [As Noted in HPI] : as noted in HPI [Chest Pain] : no chest pain [Dizziness] : no dizziness [Negative] : Integumentary [FreeTextEntry2] : sedentary [FreeTextEntry3] : Hx macular degeneration [FreeTextEntry4] : wears hearing aides [FreeTextEntry8] : depends for stress incontinence

## 2022-04-26 NOTE — PHYSICAL EXAM
[General Appearance - Alert] : alert [General Appearance - In No Acute Distress] : in no acute distress [Sclera] : the sclera and conjunctiva were normal [Outer Ear] : the ears and nose were normal in appearance [Neck Appearance] : the appearance of the neck was normal [Respiration, Rhythm And Depth] : normal respiratory rhythm and effort [Auscultation Breath Sounds / Voice Sounds] : lungs were clear to auscultation bilaterally [Murmurs] : no murmurs [Normal] : the heart rate was normal [Irregularly Irregular] : the rhythm was irregularly irregular [Pitting Edema] : pitting edema present [___ +] : bilateral [unfilled]+ pretibial pitting edema [Bowel Sounds] : normal bowel sounds [Abdomen Soft] : soft [No CVA Tenderness] : no ~M costovertebral angle tenderness [Involuntary Movements] : no involuntary movements were seen [] : no rash [No Focal Deficits] : no focal deficits [Oriented To Time, Place, And Person] : oriented to person, place, and time [FreeTextEntry1] : 2+ edema

## 2022-04-27 LAB
ALBUMIN SERPL ELPH-MCNC: 3.9 G/DL
ANION GAP SERPL CALC-SCNC: 13 MMOL/L
BASOPHILS # BLD AUTO: 0.04 K/UL
BASOPHILS NFR BLD AUTO: 0.7 %
BUN SERPL-MCNC: 68 MG/DL
CALCIUM SERPL-MCNC: 9.4 MG/DL
CHLORIDE SERPL-SCNC: 103 MMOL/L
CO2 SERPL-SCNC: 22 MMOL/L
CREAT SERPL-MCNC: 2.54 MG/DL
EGFR: 18 ML/MIN/1.73M2
EOSINOPHIL # BLD AUTO: 0.16 K/UL
EOSINOPHIL NFR BLD AUTO: 3 %
FERRITIN SERPL-MCNC: 114 NG/ML
GLUCOSE SERPL-MCNC: 162 MG/DL
HCT VFR BLD CALC: 25 %
HGB BLD-MCNC: 7.8 G/DL
IMM GRANULOCYTES NFR BLD AUTO: 0.4 %
IRON SATN MFR SERPL: 58 %
IRON SERPL-MCNC: 182 UG/DL
LYMPHOCYTES # BLD AUTO: 1.32 K/UL
LYMPHOCYTES NFR BLD AUTO: 24.5 %
MAN DIFF?: NORMAL
MCHC RBC-ENTMCNC: 31.2 GM/DL
MCHC RBC-ENTMCNC: 33.1 PG
MCV RBC AUTO: 105.9 FL
MONOCYTES # BLD AUTO: 0.59 K/UL
MONOCYTES NFR BLD AUTO: 10.9 %
NEUTROPHILS # BLD AUTO: 3.26 K/UL
NEUTROPHILS NFR BLD AUTO: 60.5 %
PHOSPHATE SERPL-MCNC: 3.6 MG/DL
PLATELET # BLD AUTO: 217 K/UL
POTASSIUM SERPL-SCNC: 4.1 MMOL/L
RBC # BLD: 2.36 M/UL
RBC # FLD: 13.8 %
SODIUM SERPL-SCNC: 139 MMOL/L
TIBC SERPL-MCNC: 312 UG/DL
UIBC SERPL-MCNC: 130 UG/DL
WBC # FLD AUTO: 5.39 K/UL

## 2022-04-28 LAB
DEPRECATED KAPPA LC FREE/LAMBDA SER: 1.64 RATIO
KAPPA LC CSF-MCNC: 7.86 MG/DL
KAPPA LC SERPL-MCNC: 12.88 MG/DL
M PROTEIN SPEC IFE-MCNC: NORMAL

## 2022-04-29 ENCOUNTER — APPOINTMENT (OUTPATIENT)
Dept: NEPHROLOGY | Facility: CLINIC | Age: 87
End: 2022-04-29
Payer: MEDICARE

## 2022-04-29 PROCEDURE — 96372 THER/PROPH/DIAG INJ SC/IM: CPT

## 2022-04-29 RX ORDER — DARBEPOETIN ALFA 100 UG/ML
100 SOLUTION INTRAVENOUS; SUBCUTANEOUS
Refills: 0 | Status: COMPLETED | OUTPATIENT
Start: 2022-04-29

## 2022-04-29 RX ADMIN — DARBEPOETIN ALFA 1 MCG/ML: 100 SOLUTION INTRAVENOUS; SUBCUTANEOUS at 00:00

## 2022-04-29 NOTE — PROCEDURE
[FreeTextEntry1] : 4/26/22 : Hgb/Hct    7.8 / 25.0\par BP acceptable\par Aranesp 100 mcg SQ x 1\par Pt tolerated procedure well\par Labs in 2 weeks by [facility]\par \par Patient advised to stop Iron supplements due to high serum iron level and will recheck with next labs\par \par \par

## 2022-05-12 ENCOUNTER — APPOINTMENT (OUTPATIENT)
Dept: CARDIOLOGY | Facility: CLINIC | Age: 87
End: 2022-05-12
Payer: MEDICARE

## 2022-05-12 VITALS
BODY MASS INDEX: 31.22 KG/M2 | TEMPERATURE: 97.8 F | SYSTOLIC BLOOD PRESSURE: 146 MMHG | DIASTOLIC BLOOD PRESSURE: 66 MMHG | OXYGEN SATURATION: 97 % | RESPIRATION RATE: 14 BRPM | WEIGHT: 159 LBS | HEIGHT: 60 IN | DIASTOLIC BLOOD PRESSURE: 60 MMHG | HEART RATE: 73 BPM | SYSTOLIC BLOOD PRESSURE: 128 MMHG

## 2022-05-12 PROCEDURE — 93000 ELECTROCARDIOGRAM COMPLETE: CPT

## 2022-05-12 PROCEDURE — 99214 OFFICE O/P EST MOD 30 MIN: CPT

## 2022-05-13 NOTE — DISCUSSION/SUMMARY
[FreeTextEntry1] : The patient  is an 86-year-old female COPD, htn, PAF, CKD, HFpEF whose dyspnea is at baseline. \par #1 HFpEF- continue lasix 80mg daily, continue nifedipine, coreg and hydralazine, BP at goal\par #2 PAF- continue eliquis and coreg\par #3 CKD- f/u Dr. Galarza, continue lokelma\par #4 COPD- at baseline, f/u Dr. Spencer, encouraged to use oxygen at night\par

## 2022-05-13 NOTE — HISTORY OF PRESENT ILLNESS
[FreeTextEntry1] : Ryanne continues to get SOB on exertion. No resting dyspnea, palpitations or dizziness. F/u with Johanna Vanesas and Geovanni.

## 2022-06-13 ENCOUNTER — APPOINTMENT (OUTPATIENT)
Dept: PULMONOLOGY | Facility: CLINIC | Age: 87
End: 2022-06-13
Payer: MEDICARE

## 2022-06-13 VITALS
HEIGHT: 60 IN | HEART RATE: 72 BPM | BODY MASS INDEX: 29.06 KG/M2 | WEIGHT: 148 LBS | OXYGEN SATURATION: 97 % | SYSTOLIC BLOOD PRESSURE: 129 MMHG | DIASTOLIC BLOOD PRESSURE: 69 MMHG

## 2022-06-13 PROCEDURE — 99214 OFFICE O/P EST MOD 30 MIN: CPT

## 2022-06-13 RX ORDER — FLUTICASONE PROPIONATE 50 UG/1
50 SPRAY, METERED NASAL
Qty: 48 | Refills: 0 | Status: ACTIVE | COMMUNITY
Start: 2022-06-06

## 2022-06-13 RX ORDER — OFLOXACIN OTIC 3 MG/ML
0.3 SOLUTION AURICULAR (OTIC)
Qty: 5 | Refills: 0 | Status: ACTIVE | COMMUNITY
Start: 2022-06-06

## 2022-06-13 NOTE — HISTORY OF PRESENT ILLNESS
[Never] : never [TextBox_4] : Patient is an 86-year-old female past medical history significant for atrial fibrillation, COPD on home oxygen, diabetes mellitus, hypertension who presents today for follow-up.  The patient complains of occasional cough shortness of breath and dyspnea on exertion.  She currently denies fevers chills chest pain weight loss or hemoptysis

## 2022-07-06 ENCOUNTER — RX RENEWAL (OUTPATIENT)
Age: 87
End: 2022-07-06

## 2022-07-06 RX ORDER — UMECLIDINIUM BROMIDE AND VILANTEROL TRIFENATATE 62.5; 25 UG/1; UG/1
62.5-25 POWDER RESPIRATORY (INHALATION) DAILY
Qty: 60 | Refills: 3 | Status: ACTIVE | COMMUNITY
Start: 2020-11-02 | End: 1900-01-01

## 2022-07-18 LAB
25(OH)D3 SERPL-MCNC: 37.7 NG/ML
ALBUMIN SERPL ELPH-MCNC: 4.1 G/DL
ANION GAP SERPL CALC-SCNC: 16 MMOL/L
BASOPHILS # BLD AUTO: 0.03 K/UL
BASOPHILS NFR BLD AUTO: 0.5 %
BUN SERPL-MCNC: 79 MG/DL
CALCIUM SERPL-MCNC: 9.3 MG/DL
CALCIUM SERPL-MCNC: 9.3 MG/DL
CHLORIDE SERPL-SCNC: 101 MMOL/L
CO2 SERPL-SCNC: 21 MMOL/L
CREAT SERPL-MCNC: 3.41 MG/DL
CREAT SPEC-SCNC: 87 MG/DL
EGFR: 13 ML/MIN/1.73M2
EOSINOPHIL # BLD AUTO: 0.31 K/UL
EOSINOPHIL NFR BLD AUTO: 5.5 %
FERRITIN SERPL-MCNC: 47 NG/ML
FOLATE SERPL-MCNC: 18.4 NG/ML
GLUCOSE SERPL-MCNC: 170 MG/DL
HCT VFR BLD CALC: 34.1 %
HGB BLD-MCNC: 10.9 G/DL
IMM GRANULOCYTES NFR BLD AUTO: 0.2 %
IRON SATN MFR SERPL: 30 %
IRON SERPL-MCNC: 106 UG/DL
LYMPHOCYTES # BLD AUTO: 1.45 K/UL
LYMPHOCYTES NFR BLD AUTO: 25.9 %
MAN DIFF?: NORMAL
MCHC RBC-ENTMCNC: 31.5 PG
MCHC RBC-ENTMCNC: 32 GM/DL
MCV RBC AUTO: 98.6 FL
MICROALBUMIN 24H UR DL<=1MG/L-MCNC: 4.2 MG/DL
MICROALBUMIN/CREAT 24H UR-RTO: 49 MG/G
MONOCYTES # BLD AUTO: 0.84 K/UL
MONOCYTES NFR BLD AUTO: 15 %
NEUTROPHILS # BLD AUTO: 2.96 K/UL
NEUTROPHILS NFR BLD AUTO: 52.9 %
PARATHYROID HORMONE INTACT: 250 PG/ML
PHOSPHATE SERPL-MCNC: 4.4 MG/DL
PLATELET # BLD AUTO: 201 K/UL
POTASSIUM SERPL-SCNC: 4.4 MMOL/L
RBC # BLD: 3.46 M/UL
RBC # FLD: 13.3 %
SODIUM SERPL-SCNC: 138 MMOL/L
TIBC SERPL-MCNC: 348 UG/DL
UIBC SERPL-MCNC: 242 UG/DL
URATE SERPL-MCNC: 8.8 MG/DL
VIT B12 SERPL-MCNC: 380 PG/ML
WBC # FLD AUTO: 5.6 K/UL

## 2022-07-19 ENCOUNTER — APPOINTMENT (OUTPATIENT)
Dept: NEPHROLOGY | Facility: CLINIC | Age: 87
End: 2022-07-19

## 2022-07-19 ENCOUNTER — LABORATORY RESULT (OUTPATIENT)
Age: 87
End: 2022-07-19

## 2022-07-19 VITALS
BODY MASS INDEX: 30.17 KG/M2 | OXYGEN SATURATION: 97 % | HEIGHT: 60 IN | DIASTOLIC BLOOD PRESSURE: 67 MMHG | HEART RATE: 73 BPM | TEMPERATURE: 97.8 F | WEIGHT: 153.66 LBS | SYSTOLIC BLOOD PRESSURE: 153 MMHG

## 2022-07-19 VITALS — DIASTOLIC BLOOD PRESSURE: 60 MMHG | SYSTOLIC BLOOD PRESSURE: 146 MMHG

## 2022-07-19 PROCEDURE — 99214 OFFICE O/P EST MOD 30 MIN: CPT

## 2022-07-19 RX ORDER — CETIRIZINE HYDROCHLORIDE 10 MG/1
10 TABLET, COATED ORAL
Qty: 30 | Refills: 0 | Status: DISCONTINUED | COMMUNITY
Start: 2022-06-06 | End: 2022-07-19

## 2022-07-19 RX ORDER — CEPHALEXIN 250 MG/1
250 TABLET ORAL TWICE DAILY
Qty: 14 | Refills: 1 | Status: DISCONTINUED | COMMUNITY
Start: 2022-04-26 | End: 2022-07-19

## 2022-07-19 RX ORDER — HYDRALAZINE HYDROCHLORIDE 100 MG/1
100 TABLET ORAL
Qty: 270 | Refills: 0 | Status: DISCONTINUED | COMMUNITY
Start: 2021-11-15 | End: 2022-07-19

## 2022-07-19 NOTE — ASSESSMENT
[FreeTextEntry1] : ANI CHESTER is a 86 year female with a history CKD4, cardiorenal syndrome, HTN, and long standing history of DM2. \par CKD 4:Creatinine trend reviewed with patient and daughter, creatinine elevation noted. HF symptoms stable on Furosemide 80mg once daily. Will tolerate higher creatinine for stability of symptoms. \par HTN: BP acceptable. Continue Nifedepine, Coreg and furosemide. \par Volume status: Restart fluid restriction 1 liter daily. Will repeat labs in one month via [facility]. \par Proteinuria : minimal \par Metabolic Acidosis : Continue sodium bicarb 2 tabs daily\par Anemia Management :  Hgb much improved. No BENNY needed today. Will recheck labs in one month\par Diabetes: A1C ordered in one month\par MBD: iPTH slightly elevated on last labs. Phos and Vit D levels normal. Will continue to monitor. \par In order to slow progression, the patient was educated on the importance of good blood pressure, diabetes control and to avoid NSAIDs. \par Lifestyle modifications encouraged including low Na diet and moderate protein intake with less red meat.\par Labs in one month-will call daughter to see if EPO is needed based on labs.\par Follow up with Dr. Galarza in 3 months.

## 2022-07-19 NOTE — HISTORY OF PRESENT ILLNESS
[FreeTextEntry1] : ANI CHESTER is a 86 year female with a history DM2, PVD, HTN, PAF, TIA, asbestosis, HFpEF here for evaluation of CKD4. Pt lives in a 2 family house with her daughter. \par Since last visit patient was seen by Dr. Fall and Dr. Spencer. Stable at those times.\par Daughter reports they have been less diligent about fluid restriction and pt has been drinking multiple cups of chamomile tea as well as homemade Low Na soups.\par Patient is mostly sedentary at home. \par Labs done last Sat 7/16.

## 2022-07-19 NOTE — PHYSICAL EXAM
[General Appearance - Alert] : alert [General Appearance - In No Acute Distress] : in no acute distress [Sclera] : the sclera and conjunctiva were normal [Outer Ear] : the ears and nose were normal in appearance [Neck Appearance] : the appearance of the neck was normal [Respiration, Rhythm And Depth] : normal respiratory rhythm and effort [Auscultation Breath Sounds / Voice Sounds] : lungs were clear to auscultation bilaterally [Heart Rate And Rhythm] : heart rate was normal and rhythm regular [Heart Sounds] : normal S1 and S2 [Murmurs] : no murmurs [___ +] : bilateral [unfilled]+ pitting edema to the knees [Bowel Sounds] : normal bowel sounds [Abdomen Soft] : soft [Stooped] : stooped [] : no rash [No Focal Deficits] : no focal deficits [Oriented To Time, Place, And Person] : oriented to person, place, and time [FreeTextEntry1] : c

## 2022-07-19 NOTE — REVIEW OF SYSTEMS
[Feeling Tired] : feeling tired [Lower Ext Edema] : lower extremity edema [SOB on Exertion] : shortness of breath during exertion [Negative] : Heme/Lymph [Chest Pain] : no chest pain [FreeTextEntry3] : follows with ophthalmologist\par  [FreeTextEntry6] : THOMPSON at baseline, needs frequent rest when ambulating. Uses oxygen at night only.  [FreeTextEntry7] : appetite good [de-identified] : Hx DM2

## 2022-07-25 DIAGNOSIS — N39.0 URINARY TRACT INFECTION, SITE NOT SPECIFIED: ICD-10-CM

## 2022-07-25 LAB
APPEARANCE: ABNORMAL
BILIRUBIN URINE: NEGATIVE
BLOOD URINE: NEGATIVE
COLOR: YELLOW
GLUCOSE QUALITATIVE U: NEGATIVE
KETONES URINE: NEGATIVE
LEUKOCYTE ESTERASE URINE: ABNORMAL
NITRITE URINE: NEGATIVE
PH URINE: 7
PROTEIN URINE: ABNORMAL
SPECIFIC GRAVITY URINE: 1.02
UROBILINOGEN URINE: NORMAL

## 2022-08-16 ENCOUNTER — LABORATORY RESULT (OUTPATIENT)
Age: 87
End: 2022-08-16

## 2022-08-17 ENCOUNTER — NON-APPOINTMENT (OUTPATIENT)
Age: 87
End: 2022-08-17

## 2022-08-18 ENCOUNTER — LABORATORY RESULT (OUTPATIENT)
Age: 87
End: 2022-08-18

## 2022-08-18 ENCOUNTER — NON-APPOINTMENT (OUTPATIENT)
Age: 87
End: 2022-08-18

## 2022-08-18 ENCOUNTER — APPOINTMENT (OUTPATIENT)
Dept: CARDIOLOGY | Facility: CLINIC | Age: 87
End: 2022-08-18

## 2022-08-18 VITALS
OXYGEN SATURATION: 97 % | HEART RATE: 76 BPM | WEIGHT: 156 LBS | BODY MASS INDEX: 30.63 KG/M2 | HEIGHT: 60 IN | SYSTOLIC BLOOD PRESSURE: 130 MMHG | DIASTOLIC BLOOD PRESSURE: 64 MMHG | RESPIRATION RATE: 12 BRPM

## 2022-08-18 PROCEDURE — 99214 OFFICE O/P EST MOD 30 MIN: CPT | Mod: 25

## 2022-08-18 PROCEDURE — 93000 ELECTROCARDIOGRAM COMPLETE: CPT

## 2022-08-18 NOTE — PHYSICAL EXAM
UA Did not look good, and was not reflex to culture. Needs follow up prior to surgery. Pt having surgery tomorrow 8/24. [Well Developed] : well developed [Well Nourished] : well nourished [No Acute Distress] : no acute distress [Normal Conjunctiva] : normal conjunctiva [Normal Venous Pressure] : normal venous pressure [No Carotid Bruit] : no carotid bruit [Normal S1, S2] : normal S1, S2 [No Murmur] : no murmur [No Rub] : no rub [No Gallop] : no gallop [Clear Lung Fields] : clear lung fields [Good Air Entry] : good air entry [No Respiratory Distress] : no respiratory distress  [Soft] : abdomen soft [Non Tender] : non-tender [No Masses/organomegaly] : no masses/organomegaly [Normal Bowel Sounds] : normal bowel sounds [Normal Gait] : normal gait [No Edema] : no edema [No Cyanosis] : no cyanosis [No Clubbing] : no clubbing [No Varicosities] : no varicosities [No Rash] : no rash [No Skin Lesions] : no skin lesions [Moves all extremities] : moves all extremities [No Focal Deficits] : no focal deficits [Normal Speech] : normal speech [Alert and Oriented] : alert and oriented [Normal memory] : normal memory

## 2022-08-19 ENCOUNTER — LABORATORY RESULT (OUTPATIENT)
Age: 87
End: 2022-08-19

## 2022-08-19 LAB
APPEARANCE: CLEAR
BILIRUBIN URINE: NEGATIVE
BLOOD URINE: NEGATIVE
COLOR: NORMAL
GLUCOSE QUALITATIVE U: NEGATIVE
KETONES URINE: NEGATIVE
LEUKOCYTE ESTERASE URINE: ABNORMAL
NITRITE URINE: POSITIVE
PH URINE: 7
PROTEIN URINE: NORMAL
SPECIFIC GRAVITY URINE: 1.01
UROBILINOGEN URINE: NORMAL

## 2022-08-19 NOTE — DISCUSSION/SUMMARY
[FreeTextEntry1] : The patient  is an 86-year-old female COPD, HTN, PAF, CKD, HFpEF whose dyspnea is at baseline. \par #1 HFpEF- continue lasix 80mg daily, continue nifedipine, coreg and hydralazine, BP at goal\par #2 PAF- continue eliquis and coreg\par #3 CKD- f/u Dr. Galarza, continue lokelma, NaHCO3, urine sent for urologist.\par #4 COPD- at baseline, f/u Dr. Spencer, encouraged to use oxygen at night\par  [EKG obtained to assist in diagnosis and management of assessed problem(s)] : EKG obtained to assist in diagnosis and management of assessed problem(s)

## 2022-08-19 NOTE — HISTORY OF PRESENT ILLNESS
[FreeTextEntry1] : Ryanne went to urologist the other day and needs urine. She denies any CP, palpitations or SOB.

## 2022-08-26 ENCOUNTER — NON-APPOINTMENT (OUTPATIENT)
Age: 87
End: 2022-08-26

## 2022-09-14 ENCOUNTER — APPOINTMENT (OUTPATIENT)
Dept: PULMONOLOGY | Facility: CLINIC | Age: 87
End: 2022-09-14

## 2022-09-14 VITALS
BODY MASS INDEX: 29.84 KG/M2 | TEMPERATURE: 98.1 F | WEIGHT: 152 LBS | HEIGHT: 60 IN | DIASTOLIC BLOOD PRESSURE: 72 MMHG | SYSTOLIC BLOOD PRESSURE: 173 MMHG | OXYGEN SATURATION: 98 % | HEART RATE: 72 BPM | RESPIRATION RATE: 12 BRPM

## 2022-09-14 PROCEDURE — 99214 OFFICE O/P EST MOD 30 MIN: CPT

## 2022-09-14 NOTE — ASSESSMENT
[FreeTextEntry1] : In summary the patient is an 87-year-old obese female past medical history significant for hypertension, diabetes, high cholesterol, COPD who presents for follow-up.  Physical exam is significant for good air entry.  Prescription renewal performed and the patient is instructed to follow-up in 3 months\par I have instructed the patient to continue using her home oxygen for minimum of 4 hours/day

## 2022-09-14 NOTE — HISTORY OF PRESENT ILLNESS
[TextBox_4] : Patient is an 87-year-old female past medical history significant for atrial fibrillation anemia COPD diabetes who presents today for follow-up.  The patient complains of occasional cough shortness of breath and dyspnea on exertion despite the use of her medications.  She currently denies fevers chills chest pain weight loss or hemoptysis

## 2022-10-04 ENCOUNTER — APPOINTMENT (OUTPATIENT)
Dept: NEPHROLOGY | Facility: CLINIC | Age: 87
End: 2022-10-04

## 2022-10-04 VITALS
WEIGHT: 154.32 LBS | HEART RATE: 63 BPM | OXYGEN SATURATION: 99 % | HEIGHT: 60 IN | SYSTOLIC BLOOD PRESSURE: 124 MMHG | TEMPERATURE: 97.5 F | DIASTOLIC BLOOD PRESSURE: 70 MMHG | BODY MASS INDEX: 30.3 KG/M2

## 2022-10-04 DIAGNOSIS — E87.5 HYPERKALEMIA: ICD-10-CM

## 2022-10-04 PROCEDURE — 99214 OFFICE O/P EST MOD 30 MIN: CPT

## 2022-10-04 RX ORDER — AMOXICILLIN AND CLAVULANATE POTASSIUM 250; 125 MG/1; MG/1
250-125 TABLET, FILM COATED ORAL TWICE DAILY
Qty: 14 | Refills: 0 | Status: DISCONTINUED | COMMUNITY
Start: 2022-07-25 | End: 2022-10-04

## 2022-10-05 NOTE — HISTORY OF PRESENT ILLNESS
[FreeTextEntry1] : ANI CHESTER is a 87 year female with a history DM2, PVD, HTN, PAF, TIA, asbestosis, HFpEF here for evaluation of CKD4. Pt lives in a 2 family house with her daughter. \par Was recently given Flonase and allergy pills ? claritin for post nasal drip with good relief.\par Uses home oxygen at night followed by pulmonary\par Edema today at baseline. \par Seeing Dr. Pavon in 2 weeks for evaluation of frequent UTIs

## 2022-10-05 NOTE — ASSESSMENT
[FreeTextEntry1] : ANI CHESTER is a 87 year female with a history CKD4, cardiorenal syndrome, HTN, and long standing history of DM2. \par CKD 4:Creatinine trend reviewed with patient and daughter. Last creatinine improved with UTI treatment. HF symptoms stable on Furosemide 80mg once daily. Will tolerate higher creatinine for stability of symptoms. \par HTN: BP acceptable. Continue Nifedepine, Coreg and furosemide. \par Volume status: Continue fluid restriction 1 liter daily. Will check labs in 2 weeks at PMD appt. \par Proteinuria : minimal \par Metabolic Acidosis : Continue sodium bicarb 2 tabs daily\par Anemia Management :  will check with next labs. \par Diabetes: last A1C 6.7 . Acceptable. \par UTI: agree with urology evaluation for frequent UTIs\par MBD: iPTH slightly elevated on last labs. Phos and Vit D levels normal. Will continue to monitor. \par In order to slow progression, the patient was educated on the importance of good blood pressure, diabetes control and to avoid NSAIDs. \par Lifestyle modifications encouraged including low Na diet and moderate protein intake with less red meat.\par Labs in 4 months and visit with Asim HERNANDEZ (TEB ok if weather is an issue).\par

## 2022-10-05 NOTE — PHYSICAL EXAM
[General Appearance - Alert] : alert [General Appearance - In No Acute Distress] : in no acute distress [Sclera] : the sclera and conjunctiva were normal [Outer Ear] : the ears and nose were normal in appearance [Neck Appearance] : the appearance of the neck was normal [Respiration, Rhythm And Depth] : normal respiratory rhythm and effort [Auscultation Breath Sounds / Voice Sounds] : lungs were clear to auscultation bilaterally [Heart Rate And Rhythm] : heart rate was normal and rhythm regular [Heart Sounds] : normal S1 and S2 [Murmurs] : no murmurs [___ +] : bilateral [unfilled]+ pitting edema to the knees [Bowel Sounds] : normal bowel sounds [Abdomen Soft] : soft [Stooped] : stooped [] : no rash [No Focal Deficits] : no focal deficits [Oriented To Time, Place, And Person] : oriented to person, place, and time [FreeTextEntry1] : uses walker

## 2022-10-05 NOTE — REVIEW OF SYSTEMS
[Lower Ext Edema] : lower extremity edema [SOB on Exertion] : shortness of breath during exertion [Negative] : Heme/Lymph [Chest Pain] : no chest pain [FreeTextEntry3] : follows with ophthalmologist\par  [FreeTextEntry5] : B/L LE edema at baseline [FreeTextEntry6] : THOMPSON at baseline, needs frequent rest when ambulating. Uses oxygen at night only.  [FreeTextEntry7] : appetite good [de-identified] : Hx DM2

## 2022-10-11 ENCOUNTER — RX CHANGE (OUTPATIENT)
Age: 87
End: 2022-10-11

## 2022-10-11 RX ORDER — MONTELUKAST 10 MG/1
10 TABLET, FILM COATED ORAL
Qty: 30 | Refills: 3 | Status: DISCONTINUED | COMMUNITY
Start: 2022-09-14 | End: 2022-10-11

## 2022-10-19 ENCOUNTER — APPOINTMENT (OUTPATIENT)
Dept: INTERNAL MEDICINE | Facility: CLINIC | Age: 87
End: 2022-10-19

## 2022-10-19 VITALS
TEMPERATURE: 98 F | OXYGEN SATURATION: 97 % | DIASTOLIC BLOOD PRESSURE: 65 MMHG | HEIGHT: 57.09 IN | HEART RATE: 62 BPM | SYSTOLIC BLOOD PRESSURE: 151 MMHG | WEIGHT: 153.86 LBS | BODY MASS INDEX: 33.19 KG/M2

## 2022-10-19 VITALS — DIASTOLIC BLOOD PRESSURE: 58 MMHG | SYSTOLIC BLOOD PRESSURE: 138 MMHG

## 2022-10-19 DIAGNOSIS — Z23 ENCOUNTER FOR IMMUNIZATION: ICD-10-CM

## 2022-10-19 DIAGNOSIS — Z00.00 ENCOUNTER FOR GENERAL ADULT MEDICAL EXAMINATION W/OUT ABNORMAL FINDINGS: ICD-10-CM

## 2022-10-19 DIAGNOSIS — L85.3 XEROSIS CUTIS: ICD-10-CM

## 2022-10-19 PROCEDURE — G0439: CPT

## 2022-10-19 PROCEDURE — G0444 DEPRESSION SCREEN ANNUAL: CPT | Mod: 59

## 2022-10-19 PROCEDURE — 36415 COLL VENOUS BLD VENIPUNCTURE: CPT

## 2022-10-19 PROCEDURE — G0008: CPT

## 2022-10-19 PROCEDURE — 99214 OFFICE O/P EST MOD 30 MIN: CPT | Mod: 25

## 2022-10-19 PROCEDURE — 90662 IIV NO PRSV INCREASED AG IM: CPT

## 2022-10-19 RX ORDER — HYDROCORTISONE 1 %
12 CREAM (GRAM) TOPICAL TWICE DAILY
Qty: 1 | Refills: 3 | Status: ACTIVE | COMMUNITY
Start: 2022-10-19 | End: 1900-01-01

## 2022-10-19 RX ORDER — LANCETS 33 GAUGE
EACH MISCELLANEOUS
Qty: 3 | Refills: 0 | Status: ACTIVE | COMMUNITY
Start: 2022-10-19 | End: 1900-01-01

## 2022-10-19 NOTE — HEALTH RISK ASSESSMENT
[Good] : ~his/her~  mood as  good [Never] : Never [No] : In the past 12 months have you used drugs other than those required for medical reasons? No [No falls in past year] : Patient reported no falls in the past year [0] : 2) Feeling down, depressed, or hopeless: Not at all (0) [PHQ-2 Negative - No further assessment needed] : PHQ-2 Negative - No further assessment needed Detail Level: Detailed [None] : None [With Family] : lives with family [Some assistance needed] : feeding [Full assistance needed] : managing finances [Reports changes in hearing] : Reports changes in hearing [Reports changes in vision] : Reports changes in vision [Reports normal functional visual acuity (ie: able to read med bottle)] : Reports normal functional visual acuity [FreeTextEntry1] : Health Maintenance [[id number]] : 0 [Change in mental status noted] : No change in mental status noted [Language] : denies difficulty with language [Learning/Retaining New Information] : denies difficulty learning/retaining new information [Handling Complex Tasks] : denies difficulty handling complex tasks [Reasoning] : denies difficulty with reasoning [Spatial Ability and Orientation] : denies difficulty with spatial ability and orientation [de-identified] : follows with opthalmology [de-identified] : follows for hearing test [Patient/Caregiver unclear of wishes] : , patient/caregiver unclear of wishes [AdvancecareDate] : 10/22

## 2022-10-19 NOTE — REASON FOR VISIT
[Annual Wellness Visit] : an annual wellness visit [FreeTextEntry1] : Subsequent Medicare Annual Wellness Visit

## 2022-10-19 NOTE — PHYSICAL EXAM
[No Acute Distress] : no acute distress [Well Nourished] : well nourished [Well Developed] : well developed [Well-Appearing] : well-appearing [Normal Sclera/Conjunctiva] : normal sclera/conjunctiva [PERRL] : pupils equal round and reactive to light [EOMI] : extraocular movements intact [Normal Oropharynx] : the oropharynx was normal [No JVD] : no jugular venous distention [No Lymphadenopathy] : no lymphadenopathy [Supple] : supple [Thyroid Normal, No Nodules] : the thyroid was normal and there were no nodules present [No Respiratory Distress] : no respiratory distress  [No Accessory Muscle Use] : no accessory muscle use [Clear to Auscultation] : lungs were clear to auscultation bilaterally [Normal Rate] : normal rate  [Regular Rhythm] : with a regular rhythm [Normal S1, S2] : normal S1 and S2 [No Murmur] : no murmur heard [No Carotid Bruits] : no carotid bruits [No Abdominal Bruit] : a ~M bruit was not heard ~T in the abdomen [No Varicosities] : no varicosities [Pedal Pulses Present] : the pedal pulses are present [No Edema] : there was no peripheral edema [No Palpable Aorta] : no palpable aorta [No Extremity Clubbing/Cyanosis] : no extremity clubbing/cyanosis [Soft] : abdomen soft [Non Tender] : non-tender [Non-distended] : non-distended [No HSM] : no HSM [Normal Bowel Sounds] : normal bowel sounds [Normal Posterior Cervical Nodes] : no posterior cervical lymphadenopathy [Normal Anterior Cervical Nodes] : no anterior cervical lymphadenopathy [No CVA Tenderness] : no CVA  tenderness [No Spinal Tenderness] : no spinal tenderness [No Joint Swelling] : no joint swelling [Grossly Normal Strength/Tone] : grossly normal strength/tone [No Rash] : no rash [Coordination Grossly Intact] : coordination grossly intact [No Focal Deficits] : no focal deficits [Normal Gait] : normal gait [Deep Tendon Reflexes (DTR)] : deep tendon reflexes were 2+ and symmetric [Normal Affect] : the affect was normal [Normal Insight/Judgement] : insight and judgment were intact [Normal Appearance] : normal in appearance [No Masses] : no palpable masses [No Nipple Discharge] : no nipple discharge [No Axillary Lymphadenopathy] : no axillary lymphadenopathy [de-identified] : Cerumen impaction. [de-identified] : Dry skin

## 2022-10-19 NOTE — HISTORY OF PRESENT ILLNESS
[FreeTextEntry1] : Patient presents for annual wellness visit. [de-identified] : Patient will be seeing urologist in 2 days, for recurrent UTI. Denies any dysuria or increased urination.\par Breathing has been stable. Pulmonologist advised to use Oxygen at night.\par Brings up phlegm sometimes, was given inhaler with alleviation of the symptoms.\par Weight has been stable.  Denies any changes in appetite.\par Compliant with Glipizide. Has not had any hypoglycemia and Taking Allegra for allergies\par Checks BP at home usually in the morning. 129/69\par Denies any falls at home.\par \par Daughter notes of Pt dry skin particularly on the back. Has tried OTC moisturizing cream with no alleviation.\par Has appointment with ENT in Dec for cerumen impaction\par Interested in flu shot

## 2022-10-19 NOTE — ASSESSMENT
[FreeTextEntry1] : Annual Wellness Visit\par -Blood work done today\par -BP is stable. Continue to check BP at home.\par -Check A1c, lipid panel and Vitamin levels.\par -Breathing has been stable. Continue to f/u with Pulmonology given pulmonary nodule.\par -Pt has f/u with Urology in 2 days for Recurring UTI.\par -Has f.u with ENT for cerumen impaction.\par -Ammonium lactate sent to pharmacy for dry skin.\par -Continue with healthy diet for T2DM.\par -RTO in 6 months.

## 2022-10-19 NOTE — ADDENDUM
[FreeTextEntry1] : I, Isaiah Gallego, acted as a scribe on behalf of Dr. Terence Briggs MD, on 10/19/2022. \par \par All medical entries made by the scribe were at my, Dr. Terence Briggs MD, direction and personally dictated by me on 10/19/2022. I have reviewed the chart and agree that the record accurately reflects my personal performance of the history, physical exam, assessment and plan. I have also personally directed, reviewed, and agreed with the chart.

## 2022-10-21 ENCOUNTER — APPOINTMENT (OUTPATIENT)
Dept: UROLOGY | Facility: CLINIC | Age: 87
End: 2022-10-21

## 2022-10-21 VITALS
SYSTOLIC BLOOD PRESSURE: 150 MMHG | DIASTOLIC BLOOD PRESSURE: 77 MMHG | RESPIRATION RATE: 14 BRPM | TEMPERATURE: 97.5 F | WEIGHT: 153 LBS | HEIGHT: 57 IN | HEART RATE: 76 BPM | OXYGEN SATURATION: 97 % | BODY MASS INDEX: 33.01 KG/M2

## 2022-10-21 PROCEDURE — 99204 OFFICE O/P NEW MOD 45 MIN: CPT

## 2022-10-21 NOTE — REVIEW OF SYSTEMS
[Eyesight Problems] : eyesight problems [Dry Eyes] : dryness of the eyes [Shortness Of Breath] : shortness of breath [Joint Pain] : joint pain [Itching] : itching [Difficulty Walking] : difficulty walking [Muscle Weakness] : muscle weakness [Negative] : Heme/Lymph

## 2022-10-24 LAB
25(OH)D3 SERPL-MCNC: 49.2 NG/ML
ALBUMIN SERPL ELPH-MCNC: 4.2 G/DL
ALP BLD-CCNC: 198 U/L
ALT SERPL-CCNC: 10 U/L
ANION GAP SERPL CALC-SCNC: 15 MMOL/L
APPEARANCE: ABNORMAL
AST SERPL-CCNC: 19 U/L
BACTERIA: NEGATIVE
BASOPHILS # BLD AUTO: 0.04 K/UL
BASOPHILS NFR BLD AUTO: 0.7 %
BILIRUB SERPL-MCNC: 0.2 MG/DL
BILIRUBIN URINE: NEGATIVE
BLOOD URINE: NEGATIVE
BUN SERPL-MCNC: 71 MG/DL
CALCIUM SERPL-MCNC: 9.4 MG/DL
CALCIUM SERPL-MCNC: 9.4 MG/DL
CHLORIDE SERPL-SCNC: 103 MMOL/L
CHOLEST SERPL-MCNC: 215 MG/DL
CO2 SERPL-SCNC: 21 MMOL/L
COLOR: NORMAL
CREAT SERPL-MCNC: 2.69 MG/DL
EGFR: 17 ML/MIN/1.73M2
EOSINOPHIL # BLD AUTO: 0.32 K/UL
EOSINOPHIL NFR BLD AUTO: 5.7 %
ESTIMATED AVERAGE GLUCOSE: 126 MG/DL
GLUCOSE QUALITATIVE U: NEGATIVE
GLUCOSE SERPL-MCNC: 137 MG/DL
HBA1C MFR BLD HPLC: 6 %
HCT VFR BLD CALC: 31.9 %
HDLC SERPL-MCNC: 75 MG/DL
HGB BLD-MCNC: 10.1 G/DL
HYALINE CASTS: 1 /LPF
IMM GRANULOCYTES NFR BLD AUTO: 0.2 %
KETONES URINE: NEGATIVE
LDLC SERPL CALC-MCNC: 127 MG/DL
LEUKOCYTE ESTERASE URINE: ABNORMAL
LYMPHOCYTES # BLD AUTO: 1.2 K/UL
LYMPHOCYTES NFR BLD AUTO: 21.4 %
MAN DIFF?: NORMAL
MCHC RBC-ENTMCNC: 31.7 GM/DL
MCHC RBC-ENTMCNC: 32.4 PG
MCV RBC AUTO: 102.2 FL
MICROSCOPIC-UA: NORMAL
MONOCYTES # BLD AUTO: 0.76 K/UL
MONOCYTES NFR BLD AUTO: 13.6 %
NEUTROPHILS # BLD AUTO: 3.27 K/UL
NEUTROPHILS NFR BLD AUTO: 58.4 %
NITRITE URINE: NEGATIVE
NONHDLC SERPL-MCNC: 140 MG/DL
PARATHYROID HORMONE INTACT: 417 PG/ML
PH URINE: 7
PHOSPHATE SERPL-MCNC: 4.5 MG/DL
PLATELET # BLD AUTO: 230 K/UL
POTASSIUM SERPL-SCNC: 4.9 MMOL/L
PROT SERPL-MCNC: 7.8 G/DL
PROTEIN URINE: ABNORMAL
RBC # BLD: 3.12 M/UL
RBC # FLD: 13 %
RED BLOOD CELLS URINE: 1 /HPF
SODIUM SERPL-SCNC: 139 MMOL/L
SPECIFIC GRAVITY URINE: 1.01
SQUAMOUS EPITHELIAL CELLS: 2 /HPF
TRIGL SERPL-MCNC: 66 MG/DL
TSH SERPL-ACNC: 1.69 UIU/ML
UROBILINOGEN URINE: NORMAL
VIT B12 SERPL-MCNC: 360 PG/ML
WBC # FLD AUTO: 5.6 K/UL
WHITE BLOOD CELLS URINE: 104 /HPF

## 2022-10-26 LAB
APPEARANCE: CLEAR
BACTERIA UR CULT: ABNORMAL
BACTERIA: ABNORMAL
BILIRUBIN URINE: NEGATIVE
BLOOD URINE: NEGATIVE
COLOR: YELLOW
GLUCOSE QUALITATIVE U: NEGATIVE
HYALINE CASTS: 0 /LPF
KETONES URINE: NEGATIVE
LEUKOCYTE ESTERASE URINE: ABNORMAL
MICROSCOPIC-UA: NORMAL
NITRITE URINE: NEGATIVE
PH URINE: 6.5
PROTEIN URINE: ABNORMAL
RED BLOOD CELLS URINE: 1 /HPF
SPECIFIC GRAVITY URINE: 1.02
SQUAMOUS EPITHELIAL CELLS: 2 /HPF
UROBILINOGEN URINE: NORMAL
WHITE BLOOD CELLS URINE: 49 /HPF

## 2022-10-26 NOTE — ASSESSMENT
[FreeTextEntry1] : 88 yo F with chronic bacteruria\par \par - PVR = 100ml\par - Reviewed labwork done by PCP\par - UA, culture\par - discussed possible etiologies for UTI. Spent extensive period of time discussed behavioral modification including adequate hydration, cutting back on caffeine intake, timed voiding during the day, importance of controlling any diabetes and chronic constipation and how all of these things could potentially increase risk for persistent or recurrent UTI.\par - Discussed importance of NOT taking abx unless symptomatic\par - Renal US\par - Discussed pros and cons of estrace given vaginal atrophy. Rx transmitted\par

## 2022-10-26 NOTE — HISTORY OF PRESENT ILLNESS
[FreeTextEntry1] : 86 yo F presents with history of chronic bacteruria\par no dysuria, no gross hematuria\par Voids every 2-3 hours, nocturia 1-2/night\par no incontinence - uses 1 pull-up mostly for safety\par Drinks 1L of liquids including water, coffee, 3 cups of herbal\par normal bowel movements\par 3 children, \par LMP = hysterectomy at age 37 for bleeding

## 2022-10-26 NOTE — PHYSICAL EXAM
[General Appearance - Well Developed] : well developed [General Appearance - Well Nourished] : well nourished [Normal Appearance] : normal appearance [Well Groomed] : well groomed [General Appearance - In No Acute Distress] : no acute distress [Edema] : no peripheral edema [Respiration, Rhythm And Depth] : normal respiratory rhythm and effort [Exaggerated Use Of Accessory Muscles For Inspiration] : no accessory muscle use [Abdomen Soft] : soft [Abdomen Tenderness] : non-tender [Costovertebral Angle Tenderness] : no ~M costovertebral angle tenderness [Urethral Meatus] : normal urethra [Urinary Bladder Findings] : the bladder was normal on palpation [Normal Station and Gait] : the gait and station were normal for the patient's age [] : no rash [No Focal Deficits] : no focal deficits [Oriented To Time, Place, And Person] : oriented to person, place, and time [Affect] : the affect was normal [Not Anxious] : not anxious [Mood] : the mood was normal [No Palpable Adenopathy] : no palpable adenopathy [FreeTextEntry1] : no prolapse, neg CST, severe vaginal atrophy

## 2022-10-26 NOTE — HISTORY OF PRESENT ILLNESS
[FreeTextEntry1] : 88 yo F presents with history of chronic bacteruria\par no dysuria, no gross hematuria\par Voids every 2-3 hours, nocturia 1-2/night\par no incontinence - uses 1 pull-up mostly for safety\par Drinks 1L of liquids including water, coffee, 3 cups of herbal\par normal bowel movements\par 3 children, \par LMP = hysterectomy at age 37 for bleeding

## 2022-10-26 NOTE — ASSESSMENT
[FreeTextEntry1] : 86 yo F with chronic bacteruria\par \par - PVR = 100ml\par - Reviewed labwork done by PCP\par - UA, culture\par - discussed possible etiologies for UTI. Spent extensive period of time discussed behavioral modification including adequate hydration, cutting back on caffeine intake, timed voiding during the day, importance of controlling any diabetes and chronic constipation and how all of these things could potentially increase risk for persistent or recurrent UTI.\par - Discussed importance of NOT taking abx unless symptomatic\par - Renal US\par - Discussed pros and cons of estrace given vaginal atrophy. Rx transmitted\par

## 2022-11-17 ENCOUNTER — RX RENEWAL (OUTPATIENT)
Age: 87
End: 2022-11-17

## 2022-11-21 ENCOUNTER — RX RENEWAL (OUTPATIENT)
Age: 87
End: 2022-11-21

## 2022-11-28 ENCOUNTER — OUTPATIENT (OUTPATIENT)
Dept: OUTPATIENT SERVICES | Facility: HOSPITAL | Age: 87
LOS: 1 days | End: 2022-11-28
Payer: MEDICARE

## 2022-11-28 ENCOUNTER — APPOINTMENT (OUTPATIENT)
Dept: ULTRASOUND IMAGING | Facility: CLINIC | Age: 87
End: 2022-11-28

## 2022-11-28 DIAGNOSIS — N39.0 URINARY TRACT INFECTION, SITE NOT SPECIFIED: ICD-10-CM

## 2022-11-28 DIAGNOSIS — Z90.710 ACQUIRED ABSENCE OF BOTH CERVIX AND UTERUS: Chronic | ICD-10-CM

## 2022-11-28 PROCEDURE — 76775 US EXAM ABDO BACK WALL LIM: CPT | Mod: 26

## 2022-11-28 PROCEDURE — 76775 US EXAM ABDO BACK WALL LIM: CPT

## 2022-12-05 ENCOUNTER — APPOINTMENT (OUTPATIENT)
Dept: CARDIOLOGY | Facility: CLINIC | Age: 87
End: 2022-12-05

## 2022-12-05 ENCOUNTER — NON-APPOINTMENT (OUTPATIENT)
Age: 87
End: 2022-12-05

## 2022-12-05 VITALS
SYSTOLIC BLOOD PRESSURE: 137 MMHG | HEART RATE: 75 BPM | TEMPERATURE: 97 F | DIASTOLIC BLOOD PRESSURE: 73 MMHG | OXYGEN SATURATION: 100 % | RESPIRATION RATE: 14 BRPM | HEIGHT: 57 IN

## 2022-12-05 PROCEDURE — 99213 OFFICE O/P EST LOW 20 MIN: CPT | Mod: 25

## 2022-12-05 PROCEDURE — 93000 ELECTROCARDIOGRAM COMPLETE: CPT

## 2022-12-05 RX ORDER — SODIUM ZIRCONIUM CYCLOSILICATE 5 G/5G
5 POWDER, FOR SUSPENSION ORAL
Qty: 1 | Refills: 3 | Status: DISCONTINUED | COMMUNITY
Start: 2021-12-30 | End: 2022-12-05

## 2022-12-05 RX ORDER — CHOLECALCIFEROL (VITAMIN D3) 50 MCG
50 MCG TABLET ORAL
Qty: 90 | Refills: 0 | Status: DISCONTINUED | COMMUNITY
Start: 2021-12-07 | End: 2022-12-05

## 2022-12-05 RX ORDER — HYDRALAZINE HYDROCHLORIDE 50 MG/1
50 TABLET ORAL
Qty: 180 | Refills: 0 | Status: DISCONTINUED | COMMUNITY
Start: 2022-11-28

## 2022-12-05 RX ORDER — CHLORHEXIDINE GLUCONATE 4 %
1000 LIQUID (ML) TOPICAL DAILY
Qty: 90 | Refills: 3 | Status: DISCONTINUED | COMMUNITY
Start: 2021-09-22 | End: 2022-12-05

## 2022-12-05 NOTE — HISTORY OF PRESENT ILLNESS
[FreeTextEntry1] : Ryanne daughter helps care for her. Denies any CP ,palpitations or SOB. Legs swollen but markedly improved.

## 2023-01-11 ENCOUNTER — APPOINTMENT (OUTPATIENT)
Dept: PULMONOLOGY | Facility: CLINIC | Age: 88
End: 2023-01-11

## 2023-02-24 ENCOUNTER — APPOINTMENT (OUTPATIENT)
Dept: NEPHROLOGY | Facility: CLINIC | Age: 88
End: 2023-02-24
Payer: MEDICARE

## 2023-02-24 VITALS
WEIGHT: 154 LBS | SYSTOLIC BLOOD PRESSURE: 128 MMHG | HEART RATE: 70 BPM | BODY MASS INDEX: 33.22 KG/M2 | DIASTOLIC BLOOD PRESSURE: 60 MMHG | HEIGHT: 57 IN

## 2023-02-24 VITALS
HEART RATE: 75 BPM | TEMPERATURE: 96.9 F | DIASTOLIC BLOOD PRESSURE: 56 MMHG | OXYGEN SATURATION: 96 % | HEIGHT: 57 IN | SYSTOLIC BLOOD PRESSURE: 157 MMHG

## 2023-02-24 PROCEDURE — 99214 OFFICE O/P EST MOD 30 MIN: CPT

## 2023-02-24 NOTE — REVIEW OF SYSTEMS
[Lower Ext Edema] : lower extremity edema [SOB on Exertion] : shortness of breath during exertion [Chest Pain] : no chest pain [Negative] : Constitutional [FreeTextEntry3] : follows with ophthalmologist\par  [FreeTextEntry5] : B/L LE edema at baseline [FreeTextEntry7] : appetite good [de-identified] : Hx DM2

## 2023-02-24 NOTE — PHYSICAL EXAM
[General Appearance - Alert] : alert [General Appearance - In No Acute Distress] : in no acute distress [Sclera] : the sclera and conjunctiva were normal [Outer Ear] : the ears and nose were normal in appearance [Neck Appearance] : the appearance of the neck was normal [Respiration, Rhythm And Depth] : normal respiratory rhythm and effort [Auscultation Breath Sounds / Voice Sounds] : lungs were clear to auscultation bilaterally [Heart Rate And Rhythm] : heart rate was normal and rhythm regular [Heart Sounds] : normal S1 and S2 [Murmurs] : no murmurs [___ +] : bilateral [unfilled]+ pitting edema to the knees [Bowel Sounds] : normal bowel sounds [Abdomen Soft] : soft [Stooped] : stooped [] : no rash [No Focal Deficits] : no focal deficits [Oriented To Time, Place, And Person] : oriented to person, place, and time [FreeTextEntry1] : uses walker [Affect] : the affect was normal

## 2023-02-24 NOTE — HISTORY OF PRESENT ILLNESS
[FreeTextEntry1] : ANI CHESTER is a 87 year female with a history DM2, PVD, HTN, PAF, TIA, asbestosis, HFpEF here for followup of CKD4. Pt lives in a 2 family house with her daughter. \par \par Uses home oxygen at night followed by pulmonary\par Edema overall improved\par Saw Dr Pavon and had creme prescribed

## 2023-02-24 NOTE — ASSESSMENT
[FreeTextEntry1] : ANI CHESTER is a 87 year female with a history CKD4, cardiorenal syndrome, HTN, and long standing history of DM2. \par CKD 4:Creatinine trend reviewed with patient and daughter. They will do repeat labs next week\par HF symptoms stable on Furosemide 80mg once daily. Will tolerate higher creatinine for stability of symptoms. \par HTN: BP well controlled. Continue Nifedepine, Coreg and furosemide. \par Volume status: Continue fluid restriction 1 liter daily. Will check labs -script given to pt\par Proteinuria : minimal \par Metabolic Acidosis : Continue sodium bicarb 2 tabs daily\par Anemia Management :  will check with next labs. \par Diabetes:trend hba1c\par UTI: followed with urology\par MBD: iPTH slightly elevated on last labs. Phos and Vit D levels normal. Will continue to monitor. \par In order to slow progression, the patient was educated on the importance of good blood pressure, diabetes control and to avoid NSAIDs. \par Followup with NHUNG HERNANDEZ in 4 mo\par

## 2023-03-02 LAB
25(OH)D3 SERPL-MCNC: 45.5 NG/ML
ALBUMIN SERPL ELPH-MCNC: 4.2 G/DL
ALP BLD-CCNC: 164 U/L
ALT SERPL-CCNC: 10 U/L
ANION GAP SERPL CALC-SCNC: 15 MMOL/L
APPEARANCE: ABNORMAL
AST SERPL-CCNC: 14 U/L
BACTERIA: ABNORMAL
BASOPHILS # BLD AUTO: 0.06 K/UL
BASOPHILS NFR BLD AUTO: 1.1 %
BILIRUB SERPL-MCNC: 0.4 MG/DL
BILIRUBIN URINE: NEGATIVE
BLOOD URINE: NEGATIVE
BUN SERPL-MCNC: 59 MG/DL
CALCIUM SERPL-MCNC: 9.7 MG/DL
CALCIUM SERPL-MCNC: 9.7 MG/DL
CHLORIDE SERPL-SCNC: 102 MMOL/L
CHOLEST SERPL-MCNC: 210 MG/DL
CO2 SERPL-SCNC: 23 MMOL/L
COLOR: NORMAL
CREAT SERPL-MCNC: 2.5 MG/DL
CREAT SPEC-SCNC: 69 MG/DL
EGFR: 18 ML/MIN/1.73M2
EOSINOPHIL # BLD AUTO: 0.27 K/UL
EOSINOPHIL NFR BLD AUTO: 4.8 %
ESTIMATED AVERAGE GLUCOSE: 137 MG/DL
FERRITIN SERPL-MCNC: 65 NG/ML
FOLATE SERPL-MCNC: 18.1 NG/ML
GLUCOSE QUALITATIVE U: NEGATIVE
GLUCOSE SERPL-MCNC: 121 MG/DL
HBA1C MFR BLD HPLC: 6.4 %
HCT VFR BLD CALC: 37.1 %
HDLC SERPL-MCNC: 63 MG/DL
HGB BLD-MCNC: 11.7 G/DL
HYALINE CASTS: 0 /LPF
IMM GRANULOCYTES NFR BLD AUTO: 0.4 %
IRON SATN MFR SERPL: 42 %
IRON SERPL-MCNC: 138 UG/DL
KETONES URINE: NEGATIVE
LDLC SERPL CALC-MCNC: 129 MG/DL
LEUKOCYTE ESTERASE URINE: ABNORMAL
LYMPHOCYTES # BLD AUTO: 1.28 K/UL
LYMPHOCYTES NFR BLD AUTO: 22.6 %
MAGNESIUM SERPL-MCNC: 2.4 MG/DL
MAN DIFF?: NORMAL
MCHC RBC-ENTMCNC: 31.5 GM/DL
MCHC RBC-ENTMCNC: 31.8 PG
MCV RBC AUTO: 100.8 FL
MICROALBUMIN 24H UR DL<=1MG/L-MCNC: 6.5 MG/DL
MICROALBUMIN/CREAT 24H UR-RTO: 94 MG/G
MICROSCOPIC-UA: NORMAL
MONOCYTES # BLD AUTO: 0.7 K/UL
MONOCYTES NFR BLD AUTO: 12.4 %
NEUTROPHILS # BLD AUTO: 3.33 K/UL
NEUTROPHILS NFR BLD AUTO: 58.7 %
NITRITE URINE: NEGATIVE
NONHDLC SERPL-MCNC: 147 MG/DL
PARATHYROID HORMONE INTACT: 384 PG/ML
PH URINE: 7
PHOSPHATE SERPL-MCNC: 4 MG/DL
PLATELET # BLD AUTO: 232 K/UL
POTASSIUM SERPL-SCNC: 4.3 MMOL/L
PROT SERPL-MCNC: 7.7 G/DL
PROTEIN URINE: ABNORMAL
RBC # BLD: 3.68 M/UL
RBC # FLD: 13 %
RED BLOOD CELLS URINE: 1 /HPF
SODIUM SERPL-SCNC: 140 MMOL/L
SPECIFIC GRAVITY URINE: 1.01
SQUAMOUS EPITHELIAL CELLS: 1 /HPF
TIBC SERPL-MCNC: 326 UG/DL
TRIGL SERPL-MCNC: 89 MG/DL
TSH SERPL-ACNC: 1.26 UIU/ML
UIBC SERPL-MCNC: 188 UG/DL
URATE SERPL-MCNC: 9.3 MG/DL
UROBILINOGEN URINE: NORMAL
VIT B12 SERPL-MCNC: 337 PG/ML
WBC # FLD AUTO: 5.66 K/UL
WHITE BLOOD CELLS URINE: 163 /HPF

## 2023-03-19 ENCOUNTER — RX RENEWAL (OUTPATIENT)
Age: 88
End: 2023-03-19

## 2023-04-21 ENCOUNTER — APPOINTMENT (OUTPATIENT)
Age: 88
End: 2023-04-21
Payer: MEDICARE

## 2023-04-21 VITALS
TEMPERATURE: 97 F | BODY MASS INDEX: 33.22 KG/M2 | WEIGHT: 154 LBS | HEART RATE: 74 BPM | OXYGEN SATURATION: 98 % | HEIGHT: 57 IN | DIASTOLIC BLOOD PRESSURE: 68 MMHG | RESPIRATION RATE: 15 BRPM | SYSTOLIC BLOOD PRESSURE: 138 MMHG

## 2023-04-21 PROCEDURE — 99214 OFFICE O/P EST MOD 30 MIN: CPT

## 2023-04-21 RX ORDER — ESTRADIOL 0.1 MG/G
0.1 CREAM VAGINAL
Qty: 1 | Refills: 3 | Status: ACTIVE | COMMUNITY
Start: 2022-10-21 | End: 1900-01-01

## 2023-04-29 NOTE — PHYSICAL EXAM
[General Appearance - Well Developed] : well developed [General Appearance - Well Nourished] : well nourished [Normal Appearance] : normal appearance [Well Groomed] : well groomed [General Appearance - In No Acute Distress] : no acute distress [Abdomen Soft] : soft [Abdomen Tenderness] : non-tender [Costovertebral Angle Tenderness] : no ~M costovertebral angle tenderness [Urethral Meatus] : normal urethra [Urinary Bladder Findings] : the bladder was normal on palpation [FreeTextEntry1] : no prolapse, neg CST, severe vaginal atrophy - deferred today [Edema] : no peripheral edema [] : no respiratory distress [Respiration, Rhythm And Depth] : normal respiratory rhythm and effort [Exaggerated Use Of Accessory Muscles For Inspiration] : no accessory muscle use [Oriented To Time, Place, And Person] : oriented to person, place, and time [Affect] : the affect was normal [Mood] : the mood was normal [Not Anxious] : not anxious [Normal Station and Gait] : the gait and station were normal for the patient's age [No Focal Deficits] : no focal deficits [No Palpable Adenopathy] : no palpable adenopathy

## 2023-04-29 NOTE — ASSESSMENT
[FreeTextEntry1] : 88 yo F with chronic bacteruria\par \par - PVR = 58ml\par - Reviewed renal US imaging through PACS and confirmed findings as written above\par - Continue estrace\par - FU in 6 months

## 2023-04-29 NOTE — HISTORY OF PRESENT ILLNESS
[FreeTextEntry1] : 88 yo F presents with history of chronic bacteruria\par no dysuria, no gross hematuria\par Voids every 2-3 hours, nocturia 1-2/night\par no incontinence - uses 1 pull-up mostly for safety\par Drinks 1L of liquids including water, coffee, 3 cups of herbal\par normal bowel movements\par 3 children, \par LMP = hysterectomy at age 37 for bleeding\par \par 23 Interval history: Doing well since last visit\par No urinary complaints\par no symptomatic UTIs since last visit\par Recent UA showed some pyuria\par Renal US from 2022 - unremarkable

## 2023-05-25 ENCOUNTER — RX RENEWAL (OUTPATIENT)
Age: 88
End: 2023-05-25

## 2023-06-22 ENCOUNTER — APPOINTMENT (OUTPATIENT)
Dept: INTERNAL MEDICINE | Facility: CLINIC | Age: 88
End: 2023-06-22
Payer: MEDICARE

## 2023-06-22 ENCOUNTER — LABORATORY RESULT (OUTPATIENT)
Age: 88
End: 2023-06-22

## 2023-06-22 VITALS
TEMPERATURE: 97.6 F | SYSTOLIC BLOOD PRESSURE: 135 MMHG | OXYGEN SATURATION: 97 % | WEIGHT: 149 LBS | HEART RATE: 67 BPM | DIASTOLIC BLOOD PRESSURE: 71 MMHG | HEIGHT: 57 IN | BODY MASS INDEX: 32.15 KG/M2

## 2023-06-22 DIAGNOSIS — E53.8 DEFICIENCY OF OTHER SPECIFIED B GROUP VITAMINS: ICD-10-CM

## 2023-06-22 DIAGNOSIS — R60.0 LOCALIZED EDEMA: ICD-10-CM

## 2023-06-22 PROCEDURE — 99214 OFFICE O/P EST MOD 30 MIN: CPT | Mod: 25

## 2023-06-22 PROCEDURE — 36415 COLL VENOUS BLD VENIPUNCTURE: CPT

## 2023-06-22 NOTE — ASSESSMENT
[FreeTextEntry1] : -Blood work done today\par -BP is stable.\par -Check A1c, Lipid panel and vitamin levels.\par -LE edema: To take extra dose 40 mg of furosemide in next couple of days. If continues to swell and gain weight to call cardio/office. Pt verbalized understanding.  -Duplex US ordered\par -Pt to make f/u appointment with Cardio.\par -Continue to f/u with uro and nephro.

## 2023-06-22 NOTE — HISTORY OF PRESENT ILLNESS
[Family Member] : family member [FreeTextEntry1] : Patient presents for follow-up. [de-identified] : Patient c/o RLE >LLE swelling going on for couple of weeks. \par Does get SOB on exertion.\par Notes she has not been walking as much, however elevates legs at home.\par Denies any CP, Chest tightness, abdominal pain, urinary symptom\par BP has been stable at home.\par Saw urologist and nephrologist and states everything is okay.

## 2023-06-22 NOTE — ADDENDUM
[FreeTextEntry1] : I, Isaiah Gallego, acted as a scribe on behalf of Dr. Terence Briggs MD, on 06/22/2023. \par \par All medical entries made by the scribe were at my, Dr. Terence Briggs MD, direction and personally dictated by me on 06/22/2023. I have reviewed the chart and agree that the record accurately reflects my personal performance of the history, physical exam, assessment and plan. I have also personally directed, reviewed, and agreed with the chart.

## 2023-06-22 NOTE — PHYSICAL EXAM
[Normal Appearance] : normal in appearance [No Masses] : no palpable masses [No Nipple Discharge] : no nipple discharge [No Axillary Lymphadenopathy] : no axillary lymphadenopathy [Normal] : soft, non-tender, non-distended, no masses palpated, no HSM and normal bowel sounds [de-identified] : 2+ pitting edema

## 2023-06-27 ENCOUNTER — NON-APPOINTMENT (OUTPATIENT)
Age: 88
End: 2023-06-27

## 2023-06-27 LAB
25(OH)D3 SERPL-MCNC: 41.4 NG/ML
ALBUMIN SERPL ELPH-MCNC: 4 G/DL
ALP BLD-CCNC: 165 U/L
ALT SERPL-CCNC: 10 U/L
ANION GAP SERPL CALC-SCNC: 13 MMOL/L
APPEARANCE: CLEAR
AST SERPL-CCNC: 17 U/L
BILIRUB SERPL-MCNC: 0.3 MG/DL
BILIRUBIN URINE: NEGATIVE
BLOOD URINE: NEGATIVE
BUN SERPL-MCNC: 45 MG/DL
CALCIUM SERPL-MCNC: 9.3 MG/DL
CALCIUM SERPL-MCNC: 9.3 MG/DL
CHLORIDE SERPL-SCNC: 102 MMOL/L
CHOLEST SERPL-MCNC: 209 MG/DL
CO2 SERPL-SCNC: 23 MMOL/L
COLOR: YELLOW
CREAT SERPL-MCNC: 2.41 MG/DL
EGFR: 19 ML/MIN/1.73M2
ESTIMATED AVERAGE GLUCOSE: 137 MG/DL
FRUCTOSAMINE SERPL-MCNC: 373 UMOL/L
GLUCOSE QUALITATIVE U: NEGATIVE MG/DL
GLUCOSE SERPL-MCNC: 169 MG/DL
HBA1C MFR BLD HPLC: 6.4 %
HDLC SERPL-MCNC: 59 MG/DL
KETONES URINE: NEGATIVE MG/DL
LDLC SERPL CALC-MCNC: 130 MG/DL
LEUKOCYTE ESTERASE URINE: ABNORMAL
NITRITE URINE: NEGATIVE
NONHDLC SERPL-MCNC: 150 MG/DL
NT-PROBNP SERPL-MCNC: 393 PG/ML
PARATHYROID HORMONE INTACT: 275 PG/ML
PH URINE: 7.5
POTASSIUM SERPL-SCNC: 4.4 MMOL/L
PROT SERPL-MCNC: 7.6 G/DL
PROTEIN URINE: 30 MG/DL
SODIUM SERPL-SCNC: 138 MMOL/L
SPECIFIC GRAVITY URINE: 1.02
TRIGL SERPL-MCNC: 102 MG/DL
TSH SERPL-ACNC: 1.5 UIU/ML
UROBILINOGEN URINE: 0.2 MG/DL
VIT B12 SERPL-MCNC: 628 PG/ML

## 2023-07-03 ENCOUNTER — RX RENEWAL (OUTPATIENT)
Age: 88
End: 2023-07-03

## 2023-07-06 ENCOUNTER — APPOINTMENT (OUTPATIENT)
Dept: NEPHROLOGY | Facility: CLINIC | Age: 88
End: 2023-07-06
Payer: MEDICARE

## 2023-07-06 VITALS — DIASTOLIC BLOOD PRESSURE: 64 MMHG | SYSTOLIC BLOOD PRESSURE: 124 MMHG

## 2023-07-06 VITALS
OXYGEN SATURATION: 97 % | HEART RATE: 86 BPM | BODY MASS INDEX: 33.29 KG/M2 | HEIGHT: 57 IN | DIASTOLIC BLOOD PRESSURE: 68 MMHG | SYSTOLIC BLOOD PRESSURE: 133 MMHG | TEMPERATURE: 98 F | WEIGHT: 154.32 LBS

## 2023-07-06 DIAGNOSIS — E87.2 ACIDOSIS: ICD-10-CM

## 2023-07-06 PROCEDURE — 99214 OFFICE O/P EST MOD 30 MIN: CPT

## 2023-07-06 NOTE — REVIEW OF SYSTEMS
[Lower Ext Edema] : lower extremity edema [SOB on Exertion] : shortness of breath during exertion [Negative] : Heme/Lymph [As Noted in HPI] : as noted in HPI [Chest Pain] : no chest pain [Shortness Of Breath] : no shortness of breath [Dysuria] : no dysuria [FreeTextEntry2] : denies complaints-"I feel good" [FreeTextEntry3] : follows with ophthalmologist\par  [FreeTextEntry7] : appetite good [de-identified] : Hx DM2

## 2023-07-06 NOTE — PHYSICAL EXAM
[General Appearance - Alert] : alert [General Appearance - In No Acute Distress] : in no acute distress [Sclera] : the sclera and conjunctiva were normal [Outer Ear] : the ears and nose were normal in appearance [Neck Appearance] : the appearance of the neck was normal [Respiration, Rhythm And Depth] : normal respiratory rhythm and effort [Auscultation Breath Sounds / Voice Sounds] : lungs were clear to auscultation bilaterally [Heart Rate And Rhythm] : heart rate was normal and rhythm regular [Heart Sounds] : normal S1 and S2 [Murmurs] : no murmurs [___ +] : bilateral [unfilled]+ pitting edema to the knees [Bowel Sounds] : normal bowel sounds [Abdomen Soft] : soft [Stooped] : stooped [] : no rash [No Focal Deficits] : no focal deficits [Oriented To Time, Place, And Person] : oriented to person, place, and time [Affect] : the affect was normal [Impaired Insight] : insight and judgment were intact [FreeTextEntry1] : uses walker

## 2023-07-06 NOTE — ASSESSMENT
[FreeTextEntry1] : ANI CHESTER is a 87 year female with a history CKD4, cardiorenal syndrome, HTN, and long standing history of DM2. \par CKD 4:Creatinine trend reviewed with patient and daughter. Creatinine stable.\par HTN: BP well controlled. Continue Nifedepine, Coreg and furosemide. \par Volume status: BNP WNL and weight stable. Edema worsening as per daughter. Obtain venous duplex as ordered. Continue Furosemide 120mg alt 80mg daily. No extra salt. 1 Liter fluid restriction. WIll repeat labs in 2 weeks.  \par Proteinuria : minimal \par Metabolic Acidosis : Continue sodium bicarb 2 tabs daily\par Anemia Management :  stable\par Diabetes:trend hba1c\par UTI: followed with urology\par MBD: iPTH improving. WIll check phos with next labs. Vit D levels normal. Will continue to monitor. \par In order to slow progression, the patient was educated on the importance of good blood pressure, diabetes control and to avoid NSAIDs. \par Followup with NHUNG HERNANDEZ in 2 weeks July 20th at 2pm. \par

## 2023-07-06 NOTE — HISTORY OF PRESENT ILLNESS
[FreeTextEntry1] : ANI CHESTER is a 87 year female with a history DM2, PVD, HTN, PAF, TIA, asbestosis, HFpEF here for followup of CKD4. Pt lives in a 2 family house with her daughter. \par \par Daughter has noticed increased BLLE edema for past few weeks R>L. Denies increased SOB. Was taking some "extra salt" because she thought BP 120s was too low. Drinks 3 cups a tea daily and minimal water. \par Had f/u with Dr. Briggs who increased Furosemide to 80mg alt with 120mg daily. Also ordered Venous Duplex of BLLE which still needs to be scheduled. \par Saw Dr Pavon who follows for asymptomatic UTIs.

## 2023-07-11 ENCOUNTER — APPOINTMENT (OUTPATIENT)
Dept: ULTRASOUND IMAGING | Facility: CLINIC | Age: 88
End: 2023-07-11
Payer: MEDICARE

## 2023-07-11 ENCOUNTER — OUTPATIENT (OUTPATIENT)
Dept: OUTPATIENT SERVICES | Facility: HOSPITAL | Age: 88
LOS: 1 days | End: 2023-07-11
Payer: MEDICARE

## 2023-07-11 DIAGNOSIS — Z90.710 ACQUIRED ABSENCE OF BOTH CERVIX AND UTERUS: Chronic | ICD-10-CM

## 2023-07-11 DIAGNOSIS — R60.0 LOCALIZED EDEMA: ICD-10-CM

## 2023-07-11 PROCEDURE — 93970 EXTREMITY STUDY: CPT

## 2023-07-11 PROCEDURE — 93970 EXTREMITY STUDY: CPT | Mod: 26

## 2023-07-13 NOTE — PATIENT PROFILE ADULT - HOW PATIENT ADDRESSED, PROFILE
Ryanne
Continue your regular medications as per routine.  Rest next couple of days.  Call Dr. Wen tomorrow to expedite close office follow-up.  Avoid moving head/neck too quickly.  Avoid sitting upright or standing up too quickly.  Avoid physical overexertion.

## 2023-07-17 ENCOUNTER — NON-APPOINTMENT (OUTPATIENT)
Age: 88
End: 2023-07-17

## 2023-07-20 ENCOUNTER — APPOINTMENT (OUTPATIENT)
Dept: NEPHROLOGY | Facility: CLINIC | Age: 88
End: 2023-07-20
Payer: MEDICARE

## 2023-07-20 VITALS
OXYGEN SATURATION: 96 % | HEART RATE: 68 BPM | BODY MASS INDEX: 33.01 KG/M2 | HEIGHT: 57 IN | WEIGHT: 153 LBS | DIASTOLIC BLOOD PRESSURE: 66 MMHG | TEMPERATURE: 97.7 F | SYSTOLIC BLOOD PRESSURE: 128 MMHG

## 2023-07-20 VITALS — DIASTOLIC BLOOD PRESSURE: 60 MMHG | SYSTOLIC BLOOD PRESSURE: 114 MMHG

## 2023-07-20 PROCEDURE — 99213 OFFICE O/P EST LOW 20 MIN: CPT

## 2023-07-20 NOTE — HISTORY OF PRESENT ILLNESS
[FreeTextEntry1] : ANI CHESTER is a 87 year female with a history DM2, PVD, HTN, PAF, TIA, asbestosis, HFpEF here for followup of CKD4. Pt lives in a 2 family house with her daughter. \par \par Daughter has noticed increased BLLE edema for past few weeks R>L. On Furosemide 80mg alt with 120mg daily x 1 month. Venous Duplex of BLLE completed. No DVT seen. \par As per daughter pt is sedentary and does not walk as much as she should. Asking about home physical therapy. \par Stopped "extra" salt. Observing fluid restriction.

## 2023-07-20 NOTE — ASSESSMENT
[FreeTextEntry1] : ANI CHESTER is a 87 year female with a history CKD4, cardiorenal syndrome, HTN, and long standing history of DM2. \par CKD 4:Creatinine trend reviewed with patient and daughter. Will repeat labs today. \par HTN: BP well controlled. After labs resulted will consider stopping Nifedipine which might be contributing to swelling. \par Volume status: BNP WNL and weight stable. Venous duplex with NO DVT seen. No extra salt. 1 Liter fluid restriction. Once labs resulted will make diuretic recommendation. \par Proteinuria : minimal \par Metabolic Acidosis : Continue sodium bicarb 2 tabs daily\par Anemia Management :  stable\par Diabetes:trend hba1c\par UTI: followed with urology\par MBD: iPTH improving. Checking phos levels today. Vit D levels normal. Will continue to monitor. \par In order to slow progression, the patient was educated on the importance of good blood pressure, diabetes control and to avoid NSAIDs. \par Will make further recommendations once labs resulted. \par Return to see Dr. Galarza in 4 months.

## 2023-07-20 NOTE — REVIEW OF SYSTEMS
[As Noted in HPI] : as noted in HPI [Lower Ext Edema] : lower extremity edema [SOB on Exertion] : shortness of breath during exertion [Negative] : Heme/Lymph [Chest Pain] : no chest pain [Shortness Of Breath] : no shortness of breath [Dysuria] : no dysuria [FreeTextEntry2] : denies complaints-"I feel good" [FreeTextEntry3] : follows with ophthalmologist\par  [FreeTextEntry5] : no change from last visit [FreeTextEntry7] : appetite good [de-identified] : Hx DM2

## 2023-07-20 NOTE — PHYSICAL EXAM
[General Appearance - Alert] : alert [General Appearance - In No Acute Distress] : in no acute distress [Sclera] : the sclera and conjunctiva were normal [Outer Ear] : the ears and nose were normal in appearance [Neck Appearance] : the appearance of the neck was normal [Respiration, Rhythm And Depth] : normal respiratory rhythm and effort [Auscultation Breath Sounds / Voice Sounds] : lungs were clear to auscultation bilaterally [Heart Sounds] : normal S1 and S2 [Heart Rate And Rhythm] : heart rate was normal and rhythm regular [Murmurs] : no murmurs [___ +] : bilateral [unfilled]+ pitting edema to the knees [Bowel Sounds] : normal bowel sounds [Abdomen Soft] : soft [Stooped] : stooped [] : no rash [No Focal Deficits] : no focal deficits [Oriented To Time, Place, And Person] : oriented to person, place, and time [Impaired Insight] : insight and judgment were intact [Affect] : the affect was normal [FreeTextEntry1] : uses walker

## 2023-07-24 ENCOUNTER — NON-APPOINTMENT (OUTPATIENT)
Age: 88
End: 2023-07-24

## 2023-07-24 LAB
ALBUMIN SERPL ELPH-MCNC: 4.3 G/DL
ANION GAP SERPL CALC-SCNC: 16 MMOL/L
BUN SERPL-MCNC: 66 MG/DL
CALCIUM SERPL-MCNC: 9.2 MG/DL
CHLORIDE SERPL-SCNC: 100 MMOL/L
CO2 SERPL-SCNC: 20 MMOL/L
CREAT SERPL-MCNC: 2.96 MG/DL
EGFR: 15 ML/MIN/1.73M2
GLUCOSE SERPL-MCNC: 161 MG/DL
PHOSPHATE SERPL-MCNC: 3.9 MG/DL
POTASSIUM SERPL-SCNC: 4 MMOL/L
SODIUM SERPL-SCNC: 136 MMOL/L

## 2023-07-31 LAB
CREAT SPEC-SCNC: 96 MG/DL
MICROALBUMIN 24H UR DL<=1MG/L-MCNC: 2.8 MG/DL
MICROALBUMIN/CREAT 24H UR-RTO: 29 MG/G

## 2023-08-07 ENCOUNTER — APPOINTMENT (OUTPATIENT)
Dept: CARDIOLOGY | Facility: CLINIC | Age: 88
End: 2023-08-07
Payer: MEDICARE

## 2023-08-07 VITALS
SYSTOLIC BLOOD PRESSURE: 176 MMHG | OXYGEN SATURATION: 96 % | WEIGHT: 154 LBS | HEART RATE: 61 BPM | RESPIRATION RATE: 12 BRPM | HEIGHT: 57 IN | DIASTOLIC BLOOD PRESSURE: 76 MMHG | BODY MASS INDEX: 33.22 KG/M2

## 2023-08-07 PROCEDURE — 99214 OFFICE O/P EST MOD 30 MIN: CPT

## 2023-08-07 PROCEDURE — 99213 OFFICE O/P EST LOW 20 MIN: CPT | Mod: 25

## 2023-08-07 PROCEDURE — 93000 ELECTROCARDIOGRAM COMPLETE: CPT

## 2023-08-07 RX ORDER — NIFEDIPINE 30 MG/1
30 TABLET, EXTENDED RELEASE ORAL DAILY
Qty: 90 | Refills: 3 | Status: DISCONTINUED | COMMUNITY
Start: 2021-10-23 | End: 2023-08-07

## 2023-08-07 NOTE — HISTORY OF PRESENT ILLNESS
[FreeTextEntry1] : Ryanne noticed increased swelling of her legs. Here with her daughter. NIfedipine stopped and a little better. No SOB.

## 2023-08-07 NOTE — DISCUSSION/SUMMARY
[FreeTextEntry1] : The patient  is an 88-year-old female COPD, HTN, PAF, CKD, HFpEF with LE edema. #1 HFpEF- c/w lasix 80mg daily, off nifedipine, c/w coreg and hydralazine 50mg bid, BP at goal #2 PAF- c/w eliquis and coreg #3 CKD- f/u Dr. Galarza, off lokelma, NaHCO3, urine sent for urologist. #4 COPD- at baseline, f/u Dr. Spencer, encouraged to use oxygen at night #5 PVD- f/u Dr. Chan today  [EKG obtained to assist in diagnosis and management of assessed problem(s)] : EKG obtained to assist in diagnosis and management of assessed problem(s)

## 2023-08-08 NOTE — REASON FOR VISIT
[FreeTextEntry1] : 88F CKD LE edema Has pneuamtic pump at home, not using here with her daughter Follows with Dr. Galarza and Dr. Fall limited mobility, sits most of the day.  Walks with a walker

## 2023-08-08 NOTE — ASSESSMENT
[FreeTextEntry1] : Assessment: 1.  LE edema - multifactorial  Plan: 1.  Defer management of diuretics to Dr. Fall and Dr. Galarza - I think this is mainly cardio/renal 2.  In efforts to help with congestion and reduce LE edema:  we discussed the importance of using a lymphedema pump 6 hours after waking up (for 1 hour ) and again 6 hours after that.   3.  Leg elevation 4.  Increase mobility

## 2023-08-10 ENCOUNTER — RX RENEWAL (OUTPATIENT)
Age: 88
End: 2023-08-10

## 2023-08-10 RX ORDER — MONTELUKAST 10 MG/1
10 TABLET, FILM COATED ORAL
Qty: 30 | Refills: 0 | Status: ACTIVE | COMMUNITY
Start: 2022-10-11 | End: 1900-01-01

## 2023-09-19 ENCOUNTER — RX CHANGE (OUTPATIENT)
Age: 88
End: 2023-09-19

## 2023-09-22 ENCOUNTER — RX RENEWAL (OUTPATIENT)
Age: 88
End: 2023-09-22

## 2023-10-04 ENCOUNTER — APPOINTMENT (OUTPATIENT)
Dept: PULMONOLOGY | Facility: CLINIC | Age: 88
End: 2023-10-04
Payer: MEDICARE

## 2023-10-04 VITALS
SYSTOLIC BLOOD PRESSURE: 126 MMHG | OXYGEN SATURATION: 97 % | RESPIRATION RATE: 14 BRPM | DIASTOLIC BLOOD PRESSURE: 62 MMHG | TEMPERATURE: 97.2 F | BODY MASS INDEX: 33.33 KG/M2 | HEART RATE: 70 BPM | WEIGHT: 154 LBS

## 2023-10-04 DIAGNOSIS — Z87.09 PERSONAL HISTORY OF OTHER DISEASES OF THE RESPIRATORY SYSTEM: ICD-10-CM

## 2023-10-04 PROCEDURE — 99214 OFFICE O/P EST MOD 30 MIN: CPT

## 2023-10-16 ENCOUNTER — RX RENEWAL (OUTPATIENT)
Age: 88
End: 2023-10-16

## 2023-10-16 RX ORDER — CARVEDILOL 12.5 MG/1
12.5 TABLET, FILM COATED ORAL
Qty: 180 | Refills: 3 | Status: ACTIVE | COMMUNITY
Start: 2021-10-23 | End: 1900-01-01

## 2023-10-25 ENCOUNTER — APPOINTMENT (OUTPATIENT)
Age: 88
End: 2023-10-25
Payer: MEDICARE

## 2023-10-25 VITALS — DIASTOLIC BLOOD PRESSURE: 67 MMHG | HEART RATE: 69 BPM | SYSTOLIC BLOOD PRESSURE: 155 MMHG

## 2023-10-25 DIAGNOSIS — N95.2 POSTMENOPAUSAL ATROPHIC VAGINITIS: ICD-10-CM

## 2023-10-25 DIAGNOSIS — R82.71 BACTERIURIA: ICD-10-CM

## 2023-10-25 PROCEDURE — 99213 OFFICE O/P EST LOW 20 MIN: CPT

## 2023-10-27 NOTE — PROGRESS NOTE ADULT - PROBLEM SELECTOR PROBLEM 2
Bradycardia Quality 130: Documentation Of Current Medications In The Medical Record: Current Medications Documented Quality 431: Preventive Care And Screening: Unhealthy Alcohol Use - Screening: Patient not identified as an unhealthy alcohol user when screened for unhealthy alcohol use using a systematic screening method Detail Level: Detailed Quality 226: Preventive Care And Screening: Tobacco Use: Screening And Cessation Intervention: Patient screened for tobacco use and is an ex/non-smoker Quality 110: Preventive Care And Screening: Influenza Immunization: Influenza Immunization previously received during influenza season Quality 111:Pneumonia Vaccination Status For Older Adults: Pneumococcal vaccine (PPSV23) administered on or after patient’s 60th birthday and before the end of the measurement period Paroxysmal atrial fibrillation

## 2023-10-29 PROBLEM — N95.2 VAGINAL ATROPHY: Status: ACTIVE | Noted: 2022-10-21

## 2023-10-29 PROBLEM — R82.71 CHRONIC BACTERIURIA: Status: ACTIVE | Noted: 2022-10-26

## 2023-11-22 RX ORDER — GLIPIZIDE 5 MG/1
5 TABLET, FILM COATED, EXTENDED RELEASE ORAL
Qty: 90 | Refills: 3 | Status: ACTIVE | COMMUNITY
Start: 2021-11-09 | End: 1900-01-01

## 2023-11-22 RX ORDER — APIXABAN 2.5 MG/1
2.5 TABLET, FILM COATED ORAL
Qty: 180 | Refills: 3 | Status: ACTIVE | COMMUNITY
Start: 2021-07-28 | End: 1900-01-01

## 2023-11-24 ENCOUNTER — RX RENEWAL (OUTPATIENT)
Age: 88
End: 2023-11-24

## 2023-12-04 ENCOUNTER — RX RENEWAL (OUTPATIENT)
Age: 88
End: 2023-12-04

## 2023-12-04 RX ORDER — HYDRALAZINE HYDROCHLORIDE 50 MG/1
50 TABLET ORAL TWICE DAILY
Qty: 180 | Refills: 1 | Status: ACTIVE | COMMUNITY
Start: 2021-10-23 | End: 1900-01-01

## 2024-01-04 NOTE — ED PROVIDER NOTE - DATE/TIME 1
Case Management/IDT follow up.   IDT continues to recommend IRF level of care as patient continue to make progress with all therapies. Projected dc date set for 1/16    DC needs: Home health for RN/PT/OT; follow up with pcp     Tc to pt's dtr Yoni; she confirms other sister, Kika is able to stay w/ pt @ time of dc; there is 1 step to enter home; 3 steps w/ one railing to negotiate to get from the laundry into home; dtr reports PT WAS NOT ANXIOUS prior to being hospitalized - this is new for pt.    Plan:  dc 1/16.    19-Jun-2021 01:32

## 2024-01-31 NOTE — PATIENT PROFILE ADULT - LANGUAGE ASSISTANCE NEEDED
Referring Physician: [unfilled]    Procedure: Colonoscopy 23572 GOLYTELY prep need it     Diagnosis:   COLONOSCOPY:   Screening   EGD:  N/A    DUE: mm/dd/year:  1/31/24     BMI over 50: No  There is no height or weight on file to calculate BMI.     Recent (within 6 months) Myocardial Infarction or Stroke: No    Cardiac assistive device (VAD's not including pacemakers or automatic defibrillators):  No    (If yes to any of the above 3 questions, location must be hospital)    Location: [facility]    GI Physician: Any GI Physician  (Select if already established with GI, otherwise indicate Any GI Physician)    Diabetic: YES Oral     Blood Thinners: No    PHENTERMINE: NO      Pharmacy: (obtained at time of scheduling)           Yes-Patient/Caregiver accepts free interpretation services...

## 2024-02-05 ENCOUNTER — APPOINTMENT (OUTPATIENT)
Dept: PULMONOLOGY | Facility: CLINIC | Age: 89
End: 2024-02-05
Payer: MEDICARE

## 2024-02-05 VITALS
OXYGEN SATURATION: 96 % | HEART RATE: 69 BPM | BODY MASS INDEX: 32.36 KG/M2 | SYSTOLIC BLOOD PRESSURE: 146 MMHG | HEIGHT: 57 IN | RESPIRATION RATE: 14 BRPM | TEMPERATURE: 96.6 F | DIASTOLIC BLOOD PRESSURE: 74 MMHG | WEIGHT: 150 LBS

## 2024-02-05 DIAGNOSIS — R06.09 OTHER FORMS OF DYSPNEA: ICD-10-CM

## 2024-02-05 DIAGNOSIS — Z86.79 PERSONAL HISTORY OF OTHER DISEASES OF THE CIRCULATORY SYSTEM: ICD-10-CM

## 2024-02-05 DIAGNOSIS — Z86.39 PERSONAL HISTORY OF OTHER ENDOCRINE, NUTRITIONAL AND METABOLIC DISEASE: ICD-10-CM

## 2024-02-05 PROCEDURE — 99213 OFFICE O/P EST LOW 20 MIN: CPT

## 2024-02-05 NOTE — HISTORY OF PRESENT ILLNESS
[Never] : never [TextBox_4] : Patient is an 88-year-old female past medical history significant for hypertension diabetes high cholesterol COPD who presents for follow-up.  The patient denies any fevers chills chest pain weight loss or hemoptysis.

## 2024-02-05 NOTE — ASSESSMENT
[FreeTextEntry1] : In summary the patient is an 88-year-old obese female past medical history significant for hypertension, diabetes, high cholesterol, COPD who presents for follow-up.  Physical exam is significant for good air entry.  Prescription renewal performed and the patient is instructed to follow-up in 3 months I have instructed the patient to continue using her home oxygen for minimum of 4 hours/day

## 2024-02-05 NOTE — REASON FOR VISIT
[Follow-Up] : a follow-up visit [Cough] : cough [Shortness of Breath] : shortness of breath [Family Member] : family member

## 2024-02-08 ENCOUNTER — APPOINTMENT (OUTPATIENT)
Dept: CARDIOLOGY | Facility: CLINIC | Age: 89
End: 2024-02-08
Payer: MEDICARE

## 2024-02-08 ENCOUNTER — NON-APPOINTMENT (OUTPATIENT)
Age: 89
End: 2024-02-08

## 2024-02-08 VITALS
SYSTOLIC BLOOD PRESSURE: 161 MMHG | TEMPERATURE: 98.6 F | HEART RATE: 65 BPM | DIASTOLIC BLOOD PRESSURE: 62 MMHG | RESPIRATION RATE: 14 BRPM | WEIGHT: 152 LBS | BODY MASS INDEX: 32.79 KG/M2 | OXYGEN SATURATION: 97 % | HEIGHT: 57 IN

## 2024-02-08 PROCEDURE — 93000 ELECTROCARDIOGRAM COMPLETE: CPT

## 2024-02-08 PROCEDURE — 99214 OFFICE O/P EST MOD 30 MIN: CPT | Mod: 25

## 2024-02-10 NOTE — REVIEW OF SYSTEMS
[SOB] : shortness of breath [Dyspnea on exertion] : dyspnea during exertion [Negative] : Heme/Lymph [Chest Discomfort] : no chest discomfort [Leg Claudication] : no intermittent leg claudication [Lower Ext Edema] : no extremity edema [Orthopnea] : no orthopnea [Palpitations] : no palpitations [PND] : no PND

## 2024-02-10 NOTE — HISTORY OF PRESENT ILLNESS
[FreeTextEntry1] : Ryanne BP had been elevated and nifedipine restarted. No sx. Now on glipizide for DM.

## 2024-02-10 NOTE — DISCUSSION/SUMMARY
[EKG obtained to assist in diagnosis and management of assessed problem(s)] : EKG obtained to assist in diagnosis and management of assessed problem(s) [FreeTextEntry1] : The patient  is an 88-year-old female COPD, HTN, PAF, CKD, DM, HFpEF with LE edema. #1 HFpEF- c/w lasix 80mg daily, back on nifedipine, c/w coreg and hydralazine 50mg bid, BP at goal #2 PAF- c/w eliquis 2.5mg and coreg #3 CKD- f/u Dr. Galarza, off [facility], [facility], urine sent for urologist. #4 COPD- at baseline, f/u Dr. Spencer, encouraged to use oxygen at night #5 PVD- f/u Dr. Chan  #6 DM- c/w glipizide

## 2024-03-04 NOTE — DIETITIAN INITIAL EVALUATION ADULT. - WEIGHT (LBS)
Quality 137: Melanoma: Continuity Of Care - Recall System: Patient information entered into a recall system that includes: target date for the next exam specified AND a process to follow up with patients regarding missed or unscheduled appointments Detail Level: Detailed 153

## 2024-03-15 RX ORDER — SODIUM BICARBONATE 650 MG/1
650 TABLET ORAL
Qty: 180 | Refills: 3 | Status: ACTIVE | COMMUNITY
Start: 2021-12-30 | End: 1900-01-01

## 2024-03-19 ENCOUNTER — LABORATORY RESULT (OUTPATIENT)
Age: 89
End: 2024-03-19

## 2024-03-20 ENCOUNTER — LABORATORY RESULT (OUTPATIENT)
Age: 89
End: 2024-03-20

## 2024-03-26 ENCOUNTER — NON-APPOINTMENT (OUTPATIENT)
Age: 89
End: 2024-03-26

## 2024-03-26 ENCOUNTER — APPOINTMENT (OUTPATIENT)
Dept: NEPHROLOGY | Facility: CLINIC | Age: 89
End: 2024-03-26
Payer: MEDICARE

## 2024-03-26 VITALS
TEMPERATURE: 97 F | OXYGEN SATURATION: 97 % | SYSTOLIC BLOOD PRESSURE: 182 MMHG | HEART RATE: 60 BPM | WEIGHT: 155 LBS | DIASTOLIC BLOOD PRESSURE: 64 MMHG | BODY MASS INDEX: 33.44 KG/M2 | HEIGHT: 57 IN

## 2024-03-26 VITALS — DIASTOLIC BLOOD PRESSURE: 60 MMHG | SYSTOLIC BLOOD PRESSURE: 170 MMHG

## 2024-03-26 DIAGNOSIS — N18.4 CHRONIC KIDNEY DISEASE, STAGE 4 (SEVERE): ICD-10-CM

## 2024-03-26 DIAGNOSIS — D63.1 CHRONIC KIDNEY DISEASE, UNSPECIFIED: ICD-10-CM

## 2024-03-26 DIAGNOSIS — E11.9 TYPE 2 DIABETES MELLITUS W/OUT COMPLICATIONS: ICD-10-CM

## 2024-03-26 DIAGNOSIS — I13.0 HYPERTENSIVE HEART AND CHRONIC KIDNEY DISEASE WITH HEART FAILURE AND STAGE 1 THROUGH STAGE 4 CHRONIC KIDNEY DISEASE, OR UNSPECIFIED CHRONIC KIDNEY DISEASE: ICD-10-CM

## 2024-03-26 DIAGNOSIS — N18.9 CHRONIC KIDNEY DISEASE, UNSPECIFIED: ICD-10-CM

## 2024-03-26 PROCEDURE — 99214 OFFICE O/P EST MOD 30 MIN: CPT

## 2024-03-26 PROCEDURE — G2211 COMPLEX E/M VISIT ADD ON: CPT

## 2024-03-26 RX ORDER — ERGOCALCIFEROL (VITAMIN D2) 50 MCG
50 MCG CAPSULE ORAL
Qty: 90 | Refills: 3 | Status: DISCONTINUED | COMMUNITY
Start: 2021-12-07 | End: 2024-03-26

## 2024-03-27 LAB
ALBUMIN SERPL ELPH-MCNC: 4.1 G/DL
ANION GAP SERPL CALC-SCNC: 15 MMOL/L
BUN SERPL-MCNC: 66 MG/DL
CALCIUM SERPL-MCNC: 9.7 MG/DL
CHLORIDE SERPL-SCNC: 102 MMOL/L
CO2 SERPL-SCNC: 22 MMOL/L
CREAT SERPL-MCNC: 2.78 MG/DL
EGFR: 16 ML/MIN/1.73M2
GLUCOSE SERPL-MCNC: 119 MG/DL
PHOSPHATE SERPL-MCNC: 3.2 MG/DL
POTASSIUM SERPL-SCNC: 4.8 MMOL/L
SODIUM SERPL-SCNC: 140 MMOL/L

## 2024-03-27 RX ORDER — FUROSEMIDE 80 MG/1
80 TABLET ORAL
Qty: 90 | Refills: 2 | Status: ACTIVE | COMMUNITY
Start: 2021-10-23

## 2024-03-27 NOTE — REVIEW OF SYSTEMS
[As Noted in HPI] : as noted in HPI [Lower Ext Edema] : lower extremity edema [SOB on Exertion] : shortness of breath during exertion [Negative] : Heme/Lymph [Chest Pain] : no chest pain [Shortness Of Breath] : no shortness of breath [Dysuria] : no dysuria [FreeTextEntry2] : denies complaints-"I feel good" [FreeTextEntry3] : follows with ophthalmologist\par   [FreeTextEntry7] : appetite good [de-identified] : uses walker [de-identified] : Hx DM2

## 2024-03-27 NOTE — HISTORY OF PRESENT ILLNESS
[FreeTextEntry1] : ANI CHESTER is an 88 year female with a history DM2, PVD, HTN, PAF, TIA, asbestosis, HFpEF here for followup of CKD4. Pt lives in a 2 family house with her daughter.   Patient's daughter had spine surgery and had difficulty getting her to appt.  Furosemide 80mg most days. In Feb Dr. Fall increased to 120mg x 3 days which pt reports worked well at lowering BLLE swelling.  Has been eating more plantains lately b/c her son was in town and staying with her.

## 2024-03-27 NOTE — ASSESSMENT
[FreeTextEntry1] : ANI CHESTER is a 87 year female with a history CKD4, cardiorenal syndrome, HTN, and long standing history of DM2.  CKD 4:Creatinine trend reviewed with patient and daughter. Renal function stable.  HTN: BP high today but pt reports home BPs have been acceptable. Nifedepine may be contributing.  Hyperkalemia: recheck K+ today. Resume Low K+ diet. Less plaintains. List reviewed.  Volume status: Weight stable. No extra salt. 1 Liter fluid restriction. Once labs resulted will make diuretic recommendation.  Proteinuria : minimal  Metabolic Acidosis : Continue sodium bicarb 2 tabs daily Anemia Management :  stable Diabetes:trend hba1c UTI: followed with urology- asymptotic bacteriuria.  MBD: iPTH high. WIll continue to monitor. Vit D levels normal. Will continue to monitor.  In order to slow progression, the patient was educated on the importance of good blood pressure, diabetes control and to avoid NSAIDs.  Return to see Dr. Galarza in 4 months. CM BLEVINS.

## 2024-03-27 NOTE — PHYSICAL EXAM
[General Appearance - Alert] : alert [General Appearance - In No Acute Distress] : in no acute distress [Sclera] : the sclera and conjunctiva were normal [Outer Ear] : the ears and nose were normal in appearance [Neck Appearance] : the appearance of the neck was normal [Respiration, Rhythm And Depth] : normal respiratory rhythm and effort [Auscultation Breath Sounds / Voice Sounds] : lungs were clear to auscultation bilaterally [Heart Sounds] : normal S1 and S2 [Heart Rate And Rhythm] : heart rate was normal and rhythm regular [Murmurs] : no murmurs [___ +] : bilateral [unfilled]+ pitting edema to the knees [Bowel Sounds] : normal bowel sounds [Abdomen Soft] : soft [Stooped] : stooped [] : no rash [No Focal Deficits] : no focal deficits [Impaired Insight] : insight and judgment were intact [Oriented To Time, Place, And Person] : oriented to person, place, and time [Affect] : the affect was normal [FreeTextEntry1] : uses walker

## 2024-03-30 NOTE — ED PROCEDURE NOTE - ATTENDING CONTRIBUTION TO CARE
POCUS: Emergency Department Focused Ultrasound performed at patient's bedside.  The complete report will be available in PACS. 03-30    146<H>  |  112<H>  |  6.3<L>  ----------------------------<  94  3.3<L>   |  20.0<L>  |  0.49<L>    Ca    8.0<L>      30 Mar 2024 05:45  Mg     2.0     03-30

## 2024-04-22 ENCOUNTER — APPOINTMENT (OUTPATIENT)
Dept: INTERNAL MEDICINE | Facility: CLINIC | Age: 89
End: 2024-04-22
Payer: MEDICARE

## 2024-04-22 VITALS — DIASTOLIC BLOOD PRESSURE: 60 MMHG | SYSTOLIC BLOOD PRESSURE: 136 MMHG

## 2024-04-22 VITALS
BODY MASS INDEX: 33.44 KG/M2 | TEMPERATURE: 97.6 F | HEART RATE: 67 BPM | HEIGHT: 57 IN | SYSTOLIC BLOOD PRESSURE: 158 MMHG | WEIGHT: 155 LBS | DIASTOLIC BLOOD PRESSURE: 57 MMHG | OXYGEN SATURATION: 99 %

## 2024-04-22 DIAGNOSIS — Z01.818 ENCOUNTER FOR OTHER PREPROCEDURAL EXAMINATION: ICD-10-CM

## 2024-04-22 PROCEDURE — 99213 OFFICE O/P EST LOW 20 MIN: CPT | Mod: 25

## 2024-04-22 PROCEDURE — 36415 COLL VENOUS BLD VENIPUNCTURE: CPT

## 2024-04-22 NOTE — PHYSICAL EXAM
[No Acute Distress] : no acute distress [Well Nourished] : well nourished [Well Developed] : well developed [Well-Appearing] : well-appearing [Normal Sclera/Conjunctiva] : normal sclera/conjunctiva [PERRL] : pupils equal round and reactive to light [EOMI] : extraocular movements intact [Normal Outer Ear/Nose] : the outer ears and nose were normal in appearance [Normal Oropharynx] : the oropharynx was normal [No JVD] : no jugular venous distention [No Lymphadenopathy] : no lymphadenopathy [Supple] : supple [Thyroid Normal, No Nodules] : the thyroid was normal and there were no nodules present [No Respiratory Distress] : no respiratory distress  [No Accessory Muscle Use] : no accessory muscle use [Clear to Auscultation] : lungs were clear to auscultation bilaterally [Normal Rate] : normal rate  [Regular Rhythm] : with a regular rhythm [Normal S1, S2] : normal S1 and S2 [No Murmur] : no murmur heard [No Carotid Bruits] : no carotid bruits [No Abdominal Bruit] : a ~M bruit was not heard ~T in the abdomen [No Varicosities] : no varicosities [Pedal Pulses Present] : the pedal pulses are present [No Palpable Aorta] : no palpable aorta [No Extremity Clubbing/Cyanosis] : no extremity clubbing/cyanosis [Soft] : abdomen soft [Non Tender] : non-tender [Non-distended] : non-distended [No Masses] : no abdominal mass palpated [No HSM] : no HSM [Normal Bowel Sounds] : normal bowel sounds [Normal Posterior Cervical Nodes] : no posterior cervical lymphadenopathy [Normal Anterior Cervical Nodes] : no anterior cervical lymphadenopathy [No CVA Tenderness] : no CVA  tenderness [No Spinal Tenderness] : no spinal tenderness [No Joint Swelling] : no joint swelling [Grossly Normal Strength/Tone] : grossly normal strength/tone [No Rash] : no rash [Coordination Grossly Intact] : coordination grossly intact [No Focal Deficits] : no focal deficits [Normal Gait] : normal gait [Deep Tendon Reflexes (DTR)] : deep tendon reflexes were 2+ and symmetric [Normal Affect] : the affect was normal [Normal Insight/Judgement] : insight and judgment were intact [de-identified] : bilateral edema in LE

## 2024-04-22 NOTE — HISTORY OF PRESENT ILLNESS
[Atrial Fibrillation] : atrial fibrillation [COPD] : COPD [No Adverse Anesthesia Reaction] : no adverse anesthesia reaction in self or family member [(Patient denies any chest pain, claudication, dyspnea on exertion, orthopnea, palpitations or syncope)] : Patient denies any chest pain, claudication, dyspnea on exertion, orthopnea, palpitations or syncope [Family Member] : family member [Chronic Anticoagulation] : chronic anticoagulation [Chronic Kidney Disease] : chronic kidney disease [Diabetes] : diabetes [Aortic Stenosis] : no aortic stenosis [Coronary Artery Disease] : no coronary artery disease [Recent Myocardial Infarction] : no recent myocardial infarction [Implantable Device/Pacemaker] : no implantable device/pacemaker [Asthma] : no asthma [Sleep Apnea] : no sleep apnea [Smoker] : not a smoker [Poor (<4 METs)] : Poor (<4 METs) [FreeTextEntry1] :  dental root canal  [FreeTextEntry2] : 04/26/2024 [FreeTextEntry4] : Patient presents for pre-op clearance, reports feeling well overall.  Reports procedure will be done under local anesthesia.  Patient with COPD, denies SOB at this time, follows with Pulmonology. denies chest pain, coughing, wheezing or hx of KENNETH. denies any allergies to anesthesia or any hx of difficulty to control bleeding.  denies current use of herbal supplements or teas.  Monitors BP at home daily, this morning /65.  Notes edema in lower extremities is manageable with Lasix, follows with Cardiology.

## 2024-04-22 NOTE — ASSESSMENT
[Patient Optimized for Surgery] : Patient optimized for surgery [No Further Testing Recommended] : no further testing recommended [FreeTextEntry4] : BP rechecked, 136/60 --- Check PT, INR, APPT, CBC, CMP. Pt on Eliquis.  Advised to hold Eliquis 2 days prior, and to resume Eliquis 24hrs post procedure.

## 2024-04-23 RX ORDER — UMECLIDINIUM BROMIDE AND VILANTEROL TRIFENATATE 62.5; 25 UG/1; UG/1
62.5-25 POWDER RESPIRATORY (INHALATION)
Qty: 60 | Refills: 10 | Status: ACTIVE | COMMUNITY
Start: 2022-11-17 | End: 1900-01-01

## 2024-04-30 LAB
ALBUMIN SERPL ELPH-MCNC: 3.6 G/DL
ALP BLD-CCNC: 196 U/L
ALT SERPL-CCNC: 14 U/L
ANION GAP SERPL CALC-SCNC: 12 MMOL/L
APTT BLD: 36.9 SEC
AST SERPL-CCNC: 12 U/L
BILIRUB SERPL-MCNC: 0.3 MG/DL
BUN SERPL-MCNC: 91 MG/DL
CALCIUM SERPL-MCNC: 9 MG/DL
CHLORIDE SERPL-SCNC: 104 MMOL/L
CO2 SERPL-SCNC: 22 MMOL/L
CREAT SERPL-MCNC: 2.46 MG/DL
EGFR: 19 ML/MIN/1.73M2
GLUCOSE SERPL-MCNC: 356 MG/DL
HCT VFR BLD CALC: 32.2 %
HGB BLD-MCNC: 10.4 G/DL
INR PPP: 1.3 RATIO
MCHC RBC-ENTMCNC: 32.3 GM/DL
MCHC RBC-ENTMCNC: 32.9 PG
MCV RBC AUTO: 101.9 FL
PLATELET # BLD AUTO: 180 K/UL
POTASSIUM SERPL-SCNC: 5.2 MMOL/L
PROT SERPL-MCNC: 6.4 G/DL
PT BLD: 14.6 SEC
RBC # BLD: 3.16 M/UL
RBC # FLD: 13.4 %
SODIUM SERPL-SCNC: 139 MMOL/L
WBC # FLD AUTO: 5.44 K/UL

## 2024-05-06 ENCOUNTER — RX RENEWAL (OUTPATIENT)
Age: 89
End: 2024-05-06

## 2024-05-06 ENCOUNTER — APPOINTMENT (OUTPATIENT)
Dept: CARDIOLOGY | Facility: CLINIC | Age: 89
End: 2024-05-06
Payer: MEDICARE

## 2024-05-06 ENCOUNTER — APPOINTMENT (OUTPATIENT)
Dept: PULMONOLOGY | Facility: CLINIC | Age: 89
End: 2024-05-06
Payer: MEDICARE

## 2024-05-06 VITALS
RESPIRATION RATE: 14 BRPM | TEMPERATURE: 97.2 F | BODY MASS INDEX: 33.44 KG/M2 | SYSTOLIC BLOOD PRESSURE: 127 MMHG | HEIGHT: 57 IN | OXYGEN SATURATION: 96 % | HEART RATE: 57 BPM | WEIGHT: 155 LBS | DIASTOLIC BLOOD PRESSURE: 68 MMHG

## 2024-05-06 VITALS
HEIGHT: 57 IN | OXYGEN SATURATION: 98 % | SYSTOLIC BLOOD PRESSURE: 127 MMHG | BODY MASS INDEX: 33.44 KG/M2 | DIASTOLIC BLOOD PRESSURE: 68 MMHG | RESPIRATION RATE: 14 BRPM | HEART RATE: 56 BPM | TEMPERATURE: 97.7 F | WEIGHT: 155 LBS

## 2024-05-06 DIAGNOSIS — R91.1 SOLITARY PULMONARY NODULE: ICD-10-CM

## 2024-05-06 DIAGNOSIS — I27.20 PULMONARY HYPERTENSION, UNSPECIFIED: ICD-10-CM

## 2024-05-06 DIAGNOSIS — E11.69 TYPE 2 DIABETES MELLITUS WITH OTHER SPECIFIED COMPLICATION: ICD-10-CM

## 2024-05-06 DIAGNOSIS — J44.9 CHRONIC OBSTRUCTIVE PULMONARY DISEASE, UNSPECIFIED: ICD-10-CM

## 2024-05-06 DIAGNOSIS — Z87.09 PERSONAL HISTORY OF OTHER DISEASES OF THE RESPIRATORY SYSTEM: ICD-10-CM

## 2024-05-06 DIAGNOSIS — I10 ESSENTIAL (PRIMARY) HYPERTENSION: ICD-10-CM

## 2024-05-06 DIAGNOSIS — N18.9 CHRONIC KIDNEY DISEASE, UNSPECIFIED: ICD-10-CM

## 2024-05-06 DIAGNOSIS — I89.0 LYMPHEDEMA, NOT ELSEWHERE CLASSIFIED: ICD-10-CM

## 2024-05-06 DIAGNOSIS — H35.30 UNSPECIFIED MACULAR DEGENERATION: ICD-10-CM

## 2024-05-06 DIAGNOSIS — I48.91 UNSPECIFIED ATRIAL FIBRILLATION: ICD-10-CM

## 2024-05-06 DIAGNOSIS — R06.02 SHORTNESS OF BREATH: ICD-10-CM

## 2024-05-06 PROCEDURE — 93000 ELECTROCARDIOGRAM COMPLETE: CPT

## 2024-05-06 PROCEDURE — G2211 COMPLEX E/M VISIT ADD ON: CPT

## 2024-05-06 PROCEDURE — 99214 OFFICE O/P EST MOD 30 MIN: CPT

## 2024-05-06 PROCEDURE — 99212 OFFICE O/P EST SF 10 MIN: CPT

## 2024-05-06 NOTE — DISCUSSION/SUMMARY
[FreeTextEntry1] : The patient  is an 88-year-old female COPD, HTN, PAF, CKD, DM, HFpEF with LE edema. #1 HFpEF- c/w lasix 80mg daily, nifedipine 60mg, c/w coreg a12.5mg nd hydralazine 50mg bid, BP at goal #2 PAF- c/w eliquis 2.5mg and coreg #3 CKD- f/u Dr. Galarza, off [facility], NaHCO3, #4 COPD- at baseline, f/u Dr. Spencer, uses oxygen at night #5 PVD- f/u Dr. Chan , in wheelchair #6 DM- c/w glipizide  [EKG obtained to assist in diagnosis and management of assessed problem(s)] : EKG obtained to assist in diagnosis and management of assessed problem(s)

## 2024-05-06 NOTE — ASSESSMENT
[FreeTextEntry1] : In summary the patient is an 88-year-old obese female past medical history significant for hypertension, diabetes, high cholesterol, COPD who presents for follow-up.  Physical exam is significant for good air entry.  Prescription renewal performed and the patient is instructed to follow-up in 3 months I have instructed the patient to continue using her home oxygen as needed

## 2024-05-08 ENCOUNTER — RX RENEWAL (OUTPATIENT)
Age: 89
End: 2024-05-08

## 2024-05-28 ENCOUNTER — RX RENEWAL (OUTPATIENT)
Age: 89
End: 2024-05-28

## 2024-05-28 RX ORDER — BLOOD SUGAR DIAGNOSTIC
STRIP MISCELLANEOUS 3 TIMES DAILY
Qty: 3 | Refills: 3 | Status: ACTIVE | COMMUNITY
Start: 2022-10-19 | End: 1900-01-01

## 2024-05-30 RX ORDER — NIFEDIPINE 60 MG/1
60 TABLET, EXTENDED RELEASE ORAL DAILY
Qty: 90 | Refills: 2 | Status: ACTIVE | COMMUNITY
Start: 2023-11-22 | End: 1900-01-01

## 2024-07-23 ENCOUNTER — APPOINTMENT (OUTPATIENT)
Dept: NEPHROLOGY | Facility: CLINIC | Age: 89
End: 2024-07-23

## 2024-07-29 ENCOUNTER — RX RENEWAL (OUTPATIENT)
Age: 89
End: 2024-07-29

## 2024-08-15 ENCOUNTER — APPOINTMENT (OUTPATIENT)
Dept: CARDIOLOGY | Facility: CLINIC | Age: 89
End: 2024-08-15

## 2024-08-16 NOTE — DISCUSSION/SUMMARY
[FreeTextEntry1] : The patient  is an 89-year-old female COPD, HTN, PAF, CKD, DM, HFpEF with LE edema. #1 HFpEF- c/w lasix 80mg daily, nifedipine 60mg, c/w coreg 12.5mg and hydralazine 50mg bid, BP at goal #2 PAF- c/w eliquis 2.5mg and coreg #3 CKD- f/u Dr. Galarza, off [facility], NaHCO3, #4 COPD- at baseline, f/u Dr. Spencer, uses oxygen at night #5 PVD- f/u Dr. Chan , in wheelchair #6 DM- c/w glipizide

## 2024-10-07 ENCOUNTER — APPOINTMENT (OUTPATIENT)
Dept: INTERNAL MEDICINE | Facility: CLINIC | Age: 89
End: 2024-10-07
Payer: MEDICARE

## 2024-10-07 VITALS
DIASTOLIC BLOOD PRESSURE: 68 MMHG | BODY MASS INDEX: 33.44 KG/M2 | HEART RATE: 69 BPM | TEMPERATURE: 97.9 F | HEIGHT: 57 IN | OXYGEN SATURATION: 96 % | SYSTOLIC BLOOD PRESSURE: 149 MMHG | WEIGHT: 155 LBS

## 2024-10-07 DIAGNOSIS — Z87.448 PERSONAL HISTORY OF OTHER DISEASES OF URINARY SYSTEM: ICD-10-CM

## 2024-10-07 DIAGNOSIS — J44.9 CHRONIC OBSTRUCTIVE PULMONARY DISEASE, UNSPECIFIED: ICD-10-CM

## 2024-10-07 DIAGNOSIS — I48.91 UNSPECIFIED ATRIAL FIBRILLATION: ICD-10-CM

## 2024-10-07 DIAGNOSIS — Z86.39 PERSONAL HISTORY OF OTHER ENDOCRINE, NUTRITIONAL AND METABOLIC DISEASE: ICD-10-CM

## 2024-10-07 DIAGNOSIS — N18.4 CHRONIC KIDNEY DISEASE, STAGE 4 (SEVERE): ICD-10-CM

## 2024-10-07 DIAGNOSIS — Z00.00 ENCOUNTER FOR GENERAL ADULT MEDICAL EXAMINATION W/OUT ABNORMAL FINDINGS: ICD-10-CM

## 2024-10-07 DIAGNOSIS — N95.2 POSTMENOPAUSAL ATROPHIC VAGINITIS: ICD-10-CM

## 2024-10-07 PROCEDURE — 90662 IIV NO PRSV INCREASED AG IM: CPT

## 2024-10-07 PROCEDURE — G0008: CPT

## 2024-10-07 PROCEDURE — G0439: CPT

## 2024-10-07 RX ORDER — BENZONATATE 100 MG/1
100 CAPSULE ORAL
Qty: 21 | Refills: 0 | Status: ACTIVE | COMMUNITY
Start: 2024-10-07 | End: 1900-01-01

## 2024-10-08 ENCOUNTER — MED ADMIN CHARGE (OUTPATIENT)
Age: 89
End: 2024-10-08

## 2024-10-14 LAB
25(OH)D3 SERPL-MCNC: 36.2 NG/ML
ALBUMIN SERPL ELPH-MCNC: 3.9 G/DL
ALP BLD-CCNC: 218 U/L
ALT SERPL-CCNC: 10 U/L
ANION GAP SERPL CALC-SCNC: 14 MMOL/L
AST SERPL-CCNC: 18 U/L
BASOPHILS # BLD AUTO: 0.05 K/UL
BASOPHILS NFR BLD AUTO: 0.9 %
BILIRUB SERPL-MCNC: 0.3 MG/DL
BUN SERPL-MCNC: 73 MG/DL
CALCIUM SERPL-MCNC: 9.4 MG/DL
CALCIUM SERPL-MCNC: 9.4 MG/DL
CHLORIDE SERPL-SCNC: 103 MMOL/L
CHOLEST SERPL-MCNC: 169 MG/DL
CO2 SERPL-SCNC: 22 MMOL/L
CREAT SERPL-MCNC: 3.26 MG/DL
EGFR: 13 ML/MIN/1.73M2
EOSINOPHIL # BLD AUTO: 0.24 K/UL
EOSINOPHIL NFR BLD AUTO: 4.2 %
ESTIMATED AVERAGE GLUCOSE: 123 MG/DL
GLUCOSE SERPL-MCNC: 129 MG/DL
HBA1C MFR BLD HPLC: 5.9 %
HCT VFR BLD CALC: 30.8 %
HDLC SERPL-MCNC: 58 MG/DL
HGB BLD-MCNC: 10.1 G/DL
IMM GRANULOCYTES NFR BLD AUTO: 0.4 %
LDLC SERPL CALC-MCNC: 99 MG/DL
LYMPHOCYTES # BLD AUTO: 1.11 K/UL
LYMPHOCYTES NFR BLD AUTO: 19.6 %
MAGNESIUM SERPL-MCNC: 2.4 MG/DL
MAN DIFF?: NORMAL
MCHC RBC-ENTMCNC: 32.8 GM/DL
MCHC RBC-ENTMCNC: 33.3 PG
MCV RBC AUTO: 101.7 FL
MONOCYTES # BLD AUTO: 0.7 K/UL
MONOCYTES NFR BLD AUTO: 12.4 %
NEUTROPHILS # BLD AUTO: 3.54 K/UL
NEUTROPHILS NFR BLD AUTO: 62.5 %
NONHDLC SERPL-MCNC: 111 MG/DL
PARATHYROID HORMONE INTACT: 547 PG/ML
PHOSPHATE SERPL-MCNC: 4.6 MG/DL
PLATELET # BLD AUTO: 205 K/UL
POTASSIUM SERPL-SCNC: 4.7 MMOL/L
PROT SERPL-MCNC: 7.2 G/DL
RBC # BLD: 3.03 M/UL
RBC # FLD: 13.3 %
SODIUM SERPL-SCNC: 139 MMOL/L
TRIGL SERPL-MCNC: 62 MG/DL
TSH SERPL-ACNC: 1.63 UIU/ML
VIT B12 SERPL-MCNC: 435 PG/ML
WBC # FLD AUTO: 5.66 K/UL

## 2024-10-23 ENCOUNTER — APPOINTMENT (OUTPATIENT)
Age: 89
End: 2024-10-23

## 2024-11-04 ENCOUNTER — RX RENEWAL (OUTPATIENT)
Age: 89
End: 2024-11-04

## 2024-11-04 RX ORDER — NIFEDIPINE 90 MG/1
90 TABLET, EXTENDED RELEASE ORAL
Qty: 90 | Refills: 2 | Status: ACTIVE | COMMUNITY
Start: 2024-11-04 | End: 1900-01-01

## 2024-11-06 ENCOUNTER — APPOINTMENT (OUTPATIENT)
Dept: PULMONOLOGY | Facility: CLINIC | Age: 89
End: 2024-11-06

## 2024-11-06 DIAGNOSIS — I27.20 PULMONARY HYPERTENSION, UNSPECIFIED: ICD-10-CM

## 2024-11-06 DIAGNOSIS — R06.02 SHORTNESS OF BREATH: ICD-10-CM

## 2024-11-06 DIAGNOSIS — R91.1 SOLITARY PULMONARY NODULE: ICD-10-CM

## 2024-11-06 DIAGNOSIS — I48.91 UNSPECIFIED ATRIAL FIBRILLATION: ICD-10-CM

## 2024-12-11 ENCOUNTER — RX RENEWAL (OUTPATIENT)
Age: 89
End: 2024-12-11

## 2024-12-13 ENCOUNTER — RX RENEWAL (OUTPATIENT)
Age: 89
End: 2024-12-13

## 2025-01-01 ENCOUNTER — INPATIENT (INPATIENT)
Facility: HOSPITAL | Age: 89
LOS: 22 days | DRG: 871 | End: 2025-02-06
Attending: STUDENT IN AN ORGANIZED HEALTH CARE EDUCATION/TRAINING PROGRAM | Admitting: HOSPITALIST
Payer: MEDICARE

## 2025-01-01 ENCOUNTER — TRANSCRIPTION ENCOUNTER (OUTPATIENT)
Age: 89
End: 2025-01-01

## 2025-01-01 VITALS
WEIGHT: 154.98 LBS | OXYGEN SATURATION: 98 % | HEART RATE: 79 BPM | SYSTOLIC BLOOD PRESSURE: 159 MMHG | DIASTOLIC BLOOD PRESSURE: 64 MMHG | RESPIRATION RATE: 18 BRPM | HEIGHT: 59 IN

## 2025-01-01 VITALS — HEART RATE: 42 BPM | RESPIRATION RATE: 20 BRPM | OXYGEN SATURATION: 89 %

## 2025-01-01 DIAGNOSIS — I48.20 CHRONIC ATRIAL FIBRILLATION, UNSPECIFIED: ICD-10-CM

## 2025-01-01 DIAGNOSIS — I50.32 CHRONIC DIASTOLIC (CONGESTIVE) HEART FAILURE: ICD-10-CM

## 2025-01-01 DIAGNOSIS — L03.116 CELLULITIS OF LEFT LOWER LIMB: ICD-10-CM

## 2025-01-01 DIAGNOSIS — S81.809A UNSPECIFIED OPEN WOUND, UNSPECIFIED LOWER LEG, INITIAL ENCOUNTER: ICD-10-CM

## 2025-01-01 DIAGNOSIS — E11.9 TYPE 2 DIABETES MELLITUS WITHOUT COMPLICATIONS: ICD-10-CM

## 2025-01-01 DIAGNOSIS — R06.02 SHORTNESS OF BREATH: ICD-10-CM

## 2025-01-01 DIAGNOSIS — I73.9 PERIPHERAL VASCULAR DISEASE, UNSPECIFIED: ICD-10-CM

## 2025-01-01 DIAGNOSIS — T68.XXXA HYPOTHERMIA, INITIAL ENCOUNTER: ICD-10-CM

## 2025-01-01 DIAGNOSIS — I50.33 ACUTE ON CHRONIC DIASTOLIC (CONGESTIVE) HEART FAILURE: ICD-10-CM

## 2025-01-01 DIAGNOSIS — E87.5 HYPERKALEMIA: ICD-10-CM

## 2025-01-01 DIAGNOSIS — Z90.710 ACQUIRED ABSENCE OF BOTH CERVIX AND UTERUS: Chronic | ICD-10-CM

## 2025-01-01 DIAGNOSIS — N17.9 ACUTE KIDNEY FAILURE, UNSPECIFIED: ICD-10-CM

## 2025-01-01 DIAGNOSIS — R11.2 NAUSEA WITH VOMITING, UNSPECIFIED: ICD-10-CM

## 2025-01-01 DIAGNOSIS — J84.9 INTERSTITIAL PULMONARY DISEASE, UNSPECIFIED: ICD-10-CM

## 2025-01-01 DIAGNOSIS — Z87.09 PERSONAL HISTORY OF OTHER DISEASES OF THE RESPIRATORY SYSTEM: ICD-10-CM

## 2025-01-01 DIAGNOSIS — Z51.5 ENCOUNTER FOR PALLIATIVE CARE: ICD-10-CM

## 2025-01-01 DIAGNOSIS — J18.9 PNEUMONIA, UNSPECIFIED ORGANISM: ICD-10-CM

## 2025-01-01 DIAGNOSIS — R06.00 DYSPNEA, UNSPECIFIED: ICD-10-CM

## 2025-01-01 DIAGNOSIS — Z29.9 ENCOUNTER FOR PROPHYLACTIC MEASURES, UNSPECIFIED: ICD-10-CM

## 2025-01-01 DIAGNOSIS — Z71.89 OTHER SPECIFIED COUNSELING: ICD-10-CM

## 2025-01-01 DIAGNOSIS — N18.4 CHRONIC KIDNEY DISEASE, STAGE 4 (SEVERE): ICD-10-CM

## 2025-01-01 DIAGNOSIS — Z79.899 OTHER LONG TERM (CURRENT) DRUG THERAPY: ICD-10-CM

## 2025-01-01 LAB
-  AMOXICILLIN/CLAVULANIC ACID: SIGNIFICANT CHANGE UP
-  AMPICILLIN/SULBACTAM: SIGNIFICANT CHANGE UP
-  AMPICILLIN: SIGNIFICANT CHANGE UP
-  AMPICILLIN: SIGNIFICANT CHANGE UP
-  AZTREONAM: SIGNIFICANT CHANGE UP
-  CEFAZOLIN: SIGNIFICANT CHANGE UP
-  CEFEPIME: SIGNIFICANT CHANGE UP
-  CEFOXITIN: SIGNIFICANT CHANGE UP
-  CEFTRIAXONE: SIGNIFICANT CHANGE UP
-  CEFUROXIME: SIGNIFICANT CHANGE UP
-  CIPROFLOXACIN: SIGNIFICANT CHANGE UP
-  CIPROFLOXACIN: SIGNIFICANT CHANGE UP
-  ERTAPENEM: SIGNIFICANT CHANGE UP
-  GENTAMICIN: SIGNIFICANT CHANGE UP
-  IMIPENEM: SIGNIFICANT CHANGE UP
-  LEVOFLOXACIN: SIGNIFICANT CHANGE UP
-  LEVOFLOXACIN: SIGNIFICANT CHANGE UP
-  MEROPENEM: SIGNIFICANT CHANGE UP
-  NITROFURANTOIN: SIGNIFICANT CHANGE UP
-  NITROFURANTOIN: SIGNIFICANT CHANGE UP
-  PIPERACILLIN/TAZOBACTAM: SIGNIFICANT CHANGE UP
-  TETRACYCLINE: SIGNIFICANT CHANGE UP
-  TOBRAMYCIN: SIGNIFICANT CHANGE UP
-  TRIMETHOPRIM/SULFAMETHOXAZOLE: SIGNIFICANT CHANGE UP
-  VANCOMYCIN: SIGNIFICANT CHANGE UP
A1C WITH ESTIMATED AVERAGE GLUCOSE RESULT: 5.9 % — HIGH (ref 4–5.6)
ACANTHOCYTES BLD QL SMEAR: SLIGHT — SIGNIFICANT CHANGE UP
ADD ON TEST-SPECIMEN IN LAB: SIGNIFICANT CHANGE UP
ALBUMIN SERPL ELPH-MCNC: 2.9 G/DL — LOW (ref 3.3–5)
ALBUMIN SERPL ELPH-MCNC: 3.1 G/DL — LOW (ref 3.3–5)
ALBUMIN SERPL ELPH-MCNC: 3.1 G/DL — LOW (ref 3.3–5)
ALBUMIN SERPL ELPH-MCNC: 3.7 G/DL — SIGNIFICANT CHANGE UP (ref 3.3–5)
ALP SERPL-CCNC: 156 U/L — HIGH (ref 40–120)
ALP SERPL-CCNC: 193 U/L — HIGH (ref 40–120)
ALP SERPL-CCNC: 221 U/L — HIGH (ref 40–120)
ALP SERPL-CCNC: 268 U/L — HIGH (ref 40–120)
ALT FLD-CCNC: 10 U/L — SIGNIFICANT CHANGE UP (ref 10–45)
ALT FLD-CCNC: 14 U/L — SIGNIFICANT CHANGE UP (ref 10–45)
ALT FLD-CCNC: 16 U/L — SIGNIFICANT CHANGE UP (ref 10–45)
ALT FLD-CCNC: 8 U/L — LOW (ref 10–45)
AMORPH URATE CRY # URNS: PRESENT
ANION GAP SERPL CALC-SCNC: 11 MMOL/L — SIGNIFICANT CHANGE UP (ref 5–17)
ANION GAP SERPL CALC-SCNC: 12 MMOL/L — SIGNIFICANT CHANGE UP (ref 5–17)
ANION GAP SERPL CALC-SCNC: 13 MMOL/L — SIGNIFICANT CHANGE UP (ref 5–17)
ANION GAP SERPL CALC-SCNC: 14 MMOL/L — SIGNIFICANT CHANGE UP (ref 5–17)
ANION GAP SERPL CALC-SCNC: 15 MMOL/L — SIGNIFICANT CHANGE UP (ref 5–17)
ANION GAP SERPL CALC-SCNC: 15 MMOL/L — SIGNIFICANT CHANGE UP (ref 5–17)
ANION GAP SERPL CALC-SCNC: 16 MMOL/L — SIGNIFICANT CHANGE UP (ref 5–17)
ANION GAP SERPL CALC-SCNC: 16 MMOL/L — SIGNIFICANT CHANGE UP (ref 5–17)
ANION GAP SERPL CALC-SCNC: 18 MMOL/L — HIGH (ref 5–17)
APPEARANCE UR: ABNORMAL
APPEARANCE UR: CLEAR — SIGNIFICANT CHANGE UP
APTT BLD: 118.5 SEC — HIGH (ref 24.5–35.6)
APTT BLD: 133.6 SEC — CRITICAL HIGH (ref 24.5–35.6)
APTT BLD: 170.9 SEC — CRITICAL HIGH (ref 24.5–35.6)
APTT BLD: 36.2 SEC — HIGH (ref 24.5–35.6)
APTT BLD: 39 SEC — HIGH (ref 24.5–35.6)
APTT BLD: 41.9 SEC — HIGH (ref 24.5–35.6)
APTT BLD: 42.4 SEC — HIGH (ref 24.5–35.6)
APTT BLD: 44.1 SEC — HIGH (ref 24.5–35.6)
APTT BLD: 89.6 SEC — HIGH (ref 24.5–35.6)
AST SERPL-CCNC: 13 U/L — SIGNIFICANT CHANGE UP (ref 10–40)
AST SERPL-CCNC: 14 U/L — SIGNIFICANT CHANGE UP (ref 10–40)
AST SERPL-CCNC: 21 U/L — SIGNIFICANT CHANGE UP (ref 10–40)
AST SERPL-CCNC: 23 U/L — SIGNIFICANT CHANGE UP (ref 10–40)
BACTERIA # UR AUTO: ABNORMAL /HPF
BACTERIA # UR AUTO: NEGATIVE /HPF — SIGNIFICANT CHANGE UP
BASE EXCESS BLDA CALC-SCNC: 0.5 MMOL/L — SIGNIFICANT CHANGE UP (ref -2–3)
BASE EXCESS BLDA CALC-SCNC: 1.9 MMOL/L — SIGNIFICANT CHANGE UP (ref -2–3)
BASE EXCESS BLDA CALC-SCNC: 3.7 MMOL/L — HIGH (ref -2–3)
BASO STIPL BLD QL SMEAR: PRESENT — SIGNIFICANT CHANGE UP
BASOPHILS # BLD AUTO: 0.02 K/UL — SIGNIFICANT CHANGE UP (ref 0–0.2)
BASOPHILS # BLD AUTO: 0.03 K/UL — SIGNIFICANT CHANGE UP (ref 0–0.2)
BASOPHILS # BLD AUTO: 0.06 K/UL — SIGNIFICANT CHANGE UP (ref 0–0.2)
BASOPHILS NFR BLD AUTO: 0.4 % — SIGNIFICANT CHANGE UP (ref 0–2)
BASOPHILS NFR BLD AUTO: 0.6 % — SIGNIFICANT CHANGE UP (ref 0–2)
BASOPHILS NFR BLD AUTO: 0.9 % — SIGNIFICANT CHANGE UP (ref 0–2)
BILIRUB SERPL-MCNC: 0.2 MG/DL — SIGNIFICANT CHANGE UP (ref 0.2–1.2)
BILIRUB SERPL-MCNC: 0.2 MG/DL — SIGNIFICANT CHANGE UP (ref 0.2–1.2)
BILIRUB SERPL-MCNC: 0.3 MG/DL — SIGNIFICANT CHANGE UP (ref 0.2–1.2)
BILIRUB SERPL-MCNC: 0.3 MG/DL — SIGNIFICANT CHANGE UP (ref 0.2–1.2)
BILIRUB UR-MCNC: NEGATIVE — SIGNIFICANT CHANGE UP
BILIRUB UR-MCNC: NEGATIVE — SIGNIFICANT CHANGE UP
BLD GP AB SCN SERPL QL: NEGATIVE — SIGNIFICANT CHANGE UP
BUN SERPL-MCNC: 103 MG/DL — HIGH (ref 7–23)
BUN SERPL-MCNC: 109 MG/DL — HIGH (ref 7–23)
BUN SERPL-MCNC: 110 MG/DL — HIGH (ref 7–23)
BUN SERPL-MCNC: 118 MG/DL — HIGH (ref 7–23)
BUN SERPL-MCNC: 123 MG/DL — HIGH (ref 7–23)
BUN SERPL-MCNC: 26 MG/DL — HIGH (ref 7–23)
BUN SERPL-MCNC: 26 MG/DL — HIGH (ref 7–23)
BUN SERPL-MCNC: 36 MG/DL — HIGH (ref 7–23)
BUN SERPL-MCNC: 46 MG/DL — HIGH (ref 7–23)
BUN SERPL-MCNC: 46 MG/DL — HIGH (ref 7–23)
BUN SERPL-MCNC: 49 MG/DL — HIGH (ref 7–23)
BUN SERPL-MCNC: 66 MG/DL — HIGH (ref 7–23)
BUN SERPL-MCNC: 68 MG/DL — HIGH (ref 7–23)
BUN SERPL-MCNC: 75 MG/DL — HIGH (ref 7–23)
BUN SERPL-MCNC: 82 MG/DL — HIGH (ref 7–23)
BUN SERPL-MCNC: 82 MG/DL — HIGH (ref 7–23)
BUN SERPL-MCNC: 86 MG/DL — HIGH (ref 7–23)
BUN SERPL-MCNC: 87 MG/DL — HIGH (ref 7–23)
BUN SERPL-MCNC: 92 MG/DL — HIGH (ref 7–23)
BUN SERPL-MCNC: 96 MG/DL — HIGH (ref 7–23)
BUN SERPL-MCNC: 99 MG/DL — HIGH (ref 7–23)
CALCIUM SERPL-MCNC: 8.6 MG/DL — SIGNIFICANT CHANGE UP (ref 8.4–10.5)
CALCIUM SERPL-MCNC: 8.8 MG/DL — SIGNIFICANT CHANGE UP (ref 8.4–10.5)
CALCIUM SERPL-MCNC: 8.8 MG/DL — SIGNIFICANT CHANGE UP (ref 8.4–10.5)
CALCIUM SERPL-MCNC: 8.9 MG/DL — SIGNIFICANT CHANGE UP (ref 8.4–10.5)
CALCIUM SERPL-MCNC: 9 MG/DL — SIGNIFICANT CHANGE UP (ref 8.4–10.5)
CALCIUM SERPL-MCNC: 9.1 MG/DL — SIGNIFICANT CHANGE UP (ref 8.4–10.5)
CALCIUM SERPL-MCNC: 9.1 MG/DL — SIGNIFICANT CHANGE UP (ref 8.4–10.5)
CALCIUM SERPL-MCNC: 9.2 MG/DL — SIGNIFICANT CHANGE UP (ref 8.4–10.5)
CALCIUM SERPL-MCNC: 9.3 MG/DL — SIGNIFICANT CHANGE UP (ref 8.4–10.5)
CALCIUM SERPL-MCNC: 9.3 MG/DL — SIGNIFICANT CHANGE UP (ref 8.4–10.5)
CALCIUM SERPL-MCNC: 9.4 MG/DL — SIGNIFICANT CHANGE UP (ref 8.4–10.5)
CALCIUM SERPL-MCNC: 9.5 MG/DL — SIGNIFICANT CHANGE UP (ref 8.4–10.5)
CAST: 3 /LPF — SIGNIFICANT CHANGE UP (ref 0–4)
CAST: 4 /LPF — SIGNIFICANT CHANGE UP (ref 0–4)
CHLORIDE SERPL-SCNC: 100 MMOL/L — SIGNIFICANT CHANGE UP (ref 96–108)
CHLORIDE SERPL-SCNC: 101 MMOL/L — SIGNIFICANT CHANGE UP (ref 96–108)
CHLORIDE SERPL-SCNC: 102 MMOL/L — SIGNIFICANT CHANGE UP (ref 96–108)
CHLORIDE SERPL-SCNC: 104 MMOL/L — SIGNIFICANT CHANGE UP (ref 96–108)
CHLORIDE SERPL-SCNC: 105 MMOL/L — SIGNIFICANT CHANGE UP (ref 96–108)
CHLORIDE SERPL-SCNC: 107 MMOL/L — SIGNIFICANT CHANGE UP (ref 96–108)
CHLORIDE SERPL-SCNC: 98 MMOL/L — SIGNIFICANT CHANGE UP (ref 96–108)
CHLORIDE SERPL-SCNC: 99 MMOL/L — SIGNIFICANT CHANGE UP (ref 96–108)
CHLORIDE SERPL-SCNC: 99 MMOL/L — SIGNIFICANT CHANGE UP (ref 96–108)
CO2 BLDA-SCNC: 32 MMOL/L — HIGH (ref 19–24)
CO2 BLDA-SCNC: 33 MMOL/L — HIGH (ref 19–24)
CO2 BLDA-SCNC: 34 MMOL/L — HIGH (ref 19–24)
CO2 SERPL-SCNC: 17 MMOL/L — LOW (ref 22–31)
CO2 SERPL-SCNC: 19 MMOL/L — LOW (ref 22–31)
CO2 SERPL-SCNC: 20 MMOL/L — LOW (ref 22–31)
CO2 SERPL-SCNC: 20 MMOL/L — LOW (ref 22–31)
CO2 SERPL-SCNC: 21 MMOL/L — LOW (ref 22–31)
CO2 SERPL-SCNC: 21 MMOL/L — LOW (ref 22–31)
CO2 SERPL-SCNC: 22 MMOL/L — SIGNIFICANT CHANGE UP (ref 22–31)
CO2 SERPL-SCNC: 23 MMOL/L — SIGNIFICANT CHANGE UP (ref 22–31)
CO2 SERPL-SCNC: 24 MMOL/L — SIGNIFICANT CHANGE UP (ref 22–31)
CO2 SERPL-SCNC: 25 MMOL/L — SIGNIFICANT CHANGE UP (ref 22–31)
CO2 SERPL-SCNC: 26 MMOL/L — SIGNIFICANT CHANGE UP (ref 22–31)
CO2 SERPL-SCNC: 26 MMOL/L — SIGNIFICANT CHANGE UP (ref 22–31)
CO2 SERPL-SCNC: 27 MMOL/L — SIGNIFICANT CHANGE UP (ref 22–31)
CO2 SERPL-SCNC: 28 MMOL/L — SIGNIFICANT CHANGE UP (ref 22–31)
COLOR SPEC: YELLOW — SIGNIFICANT CHANGE UP
COLOR SPEC: YELLOW — SIGNIFICANT CHANGE UP
CREAT ?TM UR-MCNC: 88 MG/DL — SIGNIFICANT CHANGE UP
CREAT SERPL-MCNC: 2.64 MG/DL — HIGH (ref 0.5–1.3)
CREAT SERPL-MCNC: 2.72 MG/DL — HIGH (ref 0.5–1.3)
CREAT SERPL-MCNC: 2.86 MG/DL — HIGH (ref 0.5–1.3)
CREAT SERPL-MCNC: 2.91 MG/DL — HIGH (ref 0.5–1.3)
CREAT SERPL-MCNC: 2.91 MG/DL — HIGH (ref 0.5–1.3)
CREAT SERPL-MCNC: 3 MG/DL — HIGH (ref 0.5–1.3)
CREAT SERPL-MCNC: 3.43 MG/DL — HIGH (ref 0.5–1.3)
CREAT SERPL-MCNC: 3.64 MG/DL — HIGH (ref 0.5–1.3)
CREAT SERPL-MCNC: 3.84 MG/DL — HIGH (ref 0.5–1.3)
CREAT SERPL-MCNC: 3.9 MG/DL — HIGH (ref 0.5–1.3)
CREAT SERPL-MCNC: 3.91 MG/DL — HIGH (ref 0.5–1.3)
CREAT SERPL-MCNC: 4.03 MG/DL — HIGH (ref 0.5–1.3)
CREAT SERPL-MCNC: 4.07 MG/DL — HIGH (ref 0.5–1.3)
CREAT SERPL-MCNC: 4.12 MG/DL — HIGH (ref 0.5–1.3)
CREAT SERPL-MCNC: 4.25 MG/DL — HIGH (ref 0.5–1.3)
CREAT SERPL-MCNC: 4.36 MG/DL — HIGH (ref 0.5–1.3)
CREAT SERPL-MCNC: 4.5 MG/DL — HIGH (ref 0.5–1.3)
CREAT SERPL-MCNC: 4.51 MG/DL — HIGH (ref 0.5–1.3)
CREAT SERPL-MCNC: 4.69 MG/DL — HIGH (ref 0.5–1.3)
CREAT SERPL-MCNC: 4.87 MG/DL — HIGH (ref 0.5–1.3)
CREAT SERPL-MCNC: 5.29 MG/DL — HIGH (ref 0.5–1.3)
CRP SERPL-MCNC: 13 MG/L — HIGH (ref 0–4)
CULTURE RESULTS: ABNORMAL
CULTURE RESULTS: ABNORMAL
CULTURE RESULTS: SIGNIFICANT CHANGE UP
CULTURE RESULTS: SIGNIFICANT CHANGE UP
DACRYOCYTES BLD QL SMEAR: SLIGHT — SIGNIFICANT CHANGE UP
DIFF PNL FLD: NEGATIVE — SIGNIFICANT CHANGE UP
DIFF PNL FLD: NEGATIVE — SIGNIFICANT CHANGE UP
EGFR: 10 ML/MIN/1.73M2 — LOW
EGFR: 11 ML/MIN/1.73M2 — LOW
EGFR: 12 ML/MIN/1.73M2 — LOW
EGFR: 12 ML/MIN/1.73M2 — LOW
EGFR: 14 ML/MIN/1.73M2 — LOW
EGFR: 15 ML/MIN/1.73M2 — LOW
EGFR: 16 ML/MIN/1.73M2 — LOW
EGFR: 17 ML/MIN/1.73M2 — LOW
EGFR: 7 ML/MIN/1.73M2 — LOW
EGFR: 8 ML/MIN/1.73M2 — LOW
EGFR: 8 ML/MIN/1.73M2 — LOW
EGFR: 9 ML/MIN/1.73M2 — LOW
EOSINOPHIL # BLD AUTO: 0 K/UL — SIGNIFICANT CHANGE UP (ref 0–0.5)
EOSINOPHIL # BLD AUTO: 0.13 K/UL — SIGNIFICANT CHANGE UP (ref 0–0.5)
EOSINOPHIL # BLD AUTO: 0.2 K/UL — SIGNIFICANT CHANGE UP (ref 0–0.5)
EOSINOPHIL NFR BLD AUTO: 0 % — SIGNIFICANT CHANGE UP (ref 0–6)
EOSINOPHIL NFR BLD AUTO: 2.8 % — SIGNIFICANT CHANGE UP (ref 0–6)
EOSINOPHIL NFR BLD AUTO: 4.3 % — SIGNIFICANT CHANGE UP (ref 0–6)
ERYTHROCYTE [SEDIMENTATION RATE] IN BLOOD: 97 MM/HR — HIGH (ref 0–20)
ESTIMATED AVERAGE GLUCOSE: 123 MG/DL — HIGH (ref 68–114)
FERRITIN SERPL-MCNC: 143 NG/ML — SIGNIFICANT CHANGE UP (ref 13–330)
FERRITIN SERPL-MCNC: 53 NG/ML — SIGNIFICANT CHANGE UP (ref 13–330)
FLUAV AG NPH QL: SIGNIFICANT CHANGE UP
FLUAV AG NPH QL: SIGNIFICANT CHANGE UP
FLUBV AG NPH QL: SIGNIFICANT CHANGE UP
FLUBV AG NPH QL: SIGNIFICANT CHANGE UP
FOLATE RBC-MCNC: 1358 NG/ML — SIGNIFICANT CHANGE UP (ref 499–1504)
GAS PNL BLDA: SIGNIFICANT CHANGE UP
GAS PNL BLDA: SIGNIFICANT CHANGE UP
GAS PNL BLDV: SIGNIFICANT CHANGE UP
GAS PNL BLDV: SIGNIFICANT CHANGE UP
GGT SERPL-CCNC: 48 U/L — HIGH (ref 8–40)
GLUCOSE BLDC GLUCOMTR-MCNC: 101 MG/DL — HIGH (ref 70–99)
GLUCOSE BLDC GLUCOMTR-MCNC: 103 MG/DL — HIGH (ref 70–99)
GLUCOSE BLDC GLUCOMTR-MCNC: 108 MG/DL — HIGH (ref 70–99)
GLUCOSE BLDC GLUCOMTR-MCNC: 109 MG/DL — HIGH (ref 70–99)
GLUCOSE BLDC GLUCOMTR-MCNC: 110 MG/DL — HIGH (ref 70–99)
GLUCOSE BLDC GLUCOMTR-MCNC: 116 MG/DL — HIGH (ref 70–99)
GLUCOSE BLDC GLUCOMTR-MCNC: 117 MG/DL — HIGH (ref 70–99)
GLUCOSE BLDC GLUCOMTR-MCNC: 118 MG/DL — HIGH (ref 70–99)
GLUCOSE BLDC GLUCOMTR-MCNC: 120 MG/DL — HIGH (ref 70–99)
GLUCOSE BLDC GLUCOMTR-MCNC: 121 MG/DL — HIGH (ref 70–99)
GLUCOSE BLDC GLUCOMTR-MCNC: 121 MG/DL — HIGH (ref 70–99)
GLUCOSE BLDC GLUCOMTR-MCNC: 122 MG/DL — HIGH (ref 70–99)
GLUCOSE BLDC GLUCOMTR-MCNC: 127 MG/DL — HIGH (ref 70–99)
GLUCOSE BLDC GLUCOMTR-MCNC: 128 MG/DL — HIGH (ref 70–99)
GLUCOSE BLDC GLUCOMTR-MCNC: 128 MG/DL — HIGH (ref 70–99)
GLUCOSE BLDC GLUCOMTR-MCNC: 130 MG/DL — HIGH (ref 70–99)
GLUCOSE BLDC GLUCOMTR-MCNC: 134 MG/DL — HIGH (ref 70–99)
GLUCOSE BLDC GLUCOMTR-MCNC: 136 MG/DL — HIGH (ref 70–99)
GLUCOSE BLDC GLUCOMTR-MCNC: 137 MG/DL — HIGH (ref 70–99)
GLUCOSE BLDC GLUCOMTR-MCNC: 139 MG/DL — HIGH (ref 70–99)
GLUCOSE BLDC GLUCOMTR-MCNC: 143 MG/DL — HIGH (ref 70–99)
GLUCOSE BLDC GLUCOMTR-MCNC: 146 MG/DL — HIGH (ref 70–99)
GLUCOSE BLDC GLUCOMTR-MCNC: 147 MG/DL — HIGH (ref 70–99)
GLUCOSE BLDC GLUCOMTR-MCNC: 147 MG/DL — HIGH (ref 70–99)
GLUCOSE BLDC GLUCOMTR-MCNC: 153 MG/DL — HIGH (ref 70–99)
GLUCOSE BLDC GLUCOMTR-MCNC: 156 MG/DL — HIGH (ref 70–99)
GLUCOSE BLDC GLUCOMTR-MCNC: 156 MG/DL — HIGH (ref 70–99)
GLUCOSE BLDC GLUCOMTR-MCNC: 157 MG/DL — HIGH (ref 70–99)
GLUCOSE BLDC GLUCOMTR-MCNC: 162 MG/DL — HIGH (ref 70–99)
GLUCOSE BLDC GLUCOMTR-MCNC: 163 MG/DL — HIGH (ref 70–99)
GLUCOSE BLDC GLUCOMTR-MCNC: 164 MG/DL — HIGH (ref 70–99)
GLUCOSE BLDC GLUCOMTR-MCNC: 165 MG/DL — HIGH (ref 70–99)
GLUCOSE BLDC GLUCOMTR-MCNC: 166 MG/DL — HIGH (ref 70–99)
GLUCOSE BLDC GLUCOMTR-MCNC: 168 MG/DL — HIGH (ref 70–99)
GLUCOSE BLDC GLUCOMTR-MCNC: 169 MG/DL — HIGH (ref 70–99)
GLUCOSE BLDC GLUCOMTR-MCNC: 172 MG/DL — HIGH (ref 70–99)
GLUCOSE BLDC GLUCOMTR-MCNC: 176 MG/DL — HIGH (ref 70–99)
GLUCOSE BLDC GLUCOMTR-MCNC: 177 MG/DL — HIGH (ref 70–99)
GLUCOSE BLDC GLUCOMTR-MCNC: 178 MG/DL — HIGH (ref 70–99)
GLUCOSE BLDC GLUCOMTR-MCNC: 179 MG/DL — HIGH (ref 70–99)
GLUCOSE BLDC GLUCOMTR-MCNC: 181 MG/DL — HIGH (ref 70–99)
GLUCOSE BLDC GLUCOMTR-MCNC: 182 MG/DL — HIGH (ref 70–99)
GLUCOSE BLDC GLUCOMTR-MCNC: 182 MG/DL — HIGH (ref 70–99)
GLUCOSE BLDC GLUCOMTR-MCNC: 186 MG/DL — HIGH (ref 70–99)
GLUCOSE BLDC GLUCOMTR-MCNC: 186 MG/DL — HIGH (ref 70–99)
GLUCOSE BLDC GLUCOMTR-MCNC: 188 MG/DL — HIGH (ref 70–99)
GLUCOSE BLDC GLUCOMTR-MCNC: 192 MG/DL — HIGH (ref 70–99)
GLUCOSE BLDC GLUCOMTR-MCNC: 197 MG/DL — HIGH (ref 70–99)
GLUCOSE BLDC GLUCOMTR-MCNC: 198 MG/DL — HIGH (ref 70–99)
GLUCOSE BLDC GLUCOMTR-MCNC: 199 MG/DL — HIGH (ref 70–99)
GLUCOSE BLDC GLUCOMTR-MCNC: 201 MG/DL — HIGH (ref 70–99)
GLUCOSE BLDC GLUCOMTR-MCNC: 207 MG/DL — HIGH (ref 70–99)
GLUCOSE BLDC GLUCOMTR-MCNC: 208 MG/DL — HIGH (ref 70–99)
GLUCOSE BLDC GLUCOMTR-MCNC: 211 MG/DL — HIGH (ref 70–99)
GLUCOSE BLDC GLUCOMTR-MCNC: 212 MG/DL — HIGH (ref 70–99)
GLUCOSE BLDC GLUCOMTR-MCNC: 213 MG/DL — HIGH (ref 70–99)
GLUCOSE BLDC GLUCOMTR-MCNC: 219 MG/DL — HIGH (ref 70–99)
GLUCOSE BLDC GLUCOMTR-MCNC: 223 MG/DL — HIGH (ref 70–99)
GLUCOSE BLDC GLUCOMTR-MCNC: 226 MG/DL — HIGH (ref 70–99)
GLUCOSE BLDC GLUCOMTR-MCNC: 230 MG/DL — HIGH (ref 70–99)
GLUCOSE BLDC GLUCOMTR-MCNC: 231 MG/DL — HIGH (ref 70–99)
GLUCOSE BLDC GLUCOMTR-MCNC: 232 MG/DL — HIGH (ref 70–99)
GLUCOSE BLDC GLUCOMTR-MCNC: 233 MG/DL — HIGH (ref 70–99)
GLUCOSE BLDC GLUCOMTR-MCNC: 237 MG/DL — HIGH (ref 70–99)
GLUCOSE BLDC GLUCOMTR-MCNC: 243 MG/DL — HIGH (ref 70–99)
GLUCOSE BLDC GLUCOMTR-MCNC: 251 MG/DL — HIGH (ref 70–99)
GLUCOSE BLDC GLUCOMTR-MCNC: 261 MG/DL — HIGH (ref 70–99)
GLUCOSE BLDC GLUCOMTR-MCNC: 262 MG/DL — HIGH (ref 70–99)
GLUCOSE BLDC GLUCOMTR-MCNC: 269 MG/DL — HIGH (ref 70–99)
GLUCOSE BLDC GLUCOMTR-MCNC: 277 MG/DL — HIGH (ref 70–99)
GLUCOSE BLDC GLUCOMTR-MCNC: 279 MG/DL — HIGH (ref 70–99)
GLUCOSE BLDC GLUCOMTR-MCNC: 281 MG/DL — HIGH (ref 70–99)
GLUCOSE BLDC GLUCOMTR-MCNC: 292 MG/DL — HIGH (ref 70–99)
GLUCOSE BLDC GLUCOMTR-MCNC: 293 MG/DL — HIGH (ref 70–99)
GLUCOSE BLDC GLUCOMTR-MCNC: 325 MG/DL — HIGH (ref 70–99)
GLUCOSE BLDC GLUCOMTR-MCNC: 73 MG/DL — SIGNIFICANT CHANGE UP (ref 70–99)
GLUCOSE BLDC GLUCOMTR-MCNC: 74 MG/DL — SIGNIFICANT CHANGE UP (ref 70–99)
GLUCOSE BLDC GLUCOMTR-MCNC: 79 MG/DL — SIGNIFICANT CHANGE UP (ref 70–99)
GLUCOSE BLDC GLUCOMTR-MCNC: 89 MG/DL — SIGNIFICANT CHANGE UP (ref 70–99)
GLUCOSE BLDC GLUCOMTR-MCNC: 95 MG/DL — SIGNIFICANT CHANGE UP (ref 70–99)
GLUCOSE SERPL-MCNC: 116 MG/DL — HIGH (ref 70–99)
GLUCOSE SERPL-MCNC: 131 MG/DL — HIGH (ref 70–99)
GLUCOSE SERPL-MCNC: 149 MG/DL — HIGH (ref 70–99)
GLUCOSE SERPL-MCNC: 152 MG/DL — HIGH (ref 70–99)
GLUCOSE SERPL-MCNC: 153 MG/DL — HIGH (ref 70–99)
GLUCOSE SERPL-MCNC: 160 MG/DL — HIGH (ref 70–99)
GLUCOSE SERPL-MCNC: 163 MG/DL — HIGH (ref 70–99)
GLUCOSE SERPL-MCNC: 164 MG/DL — HIGH (ref 70–99)
GLUCOSE SERPL-MCNC: 177 MG/DL — HIGH (ref 70–99)
GLUCOSE SERPL-MCNC: 179 MG/DL — HIGH (ref 70–99)
GLUCOSE SERPL-MCNC: 185 MG/DL — HIGH (ref 70–99)
GLUCOSE SERPL-MCNC: 190 MG/DL — HIGH (ref 70–99)
GLUCOSE SERPL-MCNC: 191 MG/DL — HIGH (ref 70–99)
GLUCOSE SERPL-MCNC: 192 MG/DL — HIGH (ref 70–99)
GLUCOSE SERPL-MCNC: 205 MG/DL — HIGH (ref 70–99)
GLUCOSE SERPL-MCNC: 209 MG/DL — HIGH (ref 70–99)
GLUCOSE SERPL-MCNC: 216 MG/DL — HIGH (ref 70–99)
GLUCOSE SERPL-MCNC: 347 MG/DL — HIGH (ref 70–99)
GLUCOSE SERPL-MCNC: 74 MG/DL — SIGNIFICANT CHANGE UP (ref 70–99)
GLUCOSE SERPL-MCNC: 94 MG/DL — SIGNIFICANT CHANGE UP (ref 70–99)
GLUCOSE SERPL-MCNC: 96 MG/DL — SIGNIFICANT CHANGE UP (ref 70–99)
GLUCOSE UR QL: 100 MG/DL
GLUCOSE UR QL: NEGATIVE MG/DL — SIGNIFICANT CHANGE UP
GRAM STN FLD: ABNORMAL
HBV SURFACE AG SER-ACNC: SIGNIFICANT CHANGE UP
HCO3 BLDA-SCNC: 30 MMOL/L — HIGH (ref 21–28)
HCO3 BLDA-SCNC: 31 MMOL/L — HIGH (ref 21–28)
HCO3 BLDA-SCNC: 32 MMOL/L — HIGH (ref 21–28)
HCT VFR BLD CALC: 25.9 % — LOW (ref 34.5–45)
HCT VFR BLD CALC: 26.6 % — LOW (ref 34.5–45)
HCT VFR BLD CALC: 26.7 % — LOW (ref 34.5–45)
HCT VFR BLD CALC: 27.6 % — LOW (ref 34.5–45)
HCT VFR BLD CALC: 28.1 % — LOW (ref 34.5–45)
HCT VFR BLD CALC: 28.2 % — LOW (ref 34.5–45)
HCT VFR BLD CALC: 28.2 % — LOW (ref 34.5–45)
HCT VFR BLD CALC: 28.3 % — LOW (ref 34.5–45)
HCT VFR BLD CALC: 28.3 % — LOW (ref 34.5–45)
HCT VFR BLD CALC: 29.3 % — LOW (ref 34.5–45)
HCT VFR BLD CALC: 29.5 % — LOW (ref 34.5–45)
HCT VFR BLD CALC: 29.7 % — LOW (ref 34.5–45)
HCT VFR BLD CALC: 29.8 % — LOW (ref 34.5–45)
HCT VFR BLD CALC: 30 % — LOW (ref 34.5–45)
HCT VFR BLD CALC: 30 % — LOW (ref 34.5–45)
HCT VFR BLD CALC: 30.5 % — LOW (ref 34.5–45)
HCT VFR BLD CALC: 30.6 % — LOW (ref 34.5–45)
HCT VFR BLD CALC: 31.6 % — LOW (ref 34.5–45)
HCT VFR BLD CALC: 32.1 % — LOW (ref 34.5–45)
HCT VFR BLD CALC: 33.2 % — LOW (ref 34.5–45)
HGB BLD-MCNC: 10 G/DL — LOW (ref 11.5–15.5)
HGB BLD-MCNC: 10.5 G/DL — LOW (ref 11.5–15.5)
HGB BLD-MCNC: 8.1 G/DL — LOW (ref 11.5–15.5)
HGB BLD-MCNC: 8.3 G/DL — LOW (ref 11.5–15.5)
HGB BLD-MCNC: 8.4 G/DL — LOW (ref 11.5–15.5)
HGB BLD-MCNC: 8.5 G/DL — LOW (ref 11.5–15.5)
HGB BLD-MCNC: 8.6 G/DL — LOW (ref 11.5–15.5)
HGB BLD-MCNC: 8.8 G/DL — LOW (ref 11.5–15.5)
HGB BLD-MCNC: 8.9 G/DL — LOW (ref 11.5–15.5)
HGB BLD-MCNC: 9 G/DL — LOW (ref 11.5–15.5)
HGB BLD-MCNC: 9.3 G/DL — LOW (ref 11.5–15.5)
HGB BLD-MCNC: 9.6 G/DL — LOW (ref 11.5–15.5)
HGB BLD-MCNC: 9.7 G/DL — LOW (ref 11.5–15.5)
HGB BLD-MCNC: 9.9 G/DL — LOW (ref 11.5–15.5)
HOROWITZ INDEX BLDA+IHG-RTO: 100 — SIGNIFICANT CHANGE UP
HOROWITZ INDEX BLDA+IHG-RTO: 100 — SIGNIFICANT CHANGE UP
HOROWITZ INDEX BLDA+IHG-RTO: 44 — SIGNIFICANT CHANGE UP
HYPOCHROMIA BLD QL: SLIGHT — SIGNIFICANT CHANGE UP
IMM GRANULOCYTES NFR BLD AUTO: 0.2 % — SIGNIFICANT CHANGE UP (ref 0–0.9)
IMM GRANULOCYTES NFR BLD AUTO: 0.2 % — SIGNIFICANT CHANGE UP (ref 0–0.9)
INR BLD: 1.27 RATIO — HIGH (ref 0.85–1.16)
INR BLD: 1.32 RATIO — HIGH (ref 0.85–1.16)
INR BLD: 1.38 RATIO — HIGH (ref 0.85–1.16)
INR BLD: 1.39 RATIO — HIGH (ref 0.85–1.16)
IRON SATN MFR SERPL: 14 % — SIGNIFICANT CHANGE UP (ref 14–50)
IRON SATN MFR SERPL: 27 % — SIGNIFICANT CHANGE UP (ref 14–50)
IRON SATN MFR SERPL: 31 UG/DL — SIGNIFICANT CHANGE UP (ref 30–160)
IRON SATN MFR SERPL: 65 UG/DL — SIGNIFICANT CHANGE UP (ref 30–160)
KETONES UR-MCNC: NEGATIVE MG/DL — SIGNIFICANT CHANGE UP
KETONES UR-MCNC: NEGATIVE MG/DL — SIGNIFICANT CHANGE UP
LEGIONELLA AG UR QL: NEGATIVE — SIGNIFICANT CHANGE UP
LEUKOCYTE ESTERASE UR-ACNC: ABNORMAL
LEUKOCYTE ESTERASE UR-ACNC: NEGATIVE — SIGNIFICANT CHANGE UP
LYMPHOCYTES # BLD AUTO: 0.27 K/UL — LOW (ref 1–3.3)
LYMPHOCYTES # BLD AUTO: 0.65 K/UL — LOW (ref 1–3.3)
LYMPHOCYTES # BLD AUTO: 0.76 K/UL — LOW (ref 1–3.3)
LYMPHOCYTES # BLD AUTO: 14.1 % — SIGNIFICANT CHANGE UP (ref 13–44)
LYMPHOCYTES # BLD AUTO: 16.3 % — SIGNIFICANT CHANGE UP (ref 13–44)
LYMPHOCYTES # BLD AUTO: 4.3 % — LOW (ref 13–44)
M PNEUMO IGM SER-ACNC: 0.25 INDEX — SIGNIFICANT CHANGE UP (ref 0–0.9)
MACROCYTES BLD QL: SIGNIFICANT CHANGE UP
MAGNESIUM SERPL-MCNC: 2.2 MG/DL — SIGNIFICANT CHANGE UP (ref 1.6–2.6)
MAGNESIUM SERPL-MCNC: 2.3 MG/DL — SIGNIFICANT CHANGE UP (ref 1.6–2.6)
MAGNESIUM SERPL-MCNC: 2.4 MG/DL — SIGNIFICANT CHANGE UP (ref 1.6–2.6)
MAGNESIUM SERPL-MCNC: 2.4 MG/DL — SIGNIFICANT CHANGE UP (ref 1.6–2.6)
MAGNESIUM SERPL-MCNC: 2.5 MG/DL — SIGNIFICANT CHANGE UP (ref 1.6–2.6)
MAGNESIUM SERPL-MCNC: 2.6 MG/DL — SIGNIFICANT CHANGE UP (ref 1.6–2.6)
MAGNESIUM SERPL-MCNC: 2.6 MG/DL — SIGNIFICANT CHANGE UP (ref 1.6–2.6)
MAGNESIUM SERPL-MCNC: 2.7 MG/DL — HIGH (ref 1.6–2.6)
MAGNESIUM SERPL-MCNC: 2.8 MG/DL — HIGH (ref 1.6–2.6)
MAGNESIUM SERPL-MCNC: 2.9 MG/DL — HIGH (ref 1.6–2.6)
MAGNESIUM SERPL-MCNC: 3 MG/DL — HIGH (ref 1.6–2.6)
MANUAL SMEAR VERIFICATION: SIGNIFICANT CHANGE UP
MCHC RBC-ENTMCNC: 29.3 G/DL — LOW (ref 32–36)
MCHC RBC-ENTMCNC: 29.8 G/DL — LOW (ref 32–36)
MCHC RBC-ENTMCNC: 29.9 G/DL — LOW (ref 32–36)
MCHC RBC-ENTMCNC: 30 G/DL — LOW (ref 32–36)
MCHC RBC-ENTMCNC: 30.2 G/DL — LOW (ref 32–36)
MCHC RBC-ENTMCNC: 30.4 G/DL — LOW (ref 32–36)
MCHC RBC-ENTMCNC: 30.5 G/DL — LOW (ref 32–36)
MCHC RBC-ENTMCNC: 30.8 G/DL — LOW (ref 32–36)
MCHC RBC-ENTMCNC: 31.1 G/DL — LOW (ref 32–36)
MCHC RBC-ENTMCNC: 31.2 G/DL — LOW (ref 32–36)
MCHC RBC-ENTMCNC: 31.3 G/DL — LOW (ref 32–36)
MCHC RBC-ENTMCNC: 31.3 G/DL — LOW (ref 32–36)
MCHC RBC-ENTMCNC: 31.4 G/DL — LOW (ref 32–36)
MCHC RBC-ENTMCNC: 31.5 PG — SIGNIFICANT CHANGE UP (ref 27–34)
MCHC RBC-ENTMCNC: 31.6 G/DL — LOW (ref 32–36)
MCHC RBC-ENTMCNC: 31.7 PG — SIGNIFICANT CHANGE UP (ref 27–34)
MCHC RBC-ENTMCNC: 31.9 G/DL — LOW (ref 32–36)
MCHC RBC-ENTMCNC: 32 G/DL — SIGNIFICANT CHANGE UP (ref 32–36)
MCHC RBC-ENTMCNC: 32.3 PG — SIGNIFICANT CHANGE UP (ref 27–34)
MCHC RBC-ENTMCNC: 32.3 PG — SIGNIFICANT CHANGE UP (ref 27–34)
MCHC RBC-ENTMCNC: 32.4 G/DL — SIGNIFICANT CHANGE UP (ref 32–36)
MCHC RBC-ENTMCNC: 32.4 PG — SIGNIFICANT CHANGE UP (ref 27–34)
MCHC RBC-ENTMCNC: 32.5 PG — SIGNIFICANT CHANGE UP (ref 27–34)
MCHC RBC-ENTMCNC: 32.6 PG — SIGNIFICANT CHANGE UP (ref 27–34)
MCHC RBC-ENTMCNC: 32.7 PG — SIGNIFICANT CHANGE UP (ref 27–34)
MCHC RBC-ENTMCNC: 32.7 PG — SIGNIFICANT CHANGE UP (ref 27–34)
MCHC RBC-ENTMCNC: 32.8 PG — SIGNIFICANT CHANGE UP (ref 27–34)
MCHC RBC-ENTMCNC: 32.8 PG — SIGNIFICANT CHANGE UP (ref 27–34)
MCHC RBC-ENTMCNC: 32.9 PG — SIGNIFICANT CHANGE UP (ref 27–34)
MCHC RBC-ENTMCNC: 32.9 PG — SIGNIFICANT CHANGE UP (ref 27–34)
MCHC RBC-ENTMCNC: 33.1 PG — SIGNIFICANT CHANGE UP (ref 27–34)
MCHC RBC-ENTMCNC: 33.2 PG — SIGNIFICANT CHANGE UP (ref 27–34)
MCV RBC AUTO: 101.3 FL — HIGH (ref 80–100)
MCV RBC AUTO: 102.2 FL — HIGH (ref 80–100)
MCV RBC AUTO: 102.2 FL — HIGH (ref 80–100)
MCV RBC AUTO: 102.7 FL — HIGH (ref 80–100)
MCV RBC AUTO: 102.9 FL — HIGH (ref 80–100)
MCV RBC AUTO: 103.3 FL — HIGH (ref 80–100)
MCV RBC AUTO: 103.4 FL — HIGH (ref 80–100)
MCV RBC AUTO: 103.9 FL — HIGH (ref 80–100)
MCV RBC AUTO: 104 FL — HIGH (ref 80–100)
MCV RBC AUTO: 104.1 FL — HIGH (ref 80–100)
MCV RBC AUTO: 105.1 FL — HIGH (ref 80–100)
MCV RBC AUTO: 105.2 FL — HIGH (ref 80–100)
MCV RBC AUTO: 105.7 FL — HIGH (ref 80–100)
MCV RBC AUTO: 106.4 FL — HIGH (ref 80–100)
MCV RBC AUTO: 107.8 FL — HIGH (ref 80–100)
MCV RBC AUTO: 108.4 FL — HIGH (ref 80–100)
MCV RBC AUTO: 109.2 FL — HIGH (ref 80–100)
MCV RBC AUTO: 109.7 FL — HIGH (ref 80–100)
MCV RBC AUTO: 110.7 FL — HIGH (ref 80–100)
METHOD TYPE: SIGNIFICANT CHANGE UP
METHOD TYPE: SIGNIFICANT CHANGE UP
MONOCYTES # BLD AUTO: 0.46 K/UL — SIGNIFICANT CHANGE UP (ref 0–0.9)
MONOCYTES # BLD AUTO: 0.48 K/UL — SIGNIFICANT CHANGE UP (ref 0–0.9)
MONOCYTES # BLD AUTO: 0.68 K/UL — SIGNIFICANT CHANGE UP (ref 0–0.9)
MONOCYTES NFR BLD AUTO: 10 % — SIGNIFICANT CHANGE UP (ref 2–14)
MONOCYTES NFR BLD AUTO: 14.6 % — HIGH (ref 2–14)
MONOCYTES NFR BLD AUTO: 7.8 % — SIGNIFICANT CHANGE UP (ref 2–14)
MRSA PCR RESULT.: SIGNIFICANT CHANGE UP
MYCOPLASMA AG SPEC QL: NEGATIVE — SIGNIFICANT CHANGE UP
NEUTROPHILS # BLD AUTO: 2.99 K/UL — SIGNIFICANT CHANGE UP (ref 1.8–7.4)
NEUTROPHILS # BLD AUTO: 3.34 K/UL — SIGNIFICANT CHANGE UP (ref 1.8–7.4)
NEUTROPHILS # BLD AUTO: 5.39 K/UL — SIGNIFICANT CHANGE UP (ref 1.8–7.4)
NEUTROPHILS NFR BLD AUTO: 64.2 % — SIGNIFICANT CHANGE UP (ref 43–77)
NEUTROPHILS NFR BLD AUTO: 72.3 % — SIGNIFICANT CHANGE UP (ref 43–77)
NEUTROPHILS NFR BLD AUTO: 87 % — HIGH (ref 43–77)
NITRITE UR-MCNC: NEGATIVE — SIGNIFICANT CHANGE UP
NITRITE UR-MCNC: NEGATIVE — SIGNIFICANT CHANGE UP
NRBC # BLD: 0 /100 WBCS — SIGNIFICANT CHANGE UP (ref 0–0)
NRBC BLD-RTO: 0 /100 WBCS — SIGNIFICANT CHANGE UP (ref 0–0)
NT-PROBNP SERPL-SCNC: 4483 PG/ML — HIGH (ref 0–300)
ORGANISM # SPEC MICROSCOPIC CNT: ABNORMAL
OVALOCYTES BLD QL SMEAR: SLIGHT — SIGNIFICANT CHANGE UP
PCO2 BLDA: 54 MMHG — HIGH (ref 32–45)
PCO2 BLDA: 76 MMHG — CRITICAL HIGH (ref 32–45)
PCO2 BLDA: 79 MMHG — CRITICAL HIGH (ref 32–45)
PH BLDA: 7.2 — CRITICAL LOW (ref 7.35–7.45)
PH BLDA: 7.23 — LOW (ref 7.35–7.45)
PH BLDA: 7.36 — SIGNIFICANT CHANGE UP (ref 7.35–7.45)
PH UR: 5 — SIGNIFICANT CHANGE UP (ref 5–8)
PH UR: 7.5 — SIGNIFICANT CHANGE UP (ref 5–8)
PHOSPHATE SERPL-MCNC: 3.7 MG/DL — SIGNIFICANT CHANGE UP (ref 2.5–4.5)
PHOSPHATE SERPL-MCNC: 4 MG/DL — SIGNIFICANT CHANGE UP (ref 2.5–4.5)
PHOSPHATE SERPL-MCNC: 4.1 MG/DL — SIGNIFICANT CHANGE UP (ref 2.5–4.5)
PHOSPHATE SERPL-MCNC: 4.5 MG/DL — SIGNIFICANT CHANGE UP (ref 2.5–4.5)
PHOSPHATE SERPL-MCNC: 4.6 MG/DL — HIGH (ref 2.5–4.5)
PHOSPHATE SERPL-MCNC: 4.7 MG/DL — HIGH (ref 2.5–4.5)
PHOSPHATE SERPL-MCNC: 5.1 MG/DL — HIGH (ref 2.5–4.5)
PHOSPHATE SERPL-MCNC: 5.3 MG/DL — HIGH (ref 2.5–4.5)
PHOSPHATE SERPL-MCNC: 5.5 MG/DL — HIGH (ref 2.5–4.5)
PHOSPHATE SERPL-MCNC: 5.7 MG/DL — HIGH (ref 2.5–4.5)
PHOSPHATE SERPL-MCNC: 5.8 MG/DL — HIGH (ref 2.5–4.5)
PHOSPHATE SERPL-MCNC: 5.8 MG/DL — HIGH (ref 2.5–4.5)
PHOSPHATE SERPL-MCNC: 6.3 MG/DL — HIGH (ref 2.5–4.5)
PHOSPHATE SERPL-MCNC: 6.3 MG/DL — HIGH (ref 2.5–4.5)
PHOSPHATE SERPL-MCNC: 6.4 MG/DL — HIGH (ref 2.5–4.5)
PHOSPHATE SERPL-MCNC: 6.5 MG/DL — HIGH (ref 2.5–4.5)
PHOSPHATE SERPL-MCNC: 6.7 MG/DL — HIGH (ref 2.5–4.5)
PLAT MORPH BLD: NORMAL — SIGNIFICANT CHANGE UP
PLATELET # BLD AUTO: 179 K/UL — SIGNIFICANT CHANGE UP (ref 150–400)
PLATELET # BLD AUTO: 180 K/UL — SIGNIFICANT CHANGE UP (ref 150–400)
PLATELET # BLD AUTO: 183 K/UL — SIGNIFICANT CHANGE UP (ref 150–400)
PLATELET # BLD AUTO: 188 K/UL — SIGNIFICANT CHANGE UP (ref 150–400)
PLATELET # BLD AUTO: 189 K/UL — SIGNIFICANT CHANGE UP (ref 150–400)
PLATELET # BLD AUTO: 202 K/UL — SIGNIFICANT CHANGE UP (ref 150–400)
PLATELET # BLD AUTO: 208 K/UL — SIGNIFICANT CHANGE UP (ref 150–400)
PLATELET # BLD AUTO: 208 K/UL — SIGNIFICANT CHANGE UP (ref 150–400)
PLATELET # BLD AUTO: 210 K/UL — SIGNIFICANT CHANGE UP (ref 150–400)
PLATELET # BLD AUTO: 214 K/UL — SIGNIFICANT CHANGE UP (ref 150–400)
PLATELET # BLD AUTO: 214 K/UL — SIGNIFICANT CHANGE UP (ref 150–400)
PLATELET # BLD AUTO: 218 K/UL — SIGNIFICANT CHANGE UP (ref 150–400)
PLATELET # BLD AUTO: 218 K/UL — SIGNIFICANT CHANGE UP (ref 150–400)
PLATELET # BLD AUTO: 220 K/UL — SIGNIFICANT CHANGE UP (ref 150–400)
PLATELET # BLD AUTO: 220 K/UL — SIGNIFICANT CHANGE UP (ref 150–400)
PLATELET # BLD AUTO: 223 K/UL — SIGNIFICANT CHANGE UP (ref 150–400)
PLATELET # BLD AUTO: 223 K/UL — SIGNIFICANT CHANGE UP (ref 150–400)
PLATELET # BLD AUTO: 239 K/UL — SIGNIFICANT CHANGE UP (ref 150–400)
PLATELET # BLD AUTO: 240 K/UL — SIGNIFICANT CHANGE UP (ref 150–400)
PO2 BLDA: 178 MMHG — HIGH (ref 83–108)
PO2 BLDA: 66 MMHG — LOW (ref 83–108)
PO2 BLDA: 95 MMHG — SIGNIFICANT CHANGE UP (ref 83–108)
POLYCHROMASIA BLD QL SMEAR: SLIGHT — SIGNIFICANT CHANGE UP
POTASSIUM SERPL-MCNC: 4 MMOL/L — SIGNIFICANT CHANGE UP (ref 3.5–5.3)
POTASSIUM SERPL-MCNC: 4.2 MMOL/L — SIGNIFICANT CHANGE UP (ref 3.5–5.3)
POTASSIUM SERPL-MCNC: 4.3 MMOL/L — SIGNIFICANT CHANGE UP (ref 3.5–5.3)
POTASSIUM SERPL-MCNC: 4.5 MMOL/L — SIGNIFICANT CHANGE UP (ref 3.5–5.3)
POTASSIUM SERPL-MCNC: 4.5 MMOL/L — SIGNIFICANT CHANGE UP (ref 3.5–5.3)
POTASSIUM SERPL-MCNC: 4.6 MMOL/L — SIGNIFICANT CHANGE UP (ref 3.5–5.3)
POTASSIUM SERPL-MCNC: 4.7 MMOL/L — SIGNIFICANT CHANGE UP (ref 3.5–5.3)
POTASSIUM SERPL-MCNC: 4.8 MMOL/L — SIGNIFICANT CHANGE UP (ref 3.5–5.3)
POTASSIUM SERPL-MCNC: 5.1 MMOL/L — SIGNIFICANT CHANGE UP (ref 3.5–5.3)
POTASSIUM SERPL-MCNC: 5.1 MMOL/L — SIGNIFICANT CHANGE UP (ref 3.5–5.3)
POTASSIUM SERPL-MCNC: 5.2 MMOL/L — SIGNIFICANT CHANGE UP (ref 3.5–5.3)
POTASSIUM SERPL-MCNC: 5.3 MMOL/L — SIGNIFICANT CHANGE UP (ref 3.5–5.3)
POTASSIUM SERPL-MCNC: 5.4 MMOL/L — HIGH (ref 3.5–5.3)
POTASSIUM SERPL-MCNC: 5.5 MMOL/L — HIGH (ref 3.5–5.3)
POTASSIUM SERPL-MCNC: 5.6 MMOL/L — HIGH (ref 3.5–5.3)
POTASSIUM SERPL-MCNC: 5.6 MMOL/L — HIGH (ref 3.5–5.3)
POTASSIUM SERPL-SCNC: 4 MMOL/L — SIGNIFICANT CHANGE UP (ref 3.5–5.3)
POTASSIUM SERPL-SCNC: 4.2 MMOL/L — SIGNIFICANT CHANGE UP (ref 3.5–5.3)
POTASSIUM SERPL-SCNC: 4.3 MMOL/L — SIGNIFICANT CHANGE UP (ref 3.5–5.3)
POTASSIUM SERPL-SCNC: 4.5 MMOL/L — SIGNIFICANT CHANGE UP (ref 3.5–5.3)
POTASSIUM SERPL-SCNC: 4.5 MMOL/L — SIGNIFICANT CHANGE UP (ref 3.5–5.3)
POTASSIUM SERPL-SCNC: 4.6 MMOL/L — SIGNIFICANT CHANGE UP (ref 3.5–5.3)
POTASSIUM SERPL-SCNC: 4.7 MMOL/L — SIGNIFICANT CHANGE UP (ref 3.5–5.3)
POTASSIUM SERPL-SCNC: 4.8 MMOL/L — SIGNIFICANT CHANGE UP (ref 3.5–5.3)
POTASSIUM SERPL-SCNC: 5.1 MMOL/L — SIGNIFICANT CHANGE UP (ref 3.5–5.3)
POTASSIUM SERPL-SCNC: 5.1 MMOL/L — SIGNIFICANT CHANGE UP (ref 3.5–5.3)
POTASSIUM SERPL-SCNC: 5.2 MMOL/L — SIGNIFICANT CHANGE UP (ref 3.5–5.3)
POTASSIUM SERPL-SCNC: 5.3 MMOL/L — SIGNIFICANT CHANGE UP (ref 3.5–5.3)
POTASSIUM SERPL-SCNC: 5.4 MMOL/L — HIGH (ref 3.5–5.3)
POTASSIUM SERPL-SCNC: 5.5 MMOL/L — HIGH (ref 3.5–5.3)
POTASSIUM SERPL-SCNC: 5.6 MMOL/L — HIGH (ref 3.5–5.3)
POTASSIUM SERPL-SCNC: 5.6 MMOL/L — HIGH (ref 3.5–5.3)
PROT SERPL-MCNC: 6.6 G/DL — SIGNIFICANT CHANGE UP (ref 6–8.3)
PROT SERPL-MCNC: 6.6 G/DL — SIGNIFICANT CHANGE UP (ref 6–8.3)
PROT SERPL-MCNC: 6.9 G/DL — SIGNIFICANT CHANGE UP (ref 6–8.3)
PROT SERPL-MCNC: 8 G/DL — SIGNIFICANT CHANGE UP (ref 6–8.3)
PROT UR-MCNC: 30 MG/DL
PROT UR-MCNC: NEGATIVE MG/DL — SIGNIFICANT CHANGE UP
PROTHROM AB SERPL-ACNC: 14.6 SEC — HIGH (ref 9.9–13.4)
PROTHROM AB SERPL-ACNC: 15.1 SEC — HIGH (ref 9.9–13.4)
PROTHROM AB SERPL-ACNC: 15.8 SEC — HIGH (ref 9.9–13.4)
PROTHROM AB SERPL-ACNC: 15.8 SEC — HIGH (ref 9.9–13.4)
RAPID RVP RESULT: SIGNIFICANT CHANGE UP
RBC # BLD: 2.49 M/UL — LOW (ref 3.8–5.2)
RBC # BLD: 2.53 M/UL — LOW (ref 3.8–5.2)
RBC # BLD: 2.57 M/UL — LOW (ref 3.8–5.2)
RBC # BLD: 2.66 M/UL — LOW (ref 3.8–5.2)
RBC # BLD: 2.69 M/UL — LOW (ref 3.8–5.2)
RBC # BLD: 2.69 M/UL — LOW (ref 3.8–5.2)
RBC # BLD: 2.7 M/UL — LOW (ref 3.8–5.2)
RBC # BLD: 2.71 M/UL — LOW (ref 3.8–5.2)
RBC # BLD: 2.71 M/UL — LOW (ref 3.8–5.2)
RBC # BLD: 2.72 M/UL — LOW (ref 3.8–5.2)
RBC # BLD: 2.73 M/UL — LOW (ref 3.8–5.2)
RBC # BLD: 2.76 M/UL — LOW (ref 3.8–5.2)
RBC # BLD: 2.81 M/UL — LOW (ref 3.8–5.2)
RBC # BLD: 2.83 M/UL — LOW (ref 3.8–5.2)
RBC # BLD: 2.92 M/UL — LOW (ref 3.8–5.2)
RBC # BLD: 2.96 M/UL — LOW (ref 3.8–5.2)
RBC # BLD: 3.02 M/UL — LOW (ref 3.8–5.2)
RBC # BLD: 3.07 M/UL — LOW (ref 3.8–5.2)
RBC # BLD: 3.21 M/UL — LOW (ref 3.8–5.2)
RBC # FLD: 13.1 % — SIGNIFICANT CHANGE UP (ref 10.3–14.5)
RBC # FLD: 13.2 % — SIGNIFICANT CHANGE UP (ref 10.3–14.5)
RBC # FLD: 13.4 % — SIGNIFICANT CHANGE UP (ref 10.3–14.5)
RBC # FLD: 13.4 % — SIGNIFICANT CHANGE UP (ref 10.3–14.5)
RBC # FLD: 13.5 % — SIGNIFICANT CHANGE UP (ref 10.3–14.5)
RBC # FLD: 13.6 % — SIGNIFICANT CHANGE UP (ref 10.3–14.5)
RBC # FLD: 13.7 % — SIGNIFICANT CHANGE UP (ref 10.3–14.5)
RBC # FLD: 13.8 % — SIGNIFICANT CHANGE UP (ref 10.3–14.5)
RBC # FLD: 13.9 % — SIGNIFICANT CHANGE UP (ref 10.3–14.5)
RBC BLD AUTO: ABNORMAL
RBC CASTS # UR COMP ASSIST: 48 /HPF — HIGH (ref 0–4)
RBC CASTS # UR COMP ASSIST: 5 /HPF — HIGH (ref 0–4)
REVIEW: SIGNIFICANT CHANGE UP
RH IG SCN BLD-IMP: POSITIVE — SIGNIFICANT CHANGE UP
RSV RNA NPH QL NAA+NON-PROBE: SIGNIFICANT CHANGE UP
RSV RNA NPH QL NAA+NON-PROBE: SIGNIFICANT CHANGE UP
S AUREUS DNA NOSE QL NAA+PROBE: SIGNIFICANT CHANGE UP
SAO2 % BLDA: 94.1 % — SIGNIFICANT CHANGE UP (ref 94–98)
SAO2 % BLDA: 98.5 % — HIGH (ref 94–98)
SAO2 % BLDA: 99.5 % — HIGH (ref 94–98)
SARS-COV-2 RNA SPEC QL NAA+PROBE: SIGNIFICANT CHANGE UP
SCHISTOCYTES BLD QL AUTO: SLIGHT — SIGNIFICANT CHANGE UP
SODIUM SERPL-SCNC: 134 MMOL/L — LOW (ref 135–145)
SODIUM SERPL-SCNC: 135 MMOL/L — SIGNIFICANT CHANGE UP (ref 135–145)
SODIUM SERPL-SCNC: 136 MMOL/L — SIGNIFICANT CHANGE UP (ref 135–145)
SODIUM SERPL-SCNC: 137 MMOL/L — SIGNIFICANT CHANGE UP (ref 135–145)
SODIUM SERPL-SCNC: 138 MMOL/L — SIGNIFICANT CHANGE UP (ref 135–145)
SODIUM SERPL-SCNC: 139 MMOL/L — SIGNIFICANT CHANGE UP (ref 135–145)
SODIUM SERPL-SCNC: 140 MMOL/L — SIGNIFICANT CHANGE UP (ref 135–145)
SODIUM SERPL-SCNC: 141 MMOL/L — SIGNIFICANT CHANGE UP (ref 135–145)
SODIUM UR-SCNC: 49 MMOL/L — SIGNIFICANT CHANGE UP
SP GR SPEC: 1.01 — SIGNIFICANT CHANGE UP (ref 1–1.03)
SP GR SPEC: 1.02 — SIGNIFICANT CHANGE UP (ref 1–1.03)
SPECIMEN SOURCE: SIGNIFICANT CHANGE UP
SQUAMOUS # UR AUTO: 0 /HPF — SIGNIFICANT CHANGE UP (ref 0–5)
SQUAMOUS # UR AUTO: 1 /HPF — SIGNIFICANT CHANGE UP (ref 0–5)
TIBC SERPL-MCNC: 224 UG/DL — SIGNIFICANT CHANGE UP (ref 220–430)
TIBC SERPL-MCNC: 244 UG/DL — SIGNIFICANT CHANGE UP (ref 220–430)
TRANSFERRIN SERPL-MCNC: 203 MG/DL — SIGNIFICANT CHANGE UP (ref 200–360)
TSH SERPL-MCNC: 1.3 UIU/ML — SIGNIFICANT CHANGE UP (ref 0.27–4.2)
UIBC SERPL-MCNC: 179 UG/DL — SIGNIFICANT CHANGE UP (ref 110–370)
UIBC SERPL-MCNC: 193 UG/DL — SIGNIFICANT CHANGE UP (ref 110–370)
UROBILINOGEN FLD QL: 0.2 MG/DL — SIGNIFICANT CHANGE UP (ref 0.2–1)
UROBILINOGEN FLD QL: 0.2 MG/DL — SIGNIFICANT CHANGE UP (ref 0.2–1)
UUN UR-MCNC: 339 MG/DL — SIGNIFICANT CHANGE UP
VIT B12 SERPL-MCNC: 468 PG/ML — SIGNIFICANT CHANGE UP (ref 232–1245)
WBC # BLD: 3.39 K/UL — LOW (ref 3.8–10.5)
WBC # BLD: 3.94 K/UL — SIGNIFICANT CHANGE UP (ref 3.8–10.5)
WBC # BLD: 4.35 K/UL — SIGNIFICANT CHANGE UP (ref 3.8–10.5)
WBC # BLD: 4.42 K/UL — SIGNIFICANT CHANGE UP (ref 3.8–10.5)
WBC # BLD: 4.62 K/UL — SIGNIFICANT CHANGE UP (ref 3.8–10.5)
WBC # BLD: 4.66 K/UL — SIGNIFICANT CHANGE UP (ref 3.8–10.5)
WBC # BLD: 4.67 K/UL — SIGNIFICANT CHANGE UP (ref 3.8–10.5)
WBC # BLD: 4.76 K/UL — SIGNIFICANT CHANGE UP (ref 3.8–10.5)
WBC # BLD: 4.93 K/UL — SIGNIFICANT CHANGE UP (ref 3.8–10.5)
WBC # BLD: 5.03 K/UL — SIGNIFICANT CHANGE UP (ref 3.8–10.5)
WBC # BLD: 5.34 K/UL — SIGNIFICANT CHANGE UP (ref 3.8–10.5)
WBC # BLD: 5.77 K/UL — SIGNIFICANT CHANGE UP (ref 3.8–10.5)
WBC # BLD: 5.8 K/UL — SIGNIFICANT CHANGE UP (ref 3.8–10.5)
WBC # BLD: 6.2 K/UL — SIGNIFICANT CHANGE UP (ref 3.8–10.5)
WBC # BLD: 6.3 K/UL — SIGNIFICANT CHANGE UP (ref 3.8–10.5)
WBC # BLD: 6.54 K/UL — SIGNIFICANT CHANGE UP (ref 3.8–10.5)
WBC # BLD: 6.68 K/UL — SIGNIFICANT CHANGE UP (ref 3.8–10.5)
WBC # BLD: 7.41 K/UL — SIGNIFICANT CHANGE UP (ref 3.8–10.5)
WBC # BLD: 7.74 K/UL — SIGNIFICANT CHANGE UP (ref 3.8–10.5)
WBC # FLD AUTO: 3.39 K/UL — LOW (ref 3.8–10.5)
WBC # FLD AUTO: 3.94 K/UL — SIGNIFICANT CHANGE UP (ref 3.8–10.5)
WBC # FLD AUTO: 4.35 K/UL — SIGNIFICANT CHANGE UP (ref 3.8–10.5)
WBC # FLD AUTO: 4.42 K/UL — SIGNIFICANT CHANGE UP (ref 3.8–10.5)
WBC # FLD AUTO: 4.62 K/UL — SIGNIFICANT CHANGE UP (ref 3.8–10.5)
WBC # FLD AUTO: 4.66 K/UL — SIGNIFICANT CHANGE UP (ref 3.8–10.5)
WBC # FLD AUTO: 4.67 K/UL — SIGNIFICANT CHANGE UP (ref 3.8–10.5)
WBC # FLD AUTO: 4.76 K/UL — SIGNIFICANT CHANGE UP (ref 3.8–10.5)
WBC # FLD AUTO: 4.93 K/UL — SIGNIFICANT CHANGE UP (ref 3.8–10.5)
WBC # FLD AUTO: 5.03 K/UL — SIGNIFICANT CHANGE UP (ref 3.8–10.5)
WBC # FLD AUTO: 5.34 K/UL — SIGNIFICANT CHANGE UP (ref 3.8–10.5)
WBC # FLD AUTO: 5.77 K/UL — SIGNIFICANT CHANGE UP (ref 3.8–10.5)
WBC # FLD AUTO: 5.8 K/UL — SIGNIFICANT CHANGE UP (ref 3.8–10.5)
WBC # FLD AUTO: 6.2 K/UL — SIGNIFICANT CHANGE UP (ref 3.8–10.5)
WBC # FLD AUTO: 6.3 K/UL — SIGNIFICANT CHANGE UP (ref 3.8–10.5)
WBC # FLD AUTO: 6.54 K/UL — SIGNIFICANT CHANGE UP (ref 3.8–10.5)
WBC # FLD AUTO: 6.68 K/UL — SIGNIFICANT CHANGE UP (ref 3.8–10.5)
WBC # FLD AUTO: 7.41 K/UL — SIGNIFICANT CHANGE UP (ref 3.8–10.5)
WBC # FLD AUTO: 7.74 K/UL — SIGNIFICANT CHANGE UP (ref 3.8–10.5)
WBC UR QL: 0 /HPF — SIGNIFICANT CHANGE UP (ref 0–5)
WBC UR QL: 2 /HPF — SIGNIFICANT CHANGE UP (ref 0–5)

## 2025-01-01 PROCEDURE — C1894: CPT

## 2025-01-01 PROCEDURE — 84300 ASSAY OF URINE SODIUM: CPT

## 2025-01-01 PROCEDURE — 99221 1ST HOSP IP/OBS SF/LOW 40: CPT

## 2025-01-01 PROCEDURE — 77001 FLUOROGUIDE FOR VEIN DEVICE: CPT | Mod: 26

## 2025-01-01 PROCEDURE — 99233 SBSQ HOSP IP/OBS HIGH 50: CPT

## 2025-01-01 PROCEDURE — 99233 SBSQ HOSP IP/OBS HIGH 50: CPT | Mod: GC

## 2025-01-01 PROCEDURE — 99285 EMERGENCY DEPT VISIT HI MDM: CPT

## 2025-01-01 PROCEDURE — 36556 INSERT NON-TUNNEL CV CATH: CPT

## 2025-01-01 PROCEDURE — 84295 ASSAY OF SERUM SODIUM: CPT

## 2025-01-01 PROCEDURE — 86738 MYCOPLASMA ANTIBODY: CPT

## 2025-01-01 PROCEDURE — 84132 ASSAY OF SERUM POTASSIUM: CPT

## 2025-01-01 PROCEDURE — 0225U NFCT DS DNA&RNA 21 SARSCOV2: CPT

## 2025-01-01 PROCEDURE — 71045 X-RAY EXAM CHEST 1 VIEW: CPT | Mod: 26

## 2025-01-01 PROCEDURE — 97161 PT EVAL LOW COMPLEX 20 MIN: CPT

## 2025-01-01 PROCEDURE — 82947 ASSAY GLUCOSE BLOOD QUANT: CPT

## 2025-01-01 PROCEDURE — 82977 ASSAY OF GGT: CPT

## 2025-01-01 PROCEDURE — 99232 SBSQ HOSP IP/OBS MODERATE 35: CPT

## 2025-01-01 PROCEDURE — 99232 SBSQ HOSP IP/OBS MODERATE 35: CPT | Mod: GC

## 2025-01-01 PROCEDURE — 99497 ADVNCD CARE PLAN 30 MIN: CPT | Mod: 25

## 2025-01-01 PROCEDURE — 86850 RBC ANTIBODY SCREEN: CPT

## 2025-01-01 PROCEDURE — 85018 HEMOGLOBIN: CPT

## 2025-01-01 PROCEDURE — 93306 TTE W/DOPPLER COMPLETE: CPT | Mod: 26

## 2025-01-01 PROCEDURE — 76775 US EXAM ABDO BACK WALL LIM: CPT

## 2025-01-01 PROCEDURE — 94660 CPAP INITIATION&MGMT: CPT

## 2025-01-01 PROCEDURE — 93005 ELECTROCARDIOGRAM TRACING: CPT

## 2025-01-01 PROCEDURE — 82962 GLUCOSE BLOOD TEST: CPT

## 2025-01-01 PROCEDURE — 70450 CT HEAD/BRAIN W/O DYE: CPT | Mod: 26

## 2025-01-01 PROCEDURE — 84443 ASSAY THYROID STIM HORMONE: CPT

## 2025-01-01 PROCEDURE — 82728 ASSAY OF FERRITIN: CPT

## 2025-01-01 PROCEDURE — 82570 ASSAY OF URINE CREATININE: CPT

## 2025-01-01 PROCEDURE — 93010 ELECTROCARDIOGRAM REPORT: CPT

## 2025-01-01 PROCEDURE — 87641 MR-STAPH DNA AMP PROBE: CPT

## 2025-01-01 PROCEDURE — 85025 COMPLETE CBC W/AUTO DIFF WBC: CPT

## 2025-01-01 PROCEDURE — 87040 BLOOD CULTURE FOR BACTERIA: CPT

## 2025-01-01 PROCEDURE — C1769: CPT

## 2025-01-01 PROCEDURE — 87081 CULTURE SCREEN ONLY: CPT

## 2025-01-01 PROCEDURE — 82435 ASSAY OF BLOOD CHLORIDE: CPT

## 2025-01-01 PROCEDURE — 76775 US EXAM ABDO BACK WALL LIM: CPT | Mod: 26

## 2025-01-01 PROCEDURE — 85027 COMPLETE CBC AUTOMATED: CPT

## 2025-01-01 PROCEDURE — 71045 X-RAY EXAM CHEST 1 VIEW: CPT

## 2025-01-01 PROCEDURE — 83880 ASSAY OF NATRIURETIC PEPTIDE: CPT

## 2025-01-01 PROCEDURE — 99261: CPT

## 2025-01-01 PROCEDURE — C1752: CPT

## 2025-01-01 PROCEDURE — 86901 BLOOD TYPING SEROLOGIC RH(D): CPT

## 2025-01-01 PROCEDURE — 84145 PROCALCITONIN (PCT): CPT

## 2025-01-01 PROCEDURE — 80053 COMPREHEN METABOLIC PANEL: CPT

## 2025-01-01 PROCEDURE — 83036 HEMOGLOBIN GLYCOSYLATED A1C: CPT

## 2025-01-01 PROCEDURE — 82747 ASSAY OF FOLIC ACID RBC: CPT

## 2025-01-01 PROCEDURE — 87637 SARSCOV2&INF A&B&RSV AMP PRB: CPT

## 2025-01-01 PROCEDURE — 96375 TX/PRO/DX INJ NEW DRUG ADDON: CPT

## 2025-01-01 PROCEDURE — 83550 IRON BINDING TEST: CPT

## 2025-01-01 PROCEDURE — 0241U: CPT

## 2025-01-01 PROCEDURE — 99222 1ST HOSP IP/OBS MODERATE 55: CPT

## 2025-01-01 PROCEDURE — 93306 TTE W/DOPPLER COMPLETE: CPT

## 2025-01-01 PROCEDURE — 85014 HEMATOCRIT: CPT

## 2025-01-01 PROCEDURE — 73590 X-RAY EXAM OF LOWER LEG: CPT | Mod: 26,50

## 2025-01-01 PROCEDURE — 97116 GAIT TRAINING THERAPY: CPT

## 2025-01-01 PROCEDURE — 87449 NOS EACH ORGANISM AG IA: CPT

## 2025-01-01 PROCEDURE — 96374 THER/PROPH/DIAG INJ IV PUSH: CPT

## 2025-01-01 PROCEDURE — 73590 X-RAY EXAM OF LOWER LEG: CPT

## 2025-01-01 PROCEDURE — 93970 EXTREMITY STUDY: CPT

## 2025-01-01 PROCEDURE — 87205 SMEAR GRAM STAIN: CPT

## 2025-01-01 PROCEDURE — 97110 THERAPEUTIC EXERCISES: CPT

## 2025-01-01 PROCEDURE — 85610 PROTHROMBIN TIME: CPT

## 2025-01-01 PROCEDURE — 77001 FLUOROGUIDE FOR VEIN DEVICE: CPT

## 2025-01-01 PROCEDURE — 93970 EXTREMITY STUDY: CPT | Mod: 26

## 2025-01-01 PROCEDURE — 87077 CULTURE AEROBIC IDENTIFY: CPT

## 2025-01-01 PROCEDURE — 80048 BASIC METABOLIC PNL TOTAL CA: CPT

## 2025-01-01 PROCEDURE — 83735 ASSAY OF MAGNESIUM: CPT

## 2025-01-01 PROCEDURE — 70450 CT HEAD/BRAIN W/O DYE: CPT | Mod: MC

## 2025-01-01 PROCEDURE — 85652 RBC SED RATE AUTOMATED: CPT

## 2025-01-01 PROCEDURE — 76937 US GUIDE VASCULAR ACCESS: CPT | Mod: 26

## 2025-01-01 PROCEDURE — 84466 ASSAY OF TRANSFERRIN: CPT

## 2025-01-01 PROCEDURE — 87186 SC STD MICRODIL/AGAR DIL: CPT

## 2025-01-01 PROCEDURE — 82607 VITAMIN B-12: CPT

## 2025-01-01 PROCEDURE — 83605 ASSAY OF LACTIC ACID: CPT

## 2025-01-01 PROCEDURE — 76937 US GUIDE VASCULAR ACCESS: CPT

## 2025-01-01 PROCEDURE — 85730 THROMBOPLASTIN TIME PARTIAL: CPT

## 2025-01-01 PROCEDURE — 83540 ASSAY OF IRON: CPT

## 2025-01-01 PROCEDURE — 82803 BLOOD GASES ANY COMBINATION: CPT

## 2025-01-01 PROCEDURE — 99223 1ST HOSP IP/OBS HIGH 75: CPT

## 2025-01-01 PROCEDURE — 97530 THERAPEUTIC ACTIVITIES: CPT

## 2025-01-01 PROCEDURE — 84540 ASSAY OF URINE/UREA-N: CPT

## 2025-01-01 PROCEDURE — 36415 COLL VENOUS BLD VENIPUNCTURE: CPT

## 2025-01-01 PROCEDURE — 81001 URINALYSIS AUTO W/SCOPE: CPT

## 2025-01-01 PROCEDURE — 87070 CULTURE OTHR SPECIMN AEROBIC: CPT

## 2025-01-01 PROCEDURE — 87640 STAPH A DNA AMP PROBE: CPT

## 2025-01-01 PROCEDURE — 87086 URINE CULTURE/COLONY COUNT: CPT

## 2025-01-01 PROCEDURE — 82330 ASSAY OF CALCIUM: CPT

## 2025-01-01 PROCEDURE — 36600 WITHDRAWAL OF ARTERIAL BLOOD: CPT

## 2025-01-01 PROCEDURE — 86900 BLOOD TYPING SEROLOGIC ABO: CPT

## 2025-01-01 PROCEDURE — 84100 ASSAY OF PHOSPHORUS: CPT

## 2025-01-01 PROCEDURE — 87340 HEPATITIS B SURFACE AG IA: CPT

## 2025-01-01 PROCEDURE — 99222 1ST HOSP IP/OBS MODERATE 55: CPT | Mod: GC

## 2025-01-01 PROCEDURE — 94640 AIRWAY INHALATION TREATMENT: CPT

## 2025-01-01 PROCEDURE — 86140 C-REACTIVE PROTEIN: CPT

## 2025-01-01 RX ORDER — HEPARIN SODIUM,PORCINE 10000/ML
2500 VIAL (ML) INJECTION EVERY 6 HOURS
Refills: 0 | Status: DISCONTINUED | OUTPATIENT
Start: 2025-01-01 | End: 2025-01-01

## 2025-01-01 RX ORDER — SENNOSIDES 8.6 MG
2 TABLET ORAL AT BEDTIME
Refills: 0 | Status: DISCONTINUED | OUTPATIENT
Start: 2025-01-01 | End: 2025-01-01

## 2025-01-01 RX ORDER — HYDROMORPHONE HYDROCHLORIDE 4 MG/ML
0.3 INJECTION, SOLUTION INTRAMUSCULAR; INTRAVENOUS; SUBCUTANEOUS
Refills: 0 | Status: DISCONTINUED | OUTPATIENT
Start: 2025-01-01 | End: 2025-01-01

## 2025-01-01 RX ORDER — ACETAMINOPHEN, DIPHENHYDRAMINE HCL, PHENYLEPHRINE HCL 325; 25; 5 MG/1; MG/1; MG/1
5 TABLET ORAL AT BEDTIME
Refills: 0 | Status: DISCONTINUED | OUTPATIENT
Start: 2025-01-01 | End: 2025-01-01

## 2025-01-01 RX ORDER — HYDROMORPHONE HYDROCHLORIDE 4 MG/ML
1 INJECTION, SOLUTION INTRAMUSCULAR; INTRAVENOUS; SUBCUTANEOUS
Refills: 0 | Status: DISCONTINUED | OUTPATIENT
Start: 2025-01-01 | End: 2025-01-01

## 2025-01-01 RX ORDER — ONDANSETRON 4 MG/1
4 TABLET, ORALLY DISINTEGRATING ORAL EVERY 4 HOURS
Refills: 0 | Status: DISCONTINUED | OUTPATIENT
Start: 2025-01-01 | End: 2025-01-01

## 2025-01-01 RX ORDER — DORZOLAMIDE HYDROCHLORIDE 20 MG/ML
1 SOLUTION OPHTHALMIC
Refills: 0 | Status: DISCONTINUED | OUTPATIENT
Start: 2025-01-01 | End: 2025-01-01

## 2025-01-01 RX ORDER — SODIUM CHLORIDE 0.4 %
1 AEROSOL, SPRAY (ML) NASAL
Refills: 0 | Status: DISCONTINUED | OUTPATIENT
Start: 2025-01-01 | End: 2025-01-01

## 2025-01-01 RX ORDER — DM/PSEUDOEPHED/ACETAMINOPHEN 10-30-250
12.5 CAPSULE ORAL ONCE
Refills: 0 | Status: DISCONTINUED | OUTPATIENT
Start: 2025-01-01 | End: 2025-01-01

## 2025-01-01 RX ORDER — IPRATROPIUM BROMIDE AND ALBUTEROL SULFATE .5; 2.5 MG/3ML; MG/3ML
3 SOLUTION RESPIRATORY (INHALATION) ONCE
Refills: 0 | Status: COMPLETED | OUTPATIENT
Start: 2025-01-01 | End: 2025-01-01

## 2025-01-01 RX ORDER — MONTELUKAST SODIUM 5 MG/1
1 TABLET, CHEWABLE ORAL
Refills: 0 | DISCHARGE

## 2025-01-01 RX ORDER — CEPHALEXIN 500 MG
500 CAPSULE ORAL EVERY 12 HOURS
Refills: 0 | Status: COMPLETED | OUTPATIENT
Start: 2025-01-01 | End: 2025-01-01

## 2025-01-01 RX ORDER — PIPERACILLIN SODIUM AND TAZOBACTAM SODIUM 2; 250 G/50ML; MG/50ML
3.38 INJECTION, POWDER, FOR SOLUTION INTRAVENOUS EVERY 12 HOURS
Refills: 0 | Status: DISCONTINUED | OUTPATIENT
Start: 2025-01-01 | End: 2025-01-01

## 2025-01-01 RX ORDER — APIXABAN 5 MG/1
2.5 TABLET, FILM COATED ORAL EVERY 12 HOURS
Refills: 0 | Status: COMPLETED | OUTPATIENT
Start: 2025-01-01 | End: 2025-01-01

## 2025-01-01 RX ORDER — INSULIN LISPRO 100/ML
VIAL (ML) SUBCUTANEOUS
Refills: 0 | Status: DISCONTINUED | OUTPATIENT
Start: 2025-01-01 | End: 2025-01-01

## 2025-01-01 RX ORDER — APIXABAN 5 MG/1
2.5 TABLET, FILM COATED ORAL
Refills: 0 | Status: DISCONTINUED | OUTPATIENT
Start: 2025-01-01 | End: 2025-01-01

## 2025-01-01 RX ORDER — BISACODYL 5 MG
10 TABLET, DELAYED RELEASE (ENTERIC COATED) ORAL ONCE
Refills: 0 | Status: COMPLETED | OUTPATIENT
Start: 2025-01-01 | End: 2025-01-01

## 2025-01-01 RX ORDER — HYDROMORPHONE HYDROCHLORIDE 4 MG/ML
0.5 INJECTION, SOLUTION INTRAMUSCULAR; INTRAVENOUS; SUBCUTANEOUS
Refills: 0 | Status: DISCONTINUED | OUTPATIENT
Start: 2025-01-01 | End: 2025-01-01

## 2025-01-01 RX ORDER — INSULIN GLARGINE-YFGN 100 [IU]/ML
5 INJECTION, SOLUTION SUBCUTANEOUS AT BEDTIME
Refills: 0 | Status: DISCONTINUED | OUTPATIENT
Start: 2025-01-01 | End: 2025-01-01

## 2025-01-01 RX ORDER — HYDRALAZINE HCL 100 MG
1 TABLET ORAL
Refills: 0 | DISCHARGE

## 2025-01-01 RX ORDER — BUMETANIDE 2 MG/1
4 TABLET ORAL ONCE
Refills: 0 | Status: DISCONTINUED | OUTPATIENT
Start: 2025-01-01 | End: 2025-01-01

## 2025-01-01 RX ORDER — BISACODYL 5 MG
10 TABLET, DELAYED RELEASE (ENTERIC COATED) ORAL DAILY
Refills: 0 | Status: DISCONTINUED | OUTPATIENT
Start: 2025-01-01 | End: 2025-01-01

## 2025-01-01 RX ORDER — PIPERACILLIN SODIUM AND TAZOBACTAM SODIUM 2; 250 G/50ML; MG/50ML
3.38 INJECTION, POWDER, FOR SOLUTION INTRAVENOUS ONCE
Refills: 0 | Status: COMPLETED | OUTPATIENT
Start: 2025-01-01 | End: 2025-01-01

## 2025-01-01 RX ORDER — HYDROMORPHONE HYDROCHLORIDE 4 MG/ML
1 INJECTION, SOLUTION INTRAMUSCULAR; INTRAVENOUS; SUBCUTANEOUS ONCE
Refills: 0 | Status: DISCONTINUED | OUTPATIENT
Start: 2025-01-01 | End: 2025-01-01

## 2025-01-01 RX ORDER — HEPARIN SODIUM,PORCINE 10000/ML
5500 VIAL (ML) INJECTION EVERY 6 HOURS
Refills: 0 | Status: DISCONTINUED | OUTPATIENT
Start: 2025-01-01 | End: 2025-01-01

## 2025-01-01 RX ORDER — DM/PSEUDOEPHED/ACETAMINOPHEN 10-30-250
15 CAPSULE ORAL ONCE
Refills: 0 | Status: DISCONTINUED | OUTPATIENT
Start: 2025-01-01 | End: 2025-01-01

## 2025-01-01 RX ORDER — CARVEDILOL 6.25 MG
12.5 TABLET ORAL EVERY 12 HOURS
Refills: 0 | Status: DISCONTINUED | OUTPATIENT
Start: 2025-01-01 | End: 2025-01-01

## 2025-01-01 RX ORDER — ONDANSETRON 4 MG/1
4 TABLET, ORALLY DISINTEGRATING ORAL ONCE
Refills: 0 | Status: COMPLETED | OUTPATIENT
Start: 2025-01-01 | End: 2025-01-01

## 2025-01-01 RX ORDER — NIFEDIPINE 90 MG/1
1 TABLET, FILM COATED, EXTENDED RELEASE ORAL
Refills: 0 | DISCHARGE

## 2025-01-01 RX ORDER — BACTERIOSTATIC SODIUM CHLORIDE 0.9 %
4 VIAL (ML) INJECTION EVERY 12 HOURS
Refills: 0 | Status: DISCONTINUED | OUTPATIENT
Start: 2025-01-01 | End: 2025-01-01

## 2025-01-01 RX ORDER — IPRATROPIUM BROMIDE AND ALBUTEROL SULFATE .5; 2.5 MG/3ML; MG/3ML
3 SOLUTION RESPIRATORY (INHALATION) EVERY 6 HOURS
Refills: 0 | Status: DISCONTINUED | OUTPATIENT
Start: 2025-01-01 | End: 2025-01-01

## 2025-01-01 RX ORDER — HYDRALAZINE HCL 100 MG
50 TABLET ORAL
Refills: 0 | Status: DISCONTINUED | OUTPATIENT
Start: 2025-01-01 | End: 2025-01-01

## 2025-01-01 RX ORDER — SEVELAMER CARBONATE 800 MG/1
800 TABLET, FILM COATED ORAL
Refills: 0 | Status: DISCONTINUED | OUTPATIENT
Start: 2025-01-01 | End: 2025-01-01

## 2025-01-01 RX ORDER — GLIPIZIDE 10 MG/1
1 TABLET, FILM COATED, EXTENDED RELEASE ORAL
Refills: 0 | DISCHARGE

## 2025-01-01 RX ORDER — BACTERIOSTATIC SODIUM CHLORIDE 0.9 %
4 VIAL (ML) INJECTION EVERY 6 HOURS
Refills: 0 | Status: DISCONTINUED | OUTPATIENT
Start: 2025-01-01 | End: 2025-01-01

## 2025-01-01 RX ORDER — BISACODYL 5 MG
5 TABLET, DELAYED RELEASE (ENTERIC COATED) ORAL ONCE
Refills: 0 | Status: COMPLETED | OUTPATIENT
Start: 2025-01-01 | End: 2025-01-01

## 2025-01-01 RX ORDER — HYDROMORPHONE HYDROCHLORIDE 4 MG/ML
2 INJECTION, SOLUTION INTRAMUSCULAR; INTRAVENOUS; SUBCUTANEOUS
Refills: 0 | Status: DISCONTINUED | OUTPATIENT
Start: 2025-01-01 | End: 2025-01-01

## 2025-01-01 RX ORDER — ONDANSETRON 4 MG/1
4 TABLET, ORALLY DISINTEGRATING ORAL EVERY 4 HOURS
Refills: 0 | Status: COMPLETED | OUTPATIENT
Start: 2025-01-01 | End: 2025-01-01

## 2025-01-01 RX ORDER — BUMETANIDE 2 MG/1
2 TABLET ORAL
Refills: 0 | Status: DISCONTINUED | OUTPATIENT
Start: 2025-01-01 | End: 2025-01-01

## 2025-01-01 RX ORDER — EPOETIN ALFA 2000 [IU]/ML
8000 SOLUTION INTRAVENOUS; SUBCUTANEOUS
Refills: 0 | Status: DISCONTINUED | OUTPATIENT
Start: 2025-01-01 | End: 2025-01-01

## 2025-01-01 RX ORDER — SODIUM CHLORIDE 0.4 %
1 AEROSOL, SPRAY (ML) NASAL EVERY 6 HOURS
Refills: 0 | Status: DISCONTINUED | OUTPATIENT
Start: 2025-01-01 | End: 2025-01-01

## 2025-01-01 RX ORDER — SODIUM BICARBONATE 42 MG/ML
650 INJECTION, SOLUTION INTRAVENOUS
Refills: 0 | DISCHARGE

## 2025-01-01 RX ORDER — HYDROMORPHONE HYDROCHLORIDE 4 MG/ML
0.2 INJECTION, SOLUTION INTRAMUSCULAR; INTRAVENOUS; SUBCUTANEOUS
Refills: 0 | Status: DISCONTINUED | OUTPATIENT
Start: 2025-01-01 | End: 2025-01-01

## 2025-01-01 RX ORDER — AZITHROMYCIN DIHYDRATE 500 MG/1
500 TABLET, FILM COATED ORAL DAILY
Refills: 0 | Status: DISCONTINUED | OUTPATIENT
Start: 2025-01-01 | End: 2025-01-01

## 2025-01-01 RX ORDER — POLYETHYLENE GLYCOL 3350 17 G/17G
17 POWDER, FOR SOLUTION ORAL DAILY
Refills: 0 | Status: DISCONTINUED | OUTPATIENT
Start: 2025-01-01 | End: 2025-01-01

## 2025-01-01 RX ORDER — SODIUM ZIRCONIUM CYCLOSILICATE 5 G/5G
10 POWDER, FOR SUSPENSION ORAL ONCE
Refills: 0 | Status: COMPLETED | OUTPATIENT
Start: 2025-01-01 | End: 2025-01-01

## 2025-01-01 RX ORDER — BUMETANIDE 2 MG/1
2 TABLET ORAL ONCE
Refills: 0 | Status: COMPLETED | OUTPATIENT
Start: 2025-01-01 | End: 2025-01-01

## 2025-01-01 RX ORDER — BACTERIOSTATIC SODIUM CHLORIDE 0.9 %
500 VIAL (ML) INJECTION ONCE
Refills: 0 | Status: COMPLETED | OUTPATIENT
Start: 2025-01-01 | End: 2025-01-01

## 2025-01-01 RX ORDER — INSULIN LISPRO 100/ML
2 VIAL (ML) SUBCUTANEOUS
Refills: 0 | Status: DISCONTINUED | OUTPATIENT
Start: 2025-01-01 | End: 2025-01-01

## 2025-01-01 RX ORDER — CEFTRIAXONE 250 MG/1
1000 INJECTION, POWDER, FOR SOLUTION INTRAMUSCULAR; INTRAVENOUS EVERY 24 HOURS
Refills: 0 | Status: DISCONTINUED | OUTPATIENT
Start: 2025-01-01 | End: 2025-01-01

## 2025-01-01 RX ORDER — DM/PSEUDOEPHED/ACETAMINOPHEN 10-30-250
25 CAPSULE ORAL ONCE
Refills: 0 | Status: DISCONTINUED | OUTPATIENT
Start: 2025-01-01 | End: 2025-01-01

## 2025-01-01 RX ORDER — PREDNISONE 5 MG/1
1 TABLET ORAL
Refills: 0 | DISCHARGE

## 2025-01-01 RX ORDER — BACTERIOSTATIC SODIUM CHLORIDE 0.9 %
100 VIAL (ML) INJECTION
Refills: 0 | Status: DISCONTINUED | OUTPATIENT
Start: 2025-01-01 | End: 2025-01-01

## 2025-01-01 RX ORDER — VANCOMYCIN HYDROCHLORIDE 50 MG/ML
1000 KIT ORAL ONCE
Refills: 0 | Status: COMPLETED | OUTPATIENT
Start: 2025-01-01 | End: 2025-01-01

## 2025-01-01 RX ORDER — SODIUM ZIRCONIUM CYCLOSILICATE 5 G/5G
5 POWDER, FOR SUSPENSION ORAL ONCE
Refills: 0 | Status: DISCONTINUED | OUTPATIENT
Start: 2025-01-01 | End: 2025-01-01

## 2025-01-01 RX ORDER — ACETAMINOPHEN 160 MG/5ML
1000 SUSPENSION ORAL ONCE
Refills: 0 | Status: COMPLETED | OUTPATIENT
Start: 2025-01-01 | End: 2025-01-01

## 2025-01-01 RX ORDER — INSULIN LISPRO 100/ML
VIAL (ML) SUBCUTANEOUS AT BEDTIME
Refills: 0 | Status: DISCONTINUED | OUTPATIENT
Start: 2025-01-01 | End: 2025-01-01

## 2025-01-01 RX ORDER — BUMETANIDE 2 MG/1
4 TABLET ORAL
Refills: 0 | Status: DISCONTINUED | OUTPATIENT
Start: 2025-01-01 | End: 2025-01-01

## 2025-01-01 RX ORDER — FLUTICASONE PROPIONATE AND SALMETEROL 113; 14 UG/1; UG/1
1 POWDER, METERED RESPIRATORY (INHALATION)
Refills: 0 | Status: DISCONTINUED | OUTPATIENT
Start: 2025-01-01 | End: 2025-01-01

## 2025-01-01 RX ORDER — DORZOLAMIDE HYDROCHLORIDE 20 MG/ML
1 SOLUTION OPHTHALMIC
Refills: 0 | DISCHARGE

## 2025-01-01 RX ORDER — IRON SUCROSE 20 MG/ML
100 INJECTION, SOLUTION INTRAVENOUS ONCE
Refills: 0 | Status: COMPLETED | OUTPATIENT
Start: 2025-01-01 | End: 2025-01-01

## 2025-01-01 RX ORDER — HEPARIN SODIUM,PORCINE 10000/ML
VIAL (ML) INJECTION
Qty: 25000 | Refills: 0 | Status: DISCONTINUED | OUTPATIENT
Start: 2025-01-01 | End: 2025-01-01

## 2025-01-01 RX ORDER — BUMETANIDE 2 MG/1
2 TABLET ORAL DAILY
Refills: 0 | Status: DISCONTINUED | OUTPATIENT
Start: 2025-01-01 | End: 2025-01-01

## 2025-01-01 RX ORDER — NIFEDIPINE 90 MG/1
60 TABLET, FILM COATED, EXTENDED RELEASE ORAL DAILY
Refills: 0 | Status: DISCONTINUED | OUTPATIENT
Start: 2025-01-01 | End: 2025-01-01

## 2025-01-01 RX ORDER — HYDRALAZINE HCL 100 MG
25 TABLET ORAL THREE TIMES A DAY
Refills: 0 | Status: DISCONTINUED | OUTPATIENT
Start: 2025-01-01 | End: 2025-01-01

## 2025-01-01 RX ORDER — BUMETANIDE 2 MG/1
4 TABLET ORAL DAILY
Refills: 0 | Status: DISCONTINUED | OUTPATIENT
Start: 2025-01-01 | End: 2025-01-01

## 2025-01-01 RX ORDER — APIXABAN 5 MG/1
2.5 TABLET, FILM COATED ORAL EVERY 12 HOURS
Refills: 0 | Status: DISCONTINUED | OUTPATIENT
Start: 2025-01-01 | End: 2025-01-01

## 2025-01-01 RX ORDER — BACTERIOSTATIC SODIUM CHLORIDE 0.9 %
10 VIAL (ML) INJECTION
Refills: 0 | Status: DISCONTINUED | OUTPATIENT
Start: 2025-01-01 | End: 2025-01-01

## 2025-01-01 RX ORDER — GLUCAGON 3 MG/1
1 POWDER NASAL ONCE
Refills: 0 | Status: DISCONTINUED | OUTPATIENT
Start: 2025-01-01 | End: 2025-01-01

## 2025-01-01 RX ORDER — EPOETIN ALFA 2000 [IU]/ML
4000 SOLUTION INTRAVENOUS; SUBCUTANEOUS
Refills: 0 | Status: DISCONTINUED | OUTPATIENT
Start: 2025-01-01 | End: 2025-01-01

## 2025-01-01 RX ORDER — ONDANSETRON 4 MG/1
4 TABLET, ORALLY DISINTEGRATING ORAL EVERY 8 HOURS
Refills: 0 | Status: DISCONTINUED | OUTPATIENT
Start: 2025-01-01 | End: 2025-01-01

## 2025-01-01 RX ORDER — HYDRALAZINE HCL 100 MG
10 TABLET ORAL THREE TIMES A DAY
Refills: 0 | Status: DISCONTINUED | OUTPATIENT
Start: 2025-01-01 | End: 2025-01-01

## 2025-01-01 RX ORDER — METOPROLOL SUCCINATE 25 MG
50 TABLET, EXTENDED RELEASE 24 HR ORAL
Refills: 0 | Status: DISCONTINUED | OUTPATIENT
Start: 2025-01-01 | End: 2025-01-01

## 2025-01-01 RX ORDER — MONTELUKAST SODIUM 5 MG/1
10 TABLET, CHEWABLE ORAL DAILY
Refills: 0 | Status: DISCONTINUED | OUTPATIENT
Start: 2025-01-01 | End: 2025-01-01

## 2025-01-01 RX ORDER — CEFTRIAXONE 250 MG/1
INJECTION, POWDER, FOR SOLUTION INTRAMUSCULAR; INTRAVENOUS
Refills: 0 | Status: DISCONTINUED | OUTPATIENT
Start: 2025-01-01 | End: 2025-01-01

## 2025-01-01 RX ORDER — SODIUM CHLORIDE 9 G/ML
1000 INJECTION, SOLUTION INTRAVENOUS
Refills: 0 | Status: DISCONTINUED | OUTPATIENT
Start: 2025-01-01 | End: 2025-01-01

## 2025-01-01 RX ORDER — ACETAMINOPHEN 160 MG/5ML
650 SUSPENSION ORAL EVERY 6 HOURS
Refills: 0 | Status: DISCONTINUED | OUTPATIENT
Start: 2025-01-01 | End: 2025-01-01

## 2025-01-01 RX ORDER — ANTISEPTIC SURGICAL SCRUB 0.04 MG/ML
1 SOLUTION TOPICAL
Refills: 0 | Status: DISCONTINUED | OUTPATIENT
Start: 2025-01-01 | End: 2025-01-01

## 2025-01-01 RX ORDER — MIDODRINE HYDROCHLORIDE 5 MG/1
5 TABLET ORAL ONCE
Refills: 0 | Status: COMPLETED | OUTPATIENT
Start: 2025-01-01 | End: 2025-01-01

## 2025-01-01 RX ORDER — SODIUM BICARBONATE 42 MG/ML
650 INJECTION, SOLUTION INTRAVENOUS
Refills: 0 | Status: DISCONTINUED | OUTPATIENT
Start: 2025-01-01 | End: 2025-01-01

## 2025-01-01 RX ORDER — CEFTRIAXONE 250 MG/1
1000 INJECTION, POWDER, FOR SOLUTION INTRAMUSCULAR; INTRAVENOUS ONCE
Refills: 0 | Status: COMPLETED | OUTPATIENT
Start: 2025-01-01 | End: 2025-01-01

## 2025-01-01 RX ADMIN — SODIUM BICARBONATE 650 MILLIGRAM(S): 42 INJECTION, SOLUTION INTRAVENOUS at 05:44

## 2025-01-01 RX ADMIN — Medication 2000 UNIT(S): at 13:11

## 2025-01-01 RX ADMIN — NIFEDIPINE 60 MILLIGRAM(S): 90 TABLET, FILM COATED, EXTENDED RELEASE ORAL at 05:41

## 2025-01-01 RX ADMIN — Medication 12.5 MILLIGRAM(S): at 17:34

## 2025-01-01 RX ADMIN — BUMETANIDE 132 MILLIGRAM(S): 2 TABLET ORAL at 06:28

## 2025-01-01 RX ADMIN — Medication 0 UNIT(S)/HR: at 19:08

## 2025-01-01 RX ADMIN — FLUTICASONE PROPIONATE AND SALMETEROL 1 DOSE(S): 113; 14 POWDER, METERED RESPIRATORY (INHALATION) at 05:20

## 2025-01-01 RX ADMIN — MONTELUKAST SODIUM 10 MILLIGRAM(S): 5 TABLET, CHEWABLE ORAL at 12:23

## 2025-01-01 RX ADMIN — IPRATROPIUM BROMIDE AND ALBUTEROL SULFATE 3 MILLILITER(S): .5; 2.5 SOLUTION RESPIRATORY (INHALATION) at 05:14

## 2025-01-01 RX ADMIN — SODIUM BICARBONATE 650 MILLIGRAM(S): 42 INJECTION, SOLUTION INTRAVENOUS at 08:00

## 2025-01-01 RX ADMIN — DORZOLAMIDE HYDROCHLORIDE 1 DROP(S): 20 SOLUTION OPHTHALMIC at 15:46

## 2025-01-01 RX ADMIN — FLUTICASONE PROPIONATE AND SALMETEROL 1 DOSE(S): 113; 14 POWDER, METERED RESPIRATORY (INHALATION) at 05:40

## 2025-01-01 RX ADMIN — MONTELUKAST SODIUM 10 MILLIGRAM(S): 5 TABLET, CHEWABLE ORAL at 12:00

## 2025-01-01 RX ADMIN — Medication 20 MILLIGRAM(S): at 17:27

## 2025-01-01 RX ADMIN — APIXABAN 2.5 MILLIGRAM(S): 5 TABLET, FILM COATED ORAL at 12:09

## 2025-01-01 RX ADMIN — FLUTICASONE PROPIONATE AND SALMETEROL 1 DOSE(S): 113; 14 POWDER, METERED RESPIRATORY (INHALATION) at 17:24

## 2025-01-01 RX ADMIN — SODIUM BICARBONATE 650 MILLIGRAM(S): 42 INJECTION, SOLUTION INTRAVENOUS at 08:11

## 2025-01-01 RX ADMIN — SEVELAMER CARBONATE 800 MILLIGRAM(S): 800 TABLET, FILM COATED ORAL at 17:25

## 2025-01-01 RX ADMIN — HYDROMORPHONE HYDROCHLORIDE 0.3 MILLIGRAM(S): 4 INJECTION, SOLUTION INTRAMUSCULAR; INTRAVENOUS; SUBCUTANEOUS at 07:45

## 2025-01-01 RX ADMIN — DORZOLAMIDE HYDROCHLORIDE 1 DROP(S): 20 SOLUTION OPHTHALMIC at 07:53

## 2025-01-01 RX ADMIN — NIFEDIPINE 60 MILLIGRAM(S): 90 TABLET, FILM COATED, EXTENDED RELEASE ORAL at 06:27

## 2025-01-01 RX ADMIN — Medication 500 MILLIGRAM(S): at 06:03

## 2025-01-01 RX ADMIN — Medication 50 MILLIGRAM(S): at 05:21

## 2025-01-01 RX ADMIN — Medication 12.5 MILLIGRAM(S): at 18:18

## 2025-01-01 RX ADMIN — POLYETHYLENE GLYCOL 3350 17 GRAM(S): 17 POWDER, FOR SOLUTION ORAL at 12:18

## 2025-01-01 RX ADMIN — APIXABAN 2.5 MILLIGRAM(S): 5 TABLET, FILM COATED ORAL at 17:07

## 2025-01-01 RX ADMIN — Medication 2: at 17:22

## 2025-01-01 RX ADMIN — SEVELAMER CARBONATE 800 MILLIGRAM(S): 800 TABLET, FILM COATED ORAL at 17:58

## 2025-01-01 RX ADMIN — IPRATROPIUM BROMIDE AND ALBUTEROL SULFATE 3 MILLILITER(S): .5; 2.5 SOLUTION RESPIRATORY (INHALATION) at 19:26

## 2025-01-01 RX ADMIN — FLUTICASONE PROPIONATE AND SALMETEROL 1 DOSE(S): 113; 14 POWDER, METERED RESPIRATORY (INHALATION) at 18:16

## 2025-01-01 RX ADMIN — Medication 80 MILLIGRAM(S): at 05:43

## 2025-01-01 RX ADMIN — FLUTICASONE PROPIONATE AND SALMETEROL 1 DOSE(S): 113; 14 POWDER, METERED RESPIRATORY (INHALATION) at 17:16

## 2025-01-01 RX ADMIN — EPOETIN ALFA 4000 UNIT(S): 2000 SOLUTION INTRAVENOUS; SUBCUTANEOUS at 20:21

## 2025-01-01 RX ADMIN — Medication 12.5 MILLIGRAM(S): at 17:56

## 2025-01-01 RX ADMIN — NIFEDIPINE 60 MILLIGRAM(S): 90 TABLET, FILM COATED, EXTENDED RELEASE ORAL at 06:57

## 2025-01-01 RX ADMIN — Medication 25 MILLIGRAM(S): at 15:07

## 2025-01-01 RX ADMIN — DORZOLAMIDE HYDROCHLORIDE 1 DROP(S): 20 SOLUTION OPHTHALMIC at 17:25

## 2025-01-01 RX ADMIN — FLUTICASONE PROPIONATE AND SALMETEROL 1 DOSE(S): 113; 14 POWDER, METERED RESPIRATORY (INHALATION) at 17:11

## 2025-01-01 RX ADMIN — PIPERACILLIN SODIUM AND TAZOBACTAM SODIUM 200 GRAM(S): 2; 250 INJECTION, POWDER, FOR SOLUTION INTRAVENOUS at 23:25

## 2025-01-01 RX ADMIN — Medication 2: at 21:30

## 2025-01-01 RX ADMIN — ANTISEPTIC SURGICAL SCRUB 1 APPLICATION(S): 0.04 SOLUTION TOPICAL at 05:05

## 2025-01-01 RX ADMIN — APIXABAN 2.5 MILLIGRAM(S): 5 TABLET, FILM COATED ORAL at 17:32

## 2025-01-01 RX ADMIN — Medication 25 MILLIGRAM(S): at 21:25

## 2025-01-01 RX ADMIN — PIPERACILLIN SODIUM AND TAZOBACTAM SODIUM 25 GRAM(S): 2; 250 INJECTION, POWDER, FOR SOLUTION INTRAVENOUS at 17:16

## 2025-01-01 RX ADMIN — ACETAMINOPHEN 650 MILLIGRAM(S): 160 SUSPENSION ORAL at 12:07

## 2025-01-01 RX ADMIN — Medication 50 MILLIGRAM(S): at 18:01

## 2025-01-01 RX ADMIN — Medication 12.5 MILLIGRAM(S): at 17:25

## 2025-01-01 RX ADMIN — IPRATROPIUM BROMIDE AND ALBUTEROL SULFATE 3 MILLILITER(S): .5; 2.5 SOLUTION RESPIRATORY (INHALATION) at 11:51

## 2025-01-01 RX ADMIN — Medication 500 MILLIGRAM(S): at 17:26

## 2025-01-01 RX ADMIN — Medication 80 MILLIGRAM(S): at 14:08

## 2025-01-01 RX ADMIN — Medication 12.5 MILLIGRAM(S): at 05:33

## 2025-01-01 RX ADMIN — IPRATROPIUM BROMIDE AND ALBUTEROL SULFATE 3 MILLILITER(S): .5; 2.5 SOLUTION RESPIRATORY (INHALATION) at 17:28

## 2025-01-01 RX ADMIN — ACETAMINOPHEN 650 MILLIGRAM(S): 160 SUSPENSION ORAL at 19:16

## 2025-01-01 RX ADMIN — Medication 12.5 MILLIGRAM(S): at 18:40

## 2025-01-01 RX ADMIN — PIPERACILLIN SODIUM AND TAZOBACTAM SODIUM 25 GRAM(S): 2; 250 INJECTION, POWDER, FOR SOLUTION INTRAVENOUS at 06:28

## 2025-01-01 RX ADMIN — Medication 1: at 17:21

## 2025-01-01 RX ADMIN — HYDROMORPHONE HYDROCHLORIDE 1 MILLIGRAM(S): 4 INJECTION, SOLUTION INTRAMUSCULAR; INTRAVENOUS; SUBCUTANEOUS at 10:36

## 2025-01-01 RX ADMIN — ACETAMINOPHEN 650 MILLIGRAM(S): 160 SUSPENSION ORAL at 05:40

## 2025-01-01 RX ADMIN — Medication 0.4 MILLIGRAM(S): at 02:41

## 2025-01-01 RX ADMIN — IPRATROPIUM BROMIDE AND ALBUTEROL SULFATE 3 MILLILITER(S): .5; 2.5 SOLUTION RESPIRATORY (INHALATION) at 21:56

## 2025-01-01 RX ADMIN — Medication 40 MILLIGRAM(S): at 13:16

## 2025-01-01 RX ADMIN — MONTELUKAST SODIUM 10 MILLIGRAM(S): 5 TABLET, CHEWABLE ORAL at 11:52

## 2025-01-01 RX ADMIN — ACETAMINOPHEN 650 MILLIGRAM(S): 160 SUSPENSION ORAL at 05:47

## 2025-01-01 RX ADMIN — POLYETHYLENE GLYCOL 3350 17 GRAM(S): 17 POWDER, FOR SOLUTION ORAL at 11:52

## 2025-01-01 RX ADMIN — ONDANSETRON 4 MILLIGRAM(S): 4 TABLET, ORALLY DISINTEGRATING ORAL at 12:21

## 2025-01-01 RX ADMIN — Medication 50 MILLIGRAM(S): at 18:06

## 2025-01-01 RX ADMIN — MONTELUKAST SODIUM 10 MILLIGRAM(S): 5 TABLET, CHEWABLE ORAL at 12:01

## 2025-01-01 RX ADMIN — IPRATROPIUM BROMIDE AND ALBUTEROL SULFATE 3 MILLILITER(S): .5; 2.5 SOLUTION RESPIRATORY (INHALATION) at 21:31

## 2025-01-01 RX ADMIN — Medication 80 MILLIGRAM(S): at 06:04

## 2025-01-01 RX ADMIN — SEVELAMER CARBONATE 800 MILLIGRAM(S): 800 TABLET, FILM COATED ORAL at 12:35

## 2025-01-01 RX ADMIN — Medication 4 MILLILITER(S): at 23:36

## 2025-01-01 RX ADMIN — ONDANSETRON 4 MILLIGRAM(S): 4 TABLET, ORALLY DISINTEGRATING ORAL at 20:33

## 2025-01-01 RX ADMIN — Medication 1: at 22:04

## 2025-01-01 RX ADMIN — Medication 4 MILLILITER(S): at 05:20

## 2025-01-01 RX ADMIN — APIXABAN 2.5 MILLIGRAM(S): 5 TABLET, FILM COATED ORAL at 12:35

## 2025-01-01 RX ADMIN — Medication 80 MILLIGRAM(S): at 05:36

## 2025-01-01 RX ADMIN — FLUTICASONE PROPIONATE AND SALMETEROL 1 DOSE(S): 113; 14 POWDER, METERED RESPIRATORY (INHALATION) at 05:53

## 2025-01-01 RX ADMIN — SEVELAMER CARBONATE 800 MILLIGRAM(S): 800 TABLET, FILM COATED ORAL at 12:01

## 2025-01-01 RX ADMIN — Medication 25 MILLIGRAM(S): at 21:22

## 2025-01-01 RX ADMIN — Medication 1200 UNIT(S)/HR: at 19:07

## 2025-01-01 RX ADMIN — APIXABAN 2.5 MILLIGRAM(S): 5 TABLET, FILM COATED ORAL at 18:18

## 2025-01-01 RX ADMIN — BUMETANIDE 132 MILLIGRAM(S): 2 TABLET ORAL at 17:42

## 2025-01-01 RX ADMIN — DORZOLAMIDE HYDROCHLORIDE 1 DROP(S): 20 SOLUTION OPHTHALMIC at 08:00

## 2025-01-01 RX ADMIN — Medication 4 MILLILITER(S): at 18:35

## 2025-01-01 RX ADMIN — ACETAMINOPHEN 650 MILLIGRAM(S): 160 SUSPENSION ORAL at 06:06

## 2025-01-01 RX ADMIN — SODIUM BICARBONATE 650 MILLIGRAM(S): 42 INJECTION, SOLUTION INTRAVENOUS at 06:03

## 2025-01-01 RX ADMIN — ACETAMINOPHEN 650 MILLIGRAM(S): 160 SUSPENSION ORAL at 23:40

## 2025-01-01 RX ADMIN — NIFEDIPINE 60 MILLIGRAM(S): 90 TABLET, FILM COATED, EXTENDED RELEASE ORAL at 06:28

## 2025-01-01 RX ADMIN — APIXABAN 2.5 MILLIGRAM(S): 5 TABLET, FILM COATED ORAL at 17:27

## 2025-01-01 RX ADMIN — Medication 50 MILLIGRAM(S): at 05:46

## 2025-01-01 RX ADMIN — BUMETANIDE 2 MILLIGRAM(S): 2 TABLET ORAL at 06:23

## 2025-01-01 RX ADMIN — SEVELAMER CARBONATE 800 MILLIGRAM(S): 800 TABLET, FILM COATED ORAL at 16:19

## 2025-01-01 RX ADMIN — Medication 4 MILLILITER(S): at 05:23

## 2025-01-01 RX ADMIN — MONTELUKAST SODIUM 10 MILLIGRAM(S): 5 TABLET, CHEWABLE ORAL at 12:35

## 2025-01-01 RX ADMIN — Medication 1200 UNIT(S)/HR: at 01:29

## 2025-01-01 RX ADMIN — Medication 1: at 07:46

## 2025-01-01 RX ADMIN — DORZOLAMIDE HYDROCHLORIDE 1 DROP(S): 20 SOLUTION OPHTHALMIC at 17:56

## 2025-01-01 RX ADMIN — NIFEDIPINE 60 MILLIGRAM(S): 90 TABLET, FILM COATED, EXTENDED RELEASE ORAL at 05:34

## 2025-01-01 RX ADMIN — SODIUM BICARBONATE 650 MILLIGRAM(S): 42 INJECTION, SOLUTION INTRAVENOUS at 06:23

## 2025-01-01 RX ADMIN — Medication 500 MILLILITER(S): at 01:21

## 2025-01-01 RX ADMIN — Medication 12.5 MILLIGRAM(S): at 05:41

## 2025-01-01 RX ADMIN — Medication 50 MILLIGRAM(S): at 05:40

## 2025-01-01 RX ADMIN — MONTELUKAST SODIUM 10 MILLIGRAM(S): 5 TABLET, CHEWABLE ORAL at 12:16

## 2025-01-01 RX ADMIN — MONTELUKAST SODIUM 10 MILLIGRAM(S): 5 TABLET, CHEWABLE ORAL at 12:38

## 2025-01-01 RX ADMIN — HYDROMORPHONE HYDROCHLORIDE 0.3 MILLIGRAM(S): 4 INJECTION, SOLUTION INTRAMUSCULAR; INTRAVENOUS; SUBCUTANEOUS at 06:37

## 2025-01-01 RX ADMIN — ACETAMINOPHEN 650 MILLIGRAM(S): 160 SUSPENSION ORAL at 05:34

## 2025-01-01 RX ADMIN — DORZOLAMIDE HYDROCHLORIDE 1 DROP(S): 20 SOLUTION OPHTHALMIC at 17:30

## 2025-01-01 RX ADMIN — Medication 1: at 12:08

## 2025-01-01 RX ADMIN — DORZOLAMIDE HYDROCHLORIDE 1 DROP(S): 20 SOLUTION OPHTHALMIC at 18:32

## 2025-01-01 RX ADMIN — Medication 500 MILLIGRAM(S): at 17:32

## 2025-01-01 RX ADMIN — FLUTICASONE PROPIONATE AND SALMETEROL 1 DOSE(S): 113; 14 POWDER, METERED RESPIRATORY (INHALATION) at 17:57

## 2025-01-01 RX ADMIN — DORZOLAMIDE HYDROCHLORIDE 1 DROP(S): 20 SOLUTION OPHTHALMIC at 16:17

## 2025-01-01 RX ADMIN — Medication 2000 UNIT(S): at 12:09

## 2025-01-01 RX ADMIN — Medication 12.5 MILLIGRAM(S): at 07:52

## 2025-01-01 RX ADMIN — DORZOLAMIDE HYDROCHLORIDE 1 DROP(S): 20 SOLUTION OPHTHALMIC at 07:58

## 2025-01-01 RX ADMIN — SODIUM ZIRCONIUM CYCLOSILICATE 10 GRAM(S): 5 POWDER, FOR SUSPENSION ORAL at 09:46

## 2025-01-01 RX ADMIN — IPRATROPIUM BROMIDE AND ALBUTEROL SULFATE 3 MILLILITER(S): .5; 2.5 SOLUTION RESPIRATORY (INHALATION) at 18:40

## 2025-01-01 RX ADMIN — SEVELAMER CARBONATE 800 MILLIGRAM(S): 800 TABLET, FILM COATED ORAL at 12:08

## 2025-01-01 RX ADMIN — APIXABAN 2.5 MILLIGRAM(S): 5 TABLET, FILM COATED ORAL at 04:37

## 2025-01-01 RX ADMIN — Medication 50 MILLIGRAM(S): at 18:40

## 2025-01-01 RX ADMIN — PIPERACILLIN SODIUM AND TAZOBACTAM SODIUM 200 GRAM(S): 2; 250 INJECTION, POWDER, FOR SOLUTION INTRAVENOUS at 00:30

## 2025-01-01 RX ADMIN — APIXABAN 2.5 MILLIGRAM(S): 5 TABLET, FILM COATED ORAL at 17:34

## 2025-01-01 RX ADMIN — Medication 2 TABLET(S): at 21:37

## 2025-01-01 RX ADMIN — Medication 2000 UNIT(S): at 12:16

## 2025-01-01 RX ADMIN — ONDANSETRON 4 MILLIGRAM(S): 4 TABLET, ORALLY DISINTEGRATING ORAL at 17:05

## 2025-01-01 RX ADMIN — Medication 2000 UNIT(S): at 11:51

## 2025-01-01 RX ADMIN — Medication 2000 UNIT(S): at 12:32

## 2025-01-01 RX ADMIN — FLUTICASONE PROPIONATE AND SALMETEROL 1 DOSE(S): 113; 14 POWDER, METERED RESPIRATORY (INHALATION) at 18:02

## 2025-01-01 RX ADMIN — POLYETHYLENE GLYCOL 3350 17 GRAM(S): 17 POWDER, FOR SOLUTION ORAL at 11:51

## 2025-01-01 RX ADMIN — DORZOLAMIDE HYDROCHLORIDE 1 DROP(S): 20 SOLUTION OPHTHALMIC at 17:28

## 2025-01-01 RX ADMIN — INSULIN GLARGINE-YFGN 5 UNIT(S): 100 INJECTION, SOLUTION SUBCUTANEOUS at 22:52

## 2025-01-01 RX ADMIN — Medication 20 MILLIGRAM(S): at 13:45

## 2025-01-01 RX ADMIN — Medication 1200 UNIT(S)/HR: at 16:12

## 2025-01-01 RX ADMIN — Medication 50 MILLIGRAM(S): at 06:57

## 2025-01-01 RX ADMIN — APIXABAN 2.5 MILLIGRAM(S): 5 TABLET, FILM COATED ORAL at 06:23

## 2025-01-01 RX ADMIN — NIFEDIPINE 60 MILLIGRAM(S): 90 TABLET, FILM COATED, EXTENDED RELEASE ORAL at 07:06

## 2025-01-01 RX ADMIN — Medication 80 MILLIGRAM(S): at 11:51

## 2025-01-01 RX ADMIN — Medication 2: at 12:23

## 2025-01-01 RX ADMIN — FLUTICASONE PROPIONATE AND SALMETEROL 1 DOSE(S): 113; 14 POWDER, METERED RESPIRATORY (INHALATION) at 06:38

## 2025-01-01 RX ADMIN — FLUTICASONE PROPIONATE AND SALMETEROL 1 DOSE(S): 113; 14 POWDER, METERED RESPIRATORY (INHALATION) at 06:04

## 2025-01-01 RX ADMIN — Medication 900 UNIT(S)/HR: at 20:10

## 2025-01-01 RX ADMIN — Medication 50 MILLIGRAM(S): at 06:28

## 2025-01-01 RX ADMIN — Medication 80 MILLIGRAM(S): at 22:23

## 2025-01-01 RX ADMIN — Medication 1 MILLIGRAM(S): at 12:07

## 2025-01-01 RX ADMIN — SEVELAMER CARBONATE 800 MILLIGRAM(S): 800 TABLET, FILM COATED ORAL at 08:12

## 2025-01-01 RX ADMIN — BUMETANIDE 132 MILLIGRAM(S): 2 TABLET ORAL at 06:53

## 2025-01-01 RX ADMIN — FLUTICASONE PROPIONATE AND SALMETEROL 1 DOSE(S): 113; 14 POWDER, METERED RESPIRATORY (INHALATION) at 05:15

## 2025-01-01 RX ADMIN — AZITHROMYCIN DIHYDRATE 500 MILLIGRAM(S): 500 TABLET, FILM COATED ORAL at 11:51

## 2025-01-01 RX ADMIN — HYDROMORPHONE HYDROCHLORIDE 1 MILLIGRAM(S): 4 INJECTION, SOLUTION INTRAMUSCULAR; INTRAVENOUS; SUBCUTANEOUS at 16:39

## 2025-01-01 RX ADMIN — Medication 0.2 MILLIGRAM(S): at 02:56

## 2025-01-01 RX ADMIN — DORZOLAMIDE HYDROCHLORIDE 1 DROP(S): 20 SOLUTION OPHTHALMIC at 17:23

## 2025-01-01 RX ADMIN — DORZOLAMIDE HYDROCHLORIDE 1 DROP(S): 20 SOLUTION OPHTHALMIC at 07:44

## 2025-01-01 RX ADMIN — ACETAMINOPHEN 1000 MILLIGRAM(S): 160 SUSPENSION ORAL at 16:36

## 2025-01-01 RX ADMIN — SEVELAMER CARBONATE 800 MILLIGRAM(S): 800 TABLET, FILM COATED ORAL at 17:41

## 2025-01-01 RX ADMIN — DORZOLAMIDE HYDROCHLORIDE 1 DROP(S): 20 SOLUTION OPHTHALMIC at 17:32

## 2025-01-01 RX ADMIN — Medication 50 MILLIGRAM(S): at 17:32

## 2025-01-01 RX ADMIN — SODIUM BICARBONATE 650 MILLIGRAM(S): 42 INJECTION, SOLUTION INTRAVENOUS at 05:52

## 2025-01-01 RX ADMIN — Medication 50 MILLIGRAM(S): at 18:30

## 2025-01-01 RX ADMIN — SODIUM BICARBONATE 650 MILLIGRAM(S): 42 INJECTION, SOLUTION INTRAVENOUS at 05:57

## 2025-01-01 RX ADMIN — HYDROMORPHONE HYDROCHLORIDE 1 MILLIGRAM(S): 4 INJECTION, SOLUTION INTRAMUSCULAR; INTRAVENOUS; SUBCUTANEOUS at 13:02

## 2025-01-01 RX ADMIN — APIXABAN 2.5 MILLIGRAM(S): 5 TABLET, FILM COATED ORAL at 17:56

## 2025-01-01 RX ADMIN — SODIUM BICARBONATE 650 MILLIGRAM(S): 42 INJECTION, SOLUTION INTRAVENOUS at 17:09

## 2025-01-01 RX ADMIN — DORZOLAMIDE HYDROCHLORIDE 1 DROP(S): 20 SOLUTION OPHTHALMIC at 18:03

## 2025-01-01 RX ADMIN — IPRATROPIUM BROMIDE AND ALBUTEROL SULFATE 3 MILLILITER(S): .5; 2.5 SOLUTION RESPIRATORY (INHALATION) at 18:10

## 2025-01-01 RX ADMIN — ANTISEPTIC SURGICAL SCRUB 1 APPLICATION(S): 0.04 SOLUTION TOPICAL at 05:03

## 2025-01-01 RX ADMIN — Medication 2000 UNIT(S): at 12:20

## 2025-01-01 RX ADMIN — SODIUM BICARBONATE 650 MILLIGRAM(S): 42 INJECTION, SOLUTION INTRAVENOUS at 17:03

## 2025-01-01 RX ADMIN — ACETAMINOPHEN 650 MILLIGRAM(S): 160 SUSPENSION ORAL at 22:10

## 2025-01-01 RX ADMIN — Medication 50 MILLIGRAM(S): at 17:34

## 2025-01-01 RX ADMIN — IPRATROPIUM BROMIDE AND ALBUTEROL SULFATE 3 MILLILITER(S): .5; 2.5 SOLUTION RESPIRATORY (INHALATION) at 23:13

## 2025-01-01 RX ADMIN — Medication 12.5 MILLIGRAM(S): at 05:46

## 2025-01-01 RX ADMIN — BUMETANIDE 2 MILLIGRAM(S): 2 TABLET ORAL at 05:39

## 2025-01-01 RX ADMIN — SEVELAMER CARBONATE 800 MILLIGRAM(S): 800 TABLET, FILM COATED ORAL at 17:57

## 2025-01-01 RX ADMIN — EPOETIN ALFA 8000 UNIT(S): 2000 SOLUTION INTRAVENOUS; SUBCUTANEOUS at 15:12

## 2025-01-01 RX ADMIN — SODIUM BICARBONATE 650 MILLIGRAM(S): 42 INJECTION, SOLUTION INTRAVENOUS at 17:07

## 2025-01-01 RX ADMIN — MONTELUKAST SODIUM 10 MILLIGRAM(S): 5 TABLET, CHEWABLE ORAL at 12:32

## 2025-01-01 RX ADMIN — Medication 2000 UNIT(S): at 12:38

## 2025-01-01 RX ADMIN — IPRATROPIUM BROMIDE AND ALBUTEROL SULFATE 3 MILLILITER(S): .5; 2.5 SOLUTION RESPIRATORY (INHALATION) at 09:46

## 2025-01-01 RX ADMIN — Medication 2000 UNIT(S): at 12:14

## 2025-01-01 RX ADMIN — ACETAMINOPHEN 650 MILLIGRAM(S): 160 SUSPENSION ORAL at 23:13

## 2025-01-01 RX ADMIN — IPRATROPIUM BROMIDE AND ALBUTEROL SULFATE 3 MILLILITER(S): .5; 2.5 SOLUTION RESPIRATORY (INHALATION) at 22:37

## 2025-01-01 RX ADMIN — Medication 40 MILLIGRAM(S): at 04:36

## 2025-01-01 RX ADMIN — Medication 25 MILLIGRAM(S): at 05:21

## 2025-01-01 RX ADMIN — FLUTICASONE PROPIONATE AND SALMETEROL 1 DOSE(S): 113; 14 POWDER, METERED RESPIRATORY (INHALATION) at 05:59

## 2025-01-01 RX ADMIN — SODIUM ZIRCONIUM CYCLOSILICATE 10 GRAM(S): 5 POWDER, FOR SUSPENSION ORAL at 19:44

## 2025-01-01 RX ADMIN — POLYETHYLENE GLYCOL 3350 17 GRAM(S): 17 POWDER, FOR SOLUTION ORAL at 12:08

## 2025-01-01 RX ADMIN — ACETAMINOPHEN 650 MILLIGRAM(S): 160 SUSPENSION ORAL at 17:57

## 2025-01-01 RX ADMIN — POLYETHYLENE GLYCOL 3350 17 GRAM(S): 17 POWDER, FOR SOLUTION ORAL at 12:02

## 2025-01-01 RX ADMIN — SODIUM BICARBONATE 650 MILLIGRAM(S): 42 INJECTION, SOLUTION INTRAVENOUS at 17:27

## 2025-01-01 RX ADMIN — Medication 50 MILLIGRAM(S): at 17:04

## 2025-01-01 RX ADMIN — PIPERACILLIN SODIUM AND TAZOBACTAM SODIUM 25 GRAM(S): 2; 250 INJECTION, POWDER, FOR SOLUTION INTRAVENOUS at 21:42

## 2025-01-01 RX ADMIN — APIXABAN 2.5 MILLIGRAM(S): 5 TABLET, FILM COATED ORAL at 23:31

## 2025-01-01 RX ADMIN — NIFEDIPINE 60 MILLIGRAM(S): 90 TABLET, FILM COATED, EXTENDED RELEASE ORAL at 05:53

## 2025-01-01 RX ADMIN — PIPERACILLIN SODIUM AND TAZOBACTAM SODIUM 25 GRAM(S): 2; 250 INJECTION, POWDER, FOR SOLUTION INTRAVENOUS at 05:05

## 2025-01-01 RX ADMIN — DORZOLAMIDE HYDROCHLORIDE 1 DROP(S): 20 SOLUTION OPHTHALMIC at 07:31

## 2025-01-01 RX ADMIN — Medication 25 MILLIGRAM(S): at 06:28

## 2025-01-01 RX ADMIN — Medication 50 MILLIGRAM(S): at 07:52

## 2025-01-01 RX ADMIN — EPOETIN ALFA 4000 UNIT(S): 2000 SOLUTION INTRAVENOUS; SUBCUTANEOUS at 14:59

## 2025-01-01 RX ADMIN — Medication 1: at 07:59

## 2025-01-01 RX ADMIN — ACETAMINOPHEN 650 MILLIGRAM(S): 160 SUSPENSION ORAL at 22:56

## 2025-01-01 RX ADMIN — Medication 12.5 MILLIGRAM(S): at 17:32

## 2025-01-01 RX ADMIN — Medication 500 MILLIGRAM(S): at 17:35

## 2025-01-01 RX ADMIN — IPRATROPIUM BROMIDE AND ALBUTEROL SULFATE 3 MILLILITER(S): .5; 2.5 SOLUTION RESPIRATORY (INHALATION) at 05:44

## 2025-01-01 RX ADMIN — Medication 2000 UNIT(S): at 18:18

## 2025-01-01 RX ADMIN — Medication 12.5 MILLIGRAM(S): at 06:04

## 2025-01-01 RX ADMIN — Medication 2 UNIT(S): at 16:48

## 2025-01-01 RX ADMIN — Medication 50 MILLIGRAM(S): at 07:06

## 2025-01-01 RX ADMIN — Medication 50 MILLIGRAM(S): at 05:34

## 2025-01-01 RX ADMIN — Medication 1: at 08:00

## 2025-01-01 RX ADMIN — INSULIN GLARGINE-YFGN 5 UNIT(S): 100 INJECTION, SOLUTION SUBCUTANEOUS at 22:23

## 2025-01-01 RX ADMIN — DORZOLAMIDE HYDROCHLORIDE 1 DROP(S): 20 SOLUTION OPHTHALMIC at 17:58

## 2025-01-01 RX ADMIN — MONTELUKAST SODIUM 10 MILLIGRAM(S): 5 TABLET, CHEWABLE ORAL at 12:08

## 2025-01-01 RX ADMIN — DORZOLAMIDE HYDROCHLORIDE 1 DROP(S): 20 SOLUTION OPHTHALMIC at 17:08

## 2025-01-01 RX ADMIN — APIXABAN 2.5 MILLIGRAM(S): 5 TABLET, FILM COATED ORAL at 05:52

## 2025-01-01 RX ADMIN — NIFEDIPINE 60 MILLIGRAM(S): 90 TABLET, FILM COATED, EXTENDED RELEASE ORAL at 18:19

## 2025-01-01 RX ADMIN — MONTELUKAST SODIUM 10 MILLIGRAM(S): 5 TABLET, CHEWABLE ORAL at 11:53

## 2025-01-01 RX ADMIN — Medication 2000 UNIT(S): at 11:52

## 2025-01-01 RX ADMIN — Medication 1: at 17:30

## 2025-01-01 RX ADMIN — FLUTICASONE PROPIONATE AND SALMETEROL 1 DOSE(S): 113; 14 POWDER, METERED RESPIRATORY (INHALATION) at 17:39

## 2025-01-01 RX ADMIN — Medication 4 MILLILITER(S): at 11:31

## 2025-01-01 RX ADMIN — SODIUM BICARBONATE 650 MILLIGRAM(S): 42 INJECTION, SOLUTION INTRAVENOUS at 18:18

## 2025-01-01 RX ADMIN — Medication 2000 UNIT(S): at 12:00

## 2025-01-01 RX ADMIN — SODIUM BICARBONATE 650 MILLIGRAM(S): 42 INJECTION, SOLUTION INTRAVENOUS at 17:33

## 2025-01-01 RX ADMIN — APIXABAN 2.5 MILLIGRAM(S): 5 TABLET, FILM COATED ORAL at 05:46

## 2025-01-01 RX ADMIN — Medication 500 MILLIGRAM(S): at 05:38

## 2025-01-01 RX ADMIN — SODIUM BICARBONATE 650 MILLIGRAM(S): 42 INJECTION, SOLUTION INTRAVENOUS at 05:35

## 2025-01-01 RX ADMIN — Medication 1: at 17:37

## 2025-01-01 RX ADMIN — SEVELAMER CARBONATE 800 MILLIGRAM(S): 800 TABLET, FILM COATED ORAL at 08:31

## 2025-01-01 RX ADMIN — FLUTICASONE PROPIONATE AND SALMETEROL 1 DOSE(S): 113; 14 POWDER, METERED RESPIRATORY (INHALATION) at 05:52

## 2025-01-01 RX ADMIN — ACETAMINOPHEN 650 MILLIGRAM(S): 160 SUSPENSION ORAL at 05:50

## 2025-01-01 RX ADMIN — Medication 50 MILLIGRAM(S): at 06:03

## 2025-01-01 RX ADMIN — MONTELUKAST SODIUM 10 MILLIGRAM(S): 5 TABLET, CHEWABLE ORAL at 18:19

## 2025-01-01 RX ADMIN — SEVELAMER CARBONATE 800 MILLIGRAM(S): 800 TABLET, FILM COATED ORAL at 08:23

## 2025-01-01 RX ADMIN — Medication 2000 UNIT(S): at 11:50

## 2025-01-01 RX ADMIN — FLUTICASONE PROPIONATE AND SALMETEROL 1 DOSE(S): 113; 14 POWDER, METERED RESPIRATORY (INHALATION) at 17:33

## 2025-01-01 RX ADMIN — Medication 2: at 12:20

## 2025-01-01 RX ADMIN — Medication 2000 UNIT(S): at 12:23

## 2025-01-01 RX ADMIN — ACETAMINOPHEN, DIPHENHYDRAMINE HCL, PHENYLEPHRINE HCL 5 MILLIGRAM(S): 325; 25; 5 TABLET ORAL at 21:03

## 2025-01-01 RX ADMIN — Medication 50 MILLIGRAM(S): at 17:07

## 2025-01-01 RX ADMIN — ACETAMINOPHEN 650 MILLIGRAM(S): 160 SUSPENSION ORAL at 06:57

## 2025-01-01 RX ADMIN — CEFTRIAXONE 100 MILLIGRAM(S): 250 INJECTION, POWDER, FOR SOLUTION INTRAMUSCULAR; INTRAVENOUS at 16:17

## 2025-01-01 RX ADMIN — SODIUM BICARBONATE 650 MILLIGRAM(S): 42 INJECTION, SOLUTION INTRAVENOUS at 18:06

## 2025-01-01 RX ADMIN — Medication 20 MILLIGRAM(S): at 05:53

## 2025-01-01 RX ADMIN — ACETAMINOPHEN 400 MILLIGRAM(S): 160 SUSPENSION ORAL at 15:58

## 2025-01-01 RX ADMIN — BUMETANIDE 2 MILLIGRAM(S): 2 TABLET ORAL at 05:40

## 2025-01-01 RX ADMIN — IPRATROPIUM BROMIDE AND ALBUTEROL SULFATE 3 MILLILITER(S): .5; 2.5 SOLUTION RESPIRATORY (INHALATION) at 18:12

## 2025-01-01 RX ADMIN — Medication 12.5 MILLIGRAM(S): at 17:28

## 2025-01-01 RX ADMIN — Medication 50 MILLIGRAM(S): at 05:44

## 2025-01-01 RX ADMIN — ONDANSETRON 4 MILLIGRAM(S): 4 TABLET, ORALLY DISINTEGRATING ORAL at 10:01

## 2025-01-01 RX ADMIN — Medication 1: at 07:44

## 2025-01-01 RX ADMIN — Medication 12.5 MILLIGRAM(S): at 07:06

## 2025-01-01 RX ADMIN — POLYETHYLENE GLYCOL 3350 17 GRAM(S): 17 POWDER, FOR SOLUTION ORAL at 12:17

## 2025-01-01 RX ADMIN — Medication 2 TABLET(S): at 21:31

## 2025-01-01 RX ADMIN — ACETAMINOPHEN 650 MILLIGRAM(S): 160 SUSPENSION ORAL at 22:23

## 2025-01-01 RX ADMIN — Medication 1: at 17:16

## 2025-01-01 RX ADMIN — MONTELUKAST SODIUM 10 MILLIGRAM(S): 5 TABLET, CHEWABLE ORAL at 12:15

## 2025-01-01 RX ADMIN — DORZOLAMIDE HYDROCHLORIDE 1 DROP(S): 20 SOLUTION OPHTHALMIC at 16:43

## 2025-01-01 RX ADMIN — FLUTICASONE PROPIONATE AND SALMETEROL 1 DOSE(S): 113; 14 POWDER, METERED RESPIRATORY (INHALATION) at 18:25

## 2025-01-01 RX ADMIN — SODIUM BICARBONATE 650 MILLIGRAM(S): 42 INJECTION, SOLUTION INTRAVENOUS at 17:35

## 2025-01-01 RX ADMIN — Medication 1: at 11:50

## 2025-01-01 RX ADMIN — Medication 50 MILLIGRAM(S): at 18:16

## 2025-01-01 RX ADMIN — SEVELAMER CARBONATE 800 MILLIGRAM(S): 800 TABLET, FILM COATED ORAL at 18:25

## 2025-01-01 RX ADMIN — Medication 2000 UNIT(S): at 12:01

## 2025-01-01 RX ADMIN — VANCOMYCIN HYDROCHLORIDE 250 MILLIGRAM(S): KIT at 00:58

## 2025-01-01 RX ADMIN — IPRATROPIUM BROMIDE AND ALBUTEROL SULFATE 3 MILLILITER(S): .5; 2.5 SOLUTION RESPIRATORY (INHALATION) at 10:03

## 2025-01-01 RX ADMIN — MONTELUKAST SODIUM 10 MILLIGRAM(S): 5 TABLET, CHEWABLE ORAL at 12:09

## 2025-01-01 RX ADMIN — Medication 3: at 11:51

## 2025-01-01 RX ADMIN — APIXABAN 2.5 MILLIGRAM(S): 5 TABLET, FILM COATED ORAL at 05:35

## 2025-01-01 RX ADMIN — ACETAMINOPHEN 650 MILLIGRAM(S): 160 SUSPENSION ORAL at 18:43

## 2025-01-01 RX ADMIN — IRON SUCROSE 210 MILLIGRAM(S): 20 INJECTION, SOLUTION INTRAVENOUS at 22:33

## 2025-01-01 RX ADMIN — Medication 80 MILLIGRAM(S): at 05:58

## 2025-01-01 RX ADMIN — NIFEDIPINE 60 MILLIGRAM(S): 90 TABLET, FILM COATED, EXTENDED RELEASE ORAL at 05:33

## 2025-01-01 RX ADMIN — Medication 2: at 16:17

## 2025-01-01 RX ADMIN — SEVELAMER CARBONATE 800 MILLIGRAM(S): 800 TABLET, FILM COATED ORAL at 11:31

## 2025-01-01 RX ADMIN — DORZOLAMIDE HYDROCHLORIDE 1 DROP(S): 20 SOLUTION OPHTHALMIC at 19:39

## 2025-01-01 RX ADMIN — APIXABAN 2.5 MILLIGRAM(S): 5 TABLET, FILM COATED ORAL at 05:49

## 2025-01-01 RX ADMIN — APIXABAN 2.5 MILLIGRAM(S): 5 TABLET, FILM COATED ORAL at 17:04

## 2025-01-01 RX ADMIN — APIXABAN 2.5 MILLIGRAM(S): 5 TABLET, FILM COATED ORAL at 05:34

## 2025-01-01 RX ADMIN — ONDANSETRON 4 MILLIGRAM(S): 4 TABLET, ORALLY DISINTEGRATING ORAL at 12:56

## 2025-01-01 RX ADMIN — DORZOLAMIDE HYDROCHLORIDE 1 DROP(S): 20 SOLUTION OPHTHALMIC at 08:24

## 2025-01-01 RX ADMIN — DORZOLAMIDE HYDROCHLORIDE 1 DROP(S): 20 SOLUTION OPHTHALMIC at 08:41

## 2025-01-01 RX ADMIN — APIXABAN 2.5 MILLIGRAM(S): 5 TABLET, FILM COATED ORAL at 05:40

## 2025-01-01 RX ADMIN — SODIUM BICARBONATE 650 MILLIGRAM(S): 42 INJECTION, SOLUTION INTRAVENOUS at 04:37

## 2025-01-01 RX ADMIN — SODIUM BICARBONATE 650 MILLIGRAM(S): 42 INJECTION, SOLUTION INTRAVENOUS at 05:39

## 2025-01-01 RX ADMIN — Medication 50 MILLIGRAM(S): at 17:41

## 2025-01-01 RX ADMIN — POLYETHYLENE GLYCOL 3350 17 GRAM(S): 17 POWDER, FOR SOLUTION ORAL at 12:21

## 2025-01-01 RX ADMIN — IPRATROPIUM BROMIDE AND ALBUTEROL SULFATE 3 MILLILITER(S): .5; 2.5 SOLUTION RESPIRATORY (INHALATION) at 23:46

## 2025-01-01 RX ADMIN — Medication 500 MILLIGRAM(S): at 18:41

## 2025-01-01 RX ADMIN — PIPERACILLIN SODIUM AND TAZOBACTAM SODIUM 25 GRAM(S): 2; 250 INJECTION, POWDER, FOR SOLUTION INTRAVENOUS at 17:38

## 2025-01-01 RX ADMIN — Medication 25 MILLIGRAM(S): at 13:30

## 2025-01-01 RX ADMIN — Medication 1: at 12:13

## 2025-01-01 RX ADMIN — APIXABAN 2.5 MILLIGRAM(S): 5 TABLET, FILM COATED ORAL at 18:30

## 2025-01-01 RX ADMIN — IPRATROPIUM BROMIDE AND ALBUTEROL SULFATE 3 MILLILITER(S): .5; 2.5 SOLUTION RESPIRATORY (INHALATION) at 08:40

## 2025-01-01 RX ADMIN — ANTISEPTIC SURGICAL SCRUB 1 APPLICATION(S): 0.04 SOLUTION TOPICAL at 05:45

## 2025-01-01 RX ADMIN — FLUTICASONE PROPIONATE AND SALMETEROL 1 DOSE(S): 113; 14 POWDER, METERED RESPIRATORY (INHALATION) at 17:58

## 2025-01-01 RX ADMIN — ACETAMINOPHEN 650 MILLIGRAM(S): 160 SUSPENSION ORAL at 18:37

## 2025-01-01 RX ADMIN — Medication 12.5 MILLIGRAM(S): at 18:30

## 2025-01-01 RX ADMIN — CEFTRIAXONE 100 MILLIGRAM(S): 250 INJECTION, POWDER, FOR SOLUTION INTRAMUSCULAR; INTRAVENOUS at 17:58

## 2025-01-01 RX ADMIN — Medication 0.2 MILLIGRAM(S): at 09:20

## 2025-01-01 RX ADMIN — SODIUM BICARBONATE 650 MILLIGRAM(S): 42 INJECTION, SOLUTION INTRAVENOUS at 05:40

## 2025-01-01 RX ADMIN — Medication 1: at 17:22

## 2025-01-01 RX ADMIN — ONDANSETRON 4 MILLIGRAM(S): 4 TABLET, ORALLY DISINTEGRATING ORAL at 05:39

## 2025-01-01 RX ADMIN — Medication 80 MILLIGRAM(S): at 06:28

## 2025-01-01 RX ADMIN — FLUTICASONE PROPIONATE AND SALMETEROL 1 DOSE(S): 113; 14 POWDER, METERED RESPIRATORY (INHALATION) at 06:28

## 2025-01-01 RX ADMIN — Medication 12.5 MILLIGRAM(S): at 17:07

## 2025-01-01 RX ADMIN — Medication 2000 UNIT(S): at 12:17

## 2025-01-01 RX ADMIN — Medication 12.5 MILLIGRAM(S): at 06:27

## 2025-01-01 RX ADMIN — HYDROMORPHONE HYDROCHLORIDE 0.3 MILLIGRAM(S): 4 INJECTION, SOLUTION INTRAMUSCULAR; INTRAVENOUS; SUBCUTANEOUS at 02:36

## 2025-01-01 RX ADMIN — BUMETANIDE 2 MILLIGRAM(S): 2 TABLET ORAL at 00:30

## 2025-01-01 RX ADMIN — IPRATROPIUM BROMIDE AND ALBUTEROL SULFATE 3 MILLILITER(S): .5; 2.5 SOLUTION RESPIRATORY (INHALATION) at 21:39

## 2025-01-01 RX ADMIN — ANTISEPTIC SURGICAL SCRUB 1 APPLICATION(S): 0.04 SOLUTION TOPICAL at 06:38

## 2025-01-01 RX ADMIN — FLUTICASONE PROPIONATE AND SALMETEROL 1 DOSE(S): 113; 14 POWDER, METERED RESPIRATORY (INHALATION) at 18:07

## 2025-01-01 RX ADMIN — Medication 2: at 12:01

## 2025-01-01 RX ADMIN — Medication 25 MILLIGRAM(S): at 06:57

## 2025-01-01 RX ADMIN — MONTELUKAST SODIUM 10 MILLIGRAM(S): 5 TABLET, CHEWABLE ORAL at 12:20

## 2025-01-01 RX ADMIN — Medication 500 MILLIGRAM(S): at 05:52

## 2025-01-01 RX ADMIN — SEVELAMER CARBONATE 800 MILLIGRAM(S): 800 TABLET, FILM COATED ORAL at 08:39

## 2025-01-01 RX ADMIN — Medication 2: at 17:02

## 2025-01-01 RX ADMIN — Medication 2000 UNIT(S): at 12:07

## 2025-01-01 RX ADMIN — Medication 50 MILLIGRAM(S): at 06:22

## 2025-01-01 RX ADMIN — SEVELAMER CARBONATE 800 MILLIGRAM(S): 800 TABLET, FILM COATED ORAL at 07:53

## 2025-01-01 RX ADMIN — ACETAMINOPHEN 650 MILLIGRAM(S): 160 SUSPENSION ORAL at 05:36

## 2025-01-01 RX ADMIN — FLUTICASONE PROPIONATE AND SALMETEROL 1 DOSE(S): 113; 14 POWDER, METERED RESPIRATORY (INHALATION) at 05:39

## 2025-01-01 RX ADMIN — DORZOLAMIDE HYDROCHLORIDE 1 DROP(S): 20 SOLUTION OPHTHALMIC at 09:07

## 2025-01-01 RX ADMIN — Medication 10 MILLILITER(S): at 18:54

## 2025-01-01 RX ADMIN — SEVELAMER CARBONATE 800 MILLIGRAM(S): 800 TABLET, FILM COATED ORAL at 12:14

## 2025-01-01 RX ADMIN — Medication 1: at 07:37

## 2025-01-01 RX ADMIN — SODIUM BICARBONATE 650 MILLIGRAM(S): 42 INJECTION, SOLUTION INTRAVENOUS at 18:31

## 2025-01-01 RX ADMIN — Medication 12.5 MILLIGRAM(S): at 05:40

## 2025-01-01 RX ADMIN — Medication 5 MILLIGRAM(S): at 13:30

## 2025-01-01 RX ADMIN — Medication 50 MILLIGRAM(S): at 18:18

## 2025-01-01 RX ADMIN — SODIUM ZIRCONIUM CYCLOSILICATE 10 GRAM(S): 5 POWDER, FOR SUSPENSION ORAL at 13:58

## 2025-01-01 RX ADMIN — POLYETHYLENE GLYCOL 3350 17 GRAM(S): 17 POWDER, FOR SOLUTION ORAL at 12:35

## 2025-01-01 RX ADMIN — HYDROMORPHONE HYDROCHLORIDE 0.5 MILLIGRAM(S): 4 INJECTION, SOLUTION INTRAMUSCULAR; INTRAVENOUS; SUBCUTANEOUS at 09:48

## 2025-01-01 RX ADMIN — Medication 1: at 11:30

## 2025-01-01 RX ADMIN — NIFEDIPINE 60 MILLIGRAM(S): 90 TABLET, FILM COATED, EXTENDED RELEASE ORAL at 05:44

## 2025-01-01 RX ADMIN — APIXABAN 2.5 MILLIGRAM(S): 5 TABLET, FILM COATED ORAL at 22:12

## 2025-01-01 RX ADMIN — DORZOLAMIDE HYDROCHLORIDE 1 DROP(S): 20 SOLUTION OPHTHALMIC at 08:23

## 2025-01-01 RX ADMIN — Medication 1: at 08:21

## 2025-01-01 RX ADMIN — Medication 3: at 12:16

## 2025-01-01 RX ADMIN — Medication 12.5 MILLIGRAM(S): at 04:37

## 2025-01-01 RX ADMIN — SEVELAMER CARBONATE 800 MILLIGRAM(S): 800 TABLET, FILM COATED ORAL at 11:50

## 2025-01-01 RX ADMIN — Medication 50 MILLIGRAM(S): at 17:56

## 2025-01-01 RX ADMIN — SODIUM BICARBONATE 650 MILLIGRAM(S): 42 INJECTION, SOLUTION INTRAVENOUS at 17:56

## 2025-01-01 RX ADMIN — ACETAMINOPHEN 650 MILLIGRAM(S): 160 SUSPENSION ORAL at 17:27

## 2025-01-01 RX ADMIN — POLYETHYLENE GLYCOL 3350 17 GRAM(S): 17 POWDER, FOR SOLUTION ORAL at 12:24

## 2025-01-01 RX ADMIN — SEVELAMER CARBONATE 800 MILLIGRAM(S): 800 TABLET, FILM COATED ORAL at 11:52

## 2025-01-01 RX ADMIN — FLUTICASONE PROPIONATE AND SALMETEROL 1 DOSE(S): 113; 14 POWDER, METERED RESPIRATORY (INHALATION) at 17:08

## 2025-01-01 RX ADMIN — FLUTICASONE PROPIONATE AND SALMETEROL 1 DOSE(S): 113; 14 POWDER, METERED RESPIRATORY (INHALATION) at 05:03

## 2025-01-01 RX ADMIN — SEVELAMER CARBONATE 800 MILLIGRAM(S): 800 TABLET, FILM COATED ORAL at 08:00

## 2025-01-01 RX ADMIN — Medication 4 MILLILITER(S): at 11:51

## 2025-01-01 RX ADMIN — APIXABAN 2.5 MILLIGRAM(S): 5 TABLET, FILM COATED ORAL at 23:44

## 2025-01-01 RX ADMIN — POLYETHYLENE GLYCOL 3350 17 GRAM(S): 17 POWDER, FOR SOLUTION ORAL at 12:14

## 2025-01-01 RX ADMIN — ACETAMINOPHEN 400 MILLIGRAM(S): 160 SUSPENSION ORAL at 23:02

## 2025-01-01 RX ADMIN — Medication 2: at 17:06

## 2025-01-01 RX ADMIN — Medication 1 MILLIGRAM(S): at 13:01

## 2025-01-01 RX ADMIN — SEVELAMER CARBONATE 800 MILLIGRAM(S): 800 TABLET, FILM COATED ORAL at 17:01

## 2025-01-01 RX ADMIN — HYDROMORPHONE HYDROCHLORIDE 2 MILLIGRAM(S): 4 INJECTION, SOLUTION INTRAMUSCULAR; INTRAVENOUS; SUBCUTANEOUS at 13:27

## 2025-01-01 RX ADMIN — Medication 50 MILLIGRAM(S): at 05:57

## 2025-01-01 RX ADMIN — Medication 2: at 17:55

## 2025-01-01 RX ADMIN — CEFTRIAXONE 100 MILLIGRAM(S): 250 INJECTION, POWDER, FOR SOLUTION INTRAMUSCULAR; INTRAVENOUS at 17:24

## 2025-01-01 RX ADMIN — SEVELAMER CARBONATE 800 MILLIGRAM(S): 800 TABLET, FILM COATED ORAL at 07:52

## 2025-01-01 RX ADMIN — DORZOLAMIDE HYDROCHLORIDE 1 DROP(S): 20 SOLUTION OPHTHALMIC at 08:39

## 2025-01-01 RX ADMIN — NIFEDIPINE 60 MILLIGRAM(S): 90 TABLET, FILM COATED, EXTENDED RELEASE ORAL at 05:39

## 2025-01-01 RX ADMIN — SODIUM BICARBONATE 650 MILLIGRAM(S): 42 INJECTION, SOLUTION INTRAVENOUS at 05:33

## 2025-01-01 RX ADMIN — MONTELUKAST SODIUM 10 MILLIGRAM(S): 5 TABLET, CHEWABLE ORAL at 12:17

## 2025-01-01 RX ADMIN — MONTELUKAST SODIUM 10 MILLIGRAM(S): 5 TABLET, CHEWABLE ORAL at 11:30

## 2025-01-01 RX ADMIN — NIFEDIPINE 60 MILLIGRAM(S): 90 TABLET, FILM COATED, EXTENDED RELEASE ORAL at 04:36

## 2025-01-01 RX ADMIN — FLUTICASONE PROPIONATE AND SALMETEROL 1 DOSE(S): 113; 14 POWDER, METERED RESPIRATORY (INHALATION) at 06:49

## 2025-01-01 RX ADMIN — Medication 2 UNIT(S): at 12:07

## 2025-01-01 RX ADMIN — ACETAMINOPHEN 650 MILLIGRAM(S): 160 SUSPENSION ORAL at 05:57

## 2025-01-01 RX ADMIN — Medication 10 MILLIGRAM(S): at 18:25

## 2025-01-01 RX ADMIN — IPRATROPIUM BROMIDE AND ALBUTEROL SULFATE 3 MILLILITER(S): .5; 2.5 SOLUTION RESPIRATORY (INHALATION) at 22:12

## 2025-01-01 RX ADMIN — APIXABAN 2.5 MILLIGRAM(S): 5 TABLET, FILM COATED ORAL at 23:03

## 2025-01-01 RX ADMIN — APIXABAN 2.5 MILLIGRAM(S): 5 TABLET, FILM COATED ORAL at 11:54

## 2025-01-01 RX ADMIN — Medication 500 MILLIGRAM(S): at 18:06

## 2025-01-01 RX ADMIN — ACETAMINOPHEN 650 MILLIGRAM(S): 160 SUSPENSION ORAL at 04:38

## 2025-01-01 RX ADMIN — Medication 12.5 MILLIGRAM(S): at 06:26

## 2025-01-01 RX ADMIN — Medication 12.5 MILLIGRAM(S): at 05:35

## 2025-01-01 RX ADMIN — ACETAMINOPHEN 650 MILLIGRAM(S): 160 SUSPENSION ORAL at 21:44

## 2025-01-01 RX ADMIN — HYDROMORPHONE HYDROCHLORIDE 1 MILLIGRAM(S): 4 INJECTION, SOLUTION INTRAMUSCULAR; INTRAVENOUS; SUBCUTANEOUS at 11:39

## 2025-01-01 RX ADMIN — Medication 3: at 17:31

## 2025-01-01 RX ADMIN — FLUTICASONE PROPIONATE AND SALMETEROL 1 DOSE(S): 113; 14 POWDER, METERED RESPIRATORY (INHALATION) at 17:42

## 2025-01-01 RX ADMIN — Medication 3: at 17:00

## 2025-01-01 RX ADMIN — Medication 40 MILLIGRAM(S): at 15:14

## 2025-01-01 RX ADMIN — NIFEDIPINE 60 MILLIGRAM(S): 90 TABLET, FILM COATED, EXTENDED RELEASE ORAL at 05:20

## 2025-01-01 RX ADMIN — NIFEDIPINE 60 MILLIGRAM(S): 90 TABLET, FILM COATED, EXTENDED RELEASE ORAL at 06:23

## 2025-01-01 RX ADMIN — Medication 80 MILLIGRAM(S): at 18:11

## 2025-01-01 RX ADMIN — IPRATROPIUM BROMIDE AND ALBUTEROL SULFATE 3 MILLILITER(S): .5; 2.5 SOLUTION RESPIRATORY (INHALATION) at 21:01

## 2025-01-01 RX ADMIN — DORZOLAMIDE HYDROCHLORIDE 1 DROP(S): 20 SOLUTION OPHTHALMIC at 09:35

## 2025-01-01 RX ADMIN — FLUTICASONE PROPIONATE AND SALMETEROL 1 DOSE(S): 113; 14 POWDER, METERED RESPIRATORY (INHALATION) at 17:31

## 2025-01-01 RX ADMIN — ANTISEPTIC SURGICAL SCRUB 1 APPLICATION(S): 0.04 SOLUTION TOPICAL at 06:29

## 2025-01-01 RX ADMIN — ANTISEPTIC SURGICAL SCRUB 1 APPLICATION(S): 0.04 SOLUTION TOPICAL at 06:07

## 2025-01-01 RX ADMIN — ACETAMINOPHEN, DIPHENHYDRAMINE HCL, PHENYLEPHRINE HCL 5 MILLIGRAM(S): 325; 25; 5 TABLET ORAL at 21:23

## 2025-01-01 RX ADMIN — IPRATROPIUM BROMIDE AND ALBUTEROL SULFATE 3 MILLILITER(S): .5; 2.5 SOLUTION RESPIRATORY (INHALATION) at 05:59

## 2025-01-01 RX ADMIN — Medication 2 TABLET(S): at 21:22

## 2025-01-01 RX ADMIN — APIXABAN 2.5 MILLIGRAM(S): 5 TABLET, FILM COATED ORAL at 06:27

## 2025-01-01 RX ADMIN — ONDANSETRON 4 MILLIGRAM(S): 4 TABLET, ORALLY DISINTEGRATING ORAL at 08:37

## 2025-01-01 RX ADMIN — ACETAMINOPHEN 650 MILLIGRAM(S): 160 SUSPENSION ORAL at 18:54

## 2025-01-01 RX ADMIN — IPRATROPIUM BROMIDE AND ALBUTEROL SULFATE 3 MILLILITER(S): .5; 2.5 SOLUTION RESPIRATORY (INHALATION) at 12:21

## 2025-01-01 RX ADMIN — Medication 12.5 MILLIGRAM(S): at 17:04

## 2025-01-01 RX ADMIN — DORZOLAMIDE HYDROCHLORIDE 1 DROP(S): 20 SOLUTION OPHTHALMIC at 18:36

## 2025-01-01 RX ADMIN — FLUTICASONE PROPIONATE AND SALMETEROL 1 DOSE(S): 113; 14 POWDER, METERED RESPIRATORY (INHALATION) at 05:44

## 2025-01-01 RX ADMIN — MIDODRINE HYDROCHLORIDE 5 MILLIGRAM(S): 5 TABLET ORAL at 11:47

## 2025-01-01 RX ADMIN — Medication 20 MILLIGRAM(S): at 16:04

## 2025-01-01 RX ADMIN — ACETAMINOPHEN 650 MILLIGRAM(S): 160 SUSPENSION ORAL at 22:14

## 2025-01-01 RX ADMIN — Medication 12.5 MILLIGRAM(S): at 17:57

## 2025-01-01 RX ADMIN — ANTISEPTIC SURGICAL SCRUB 1 APPLICATION(S): 0.04 SOLUTION TOPICAL at 05:18

## 2025-01-01 RX ADMIN — POLYETHYLENE GLYCOL 3350 17 GRAM(S): 17 POWDER, FOR SOLUTION ORAL at 12:32

## 2025-01-01 RX ADMIN — IPRATROPIUM BROMIDE AND ALBUTEROL SULFATE 3 MILLILITER(S): .5; 2.5 SOLUTION RESPIRATORY (INHALATION) at 07:44

## 2025-01-01 RX ADMIN — Medication 1: at 17:18

## 2025-01-01 RX ADMIN — ONDANSETRON 4 MILLIGRAM(S): 4 TABLET, ORALLY DISINTEGRATING ORAL at 17:03

## 2025-01-01 RX ADMIN — NIFEDIPINE 60 MILLIGRAM(S): 90 TABLET, FILM COATED, EXTENDED RELEASE ORAL at 06:02

## 2025-01-01 RX ADMIN — Medication 500 MILLIGRAM(S): at 05:35

## 2025-01-01 RX ADMIN — HYDROMORPHONE HYDROCHLORIDE 0.3 MILLIGRAM(S): 4 INJECTION, SOLUTION INTRAMUSCULAR; INTRAVENOUS; SUBCUTANEOUS at 20:27

## 2025-01-01 RX ADMIN — SODIUM BICARBONATE 650 MILLIGRAM(S): 42 INJECTION, SOLUTION INTRAVENOUS at 18:37

## 2025-01-01 RX ADMIN — Medication 2: at 17:00

## 2025-01-01 RX ADMIN — Medication 500 MILLIGRAM(S): at 05:46

## 2025-01-01 RX ADMIN — Medication 4 MILLILITER(S): at 17:56

## 2025-01-01 RX ADMIN — ACETAMINOPHEN, DIPHENHYDRAMINE HCL, PHENYLEPHRINE HCL 5 MILLIGRAM(S): 325; 25; 5 TABLET ORAL at 21:36

## 2025-01-01 RX ADMIN — SEVELAMER CARBONATE 800 MILLIGRAM(S): 800 TABLET, FILM COATED ORAL at 17:23

## 2025-01-01 RX ADMIN — APIXABAN 2.5 MILLIGRAM(S): 5 TABLET, FILM COATED ORAL at 05:58

## 2025-01-01 RX ADMIN — Medication 4 MILLILITER(S): at 18:17

## 2025-01-01 RX ADMIN — IPRATROPIUM BROMIDE AND ALBUTEROL SULFATE 3 MILLILITER(S): .5; 2.5 SOLUTION RESPIRATORY (INHALATION) at 09:54

## 2025-01-01 RX ADMIN — Medication 1: at 12:00

## 2025-01-01 RX ADMIN — MONTELUKAST SODIUM 10 MILLIGRAM(S): 5 TABLET, CHEWABLE ORAL at 13:10

## 2025-01-01 RX ADMIN — Medication 2 UNIT(S): at 17:08

## 2025-01-01 RX ADMIN — FLUTICASONE PROPIONATE AND SALMETEROL 1 DOSE(S): 113; 14 POWDER, METERED RESPIRATORY (INHALATION) at 18:32

## 2025-01-01 RX ADMIN — HYDROMORPHONE HYDROCHLORIDE 0.3 MILLIGRAM(S): 4 INJECTION, SOLUTION INTRAMUSCULAR; INTRAVENOUS; SUBCUTANEOUS at 13:57

## 2025-01-01 RX ADMIN — Medication 50 MILLIGRAM(S): at 05:33

## 2025-01-01 RX ADMIN — FLUTICASONE PROPIONATE AND SALMETEROL 1 DOSE(S): 113; 14 POWDER, METERED RESPIRATORY (INHALATION) at 06:22

## 2025-01-01 RX ADMIN — Medication 12.5 MILLIGRAM(S): at 18:06

## 2025-01-01 RX ADMIN — ACETAMINOPHEN 650 MILLIGRAM(S): 160 SUSPENSION ORAL at 13:50

## 2025-01-01 RX ADMIN — Medication 1: at 12:31

## 2025-01-01 RX ADMIN — Medication 1: at 12:36

## 2025-01-01 RX ADMIN — Medication 12.5 MILLIGRAM(S): at 17:44

## 2025-01-01 RX ADMIN — Medication 1100 UNIT(S)/HR: at 11:59

## 2025-01-01 RX ADMIN — ONDANSETRON 4 MILLIGRAM(S): 4 TABLET, ORALLY DISINTEGRATING ORAL at 12:19

## 2025-01-01 RX ADMIN — FLUTICASONE PROPIONATE AND SALMETEROL 1 DOSE(S): 113; 14 POWDER, METERED RESPIRATORY (INHALATION) at 18:40

## 2025-01-01 RX ADMIN — Medication 12.5 MILLIGRAM(S): at 05:52

## 2025-01-01 RX ADMIN — PIPERACILLIN SODIUM AND TAZOBACTAM SODIUM 25 GRAM(S): 2; 250 INJECTION, POWDER, FOR SOLUTION INTRAVENOUS at 08:24

## 2025-01-01 RX ADMIN — EPOETIN ALFA 8000 UNIT(S): 2000 SOLUTION INTRAVENOUS; SUBCUTANEOUS at 14:49

## 2025-01-01 RX ADMIN — Medication 2000 UNIT(S): at 12:18

## 2025-01-01 RX ADMIN — Medication 1: at 07:58

## 2025-01-01 RX ADMIN — FLUTICASONE PROPIONATE AND SALMETEROL 1 DOSE(S): 113; 14 POWDER, METERED RESPIRATORY (INHALATION) at 05:34

## 2025-01-01 RX ADMIN — APIXABAN 2.5 MILLIGRAM(S): 5 TABLET, FILM COATED ORAL at 17:23

## 2025-01-01 RX ADMIN — Medication 2: at 17:09

## 2025-01-01 RX ADMIN — ACETAMINOPHEN 650 MILLIGRAM(S): 160 SUSPENSION ORAL at 18:30

## 2025-01-01 RX ADMIN — PIPERACILLIN SODIUM AND TAZOBACTAM SODIUM 25 GRAM(S): 2; 250 INJECTION, POWDER, FOR SOLUTION INTRAVENOUS at 12:00

## 2025-01-01 RX ADMIN — NIFEDIPINE 60 MILLIGRAM(S): 90 TABLET, FILM COATED, EXTENDED RELEASE ORAL at 05:46

## 2025-01-01 RX ADMIN — IPRATROPIUM BROMIDE AND ALBUTEROL SULFATE 3 MILLILITER(S): .5; 2.5 SOLUTION RESPIRATORY (INHALATION) at 13:45

## 2025-01-01 RX ADMIN — Medication 12.5 MILLIGRAM(S): at 05:57

## 2025-01-01 RX ADMIN — APIXABAN 2.5 MILLIGRAM(S): 5 TABLET, FILM COATED ORAL at 06:03

## 2025-01-01 RX ADMIN — ONDANSETRON 4 MILLIGRAM(S): 4 TABLET, ORALLY DISINTEGRATING ORAL at 22:23

## 2025-01-01 RX ADMIN — MONTELUKAST SODIUM 10 MILLIGRAM(S): 5 TABLET, CHEWABLE ORAL at 11:50

## 2025-01-01 RX ADMIN — SEVELAMER CARBONATE 800 MILLIGRAM(S): 800 TABLET, FILM COATED ORAL at 12:18

## 2025-01-01 RX ADMIN — DORZOLAMIDE HYDROCHLORIDE 1 DROP(S): 20 SOLUTION OPHTHALMIC at 08:38

## 2025-01-01 RX ADMIN — Medication 0: at 21:24

## 2025-01-01 RX ADMIN — FLUTICASONE PROPIONATE AND SALMETEROL 1 DOSE(S): 113; 14 POWDER, METERED RESPIRATORY (INHALATION) at 04:38

## 2025-01-01 RX ADMIN — APIXABAN 2.5 MILLIGRAM(S): 5 TABLET, FILM COATED ORAL at 12:21

## 2025-01-01 RX ADMIN — Medication 2000 UNIT(S): at 11:31

## 2025-01-01 RX ADMIN — NIFEDIPINE 60 MILLIGRAM(S): 90 TABLET, FILM COATED, EXTENDED RELEASE ORAL at 05:57

## 2025-01-01 RX ADMIN — Medication 2: at 08:13

## 2025-01-01 RX ADMIN — DORZOLAMIDE HYDROCHLORIDE 1 DROP(S): 20 SOLUTION OPHTHALMIC at 08:32

## 2025-01-01 RX ADMIN — APIXABAN 2.5 MILLIGRAM(S): 5 TABLET, FILM COATED ORAL at 05:44

## 2025-01-01 RX ADMIN — APIXABAN 2.5 MILLIGRAM(S): 5 TABLET, FILM COATED ORAL at 23:29

## 2025-01-01 RX ADMIN — DORZOLAMIDE HYDROCHLORIDE 1 DROP(S): 20 SOLUTION OPHTHALMIC at 08:15

## 2025-01-01 RX ADMIN — SEVELAMER CARBONATE 800 MILLIGRAM(S): 800 TABLET, FILM COATED ORAL at 18:01

## 2025-01-01 RX ADMIN — FLUTICASONE PROPIONATE AND SALMETEROL 1 DOSE(S): 113; 14 POWDER, METERED RESPIRATORY (INHALATION) at 17:28

## 2025-01-01 RX ADMIN — ACETAMINOPHEN 650 MILLIGRAM(S): 160 SUSPENSION ORAL at 05:39

## 2025-01-01 RX ADMIN — AZITHROMYCIN DIHYDRATE 500 MILLIGRAM(S): 500 TABLET, FILM COATED ORAL at 17:57

## 2025-01-01 RX ADMIN — APIXABAN 2.5 MILLIGRAM(S): 5 TABLET, FILM COATED ORAL at 18:06

## 2025-01-01 RX ADMIN — Medication 2 UNIT(S): at 11:58

## 2025-01-01 RX ADMIN — Medication 12.5 MILLIGRAM(S): at 05:44

## 2025-01-01 RX ADMIN — FLUTICASONE PROPIONATE AND SALMETEROL 1 DOSE(S): 113; 14 POWDER, METERED RESPIRATORY (INHALATION) at 05:21

## 2025-01-01 RX ADMIN — IPRATROPIUM BROMIDE AND ALBUTEROL SULFATE 3 MILLILITER(S): .5; 2.5 SOLUTION RESPIRATORY (INHALATION) at 00:30

## 2025-01-01 RX ADMIN — SEVELAMER CARBONATE 800 MILLIGRAM(S): 800 TABLET, FILM COATED ORAL at 11:51

## 2025-01-01 RX ADMIN — SODIUM BICARBONATE 650 MILLIGRAM(S): 42 INJECTION, SOLUTION INTRAVENOUS at 18:25

## 2025-01-01 RX ADMIN — Medication 50 MILLIGRAM(S): at 05:41

## 2025-01-01 RX ADMIN — ACETAMINOPHEN 650 MILLIGRAM(S): 160 SUSPENSION ORAL at 13:10

## 2025-01-01 RX ADMIN — DORZOLAMIDE HYDROCHLORIDE 1 DROP(S): 20 SOLUTION OPHTHALMIC at 07:45

## 2025-01-01 RX ADMIN — Medication 50 MILLIGRAM(S): at 21:22

## 2025-01-01 RX ADMIN — SEVELAMER CARBONATE 800 MILLIGRAM(S): 800 TABLET, FILM COATED ORAL at 17:13

## 2025-01-01 RX ADMIN — IPRATROPIUM BROMIDE AND ALBUTEROL SULFATE 3 MILLILITER(S): .5; 2.5 SOLUTION RESPIRATORY (INHALATION) at 02:08

## 2025-01-01 RX ADMIN — POLYETHYLENE GLYCOL 3350 17 GRAM(S): 17 POWDER, FOR SOLUTION ORAL at 11:59

## 2025-01-01 RX ADMIN — Medication 1 SPRAY(S): at 17:55

## 2025-01-01 RX ADMIN — Medication 80 MILLIGRAM(S): at 22:35

## 2025-01-01 RX ADMIN — APIXABAN 2.5 MILLIGRAM(S): 5 TABLET, FILM COATED ORAL at 12:00

## 2025-01-01 RX ADMIN — ACETAMINOPHEN 650 MILLIGRAM(S): 160 SUSPENSION ORAL at 22:53

## 2025-01-01 RX ADMIN — Medication 12.5 MILLIGRAM(S): at 06:22

## 2025-01-01 RX ADMIN — DORZOLAMIDE HYDROCHLORIDE 1 DROP(S): 20 SOLUTION OPHTHALMIC at 16:04

## 2025-01-01 RX ADMIN — ACETAMINOPHEN 650 MILLIGRAM(S): 160 SUSPENSION ORAL at 06:20

## 2025-01-01 RX ADMIN — DORZOLAMIDE HYDROCHLORIDE 1 DROP(S): 20 SOLUTION OPHTHALMIC at 08:13

## 2025-01-01 RX ADMIN — APIXABAN 2.5 MILLIGRAM(S): 5 TABLET, FILM COATED ORAL at 23:02

## 2025-01-01 RX ADMIN — Medication 50 MILLIGRAM(S): at 04:37

## 2025-01-01 RX ADMIN — DORZOLAMIDE HYDROCHLORIDE 1 DROP(S): 20 SOLUTION OPHTHALMIC at 15:17

## 2025-01-01 RX ADMIN — Medication 50 MILLIGRAM(S): at 05:52

## 2025-01-01 RX ADMIN — SODIUM ZIRCONIUM CYCLOSILICATE 10 GRAM(S): 5 POWDER, FOR SUSPENSION ORAL at 09:29

## 2025-01-01 RX ADMIN — AZITHROMYCIN DIHYDRATE 500 MILLIGRAM(S): 500 TABLET, FILM COATED ORAL at 11:31

## 2025-01-01 RX ADMIN — ACETAMINOPHEN 650 MILLIGRAM(S): 160 SUSPENSION ORAL at 06:09

## 2025-01-01 RX ADMIN — APIXABAN 2.5 MILLIGRAM(S): 5 TABLET, FILM COATED ORAL at 18:37

## 2025-01-01 RX ADMIN — DORZOLAMIDE HYDROCHLORIDE 1 DROP(S): 20 SOLUTION OPHTHALMIC at 08:37

## 2025-01-01 RX ADMIN — FLUTICASONE PROPIONATE AND SALMETEROL 1 DOSE(S): 113; 14 POWDER, METERED RESPIRATORY (INHALATION) at 17:55

## 2025-01-01 RX ADMIN — ONDANSETRON 4 MILLIGRAM(S): 4 TABLET, ORALLY DISINTEGRATING ORAL at 10:52

## 2025-01-01 RX ADMIN — Medication 2000 UNIT(S): at 12:35

## 2025-01-01 RX ADMIN — FLUTICASONE PROPIONATE AND SALMETEROL 1 DOSE(S): 113; 14 POWDER, METERED RESPIRATORY (INHALATION) at 18:18

## 2025-01-06 ENCOUNTER — RX RENEWAL (OUTPATIENT)
Age: 89
End: 2025-01-06

## 2025-01-10 DIAGNOSIS — N18.4 CHRONIC KIDNEY DISEASE, STAGE 4 (SEVERE): ICD-10-CM

## 2025-01-10 DIAGNOSIS — I10 ESSENTIAL (PRIMARY) HYPERTENSION: ICD-10-CM

## 2025-01-14 NOTE — ED PROVIDER NOTE - OBJECTIVE STATEMENT
86F w/ hx of chronic Afib on Eliquis, CKD stage 4, PVD, chronic diastolic CHF, DM type 2, HTN, BRASH syndrome (Bradycardia, Renal Failure, AV blockade, Shock, and Hyperkalamia) presents to the ED accomponied by her daughter for evaluation of wounds to the legs.  Per patient daughter patient used to go to wound care for management of wounds in the legs which improved.  Approximately 2 weeks ago patient had a dried lesion on one of her legs and her daughter who is a doctor valuated and said there is no concern for infection at that time.  Over the last 2 weeks patient has been not allowing family to help her get dressed or bath and since has developed wounds to the bilateral lower extremities.  Patient reports that areas of burn on wet but otherwise denies pain.  No falls, trauma, fevers, chest pain, shortness of breath, abdominal pain nausea or vomiting.  At baseline patient is minimally ambulatory

## 2025-01-14 NOTE — ED PROVIDER NOTE - PROGRESS NOTE DETAILS
attending Pollack: withholding 30cc/kg IVF bolus for sepsis as patient appears fluid-overloaded on exam Urban Lopez MD PGY3: Patient was signed out to me from previous provider at signout. No osteo on xr, us nonactionable. infectious etiology likely from leg wounds. to admit for further management.

## 2025-01-14 NOTE — ED PROVIDER NOTE - PHYSICAL EXAMINATION
CONSTITUTIONAL: Patient is awake, alert. Patient is well appearing and in no acute distress  HEAD: NCAT  NECK: supple, FROM  LUNGS: CTA b/l, no wheezing or rales   HEART: RRR.+S1S2 no murmurs  ABDOMEN: Soft, non-distended, nttp,  no rebound or guarding  EXTREMITY: B/l LE edema with dry, flaking skin b/l. There are scattered erythematous wounds to the b/l shins/calfs without active drainage or associated warmth. Cap refill<2 b/l and feet are warm and well perfused. FROM upper and lower ext b/l  SKIN: No rash or lesions  NEURO: No focal deficits

## 2025-01-14 NOTE — ED ADULT NURSE NOTE - NSFALLHARMRISKINTERV_ED_ALL_ED

## 2025-01-14 NOTE — ED ADULT NURSE NOTE - OBJECTIVE STATEMENT
Pt is an 89 y.o female c.o wound check. PT is A&OX4 accompanied by daughter at bedside. PT endorsing B/L LE weeping and swelling that began earlier this week. PT has a hx of cellulitis in this area that has been treated with IV abx in the past. Pt is an 89 y.o female c.o wound check. Pt has a PMHx of afib, CKD, PVD, CHF, and DM2. PT is A&OX4 accompanied by daughter at bedside. PT endorsing B/L LE weeping and swelling that began earlier x2 weeks ago. PT has a hx of cellulitis in this area that has been treated with IV abx in the past. Spontaneously breathing on RA>95%, unable to obtain oral/axillary temperature; rectal temperature revealed mild hypothermia, B/L LE weeping and erythema noted, skin otherwise dry and intact. PT denies any CP, SOB, fever, N/V/D, chills, dysuria, hematuria, or recent travel. Safety and comfort measures initiated- bed placed in lowest position and side rails raised.

## 2025-01-14 NOTE — ED PROVIDER NOTE - CLINICAL SUMMARY MEDICAL DECISION MAKING FREE TEXT BOX
attending Kamryn: 86yF h/o Afib on Eliquis, CKD stage 4, PVD, chronic diastolic CHF, DM type 2, HTN, BRASH syndrome (Bradycardia, Renal Failure, AV blockade, Shock, and Hyperkalamia) p/w daughter for evaluation of wounds to the legs.  Per patient daughter patient used to go to wound care for management of wounds in the legs which improved.  Over the last 2 weeks patient has been not allowing family to help her get dressed or bath and since has developed wounds to the b/l LE. Hypothermic rectally, exam as above. Will treat for sepsis. Will obtain labs including vbg with lactate and blood cultures, IVF, empiric abx, cxr, UA/Ucx, admit.

## 2025-01-15 NOTE — H&P ADULT - PROBLEM SELECTOR PLAN 1
- w/ signs of stasis dermatitis and wounds to b/l LE  - s/p Vanc/Zosyn, will c/w Vanc 1000mg q72hrs pending MRSA PCR, and Zosyn q12hrs renally dosed  - Wound care consult placed, f/u recs  - PT  - Fall precuations

## 2025-01-15 NOTE — H&P ADULT - ASSESSMENT
89F w/ PMH of HTN, DM2, Afib on Eliquis, HFpEF, CKD4, PVD, BRASH Syndrome pw LE wounds found to be hypothermic likely multifactorial i/s/o age and possible infectious etiology d/t cellulitis of LLE

## 2025-01-15 NOTE — CHART NOTE - NSCHARTNOTEFT_GEN_A_CORE
Earlier H&P reviewed. Patient both [place]an and English speaking,  ID#: [id number] used for encounter although pt answered most questions in English. States daughter is a physician who lives in [place], lives with granddaughter who is an RN and brought pt in d/t 3 weeks drainage (notes mostly clear) from BLE. Denies f/c at home or pain in BLE.     # Cellulitis of BLE with venous stasis changes  - Appreciate wound care evaluation; verbally recommended ZULEYKA/PVR testing, will f/u note  - Patient states she has never seen vascular or any other specialty for her legs but with hx of PVD?; states her granddaughter wraps them at home  - C/w abx for now for overlying cellulitis, pending MRSA PCR  - PT    # Hypothermia - resolved at this time, afebrile since transferred to medical floor. TSH pending  # LUIS FERNANDO on stage 4 CKD - Cr 3 on admission, baseline appears around 2.5-2.8, improved to 2.86 on repeat labs after 500 cc bolus. Continue to monitor, avoid nephrotoxic medications, and dose for GFR.  # Need for medication reconciliation - attempted to reach daughter Elizabeth at both numbers listed, one with busy tone, left VM on other. Appreciate pharmacy team assistance in obtaining med rec.     Discussed with MARY Patricio. Earlier H&P reviewed. Patient both [place] and English speaking,  ID#: [id number] used for encounter although pt answered most questions in English. States daughter is a physician who lives in [place], lives with granddaughter who is an RN and brought pt in d/t 3 weeks drainage (notes mostly clear) from BLE. Denies f/c at home or pain in BLE.     # Cellulitis of BLE with venous stasis changes  - Appreciate wound care evaluation; verbally recommended ZULEYKA/PVR testing, will f/u note  - Patient states she has never seen vascular or any other specialty for her legs but with hx of PVD?; states her granddaughter wraps them at home  - C/w abx for now for overlying cellulitis, pending MRSA PCR  - PT    # Hypothermia - resolved at this time, afebrile since transferred to medical floor. TSH WNL.  # LUIS FERNANDO on stage 4 CKD - Cr 3 on admission, baseline appears around 2.5-2.8, improved to 2.86 on repeat labs after 500 cc bolus. Continue to monitor, avoid nephrotoxic medications, and dose for GFR. Holding home lasix 80 mg for now.  # HTN - c/w home nifedipine, hydralazine  # DM - home regimen: glipizide 5 mg, on SSI for now  # Need for medication reconciliation - attempted to reach daughter Elizabeth at both numbers listed, one with busy tone, left VM on other. Appreciate pharmacy team assistance in obtaining med rec. Please see their note for full list of home medications.     Discussed with ACP Sintia.

## 2025-01-15 NOTE — H&P ADULT - PROBLEM SELECTOR PLAN 4
- Last TTE on record 2021, EF 65%  - Does have some s/s of improved LE edema, confirm med rec in AM and consider resuming

## 2025-01-15 NOTE — PHYSICAL THERAPY INITIAL EVALUATION ADULT - GENERAL OBSERVATIONS, REHAB EVAL
Pt received supine in bed, A&0x4, following 100% multi-step commands. Pt presenting to the ED for evaluation of wounds to the legs. Pt admitted for sepsis 2/2 BLE wounds/cellultis.

## 2025-01-15 NOTE — PHYSICAL THERAPY INITIAL EVALUATION ADULT - NSPTDISCHREC_GEN_A_CORE
TAI; If pt d/c home, pt would require Home PT and assist/supervision with ALL functional mobility and ADLs. Pt will require transportation into the home 2/2 stairs./Sub-acute Rehab

## 2025-01-15 NOTE — CONSULT NOTE ADULT - NS ATTEND AMEND GEN_ALL_CORE FT
Pt seen and examined with ACP.  Assessment and plan reviewed and discussed.  Agree with above.  D/w primary med attending at bedside    I spent 55  minutes face to face w/ this pt of which more than 50% of the time was spent counseling & coordinating care of this pt.

## 2025-01-15 NOTE — H&P ADULT - NSHPLABSRESULTS_GEN_ALL_CORE
LABS:                      10.5   4.62  )-----------( 218      ( 2025 22:39 )             33.2         138  |  101  |  82[H]  ----------------------------<  347[H]  4.8   |  19[L]  |  3.00[H]    Ca    9.5      2025 22:39    TPro  8.0  /  Alb  3.7  /  TBili  0.3  /  DBili  x   /  AST  21  /  ALT  16  /  AlkPhos  268[H]      LIVER FUNCTIONS - ( 2025 22:39 )  Alb: 3.7 g/dL / Pro: 8.0 g/dL / ALK PHOS: 268 U/L / ALT: 16 U/L / AST: 21 U/L / GGT: x           PT/INR - ( 2025 22:39 )   PT: 15.8 sec;   INR: 1.39 ratio    PTT - ( 2025 22:39 )  PTT:44.1 sec    Urinalysis Basic - ( 15 Jose 2025 01:37 )  Color: Yellow / Appearance: Cloudy / S.012 / pH: x  Gluc: x / Ketone: Negative mg/dL  / Bili: Negative / Urobili: 0.2 mg/dL   Blood: x / Protein: 30 mg/dL / Nitrite: Negative   Leuk Esterase: Negative / RBC: 48 /HPF / WBC 2 /HPF   Sq Epi: x / Non Sq Epi: 1 /HPF / Bacteria: Too Numerous to count /HPF    IMAGING:  Xray Chest 1 View AP/PA (25 @ 22:50)  IMPRESSION:  - Right lower lung airspace opacities.  - Right pleural calcification and thickening.  ******PRELIMINARY REPORT******      Xray Tibia + Fibula 2 Views, Bilateral (25 @ 22:49)  IMPRESSION:  - No radiographic evidence of osteomyelitis.  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  ******PRELIMINARY REPORT******

## 2025-01-15 NOTE — PHYSICAL THERAPY INITIAL EVALUATION ADULT - TRANSFER TRAINING, PT EVAL
GOAL: Pt will transfer sit<->stand with Beatriz to improve overall ease with transfers in 2 weeks. GOAL: Pt will transfer sit<->stand with cga to improve overall ease with transfers in 2 weeks.

## 2025-01-15 NOTE — PHYSICAL THERAPY INITIAL EVALUATION ADULT - ADDITIONAL COMMENTS
Pt resides in a house with daughter and daughter's family.  Pt has 20 stairs to enter, family assists with all ADLs. Pt ambulates with a rolling walker at baseline and owns wheelchair and cane. Pt resides in a house with daughter and daughter's family.  Pt has 20 stairs to enter, family assists with all ADLs. Pt ambulates with a rolling walker ind at baseline and owns wheelchair, showerchair and cane. dtr reports hiring someone to bathe pt.

## 2025-01-15 NOTE — PHYSICAL THERAPY INITIAL EVALUATION ADULT - NSTOILETINGEQUIP_GEN_A_PT
Patient will require a commode upon discharge secondary to patient confined to single room/first floor without a bathroom./3:1 commode

## 2025-01-15 NOTE — PATIENT PROFILE ADULT - FALL HARM RISK - HARM RISK INTERVENTIONS

## 2025-01-15 NOTE — PHYSICAL THERAPY INITIAL EVALUATION ADULT - PHYSICAL ASSIST/NONPHYSICAL ASSIST: SIT/SUPINE, REHAB EVAL
Polyps benign adenoma . Beau Dalal 5-year recall colonoscopy.   I left a voicemail for the patient
1 person assist

## 2025-01-15 NOTE — CONSULT NOTE ADULT - SUBJECTIVE AND OBJECTIVE BOX
Wound SURGERY CONSULT NOTE    HPI:  Pt is an 89F w/ PMH of HTN, DM2, Afib on Eliquis, HFpEF, CKD4, PVD, BRASH Syndrome pw LE wounds.     Pt provides limited hx and unable to reach daughter ON. Reports chronic wounds previously managed w/ wound care. In the past 2 weeks, reports of new wounds to b/l LE. She otherwise denies any pain, no F/C, CP, SOB, abd pain, N/V, dysuria.     In the ED hypothermic w/ labs ~baseline. UA w/ bacteria though denies urianry symptoms and CXR w/ RLL scarring as noted on prior CXR. She was administered Vanc/Zosyn and 500cc IVF.    (15 Jose 2025 03:55)        N/V/D,  BM/ Flatus,   NGT,     palp/ sob/dyspnea/ cp,       F/C/S  Wound consult requested by team to assist w/ management of      wound/ pressure injury.   Pt (unable to)  c/o pain, drainage, odor, color change,  or worsening swelling. Offloading and pericare initiated upon admission as pt Increasingly sedentary 2/2 to illness. Pt is Incontinent of urine & stool. (+)douglas/ ostomy.   No h/o bites, scratches, falls, trauma.  Pt seen by Wound RN  CAVILON Advance/  Carla,TRIAD/ Judith/ medihoney/ Allevyn foam/ dakins/ Adaptic/ DSD recommended used at home/ while awaiting consult.  Appetite good/ decreased.  weight loss.  S&S / RD consult appreciated All questions asked and answered to pt's and family's expressed understanding and satisfaction.    Current Diet: Diet, DASH/TLC:   Sodium & Cholesterol Restricted  Consistent Carbohydrate No Snacks (CSTCHO) (01-15-25 @ 03:53)      PAST MEDICAL & SURGICAL HISTORY:  Benign essential hypertension      Diabetes mellitus      Peripheral vascular disease      Personal history of asbestosis      Spinal stenosis      HTN (hypertension)      DM (diabetes mellitus)      TIA (transient ischemic attack)      Pulmonary fibrosis      Peripheral edema      Degenerative arthritis of right knee      Degenerative arthritis of left knee      Osteoporosis      KENNETH (obstructive sleep apnea)      Hypertension      Chronic kidney disease (CKD)      T2DM (type 2 diabetes mellitus)      PVD (peripheral vascular disease)      H/O abdominal hysterectomy      S/P hysterectomy      S/P spinal fusion      S/P cataract surgery  Right eye      History of hysterectomy          REVIEW OF SYSTEMS: Pt unable to offer  General/ Breast/ Skin/Vasc/ Neuro/ MSK: see HPI  All other systems negative    MEDICATIONS  (STANDING):  apixaban 2.5 milliGRAM(s) Oral every 12 hours  carvedilol 12.5 milliGRAM(s) Oral every 12 hours  cholecalciferol 2000 Unit(s) Oral daily  dextrose 5%. 1000 milliLiter(s) (100 mL/Hr) IV Continuous <Continuous>  dextrose 5%. 1000 milliLiter(s) (50 mL/Hr) IV Continuous <Continuous>  dextrose 50% Injectable 25 Gram(s) IV Push once  dextrose 50% Injectable 12.5 Gram(s) IV Push once  dextrose 50% Injectable 25 Gram(s) IV Push once  dorzolamide 2% Ophthalmic Solution 1 Drop(s) Both EYES <User Schedule>  fluticasone propionate/ salmeterol 250-50 MICROgram(s) Diskus 1 Dose(s) Inhalation two times a day  glucagon  Injectable 1 milliGRAM(s) IntraMuscular once  hydrALAZINE 50 milliGRAM(s) Oral two times a day  insulin lispro (ADMELOG) corrective regimen sliding scale   SubCutaneous three times a day before meals  insulin lispro (ADMELOG) corrective regimen sliding scale   SubCutaneous at bedtime  montelukast 10 milliGRAM(s) Oral daily  NIFEdipine XL 60 milliGRAM(s) Oral daily  piperacillin/tazobactam IVPB.. 3.375 Gram(s) IV Intermittent every 12 hours  sodium bicarbonate 650 milliGRAM(s) Oral two times a day    MEDICATIONS  (PRN):  acetaminophen     Tablet .. 650 milliGRAM(s) Oral every 6 hours PRN Temp greater or equal to 38C (100.4F), Mild Pain (1 - 3)  dextrose Oral Gel 15 Gram(s) Oral once PRN Blood Glucose LESS THAN 70 milliGRAM(s)/deciliter  senna 2 Tablet(s) Oral at bedtime PRN Constipation      Allergies    Levaquin (Rash)    Intolerances        SOCIAL HISTORY:  / /single/ ; (+)HHA/ lives in SNF; Former smoker, No current/ Denies smoking, ETOH, drugs    FAMILY HISTORY:   no h/o PVD or wound healing or skin/ significant problems    PHYSICAL EXAM:  Vital Signs Last 24 Hrs  T(C): 36.8 (15 Jose 2025 12:22), Max: 37 (15 Jose 2025 08:59)  T(F): 98.3 (15 Jose 2025 12:22), Max: 98.6 (15 Jose 2025 08:59)  HR: 70 (15 Jose 2025 12:22) (66 - 79)  BP: 150/70 (15 Jose 2025 12:22) (113/56 - 159/64)  BP(mean): 74 (15 Jose 2025 02:15) (74 - 90)  RR: 18 (15 Jose 2025 12:22) (18 - 20)  SpO2: 98% (15 Jose 2025 12:22) (96% - 99%)    Parameters below as of 15 Jose 2025 12:22  Patient On (Oxygen Delivery Method): room air        NAD, Guarded but stable,  A&Ox3/ Alert/ Confused  cachectic/ thin, MO/ Obese, frail,  WD/ WN/ WG,  Disheveled  Total Care Sport/ Versa Care P500 / Envella Progressa bed     HEENT:  NC/AT, PERRL, EOMI, sclera clear, mucosa moist, throat clear, trachea midline, neck supple, trach  Respiratory: nonlabored w/ equal chest rise  Gastrointestinal: soft NT/ND (+)BS  (+)PEG (+)ostomy (+)NGT  : (+)douglas/ purewick/ condom cath  Neurology:  weakened strength & sensation grossly intact, paraesthesia  nonverbal, no follow commands, paraplegic  Psych: calm/ appropriate/ flat affect/ easily agitated/ restless/ anxious/ difficult to assess  Musculoskeletal:  limited stiff / p/FROM, no deformities/ contractures  Vascular: BLE equally warm/ cool,  no cyanosis, clubbing, edema nor acute ischemia           >LE //BLE edema equal           BLE DP/PT pulses palpable          BLE hemosiderin staining/ varicose veins  Skin:  moist w/ good turgor  thin, dry, pale, frail,  ecchymosis w/o hematoma  blistering  or serosanguinous drainage  No odor, erythema, increased warmth, tenderness, induration, fluctuance, nor crepitus    LABS/ CULTURES/ RADIOLOGY:                        9.6    4.66  )-----------( 188      ( 15 Jose 2025 06:35 )             30.0       138  |  105  |  75  ----------------------------<  116      [01-15-25 @ 06:35]  4.7   |  21  |  2.86        Ca     8.8     [01-15-25 @ 06:35]      Mg     2.2     [01-15-25 @ 06:35]      Phos  4.1     [01-15-25 @ 06:35]    TPro  6.9  /  Alb  3.1  /  TBili  0.3  /  DBili  x   /  AST  14  /  ALT  10  /  AlkPhos  221  [01-15-25 @ 06:35]    PT/INR: PT 15.8 , INR 1.38       [01-15-25 @ 06:35]  PTT: 39.0       [01-15-25 @ 06:35]        A1C with Estimated Average Glucose Result: 5.9 % (01-15-25 @ 06:35)            < from: VA Duplex Lower Ext Vein Scan, Bilat (01.14.25 @ 23:46) >  ACC: [id number] EXAM:  DUPLEX SCAN EXT VEINS LOWER BI   ORDERED BY:   PABLITO HE     PROCEDURE DATE:  01/14/2025        INTERPRETATION:  CLINICAL INFORMATION: Bilateral leg pain    COMPARISON: Bilateral lower extremity venous Doppler ultrasound 7/11/2023    TECHNIQUE: Duplex sonography of the BILATERAL LOWER extremity veins with   color and spectral Doppler, with and without compression.    FINDINGS:    RIGHT:  Normal compressibility of the RIGHT common femoral, femoral and popliteal   veins.  Doppler examination shows normal spontaneous and phasic flow.  Limited visualization of the RIGHT calf veins. No RIGHT calf vein   thrombosis detected.    LEFT:  Normal compressibility of the LEFT common femoral, femoral and popliteal   veins.  Doppler examination shows normal spontaneous and phasic flow.  No LEFT calf vein thrombosis is detected.    IMPRESSION:  No evidence of deep venous thrombosis in either lower extremity.      < end of copied text >  < from: Xray Tibia + Fibula 2 Views, Bilateral (01.14.25 @ 22:49) >    ACC: [id number] EXAM:  XR TIB FIB AP LAT 2 VIEWS BI   ORDERED BY: PABLITO HE     PROCEDURE DATE:  01/14/2025          INTERPRETATION:  CLINICAL INDICATION: Bilateral leg pain with no evidence.    TECHNIQUE: 2 views of the bilateral tibias/fibulas.    COMPARISON: Right knee x-rays dated 9/10/2013..    FINDINGS:  Right tibia/fibula:  No acute fracture. No cortical erosion or periosteal new bone formation.  No dislocation. Severe medial tibiofemoral and moderate lateral   tibiofemoral arthrosis with chondrocalcinosis .  No subcutaneous emphysema.  Vascular and dystrophic calcifications.    Left tibia/fibula:  No acute fracture. There is chronic-appearing impaction deformity of the   medial tibial plateau. No cortical erosion or periosteal new bone   formation.  No dislocation. Severe medial tibiofemoral and moderate lateral   tibiofemoral arthrosis with chondrocalcinosis.  No subcutaneous emphysema.  Vascular and dystrophic calcifications.    IMPRESSION:  No radiographic evidenceof osteomyelitis.  Moderate to severe bilateral knee arthrosis.  Chronic-appearing impaction deformity of the left medial tibial plateau.         Wound SURGERY CONSULT NOTE    HPI:  Pt is an 89F w/ PMH of HTN, DM2, Afib on Eliquis, HFpEF, CKD4, PVD, BRASH Syndrome pw LE wounds.     Pt provides limited hx and unable to reach daughter ON. Reports chronic wounds previously managed w/ wound care. In the past 2 weeks, reports of new wounds to b/l LE. She otherwise denies any pain, no F/C, CP, SOB, abd pain, N/V, dysuria.     In the ED hypothermic w/ labs ~baseline. UA w/ bacteria though denies urianry symptoms and CXR w/ RLL scarring as noted on prior CXR. She was administered Vanc/Zosyn and 500cc IVF.     Wound consult requested by team to assist w/ management of sacral and leg wound. Pt unable to c/o pain, drainage, odor, color change, or worsening swelling. Offloading and pericare initiated upon admission as pt Increasingly sedentary 2/2 to illness. Pt is Incontinent of urine & stool. No h/o bites, scratches, falls, trauma.  Appetite good w/o weight loss.     Current Diet: Diet, DASH/TLC:   Sodium & Cholesterol Restricted  Consistent Carbohydrate No Snacks (CSTCHO) (01-15-25 @ 03:53)      PAST MEDICAL & SURGICAL HISTORY:  Benign essential hypertension    Diabetes mellitus    Peripheral vascular disease    asbestosis    Spinal stenosis    HTN (hypertension)    TIA (transient ischemic attack)    Pulmonary fibrosis    Peripheral edema    Degenerative arthritis of knees    Osteoporosis    KENNETH (obstructive sleep apnea)    Hypertension    Chronic kidney disease (CKD)    s/p abdominal hysterectomy    S/P spinal fusion    S/P Rt cataract surgery      REVIEW OF SYSTEMS: Pt unable to offer      MEDICATIONS  (STANDING):  apixaban 2.5 milliGRAM(s) Oral every 12 hours  carvedilol 12.5 milliGRAM(s) Oral every 12 hours  cholecalciferol 2000 Unit(s) Oral daily  dextrose 5%. 1000 milliLiter(s) (100 mL/Hr) IV Continuous <Continuous>  dextrose 5%. 1000 milliLiter(s) (50 mL/Hr) IV Continuous <Continuous>  dextrose 50% Injectable 25 Gram(s) IV Push once  dextrose 50% Injectable 12.5 Gram(s) IV Push once  dextrose 50% Injectable 25 Gram(s) IV Push once  dorzolamide 2% Ophthalmic Solution 1 Drop(s) Both EYES <User Schedule>  fluticasone propionate/ salmeterol 250-50 MICROgram(s) Diskus 1 Dose(s) Inhalation two times a day  glucagon  Injectable 1 milliGRAM(s) IntraMuscular once  hydrALAZINE 50 milliGRAM(s) Oral two times a day  insulin lispro (ADMELOG) corrective regimen sliding scale   SubCutaneous three times a day before meals  insulin lispro (ADMELOG) corrective regimen sliding scale   SubCutaneous at bedtime  montelukast 10 milliGRAM(s) Oral daily  NIFEdipine XL 60 milliGRAM(s) Oral daily  piperacillin/tazobactam IVPB.. 3.375 Gram(s) IV Intermittent every 12 hours  sodium bicarbonate 650 milliGRAM(s) Oral two times a day    MEDICATIONS  (PRN):  acetaminophen     Tablet .. 650 milliGRAM(s) Oral every 6 hours PRN Temp greater or equal to 38C (100.4F), Mild Pain (1 - 3)  dextrose Oral Gel 15 Gram(s) Oral once PRN Blood Glucose LESS THAN 70 milliGRAM(s)/deciliter  senna 2 Tablet(s) Oral at bedtime PRN Constipation      Allergies  Levaquin (Rash)      SOCIAL HISTORY: , lies w/Family; No smoking, ETOH, drugs    FAMILY HISTORY:  no h/o significant problems    PHYSICAL EXAM:  Vital Signs Last 24 Hrs  T(C): 36.8 (15 Jose 2025 12:22), Max: 37 (15 Jose 2025 08:59)  T(F): 98.3 (15 Jose 2025 12:22), Max: 98.6 (15 Jose 2025 08:59)  HR: 70 (15 Jose 2025 12:22) (66 - 79)  BP: 150/70 (15 Jose 2025 12:22) (113/56 - 159/64)  BP(mean): 74 (15 Jose 2025 02:15) (74 - 90)  RR: 18 (15 Jose 2025 12:22) (18 - 20)  SpO2: 98% (15 Jose 2025 12:22) (96% - 99%)    Parameters below as of 15 Jsoe 2025 12:22  Patient On (Oxygen Delivery Method): room air      NAD, Alert, Obese, frail,  WD/ WN/ WG,  Versa Care P500 bed  HEENT:  NC/AT, EOMI, sclera clear, mucosa moist, throat clear, trachea midline, neck supple  Respiratory: nonlabored w/ equal chest rise  Gastrointestinal: soft NT/ND   Neurology: weakened strength & sensation grossly intact  Psych: calm/ appropriate  Musculoskeletal:  limited stiff / p/FROM, no deformities/ contractures  Vascular: BLE equally cool,  no cyanosis, clubbing, nor acute ischemia        BLE edema equal         no BLE DP/PT pulses palpable         BLE hemosiderin staining and hypopigmented intact skin      Rt calf/ achilles partial thickness moist serous weeping wound       no blistering    No odor, erythema, increased warmth, tenderness, induration, fluctuance, nor crepitus  Skin:  moist w/ good turgor  Sacrum stage 3 pressure injury     0.5cm x 0.5cm x 0.2cm      moist granular w/ hypopigmented intact skin in periwound area      no blistering  or active drainage  No odor, erythema, increased warmth, tenderness, induration, fluctuance, nor crepitus    LABS/ CULTURES/ RADIOLOGY:                        9.6    4.66  )-----------( 188      ( 15 Jose 2025 06:35 )             30.0       138  |  105  |  75  ----------------------------<  116      [01-15-25 @ 06:35]  4.7   |  21  |  2.86        Ca     8.8     [01-15-25 @ 06:35]      Mg     2.2     [01-15-25 @ 06:35]      Phos  4.1     [01-15-25 @ 06:35]    TPro  6.9  /  Alb  3.1  /  TBili  0.3  /  DBili  x   /  AST  14  /  ALT  10  /  AlkPhos  221  [01-15-25 @ 06:35]    PT/INR: PT 15.8 , INR 1.38       [01-15-25 @ 06:35]  PTT: 39.0       [01-15-25 @ 06:35]      A1C with Estimated Average Glucose Result: 5.9 % (01-15-25 @ 06:35)      < from: VA Duplex Lower Ext Vein Scan, Bilat (01.14.25 @ 23:46) >  ACC: [id number] EXAM:  DUPLEX SCAN EXT VEINS LOWER BI   ORDERED BY:   PABLITO HE     PROCEDURE DATE:  01/14/2025        INTERPRETATION:  CLINICAL INFORMATION: Bilateral leg pain    COMPARISON: Bilateral lower extremity venous Doppler ultrasound 7/11/2023    TECHNIQUE: Duplex sonography of the BILATERAL LOWER extremity veins with   color and spectral Doppler, with and without compression.    FINDINGS:    RIGHT:  Normal compressibility of the RIGHT common femoral, femoral and popliteal   veins.  Doppler examination shows normal spontaneous and phasic flow.  Limited visualization of the RIGHT calf veins. No RIGHT calf vein   thrombosis detected.    LEFT:  Normal compressibility of the LEFT common femoral, femoral and popliteal   veins.  Doppler examination shows normal spontaneous and phasic flow.  No LEFT calf vein thrombosis is detected.    IMPRESSION:  No evidence of deep venous thrombosis in either lower extremity.      < end of copied text >  < from: Xray Tibia + Fibula 2 Views, Bilateral (01.14.25 @ 22:49) >    ACC: [id number] EXAM:  XR TIB FIB AP LAT 2 VIEWS BI   ORDERED BY: PABLITO HE     PROCEDURE DATE:  01/14/2025          INTERPRETATION:  CLINICAL INDICATION: Bilateral leg pain with no evidence.    TECHNIQUE: 2 views of the bilateral tibias/fibulas.    COMPARISON: Right knee x-rays dated 9/10/2013..    FINDINGS:  Right tibia/fibula:  No acute fracture. No cortical erosion or periosteal new bone formation.  No dislocation. Severe medial tibiofemoral and moderate lateral   tibiofemoral arthrosis with chondrocalcinosis .  No subcutaneous emphysema.  Vascular and dystrophic calcifications.    Left tibia/fibula:  No acute fracture. There is chronic-appearing impaction deformity of the   medial tibial plateau. No cortical erosion or periosteal new bone   formation.  No dislocation. Severe medial tibiofemoral and moderate lateral   tibiofemoral arthrosis with chondrocalcinosis.  No subcutaneous emphysema.  Vascular and dystrophic calcifications.    IMPRESSION:  No radiographic evidenceof osteomyelitis.  Moderate to severe bilateral knee arthrosis.  Chronic-appearing impaction deformity of the left medial tibial plateau.

## 2025-01-15 NOTE — H&P ADULT - PROBLEM SELECTOR PLAN 8
- Will need med rec in AM as unsure of outpt list updated for now, pt unable to recall and unable to reach family overnight

## 2025-01-15 NOTE — PHARMACOTHERAPY INTERVENTION NOTE - COMMENTS
Performed medication reconciliation and home medication list updated in prescription writer/outpatient medication review. Medications verified with patient and pharmacy.     Home Pharmacy: [facility] Jonathan Tele#[id number]  Allergies: Levaquin    Home Medications:  ANORO ELLIPTA 62.5-25 MCG INH: TAKE 1 PUFF BY MOUTH EVERY DAY  apixaban 2.5 mg oral tablet: 1 tab(s) orally 2 times a day  carvedilol 12.5 mg oral tablet: 1 tab(s) orally every 12 hours  dorzolamide 2% ophthalmic solution: 1 drop(s) in each eye 2 times a day  furosemide 80 mg oral tablet: 1 tab(s) orally once a day may take additional 40mg as needed  glipiZIDE 5 mg oral tablet, extended release: 1 tab(s) orally once a day  hydrALAZINE 50 mg oral tablet: 1 tab(s) orally 2 times a day  montelukast 10 mg oral tablet: 1 tab(s) orally once a day  NIFEdipine 60 mg oral tablet, extended release: 1 tab(s) orally once a day  predniSONE 20 mg oral tablet: 1 tab(s) orally 2 times a day  senna oral tablet: 2 tab(s) orally once a day (at bedtime), As needed, Constipation  sodium bicarbonate: 650 milligram(s) orally 2 times a day with food  Vitamin D3 25 mcg (1000 intl units) oral tablet: 2 tab(s) orally once a day      ------------------------------------------------------------------------------------------------------------     Home medications reconciled with lisa Johnson and [facility] pharmacy.    Daily Monge  Pharmacy Candidate 2025  [facility]   Performed medication reconciliation and home medication list updated in prescription writer/outpatient medication review. Medications verified with patient and pharmacy.     Home Pharmacy: [facility] Jonathan Tele#[id number]  Allergies: Levaquin    Home Medications:  ANORO ELLIPTA 62.5-25 MCG INH: TAKE 1 PUFF BY MOUTH EVERY DAY  apixaban 2.5 mg oral tablet: 1 tab(s) orally 2 times a day  carvedilol 12.5 mg oral tablet: 1 tab(s) orally every 12 hours  dorzolamide 2% ophthalmic solution: 1 drop(s) in each eye 2 times a day  furosemide 80 mg oral tablet: 1 tab(s) orally once a day may take additional 40mg as needed  glipiZIDE 5 mg oral tablet, extended release: 1 tab(s) orally once a day  hydrALAZINE 50 mg oral tablet: 1 tab(s) orally 2 times a day  montelukast 10 mg oral tablet: 1 tab(s) orally once a day  NIFEdipine 60 mg oral tablet, extended release: 1 tab(s) orally once a day  predniSONE 20 mg oral tablet: 1 tab(s) orally 2 times a day  senna oral tablet: 2 tab(s) orally once a day (at bedtime), As needed, Constipation  sodium bicarbonate: 650 milligram(s) orally 2 times a day with food  Vitamin D3 25 mcg (1000 intl units) oral tablet: 2 tab(s) orally once a day      Home medications reconciled with daughter Elizabeth and [facility] pharmacy.    Daily Monge  Pharmacy Candidate 2025  [facility]    Kenneth (Elier Ramos) Gabe - PharmD, BCPS  Transitions of Care Pharmacist  Available on [employer] Teams (Preferred)  Spectra: [phone]

## 2025-01-15 NOTE — H&P ADULT - HISTORY OF PRESENT ILLNESS
Pt is an 89F w/ PMH of HTN, DM2, Afib on Eliquis, HFpEF, CKD4, PVD, BRASH Syndrome pw LE wounds.     Pt provides limited hx and unable to reach daughter ON. Reports chronic wounds previously managed w/ wound care. In the past 2 weeks, reports of new wounds to b/l LE. She otherwise denies any pain, no F/C, CP, SOB, abd pain, N/V, dysuria.     In the ED hypothermic w/ labs ~baseline. UA w/ bacteria though denies urianry symptoms and CXR w/ RLL scarring as noted on prior CXR. She was administered Vanc/Zosyn and 500cc IVF.

## 2025-01-15 NOTE — PHYSICAL THERAPY INITIAL EVALUATION ADULT - ASSISTIVE DEVICE FOR TRANSFER: STAND/SIT, REHAB EVAL
Quality 111:Pneumonia Vaccination Status For Older Adults: Pneumococcal Vaccination Previously Received
Detail Level: Detailed
rolling walker
Quality 226: Preventive Care And Screening: Tobacco Use: Screening And Cessation Intervention: Patient screened for tobacco use and is an ex/non-smoker
Quality 110: Preventive Care And Screening: Influenza Immunization: Influenza Immunization Administered during Influenza season

## 2025-01-15 NOTE — H&P ADULT - PROBLEM SELECTOR PLAN 3
- c/w Eliquis 2.5mg BID  - Will need med rec in AM as unsure of outpt list updated for now  - Does have hx of BRASH Syndrome, unsure if on BB

## 2025-01-15 NOTE — PHYSICAL THERAPY INITIAL EVALUATION ADULT - PERTINENT HX OF CURRENT PROBLEM, REHAB EVAL
Pt is a 89yoF PMHx chronic Afib on Eliquis, CKD stage 4, PVD, chronic diastolic CHF, DM type 2, HTN, BRASH syndrome (Bradycardia, Renal Failure, AV blockade, Shock, and Hyperkalamia) presents to the ED for evaluation of wounds to the legs.   Xray Tibia/Fibula Benjamin: No radiographic evidence of osteomyelitis. Moderate to severe bilateral knee arthrosis. Chronic-appearing impaction deformity of the left medial tibial plateau.  CXR: Right lower lung airspace opacities. Right pleural calcification and thickening.  VA Duplex Benjamin: No evidence of deep venous thrombosis in either lower extremity.

## 2025-01-15 NOTE — H&P ADULT - NSHPPHYSICALEXAM_GEN_ALL_CORE
Vital Signs Last 24 Hrs  T(C): 36.7 (15 Jose 2025 02:49), Max: 36.7 (15 Jose 2025 02:49)  T(F): 98.1 (15 Jose 2025 02:49), Max: 98.1 (15 Jose 2025 02:49)  HR: 75 (15 Jose 2025 02:49) (75 - 79)  BP: 157/79 (15 Jose 2025 02:49) (113/56 - 159/64)  BP(mean): 74 (15 Jose 2025 02:15) (74 - 90)  RR: 20 (15 Jose 2025 02:49) (18 - 20)  SpO2: 96% (15 Jose 2025 02:49) (96% - 99%)    Parameters below as of 15 Jose 2025 02:49  Patient On (Oxygen Delivery Method): room air    CONSTITUTIONAL: Well-groomed, in no apparent distress  EYES: No conjunctival or scleral injection, non-icteric;   ENMT: No external nasal lesions; MMM  NECK: Trachea midline without palpable neck mass; thyroid not enlarged and non-tender  RESPIRATORY: Breathing comfortably; no dullness to percussion; lungs CTA without wheeze/rhonchi/rales  CARDIOVASCULAR: +S1S2, RRR, no M/G/R; pedal pulses full and symmetric; no lower extremity edema  GASTROINTESTINAL: No palpable masses or tenderness, +BS throughout, no rebound/guarding; no hepatosplenomegaly; no hernia palpated  LYMPHATIC: No cervical LAD or tenderness  SKIN: Wounds to b/l LE w/ largest on dorsal aspect of distal RLE. LLE w/ some erythema, edema and warmth to the shin   NEUROLOGIC: CN II-XII intact; sensation intact in LEs b/l to light touch  PSYCHIATRIC: A&Ox3; mood and affect appropriate; appropriate insight and judgment

## 2025-01-15 NOTE — PHYSICAL THERAPY INITIAL EVALUATION ADULT - GAIT TRAINING, PT EVAL
GOAL: Pt will ambulate 50'x2 with RW and Beatriz to improve overall endurance in 2 weeks. GOAL: Pt will ambulate 50'x2 with RW and cga to improve overall endurance in 2 weeks.

## 2025-01-15 NOTE — CONSULT NOTE ADULT - ASSESSMENT
89F w/ PMH of HTN, DM2, Afib on Eliquis, HFpEF, CKD4, PVD, BRASH Syndrome pw LE wounds found to be hypothermic likely multifactorial i/s/o age and possible infectious etiology d/t cellulitis of LLE      Wound Consult requested to assist w/ management of BLE PVD w/ stasis dermatitis   BLE lymphedema  Sacral Stage 3 pressure injury    BLE elevation & Compression  Consider ZULEYKA/PVR  BLE Duplex no DVT  BLE Xray no fx disloc fb or OM, (+)chronic changes  Moisturize intact skin w/ SWEEN cream BID  Nutrition Consult for optimization       encourage high quality protein, [place]/ prosource, MVI & Vit C to promote wound healing  Hyperglycemia -  ADA diet and FS w/ ISS  Continue turning and positioning w/ offloading assistive devices as per protocol  Buttocks/ Sacrum  TRIAD BID and prn soiling        Continue w/ attends under pads and Pericare w/purewick care as per protocol  Waffle Cushion to chair when oob to chair  Continue w/ low air loss pressure redistribution bed surface   Pt will need Group 2 mattress on hospital bed and ROHO cushion for wheel chair upon discharge home  Care as per medicine, will follow w/ you  Upon discharge f/u as outpatient at [facility] [address] [place] [address] [phone]  Seen w/ attng and D/w team & RN  Thank you for this consult  Alba Fuentes PA-C CWS [id number]  Nights/ Weekends/ Holidays please call:  General Surgery Consult pager [phone]) for emergencies  Wound PT for multilayer leg wrapping or VAC issues (x [phone])   I spent 55minutes face to face w/ this pt of which more than 50% of the time was spent counseling & coordinating care of this pt.  89F w/ PMH of HTN, DM2, Afib on Eliquis, HFpEF, CKD4, PVD, BRASH Syndrome pw LE wounds found to be hypothermic likely multifactorial i/s/o age and possible infectious etiology d/t cellulitis of LLE      Wound Consult requested to assist w/ management of:  BLE PVD w/ stasis dermatitis   BLE lymphedema  Sacral Stage 3 pressure injury    BLE elevation & Compression  Consider ZULEYKA/PVR  BLE Duplex no DVT  BLE Xray no fx disloc fb or OM, (+)chronic changes  Moisturize intact skin w/ SWEEN cream BID  Nutrition Consult for optimization       encourage high quality protein, [place]/ prosource, MVI & Vit C to promote wound healing  Hyperglycemia -  ADA diet and FS w/ ISS  Continue turning and positioning w/ offloading assistive devices as per protocol  Buttocks/ Sacrum  TRIAD BID and prn soiling        Continue w/ attends under pads and Pericare w/purewick care as per protocol  Waffle Cushion to chair when oob to chair  Continue w/ low air loss pressure redistribution bed surface   Pt will need Group 2 mattress on hospital bed and ROHO cushion for wheel chair upon discharge home  Care as per medicine, will follow w/ you  Upon discharge f/u as outpatient at [facility] [address] [place] [address] [phone]  Seen w/ attng and D/w team & RN  Thank you for this consult  Alba Fuentes PA-C CWS [id number]  Nights/ Weekends/ Holidays please call:  General Surgery Consult pager [phone]) for emergencies  Wound PT for multilayer leg wrapping or VAC issues (x [phone])

## 2025-01-16 NOTE — PROGRESS NOTE ADULT - PROBLEM SELECTOR PLAN 1
cellulitis improving  transition to keflex 500mg bid  pt should have legs with compression during the day and off at night

## 2025-01-16 NOTE — PROGRESS NOTE ADULT - PROBLEM SELECTOR PLAN 2
- Potentially i/s/o infection d/t above  - now resolved  - Thyroid Stimulating Hormone, Serum: 1.30 uIU/mL (01.15.25 @ 00:35)

## 2025-01-16 NOTE — PROGRESS NOTE ADULT - SUBJECTIVE AND OBJECTIVE BOX
Andre Reyes, M.D.  Office: [phone]  Available thru [employer] Teams     Patient is a 89y old  Female who presents with a chief complaint of LE wounds (15 Jose 2025 18:25)          SUBJECTIVE / OVERNIGHT EVENTS:    No acute overnight events.    ROS: ( - ) Fever, ( - )Chills,  ( - )Nausea/Vomiting, ( - ) Cough, ( - )Shortness of breath, ( - )Chest Pain    MEDICATIONS  (STANDING):  apixaban 2.5 milliGRAM(s) Oral every 12 hours  carvedilol 12.5 milliGRAM(s) Oral every 12 hours  cholecalciferol 2000 Unit(s) Oral daily  dextrose 5%. 1000 milliLiter(s) (100 mL/Hr) IV Continuous <Continuous>  dextrose 5%. 1000 milliLiter(s) (50 mL/Hr) IV Continuous <Continuous>  dextrose 50% Injectable 25 Gram(s) IV Push once  dextrose 50% Injectable 12.5 Gram(s) IV Push once  dextrose 50% Injectable 25 Gram(s) IV Push once  dorzolamide 2% Ophthalmic Solution 1 Drop(s) Both EYES <User Schedule>  fluticasone propionate/ salmeterol 250-50 MICROgram(s) Diskus 1 Dose(s) Inhalation two times a day  glucagon  Injectable 1 milliGRAM(s) IntraMuscular once  hydrALAZINE 50 milliGRAM(s) Oral two times a day  insulin lispro (ADMELOG) corrective regimen sliding scale   SubCutaneous three times a day before meals  insulin lispro (ADMELOG) corrective regimen sliding scale   SubCutaneous at bedtime  montelukast 10 milliGRAM(s) Oral daily  NIFEdipine XL 60 milliGRAM(s) Oral daily  piperacillin/tazobactam IVPB.. 3.375 Gram(s) IV Intermittent every 12 hours  sodium bicarbonate 650 milliGRAM(s) Oral two times a day    MEDICATIONS  (PRN):  acetaminophen     Tablet .. 650 milliGRAM(s) Oral every 6 hours PRN Temp greater or equal to 38C (100.4F), Mild Pain (1 - 3)  dextrose Oral Gel 15 Gram(s) Oral once PRN Blood Glucose LESS THAN 70 milliGRAM(s)/deciliter  senna 2 Tablet(s) Oral at bedtime PRN Constipation          T(C): 36.4 (01-16 @ 05:09), Max: 37.1 (01-15 @ 20:32)   HR: 66   BP: 123/73   RR: 18   SpO2: 96%    PHYSICAL EXAM:    CONSTITUTIONAL: NAD, well-developed, well-groomed  EYES: PERRLA; conjunctiva and sclera clear  ENMT: Moist oral mucosa, no pharyngeal injection or exudates; normal dentition  RESPIRATORY: Normal respiratory effort; lungs are clear to auscultation bilaterally  CARDIOVASCULAR: Regular rate and rhythm, normal S1 and S2, no murmur/rub/gallop; No lower extremity edema; Peripheral pulses are 2+ bilaterally  ABDOMEN: Nontender to palpation, normoactive bowel sounds, no rebound/guarding; No hepatosplenomegaly  MUSCULOSKELETAL:  no clubbing or cyanosis of digits; no joint swelling or tenderness to palpation  PSYCH: A+O to person, place, and time; affect appropriate  NEUROLOGY: CN 2-12 are intact and symmetric; no gross sensory deficits       LABS:                        10.0   4.76  )-----------( 202      ( 16 Jan 2025 07:06 )             31.6      01-16    138  |  105  |  68[H]  ----------------------------<  96  4.7   |  19[L]  |  2.91[H]    Ca    9.1      16 Jan 2025 07:06  Phos  4.1     01-15  Mg     2.2     01-15    TPro  6.9  /  Alb  3.1[L]  /  TBili  0.3  /  DBili  x   /  AST  14  /  ALT  10  /  AlkPhos  221[H]  01-15       CAPILLARY BLOOD GLUCOSE      POCT Blood Glucose.: 95 mg/dL (16 Jan 2025 07:34)  POCT Blood Glucose.: 130 mg/dL (15 Jose 2025 21:31)  POCT Blood Glucose.: 134 mg/dL (15 Jose 2025 16:23)  POCT Blood Glucose.: 120 mg/dL (15 Jose 2025 13:42)      RADIOLOGY & ADDITIONAL TESTS:    Imaging Personally Reviewed:  Consultant(s) Notes Reviewed:    Care Discussed with Consultants/Other Providers:   Andre Reyes, M.D.  Office: [phone]  Available thru Microsoft Teams     Patient is a 89y old  Female who presents with a chief complaint of LE wounds (15 Jose 2025 18:25)          SUBJECTIVE / OVERNIGHT EVENTS:    No acute overnight events.      ROS: ( - ) Fever, ( - )Chills,  ( - )Nausea/Vomiting, ( - ) Cough, ( - )Shortness of breath, ( - )Chest Pain    MEDICATIONS  (STANDING):  apixaban 2.5 milliGRAM(s) Oral every 12 hours  carvedilol 12.5 milliGRAM(s) Oral every 12 hours  cholecalciferol 2000 Unit(s) Oral daily  dextrose 5%. 1000 milliLiter(s) (100 mL/Hr) IV Continuous <Continuous>  dextrose 5%. 1000 milliLiter(s) (50 mL/Hr) IV Continuous <Continuous>  dextrose 50% Injectable 25 Gram(s) IV Push once  dextrose 50% Injectable 12.5 Gram(s) IV Push once  dextrose 50% Injectable 25 Gram(s) IV Push once  dorzolamide 2% Ophthalmic Solution 1 Drop(s) Both EYES <User Schedule>  fluticasone propionate/ salmeterol 250-50 MICROgram(s) Diskus 1 Dose(s) Inhalation two times a day  glucagon  Injectable 1 milliGRAM(s) IntraMuscular once  hydrALAZINE 50 milliGRAM(s) Oral two times a day  insulin lispro (ADMELOG) corrective regimen sliding scale   SubCutaneous three times a day before meals  insulin lispro (ADMELOG) corrective regimen sliding scale   SubCutaneous at bedtime  montelukast 10 milliGRAM(s) Oral daily  NIFEdipine XL 60 milliGRAM(s) Oral daily  piperacillin/tazobactam IVPB.. 3.375 Gram(s) IV Intermittent every 12 hours  sodium bicarbonate 650 milliGRAM(s) Oral two times a day    MEDICATIONS  (PRN):  acetaminophen     Tablet .. 650 milliGRAM(s) Oral every 6 hours PRN Temp greater or equal to 38C (100.4F), Mild Pain (1 - 3)  dextrose Oral Gel 15 Gram(s) Oral once PRN Blood Glucose LESS THAN 70 milliGRAM(s)/deciliter  senna 2 Tablet(s) Oral at bedtime PRN Constipation          T(C): 36.4 (01-16 @ 05:09), Max: 37.1 (01-15 @ 20:32)   HR: 66   BP: 123/73   RR: 18   SpO2: 96%    PHYSICAL EXAM:    CONSTITUTIONAL: NAD, well-developed, well-groomed  EYES: PERRLA; conjunctiva and sclera clear  ENMT: Moist oral mucosa, no pharyngeal injection or exudates; normal dentition  RESPIRATORY: Normal respiratory effort; lungs are clear to auscultation bilaterally  CARDIOVASCULAR: Regular rate and rhythm, normal S1 and S2, no murmur/rub/gallop;  2+ lymphedema lower extremity edema; Peripheral pulses are 2+ bilaterally  ABDOMEN: Nontender to palpation, normoactive bowel sounds, no rebound/guarding; No hepatosplenomegaly  MUSCULOSKELETAL:  no clubbing or cyanosis of digits; no joint swelling or tenderness to palpation  PSYCH: A+O to person, place, and time; affect appropriate  NEUROLOGY: CN 2-12 are intact and symmetric; no gross sensory deficits   SKIN: no erythema or tenderness noted.      LABS:                        10.0   4.76  )-----------( 202      ( 16 Jan 2025 07:06 )             31.6      01-16    138  |  105  |  68[H]  ----------------------------<  96  4.7   |  19[L]  |  2.91[H]    Ca    9.1      16 Jan 2025 07:06  Phos  4.1     01-15  Mg     2.2     01-15    TPro  6.9  /  Alb  3.1[L]  /  TBili  0.3  /  DBili  x   /  AST  14  /  ALT  10  /  AlkPhos  221[H]  01-15       CAPILLARY BLOOD GLUCOSE      POCT Blood Glucose.: 95 mg/dL (16 Jan 2025 07:34)  POCT Blood Glucose.: 130 mg/dL (15 Jose 2025 21:31)  POCT Blood Glucose.: 134 mg/dL (15 Jose 2025 16:23)  POCT Blood Glucose.: 120 mg/dL (15 Jose 2025 13:42)      RADIOLOGY & ADDITIONAL TESTS:    Imaging Personally Reviewed:  Consultant(s) Notes Reviewed:    Care Discussed with Consultants/Other Providers:

## 2025-01-17 NOTE — DIETITIAN INITIAL EVALUATION ADULT - PERTINENT MEDS FT
MEDICATIONS  (STANDING):  apixaban 2.5 milliGRAM(s) Oral every 12 hours  carvedilol 12.5 milliGRAM(s) Oral every 12 hours  cephalexin 500 milliGRAM(s) Oral every 12 hours  cholecalciferol 2000 Unit(s) Oral daily  dextrose 5%. 1000 milliLiter(s) (100 mL/Hr) IV Continuous <Continuous>  dextrose 5%. 1000 milliLiter(s) (50 mL/Hr) IV Continuous <Continuous>  dextrose 50% Injectable 25 Gram(s) IV Push once  dextrose 50% Injectable 12.5 Gram(s) IV Push once  dextrose 50% Injectable 25 Gram(s) IV Push once  dorzolamide 2% Ophthalmic Solution 1 Drop(s) Both EYES <User Schedule>  fluticasone propionate/ salmeterol 250-50 MICROgram(s) Diskus 1 Dose(s) Inhalation two times a day  glucagon  Injectable 1 milliGRAM(s) IntraMuscular once  hydrALAZINE 50 milliGRAM(s) Oral two times a day  insulin lispro (ADMELOG) corrective regimen sliding scale   SubCutaneous three times a day before meals  insulin lispro (ADMELOG) corrective regimen sliding scale   SubCutaneous at bedtime  montelukast 10 milliGRAM(s) Oral daily  NIFEdipine XL 60 milliGRAM(s) Oral daily  sodium bicarbonate 650 milliGRAM(s) Oral two times a day    MEDICATIONS  (PRN):  acetaminophen     Tablet .. 650 milliGRAM(s) Oral every 6 hours PRN Temp greater or equal to 38C (100.4F), Mild Pain (1 - 3)  dextrose Oral Gel 15 Gram(s) Oral once PRN Blood Glucose LESS THAN 70 milliGRAM(s)/deciliter  senna 2 Tablet(s) Oral at bedtime PRN Constipation

## 2025-01-17 NOTE — DIETITIAN INITIAL EVALUATION ADULT - PERTINENT LABORATORY DATA
01-16    138  |  105  |  68[H]  ----------------------------<  96  4.7   |  19[L]  |  2.91[H]    Ca    9.1      16 Jan 2025 07:06    POCT Blood Glucose.: 198 mg/dL (01-17-25 @ 11:44)  A1C with Estimated Average Glucose Result: 5.9 % (01-15-25 @ 06:35)

## 2025-01-17 NOTE — DIETITIAN INITIAL EVALUATION ADULT - NSFNSPHYEXAMSKINFT_GEN_A_CORE
Pressure Injury 1: sacrum, Stage III  Pressure Injury 2: none, none  Pressure Injury 3: none, none  Pressure Injury 4: none, none  Pressure Injury 5: none, none  Pressure Injury 6: none, none  Pressure Injury 7: none, none  Pressure Injury 8: none, none  Pressure Injury 9: none, none  Pressure Injury 10: none, none  Pressure Injury 11: none, none, Pressure Injury 1: sacrum, Stage III  Pressure Injury 2: none, none  Pressure Injury 3: none, none  Pressure Injury 4: none, none  Pressure Injury 5: none, none  Pressure Injury 6: none, none  Pressure Injury 7: none, none  Pressure Injury 8: none, none  Pressure Injury 9: none, none  Pressure Injury 10: none, none  Pressure Injury 11: none, none Pressure Injury 1: sacrum, Stage III

## 2025-01-17 NOTE — DIETITIAN INITIAL EVALUATION ADULT - ADD RECOMMEND
1. Recommend to liberalize diet to free of dietary restrictions given suboptimal intake per flowsheet  2. Recommend Nepro 1x daily (425 kcals, 19 g protein) to optimize PO intake  3. Consider multivitamin and ascorbic acid pending no medical contraindications to promote wound healing  4. Continue Cholecalciferol as ordered   5. Continue to monitor PO intake, weight trend, electrolytes, blood glucose, labs, BMs in-house

## 2025-01-17 NOTE — DIETITIAN INITIAL EVALUATION ADULT - PHYSCIAL ASSESSMENT
Nutrition focused physical exam deferred at this time.     Per [facility] weight history: (5/2024)155lbs; (4/2023)154lbs; (10/2022)153lbs;    IBW: 95lbs-100lbs  IBW%: 154%

## 2025-01-17 NOTE — DIETITIAN INITIAL EVALUATION ADULT - ETIOLOGY
increased physiological demand for nutrients  decreased ability to consume adequate protein-energy intake in setting of clinical course

## 2025-01-17 NOTE — PROGRESS NOTE ADULT - PROBLEM SELECTOR PLAN 1
cellulitis improving  c/w keflex 500mg bid, plan for total 7 days of antibiotics (1/15 - 1/21)  pt should have legs with compression during the day and off at night

## 2025-01-17 NOTE — DIETITIAN INITIAL EVALUATION ADULT - ENERGY INTAKE
Patient noted with inadequate PO intake per nursing flowsheet, noted consumes meeting <75% of estimated nutritional needs  Patient noted with inadequate PO intake per nursing flowsheet, noted consumes meeting <75% of her meals. Pt likely meeting <75% of estimated nutritional needs. No other GI distress noted per flowsheet.

## 2025-01-17 NOTE — DIETITIAN INITIAL EVALUATION ADULT - REASON INDICATOR FOR ASSESSMENT
Patient seen for "Consult for Pressure injury stage 2 or >",Source: Pt asleep during time of visit, Electronic Medical Record. Chart reviewed, events noted.

## 2025-01-17 NOTE — DIETITIAN INITIAL EVALUATION ADULT - PROBLEM SELECTOR PROBLEM 8
Medication management Fusiform Excision Additional Text (Leave Blank If You Do Not Want): The margin was drawn around the clinically apparent lesion.  A fusiform shape was then drawn on the skin incorporating the lesion and margins.  Incisions were then made along these lines to the appropriate tissue plane and the lesion was extirpated.

## 2025-01-17 NOTE — DIETITIAN INITIAL EVALUATION ADULT - OTHER INFO
- Endo: history of type 2 diabetes, A1C(1/15):5.9%, Finger stick x 24hrs 95-233mg/dL, ordered for sliding scale insulin   - Per chart, stage 4 chronic kidney disease  - Ordered for cholecalciferol   - GI: ordered for senna

## 2025-01-17 NOTE — CHART NOTE - NSCHARTNOTEFT_GEN_A_CORE
Patient is confined to a single level without a bathroom and requires a 3-in-1 commode. 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

## 2025-01-17 NOTE — DIETITIAN INITIAL EVALUATION ADULT - NS FNS DIET ORDER
Diet, DASH/TLC:   Sodium & Cholesterol Restricted  Consistent Carbohydrate {No Snacks} (CSTCHO) (01-15-25 @ 03:53) [Active]

## 2025-01-17 NOTE — DIETITIAN INITIAL EVALUATION ADULT - PROBLEM SELECTOR PROBLEM 2
Hypothermia Additional Notes: Patient consent was obtained to proceed with the visit and recommended plan of care after discussion of all risks and benefits, including the risks of COVID-19 exposure. Detail Level: Simple

## 2025-01-17 NOTE — DIETITIAN INITIAL EVALUATION ADULT - ORAL INTAKE PTA/DIET HISTORY
Patient was asleep peacefully during time of visit. No family at the bedside. Unknown how pt was eating PTA. Unknown if pt followed therapeutic diet. Unknown if pt with chewing/swallowing issues. Unknown if pt took oral nutrition supplements or micronutrients.  No known food allergies.

## 2025-01-17 NOTE — PROGRESS NOTE ADULT - SUBJECTIVE AND OBJECTIVE BOX
Karissa Chacon MD  Hospitalist  Available on MS Teams      PROGRESS NOTE:     Patient is a 89y old  Female who presents with a chief complaint of Unspecified open wound, unspecified lower leg, initial encounter     (17 Jan 2025 13:07)      SUBJECTIVE / OVERNIGHT EVENTS:  No acute events overnight.     MEDICATIONS  (STANDING):  apixaban 2.5 milliGRAM(s) Oral every 12 hours  carvedilol 12.5 milliGRAM(s) Oral every 12 hours  cephalexin 500 milliGRAM(s) Oral every 12 hours  cholecalciferol 2000 Unit(s) Oral daily  dextrose 5%. 1000 milliLiter(s) (100 mL/Hr) IV Continuous <Continuous>  dextrose 5%. 1000 milliLiter(s) (50 mL/Hr) IV Continuous <Continuous>  dextrose 50% Injectable 25 Gram(s) IV Push once  dextrose 50% Injectable 12.5 Gram(s) IV Push once  dextrose 50% Injectable 25 Gram(s) IV Push once  dorzolamide 2% Ophthalmic Solution 1 Drop(s) Both EYES <User Schedule>  fluticasone propionate/ salmeterol 250-50 MICROgram(s) Diskus 1 Dose(s) Inhalation two times a day  glucagon  Injectable 1 milliGRAM(s) IntraMuscular once  hydrALAZINE 50 milliGRAM(s) Oral two times a day  insulin lispro (ADMELOG) corrective regimen sliding scale   SubCutaneous three times a day before meals  insulin lispro (ADMELOG) corrective regimen sliding scale   SubCutaneous at bedtime  montelukast 10 milliGRAM(s) Oral daily  NIFEdipine XL 60 milliGRAM(s) Oral daily  sodium bicarbonate 650 milliGRAM(s) Oral two times a day    MEDICATIONS  (PRN):  acetaminophen     Tablet .. 650 milliGRAM(s) Oral every 6 hours PRN Temp greater or equal to 38C (100.4F), Mild Pain (1 - 3)  dextrose Oral Gel 15 Gram(s) Oral once PRN Blood Glucose LESS THAN 70 milliGRAM(s)/deciliter  senna 2 Tablet(s) Oral at bedtime PRN Constipation      CAPILLARY BLOOD GLUCOSE      POCT Blood Glucose.: 169 mg/dL (17 Jan 2025 16:50)  POCT Blood Glucose.: 198 mg/dL (17 Jan 2025 11:44)  POCT Blood Glucose.: 136 mg/dL (17 Jan 2025 07:17)  POCT Blood Glucose.: 233 mg/dL (16 Jan 2025 21:44)    I&O's Summary    16 Jan 2025 07:01  -  17 Jan 2025 07:00  --------------------------------------------------------  IN: 150 mL / OUT: 900 mL / NET: -750 mL    17 Jan 2025 07:01  -  17 Jan 2025 19:39  --------------------------------------------------------  IN: 0 mL / OUT: 400 mL / NET: -400 mL        PHYSICAL EXAM:  Vital Signs Last 24 Hrs  T(C): 36.6 (17 Jan 2025 12:29), Max: 36.7 (17 Jan 2025 04:44)  T(F): 97.9 (17 Jan 2025 12:29), Max: 98 (17 Jan 2025 04:44)  HR: 74 (17 Jan 2025 17:42) (68 - 74)  BP: 110/71 (17 Jan 2025 17:42) (110/71 - 128/71)  BP(mean): --  RR: 18 (17 Jan 2025 12:29) (18 - 18)  SpO2: 92% (17 Jan 2025 12:29) (92% - 93%)    Parameters below as of 17 Jan 2025 12:29  Patient On (Oxygen Delivery Method): room air    CONSTITUTIONAL: NAD, well-developed, well-groomed  EYES: PERRLA; conjunctiva and sclera clear  RESPIRATORY: Normal respiratory effort; lungs are clear to auscultation bilaterally  CARDIOVASCULAR: Regular rate and rhythm, normal S1 and S2, no murmur/rub/gallop;  2+ lymphedema lower extremity edema; Peripheral pulses are 2+ bilaterally  ABDOMEN: Nontender to palpation, normoactive bowel sounds, no rebound/guarding; No hepatosplenomegaly  MUSCULOSKELETAL:  no clubbing or cyanosis of digits; no joint swelling or tenderness to palpation  PSYCH: A+O to person, place, and time; affect appropriate  NEUROLOGY: CN 2-12 are intact and symmetric; no gross sensory deficits   SKIN: lower extremities wrapped in ace wrap. Somewhat tender to touch      LABS:                        10.0   4.76  )-----------( 202      ( 16 Jan 2025 07:06 )             31.6     01-16    138  |  105  |  68[H]  ----------------------------<  96  4.7   |  19[L]  |  2.91[H]    Ca    9.1      16 Jan 2025 07:06            Urinalysis Basic - ( 16 Jan 2025 07:06 )    Color: x / Appearance: x / SG: x / pH: x  Gluc: 96 mg/dL / Ketone: x  / Bili: x / Urobili: x   Blood: x / Protein: x / Nitrite: x   Leuk Esterase: x / RBC: x / WBC x   Sq Epi: x / Non Sq Epi: x / Bacteria: x        Culture - Urine (collected 15 Jose 2025 01:37)  Source: Catheterized Catheterized  Preliminary Report (17 Jan 2025 16:17):    >100,000 CFU/ml Escherichia coli    >100,000 CFU/ml Enterococcus faecalis  Organism: Escherichia coli (17 Jan 2025 16:16)  Organism: Escherichia coli (17 Jan 2025 16:16)    Culture - Blood (collected 14 Jan 2025 21:35)  Source: .Blood BLOOD  Preliminary Report (17 Jan 2025 05:01):    No growth at 48 Hours    Culture - Blood (collected 14 Jan 2025 21:20)  Source: .Blood BLOOD  Preliminary Report (17 Jan 2025 05:01):    No growth at 48 Hours

## 2025-01-18 NOTE — PROGRESS NOTE ADULT - PROBLEM SELECTOR PLAN 7
- med rec completed. Unclear why the patient is on prednisone 20mg BID at home  - tried to call the daughter today but she didn't  x2

## 2025-01-18 NOTE — PROGRESS NOTE ADULT - SUBJECTIVE AND OBJECTIVE BOX
Karissa Chacon MD  Hospitalist  Available on MS Teams      PROGRESS NOTE:     Patient is a 89y old  Female who presents with a chief complaint of LE wounds (17 Jan 2025 19:39)      SUBJECTIVE / OVERNIGHT EVENTS:   ID [id number].    The patient has no acute complaints. Endorsed having pain in the lower extremities earlier today but improved after acetaminophen. No fevers/chills. No dysuria.     MEDICATIONS  (STANDING):  apixaban 2.5 milliGRAM(s) Oral every 12 hours  carvedilol 12.5 milliGRAM(s) Oral every 12 hours  cephalexin 500 milliGRAM(s) Oral every 12 hours  cholecalciferol 2000 Unit(s) Oral daily  dextrose 5%. 1000 milliLiter(s) (100 mL/Hr) IV Continuous <Continuous>  dextrose 5%. 1000 milliLiter(s) (50 mL/Hr) IV Continuous <Continuous>  dextrose 50% Injectable 25 Gram(s) IV Push once  dextrose 50% Injectable 12.5 Gram(s) IV Push once  dextrose 50% Injectable 25 Gram(s) IV Push once  dorzolamide 2% Ophthalmic Solution 1 Drop(s) Both EYES <User Schedule>  fluticasone propionate/ salmeterol 250-50 MICROgram(s) Diskus 1 Dose(s) Inhalation two times a day  glucagon  Injectable 1 milliGRAM(s) IntraMuscular once  hydrALAZINE 50 milliGRAM(s) Oral two times a day  insulin lispro (ADMELOG) corrective regimen sliding scale   SubCutaneous three times a day before meals  insulin lispro (ADMELOG) corrective regimen sliding scale   SubCutaneous at bedtime  montelukast 10 milliGRAM(s) Oral daily  NIFEdipine XL 60 milliGRAM(s) Oral daily  polyethylene glycol 3350 17 Gram(s) Oral daily  sodium bicarbonate 650 milliGRAM(s) Oral two times a day    MEDICATIONS  (PRN):  acetaminophen     Tablet .. 650 milliGRAM(s) Oral every 6 hours PRN Temp greater or equal to 38C (100.4F), Mild Pain (1 - 3)  dextrose Oral Gel 15 Gram(s) Oral once PRN Blood Glucose LESS THAN 70 milliGRAM(s)/deciliter  senna 2 Tablet(s) Oral at bedtime PRN Constipation      CAPILLARY BLOOD GLUCOSE      POCT Blood Glucose.: 201 mg/dL (18 Jan 2025 11:56)  POCT Blood Glucose.: 118 mg/dL (18 Jan 2025 07:45)  POCT Blood Glucose.: 163 mg/dL (17 Jan 2025 21:40)  POCT Blood Glucose.: 169 mg/dL (17 Jan 2025 16:50)    I&O's Summary    17 Jan 2025 07:01  -  18 Jan 2025 07:00  --------------------------------------------------------  IN: 0 mL / OUT: 750 mL / NET: -750 mL        PHYSICAL EXAM:  Vital Signs Last 24 Hrs  T(C): 36.7 (18 Jan 2025 12:14), Max: 36.7 (18 Jan 2025 05:10)  T(F): 98.1 (18 Jan 2025 12:14), Max: 98.1 (18 Jan 2025 12:14)  HR: 70 (18 Jan 2025 12:14) (62 - 87)  BP: 101/60 (18 Jan 2025 12:14) (101/60 - 113/69)  BP(mean): --  RR: 18 (18 Jan 2025 12:14) (18 - 18)  SpO2: 93% (18 Jan 2025 12:14) (93% - 96%)    Parameters below as of 18 Jan 2025 12:14  Patient On (Oxygen Delivery Method): room air      CONSTITUTIONAL: NAD, well-developed, well-groomed  EYES: PERRLA; conjunctiva and sclera clear  RESPIRATORY: Normal respiratory effort; lungs are clear to auscultation bilaterally  CARDIOVASCULAR: Regular rate and rhythm, normal S1 and S2, no murmur/rub/gallop;  2+ lymphedema lower extremity edema; Peripheral pulses are 2+ bilaterally  ABDOMEN: soft, nontender, nondistended  PSYCH: A+O  SKIN: lower extremities wrapped in ace wrap. Somewhat tender to touch

## 2025-01-18 NOTE — PROGRESS NOTE ADULT - PROBLEM SELECTOR PLAN 4
- caution with nephrotoxic medications   - Renally dose all medications  - restart home lasix 80mg daily

## 2025-01-19 NOTE — PROGRESS NOTE ADULT - SUBJECTIVE AND OBJECTIVE BOX
Karissa Chacon MD  Hospitalist  Available on MS Teams      PROGRESS NOTE:     Patient is a 89y old  Female who presents with a chief complaint of LE wounds (18 Jan 2025 12:41)      SUBJECTIVE / OVERNIGHT EVENTS:  Pt overnight complaining of shortness of breath. Not hypoxic but placed on supplemental O2 for comfort.    Pt seen this morning. C/o SOB but no cough, fevers, or chills.     MEDICATIONS  (STANDING):  apixaban 2.5 milliGRAM(s) Oral every 12 hours  carvedilol 12.5 milliGRAM(s) Oral every 12 hours  cephalexin 500 milliGRAM(s) Oral every 12 hours  cholecalciferol 2000 Unit(s) Oral daily  dextrose 5%. 1000 milliLiter(s) (100 mL/Hr) IV Continuous <Continuous>  dextrose 5%. 1000 milliLiter(s) (50 mL/Hr) IV Continuous <Continuous>  dextrose 50% Injectable 25 Gram(s) IV Push once  dextrose 50% Injectable 12.5 Gram(s) IV Push once  dextrose 50% Injectable 25 Gram(s) IV Push once  dorzolamide 2% Ophthalmic Solution 1 Drop(s) Both EYES <User Schedule>  fluticasone propionate/ salmeterol 250-50 MICROgram(s) Diskus 1 Dose(s) Inhalation two times a day  furosemide    Tablet 80 milliGRAM(s) Oral daily  furosemide   Injectable 40 milliGRAM(s) IV Push once  glucagon  Injectable 1 milliGRAM(s) IntraMuscular once  hydrALAZINE 50 milliGRAM(s) Oral two times a day  insulin lispro (ADMELOG) corrective regimen sliding scale   SubCutaneous three times a day before meals  insulin lispro (ADMELOG) corrective regimen sliding scale   SubCutaneous at bedtime  montelukast 10 milliGRAM(s) Oral daily  NIFEdipine XL 60 milliGRAM(s) Oral daily  polyethylene glycol 3350 17 Gram(s) Oral daily  sodium bicarbonate 650 milliGRAM(s) Oral two times a day    MEDICATIONS  (PRN):  acetaminophen     Tablet .. 650 milliGRAM(s) Oral every 6 hours PRN Temp greater or equal to 38C (100.4F), Mild Pain (1 - 3)  albuterol/ipratropium for Nebulization 3 milliLiter(s) Nebulizer every 6 hours PRN Shortness of Breath and/or Wheezing  dextrose Oral Gel 15 Gram(s) Oral once PRN Blood Glucose LESS THAN 70 milliGRAM(s)/deciliter  senna 2 Tablet(s) Oral at bedtime PRN Constipation      CAPILLARY BLOOD GLUCOSE      POCT Blood Glucose.: 199 mg/dL (19 Jan 2025 12:04)  POCT Blood Glucose.: 153 mg/dL (19 Jan 2025 07:44)  POCT Blood Glucose.: 277 mg/dL (18 Jan 2025 21:48)  POCT Blood Glucose.: 182 mg/dL (18 Jan 2025 16:33)    I&O's Summary    18 Jan 2025 07:01  -  19 Jan 2025 07:00  --------------------------------------------------------  IN: 480 mL / OUT: 800 mL / NET: -320 mL        PHYSICAL EXAM:  Vital Signs Last 24 Hrs  T(C): 36.4 (19 Jan 2025 08:04), Max: 37 (19 Jan 2025 05:05)  T(F): 97.6 (19 Jan 2025 08:04), Max: 98.6 (19 Jan 2025 05:05)  HR: 66 (19 Jan 2025 08:04) (65 - 70)  BP: 111/50 (19 Jan 2025 08:04) (100/62 - 111/50)  BP(mean): --  RR: 18 (19 Jan 2025 08:04) (18 - 18)  SpO2: 96% (19 Jan 2025 08:04) (93% - 96%)    Parameters below as of 19 Jan 2025 08:04  Patient On (Oxygen Delivery Method): nasal cannula  O2 Flow (L/min): 3    CONSTITUTIONAL: NAD, well-developed, well-groomed  EYES: PERRLA; conjunctiva and sclera clear  RESPIRATORY: Normal respiratory effort; lungs are clear to auscultation bilaterally  CARDIOVASCULAR: Regular rate and rhythm, normal S1 and S2, no murmur/rub/gallop;  2+ lymphedema lower extremity edema; Peripheral pulses are 2+ bilaterally  ABDOMEN: soft, nontender, nondistended  PSYCH: A+O      LABS:                        9.9    4.93  )-----------( 189      ( 19 Jan 2025 09:15 )             30.6     01-19    137  |  104  |  82[H]  ----------------------------<  163[H]  5.1   |  17[L]  |  3.64[H]    Ca    9.0      19 Jan 2025 09:15  Phos  5.1     01-19  Mg     2.3     01-19    TPro  6.6  /  Alb  3.1[L]  /  TBili  0.2  /  DBili  x   /  AST  13  /  ALT  8[L]  /  AlkPhos  193[H]  01-19          Urinalysis Basic - ( 19 Jan 2025 09:15 )    Color: x / Appearance: x / SG: x / pH: x  Gluc: 163 mg/dL / Ketone: x  / Bili: x / Urobili: x   Blood: x / Protein: x / Nitrite: x   Leuk Esterase: x / RBC: x / WBC x   Sq Epi: x / Non Sq Epi: x / Bacteria: x

## 2025-01-19 NOTE — PROGRESS NOTE ADULT - PROBLEM SELECTOR PLAN 3
History of COPD and history of calcifications of the right lung  - reviewed prior pulm evaluation. The calcifications are thought to be d/t prior hx of infection. 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

## 2025-01-19 NOTE — PROGRESS NOTE ADULT - PROBLEM SELECTOR PLAN 1
Shortness of breath overnight  - CXR shows calcification about the right lung, similar in appearance to CXR about three years ago  - there is also small pleural effusion  - home lasix where held until yesterday as pt came in with acute infection. Will continue home lasix 80mg PO daily. Give one dose of lasix 40mg IV today  - duonebs q6hrs  - monitor respiratory status

## 2025-01-20 NOTE — PROGRESS NOTE ADULT - PROBLEM SELECTOR PLAN 1
Shortness of breath on 1/19, now with improved symptoms.   - CXR shows calcification about the right lung, similar in appearance to CXR about three years ago. There is also small pleural effusion  - home lasix 80mg PO daily was resumed on 1/18. Additional IV lasix 40mg was given on 1/19  - proBNP elevated from 824 on admission to 4483 today  - duonebs q6hrs  - titrate down supplemental O2 as tolerated  - nephro and cardiology is consulted today

## 2025-01-20 NOTE — PROGRESS NOTE ADULT - PROBLEM SELECTOR PLAN 10
DVT ppx: Eliquis 2.5mg BID  Diet: CCC  Dispo: now medically active with LU[facility] FERNANDO, evaluation for decompensated heart failure    Eventual dc to [facility].

## 2025-01-20 NOTE — CONSULT NOTE ADULT - SUBJECTIVE AND OBJECTIVE BOX
Cardiology Consult Note   [Please check [email or web address] password: [email or web address]" for cardiology service schedule and contact information]    HPI:  89F w/ PMH of HTN, DM2, Afib on Eliquis, HFpEF, CKD4, PVD, BRASH Syndrome who presents with LE wounds. Cardiology consulted for HFpEF management.    She was admitted for treatment of cellulitis and transitioned to PO antibiotics. Yesterday, she had an episode of acute shortness of breath. Home Lasix had been held for the first few days of admission due to concern of worsening creatinine. She was restarted on home Lasix on 1/18/25 and given additional dose of IV Lasix 40 mg for which she responded well to.    Currently, on NC. No shortness of breath, chest pain, or palpitations. She has orthopnea at baseline and does not sleep flat. BNP noted to be uptrending throughout hospital course.    PAST MEDICAL & SURGICAL HISTORY:  Benign essential hypertension  Diabetes mellitus  Peripheral vascular disease  Personal history of asbestosis  Spinal stenosis  HTN (hypertension)  DM (diabetes mellitus)  TIA (transient ischemic attack)  Pulmonary fibrosis  Peripheral edema  Degenerative arthritis of right knee  Degenerative arthritis of left knee  Osteoporosis  KENNETH (obstructive sleep apnea)  Hypertension  Chronic kidney disease (CKD)  T2DM (type 2 diabetes mellitus)  PVD (peripheral vascular disease)  H/O abdominal hysterectomy  S/P hysterectomy  S/P spinal fusion  S/P cataract surgery  Right eye  History of hysterectomy    FAMILY HISTORY: non-contributory    SOCIAL HISTORY: unchanged    MEDICATIONS:  apixaban 2.5 milliGRAM(s) Oral every 12 hours  carvedilol 12.5 milliGRAM(s) Oral every 12 hours  furosemide    Tablet 80 milliGRAM(s) Oral daily  hydrALAZINE 50 milliGRAM(s) Oral two times a day  NIFEdipine XL 60 milliGRAM(s) Oral daily  cephalexin 500 milliGRAM(s) Oral every 12 hours  albuterol/ipratropium for Nebulization 3 milliLiter(s) Nebulizer every 6 hours PRN  fluticasone propionate/ salmeterol 250-50 MICROgram(s) Diskus 1 Dose(s) Inhalation two times a day  montelukast 10 milliGRAM(s) Oral daily  acetaminophen     Tablet .. 650 milliGRAM(s) Oral every 6 hours PRN  polyethylene glycol 3350 17 Gram(s) Oral daily  senna 2 Tablet(s) Oral at bedtime PRN  dextrose 50% Injectable 25 Gram(s) IV Push once  dextrose 50% Injectable 12.5 Gram(s) IV Push once  dextrose 50% Injectable 25 Gram(s) IV Push once  dextrose Oral Gel 15 Gram(s) Oral once PRN  glucagon  Injectable 1 milliGRAM(s) IntraMuscular once  insulin lispro (ADMELOG) corrective regimen sliding scale   SubCutaneous three times a day before meals  insulin lispro (ADMELOG) corrective regimen sliding scale   SubCutaneous at bedtime  cholecalciferol 2000 Unit(s) Oral daily  dextrose 5%. 1000 milliLiter(s) IV Continuous <Continuous>  dextrose 5%. 1000 milliLiter(s) IV Continuous <Continuous>  dorzolamide 2% Ophthalmic Solution 1 Drop(s) Both EYES <User Schedule>  sodium bicarbonate 650 milliGRAM(s) Oral two times a day    -------------------------------------------------------------------------------------------  PHYSICAL EXAM:  T(C): 36.4 (01-20-25 @ 12:24), Max: 36.6 (01-19-25 @ 19:38)  HR: 63 (01-20-25 @ 12:24) (63 - 77)  BP: 118/65 (01-20-25 @ 12:24) (108/65 - 118/65)  RR: 18 (01-20-25 @ 12:24) (18 - 18)  SpO2: 97% (01-20-25 @ 12:24) (95% - 98%)  Wt(kg): --  I&O's Summary    19 Jan 2025 07:01  -  20 Jan 2025 07:00  --------------------------------------------------------  IN: 0 mL / OUT: 400 mL / NET: -400 mL    GENERAL: NAD  CHEST/LUNG: Bibasilar crackles, on NC.  HEART: Regular rate and rhythm; No murmurs, rubs, or gallops.  ABDOMEN: Soft, Nontender, Nondistended.   EXTREMITIES: BLE wrapped with edema to mid-shin    -------------------------------------------------------------------------------------------  LABS:                          9.9    4.93  )-----------( 189      ( 19 Jan 2025 09:15 )             30.6     01-20    140  |  107  |  86[H]  ----------------------------<  149[H]  5.2   |  19[L]  |  3.90[H]    Ca    9.0      20 Jan 2025 07:07  Phos  5.8     01-20  Mg     2.5     01-20    TPro  6.6  /  Alb  3.1[L]  /  TBili  0.2  /  DBili  x   /  AST  13  /  ALT  8[L]  /  AlkPhos  193[H]  01-19      -------------------------------------------------------------------------------------------  Cardiovascular Diagnostic Testing:    Echo:   Conclusions:  1. Mitral annular calcification, otherwise normal mitral  valve. Mild mitral regurgitation.  2. Calcified trileaflet aortic valve with decreased  opening. No aortic valve regurgitation seen.  3. Moderately dilated left atrium.  LA volume index = 42  cc/m2.  4. Normal left ventricular systolic function. No segmental  wall motionabnormalities.  5. Grade II diastolic dysfunction,  Increased E/e'  is  consistent with elevated left ventricular filling pressure.  6. Normal right ventricular size and function.  7. Estimated right ventricular systolic pressure equals 40  mm Hg, assuming right atrial pressure equals 8 mm Hg,  consistent with mild pulmonary hypertension.  *** Compared with echocardiogram of 4/12/2021, no  significant changes noted.    -------------------------------------------------------------------------------------------

## 2025-01-20 NOTE — CONSULT NOTE ADULT - ATTENDING COMMENTS
#ricardo on ckd stage 4  followed by dr padmini de la vega uptrending in setting of diuretics however appears volume overloaded on exam and also improved with IV lasix  cont IV lasix for now  check proBNP  monitor daily BNP  #CV follow up- Dr Fall  Pulm follow up for interstitial fibrosis- Dr Spencer  #check renal sono and bladder scan  #met acidosis  monitor bicarb trend  cont sodium bicarb tabs  #hyperkalemia  mild  lokelma as needed  monitor K trends #ricardo on ckd stage 4- crs  followed by dr padmini de la vega uptrending in setting of diuretics however appears volume overloaded on exam and also improved with IV lasix  cont IV lasix for now  check proBNP  monitor daily BNP  #CV follow up- Dr Fall  Pulm follow up for interstitial fibrosis- Dr Spencer  #check renal sono and bladder scan  #met acidosis  monitor bicarb trend  cont sodium bicarb tabs  #hyperkalemia  mild  lokelma as needed  monitor K trends  d/w daughter at bedside who is a physician #ricardo on ckd stage 4- crs  followed by dr padmini de la vega uptrending in setting of diuretics however still appears volume overloaded on exam and also SOB yesterday improved with IV lasix  cont IV lasix for now  check proBNP  monitor daily BNP  #CV follow up- Dr Fall  Pulm follow up for interstitial fibrosis- Dr Spencer  #check renal sono and bladder scan  #met acidosis  monitor bicarb trend  cont sodium bicarb tabs  #hyperkalemia  mild  lokelma as needed  monitor K trends  d/w daughter at bedside who is a physician

## 2025-01-20 NOTE — PROGRESS NOTE ADULT - PROBLEM SELECTOR PLAN 4
cellulitis improving  c/w keflex 500mg bid, plan for total 7 days of antibiotics (1/15 - 1/21)  pt should have legs with compression during the day and off at night Libtayo Counseling- I discussed with the patient the risks of Libtayo including but not limited to nausea, vomiting, diarrhea, and bone or muscle pain.  The patient verbalized understanding of the proper use and possible adverse effects of Libtayo.  All of the patient's questions and concerns were addressed.

## 2025-01-20 NOTE — PROGRESS NOTE ADULT - SUBJECTIVE AND OBJECTIVE BOX
Karissa Chacon MD  Hospitalist  Available on MS Teams      PROGRESS NOTE:     Patient is a 89y old  Female who presents with a chief complaint of LE wounds (19 Jan 2025 12:12)      SUBJECTIVE / OVERNIGHT EVENTS:  Pt seen with her sister at bedside. Her shortness of breath is improved compared to yesterday. Denies any subjective fevers/chills.     MEDICATIONS  (STANDING):  apixaban 2.5 milliGRAM(s) Oral every 12 hours  carvedilol 12.5 milliGRAM(s) Oral every 12 hours  cephalexin 500 milliGRAM(s) Oral every 12 hours  cholecalciferol 2000 Unit(s) Oral daily  dextrose 5%. 1000 milliLiter(s) (100 mL/Hr) IV Continuous <Continuous>  dextrose 5%. 1000 milliLiter(s) (50 mL/Hr) IV Continuous <Continuous>  dextrose 50% Injectable 25 Gram(s) IV Push once  dextrose 50% Injectable 12.5 Gram(s) IV Push once  dextrose 50% Injectable 25 Gram(s) IV Push once  dorzolamide 2% Ophthalmic Solution 1 Drop(s) Both EYES <User Schedule>  fluticasone propionate/ salmeterol 250-50 MICROgram(s) Diskus 1 Dose(s) Inhalation two times a day  furosemide    Tablet 80 milliGRAM(s) Oral daily  glucagon  Injectable 1 milliGRAM(s) IntraMuscular once  hydrALAZINE 50 milliGRAM(s) Oral two times a day  insulin lispro (ADMELOG) corrective regimen sliding scale   SubCutaneous three times a day before meals  insulin lispro (ADMELOG) corrective regimen sliding scale   SubCutaneous at bedtime  montelukast 10 milliGRAM(s) Oral daily  NIFEdipine XL 60 milliGRAM(s) Oral daily  polyethylene glycol 3350 17 Gram(s) Oral daily  sodium bicarbonate 650 milliGRAM(s) Oral two times a day    MEDICATIONS  (PRN):  acetaminophen     Tablet .. 650 milliGRAM(s) Oral every 6 hours PRN Temp greater or equal to 38C (100.4F), Mild Pain (1 - 3)  albuterol/ipratropium for Nebulization 3 milliLiter(s) Nebulizer every 6 hours PRN Shortness of Breath and/or Wheezing  dextrose Oral Gel 15 Gram(s) Oral once PRN Blood Glucose LESS THAN 70 milliGRAM(s)/deciliter  senna 2 Tablet(s) Oral at bedtime PRN Constipation      CAPILLARY BLOOD GLUCOSE      POCT Blood Glucose.: 211 mg/dL (20 Jan 2025 11:39)  POCT Blood Glucose.: 147 mg/dL (20 Jan 2025 07:35)  POCT Blood Glucose.: 292 mg/dL (19 Jan 2025 21:39)  POCT Blood Glucose.: 232 mg/dL (19 Jan 2025 16:26)    I&O's Summary    19 Jan 2025 07:01  -  20 Jan 2025 07:00  --------------------------------------------------------  IN: 0 mL / OUT: 400 mL / NET: -400 mL        PHYSICAL EXAM:  Vital Signs Last 24 Hrs  T(C): 36.4 (20 Jan 2025 12:24), Max: 36.6 (19 Jan 2025 19:38)  T(F): 97.6 (20 Jan 2025 12:24), Max: 97.9 (19 Jan 2025 19:38)  HR: 63 (20 Jan 2025 12:24) (63 - 77)  BP: 118/65 (20 Jan 2025 12:24) (108/65 - 118/65)  BP(mean): --  RR: 18 (20 Jan 2025 12:24) (18 - 18)  SpO2: 97% (20 Jan 2025 12:24) (95% - 98%)    Parameters below as of 20 Jan 2025 12:24  Patient On (Oxygen Delivery Method): nasal cannula  O2 Flow (L/min): 2      CONSTITUTIONAL: NAD, well-developed, well-groomed  EYES: PERRLA; conjunctiva and sclera clear  RESPIRATORY: Normal respiratory effort; lungs are clear to auscultation bilaterally  CARDIOVASCULAR: Regular rate and rhythm, normal S1 and S2, no murmur/rub/gallop;  2+ lymphedema lower extremity edema,   ABDOMEN: soft, nontender, nondistended  PSYCH: A+O      LABS:                        9.9    4.93  )-----------( 189      ( 19 Jan 2025 09:15 )             30.6     01-20    140  |  107  |  86[H]  ----------------------------<  149[H]  5.2   |  19[L]  |  3.90[H]    Ca    9.0      20 Jan 2025 07:07  Phos  5.8     01-20  Mg     2.5     01-20    TPro  6.6  /  Alb  3.1[L]  /  TBili  0.2  /  DBili  x   /  AST  13  /  ALT  8[L]  /  AlkPhos  193[H]  01-19          Urinalysis Basic - ( 20 Jan 2025 07:07 )    Color: x / Appearance: x / SG: x / pH: x  Gluc: 149 mg/dL / Ketone: x  / Bili: x / Urobili: x   Blood: x / Protein: x / Nitrite: x   Leuk Esterase: x / RBC: x / WBC x   Sq Epi: x / Non Sq Epi: x / Bacteria: x

## 2025-01-20 NOTE — CONSULT NOTE ADULT - SUBJECTIVE AND OBJECTIVE BOX
[facility] DIVISION OF KIDNEY DISEASES AND HYPERTENSION -- [phone]  -- INITIAL CONSULT NOTE  --------------------------------------------------------------------------------  HPI:        PAST HISTORY  --------------------------------------------------------------------------------  PAST MEDICAL & SURGICAL HISTORY:  Benign essential hypertension      Diabetes mellitus      Peripheral vascular disease      Personal history of asbestosis      Spinal stenosis      HTN (hypertension)      DM (diabetes mellitus)      TIA (transient ischemic attack)      Pulmonary fibrosis      Peripheral edema      Degenerative arthritis of right knee      Degenerative arthritis of left knee      Osteoporosis      KENNETH (obstructive sleep apnea)      Hypertension      Chronic kidney disease (CKD)      T2DM (type 2 diabetes mellitus)      PVD (peripheral vascular disease)      H/O abdominal hysterectomy      S/P hysterectomy      S/P spinal fusion      S/P cataract surgery  Right eye      History of hysterectomy        FAMILY HISTORY:    PAST SOCIAL HISTORY:    ALLERGIES & MEDICATIONS  --------------------------------------------------------------------------------  Allergies    Levaquin (Rash)    Intolerances      Standing Inpatient Medications  apixaban 2.5 milliGRAM(s) Oral every 12 hours  carvedilol 12.5 milliGRAM(s) Oral every 12 hours  cephalexin 500 milliGRAM(s) Oral every 12 hours  cholecalciferol 2000 Unit(s) Oral daily  dextrose 5%. 1000 milliLiter(s) IV Continuous <Continuous>  dextrose 5%. 1000 milliLiter(s) IV Continuous <Continuous>  dextrose 50% Injectable 25 Gram(s) IV Push once  dextrose 50% Injectable 12.5 Gram(s) IV Push once  dextrose 50% Injectable 25 Gram(s) IV Push once  dorzolamide 2% Ophthalmic Solution 1 Drop(s) Both EYES <User Schedule>  fluticasone propionate/ salmeterol 250-50 MICROgram(s) Diskus 1 Dose(s) Inhalation two times a day  furosemide    Tablet 80 milliGRAM(s) Oral daily  glucagon  Injectable 1 milliGRAM(s) IntraMuscular once  hydrALAZINE 50 milliGRAM(s) Oral two times a day  insulin lispro (ADMELOG) corrective regimen sliding scale   SubCutaneous three times a day before meals  insulin lispro (ADMELOG) corrective regimen sliding scale   SubCutaneous at bedtime  montelukast 10 milliGRAM(s) Oral daily  NIFEdipine XL 60 milliGRAM(s) Oral daily  polyethylene glycol 3350 17 Gram(s) Oral daily  sodium bicarbonate 650 milliGRAM(s) Oral two times a day    PRN Inpatient Medications  acetaminophen     Tablet .. 650 milliGRAM(s) Oral every 6 hours PRN  albuterol/ipratropium for Nebulization 3 milliLiter(s) Nebulizer every 6 hours PRN  dextrose Oral Gel 15 Gram(s) Oral once PRN  senna 2 Tablet(s) Oral at bedtime PRN      REVIEW OF SYSTEMS  --------------------------------------------------------------------------------  Gen: No fevers/chills  Skin: No rashes  Head/Eyes/Ears: No HA  Respiratory: No SOB, cough  CV: No CP  GI: No abdominal pain, diarrhea, N/V  : No dysuria, hematuria  MSK: No edema  Heme: No easy bruising or bleeding  Psych: No significant depression    All other systems were reviewed and are negative, except as noted.    VITALS/PHYSICAL EXAM  --------------------------------------------------------------------------------  T(C): 36.4 (01-20-25 @ 12:24), Max: 36.6 (01-19-25 @ 19:38)  HR: 63 (01-20-25 @ 12:24) (63 - 77)  BP: 118/65 (01-20-25 @ 12:24) (108/65 - 118/65)  RR: 18 (01-20-25 @ 12:24) (18 - 18)  SpO2: 97% (01-20-25 @ 12:24) (95% - 98%)  Wt(kg): --        01-19-25 @ 07:01  -  01-20-25 @ 07:00  --------------------------------------------------------  IN: 0 mL / OUT: 400 mL / NET: -400 mL      Physical Exam:  	Gen: NAD  	HEENT: MMM  	Pulm: CTA B/L  	CV: S1S2  	Abd: Soft, +BS   	Ext: No LE edema B/L  	Neuro: Awake  	Skin: Warm and dry  	Vascular access:    LABS/STUDIES  --------------------------------------------------------------------------------              9.9    4.93  >-----------<  189      [01-19-25 @ 09:15]              30.6     140  |  107  |  86  ----------------------------<  149      [01-20-25 @ 07:07]  5.2   |  19  |  3.90        Ca     9.0     [01-20-25 @ 07:07]      Mg     2.5     [01-20-25 @ 07:07]      Phos  5.8     [01-20-25 @ 07:07]    TPro  6.6  /  Alb  3.1  /  TBili  0.2  /  DBili  x   /  AST  13  /  ALT  8   /  AlkPhos  193  [01-19-25 @ 09:15]    Creatinine Trend:  SCr 3.90 [01-20 @ 07:07]  SCr 3.64 [01-19 @ 09:15]  SCr 2.91 [01-16 @ 07:06]  SCr 2.86 [01-15 @ 06:35]  SCr 3.00 [01-14 @ 22:39]    TSH 1.30      [01-15-25 @ 00:35]   [facility] DIVISION OF KIDNEY DISEASES AND HYPERTENSION -- [phone]  -- INITIAL CONSULT NOTE  --------------------------------------------------------------------------------  HPI: 89F w/ PMH of HTN, DM2, Afib on Eliquis, HFpEF, CKD4, PVD, BRASH Syndrome admitted w/ cellulitis of LLE. Nephrology consulted for LUIS FERNANDO on CKD.     Pt seen and examined at bedside this morning, with pts daughter (family med physician) at bedside providing history/ interpretation. Pt presented with LLE wound and found to have cellulitis. However, during hospitalization developed SOB on 1/19. After IV diuretic dose on 1/19 pt felt that her SOB improved. Also reports having significant b/l LE edema prior to admission, which has improved slightly since admission. No other acute complaints. Denies any urinary complaints. Pts daughter is aware of chronic right lung changes noted on CXR, and pt follows with outpt Pulmonary ([facility] doctor). Pt also follow with cardiology ([facility] doctor) for HF management. Pt was following with outpt nephrology (Dr. Galarza) for CKD4 management however has not followed up in Health system. No other complaints. Denies NSAID use. Denies any nausea, vomiting, fevers/chills, chest pain and abdominal pain.    PAST HISTORY  --------------------------------------------------------------------------------  PAST MEDICAL & SURGICAL HISTORY:  Benign essential hypertension  Diabetes mellitus  Peripheral vascular disease  Personal history of asbestosis  Spinal stenosis  HTN (hypertension)  DM (diabetes mellitus)  TIA (transient ischemic attack)  Pulmonary fibrosis  Peripheral edema  Degenerative arthritis of right knee  Degenerative arthritis of left knee  Osteoporosis  KENNETH (obstructive sleep apnea)  Hypertension  Chronic kidney disease (CKD)  T2DM (type 2 diabetes mellitus)  PVD (peripheral vascular disease)  H/O abdominal hysterectomy  S/P hysterectomy  S/P spinal fusion  S/P cataract surgery  Right eye  History of hysterectomy    FAMILY HISTORY: N/A    PAST SOCIAL HISTORY: N/A    ALLERGIES & MEDICATIONS  --------------------------------------------------------------------------------  Allergies    Levaquin (Rash)    Intolerances    Standing Inpatient Medications  apixaban 2.5 milliGRAM(s) Oral every 12 hours  carvedilol 12.5 milliGRAM(s) Oral every 12 hours  cephalexin 500 milliGRAM(s) Oral every 12 hours  cholecalciferol 2000 Unit(s) Oral daily  dextrose 5%. 1000 milliLiter(s) IV Continuous <Continuous>  dextrose 5%. 1000 milliLiter(s) IV Continuous <Continuous>  dextrose 50% Injectable 25 Gram(s) IV Push once  dextrose 50% Injectable 12.5 Gram(s) IV Push once  dextrose 50% Injectable 25 Gram(s) IV Push once  dorzolamide 2% Ophthalmic Solution 1 Drop(s) Both EYES <User Schedule>  fluticasone propionate/ salmeterol 250-50 MICROgram(s) Diskus 1 Dose(s) Inhalation two times a day  furosemide    Tablet 80 milliGRAM(s) Oral daily  glucagon  Injectable 1 milliGRAM(s) IntraMuscular once  hydrALAZINE 50 milliGRAM(s) Oral two times a day  insulin lispro (ADMELOG) corrective regimen sliding scale   SubCutaneous three times a day before meals  insulin lispro (ADMELOG) corrective regimen sliding scale   SubCutaneous at bedtime  montelukast 10 milliGRAM(s) Oral daily  NIFEdipine XL 60 milliGRAM(s) Oral daily  polyethylene glycol 3350 17 Gram(s) Oral daily  sodium bicarbonate 650 milliGRAM(s) Oral two times a day    PRN Inpatient Medications  acetaminophen     Tablet .. 650 milliGRAM(s) Oral every 6 hours PRN  albuterol/ipratropium for Nebulization 3 milliLiter(s) Nebulizer every 6 hours PRN  dextrose Oral Gel 15 Gram(s) Oral once PRN  senna 2 Tablet(s) Oral at bedtime PRN    REVIEW OF SYSTEMS  --------------------------------------------------------------------------------  Gen: No fevers/chills  Skin: No rashes  Head/Eyes/Ears: No HA  Respiratory: +SOB, no cough  CV: No CP  GI: No abdominal pain, diarrhea, N/V  : No dysuria, hematuria  MSK: +edema  Heme: No easy bruising or bleeding  Psych: No significant depression    All other systems were reviewed and are negative, except as noted.    VITALS/PHYSICAL EXAM  --------------------------------------------------------------------------------  T(C): 36.4 (01-20-25 @ 12:24), Max: 36.6 (01-19-25 @ 19:38)  HR: 63 (01-20-25 @ 12:24) (63 - 77)  BP: 118/65 (01-20-25 @ 12:24) (108/65 - 118/65)  RR: 18 (01-20-25 @ 12:24) (18 - 18)  SpO2: 97% (01-20-25 @ 12:24) (95% - 98%)  Wt(kg): --    01-19-25 @ 07:01  -  01-20-25 @ 07:00  --------------------------------------------------------  IN: 0 mL / OUT: 400 mL / NET: -400 mL    Physical Exam:  	Gen: NAD  	HEENT: MMM  	Pulm: bibasilar crackles, +NS  	CV: S1S2  	Abd: Soft, +BS   	Ext: ++LE edema B/L  	Neuro: Awake  	Skin: Warm and dry    LABS/STUDIES  --------------------------------------------------------------------------------              9.9    4.93  >-----------<  189      [01-19-25 @ 09:15]              30.6     140  |  107  |  86  ----------------------------<  149      [01-20-25 @ 07:07]  5.2   |  19  |  3.90        Ca     9.0     [01-20-25 @ 07:07]      Mg     2.5     [01-20-25 @ 07:07]      Phos  5.8     [01-20-25 @ 07:07]    TPro  6.6  /  Alb  3.1  /  TBili  0.2  /  DBili  x   /  AST  13  /  ALT  8   /  AlkPhos  193  [01-19-25 @ 09:15]    Creatinine Trend:  SCr 3.90 [01-20 @ 07:07]  SCr 3.64 [01-19 @ 09:15]  SCr 2.91 [01-16 @ 07:06]  SCr 2.86 [01-15 @ 06:35]  SCr 3.00 [01-14 @ 22:39]    TSH 1.30      [01-15-25 @ 00:35]

## 2025-01-20 NOTE — CONSULT NOTE ADULT - ASSESSMENT
89F w/ PMH of HTN, DM2, Afib on Eliquis, HFpEF, CKD4, PVD, BRASH Syndrome who presents with LE wounds. Cardiology consulted for HFpEF management. Volume overloaded on exam, likely in the setting of holding maintenance diuretics.    - Telemetry  - Repeat full TTE  - Would diurese with IV Lasix 40 mg BID  - Continue apixaban 2.5 mg BID for a-fib  - Continue hydralazine 50 mg BID, nifedipine 60 mg daily, and carvedilol 12.5 mg BID  - Wean O2 as tolerated  - Goal K>4, Mg>2    Please see attending attestation for final recommendations.    Keya Mays MD PGY-4  Cardiology Fellow 89F w/ PMH of HTN, DM2, Afib on Eliquis, HFpEF, CKD4, PVD, BRASH Syndrome who presents with LE wounds. Cardiology consulted for HFpEF management. Volume overloaded on exam, likely in the setting of holding maintenance diuretics.    - Telemetry  - Repeat full TTE  - Would diurese with IV Lasix 40 mg BID  - Continue apixaban 2.5 mg BID for a-fib  - Continue hydralazine 50 mg BID, nifedipine 60 mg daily, and carvedilol 12.5 mg BID  - Wean O2 as tolerated  - Goal K>4, Mg>2    Please see attending attestation for final recommendations.    Keya Mays MD PGY-4  Cardiology Fellow    This patient was seen and examined personally by me and the plan was discussed with the fellow and/or resident above. Amendments were made as necessary to the above. Agree with the excellent note and plan above. 89F w HTN, DM2, AF on eliquis, HFpEF, CKD4, PVD here w ADHF.   -tele  -TTE pend  -cont lasix 40mg IV bid  -cont apixaban, hydral, nifedipine, coreg    30 minutes were spent on this encounter for extensive review of medical record details including labs and/or imaging studies and/or adjacent care team and consultant records, as well as review and reconciliation of current medications. Time was spent on obtaining a history, performing physical examination of patient, and answering patient and/or family questions regarding plan of care. Time was also spent discussing plan of care with patient’s other care team members including primary and consulting teams. Time also was spent on documentation of this encounter into the EHR.    Kenneth Navas MD, MPhil, [facility]  Cardiologist, [facility]  ; [facility] and [facility]  Email: [email or web address] on-service contact/call information, go to [email or web address] and use [email or web address] to login.  Outpatient Cardiology appointments, call [phone] to arrange with a colleague; I do not have outpatient Cardiology clinic.

## 2025-01-20 NOTE — PROGRESS NOTE ADULT - PROBLEM SELECTOR PLAN 2
- Last TTE on record 2021, EF 65%  - continue home PO lasix 80mg daily  - coreg 12.5mg q12hr  - hydralazine 50mg BID  - nifedipine XL 60mg daily  - sees Dr. Merchant hammer

## 2025-01-21 NOTE — PROGRESS NOTE ADULT - PROBLEM SELECTOR PLAN 1
Shortness of breath on 1/19, now with improved symptoms.   - CXR shows calcification about the right lung, similar in appearance to CXR about three years ago. There is also small pleural effusion  - home lasix 80mg PO daily was resumed on 1/18. Additional IV lasix 40mg was given on 1/19  - proBNP elevated from 824 on admission to 4483   - appreciate cards and neph recs; likely volume overload in setting of held maintenance diuretics  - diurese with IV lasix 40 mg BID  - titrate down supplemental O2 as tolerated

## 2025-01-21 NOTE — PROGRESS NOTE ADULT - PROBLEM SELECTOR PLAN 3
History of COPD and history of calcifications of the right lung  - reviewed prior pulm evaluation. The calcifications are thought to be d/t prior hx of infection. 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

## 2025-01-21 NOTE — PROGRESS NOTE ADULT - SUBJECTIVE AND OBJECTIVE BOX
[facility] Division of Kidney Diseases & Hypertension  FOLLOW UP NOTE  [phone]--------------------------------------------------------------------------------    Chief Complaint: LUIS FERNANDO on CKD    24 hour events/subjective: Patient seen and examined earlier today. Reports improvement in SOB. Denies HA, fevers/chills, CP, abdominal pain, dysuria, or LE swelling.    PAST HISTORY  --------------------------------------------------------------------------------  No significant changes to PMH, PSH, FHx, SHx, unless otherwise noted    ALLERGIES & MEDICATIONS  --------------------------------------------------------------------------------  Allergies  Levaquin (Rash)    Intolerances    Standing Inpatient Medications  apixaban 2.5 milliGRAM(s) Oral every 12 hours  carvedilol 12.5 milliGRAM(s) Oral every 12 hours  cholecalciferol 2000 Unit(s) Oral daily  dextrose 5%. 1000 milliLiter(s) IV Continuous <Continuous>  dextrose 5%. 1000 milliLiter(s) IV Continuous <Continuous>  dextrose 50% Injectable 25 Gram(s) IV Push once  dextrose 50% Injectable 12.5 Gram(s) IV Push once  dextrose 50% Injectable 25 Gram(s) IV Push once  dorzolamide 2% Ophthalmic Solution 1 Drop(s) Both EYES <User Schedule>  fluticasone propionate/ salmeterol 250-50 MICROgram(s) Diskus 1 Dose(s) Inhalation two times a day  furosemide   Injectable 40 milliGRAM(s) IV Push two times a day  glucagon  Injectable 1 milliGRAM(s) IntraMuscular once  hydrALAZINE 50 milliGRAM(s) Oral two times a day  insulin lispro (ADMELOG) corrective regimen sliding scale   SubCutaneous three times a day before meals  insulin lispro (ADMELOG) corrective regimen sliding scale   SubCutaneous at bedtime  montelukast 10 milliGRAM(s) Oral daily  NIFEdipine XL 60 milliGRAM(s) Oral daily  polyethylene glycol 3350 17 Gram(s) Oral daily  sodium bicarbonate 650 milliGRAM(s) Oral two times a day    PRN Inpatient Medications  acetaminophen     Tablet .. 650 milliGRAM(s) Oral every 6 hours PRN  albuterol/ipratropium for Nebulization 3 milliLiter(s) Nebulizer every 6 hours PRN  dextrose Oral Gel 15 Gram(s) Oral once PRN  senna 2 Tablet(s) Oral at bedtime PRN    REVIEW OF SYSTEMS  --------------------------------------------------------------------------------  Gen: No fevers/chills  Head/Eyes/Ears: No HA  Respiratory: +SOB improved  CV: No CP  GI: No abdominal pain, diarrhea, N/V  : No dysuria, hematuria  MSK: No edema    VITALS/PHYSICAL EXAM  --------------------------------------------------------------------------------  T(C): 36.4 (01-21-25 @ 13:27), Max: 36.6 (01-20-25 @ 21:17)  HR: 67 (01-21-25 @ 13:30) (59 - 70)  BP: 106/60 (01-21-25 @ 13:30) (103/61 - 115/70)  RR: 18 (01-21-25 @ 13:27) (18 - 19)  SpO2: 98% (01-21-25 @ 13:27) (94% - 98%)  Wt(kg): --    01-20-25 @ 07:01  -  01-21-25 @ 07:00  --------------------------------------------------------  IN: 120 mL / OUT: 300 mL / NET: -180 mL    Physical Exam:  Gen: NAD  HEENT: Anicteric  Pulm: Crackles B/L, mild wheezing  CV: RRR, S1S2  Abd: +BS, soft, nontender/nondistended  : No suprapubic tenderness  Extremities: no bilateral LE edema noted  Neuro: Awake  Skin: Warm    LABS/STUDIES  --------------------------------------------------------------------------------              8.8    5.80  >-----------<  183      [01-21-25 @ 07:16]              28.2     137  |  105  |  92  ----------------------------<  164      [01-21-25 @ 07:12]  5.6   |  20  |  4.07        Ca     9.1     [01-21-25 @ 07:12]      Mg     2.5     [01-21-25 @ 07:12]      Phos  5.8     [01-21-25 @ 07:12]    Creatinine Trend:  SCr 4.07 [01-21 @ 07:12]  SCr 3.90 [01-20 @ 07:07]  SCr 3.64 [01-19 @ 09:15]  SCr 2.91 [01-16 @ 07:06]  SCr 2.86 [01-15 @ 06:35]    Urine Creatinine 88      [01-20-25 @ 16:46]  Urine Sodium 49      [01-20-25 @ 16:46]  Urine Urea Nitrogen 339      [01-20-25 @ 16:46]    TSH 1.30      [01-15-25 @ 00:35]

## 2025-01-21 NOTE — PROGRESS NOTE ADULT - SUBJECTIVE AND OBJECTIVE BOX
[facility] Division of Hospital Medicine  Sarah Watkins MD  Available on Teams Dann    Patient is a 89y old  Female who presents with a chief complaint of LE wounds (21 Jan 2025 14:27)      SUBJECTIVE / OVERNIGHT EVENTS:  Patient evaluated at bedside, with no acute overnight events. Weaning oxygen as tolerated, reports breathing feels improved overall, along with BLE. Denies any f/c, CP, abd pain.     ADDITIONAL REVIEW OF SYSTEMS: negative    MEDICATIONS  (STANDING):  apixaban 2.5 milliGRAM(s) Oral every 12 hours  carvedilol 12.5 milliGRAM(s) Oral every 12 hours  cholecalciferol 2000 Unit(s) Oral daily  dextrose 5%. 1000 milliLiter(s) (100 mL/Hr) IV Continuous <Continuous>  dextrose 5%. 1000 milliLiter(s) (50 mL/Hr) IV Continuous <Continuous>  dextrose 50% Injectable 25 Gram(s) IV Push once  dextrose 50% Injectable 12.5 Gram(s) IV Push once  dextrose 50% Injectable 25 Gram(s) IV Push once  dorzolamide 2% Ophthalmic Solution 1 Drop(s) Both EYES <User Schedule>  fluticasone propionate/ salmeterol 250-50 MICROgram(s) Diskus 1 Dose(s) Inhalation two times a day  furosemide   Injectable 40 milliGRAM(s) IV Push two times a day  glucagon  Injectable 1 milliGRAM(s) IntraMuscular once  hydrALAZINE 50 milliGRAM(s) Oral two times a day  insulin lispro (ADMELOG) corrective regimen sliding scale   SubCutaneous three times a day before meals  insulin lispro (ADMELOG) corrective regimen sliding scale   SubCutaneous at bedtime  montelukast 10 milliGRAM(s) Oral daily  NIFEdipine XL 60 milliGRAM(s) Oral daily  polyethylene glycol 3350 17 Gram(s) Oral daily  sodium bicarbonate 650 milliGRAM(s) Oral two times a day    MEDICATIONS  (PRN):  acetaminophen     Tablet .. 650 milliGRAM(s) Oral every 6 hours PRN Temp greater or equal to 38C (100.4F), Mild Pain (1 - 3)  albuterol/ipratropium for Nebulization 3 milliLiter(s) Nebulizer every 6 hours PRN Shortness of Breath and/or Wheezing  dextrose Oral Gel 15 Gram(s) Oral once PRN Blood Glucose LESS THAN 70 milliGRAM(s)/deciliter  senna 2 Tablet(s) Oral at bedtime PRN Constipation      CAPILLARY BLOOD GLUCOSE      POCT Blood Glucose.: 223 mg/dL (21 Jan 2025 16:26)  POCT Blood Glucose.: 293 mg/dL (21 Jan 2025 11:29)  POCT Blood Glucose.: 164 mg/dL (21 Jan 2025 07:21)  POCT Blood Glucose.: 219 mg/dL (20 Jan 2025 21:39)    I&O's Summary    20 Jan 2025 07:01  -  21 Jan 2025 07:00  --------------------------------------------------------  IN: 120 mL / OUT: 300 mL / NET: -180 mL        PHYSICAL EXAM:  Vital Signs Last 24 Hrs  T(C): 36.4 (21 Jan 2025 13:27), Max: 36.6 (20 Jan 2025 21:17)  T(F): 97.6 (21 Jan 2025 13:27), Max: 97.8 (20 Jan 2025 21:17)  HR: 64 (21 Jan 2025 16:09) (59 - 70)  BP: 106/69 (21 Jan 2025 16:09) (103/61 - 115/70)  BP(mean): --  RR: 18 (21 Jan 2025 16:09) (18 - 19)  SpO2: 96% (21 Jan 2025 16:09) (94% - 98%)    Parameters below as of 21 Jan 2025 16:09  Patient On (Oxygen Delivery Method): nasal cannula    CONSTITUTIONAL: Well-groomed, in no apparent distress  EYES: No conjunctival or scleral injection, non-icteric  ENMT: Oral mucosa with moist membranes  RESPIRATORY: Breathing comfortably; lungs CTA  CARDIOVASCULAR: +S1S2, RRR, no M/G/R; pedal pulses full and symmetric; 2+ BLE (lymphedema), ACE wraps in place  GASTROINTESTINAL: No palpable masses or tenderness, +BS throughout, no rebound/guarding  MUSCULOSKELETAL: no digital clubbing or cyanosis  SKIN: No rashes or ulcers noted  PSYCHIATRIC: A+O x 3; mood and affect appropriate    LABS:                        8.8    5.80  )-----------( 183      ( 21 Jan 2025 07:16 )             28.2     01-21    137  |  105  |  92[H]  ----------------------------<  164[H]  5.6[H]   |  20[L]  |  4.07[H]    Ca    9.1      21 Jan 2025 07:12  Phos  5.8     01-21  Mg     2.5     01-21            Urinalysis Basic - ( 21 Jan 2025 07:12 )    Color: x / Appearance: x / SG: x / pH: x  Gluc: 164 mg/dL / Ketone: x  / Bili: x / Urobili: x   Blood: x / Protein: x / Nitrite: x   Leuk Esterase: x / RBC: x / WBC x   Sq Epi: x / Non Sq Epi: x / Bacteria: x          RADIOLOGY & ADDITIONAL TESTS:  Results Reviewed:   Imaging Personally Reviewed:  Electrocardiogram Personally Reviewed:    COORDINATION OF CARE:  Care Discussed with Consultants/Other Providers [Y/N]:  Prior or Outpatient Records Reviewed [Y/N]:

## 2025-01-21 NOTE — PROGRESS NOTE ADULT - PROBLEM SELECTOR PLAN 2
Patient with hyperkalemia to 5.6 today. Recommend Lokelma 10gm x1. Change to renal diet. Monitor serum potassium.     If you have any questions, please feel free to contact me.  Larry Alcala MD  Nephrology Fellow  [phone] / [employer] Teams (Preferred)  (Please check the on-call schedule to reach the appropriate Nephrology Fellow)

## 2025-01-21 NOTE — PROGRESS NOTE ADULT - PROBLEM SELECTOR PLAN 10
DVT ppx: Eliquis 2.5mg BID  Diet: CCC  Dispo: now medically active with LUIS FERNANDO, evaluation for decompensated heart failure  Eventual dc to [facility].  Discussed with daughter Elizabeth at bedside, MARY Denton

## 2025-01-21 NOTE — PROGRESS NOTE ADULT - PROBLEM SELECTOR PLAN 1
Patient with LUIS FERNANDO on CKD iso CRS, hypervolemia noted on exam. Varghese FANG/Sunrise reviewed. Patient with CKD4 iso cardiorenal syndrome, HTN and DM. Follows with Dr. Galarza last seen 3/26/24. Scr fluctuates between 2.4-2.8 (eGFR: 16-19), last outpt Scr increased to 3.26 (eGFR: 13) on 10/7/24. Admission Scr elevated 3.0 (1/14), improved to 2.86 (1/15) and then progressively increased to 3.9 (1/20). Labs also significant for rising pro- (1/14) -> 4483 (1/20). UA w/ proteinuria and hematuria. CXR w/ R pleural calcifications and thickening (chronic finding), w/ lower lung opacities. Hypervolemia noted on exam (LE edema and requiring NC), bibasilar crackles. Home meds include Lasix 80mg QD w/ additional 40mg PRN.     Scr increased to 4.07 today. Agree with diuretic therapy. Increased to IV Lasix 40mg BID. Kidney sonogram on 1/20 with B/L renal cysts, echogenic, ~10cm in size. Continue home sodium bicarb. Holding parameters on all antihypertensive agents. Monitor labs and urine output. Avoid nephrotoxins. Dose medications as per eGFR. Patient with LUIS FERNANDO on CKD iso CRS, hypervolemia noted on exam. Varghese FANG/Sunrise reviewed. Patient with CKD4 iso cardiorenal syndrome, HTN and DM. Follows with Dr. Galarza last seen 3/26/24. Scr fluctuates between 2.4-2.8 (eGFR: 16-19), last outpt Scr increased to 3.26 (eGFR: 13) on 10/7/24. Admission Scr elevated 3.0 (1/14), improved to 2.86 (1/15) and then progressively increased to 3.9 (1/20). Labs also significant for rising pro- (1/14) -> 4483 (1/20). UA w/ proteinuria and hematuria. CXR w/ R pleural calcifications and thickening (chronic finding), w/ lower lung opacities. Hypervolemia noted on exam (LE edema and requiring NC), bibasilar crackles. Home meds include Lasix 80mg QD w/ additional 40mg PRN.     Scr increased to 4.07 today. Agree with diuretic therapy. Increased to IV Lasix 40mg BID. Kidney sonogram on 1/20 with B/L renal cysts, echogenic, ~10cm in size. Continue home sodium bicarb. Holding parameters on all antihypertensive agents. Monitor labs and urine output. Please repeat U/A/ Avoid nephrotoxins. Dose medications as per eGFR.

## 2025-01-21 NOTE — PROGRESS NOTE ADULT - PROBLEM SELECTOR PLAN 2
- Last TTE on record 2021, EF 65%, repeat pending  - on home lasix 80 mg PO daily; diuresing with 40 mg IV BID as noted above  - c/w BB (coreg 12.5mg q12hr), hydralazine 50 mg BID and nifedipine 60 mg daily  - monitor electrolytes, keep Mg >2, K >4, monitor on tele  - sees Dr. Burton outpatient

## 2025-01-22 NOTE — CHART NOTE - NSCHARTNOTEFT_GEN_A_CORE
Case discussed with Nephrology fellow.  Per recs will increase lasix to 80mg IV BID.    XIANG Jaquez-C

## 2025-01-22 NOTE — PROGRESS NOTE ADULT - PROBLEM SELECTOR PLAN 10
DVT ppx: Eliquis 2.5mg BID  Diet: CCC  Dispo: now medically active with LUIS FERNANDO, evaluation for decompensated heart failure  Eventual dc to [facility].  Discussed with daughter Elizabeth at bedside, 1/21, MARY [place]

## 2025-01-22 NOTE — PROGRESS NOTE ADULT - PROBLEM SELECTOR PLAN 2
Received Lokelma 10gm x1 after serum potassium elevated at 5.5 on PM labs from 1/21. Currently resolved. Monitor serum potassium.     If you have any questions, please feel free to contact me.  Larry Alcala MD  Nephrology Fellow  [id number] / [employer] Teams (Preferred)  (Please check the on-call schedule to reach the appropriate Nephrology Fellow)

## 2025-01-22 NOTE — PROGRESS NOTE ADULT - SUBJECTIVE AND OBJECTIVE BOX
INTERVAL EVENTS/SUBJ:  No events     Home Medications:  ANORO ELLIPTA 62.5-25 MCG INH: TAKE 1 PUFF BY MOUTH EVERY DAY (15 Jose 2025 10:44)  apixaban 2.5 mg oral tablet: 1 tab(s) orally 2 times a day (15 Jose 2025 10:44)  dorzolamide 2% ophthalmic solution: 1 drop(s) in each eye 2 times a day (15 Jose 2025 11:05)  furosemide 80 mg oral tablet: 1 tab(s) orally once a day may take additional 40mg as needed (15 Jose 2025 11:06)  glipiZIDE 5 mg oral tablet, extended release: 1 tab(s) orally once a day (15 Jose 2025 10:43)  hydrALAZINE 50 mg oral tablet: 1 tab(s) orally 2 times a day (15 Jose 2025 10:40)  montelukast 10 mg oral tablet: 1 tab(s) orally once a day (15 Jose 2025 11:01)  NIFEdipine 60 mg oral tablet, extended release: 1 tab(s) orally once a day (15 Jose 2025 10:43)  senna oral tablet: 2 tab(s) orally once a day (at bedtime), As needed, Constipation (15 Jose 2025 10:44)  sodium bicarbonate: 650 milligram(s) orally 2 times a day with food (15 Jose 2025 11:03)  Vitamin D3 25 mcg (1000 intl units) oral tablet: 2 tab(s) orally once a day (15 Jose 2025 10:44)      MEDICATIONS  (STANDING):  apixaban 2.5 milliGRAM(s) Oral every 12 hours  carvedilol 12.5 milliGRAM(s) Oral every 12 hours  cholecalciferol 2000 Unit(s) Oral daily  dextrose 5%. 1000 milliLiter(s) (100 mL/Hr) IV Continuous <Continuous>  dextrose 5%. 1000 milliLiter(s) (50 mL/Hr) IV Continuous <Continuous>  dextrose 50% Injectable 25 Gram(s) IV Push once  dextrose 50% Injectable 12.5 Gram(s) IV Push once  dextrose 50% Injectable 25 Gram(s) IV Push once  dorzolamide 2% Ophthalmic Solution 1 Drop(s) Both EYES <User Schedule>  fluticasone propionate/ salmeterol 250-50 MICROgram(s) Diskus 1 Dose(s) Inhalation two times a day  furosemide   Injectable 40 milliGRAM(s) IV Push two times a day  glucagon  Injectable 1 milliGRAM(s) IntraMuscular once  hydrALAZINE 50 milliGRAM(s) Oral two times a day  insulin lispro (ADMELOG) corrective regimen sliding scale   SubCutaneous three times a day before meals  insulin lispro (ADMELOG) corrective regimen sliding scale   SubCutaneous at bedtime  montelukast 10 milliGRAM(s) Oral daily  NIFEdipine XL 60 milliGRAM(s) Oral daily  polyethylene glycol 3350 17 Gram(s) Oral daily  sodium bicarbonate 650 milliGRAM(s) Oral two times a day    MEDICATIONS  (PRN):  acetaminophen     Tablet .. 650 milliGRAM(s) Oral every 6 hours PRN Temp greater or equal to 38C (100.4F), Mild Pain (1 - 3)  albuterol/ipratropium for Nebulization 3 milliLiter(s) Nebulizer every 6 hours PRN Shortness of Breath and/or Wheezing  dextrose Oral Gel 15 Gram(s) Oral once PRN Blood Glucose LESS THAN 70 milliGRAM(s)/deciliter  senna 2 Tablet(s) Oral at bedtime PRN Constipation      Vital Signs Last 24 Hrs  T(C): 36.4 (22 Jan 2025 04:33), Max: 36.4 (21 Jan 2025 13:27)  T(F): 97.6 (22 Jan 2025 04:33), Max: 97.6 (21 Jan 2025 13:27)  HR: 67 (22 Jan 2025 04:33) (62 - 76)  BP: 112/67 (22 Jan 2025 04:33) (106/60 - 121/74)  BP(mean): --  RR: 18 (22 Jan 2025 04:33) (18 - 18)  SpO2: 96% (22 Jan 2025 04:33) (96% - 98%)    Parameters below as of 22 Jan 2025 04:33  Patient On (Oxygen Delivery Method): nasal cannula  O2 Flow (L/min): 2      REVIEW OF SYSTEMS:  As per HPI, otherwise unremarkable.     PHYSICAL EXAM:  Constitutional/Appearance: Normal, Well-developed  HEENT:   Normal oral mucosa, no drainage or redness, supple neck  Lymphatic: No lymphadenopathy  Cardiovascular: Normal S1 S2, No edema, II/VI ANA  Respiratory: Lungs clear to auscultation, respirations non-labored  Psychiatry: A & O x 3, appropriate affect.   Gastrointestinal:  Soft, Non-tender, no distention  Skin: No rashes, No ecchymoses, No cyanosis	  Neurologic: Non-focal, Alert and oriented x 3  Extremities: Normal range of motion  Vascular: Peripheral pulses palpable 2+ bilaterally (radial)    LABS:  CBC Full  -  ( 22 Jan 2025 07:13 )  WBC Count : 4.35 K/uL  RBC Count : 2.76 M/uL  Hemoglobin : 9.0 g/dL  Hematocrit : 28.2 %  Platelet Count - Automated : 179 K/uL  Mean Cell Volume : 102.2 fl  Mean Cell Hemoglobin : 32.6 pg  Mean Cell Hemoglobin Concentration : 31.9 g/dL  Auto Neutrophil # : x  Auto Lymphocyte # : x  Auto Monocyte # : x  Auto Eosinophil # : x  Auto Basophil # : x  Auto Neutrophil % : x  Auto Lymphocyte % : x  Auto Monocyte % : x  Auto Eosinophil % : x  Auto Basophil % : x      01-21    135  |  101  |  103[H]  ----------------------------<  216[H]  5.5[H]   |  19[L]  |  4.03[H]    Ca    9.2      21 Jan 2025 17:12  Phos  5.8     01-21  Mg     2.5     01-21      IMPRESSION AND PLAN: 89F w HTN, DM2, AF on eliquis, HFpEF, CKD4, PVD here w ADHF.   -tele  -TTE pend  -cont lasix 40mg IV bid  -renal recs  -cont apixaban, hydral, nifedipine, coreg        35 minutes were spent on this encounter for extensive review of medical record details including labs and/or imaging studies and/or adjacent care team and consultant records, as well as review and reconciliation of current medications. Time was spent on obtaining a history, performing physical examination of patient, and answering patient and/or family questions regarding plan of care. Time was also spent discussing plan of care with patient’s other care team members including primary and consulting teams. Time also was spent on documentation of this encounter into the EHR.    ***    Kenneth Navas MD, MPhil, [facility]  Cardiologist, [facility]  ; [employer] at [facility]  email: [email or web address] on-service contact/call information, go to [email or web address] and use "[email or web address]" to login.  Outpatient Cardiology appointments, call  [phone] to arrange with a colleague; I do not have outpatient Cardiology clinic.

## 2025-01-22 NOTE — PROGRESS NOTE ADULT - PROBLEM SELECTOR PLAN 1
Shortness of breath on 1/19, now with improved symptoms.   - CXR shows calcification about the right lung, similar in appearance to CXR about three years ago. There is also small pleural effusion  - home lasix 80mg PO daily was resumed on 1/18. Additional IV lasix 40mg was given on 1/19  - proBNP elevated from 824 on admission to 4483   - appreciate cards and neph recs; likely volume overload in setting of held maintenance diuretics  - diurese with IV lasix, increase to 80 mg BID   - on 2L NC, at baseline wears 2L PRN

## 2025-01-22 NOTE — PROGRESS NOTE ADULT - PROBLEM SELECTOR PLAN 7
- Hold home oral hypoglycemics  - c/w ASHANTI  - Diabetic education  - CC diet  - A1C 5.9 Calcipotriene Counseling: Cantharidin Counseling:  I discussed with the patient the risks of Cantharidin including but not limited to pain, redness, burning, itching, and blistering.

## 2025-01-22 NOTE — PROGRESS NOTE ADULT - SUBJECTIVE AND OBJECTIVE BOX
[facility] Division of Hospital Medicine  Sarah Watkins MD  Available on Teams Dann    Patient is a 89y old  Female who presents with a chief complaint of LE wounds (22 Jan 2025 13:04)      SUBJECTIVE / OVERNIGHT EVENTS:  Patient seen and evaluated at bedside, with no acute overnight events. Had some nausea this AM for which she received zofran, now tolerating lunch. On baseline 2L NC. Denies any CP, SOB, abd discomfort, or any other acute concerns. Having BM.    ADDITIONAL REVIEW OF SYSTEMS: negative    MEDICATIONS  (STANDING):  apixaban 2.5 milliGRAM(s) Oral every 12 hours  carvedilol 12.5 milliGRAM(s) Oral every 12 hours  cholecalciferol 2000 Unit(s) Oral daily  dextrose 5%. 1000 milliLiter(s) (100 mL/Hr) IV Continuous <Continuous>  dextrose 5%. 1000 milliLiter(s) (50 mL/Hr) IV Continuous <Continuous>  dextrose 50% Injectable 25 Gram(s) IV Push once  dextrose 50% Injectable 12.5 Gram(s) IV Push once  dextrose 50% Injectable 25 Gram(s) IV Push once  dorzolamide 2% Ophthalmic Solution 1 Drop(s) Both EYES <User Schedule>  fluticasone propionate/ salmeterol 250-50 MICROgram(s) Diskus 1 Dose(s) Inhalation two times a day  furosemide   Injectable 80 milliGRAM(s) IV Push two times a day  glucagon  Injectable 1 milliGRAM(s) IntraMuscular once  hydrALAZINE 50 milliGRAM(s) Oral two times a day  insulin lispro (ADMELOG) corrective regimen sliding scale   SubCutaneous three times a day before meals  insulin lispro (ADMELOG) corrective regimen sliding scale   SubCutaneous at bedtime  montelukast 10 milliGRAM(s) Oral daily  NIFEdipine XL 60 milliGRAM(s) Oral daily  polyethylene glycol 3350 17 Gram(s) Oral daily  sodium bicarbonate 650 milliGRAM(s) Oral two times a day    MEDICATIONS  (PRN):  acetaminophen     Tablet .. 650 milliGRAM(s) Oral every 6 hours PRN Temp greater or equal to 38C (100.4F), Mild Pain (1 - 3)  albuterol/ipratropium for Nebulization 3 milliLiter(s) Nebulizer every 6 hours PRN Shortness of Breath and/or Wheezing  dextrose Oral Gel 15 Gram(s) Oral once PRN Blood Glucose LESS THAN 70 milliGRAM(s)/deciliter  senna 2 Tablet(s) Oral at bedtime PRN Constipation      CAPILLARY BLOOD GLUCOSE      POCT Blood Glucose.: 281 mg/dL (22 Jan 2025 16:31)  POCT Blood Glucose.: 177 mg/dL (22 Jan 2025 11:47)  POCT Blood Glucose.: 137 mg/dL (22 Jan 2025 07:20)  POCT Blood Glucose.: 226 mg/dL (21 Jan 2025 21:26)    I&O's Summary    21 Jan 2025 07:01  -  22 Jan 2025 07:00  --------------------------------------------------------  IN: 240 mL / OUT: 650 mL / NET: -410 mL        PHYSICAL EXAM:  Vital Signs Last 24 Hrs  T(C): 36.7 (22 Jan 2025 15:10), Max: 36.7 (22 Jan 2025 15:10)  T(F): 98 (22 Jan 2025 15:10), Max: 98 (22 Jan 2025 15:10)  HR: 65 (22 Jan 2025 15:10) (62 - 70)  BP: 118/71 (22 Jan 2025 15:10) (101/62 - 118/71)  BP(mean): --  RR: 18 (22 Jan 2025 15:10) (18 - 18)  SpO2: 95% (22 Jan 2025 15:10) (95% - 97%)    Parameters below as of 22 Jan 2025 15:10  Patient On (Oxygen Delivery Method): nasal cannula  O2 Flow (L/min): 2      CONSTITUTIONAL: Well-groomed, in no apparent distress  EYES: No conjunctival or scleral injection, non-icteric  ENMT: Oral mucosa with moist membranes  RESPIRATORY: Breathing comfortably; lungs with occasional rales  CARDIOVASCULAR: +S1S2, RRR, no M/G/R; pedal pulses full and symmetric; 2+ BLE (lymphedema), ACE wraps in place  GASTROINTESTINAL: No palpable masses or tenderness, +BS throughout, no rebound/guarding  MUSCULOSKELETAL: no digital clubbing or cyanosis  SKIN: No rashes or ulcers noted  PSYCHIATRIC: A+O x 3; mood and affect appropriate    LABS:                        9.0    4.35  )-----------( 179      ( 22 Jan 2025 07:13 )             28.2     01-22    136  |  102  |  99[H]  ----------------------------<  152[H]  5.1   |  20[L]  |  3.91[H]    Ca    8.9      22 Jan 2025 07:09  Phos  5.7     01-22  Mg     2.6     01-22            Urinalysis Basic - ( 22 Jan 2025 07:09 )    Color: x / Appearance: x / SG: x / pH: x  Gluc: 152 mg/dL / Ketone: x  / Bili: x / Urobili: x   Blood: x / Protein: x / Nitrite: x   Leuk Esterase: x / RBC: x / WBC x   Sq Epi: x / Non Sq Epi: x / Bacteria: x          RADIOLOGY & ADDITIONAL TESTS:  Results Reviewed:   Imaging Personally Reviewed:  Electrocardiogram Personally Reviewed:    COORDINATION OF CARE:  Care Discussed with Consultants/Other Providers [Y/N]:  Prior or Outpatient Records Reviewed [Y/N]:

## 2025-01-22 NOTE — PROGRESS NOTE ADULT - SUBJECTIVE AND OBJECTIVE BOX
[facility] Division of Kidney Diseases & Hypertension  FOLLOW UP NOTE  [phone]--------------------------------------------------------------------------------    Chief Complaint: LUIS FERNANDO on CKD    24 hour events/subjective: Patient seen and examined earlier today. Reports improvement in SOB. Denies HA, fevers/chills, CP, abdominal pain, dysuria, or LE swelling.    PAST HISTORY  --------------------------------------------------------------------------------  No significant changes to PMH, PSH, FHx, SHx, unless otherwise noted    ALLERGIES & MEDICATIONS  --------------------------------------------------------------------------------  Allergies  Levaquin (Rash)    Intolerances    Standing Inpatient Medications  apixaban 2.5 milliGRAM(s) Oral every 12 hours  carvedilol 12.5 milliGRAM(s) Oral every 12 hours  cholecalciferol 2000 Unit(s) Oral daily  dextrose 5%. 1000 milliLiter(s) IV Continuous <Continuous>  dextrose 5%. 1000 milliLiter(s) IV Continuous <Continuous>  dextrose 50% Injectable 25 Gram(s) IV Push once  dextrose 50% Injectable 12.5 Gram(s) IV Push once  dextrose 50% Injectable 25 Gram(s) IV Push once  dorzolamide 2% Ophthalmic Solution 1 Drop(s) Both EYES <User Schedule>  fluticasone propionate/ salmeterol 250-50 MICROgram(s) Diskus 1 Dose(s) Inhalation two times a day  furosemide   Injectable 40 milliGRAM(s) IV Push two times a day  glucagon  Injectable 1 milliGRAM(s) IntraMuscular once  hydrALAZINE 50 milliGRAM(s) Oral two times a day  insulin lispro (ADMELOG) corrective regimen sliding scale   SubCutaneous three times a day before meals  insulin lispro (ADMELOG) corrective regimen sliding scale   SubCutaneous at bedtime  montelukast 10 milliGRAM(s) Oral daily  NIFEdipine XL 60 milliGRAM(s) Oral daily  polyethylene glycol 3350 17 Gram(s) Oral daily  sodium bicarbonate 650 milliGRAM(s) Oral two times a day    PRN Inpatient Medications  acetaminophen     Tablet .. 650 milliGRAM(s) Oral every 6 hours PRN  albuterol/ipratropium for Nebulization 3 milliLiter(s) Nebulizer every 6 hours PRN  dextrose Oral Gel 15 Gram(s) Oral once PRN  senna 2 Tablet(s) Oral at bedtime PRN    REVIEW OF SYSTEMS  --------------------------------------------------------------------------------  Gen: No fevers/chills  Head/Eyes/Ears: No HA  Respiratory: +SOB improved  CV: No CP  GI: No abdominal pain, diarrhea, N/V  : No dysuria, hematuria  MSK: No edema    VITALS/PHYSICAL EXAM  --------------------------------------------------------------------------------  T(C): 36.2 (01-22-25 @ 12:19), Max: 36.4 (01-21-25 @ 13:27)  HR: 70 (01-22-25 @ 12:19) (62 - 76)  BP: 101/62 (01-22-25 @ 12:19) (101/62 - 121/74)  RR: 18 (01-22-25 @ 12:19) (18 - 18)  SpO2: 96% (01-22-25 @ 12:19) (96% - 98%)  Wt(kg): --    01-21-25 @ 07:01  -  01-22-25 @ 07:00  --------------------------------------------------------  IN: 240 mL / OUT: 650 mL / NET: -410 mL    Physical Exam:  Gen: NAD  HEENT: Anicteric  Pulm: Crackles B/L  CV: RRR, S1S2  Abd: +BS, soft, nontender/nondistended  : No suprapubic tenderness  Extremities: no bilateral LE edema noted  Neuro: Awake  Skin: Warm    LABS/STUDIES  --------------------------------------------------------------------------------              9.0    4.35  >-----------<  179      [01-22-25 @ 07:13]              28.2     136  |  102  |  99  ----------------------------<  152      [01-22-25 @ 07:09]  5.1   |  20  |  3.91        Ca     8.9     [01-22-25 @ 07:09]      Mg     2.6     [01-22-25 @ 07:09]      Phos  5.7     [01-22-25 @ 07:09]    Creatinine Trend:  SCr 3.91 [01-22 @ 07:09]  SCr 4.03 [01-21 @ 17:12]  SCr 4.07 [01-21 @ 07:12]  SCr 3.90 [01-20 @ 07:07]  SCr 3.64 [01-19 @ 09:15]    Urine Creatinine 88      [01-20-25 @ 16:46]  Urine Sodium 49      [01-20-25 @ 16:46]  Urine Urea Nitrogen 339      [01-20-25 @ 16:46]

## 2025-01-22 NOTE — PROGRESS NOTE ADULT - PROBLEM SELECTOR PLAN 1
Patient with LUIS FERNANDO on CKD iso CRS, hypervolemia noted on exam. Varghese FANG/Sunrise reviewed. Patient with CKD4 iso cardiorenal syndrome, HTN and DM. Follows with Dr. Galarza last seen 3/26/24. Scr fluctuates between 2.4-2.8 (eGFR: 16-19), last outpt Scr increased to 3.26 (eGFR: 13) on 10/7/24. Admission Scr elevated 3.0 (1/14), improved to 2.86 (1/15) and then progressively increased to 3.9 (1/20). Labs also significant for rising pro- (1/14) -> 4483 (1/20). UA w/ proteinuria and hematuria. Kidney sonogram on 1/20 with B/L renal cysts, echogenic, ~10cm in size. CXR w/ R pleural calcifications and thickening (chronic finding), w/ lower lung opacities. Hypervolemic on exam. Home meds include Lasix 80mg QD w/ additional 40mg PRN.     Scr improved to 3.91 today. Recommend increasing IV Lasix to 80mg BID. Please repeat U/A. Continue home sodium bicarb. Holding parameters on all antihypertensive agents. Monitor labs and urine output. Avoid nephrotoxins. Dose medications as per eGFR.

## 2025-01-23 ENCOUNTER — RESULT REVIEW (OUTPATIENT)
Age: 89
End: 2025-01-23

## 2025-01-23 NOTE — PROGRESS NOTE ADULT - PROBLEM SELECTOR PLAN 1
Shortness of breath on 1/19, now with improved symptoms.   - CXR shows calcification about the right lung, similar in appearance to CXR about three years ago. There is also small pleural effusion  - home lasix 80mg PO daily was resumed on 1/18. Additional IV lasix 40mg was given on 1/19  - proBNP elevated from 824 on admission to 4483   - appreciate cards and neph recs; likely volume overload in setting of held maintenance diuretics  - diurese with IV lasix, c/w 80 mg BID   - on 2L NC, at baseline wears 2L PRN

## 2025-01-23 NOTE — PROGRESS NOTE ADULT - PROBLEM SELECTOR PLAN 2
- Last TTE on record 2021, EF 65%, repeat 1/23 with preserved EF, indeterminate diastolic function  - on home lasix 80 mg PO daily; diuresing with 80 mg IV BID as noted above  - c/w BB (coreg 12.5mg q12hr), hydralazine 50 mg BID and nifedipine 60 mg daily  - monitor electrolytes, keep Mg >2, K >4, monitor on tele  - sees Dr. Burton outpatient

## 2025-01-23 NOTE — PROGRESS NOTE ADULT - SUBJECTIVE AND OBJECTIVE BOX
[facility] Division of Hospital Medicine  Sarah Watkins MD  Available on Teams Dann    Patient is a 89y old  Female who presents with a chief complaint of LE wounds (23 Jan 2025 15:27)      SUBJECTIVE / OVERNIGHT EVENTS:  Patient seen and evaluated at bedside, with no acute overnight events. On 2L NC; notes breathing has improved. Denies any acute CP, abd pain or any other concerns.    ADDITIONAL REVIEW OF SYSTEMS: negative    MEDICATIONS  (STANDING):  apixaban 2.5 milliGRAM(s) Oral every 12 hours  carvedilol 12.5 milliGRAM(s) Oral every 12 hours  cholecalciferol 2000 Unit(s) Oral daily  dextrose 5%. 1000 milliLiter(s) (100 mL/Hr) IV Continuous <Continuous>  dextrose 5%. 1000 milliLiter(s) (50 mL/Hr) IV Continuous <Continuous>  dextrose 50% Injectable 25 Gram(s) IV Push once  dextrose 50% Injectable 12.5 Gram(s) IV Push once  dextrose 50% Injectable 25 Gram(s) IV Push once  dorzolamide 2% Ophthalmic Solution 1 Drop(s) Both EYES <User Schedule>  fluticasone propionate/ salmeterol 250-50 MICROgram(s) Diskus 1 Dose(s) Inhalation two times a day  furosemide   Injectable 80 milliGRAM(s) IV Push two times a day  glucagon  Injectable 1 milliGRAM(s) IntraMuscular once  hydrALAZINE 50 milliGRAM(s) Oral two times a day  insulin lispro (ADMELOG) corrective regimen sliding scale   SubCutaneous three times a day before meals  insulin lispro (ADMELOG) corrective regimen sliding scale   SubCutaneous at bedtime  montelukast 10 milliGRAM(s) Oral daily  NIFEdipine XL 60 milliGRAM(s) Oral daily  polyethylene glycol 3350 17 Gram(s) Oral daily  sodium bicarbonate 650 milliGRAM(s) Oral two times a day    MEDICATIONS  (PRN):  acetaminophen     Tablet .. 650 milliGRAM(s) Oral every 6 hours PRN Temp greater or equal to 38C (100.4F), Mild Pain (1 - 3)  albuterol/ipratropium for Nebulization 3 milliLiter(s) Nebulizer every 6 hours PRN Shortness of Breath and/or Wheezing  dextrose Oral Gel 15 Gram(s) Oral once PRN Blood Glucose LESS THAN 70 milliGRAM(s)/deciliter  senna 2 Tablet(s) Oral at bedtime PRN Constipation      CAPILLARY BLOOD GLUCOSE      POCT Blood Glucose.: 251 mg/dL (23 Jan 2025 16:41)  POCT Blood Glucose.: 230 mg/dL (23 Jan 2025 11:49)  POCT Blood Glucose.: 162 mg/dL (23 Jan 2025 07:19)  POCT Blood Glucose.: 232 mg/dL (22 Jan 2025 21:44)    I&O's Summary    22 Jan 2025 07:01  -  23 Jan 2025 07:00  --------------------------------------------------------  IN: 200 mL / OUT: 0 mL / NET: 200 mL    23 Jan 2025 07:01  -  23 Jan 2025 19:34  --------------------------------------------------------  IN: 0 mL / OUT: 550 mL / NET: -550 mL        PHYSICAL EXAM:  Vital Signs Last 24 Hrs  T(C): 36.4 (23 Jan 2025 12:37), Max: 36.4 (22 Jan 2025 20:54)  T(F): 97.5 (23 Jan 2025 12:37), Max: 97.6 (22 Jan 2025 20:54)  HR: 88 (23 Jan 2025 18:00) (63 - 88)  BP: 96/44 (23 Jan 2025 18:00) (96/44 - 118/70)  BP(mean): --  RR: 18 (23 Jan 2025 12:37) (18 - 20)  SpO2: 94% (23 Jan 2025 12:37) (91% - 94%)    Parameters below as of 23 Jan 2025 12:37  Patient On (Oxygen Delivery Method): nasal cannula    CONSTITUTIONAL: Well-groomed, in no apparent distress  EYES: No conjunctival or scleral injection, non-icteric  ENMT: Oral mucosa with moist membranes  RESPIRATORY: Breathing comfortably; lungs clear  CARDIOVASCULAR: +S1S2, RRR, systolic murmur; pedal pulses full and symmetric; chronic BLE lymphedema, ACE wraps in place  GASTROINTESTINAL: No palpable masses or tenderness, +BS throughout, no rebound/guarding  MUSCULOSKELETAL: no digital clubbing or cyanosis  SKIN: No rashes or ulcers noted  PSYCHIATRIC: A+O x 3; mood and affect appropriate    LABS:                        9.0    4.35  )-----------( 179      ( 22 Jan 2025 07:13 )             28.2     01-23    134[L]  |  100  |  96[H]  ----------------------------<  160[H]  5.6[H]   |  21[L]  |  4.25[H]    Ca    8.8      23 Jan 2025 07:25  Phos  6.7     01-23  Mg     2.6     01-23            Urinalysis Basic - ( 23 Jan 2025 07:25 )    Color: x / Appearance: x / SG: x / pH: x  Gluc: 160 mg/dL / Ketone: x  / Bili: x / Urobili: x   Blood: x / Protein: x / Nitrite: x   Leuk Esterase: x / RBC: x / WBC x   Sq Epi: x / Non Sq Epi: x / Bacteria: x          RADIOLOGY & ADDITIONAL TESTS:  Results Reviewed:   Imaging Personally Reviewed:  Electrocardiogram Personally Reviewed:    COORDINATION OF CARE:  Care Discussed with Consultants/Other Providers [Y/N]:  Prior or Outpatient Records Reviewed [Y/N]:

## 2025-01-23 NOTE — PROGRESS NOTE ADULT - SUBJECTIVE AND OBJECTIVE BOX
INTERVAL EVENTS/SUBJ:  lasix increased    Home Medications:  ANORO ELLIPTA 62.5-25 MCG INH: TAKE 1 PUFF BY MOUTH EVERY DAY (15 Jose 2025 10:44)  apixaban 2.5 mg oral tablet: 1 tab(s) orally 2 times a day (15 Jose 2025 10:44)  dorzolamide 2% ophthalmic solution: 1 drop(s) in each eye 2 times a day (15 Jose 2025 11:05)  furosemide 80 mg oral tablet: 1 tab(s) orally once a day may take additional 40mg as needed (15 Jose 2025 11:06)  glipiZIDE 5 mg oral tablet, extended release: 1 tab(s) orally once a day (15 Jose 2025 10:43)  hydrALAZINE 50 mg oral tablet: 1 tab(s) orally 2 times a day (15 Jose 2025 10:40)  montelukast 10 mg oral tablet: 1 tab(s) orally once a day (15 Jose 2025 11:01)  NIFEdipine 60 mg oral tablet, extended release: 1 tab(s) orally once a day (15 Jose 2025 10:43)  senna oral tablet: 2 tab(s) orally once a day (at bedtime), As needed, Constipation (15 Jose 2025 10:44)  sodium bicarbonate: 650 milligram(s) orally 2 times a day with food (15 Jose 2025 11:03)  Vitamin D3 25 mcg (1000 intl units) oral tablet: 2 tab(s) orally once a day (15 Jose 2025 10:44)      MEDICATIONS  (STANDING):  apixaban 2.5 milliGRAM(s) Oral every 12 hours  carvedilol 12.5 milliGRAM(s) Oral every 12 hours  cholecalciferol 2000 Unit(s) Oral daily  dextrose 5%. 1000 milliLiter(s) (50 mL/Hr) IV Continuous <Continuous>  dextrose 5%. 1000 milliLiter(s) (100 mL/Hr) IV Continuous <Continuous>  dextrose 50% Injectable 25 Gram(s) IV Push once  dextrose 50% Injectable 12.5 Gram(s) IV Push once  dextrose 50% Injectable 25 Gram(s) IV Push once  dorzolamide 2% Ophthalmic Solution 1 Drop(s) Both EYES <User Schedule>  fluticasone propionate/ salmeterol 250-50 MICROgram(s) Diskus 1 Dose(s) Inhalation two times a day  furosemide   Injectable 80 milliGRAM(s) IV Push two times a day  glucagon  Injectable 1 milliGRAM(s) IntraMuscular once  hydrALAZINE 50 milliGRAM(s) Oral two times a day  insulin lispro (ADMELOG) corrective regimen sliding scale   SubCutaneous three times a day before meals  insulin lispro (ADMELOG) corrective regimen sliding scale   SubCutaneous at bedtime  montelukast 10 milliGRAM(s) Oral daily  NIFEdipine XL 60 milliGRAM(s) Oral daily  polyethylene glycol 3350 17 Gram(s) Oral daily  sodium bicarbonate 650 milliGRAM(s) Oral two times a day    MEDICATIONS  (PRN):  acetaminophen     Tablet .. 650 milliGRAM(s) Oral every 6 hours PRN Temp greater or equal to 38C (100.4F), Mild Pain (1 - 3)  albuterol/ipratropium for Nebulization 3 milliLiter(s) Nebulizer every 6 hours PRN Shortness of Breath and/or Wheezing  dextrose Oral Gel 15 Gram(s) Oral once PRN Blood Glucose LESS THAN 70 milliGRAM(s)/deciliter  senna 2 Tablet(s) Oral at bedtime PRN Constipation      Vital Signs Last 24 Hrs  T(C): 36.3 (23 Jan 2025 05:41), Max: 36.7 (22 Jan 2025 15:10)  T(F): 97.3 (23 Jan 2025 05:41), Max: 98 (22 Jan 2025 15:10)  HR: 63 (23 Jan 2025 05:41) (63 - 72)  BP: 113/68 (23 Jan 2025 05:41) (101/62 - 118/71)  BP(mean): --  RR: 18 (23 Jan 2025 05:41) (18 - 20)  SpO2: 93% (23 Jan 2025 05:41) (91% - 96%)    Parameters below as of 23 Jan 2025 05:41  Patient On (Oxygen Delivery Method): nasal cannula  O2 Flow (L/min): 2      REVIEW OF SYSTEMS:  As per HPI, otherwise unremarkable.     PHYSICAL EXAM:  Constitutional/Appearance: Normal, Well-developed  HEENT:   Normal oral mucosa, no drainage or redness, supple neck  Lymphatic: No lymphadenopathy  Cardiovascular: Normal S1 S2, No edema, II/VI ANA  Respiratory: Lungs clear to auscultation, respirations non-labored  Psychiatry: A & O x 3, appropriate affect.   Gastrointestinal:  Soft, Non-tender, no distention  Skin: No rashes, No ecchymoses, No cyanosis	  Neurologic: Non-focal, Alert and oriented x 3  Extremities: Normal range of motion  Vascular: Peripheral pulses palpable 2+ bilaterally (radial)    LABS:  CBC Full  -  ( 22 Jan 2025 07:13 )  WBC Count : 4.35 K/uL  RBC Count : 2.76 M/uL  Hemoglobin : 9.0 g/dL  Hematocrit : 28.2 %  Platelet Count - Automated : 179 K/uL  Mean Cell Volume : 102.2 fl  Mean Cell Hemoglobin : 32.6 pg  Mean Cell Hemoglobin Concentration : 31.9 g/dL  Auto Neutrophil # : x  Auto Lymphocyte # : x  Auto Monocyte # : x  Auto Eosinophil # : x  Auto Basophil # : x  Auto Neutrophil % : x  Auto Lymphocyte % : x  Auto Monocyte % : x  Auto Eosinophil % : x  Auto Basophil % : x      01-23    134[L]  |  100  |  96[H]  ----------------------------<  160[H]  5.6[H]   |  21[L]  |  4.25[H]    Ca    8.8      23 Jan 2025 07:25  Phos  6.7     01-23  Mg     2.6     01-23      IMPRESSION AND PLAN:  89F w HTN, DM2, AF on eliquis, HFpEF, CKD4, PVD here w ADHF.   -tele  -TTE pend  -inc lasix 80mg IV bid  -renal recs  -cont apixaban, hydral, nifedipine, coreg        35 minutes were spent on this encounter for extensive review of medical record details including labs and/or imaging studies and/or adjacent care team and consultant records, as well as review and reconciliation of current medications. Time was spent on obtaining a history, performing physical examination of patient, and answering patient and/or family questions regarding plan of care. Time was also spent discussing plan of care with patient’s other care team members including primary and consulting teams. Time also was spent on documentation of this encounter into the EHR.    ***    Kenneth Navas MD, MPhil, [facility]  Cardiologist, [facility]  ; [employer] [facility]  email: [email or web address] and Cardiovascular Surgery on-service contact/call information, go to [email or web address] and use [email or web address]" to login.  Outpatient Cardiology appointments, call  [phone] to arrange with a colleague; I do not have outpatient Cardiology clinic.

## 2025-01-23 NOTE — PROGRESS NOTE ADULT - SUBJECTIVE AND OBJECTIVE BOX
[facility]-- WOUND TEAM -- FOLLOW UP NOTE  --------------------------------------------------------------------------------    24 hour events/subjective:          Diet:  Diet, Regular:   For patients receiving Renal Replacement - No Protein Restr, No Conc K, No Conc Phos, Low Sodium (RENAL)  Low Sodium  Supplement Feeding Modality:  Oral  Nepro Cans or Servings Per Day:  1       Frequency:  Daily (01-21-25 @ 14:44)      ROS: General/ SKIN/ MSK/ GI see HPI  all other systems negative      ALLERGIES & MEDICATIONS  --------------------------------------------------------------------------------  Allergies  Levaquin (Rash)      STANDING INPATIENT MEDICATIONS  apixaban 2.5 milliGRAM(s) Oral every 12 hours  carvedilol 12.5 milliGRAM(s) Oral every 12 hours  cholecalciferol 2000 Unit(s) Oral daily  dextrose 5%. 1000 milliLiter(s) IV Continuous <Continuous>  dextrose 5%. 1000 milliLiter(s) IV Continuous <Continuous>  dextrose 50% Injectable 25 Gram(s) IV Push once  dextrose 50% Injectable 12.5 Gram(s) IV Push once  dextrose 50% Injectable 25 Gram(s) IV Push once  dorzolamide 2% Ophthalmic Solution 1 Drop(s) Both EYES <User Schedule>  fluticasone propionate/ salmeterol 250-50 MICROgram(s) Diskus 1 Dose(s) Inhalation two times a day  furosemide   Injectable 80 milliGRAM(s) IV Push two times a day  glucagon  Injectable 1 milliGRAM(s) IntraMuscular once  hydrALAZINE 50 milliGRAM(s) Oral two times a day  insulin lispro (ADMELOG) corrective regimen sliding scale   SubCutaneous three times a day before meals  insulin lispro (ADMELOG) corrective regimen sliding scale   SubCutaneous at bedtime  montelukast 10 milliGRAM(s) Oral daily  NIFEdipine XL 60 milliGRAM(s) Oral daily  polyethylene glycol 3350 17 Gram(s) Oral daily  sodium bicarbonate 650 milliGRAM(s) Oral two times a day      PRN INPATIENT MEDICATION  acetaminophen Tablet 650 milliGRAM(s) Oral every 6 hours PRN  albuterol/ipratropium for Nebulization 3 milliLiter(s) Nebulizer every 6 hours PRN  dextrose Oral Gel 15 Gram(s) Oral once PRN  senna 2 Tablet(s) Oral at bedtime PRN        VITALS/PHYSICAL EXAM  --------------------------------------------------------------------------------  T(C): 36.4 (01-23-25 @ 12:37), Max: 36.6 (01-22-25 @ 18:28)  HR: 65 (01-23-25 @ 12:37) (63 - 72)  BP: 118/70 (01-23-25 @ 12:37) (106/60 - 118/70)  RR: 18 (01-23-25 @ 12:37) (18 - 20)  SpO2: 94% (01-23-25 @ 12:37) (91% - 96%)  Wt(kg): --                  LABS/ CULTURES/ RADIOLOGY:              9.0    4.35  >-----------<  179      [01-22-25 @ 07:13]              28.2     134  |  100  |  96  ----------------------------<  160      [01-23-25 @ 07:25]  5.6   |  21  |  4.25        Ca     8.8     [01-23-25 @ 07:25]      Mg     2.6     [01-23-25 @ 07:25]      Phos  6.7     [01-23-25 @ 07:25]      CAPILLARY BLOOD GLUCOSE  POCT Blood Glucose.: 230 mg/dL (23 Jan 2025 11:49)  POCT Blood Glucose.: 162 mg/dL (23 Jan 2025 07:19)  POCT Blood Glucose.: 232 mg/dL (22 Jan 2025 21:44)  POCT Blood Glucose.: 281 mg/dL (22 Jan 2025 16:31)      A1C with Estimated Average Glucose Result: 5.9 % (01-15-25 @ 06:35)   [facility]-- WOUND TEAM -- FOLLOW UP NOTE  --------------------------------------------------------------------------------    24 hour events/subjective:          Diet:  Diet, Regular:   For patients receiving Renal Replacement - No Protein Restr, No Conc K, No Conc Phos, Low Sodium (RENAL)  Low Sodium  Supplement Feeding Modality:  Oral  Nepro Cans or Servings Per Day:  1       Frequency:  Daily (01-21-25 @ 14:44)      ROS: General/ SKIN/ MSK/ GI see HPI  all other systems negative      ALLERGIES & MEDICATIONS  --------------------------------------------------------------------------------  Allergies  Levaquin (Rash)      STANDING INPATIENT MEDICATIONS  apixaban 2.5 milliGRAM(s) Oral every 12 hours  carvedilol 12.5 milliGRAM(s) Oral every 12 hours  cholecalciferol 2000 Unit(s) Oral daily  dextrose 5%. 1000 milliLiter(s) IV Continuous <Continuous>  dextrose 5%. 1000 milliLiter(s) IV Continuous <Continuous>  dextrose 50% Injectable 25 Gram(s) IV Push once  dextrose 50% Injectable 12.5 Gram(s) IV Push once  dextrose 50% Injectable 25 Gram(s) IV Push once  dorzolamide 2% Ophthalmic Solution 1 Drop(s) Both EYES <User Schedule>  fluticasone propionate/ salmeterol 250-50 MICROgram(s) Diskus 1 Dose(s) Inhalation two times a day  furosemide   Injectable 80 milliGRAM(s) IV Push two times a day  glucagon  Injectable 1 milliGRAM(s) IntraMuscular once  hydrALAZINE 50 milliGRAM(s) Oral two times a day  insulin lispro (ADMELOG) corrective regimen sliding scale   SubCutaneous three times a day before meals  insulin lispro (ADMELOG) corrective regimen sliding scale   SubCutaneous at bedtime  montelukast 10 milliGRAM(s) Oral daily  NIFEdipine XL 60 milliGRAM(s) Oral daily  polyethylene glycol 3350 17 Gram(s) Oral daily  sodium bicarbonate 650 milliGRAM(s) Oral two times a day      PRN INPATIENT MEDICATION  acetaminophen Tablet 650 milliGRAM(s) Oral every 6 hours PRN  albuterol/ipratropium for Nebulization 3 milliLiter(s) Nebulizer every 6 hours PRN  dextrose Oral Gel 15 Gram(s) Oral once PRN  senna 2 Tablet(s) Oral at bedtime PRN        VITALS/PHYSICAL EXAM  --------------------------------------------------------------------------------  T(C): 36.4 (01-23-25 @ 12:37), Max: 36.6 (01-22-25 @ 18:28)  HR: 65 (01-23-25 @ 12:37) (63 - 72)  BP: 118/70 (01-23-25 @ 12:37) (106/60 - 118/70)  RR: 18 (01-23-25 @ 12:37) (18 - 20)  SpO2: 94% (01-23-25 @ 12:37) (91% - 96%)  Wt(kg): --      NAD, Alert, Obese, frail,  WD/ WN/ WG,  Versa [employer] P500 bed  HEENT:  NC/AT, EOMI, sclera clear, mucosa moist, throat clear, trachea midline, neck supple  Respiratory: nonlabored w/ equal chest rise  Gastrointestinal: soft NT/ND   Neurology: weakened strength & sensation grossly intact  Psych: calm/ appropriate  Musculoskeletal:  limited stiff / p/FROM, no deformities/ contractures  Vascular: BLE equally cool,  no cyanosis, clubbing, nor acute ischemia        BLE edema equal         no BLE DP/PT pulses palpable         BLE hemosiderin staining and hypopigmented intact skin      Rt calf/ achilles partial thickness moist serous weeping wound       no blistering    No odor, erythema, increased warmth, tenderness, induration, fluctuance, nor crepitus  Skin:  moist w/ good turgor  Sacrum stage 3 pressure injury     0.5cm x 0.5cm x 0.2cm      moist granular w/ hypopigmented intact skin in periwound area      no blistering  or active drainage  No odor, erythema, increased warmth, tenderness, induration, fluctuance, nor crepitus        LABS/ CULTURES/ RADIOLOGY:              9.0    4.35  >-----------<  179      [01-22-25 @ 07:13]              28.2     134  |  100  |  96  ----------------------------<  160      [01-23-25 @ 07:25]  5.6   |  21  |  4.25        Ca     8.8     [01-23-25 @ 07:25]      Mg     2.6     [01-23-25 @ 07:25]      Phos  6.7     [01-23-25 @ 07:25]      CAPILLARY BLOOD GLUCOSE  POCT Blood Glucose.: 230 mg/dL (23 Jan 2025 11:49)  POCT Blood Glucose.: 162 mg/dL (23 Jan 2025 07:19)  POCT Blood Glucose.: 232 mg/dL (22 Jan 2025 21:44)  POCT Blood Glucose.: 281 mg/dL (22 Jan 2025 16:31)      A1C with Estimated Average Glucose Result: 5.9 % (01-15-25 @ 06:35)   [facility]-- WOUND TEAM -- FOLLOW UP NOTE  --------------------------------------------------------------------------------    24 hour events/subjective:    alert  afebrile  tolerating po w/o n/v  incontinent  no drainage, odor, pain  tolerating ace wrapping      Diet:  Diet, Regular:   For patients receiving Renal Replacement - No Protein Restr, No Conc K, No Conc Phos, Low Sodium (RENAL)  Low Sodium  Supplement Feeding Modality:  Oral  Nepro Cans or Servings Per Day:  1       Frequency:  Daily (01-21-25 @ 14:44)      ROS: General/ SKIN/ MSK/ GI see HPI  all other systems negative      ALLERGIES & MEDICATIONS  --------------------------------------------------------------------------------  Allergies  Levaquin (Rash)      STANDING INPATIENT MEDICATIONS  apixaban 2.5 milliGRAM(s) Oral every 12 hours  carvedilol 12.5 milliGRAM(s) Oral every 12 hours  cholecalciferol 2000 Unit(s) Oral daily  dextrose 5%. 1000 milliLiter(s) IV Continuous <Continuous>  dextrose 5%. 1000 milliLiter(s) IV Continuous <Continuous>  dextrose 50% Injectable 25 Gram(s) IV Push once  dextrose 50% Injectable 12.5 Gram(s) IV Push once  dextrose 50% Injectable 25 Gram(s) IV Push once  dorzolamide 2% Ophthalmic Solution 1 Drop(s) Both EYES <User Schedule>  fluticasone propionate/ salmeterol 250-50 MICROgram(s) Diskus 1 Dose(s) Inhalation two times a day  furosemide   Injectable 80 milliGRAM(s) IV Push two times a day  glucagon  Injectable 1 milliGRAM(s) IntraMuscular once  hydrALAZINE 50 milliGRAM(s) Oral two times a day  insulin lispro (ADMELOG) corrective regimen sliding scale   SubCutaneous three times a day before meals  insulin lispro (ADMELOG) corrective regimen sliding scale   SubCutaneous at bedtime  montelukast 10 milliGRAM(s) Oral daily  NIFEdipine XL 60 milliGRAM(s) Oral daily  polyethylene glycol 3350 17 Gram(s) Oral daily  sodium bicarbonate 650 milliGRAM(s) Oral two times a day      PRN INPATIENT MEDICATION  acetaminophen Tablet 650 milliGRAM(s) Oral every 6 hours PRN  albuterol/ipratropium for Nebulization 3 milliLiter(s) Nebulizer every 6 hours PRN  dextrose Oral Gel 15 Gram(s) Oral once PRN  senna 2 Tablet(s) Oral at bedtime PRN        VITALS/PHYSICAL EXAM  --------------------------------------------------------------------------------  T(C): 36.4 (01-23-25 @ 12:37), Max: 36.6 (01-22-25 @ 18:28)  HR: 65 (01-23-25 @ 12:37) (63 - 72)  BP: 118/70 (01-23-25 @ 12:37) (106/60 - 118/70)  RR: 18 (01-23-25 @ 12:37) (18 - 20)  SpO2: 94% (01-23-25 @ 12:37) (91% - 96%)  Wt(kg): --      NAD, Alert, Obese, frail,  WD/ WN/ WG,  [employer] Care P500 bed  HEENT:  NC/AT, EOMI, sclera clear, mucosa moist, throat clear, trachea midline, neck supple  Respiratory: nonlabored w/ equal chest rise  Gastrointestinal: soft NT/ND   Neurology: weakened strength & sensation grossly intact  Psych: calm/ appropriate  Musculoskeletal:  limited stiff / p/FROM, no deformities/ contractures  Vascular: BLE equally cool,  no cyanosis, clubbing, nor acute ischemia        BLE edema equal         no BLE DP/PT pulses palpable         BLE hemosiderin staining and hypopigmented intact skin      Rt calf/ achilles partial thickness moist serous weeping wound       no blistering    No odor, erythema, increased warmth, tenderness, induration, fluctuance, nor crepitus  Skin:  moist w/ good turgor  Sacrum stage 3 pressure injury     0.5cm x 0.5cm x 0.2cm      moist granular w/ hypopigmented intact skin in periwound area      no blistering  or active drainage  No odor, erythema, increased warmth, tenderness, induration, fluctuance, nor crepitus        LABS/ CULTURES/ RADIOLOGY:              9.0    4.35  >-----------<  179      [01-22-25 @ 07:13]              28.2     134  |  100  |  96  ----------------------------<  160      [01-23-25 @ 07:25]  5.6   |  21  |  4.25        Ca     8.8     [01-23-25 @ 07:25]      Mg     2.6     [01-23-25 @ 07:25]      Phos  6.7     [01-23-25 @ 07:25]      CAPILLARY BLOOD GLUCOSE  POCT Blood Glucose.: 230 mg/dL (23 Jan 2025 11:49)  POCT Blood Glucose.: 162 mg/dL (23 Jan 2025 07:19)  POCT Blood Glucose.: 232 mg/dL (22 Jan 2025 21:44)  POCT Blood Glucose.: 281 mg/dL (22 Jan 2025 16:31)      A1C with Estimated Average Glucose Result: 5.9 % (01-15-25 @ 06:35)

## 2025-01-23 NOTE — PROGRESS NOTE ADULT - PROBLEM SELECTOR PLAN 1
Patient with LUIS FERNANDO on CKD iso CRS with hypervolemia noted on exam. Varghese FANG/[facility] reviewed. Patient with CKD4 iso CHF, HTN and DM. Follows with Dr. Galarza last seen 3/26/24. Scr fluctuates between 2.4-2.8 (eGFR: 16-19), last outpt Scr increased to 3.26 (eGFR: 13) on 10/7/24. Admission Scr elevated 3.0 (1/14), improved to 2.86 (1/15) and then progressively increased to 3.9 (1/20). Labs also significant for rising pro- (1/14) -> 4483 (1/20). UA w/ proteinuria and hematuria. Kidney sonogram on 1/20 with B/L renal cysts, echogenic, ~10cm in size. CXR w/ R pleural calcifications and thickening (chronic finding), w/ lower lung opacities. Home meds include Lasix 80mg QD w/ additional 40mg PRN.     Scr noted to be ~2.8-3 initially. No labs checked on 1/17-18, then on 1/19 Cr increased to 3.6 and has remained stable at ~3.9-4. However, today Cr increased to 4.25 and serum K elevated at 5.6. Pt had slightly lower BPs than her admission vitals, which could contribute to worsening LUIS FERNANDO in addition to worsening hypervolemia. IV Lasix increased to 80mg BID (1/23). Repeat UA bland. UOP not being reliably documented. Recommend bladder scan and strict Is+Os. Continue home sodium bicarb (SCO2 21 today). Monitor labs and urine output. Avoid nephrotoxins. Dose medications as per eGFR.

## 2025-01-23 NOTE — PROGRESS NOTE ADULT - PROBLEM SELECTOR PLAN 10
DVT ppx: Eliquis 2.5mg BID  Diet: CCC  Dispo: now medically active with LUIS FERNANDO, evaluation for decompensated heart failure  Eventual dc to [facility].  Discussed with daughter Elizabeth 1/21, MARY Barber

## 2025-01-23 NOTE — PROGRESS NOTE ADULT - PROBLEM SELECTOR PLAN 2
Received Lokelma 10gm x1 after serum potassium elevated at 5.5 on PM labs from 1/21. Serum K again elevated at 5.6 today. Recommend additional Lokelma 10g x1 today. Low K diet. Monitor serum potassium.

## 2025-01-23 NOTE — PROGRESS NOTE ADULT - SUBJECTIVE AND OBJECTIVE BOX
[facility] DIVISION OF KIDNEY DISEASES AND HYPERTENSION --    Reason for consult: LUIS FERNANDO on CKD    24 hour events/subjective: Patient seen and examined at bedside. States she feels ok. On 2L NC but denies SOB, CP, edema, or any urinary issues.      PAST HISTORY  --------------------------------------------------------------------------------  No significant changes to PMH, PSH, FHx, SHx, unless otherwise noted    ALLERGIES & MEDICATIONS  --------------------------------------------------------------------------------  Allergies    Levaquin (Rash)      Standing Inpatient Medications  apixaban 2.5 milliGRAM(s) Oral every 12 hours  carvedilol 12.5 milliGRAM(s) Oral every 12 hours  cholecalciferol 2000 Unit(s) Oral daily  dextrose 5%. 1000 milliLiter(s) IV Continuous <Continuous>  dextrose 5%. 1000 milliLiter(s) IV Continuous <Continuous>  dextrose 50% Injectable 25 Gram(s) IV Push once  dextrose 50% Injectable 12.5 Gram(s) IV Push once  dextrose 50% Injectable 25 Gram(s) IV Push once  dorzolamide 2% Ophthalmic Solution 1 Drop(s) Both EYES <User Schedule>  fluticasone propionate/ salmeterol 250-50 MICROgram(s) Diskus 1 Dose(s) Inhalation two times a day  furosemide   Injectable 80 milliGRAM(s) IV Push two times a day  glucagon  Injectable 1 milliGRAM(s) IntraMuscular once  hydrALAZINE 50 milliGRAM(s) Oral two times a day  insulin lispro (ADMELOG) corrective regimen sliding scale   SubCutaneous three times a day before meals  insulin lispro (ADMELOG) corrective regimen sliding scale   SubCutaneous at bedtime  montelukast 10 milliGRAM(s) Oral daily  NIFEdipine XL 60 milliGRAM(s) Oral daily  polyethylene glycol 3350 17 Gram(s) Oral daily  sodium bicarbonate 650 milliGRAM(s) Oral two times a day    PRN Inpatient Medications  acetaminophen     Tablet .. 650 milliGRAM(s) Oral every 6 hours PRN  albuterol/ipratropium for Nebulization 3 milliLiter(s) Nebulizer every 6 hours PRN  dextrose Oral Gel 15 Gram(s) Oral once PRN  senna 2 Tablet(s) Oral at bedtime PRN      REVIEW OF SYSTEMS  --------------------------------------------------------------------------------  Gen: No fever  Respiratory: No dyspnea  CV: No chest pain  GI: No abdominal pain, diarrhea, constipation, nausea, vomiting  : No increased frequency, dysuria, hematuria  MSK: No edema  Neuro: No dizziness/lightheadedness    All other systems were reviewed and are negative, except as noted.    VITALS/PHYSICAL EXAM  --------------------------------------------------------------------------------  T(C): 36.4 (01-23-25 @ 12:37), Max: 36.7 (01-22-25 @ 15:10)  HR: 65 (01-23-25 @ 12:37) (63 - 72)  BP: 118/70 (01-23-25 @ 12:37) (106/60 - 118/71)  RR: 18 (01-23-25 @ 12:37) (18 - 20)  SpO2: 94% (01-23-25 @ 12:37) (91% - 96%)  Wt(kg): --        01-22-25 @ 07:01  -  01-23-25 @ 07:00  --------------------------------------------------------  IN: 200 mL / OUT: 0 mL / NET: 200 mL    01-23-25 @ 07:01  -  01-23-25 @ 13:10  --------------------------------------------------------  IN: 0 mL / OUT: 300 mL / NET: -300 mL      PHYSICAL EXAM:  Gen: NAD  Neuro: non-focal  HEENT: Anicteric, MMM, on NC  Pulm: CTA B/L  CV: +S1S2  Abd: soft, non-tender/non-distended  : No suprapubic tenderness  Extremities: no edema  Back: no CVA tenderness  Skin: Warm    LABS/STUDIES  --------------------------------------------------------------------------------              9.0    4.35  >-----------<  179      [01-22-25 @ 07:13]              28.2     134  |  100  |  96  ----------------------------<  160      [01-23-25 @ 07:25]  5.6   |  21  |  4.25        Ca     8.8     [01-23-25 @ 07:25]      Mg     2.6     [01-23-25 @ 07:25]      Phos  6.7     [01-23-25 @ 07:25]      Creatinine Trend:  SCr 4.25 [01-23 @ 07:25]  SCr 3.91 [01-22 @ 07:09]  SCr 4.03 [01-21 @ 17:12]  SCr 4.07 [01-21 @ 07:12]  SCr 3.90 [01-20 @ 07:07]    Urine Creatinine 88      [01-20-25 @ 16:46]  Urine Sodium 49      [01-20-25 @ 16:46]  Urine Urea Nitrogen 339      [01-20-25 @ 16:46]    TSH 1.30      [01-15-25 @ 00:35]

## 2025-01-23 NOTE — PROGRESS NOTE ADULT - PROBLEM SELECTOR PLAN 4
Please advise. I know pt will most likely need a telemed appt.   cellulitis improving  c/w keflex 500mg bid, plan for total 7 days of antibiotics (1/15 - 1/21)  pt should have legs with compression during the day and off at night  appreciate wound care recs, f/u

## 2025-01-24 NOTE — PROGRESS NOTE ADULT - PROBLEM SELECTOR PLAN 3
Hg trending down   Please check iron studies    If you have any questions, please feel free to contact me.  Larry Alcala MD  Nephrology Fellow  [phone] / [employer] Teams (Preferred)  (Please check the on-call schedule to reach the appropriate Nephrology Fellow)

## 2025-01-24 NOTE — PROGRESS NOTE ADULT - PROBLEM SELECTOR PLAN 10
- med rec completed.   - clarified with family that the patient is not on prednisone at home. Med rec edited

## 2025-01-24 NOTE — PROGRESS NOTE ADULT - PROBLEM SELECTOR PLAN 3
- caution with nephrotoxic medications   - LUIS FERNANDO on CKD4 in setting of cardiorenal syndrome  - baseline Cr fluctuates between 2.4-2.8, continues to rise Cr 4.5 as of late  - Renally dose all medications  - renal US without hydro, b/l renal cysts, (echogenic ~10 cm in size), bladder scans to monitor for retention  - nephro had discussed with daughter Elizabeth regarding worsening renal function - family is interested in pursuing dialysis if needed     # Hyperkalemia  - Has intermittently required lokelma, low K diet, continue to monitor

## 2025-01-24 NOTE — PROGRESS NOTE ADULT - PROBLEM SELECTOR PLAN 1
Patient with LUIS FERNANDO on CKD iso CRS with hypervolemia noted on exam. Varghese FANG/Sunrise reviewed. Patient with CKD4 iso CHF, HTN and DM. Follows with Dr. Galarza last seen 3/26/24. Scr fluctuates between 2.4-2.8 (eGFR: 16-19), last outpt Scr increased to 3.26 (eGFR: 13) on 10/7/24. Admission Scr elevated 3.0 (1/14), improved to 2.86 (1/15) and then progressively increased to 3.9 (1/20). Labs also significant for rising pro- (1/14) -> 4483 (1/20). UA w/ proteinuria and hematuria. Kidney sonogram on 1/20 with B/L renal cysts, echogenic, ~10cm in size. CXR w/ R pleural calcifications and thickening (chronic finding), w/ lower lung opacities. Home meds include Lasix 80mg QD w/ additional 40mg PRN.     Scr noted to be ~2.8-3 initially. No labs checked on 1/17-18, then on 1/19 Cr increased to 3.6 and has remained stable at ~3.9-4. However, today Scr increased to 4.5. Recommend decreasing IV Lasix 80mg BID to PO Bumex 2mg QD. Continue home sodium bicarb (SCO2 WNL today). Discussed worsening renal function with daughter. Family to decide if they would like to pursue dialysis if her renal function continues to worsen. Monitor labs and urine output. Avoid nephrotoxins. Dose medications as per eGFR. Patient with LUIS FERNANDO on CKD iso CRS with hypervolemia noted on exam. Varghese FANG/Sunrise reviewed. Patient with CKD4 iso CHF, HTN and DM. Follows with Dr. Galarza last seen 3/26/24. Scr fluctuates between 2.4-2.8 (eGFR: 16-19), last outpt Scr increased to 3.26 (eGFR: 13) on 10/7/24. Admission Scr elevated 3.0 (1/14), improved to 2.86 (1/15) and then progressively increased to 3.9 (1/20). Labs also significant for rising pro- (1/14) -> 4483 (1/20). UA w/ proteinuria and hematuria. Kidney sonogram on 1/20 with B/L renal cysts, echogenic, ~10cm in size. CXR w/ R pleural calcifications and thickening (chronic finding), w/ lower lung opacities. Home meds include Lasix 80mg QD w/ additional 40mg PRN.     Scr noted to be ~2.8-3 initially. No labs checked on 1/17-18, then on 1/19 Cr increased to 3.6 and has progressively worsened to 4.5 today. She received Lasix 80 mg IV BID yesterday. Now with improvement in pulmonary symptoms     Recommend stopping lasix. Start PO Bumex 2mg QD in AM . Continue home sodium bicarb (SCO2 WNL today). Discussed worsening renal function with daughter. Family to decide if they would like to pursue dialysis if her renal function continues to worsen. Monitor labs and urine output. Avoid nephrotoxins. Dose medications as per eGFR. Patient with LUIS FERNANDO on CKD iso CRS with hypervolemia noted on exam. Varghese FANG/Sunrise reviewed. Patient with CKD4 iso CHF, HTN and DM. Follows with Dr. Galarza last seen 3/26/24. Scr fluctuates between 2.4-2.8 (eGFR: 16-19), last outpt Scr increased to 3.26 (eGFR: 13) on 10/7/24. Admission Scr elevated 3.0 (1/14), improved to 2.86 (1/15) and then progressively increased to 3.9 (1/20). Labs also significant for rising pro- (1/14) -> 4483 (1/20). UA w/ proteinuria and hematuria. Kidney sonogram on 1/20 with B/L renal cysts, echogenic, ~10cm in size. CXR w/ R pleural calcifications and thickening (chronic finding), w/ lower lung opacities. Home meds include Lasix 80mg QD w/ additional 40mg PRN.     Scr noted to be ~2.8-3 initially. No labs checked on 1/17-18, then on 1/19 Cr increased to 3.6 and has progressively worsened to 4.5 today. She received Lasix 80 mg IV BID yesterday. Now with improvement in pulmonary symptoms  Please check bladder scans to ensure no retention  Recommend stopping lasix. Start PO Bumex 2mg QD in AM . Continue home sodium bicarb (SCO2 WNL today). Discussed worsening renal function with daughter. Family to decide if they would like to pursue dialysis if her renal function continues to worsen. Monitor labs and urine output. Avoid nephrotoxins. Dose medications as per eGFR.

## 2025-01-24 NOTE — PROGRESS NOTE ADULT - PROBLEM SELECTOR PLAN 1
Shortness of breath on 1/19, now with improved symptoms.   - CXR shows calcification about the right lung, similar in appearance to CXR about three years ago. There is also small pleural effusion  - home lasix 80mg PO daily was resumed on 1/18. Additional IV lasix 40mg was given on 1/19  - proBNP elevated from 824 on admission to 4483   - appreciate cards and neph recs; likely volume overload in setting of held maintenance diuretics, have been diuresing with 80 mg IV BID  - now near dry weight, per neph transition to oral diuretics bumex 2 mg PO as of 1/25  - on 2L NC, at baseline wears 2L PRN

## 2025-01-24 NOTE — PROGRESS NOTE ADULT - SUBJECTIVE AND OBJECTIVE BOX
[facility] Division of Kidney Diseases & Hypertension  FOLLOW UP NOTE  [phone]--------------------------------------------------------------------------------    Reason for consult: LUIS FERNANDO on CKD    24 hour events/subjective: Patient seen and examined at bedside. Reports improvement in breathing. Feels nauseous this morning. Denies HA, fevers/chills, CP, abdominal pain, or dysuria.    PAST HISTORY  --------------------------------------------------------------------------------  No significant changes to PMH, PSH, FHx, SHx, unless otherwise noted    ALLERGIES & MEDICATIONS  --------------------------------------------------------------------------------  Allergies  Levaquin (Rash)    Intolerances    Standing Inpatient Medications  apixaban 2.5 milliGRAM(s) Oral every 12 hours  carvedilol 12.5 milliGRAM(s) Oral every 12 hours  cholecalciferol 2000 Unit(s) Oral daily  dextrose 5%. 1000 milliLiter(s) IV Continuous <Continuous>  dextrose 5%. 1000 milliLiter(s) IV Continuous <Continuous>  dextrose 50% Injectable 25 Gram(s) IV Push once  dextrose 50% Injectable 12.5 Gram(s) IV Push once  dextrose 50% Injectable 25 Gram(s) IV Push once  dorzolamide 2% Ophthalmic Solution 1 Drop(s) Both EYES <User Schedule>  fluticasone propionate/ salmeterol 250-50 MICROgram(s) Diskus 1 Dose(s) Inhalation two times a day  furosemide   Injectable 80 milliGRAM(s) IV Push two times a day  glucagon  Injectable 1 milliGRAM(s) IntraMuscular once  hydrALAZINE 50 milliGRAM(s) Oral two times a day  insulin lispro (ADMELOG) corrective regimen sliding scale   SubCutaneous three times a day before meals  insulin lispro (ADMELOG) corrective regimen sliding scale   SubCutaneous at bedtime  montelukast 10 milliGRAM(s) Oral daily  NIFEdipine XL 60 milliGRAM(s) Oral daily  ondansetron    Tablet 4 milliGRAM(s) Oral once  polyethylene glycol 3350 17 Gram(s) Oral daily  sodium bicarbonate 650 milliGRAM(s) Oral two times a day    PRN Inpatient Medications  acetaminophen     Tablet .. 650 milliGRAM(s) Oral every 6 hours PRN  albuterol/ipratropium for Nebulization 3 milliLiter(s) Nebulizer every 6 hours PRN  dextrose Oral Gel 15 Gram(s) Oral once PRN  senna 2 Tablet(s) Oral at bedtime PRN    REVIEW OF SYSTEMS  --------------------------------------------------------------------------------  Gen: No fevers/chills  Head/Eyes/Ears: No HA  Respiratory: +SOB improved  CV: No CP  GI: No abdominal pain, diarrhea, +nausea  : No dysuria, hematuria  MSK: No edema    VITALS/PHYSICAL EXAM  --------------------------------------------------------------------------------  T(C): 36.4 (01-24-25 @ 05:15), Max: 36.4 (01-23-25 @ 12:37)  HR: 67 (01-24-25 @ 05:15) (65 - 88)  BP: 114/63 (01-24-25 @ 05:15) (96/44 - 126/69)  RR: 18 (01-24-25 @ 05:15) (18 - 18)  SpO2: 96% (01-24-25 @ 05:15) (94% - 96%)  Wt(kg): --    01-23-25 @ 07:01  -  01-24-25 @ 07:00  --------------------------------------------------------  IN: 360 mL / OUT: 550 mL / NET: -190 mL    Physical Exam:  Gen: NAD  HEENT: Anicteric  Pulm: Crackles B/L  CV: RRR, S1S2  Abd: +BS, soft, nontender/nondistended  : No suprapubic tenderness  Extremities: no bilateral LE edema noted  Neuro: Awake  Skin: Warm    LABS/STUDIES  --------------------------------------------------------------------------------              8.3    3.94  >-----------<  180      [01-24-25 @ 07:10]              26.7     136  |  99  |  110  ----------------------------<  191      [01-24-25 @ 07:10]  5.2   |  23  |  4.50        Ca     9.0     [01-24-25 @ 07:10]      Mg     2.7     [01-24-25 @ 07:10]      Phos  6.3     [01-24-25 @ 07:10]    Creatinine Trend:  SCr 4.50 [01-24 @ 07:10]  SCr 4.25 [01-23 @ 07:25]  SCr 3.91 [01-22 @ 07:09]  SCr 4.03 [01-21 @ 17:12]  SCr 4.07 [01-21 @ 07:12]    Urine Creatinine 88      [01-20-25 @ 16:46]  Urine Sodium 49      [01-20-25 @ 16:46]  Urine Urea Nitrogen 339      [01-20-25 @ 16:46]

## 2025-01-24 NOTE — PROGRESS NOTE ADULT - PROBLEM SELECTOR PLAN 4
cellulitis improving  c/w keflex 500mg bid, plan for total 7 days of antibiotics (1/15 - 1/21)  pt should have legs with compression during the day and off at night  appreciate wound care recs, f/u

## 2025-01-24 NOTE — PROGRESS NOTE ADULT - PROBLEM SELECTOR PLAN 2
- Last TTE on record 2021, EF 65%, repeat 1/23 with preserved EF, indeterminate diastolic function  - on home lasix 80 mg PO daily with additional 40 mg prn; transition from IV diuretics back to PO bumex 2mg qdaily in am  - c/w BB (coreg 12.5mg q12hr), hydralazine 50 mg BID and nifedipine 60 mg daily  - monitor electrolytes, keep Mg >2, K >4, monitor on tele  - sees Dr. Burton outpatient

## 2025-01-24 NOTE — PROGRESS NOTE ADULT - PROBLEM SELECTOR PLAN 2
Has intermittently required Lokelma. WNL today. Low K diet. Monitor serum potassium.    If you have any questions, please feel free to contact me.  Larry Alcala MD  Nephrology Fellow  [phone] / [employer] Teams (Preferred)  (Please check the on-call schedule to reach the appropriate Nephrology Fellow) Has intermittently required Lokelma. WNL today. Low K diet. Monitor serum potassium.

## 2025-01-25 NOTE — PROGRESS NOTE ADULT - PROBLEM SELECTOR PLAN 3
Hgb improved/stable at 8.9 today.  Please check iron studies.    If you have any questions, please feel free to contact me  Savage Taylor  Nephrology Fellow  Page [phone] / [employer] Teams (Preferred)  (After 4pm or on weekends please page the on-call fellow)

## 2025-01-25 NOTE — PROGRESS NOTE ADULT - PROBLEM SELECTOR PLAN 9
- med rec completed.   - clarified with family that the patient is not on prednisone at home. Med rec edited
- Hb has slowly been downtrending, 10.5 on admission, macrocytic   - No overt signs of bleeding  - Checking iron panel, folic, B12   - Continue to monitor, transfuse if Hb <7
- med rec completed.   - clarified with family that the patient is not on prednisone at home. Med rec edited
- Hb has slowly been downtrending, 10.5 on admission, macrocytic   - No overt signs of bleeding  - iron panel unremarkable, b12 wnl, folic acid pending  - Continue to monitor, transfuse if Hb <7
- med rec completed.   - clarified with family that the patient is not on prednisone at home. Med rec edited
- med rec completed.   - clarified with family that the patient is not on prednisone at home. Med rec edited
DVT ppx: Eliquis 2.5mg BID  Diet: CCC  Dispo: stable for discharge with out follow
- med rec completed.   - clarified with family that the patient is not on prednisone at home. Med rec edited
DVT ppx: Eliquis 2.5mg BID  Diet: CCC  Dispo: stable for discharge with out follow up    Discussed with the patient's daughter, Antoinette.

## 2025-01-25 NOTE — PROGRESS NOTE ADULT - SUBJECTIVE AND OBJECTIVE BOX
[facility] Division of Hospital Medicine  Sarah Watkins MD  Available on Teams Dann    Patient is a 89y old  Female who presents with a chief complaint of LE wounds (25 Jan 2025 12:28)      SUBJECTIVE / OVERNIGHT EVENTS:  Patient seen and evaluated at bedside, with no acute overnight events. Per nursing/daughter at bedside, having frequent nausea with worsening renal function. Pt states breathing feels unchanged, denies any CP. Notes occasional BLE pain; edema is stable.     ADDITIONAL REVIEW OF SYSTEMS: negative    MEDICATIONS  (STANDING):  apixaban 2.5 milliGRAM(s) Oral every 12 hours  buMETAnide 2 milliGRAM(s) Oral daily  carvedilol 12.5 milliGRAM(s) Oral every 12 hours  cholecalciferol 2000 Unit(s) Oral daily  dextrose 5%. 1000 milliLiter(s) (50 mL/Hr) IV Continuous <Continuous>  dextrose 5%. 1000 milliLiter(s) (100 mL/Hr) IV Continuous <Continuous>  dextrose 50% Injectable 25 Gram(s) IV Push once  dextrose 50% Injectable 12.5 Gram(s) IV Push once  dextrose 50% Injectable 25 Gram(s) IV Push once  dorzolamide 2% Ophthalmic Solution 1 Drop(s) Both EYES <User Schedule>  fluticasone propionate/ salmeterol 250-50 MICROgram(s) Diskus 1 Dose(s) Inhalation two times a day  glucagon  Injectable 1 milliGRAM(s) IntraMuscular once  hydrALAZINE 50 milliGRAM(s) Oral two times a day  insulin lispro (ADMELOG) corrective regimen sliding scale   SubCutaneous three times a day before meals  insulin lispro (ADMELOG) corrective regimen sliding scale   SubCutaneous at bedtime  montelukast 10 milliGRAM(s) Oral daily  NIFEdipine XL 60 milliGRAM(s) Oral daily  polyethylene glycol 3350 17 Gram(s) Oral daily  sodium bicarbonate 650 milliGRAM(s) Oral two times a day    MEDICATIONS  (PRN):  acetaminophen     Tablet .. 650 milliGRAM(s) Oral every 6 hours PRN Temp greater or equal to 38C (100.4F), Mild Pain (1 - 3)  albuterol/ipratropium for Nebulization 3 milliLiter(s) Nebulizer every 6 hours PRN Shortness of Breath and/or Wheezing  dextrose Oral Gel 15 Gram(s) Oral once PRN Blood Glucose LESS THAN 70 milliGRAM(s)/deciliter  ondansetron Injectable 4 milliGRAM(s) IV Push every 4 hours PRN Nausea and/or Vomiting  senna 2 Tablet(s) Oral at bedtime PRN Constipation      CAPILLARY BLOOD GLUCOSE      POCT Blood Glucose.: 261 mg/dL (25 Jan 2025 11:24)  POCT Blood Glucose.: 186 mg/dL (25 Jan 2025 07:48)  POCT Blood Glucose.: 243 mg/dL (24 Jan 2025 22:55)  POCT Blood Glucose.: 269 mg/dL (24 Jan 2025 21:15)  POCT Blood Glucose.: 213 mg/dL (24 Jan 2025 17:05)    I&O's Summary    24 Jan 2025 07:01  -  25 Jan 2025 07:00  --------------------------------------------------------  IN: 0 mL / OUT: 700 mL / NET: -700 mL        PHYSICAL EXAM:  Vital Signs Last 24 Hrs  T(C): 36.3 (25 Jan 2025 11:14), Max: 36.6 (24 Jan 2025 21:16)  T(F): 97.4 (25 Jan 2025 11:14), Max: 97.9 (24 Jan 2025 21:16)  HR: 60 (25 Jan 2025 11:14) (60 - 63)  BP: 109/61 (25 Jan 2025 11:14) (98/54 - 109/61)  BP(mean): --  RR: 18 (25 Jan 2025 11:14) (18 - 18)  SpO2: 91% (25 Jan 2025 11:14) (91% - 95%)    Parameters below as of 25 Jan 2025 11:14  Patient On (Oxygen Delivery Method): nasal cannula  O2 Flow (L/min): 2    CONSTITUTIONAL: Well-groomed, in no apparent distress  EYES: No conjunctival or scleral injection, non-icteric  ENMT: Oral mucosa with moist membranes  RESPIRATORY: B/l crackles   CARDIOVASCULAR: +S1S2, RRR, systolic murmur; pedal pulses full and symmetric; chronic BLE lymphedema, ACE wraps in place  GASTROINTESTINAL: No palpable masses or tenderness, +BS throughout, no rebound/guarding  MUSCULOSKELETAL: no digital clubbing or cyanosis  SKIN: No rashes or ulcers noted  PSYCHIATRIC: A+O x 3; mood and affect appropriate    LABS:                        8.9    4.67  )-----------( 208      ( 25 Jan 2025 08:29 )             28.3     01-25    140  |  101  |  109[H]  ----------------------------<  177[H]  5.3   |  23  |  4.51[H]    Ca    9.3      25 Jan 2025 08:29  Phos  6.3     01-25  Mg     2.8     01-25            Urinalysis Basic - ( 25 Jan 2025 08:29 )    Color: x / Appearance: x / SG: x / pH: x  Gluc: 177 mg/dL / Ketone: x  / Bili: x / Urobili: x   Blood: x / Protein: x / Nitrite: x   Leuk Esterase: x / RBC: x / WBC x   Sq Epi: x / Non Sq Epi: x / Bacteria: x          RADIOLOGY & ADDITIONAL TESTS:  Results Reviewed:   Imaging Personally Reviewed:  Electrocardiogram Personally Reviewed:    COORDINATION OF CARE:  Care Discussed with Consultants/Other Providers [Y/N]:  Prior or Outpatient Records Reviewed [Y/N]:

## 2025-01-25 NOTE — PROGRESS NOTE ADULT - PROBLEM SELECTOR PROBLEM 10
Prophylactic measure
Medication management
Prophylactic measure
Prophylactic measure
Medication management

## 2025-01-25 NOTE — PROGRESS NOTE ADULT - PROBLEM SELECTOR PLAN 1
Patient with LUIS FERNANDO on CKD iso CRS with hypervolemia noted on exam. Varghese FANG/Sunrise reviewed. Patient with CKD4 iso CHF, HTN and DM. Follows with Dr. Galarza last seen 3/26/24. Scr fluctuates between 2.4-2.8 (eGFR: 16-19), last outpt Scr increased to 3.26 (eGFR: 13) on 10/7/24. Admission Scr elevated 3.0 (1/14), improved to 2.86 (1/15) and then progressively increased to 3.9 (1/20). Labs also significant for rising pro- (1/14) -> 4483 (1/20). UA w/ proteinuria and hematuria. Kidney sonogram on 1/20 with B/L renal cysts, echogenic, ~10cm in size. CXR w/ R pleural calcifications and thickening (chronic finding), w/ lower lung opacities. Home meds include Lasix 80mg QD w/ additional 40mg PRN.     Scr noted to be ~2.8-3 initially. No labs checked on 1/17-18, then on 1/19 Cr increased to 3.6 and has progressively worsened to 4.5 yesterday. She received Lasix 80 mg IV BID on 1/23/25. Please check bladder scans to ensure no retention. Place douglas if retention. Would continue Bumex 2mg QD in the AM. No labs from this morning. Please obtain labs. Continue home sodium bicarbonate tablets. Monitor labs and urine output. Avoid nephrotoxins. Dose medications as per eGFR. Patient with LUIS FERNANDO on CKD iso CRS with hypervolemia noted on exam. Varghese FANG/Sunrise reviewed. Patient with CKD4 iso CHF, HTN and DM. Follows with Dr. Galarza last seen 3/26/24. Scr fluctuates between 2.4-2.8 (eGFR: 16-19), last outpt Scr increased to 3.26 (eGFR: 13) on 10/7/24. Admission Scr elevated 3.0 (1/14), improved to 2.86 (1/15) and then progressively increased to 3.9 (1/20). Labs also significant for rising pro- (1/14) -> 4483 (1/20). UA w/ proteinuria and hematuria. Kidney sonogram on 1/20 with B/L renal cysts, echogenic, ~10cm in size. CXR w/ R pleural calcifications and thickening (chronic finding), w/ lower lung opacities. Home meds include Lasix 80mg QD w/ additional 40mg PRN.     Scr noted to be ~2.8-3 initially. No labs checked on 1/17-18, then on 1/19 Cr increased to 3.6 and has progressively worsened to 4.5 yesterday. She received Lasix 80 mg IV BID on 1/23/25.     Please check bladder scans to ensure no retention. Place douglas if retention.     Would continue Bumex 2mg QD in the AM. No labs from this morning. Please obtain labs. Continue home sodium bicarbonate tablets. Monitor labs and urine output. Avoid nephrotoxins. Dose medications as per eGFR.

## 2025-01-25 NOTE — PROGRESS NOTE ADULT - PROBLEM SELECTOR PLAN 11
DVT ppx: Eliquis 2.5mg BID  Diet: CCC  Dispo: now medically active with LUIS FERNANDO, evaluation for decompensated heart failure  Eventual dc to [facility], [place].  Discussed with daughter at bedside, MARY Iraheta
DVT ppx: Eliquis 2.5mg BID  Diet: CCC  Dispo: now medically active with LUIS FERNANDO, evaluation for decompensated heart failure  Eventual dc to [facility].  Discussed with daughter Elizabeth at bedside, MARY Patricio

## 2025-01-25 NOTE — PROGRESS NOTE ADULT - ATTENDING COMMENTS
I have seen this patient with the fellow and agree with their assessment and plan. I was physically present for significant portions of the evaluation and management (E/M) service provided.  I agree with the above history, physical, and plan which I have reviewed and edited where appropriate.    Lala Boyer MD  Office   Contact me directly via Microsoft Teams     (After 5 pm or on weekends please page the on-call fellow/attending, can check [email or web address] for schedule. Login is viktoriya mcdowell, schedule under [facility] medicine, psych, derm) I have seen this patient with the fellow and agree with their assessment and plan. I was physically present for significant portions of the evaluation and management (E/M) service provided.  I agree with the above history, physical, and plan which I have reviewed and edited where appropriate.    As stated above  Phos rising, can start oral binders if pt can tolerate    Lala Boyer MD  Office   Contact me directly via Microsoft Teams     (After 5 pm or on weekends please page the on-call fellow/attending, can check [email or web address] for schedule. Login is viktoriya mcdowell, schedule under [facility] medicine, psych, derm)

## 2025-01-25 NOTE — PROGRESS NOTE ADULT - PROBLEM SELECTOR PLAN 2
Has intermittently required Lokelma. No labs yet today. Low K diet. Medically manage potassium. Monitor serum potassium.

## 2025-01-25 NOTE — PROGRESS NOTE ADULT - PROBLEM SELECTOR PROBLEM 9
Medication management
Prophylactic measure
Anemia in chronic kidney disease (CKD)
Medication management
Anemia in chronic kidney disease (CKD)
Prophylactic measure
Medication management

## 2025-01-25 NOTE — PROGRESS NOTE ADULT - PROBLEM SELECTOR PROBLEM 3
Patient:   LENIN VASQUEZ            MRN: [id number]            FIN: [id number]               Age:   56 years     Sex:  FEMALE     :  62   Associated Diagnoses:   None   Author:   OSCAR MARTINEZ      Chief Complaint   anemia      History of Present Illness             The patient presents with.     iron deficiency      Histories   Past Med History: Past Medical History   Anxiety  Cancer  Diabetes  GERD (gastroesophageal reflux disease)  Gastric bypass  Hypercholesteremia     Family History:       Procedure History:    right breast partial mastectomy.  Cholecystectomy (SNOMED CT 15845024).  left wedge thoracotomy.  gastric bypass.  hernia repair.  right carpal tunnel.  foot surgery.  Esophagogastroduodenoscopy (SNOMED CT 114938463).   Social History        Alcohol  Details: Use: Current.  Frequency: 1-2 times per month.  Substance Abuse  Details: Use: None.  Tobacco  Details: Smoked/Smokeless Tobacco Last 30 Days: No.  Use: Former smoker.  .        Health Status   Allergies:    Allergic Reactions (Selected)  Severity Not Documented  NKA,- No reactions were documented.   Current medications:  (Selected)   Documented Medications  Documented  Abilify oral 2 mg tablet: 2 mg, 1 tab, Daily  CeleBREX: 200 mg, Oral, Daily  Citracal + D: 1200, Oral, Q Evening  Claritin: 10 mg, Oral, Daily  Cranberry: 1,680 mg, Oral, BID  Fish Oil 1200 mg oral capsule:   Fish Oil: 1,200 mg, Oral, Daily  Freetext Home Medication: Diabetic Health Pack,-oral, Daily-A Mixture of vitamins.  Invokana 100 mg oral tablet:   Januvia: 100 mg, Daily  Tums E-X: 1,500 mg, Chewed, Daily  Vitamin B12:   Vitamin D3:   Wellbutrin: 150 mg, Daily  metFORMIN: 1,000 mg, Oral, BID  raNITIdine 300 mg oral capsule:       Physical Examination   General:  Alert and oriented, No acute distress.    Respiratory:  Lungs are clear to auscultation.    Cardiovascular:  Regular rhythm, No murmur.    Gastrointestinal:  Soft, Non-tender, Non-distended.     Neurologic:  No focal deficits.       Impression and Plan   anemia    plan  egd / colon            Electronically Signed On 01/07/2017 14:46  __________________________________________________   OSCAR MARTINEZ     Stage 4 chronic kidney disease

## 2025-01-26 NOTE — PROGRESS NOTE ADULT - ATTENDING COMMENTS
I have seen this patient with the fellow and agree with their assessment and plan. I was physically present for significant portions of the evaluation and management (E/M) service provided.  I agree with the above history, physical, and plan which I have reviewed and edited where appropriate.    LUIS FERNANDO worsening on CKD, uremic today on exam and volume overloaded  can give lasix 80mg IV and zofran today  d/w at length with two daughters, need to start HD tomorrow as a transition to PD outpatient  needs non tunnelled HD cath tomorrow AM and first treatment tomorrow AM for HD  needs rehab now with HD for placement    Lala Boyer MD  Office   Contact me directly via Microsoft Teams     (After 5 pm or on weekends please page the on-call fellow/attending, can check [email or web address] for schedule. Login is viktoriya mcdowell, schedule under [facility] medicine, psych, derm)

## 2025-01-26 NOTE — PROGRESS NOTE ADULT - PROBLEM SELECTOR PLAN 2
Has intermittently required Lokelma. Potassium 5.4 today. Would medically manage potassium and keep low K diet. Monitor serum potassium.

## 2025-01-26 NOTE — PROGRESS NOTE ADULT - PROBLEM SELECTOR PLAN 1
LUIS FERNANDO on CKD4 now requiring dialysis due to uremia  IR consult placed for Shiley catheter on 1/26  Plan to initiate dialysis 1/26  Standing Zofran x1 day for uremia and start Lasix 80 IV bid

## 2025-01-26 NOTE — PROGRESS NOTE ADULT - PROBLEM SELECTOR PLAN 1
Patient with LUIS FERNANDO on CKD iso CRS with hypervolemia noted on exam. Varghese FANG/Sunrise reviewed. Patient with CKD4 iso CHF, HTN and DM. Follows with Dr. Galarza last seen 3/26/24. Scr fluctuates between 2.4-2.8 (eGFR: 16-19), last outpt Scr increased to 3.26 (eGFR: 13) on 10/7/24. Admission Scr elevated 3.0 (1/14), improved to 2.86 (1/15) and then progressively increased to 3.9 (1/20). Labs also significant for rising pro- (1/14) -> 4483 (1/20). UA w/ proteinuria and hematuria. Kidney sonogram on 1/20 with B/L renal cysts, echogenic, ~10cm in size. CXR w/ R pleural calcifications and thickening (chronic finding), w/ lower lung opacities. Home meds include Lasix 80mg QD w/ additional 40mg PRN.     Scr noted to be ~2.8-3 initially. No labs checked on 1/17-18, then on 1/19 Cr increased to 3.6 and has progressively worsened to 4.69 today. BUN keegan to 123 today. She received Lasix 80 mg IV BID on 1/23/25.     Would give IV lasix 80mg today and zofran (if QTc acceptable). Team had a long discussion with daughter bedside. Please obtain IR consult for NTDC placement to initiate HD tomorrow. Can give 15mcg DDAVP prior to procedure to reduce bleeding risk. Keep BP controlled (<150/90). Pt. and family leaning toward PD (other daughter will do PD for mother). Will need teaching outpatient with nephrologist Dr. Galarza. Would recommend rehab with in-center HD for pt. Continue home sodium bicarbonate tablets for now. Monitor labs and urine output. Avoid nephrotoxins. Dose medications as per eGFR. Discussed with primary team. Patient with LUIS FERNANDO on CKD iso CRS with hypervolemia noted on exam. Varghese FANG/Sunrise reviewed. Patient with CKD4 iso CHF, HTN and DM. Follows with Dr. Galarza last seen 3/26/24. Scr fluctuates between 2.4-2.8 (eGFR: 16-19), last outpt Scr increased to 3.26 (eGFR: 13) on 10/7/24. Admission Scr elevated 3.0 (1/14), improved to 2.86 (1/15) and then progressively increased to 3.9 (1/20). Labs also significant for rising pro- (1/14) -> 4483 (1/20). UA w/ proteinuria and hematuria. Kidney sonogram on 1/20 with B/L renal cysts, echogenic, ~10cm in size. CXR w/ R pleural calcifications and thickening (chronic finding), w/ lower lung opacities. Home meds include Lasix 80mg QD w/ additional 40mg PRN.     Scr noted to be ~2.8-3 initially. No labs checked on 1/17-18, then on 1/19 Cr increased to 3.6 and has progressively worsened to 4.69 today. BUN keegan to 123 today. She received Lasix 80 mg IV BID on 1/23/25.     Would give IV lasix 80mg today and zofran (if QTc acceptable). Team had a long discussion with daughter bedside. Please obtain IR consult for NTDC placement to initiate HD tomorrow. Can give 20mcg DDAVP prior to procedure to reduce bleeding risk. Keep BP controlled (<150/90). Pt. and family leaning toward PD (other daughter will do PD for mother). Will need teaching outpatient with nephrologist Dr. Galarza. Would recommend rehab with in-center HD for pt. Continue home sodium bicarbonate tablets for now. Monitor labs and urine output. Avoid nephrotoxins. Dose medications as per eGFR. Discussed with primary team.

## 2025-01-26 NOTE — PROGRESS NOTE ADULT - SUBJECTIVE AND OBJECTIVE BOX
Lamberto Knapp MD  Division of Hospital Medicine  Available on Microsoft Teams    PROGRESS NOTE:     Patient is a 89y old  Female who presents with a chief complaint of LE wounds (26 Jan 2025 13:11)      SUBJECTIVE / OVERNIGHT EVENTS: Patient seen and examined. Worsening uremia, confusion, nausea. Plan for Shiley placement and HD starting tomorrow. Discussed with her daughter.    MEDICATIONS  (STANDING):  buMETAnide 2 milliGRAM(s) Oral daily  carvedilol 12.5 milliGRAM(s) Oral every 12 hours  cholecalciferol 2000 Unit(s) Oral daily  dextrose 5%. 1000 milliLiter(s) (50 mL/Hr) IV Continuous <Continuous>  dextrose 5%. 1000 milliLiter(s) (100 mL/Hr) IV Continuous <Continuous>  dextrose 50% Injectable 25 Gram(s) IV Push once  dextrose 50% Injectable 12.5 Gram(s) IV Push once  dextrose 50% Injectable 25 Gram(s) IV Push once  dorzolamide 2% Ophthalmic Solution 1 Drop(s) Both EYES <User Schedule>  fluticasone propionate/ salmeterol 250-50 MICROgram(s) Diskus 1 Dose(s) Inhalation two times a day  furosemide   Injectable 80 milliGRAM(s) IV Push two times a day  glucagon  Injectable 1 milliGRAM(s) IntraMuscular once  heparin  Infusion.  Unit(s)/Hr (12 mL/Hr) IV Continuous <Continuous>  hydrALAZINE 50 milliGRAM(s) Oral two times a day  insulin lispro (ADMELOG) corrective regimen sliding scale   SubCutaneous three times a day before meals  insulin lispro (ADMELOG) corrective regimen sliding scale   SubCutaneous at bedtime  montelukast 10 milliGRAM(s) Oral daily  NIFEdipine XL 60 milliGRAM(s) Oral daily  ondansetron Injectable 4 milliGRAM(s) IV Push every 4 hours  polyethylene glycol 3350 17 Gram(s) Oral daily  sevelamer carbonate 800 milliGRAM(s) Oral three times a day with meals  sodium bicarbonate 650 milliGRAM(s) Oral two times a day    MEDICATIONS  (PRN):  acetaminophen     Tablet .. 650 milliGRAM(s) Oral every 6 hours PRN Temp greater or equal to 38C (100.4F), Mild Pain (1 - 3)  albuterol/ipratropium for Nebulization 3 milliLiter(s) Nebulizer every 6 hours PRN Shortness of Breath and/or Wheezing  dextrose Oral Gel 15 Gram(s) Oral once PRN Blood Glucose LESS THAN 70 milliGRAM(s)/deciliter  heparin   Injectable 5500 Unit(s) IV Push every 6 hours PRN For aPTT less than 40  heparin   Injectable 2500 Unit(s) IV Push every 6 hours PRN For aPTT between 40 - 57  senna 2 Tablet(s) Oral at bedtime PRN Constipation      CAPILLARY BLOOD GLUCOSE      POCT Blood Glucose.: 279 mg/dL (26 Jan 2025 11:30)  POCT Blood Glucose.: 207 mg/dL (26 Jan 2025 07:45)  POCT Blood Glucose.: 243 mg/dL (25 Jan 2025 21:36)    I&O's Summary    25 Jan 2025 07:01  -  26 Jan 2025 07:00  --------------------------------------------------------  IN: 180 mL / OUT: 500 mL / NET: -320 mL        PHYSICAL EXAM:  Vital Signs Last 24 Hrs  T(C): 36.3 (26 Jan 2025 11:35), Max: 36.3 (25 Jan 2025 21:30)  T(F): 97.4 (26 Jan 2025 11:35), Max: 97.4 (26 Jan 2025 11:35)  HR: 57 (26 Jan 2025 11:35) (57 - 72)  BP: 107/61 (26 Jan 2025 11:35) (107/61 - 112/58)  BP(mean): --  RR: 16 (26 Jan 2025 11:35) (16 - 19)  SpO2: 93% (26 Jan 2025 11:35) (91% - 93%)    Parameters below as of 26 Jan 2025 11:35  Patient On (Oxygen Delivery Method): nasal cannula  O2 Flow (L/min): 2    CONSTITUTIONAL: ill appearing elderly woman  EYES: No conjunctival or scleral injection, non-icteric  ENMT: Oral mucosa with moist membranes  RESPIRATORY: B/l crackles   CARDIOVASCULAR: +S1S2, RRR, systolic murmur; pedal pulses full and symmetric; chronic BLE lymphedema, ACE wraps in place  GASTROINTESTINAL: No palpable masses or tenderness, +BS throughout, no rebound/guarding  MUSCULOSKELETAL: no digital clubbing or cyanosis  SKIN: No rashes or ulcers noted  PSYCHIATRIC: A+O x 3    LABS:                        8.4    3.39  )-----------( 208      ( 26 Jan 2025 07:35 )             26.6     01-26    136  |  102  |  123[H]  ----------------------------<  205[H]  5.4[H]   |  22  |  4.69[H]    Ca    9.2      26 Jan 2025 07:31  Phos  6.4     01-26  Mg     2.9     01-26            Urinalysis Basic - ( 26 Jan 2025 07:31 )    Color: x / Appearance: x / SG: x / pH: x  Gluc: 205 mg/dL / Ketone: x  / Bili: x / Urobili: x   Blood: x / Protein: x / Nitrite: x   Leuk Esterase: x / RBC: x / WBC x   Sq Epi: x / Non Sq Epi: x / Bacteria: x          RADIOLOGY & ADDITIONAL TESTS:  Results Reviewed: Y  Imaging Personally Reviewed:Y  Electrocardiogram Personally Reviewed:Y    COORDINATION OF CARE:  Care Discussed with Consultants/Other Providers [Y/N]:Y  Prior or Outpatient Records Reviewed [Y/N]:Y

## 2025-01-26 NOTE — CONSULT NOTE ADULT - ASSESSMENT
Interventional Radiology    Evaluate for Procedure: nontunneled HD catheter placement    HPI: 89 yo female with acute on chronic kidney disease, now with worsening renal function requiring dialysis.     Allergies: Levaquin (Rash)    Medications (Abx/Cardiac/Anticoagulation/Blood Products)    apixaban: 2.5 milliGRAM(s) Oral (01-26 @ 05:40)  buMETAnide: 2 milliGRAM(s) Oral (01-26 @ 05:39)  carvedilol: 12.5 milliGRAM(s) Oral (01-26 @ 05:40)  furosemide   Injectable: 80 milliGRAM(s) IV Push (01-26 @ 11:51)  hydrALAZINE: 50 milliGRAM(s) Oral (01-26 @ 05:40)  NIFEdipine XL: 60 milliGRAM(s) Oral (01-26 @ 05:39)    Data:    T(C): 36.3  HR: 57  BP: 107/61  RR: 16  SpO2: 93%    -WBC 3.39 / HgB 8.4 / Hct 26.6 / Plt 208  -Na 136 / Cl 102 /  / Glucose 205  -K 5.4 / CO2 22 / Cr 4.69  -ALT -- / Alk Phos -- / T.Bili --  -INR 1.38 / PTT 39.0    Assessment/Plan:   89 yo female with acute on chronic kidney disease, now with worsening renal function requiring dialysis.   -- IR will plan to perform nontunneled dialysis catheter placement tomorrow 1/27  -- no need for NPO  -- please place IR procedure request order under Dr. Ramesh    --  Bipin Florez DO/MICHAEL/MARILY, PGY-6 Chief Resident  Vascular and Interventional Radiology   Available on Microsoft Teams    - Non-emergent consults: Place IR consult order in [place]  - Emergent issues (pager): [facility] [phone]; [facility] [phone]; [phone]  - Scheduling questions: [facility] [phone]; [facility] [phone]  - Clinic/outpatient booking: [facility] [phone]; [facility] [phone]

## 2025-01-26 NOTE — PROGRESS NOTE ADULT - SUBJECTIVE AND OBJECTIVE BOX
[facility] Division of Kidney Diseases & Hypertension  FOLLOW UP NOTE  [phone]--------------------------------------------------------------------------------  Chief Complaint: Unspecified open wound, unspecified lower leg, initial encounter    24 hour events/subjective: Pt. seen and examined with daughter bedside. Now more fatigued and nauseous. Increased oxygen requirements. No fever, chills, CP, or abdominal pain.    PAST HISTORY  --------------------------------------------------------------------------------  No significant changes to PMH, PSH, FHx, SHx, unless otherwise noted    ALLERGIES & MEDICATIONS  --------------------------------------------------------------------------------  Allergies    Levaquin (Rash)    Intolerances    Standing Inpatient Medications  apixaban 2.5 milliGRAM(s) Oral every 12 hours  buMETAnide 2 milliGRAM(s) Oral daily  carvedilol 12.5 milliGRAM(s) Oral every 12 hours  cholecalciferol 2000 Unit(s) Oral daily  dextrose 5%. 1000 milliLiter(s) IV Continuous <Continuous>  dextrose 5%. 1000 milliLiter(s) IV Continuous <Continuous>  dextrose 50% Injectable 25 Gram(s) IV Push once  dextrose 50% Injectable 12.5 Gram(s) IV Push once  dextrose 50% Injectable 25 Gram(s) IV Push once  dorzolamide 2% Ophthalmic Solution 1 Drop(s) Both EYES <User Schedule>  fluticasone propionate/ salmeterol 250-50 MICROgram(s) Diskus 1 Dose(s) Inhalation two times a day  furosemide   Injectable 80 milliGRAM(s) IV Push two times a day  glucagon  Injectable 1 milliGRAM(s) IntraMuscular once  hydrALAZINE 50 milliGRAM(s) Oral two times a day  insulin lispro (ADMELOG) corrective regimen sliding scale   SubCutaneous three times a day before meals  insulin lispro (ADMELOG) corrective regimen sliding scale   SubCutaneous at bedtime  montelukast 10 milliGRAM(s) Oral daily  NIFEdipine XL 60 milliGRAM(s) Oral daily  polyethylene glycol 3350 17 Gram(s) Oral daily  sevelamer carbonate 800 milliGRAM(s) Oral three times a day with meals  sodium bicarbonate 650 milliGRAM(s) Oral two times a day    PRN Inpatient Medications  acetaminophen     Tablet .. 650 milliGRAM(s) Oral every 6 hours PRN  albuterol/ipratropium for Nebulization 3 milliLiter(s) Nebulizer every 6 hours PRN  dextrose Oral Gel 15 Gram(s) Oral once PRN  ondansetron Injectable 4 milliGRAM(s) IV Push every 4 hours PRN  senna 2 Tablet(s) Oral at bedtime PRN    REVIEW OF SYSTEMS  --------------------------------------------------------------------------------  Head/Eyes/Ears: No HA  Respiratory: +SOB  CV: No CP  GI: No abdominal pain, diarrhea, +nausea  : No dysuria, hematuria  MSK: No edema    All other systems were reviewed and are negative, except as noted.    VITALS/PHYSICAL EXAM  --------------------------------------------------------------------------------  T(C): 36.3 (01-26-25 @ 11:35), Max: 36.3 (01-25-25 @ 21:30)  HR: 57 (01-26-25 @ 11:35) (57 - 72)  BP: 107/61 (01-26-25 @ 11:35) (107/61 - 112/58)  RR: 16 (01-26-25 @ 11:35) (16 - 19)  SpO2: 93% (01-26-25 @ 11:35) (91% - 93%)  Wt(kg): --    01-25-25 @ 07:01  -  01-26-25 @ 07:00  --------------------------------------------------------  IN: 180 mL / OUT: 500 mL / NET: -320 mL    Physical Exam:  Gen: NAD  HEENT: Anicteric  Pulm: Decreased breath sounds B/L  CV: RRR, S1S2  Abd: +BS, soft, nontender/nondistended  : No suprapubic tenderness  Extremities: no bilateral LE edema noted  Neuro: Awake  Skin: Warm    LABS/STUDIES  --------------------------------------------------------------------------------              8.4    3.39  >-----------<  208      [01-26-25 @ 07:35]              26.6     136  |  102  |  123  ----------------------------<  205      [01-26-25 @ 07:31]  5.4   |  22  |  4.69        Ca     9.2     [01-26-25 @ 07:31]      Mg     2.9     [01-26-25 @ 07:31]      Phos  6.4     [01-26-25 @ 07:31]    Creatinine Trend:  SCr 4.69 [01-26 @ 07:31]  SCr 4.51 [01-25 @ 08:29]  SCr 4.50 [01-24 @ 07:10]  SCr 4.25 [01-23 @ 07:25]  SCr 3.91 [01-22 @ 07:09]    Urine Creatinine 88      [01-20-25 @ 16:46]  Urine Sodium 49      [01-20-25 @ 16:46]  Urine Urea Nitrogen 339      [01-20-25 @ 16:46]    Iron 65, TIBC 244, %sat 27      [01-25-25 @ 08:29]  Ferritin 53      [01-25-25 @ 08:29]

## 2025-01-26 NOTE — PROGRESS NOTE ADULT - PROBLEM SELECTOR PLAN 4
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

## 2025-01-26 NOTE — PROGRESS NOTE ADULT - PROBLEM SELECTOR PLAN 3
Hgb improved/stable at 8.4 today. Please check iron studies. Will likely need BENNY with HD in the future.    If you have any questions, please feel free to contact me  Savage Taylor  Nephrology Fellow  Page [phone] / Microsoft Teams (Preferred)  (After 4pm or on weekends please page the on-call fellow)

## 2025-01-27 NOTE — PROGRESS NOTE ADULT - SUBJECTIVE AND OBJECTIVE BOX
Interval history:    S/P tunneled catheter for HD today. Seem dyspneic despite not reporting any complains. Daughter at bedside. Continue on diuretics.     VITALS    T(C): 36.3 (25 @ 15:08), Max: 36.4 (25 @ 05:08)  HR: 63 (25 @ 15:08) (61 - 73)  BP: 101/66 (25 @ 15:08) (101/66 - 122/62)  RR: 18 (25 @ 15:08) (17 - 18)  SpO2: 94% (25 @ 15:08) (93% - 94%)    Daily Weight in k.2 (2025 14:50)    Physical examination:    GENERAL: Vitals stable. In acute distress,+ tachypneic, AOx3  HEAD: Atraumatic, Normocephalic.  NECK: Supple, + JVD. +RIL cather  CHEST/LUNG: +crackles  HEART: +tachycardic, n murmurs, S3 or S4 gallops   ABDOMEN: Soft, nontender, nondistended.   EXTREMITIES: No edema. 2+ Peripheral Pulse, no skin discoloration    MEDICATIONS:    acetaminophen     Tablet .. 650 milliGRAM(s) Oral every 6 hours PRN  albuterol/ipratropium for Nebulization 3 milliLiter(s) Nebulizer every 6 hours PRN  bisacodyl Suppository 10 milliGRAM(s) Rectal once  carvedilol 12.5 milliGRAM(s) Oral every 12 hours  chlorhexidine 4% Liquid 1 Application(s) Topical <User Schedule>  cholecalciferol 2000 Unit(s) Oral daily  dextrose 5%. 1000 milliLiter(s) IV Continuous <Continuous>  dextrose 5%. 1000 milliLiter(s) IV Continuous <Continuous>  dextrose 50% Injectable 25 Gram(s) IV Push once  dextrose 50% Injectable 12.5 Gram(s) IV Push once  dextrose 50% Injectable 25 Gram(s) IV Push once  dextrose Oral Gel 15 Gram(s) Oral once PRN  dorzolamide 2% Ophthalmic Solution 1 Drop(s) Both EYES <User Schedule>  fluticasone propionate/ salmeterol 250-50 MICROgram(s) Diskus 1 Dose(s) Inhalation two times a day  furosemide   Injectable 80 milliGRAM(s) IV Push once  glucagon  Injectable 1 milliGRAM(s) IntraMuscular once  heparin   Injectable 5500 Unit(s) IV Push every 6 hours PRN  heparin   Injectable 2500 Unit(s) IV Push every 6 hours PRN  heparin  Infusion.  Unit(s)/Hr IV Continuous <Continuous>  hydrALAZINE 50 milliGRAM(s) Oral two times a day  insulin glargine Injectable (LANTUS) 5 Unit(s) SubCutaneous at bedtime  insulin lispro (ADMELOG) corrective regimen sliding scale   SubCutaneous three times a day before meals  insulin lispro (ADMELOG) corrective regimen sliding scale   SubCutaneous at bedtime  insulin lispro Injectable (ADMELOG) 2 Unit(s) SubCutaneous three times a day before meals  montelukast 10 milliGRAM(s) Oral daily  NIFEdipine XL 60 milliGRAM(s) Oral daily  polyethylene glycol 3350 17 Gram(s) Oral daily  senna 2 Tablet(s) Oral at bedtime PRN  sevelamer carbonate 800 milliGRAM(s) Oral three times a day with meals  sodium bicarbonate 650 milliGRAM(s) Oral two times a day  sodium chloride 0.9% Bolus. 100 milliLiter(s) IV Bolus every 5 minutes PRN  sodium chloride 0.9% lock flush 10 milliLiter(s) IV Push every 1 hour PRN      Pertient labs personally reviewed by me    CBC:            8.8    5.03  )-----------( 214      ( 25 @ 07:03 )             28.1         Chem:         ( 25 @ 07:03 )    136  |  98  |  118[H]  ----------------------------<  153[H]  5.2   |  23  |  4.87[H]      Cardiac testing personally reviewed by me  Tele: NSR    TTE 2025     1. Left ventricular cavity is normal in size. Left ventricular wall thickness is normal. Left ventricular systolic function is normal with an ejection fraction of 65 % by Laureano's method of disks. There are no regional wall motion abnormalities seen.   2. The left ventricular diastolic function is indeterminate, with indeterminate left ventricular filling pressure.   3. Mildly enlarged right ventricular cavity size, with normal wall thickness, and normal right ventricular systolic function.   4. The right atrium is severely dilated.   5. No significant valvular disease.   6. No pericardial effusion seen.   7. Estimated pulmonary artery systolic pressure is 39 mmHg.   8. Compared to the transthoracic echocardiogram performed on 10/12/2021, there have been no significant interval changes.

## 2025-01-27 NOTE — PROGRESS NOTE ADULT - SUBJECTIVE AND OBJECTIVE BOX
[facility] DIVISION OF KIDNEY DISEASES AND HYPERTENSION -- FOLLOW UP NOTE  --------------------------------------------------------------------------------  Chief Complaint: leg wound    24 hour events/subjective:  For HD cath today  Undergoing HD        PAST HISTORY  --------------------------------------------------------------------------------  No significant changes to PMH, PSH, FHx, SHx, unless otherwise noted    ALLERGIES & MEDICATIONS  --------------------------------------------------------------------------------  Allergies    Levaquin (Rash)    Intolerances      Standing Inpatient Medications  apixaban 2.5 milliGRAM(s) Oral every 12 hours  carvedilol 12.5 milliGRAM(s) Oral every 12 hours  chlorhexidine 4% Liquid 1 Application(s) Topical <User Schedule>  cholecalciferol 2000 Unit(s) Oral daily  dextrose 5%. 1000 milliLiter(s) IV Continuous <Continuous>  dextrose 5%. 1000 milliLiter(s) IV Continuous <Continuous>  dextrose 50% Injectable 25 Gram(s) IV Push once  dextrose 50% Injectable 12.5 Gram(s) IV Push once  dextrose 50% Injectable 25 Gram(s) IV Push once  dorzolamide 2% Ophthalmic Solution 1 Drop(s) Both EYES <User Schedule>  fluticasone propionate/ salmeterol 250-50 MICROgram(s) Diskus 1 Dose(s) Inhalation two times a day  furosemide   Injectable 80 milliGRAM(s) IV Push once  glucagon  Injectable 1 milliGRAM(s) IntraMuscular once  heparin  Infusion.  Unit(s)/Hr IV Continuous <Continuous>  hydrALAZINE 50 milliGRAM(s) Oral two times a day  insulin glargine Injectable (LANTUS) 5 Unit(s) SubCutaneous at bedtime  insulin lispro (ADMELOG) corrective regimen sliding scale   SubCutaneous three times a day before meals  insulin lispro (ADMELOG) corrective regimen sliding scale   SubCutaneous at bedtime  insulin lispro Injectable (ADMELOG) 2 Unit(s) SubCutaneous three times a day before meals  montelukast 10 milliGRAM(s) Oral daily  NIFEdipine XL 60 milliGRAM(s) Oral daily  polyethylene glycol 3350 17 Gram(s) Oral daily  sevelamer carbonate 800 milliGRAM(s) Oral three times a day with meals  sodium bicarbonate 650 milliGRAM(s) Oral two times a day    PRN Inpatient Medications  acetaminophen     Tablet .. 650 milliGRAM(s) Oral every 6 hours PRN  albuterol/ipratropium for Nebulization 3 milliLiter(s) Nebulizer every 6 hours PRN  dextrose Oral Gel 15 Gram(s) Oral once PRN  heparin   Injectable 5500 Unit(s) IV Push every 6 hours PRN  heparin   Injectable 2500 Unit(s) IV Push every 6 hours PRN  senna 2 Tablet(s) Oral at bedtime PRN  sodium chloride 0.9% Bolus. 100 milliLiter(s) IV Bolus every 5 minutes PRN  sodium chloride 0.9% lock flush 10 milliLiter(s) IV Push every 1 hour PRN      REVIEW OF SYSTEMS  --------------------------------------------------------------------------------  Gen: No weight changes, fatigue, fevers/chills, weakness  Skin: No rashes  Head/Eyes/Ears/Mouth: No headache; Normal hearing; Normal vision w/o blurriness; No sinus pain/discomfort, sore throat  Respiratory: No dyspnea, cough, wheezing, hemoptysis  CV: No chest pain, PND, orthopnea  GI: No abdominal pain, diarrhea, constipation, nausea, vomiting, melena, hematochezia  : No increased frequency, dysuria, hematuria, nocturia  MSK: No joint pain/swelling; no back pain; no edema  Neuro: No dizziness/lightheadedness, weakness, seizures, numbness, tingling  Heme: No easy bruising or bleeding  Endo: No heat/cold intolerance  Psych: No significant nervousness, anxiety, stress, depression    All other systems were reviewed and are negative, except as noted.    VITALS/PHYSICAL EXAM  --------------------------------------------------------------------------------  T(C): 36.3 (01-27-25 @ 15:08), Max: 36.4 (01-27-25 @ 05:08)  HR: 63 (01-27-25 @ 15:08) (61 - 73)  BP: 101/66 (01-27-25 @ 15:08) (101/66 - 122/62)  RR: 18 (01-27-25 @ 15:08) (17 - 18)  SpO2: 94% (01-27-25 @ 15:08) (93% - 94%)  Wt(kg): --        01-26-25 @ 07:01  -  01-27-25 @ 07:00  --------------------------------------------------------  IN: 380 mL / OUT: 0 mL / NET: 380 mL    01-27-25 @ 07:01  -  01-27-25 @ 18:37  --------------------------------------------------------  IN: 540 mL / OUT: 1200 mL / NET: -660 mL      Physical Exam:  	Gen: NAD, well-appearing  	HEENT: PERRL, supple neck, clear oropharynx  	Pulm: CTA B/L  	CV: RRR, S1S2; no rub  	Back: No spinal or CVA tenderness; no sacral edema  	Abd: +BS, soft, nontender/nondistended  	: No suprapubic tenderness  	UE: Warm, FROM, no clubbing, intact strength; no edema; no asterixis  	LE: Warm, FROM, no clubbing, intact strength; no edema  	Neuro: No focal deficits, intact gait  	Psych: Normal affect and mood  	Skin: Warm, without rashes  	Vascular access:    LABS/STUDIES  --------------------------------------------------------------------------------              8.8    5.03  >-----------<  214      [01-27-25 @ 07:03]              28.1     136  |  98  |  118  ----------------------------<  153      [01-27-25 @ 07:03]  5.2   |  23  |  4.87        Ca     9.0     [01-27-25 @ 07:03]      Mg     3.0     [01-27-25 @ 07:03]      Phos  6.5     [01-27-25 @ 07:03]      PT/INR: PT 15.1 , INR 1.32       [01-27-25 @ 07:03]  PTT: 118.5      [01-27-25 @ 07:03]      Creatinine Trend:  SCr 4.87 [01-27 @ 07:03]  SCr 4.69 [01-26 @ 07:31]  SCr 4.51 [01-25 @ 08:29]  SCr 4.50 [01-24 @ 07:10]  SCr 4.25 [01-23 @ 07:25]    Urinalysis - [01-27-25 @ 07:03]      Color  / Appearance  / SG  / pH       Gluc 153 / Ketone   / Bili  / Urobili        Blood  / Protein  / Leuk Est  / Nitrite       RBC  / WBC  / Hyaline  / Gran  / Sq Epi  / Non Sq Epi  / Bacteria       Iron 65, TIBC 244, %sat 27      [01-25-25 @ 08:29]  Ferritin 53      [01-25-25 @ 08:29]  TSH 1.30      [01-15-25 @ 00:35]    HBsAg Nonreact      [01-27-25 @ 12:09]

## 2025-01-27 NOTE — PROCEDURE NOTE - ADDITIONAL PROCEDURE DETAILS
Catheter ok to use Catheter ok to use.    Can resume Eliquis tonight if no concern for bleeding. Catheter ok to use.    Can resume heparin drip in 6 hrs or Eliquis tonight if no concern for bleeding.

## 2025-01-27 NOTE — PROGRESS NOTE ADULT - SUBJECTIVE AND OBJECTIVE BOX
[facility] Rivera, [place], Podiatry, [place]  - chronic, on max dose gabapentin, now with peeling skin on her feet making her pain worse. - neuropathy is from her spinal stenosis     Spinal stenosis of lumbar region with neurogenic claudication  -     [facility], [place], CA/Naomi Love, DO [facility] Division of Hospital Medicine  KAREN Jaquez  Available via MS Teams    SUBJECTIVE / OVERNIGHT EVENTS:  No overnight events    ADDITIONAL REVIEW OF SYSTEMS:    MEDICATIONS  (STANDING):  carvedilol 12.5 milliGRAM(s) Oral every 12 hours  chlorhexidine 4% Liquid 1 Application(s) Topical <User Schedule>  cholecalciferol 2000 Unit(s) Oral daily  dextrose 5%. 1000 milliLiter(s) (50 mL/Hr) IV Continuous <Continuous>  dextrose 5%. 1000 milliLiter(s) (100 mL/Hr) IV Continuous <Continuous>  dextrose 50% Injectable 25 Gram(s) IV Push once  dextrose 50% Injectable 12.5 Gram(s) IV Push once  dextrose 50% Injectable 25 Gram(s) IV Push once  dorzolamide 2% Ophthalmic Solution 1 Drop(s) Both EYES <User Schedule>  fluticasone propionate/ salmeterol 250-50 MICROgram(s) Diskus 1 Dose(s) Inhalation two times a day  furosemide   Injectable 80 milliGRAM(s) IV Push once  glucagon  Injectable 1 milliGRAM(s) IntraMuscular once  heparin  Infusion.  Unit(s)/Hr (12 mL/Hr) IV Continuous <Continuous>  hydrALAZINE 50 milliGRAM(s) Oral two times a day  insulin glargine Injectable (LANTUS) 5 Unit(s) SubCutaneous at bedtime  insulin lispro (ADMELOG) corrective regimen sliding scale   SubCutaneous three times a day before meals  insulin lispro (ADMELOG) corrective regimen sliding scale   SubCutaneous at bedtime  insulin lispro Injectable (ADMELOG) 2 Unit(s) SubCutaneous three times a day before meals  montelukast 10 milliGRAM(s) Oral daily  NIFEdipine XL 60 milliGRAM(s) Oral daily  polyethylene glycol 3350 17 Gram(s) Oral daily  sevelamer carbonate 800 milliGRAM(s) Oral three times a day with meals  sodium bicarbonate 650 milliGRAM(s) Oral two times a day    MEDICATIONS  (PRN):  acetaminophen     Tablet .. 650 milliGRAM(s) Oral every 6 hours PRN Temp greater or equal to 38C (100.4F), Mild Pain (1 - 3)  albuterol/ipratropium for Nebulization 3 milliLiter(s) Nebulizer every 6 hours PRN Shortness of Breath and/or Wheezing  dextrose Oral Gel 15 Gram(s) Oral once PRN Blood Glucose LESS THAN 70 milliGRAM(s)/deciliter  heparin   Injectable 5500 Unit(s) IV Push every 6 hours PRN For aPTT less than 40  heparin   Injectable 2500 Unit(s) IV Push every 6 hours PRN For aPTT between 40 - 57  senna 2 Tablet(s) Oral at bedtime PRN Constipation  sodium chloride 0.9% Bolus. 100 milliLiter(s) IV Bolus every 5 minutes PRN SBP LESS THAN or EQUAL to 90 mmHg  sodium chloride 0.9% lock flush 10 milliLiter(s) IV Push every 1 hour PRN Pre/post blood products, medications, blood draw, and to maintain line patency      I&O's Summary    26 Jan 2025 07:01  -  27 Jan 2025 07:00  --------------------------------------------------------  IN: 380 mL / OUT: 0 mL / NET: 380 mL    27 Jan 2025 07:01  -  27 Jan 2025 15:44  --------------------------------------------------------  IN: 200 mL / OUT: 1000 mL / NET: -800 mL        PHYSICAL EXAM:  Vital Signs Last 24 Hrs  T(C): 36.3 (27 Jan 2025 15:08), Max: 36.4 (27 Jan 2025 05:08)  T(F): 97.3 (27 Jan 2025 15:08), Max: 97.5 (27 Jan 2025 05:08)  HR: 63 (27 Jan 2025 15:08) (61 - 73)  BP: 101/66 (27 Jan 2025 15:08) (101/66 - 122/62)  BP(mean): --  RR: 18 (27 Jan 2025 15:08) (17 - 18)  SpO2: 94% (27 Jan 2025 15:08) (93% - 94%)    Parameters below as of 27 Jan 2025 15:08  Patient On (Oxygen Delivery Method): nasal cannula  O2 Flow (L/min): 6    CONSTITUTIONAL: NAD  NECK: Supple  RESPIRATORY: Normal respiratory effort; lungs are clear to auscultation bilaterally  CARDIOVASCULAR: normal S1 and S2, no murmur; No lower extremity edema  ABDOMEN: Nontender to palpation, normoactive bowel sounds, no rebound/guarding  MUSCULOSKELETAL:  no joint swelling or tenderness to palpation  PSYCH: A+O to person, place, and time; affect appropriate  NEUROLOGY: CN 2-12 are intact and symmetric  SKIN: No rashes    LABS:                        8.8    5.03  )-----------( 214      ( 27 Jan 2025 07:03 )             28.1     01-27    136  |  98  |  118[H]  ----------------------------<  153[H]  5.2   |  23  |  4.87[H]    Ca    9.0      27 Jan 2025 07:03  Phos  6.5     01-27  Mg     3.0     01-27      PT/INR - ( 27 Jan 2025 07:03 )   PT: 15.1 sec;   INR: 1.32 ratio         PTT - ( 27 Jan 2025 07:03 )  PTT:118.5 sec      Urinalysis Basic - ( 27 Jan 2025 07:03 )    Color: x / Appearance: x / SG: x / pH: x  Gluc: 153 mg/dL / Ketone: x  / Bili: x / Urobili: x   Blood: x / Protein: x / Nitrite: x   Leuk Esterase: x / RBC: x / WBC x   Sq Epi: x / Non Sq Epi: x / Bacteria: x          COMMUNICATION:  Care Discussed with Consultants/Other Providers and Details of Discussion: Nephrology  Discussions with Patient/Family: Daughters at bedside

## 2025-01-27 NOTE — PROGRESS NOTE ADULT - PROBLEM SELECTOR PLAN 4
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  Tolerating 4L NC will continue to wean as tolerated.

## 2025-01-27 NOTE — PROGRESS NOTE ADULT - PROBLEM SELECTOR PLAN 1
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  s/p 1st HD session today.  Per renal plan for HD again tomorrow and Wednesday.  Lasix 80mg IV x 1 per renal this evening.  Team to re-assess for further need tomorrow.

## 2025-01-27 NOTE — PROVIDER CONTACT NOTE (CRITICAL VALUE NOTIFICATION) - ASSESSMENT
Pt is AXO3-4 confused @ times; Italian speaking but can make needs known in English. Vital signs stable. No signs of bleeding noted. Denies chest pain or report of discomfort.

## 2025-01-27 NOTE — PROGRESS NOTE ADULT - PROBLEM SELECTOR PLAN 3
Hg low  Iron sat ok but ferritin low.   Can give Venofer  Start BENNY with HD         Monserrat Gray MD  O: [phone]  Contact me on teams

## 2025-01-27 NOTE — PROVIDER CONTACT NOTE (CRITICAL VALUE NOTIFICATION) - ASSESSMENT
Patient is AxOx3, all VSS, no s/s of bleeding. Heparin drip stopped for 1 hr per nomogram, resume at 9cc/hr in 1 hr

## 2025-01-27 NOTE — PROGRESS NOTE ADULT - PROBLEM SELECTOR PLAN 1
Patient with LUIS FERNANDO on CKD iso CRS with hypervolemia noted on exam. [facility] reviewed. Patient with CKD4 iso CHF, HTN and DM. Follows with Dr. Galarza last seen 3/26/24. Scr fluctuates between 2.4-2.8 (eGFR: 16-19), last outpt Scr increased to 3.26 (eGFR: 13) on 10/7/24. Admission Scr elevated 3.0 (1/14), improved to 2.86 (1/15) and then progressively increased to 3.9 (1/20). Labs also significant for rising pro- (1/14) -> 4483 (1/20). UA w/ proteinuria and hematuria. Kidney sonogram on 1/20 with B/L renal cysts, echogenic, ~10cm in size. CXR w/ R pleural calcifications and thickening (chronic finding), w/ lower lung opacities. Home meds include Lasix 80mg QD w/ additional 40mg PRN.     Scr noted to be ~2.8-3 initially. No labs checked on 1/17-18, then on 1/19 Cr increased to 3.6 and progressively worsened to 4.8 and BUN keegan to 118 today. She was initially diuresed with 40 IV BID lasx and then transitioned to   80 mg IV BID on 1/23/25. Received oral diuretics on 1/25 with resumption of Lasix 80 IV BID as she became progressively more fluid overloaded and uremic . Underwent HD cath placement today and first session of HD which she tolerated well. Next HD in am   Can give additional 80 mg of lasix tonight   stop PO bicarbonate  Pt. and family leaning toward PD (other daughter will do PD for mother). Will need teaching outpatient with nephrologist Dr. Galarza. Would recommend rehab with in-center HD for pt. Continue home sodium bicarbonate tablets for now. Monitor labs and urine output. Avoid nephrotoxins. Dose medications as per eGFR. Discussed with primary team.

## 2025-01-28 NOTE — PROGRESS NOTE ADULT - PROBLEM SELECTOR PLAN 3
Hgb low. Iron sat ok but ferritin low.   Started on EPO 4k with HD.  Can give Venofer    If you have any questions, please feel free to contact me.  Larry Alcala MD  Nephrology Fellow  [id number] / [employer] Teams (Preferred)  (Please check the on-call schedule to reach the appropriate Nephrology Fellow)

## 2025-01-28 NOTE — PROGRESS NOTE ADULT - SUBJECTIVE AND OBJECTIVE BOX
[facility] Division of Kidney Diseases & Hypertension  FOLLOW UP NOTE  [phone]--------------------------------------------------------------------------------    Chief Complaint: LUIS FERNANDO on CKD, now on HD    24 hour events/subjective: Patient seen and examined earlier today. Appears comfortable. No HA, fever, chills, CP, SOB.    PAST HISTORY  --------------------------------------------------------------------------------  No significant changes to PMH, PSH, FHx, SHx, unless otherwise noted    ALLERGIES & MEDICATIONS  --------------------------------------------------------------------------------  Allergies  Levaquin (Rash)    Intolerances    Standing Inpatient Medications  apixaban 2.5 milliGRAM(s) Oral every 12 hours  carvedilol 12.5 milliGRAM(s) Oral every 12 hours  chlorhexidine 4% Liquid 1 Application(s) Topical <User Schedule>  cholecalciferol 2000 Unit(s) Oral daily  dextrose 5%. 1000 milliLiter(s) IV Continuous <Continuous>  dextrose 5%. 1000 milliLiter(s) IV Continuous <Continuous>  dextrose 50% Injectable 25 Gram(s) IV Push once  dextrose 50% Injectable 12.5 Gram(s) IV Push once  dextrose 50% Injectable 25 Gram(s) IV Push once  dorzolamide 2% Ophthalmic Solution 1 Drop(s) Both EYES <User Schedule>  fluticasone propionate/ salmeterol 250-50 MICROgram(s) Diskus 1 Dose(s) Inhalation two times a day  glucagon  Injectable 1 milliGRAM(s) IntraMuscular once  hydrALAZINE 50 milliGRAM(s) Oral two times a day  insulin glargine Injectable (LANTUS) 5 Unit(s) SubCutaneous at bedtime  insulin lispro (ADMELOG) corrective regimen sliding scale   SubCutaneous three times a day before meals  insulin lispro (ADMELOG) corrective regimen sliding scale   SubCutaneous at bedtime  insulin lispro Injectable (ADMELOG) 2 Unit(s) SubCutaneous three times a day before meals  montelukast 10 milliGRAM(s) Oral daily  NIFEdipine XL 60 milliGRAM(s) Oral daily  polyethylene glycol 3350 17 Gram(s) Oral daily  sevelamer carbonate 800 milliGRAM(s) Oral three times a day with meals    PRN Inpatient Medications  acetaminophen     Tablet .. 650 milliGRAM(s) Oral every 6 hours PRN  albuterol/ipratropium for Nebulization 3 milliLiter(s) Nebulizer every 6 hours PRN  dextrose Oral Gel 15 Gram(s) Oral once PRN  senna 2 Tablet(s) Oral at bedtime PRN  sodium chloride 0.9% Bolus. 100 milliLiter(s) IV Bolus every 5 minutes PRN  sodium chloride 0.9% lock flush 10 milliLiter(s) IV Push every 1 hour PRN    REVIEW OF SYSTEMS  --------------------------------------------------------------------------------  Gen: No fevers/chills  Head/Eyes/Ears: No HA  Respiratory: +SOB improved  CV: No CP  GI: No abdominal pain, diarrhea, nausea  : No dysuria, hematuria  MSK: No edema    VITALS/PHYSICAL EXAM  --------------------------------------------------------------------------------  T(C): 36.3 (01-28-25 @ 16:00), Max: 36.7 (01-28-25 @ 12:20)  HR: 72 (01-28-25 @ 16:00) (60 - 81)  BP: 112/63 (01-28-25 @ 16:00) (101/50 - 117/48)  RR: 18 (01-28-25 @ 16:00) (17 - 20)  SpO2: 94% (01-28-25 @ 16:00) (85% - 97%)  Wt(kg): --    01-27-25 @ 07:01  -  01-28-25 @ 07:00  --------------------------------------------------------  IN: 740 mL / OUT: 1200 mL / NET: -460 mL    01-28-25 @ 07:01  -  01-28-25 @ 16:19  --------------------------------------------------------  IN: 540 mL / OUT: 1300 mL / NET: -760 mL    Physical Exam:  Gen: NAD  HEENT: Anicteric  Pulm: Crackles B/L, on NC  CV: RRR, S1S2  Abd: +BS, soft, nontender/nondistended  Extremities: No pitting edema noted, chronic lymphedema  Neuro: Awake  Skin: Warm  Vascular access: R-IJ non-tunneled dialysis catheter    LABS/STUDIES  --------------------------------------------------------------------------------              8.1    4.42  >-----------<  210      [01-28-25 @ 07:20]              25.9     138  |  99  |  87  ----------------------------<  74      [01-28-25 @ 07:18]  4.6   |  26  |  3.84        Ca     8.9     [01-28-25 @ 07:18]      Mg     3.0     [01-27-25 @ 07:03]      Phos  5.5     [01-28-25 @ 07:18]    PT/INR: PT 15.1 , INR 1.32       [01-27-25 @ 07:03]  PTT: 41.9       [01-28-25 @ 07:17]    Creatinine Trend:  SCr 3.84 [01-28 @ 07:18]  SCr 4.87 [01-27 @ 07:03]  SCr 4.69 [01-26 @ 07:31]  SCr 4.51 [01-25 @ 08:29]  SCr 4.50 [01-24 @ 07:10]    Iron 65, TIBC 244, %sat 27      [01-25-25 @ 08:29]  Ferritin 53      [01-25-25 @ 08:29]    HBsAg Nonreact      [01-27-25 @ 12:09]

## 2025-01-28 NOTE — DISCHARGE NOTE NURSING/CASE MANAGEMENT/SOCIAL WORK - PATIENT PORTAL LINK FT
You can access the FollowMyHealth Patient Portal offered by [facility] by registering at the following website: [email or web address]. By joining [facility]’s FollowMyHealth portal, you will also be able to view your health information using other applications (apps) compatible with our system.

## 2025-01-28 NOTE — PROGRESS NOTE ADULT - PROBLEM SELECTOR PLAN 1
Patient with LUIS FERNANDO on CKD iso CRS with hypervolemia noted on exam. Varghese FANG/Sunrise reviewed. Patient with CKD4 iso CHF, HTN and DM. Follows with Dr. Galarza last seen 3/26/24. Scr fluctuates between 2.4-2.8 (eGFR: 16-19), last outpt Scr increased to 3.26 (eGFR: 13) on 10/7/24. Admission Scr elevated 3.0 (1/14), improved to 2.86 (1/15) and then progressively increased to 3.9 (1/20). Labs also significant for rising pro- (1/14) -> 4483 (1/20). UA w/ proteinuria and hematuria. Kidney sonogram on 1/20 with B/L renal cysts, echogenic, ~10cm in size. CXR w/ R pleural calcifications and thickening (chronic finding), w/ lower lung opacities. Home meds include Lasix 80mg QD w/ additional 40mg PRN.     Scr noted to be ~2.8-3 initially. On 1/19 Scr increased to 3.6 and progressively worsened to 4.8 and BUN keegan to 118 today. She was initiated on IV diuretics on 1/23/25. Received PO diuretics on 1/25 with resumption of IV Lasix 80mg BID as she became progressively more fluid overloaded and uremic. Underwent HD cath placement on 1/27 followed by first session of HD which she tolerated well. 2nd consecutive HD treatment planned today. Continue on Phoslo.    Patient and family leaning toward PD (other daughter will do PD for mother). Will need teaching outpatient with nephrologist Dr. Galarza. Would recommend rehab with in-center HD. Monitor labs and urine output. Avoid nephrotoxins. Dose medications as per eGFR. Discussed with primary team. Patient with LUIS FERNANDO on CKD iso CRS with hypervolemia noted on exam. Varghese FANG/Sunrise reviewed. Patient with CKD4 iso CHF, HTN and DM. Follows with Dr. Galarza last seen 3/26/24. Scr fluctuates between 2.4-2.8 (eGFR: 16-19), last outpt Scr increased to 3.26 (eGFR: 13) on 10/7/24. Admission Scr elevated 3.0 (1/14), improved to 2.86 (1/15) and then progressively increased to 3.9 (1/20). Labs also significant for rising pro- (1/14) -> 4483 (1/20). UA w/ proteinuria and hematuria. Kidney sonogram on 1/20 with B/L renal cysts, echogenic, ~10cm in size. CXR w/ R pleural calcifications and thickening (chronic finding), w/ lower lung opacities. Home meds include Lasix 80mg QD w/ additional 40mg PRN.     Scr noted to be ~2.8-3 initially. On 1/19 Scr increased to 3.6 and progressively worsened to 4.8 and BUN keegan to 118 today. She was initiated on IV diuretics on 1/23/25. Received PO diuretics on 1/25 with resumption of IV Lasix 80mg BID as she became progressively more fluid overloaded and uremic. Underwent HD cath placement on 1/27 followed by first session of HD which she tolerated well. 2nd consecutive HD treatment planned today. Continue on Phoslo. Consider decreasing dose of Hydralazine given soft BP    Patient and family leaning toward PD (other daughter will do PD for mother). Will need teaching outpatient with nephrologist Dr. Galarza. Would recommend rehab with in-center HD. Monitor labs and urine output. Avoid nephrotoxins. Dose medications as per eGFR. Discussed with primary team.

## 2025-01-28 NOTE — PROGRESS NOTE ADULT - PROBLEM SELECTOR PLAN 1
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  Family interested in potential PD (daughter to do PD for mother) - will need teaching with o/p nephrologist Dr. Galarza; per nephro recommending rehab with in-center HD for patient

## 2025-01-28 NOTE — PROGRESS NOTE ADULT - PROBLEM SELECTOR PLAN 4
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  Increased to 6L NC  Repeat COVID/RSV/influenza negative, CXR 1/28 with small bilateral pleural effusions and pulmonary congestion

## 2025-01-28 NOTE — PROGRESS NOTE ADULT - SUBJECTIVE AND OBJECTIVE BOX
[facility] Division of Hospital Medicine  Sarah Watkins MD  Available on Teams Dann    Patient is a 89y old  Female who presents with a chief complaint of LE wounds (27 Jan 2025 18:37)      SUBJECTIVE / OVERNIGHT EVENTS:  Patient seen and evaluated at bedside. Per nursing when attempting to wean O2 overnight, desaturated, now on 6L NC. This AM, daughter bedside, pt with no complaints, but notably more lethargic than prior. With poor appetite as well, notes no BM for several days. Interested in palliative consult.     ADDITIONAL REVIEW OF SYSTEMS: as noted above, pt lethargic denies acute c/o, drifting back to sleep    MEDICATIONS  (STANDING):  apixaban 2.5 milliGRAM(s) Oral every 12 hours  carvedilol 12.5 milliGRAM(s) Oral every 12 hours  chlorhexidine 4% Liquid 1 Application(s) Topical <User Schedule>  cholecalciferol 2000 Unit(s) Oral daily  dextrose 5%. 1000 milliLiter(s) (100 mL/Hr) IV Continuous <Continuous>  dextrose 5%. 1000 milliLiter(s) (50 mL/Hr) IV Continuous <Continuous>  dextrose 50% Injectable 25 Gram(s) IV Push once  dextrose 50% Injectable 12.5 Gram(s) IV Push once  dextrose 50% Injectable 25 Gram(s) IV Push once  dorzolamide 2% Ophthalmic Solution 1 Drop(s) Both EYES <User Schedule>  fluticasone propionate/ salmeterol 250-50 MICROgram(s) Diskus 1 Dose(s) Inhalation two times a day  glucagon  Injectable 1 milliGRAM(s) IntraMuscular once  hydrALAZINE 50 milliGRAM(s) Oral two times a day  insulin glargine Injectable (LANTUS) 5 Unit(s) SubCutaneous at bedtime  insulin lispro (ADMELOG) corrective regimen sliding scale   SubCutaneous three times a day before meals  insulin lispro (ADMELOG) corrective regimen sliding scale   SubCutaneous at bedtime  insulin lispro Injectable (ADMELOG) 2 Unit(s) SubCutaneous three times a day before meals  montelukast 10 milliGRAM(s) Oral daily  NIFEdipine XL 60 milliGRAM(s) Oral daily  polyethylene glycol 3350 17 Gram(s) Oral daily  sevelamer carbonate 800 milliGRAM(s) Oral three times a day with meals    MEDICATIONS  (PRN):  acetaminophen     Tablet .. 650 milliGRAM(s) Oral every 6 hours PRN Temp greater or equal to 38C (100.4F), Mild Pain (1 - 3)  albuterol/ipratropium for Nebulization 3 milliLiter(s) Nebulizer every 6 hours PRN Shortness of Breath and/or Wheezing  dextrose Oral Gel 15 Gram(s) Oral once PRN Blood Glucose LESS THAN 70 milliGRAM(s)/deciliter  senna 2 Tablet(s) Oral at bedtime PRN Constipation  sodium chloride 0.9% Bolus. 100 milliLiter(s) IV Bolus every 5 minutes PRN SBP LESS THAN or EQUAL to 90 mmHg  sodium chloride 0.9% lock flush 10 milliLiter(s) IV Push every 1 hour PRN Pre/post blood products, medications, blood draw, and to maintain line patency      CAPILLARY BLOOD GLUCOSE  116 (27 Jan 2025 17:00)      POCT Blood Glucose.: 109 mg/dL (28 Jan 2025 11:16)  POCT Blood Glucose.: 110 mg/dL (28 Jan 2025 09:02)  POCT Blood Glucose.: 79 mg/dL (28 Jan 2025 08:09)  POCT Blood Glucose.: 74 mg/dL (28 Jan 2025 07:37)  POCT Blood Glucose.: 73 mg/dL (28 Jan 2025 07:08)  POCT Blood Glucose.: 108 mg/dL (27 Jan 2025 22:38)  POCT Blood Glucose.: 117 mg/dL (27 Jan 2025 21:23)  POCT Blood Glucose.: 116 mg/dL (27 Jan 2025 16:43)  POCT Blood Glucose.: 121 mg/dL (27 Jan 2025 16:23)    I&O's Summary    27 Jan 2025 07:01  -  28 Jan 2025 07:00  --------------------------------------------------------  IN: 740 mL / OUT: 1200 mL / NET: -460 mL    28 Jan 2025 07:01  -  28 Jan 2025 16:22  --------------------------------------------------------  IN: 540 mL / OUT: 1300 mL / NET: -760 mL        PHYSICAL EXAM:  Vital Signs Last 24 Hrs  T(C): 36.3 (28 Jan 2025 16:00), Max: 36.7 (28 Jan 2025 12:20)  T(F): 97.3 (28 Jan 2025 16:00), Max: 98 (28 Jan 2025 12:20)  HR: 72 (28 Jan 2025 16:00) (60 - 81)  BP: 112/63 (28 Jan 2025 16:00) (101/50 - 117/48)  BP(mean): --  RR: 18 (28 Jan 2025 16:00) (17 - 20)  SpO2: 94% (28 Jan 2025 16:00) (85% - 97%)    Parameters below as of 28 Jan 2025 16:00  Patient On (Oxygen Delivery Method): nasal cannula  O2 Flow (L/min): 6      CONSTITUTIONAL: Well-groomed, but lethargic  EYES: No conjunctival or scleral injection, non-icteric  ENMT: Oral mucosa with moist membranes  RESPIRATORY: B/l crackles   CARDIOVASCULAR: +S1S2, RRR, systolic murmur; pedal pulses full and symmetric; chronic BLE lymphedema, ACE wraps in place  GASTROINTESTINAL: Soft, nontender, but notably larger/more distended than prior, +BS throughout, no rebound/guarding  MUSCULOSKELETAL: no digital clubbing or cyanosis  SKIN: No rashes or ulcers noted  PSYCHIATRIC: Alert, answering questions appropriately but notably more lethargic than days prior     LABS:                        8.1    4.42  )-----------( 210      ( 28 Jan 2025 07:20 )             25.9     01-28    138  |  99  |  87[H]  ----------------------------<  74  4.6   |  26  |  3.84[H]    Ca    8.9      28 Jan 2025 07:18  Phos  5.5     01-28  Mg     3.0     01-27      PT/INR - ( 27 Jan 2025 07:03 )   PT: 15.1 sec;   INR: 1.32 ratio         PTT - ( 28 Jan 2025 07:17 )  PTT:41.9 sec      Urinalysis Basic - ( 28 Jan 2025 07:18 )    Color: x / Appearance: x / SG: x / pH: x  Gluc: 74 mg/dL / Ketone: x  / Bili: x / Urobili: x   Blood: x / Protein: x / Nitrite: x   Leuk Esterase: x / RBC: x / WBC x   Sq Epi: x / Non Sq Epi: x / Bacteria: x          RADIOLOGY & ADDITIONAL TESTS:  Results Reviewed:   Imaging Personally Reviewed:  Electrocardiogram Personally Reviewed:    COORDINATION OF CARE:  Care Discussed with Consultants/Other Providers [Y/N]:  Prior or Outpatient Records Reviewed [Y/N]:

## 2025-01-28 NOTE — PROGRESS NOTE ADULT - PROBLEM SELECTOR PLAN 7
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

## 2025-01-28 NOTE — DISCHARGE NOTE NURSING/CASE MANAGEMENT/SOCIAL WORK - FINANCIAL ASSISTANCE
[facility] provides services at a reduced cost to those who are determined to be eligible through [facility]’s financial assistance program. Information regarding [facility]’s financial assistance program can be found by going to [email or web address] or by calling [phone].

## 2025-01-28 NOTE — PROVIDER CONTACT NOTE (HYPOGLYCEMIA EVENT) - NS PROVIDER CONTACT ASSESS-HYPO
Pt is AxOx3, all VSS, no pain or SOB, no diaphoresis or s/s of hypoglycemia. Pt has been having poor PO intake over the last few days. Pt fed breakfast

## 2025-01-28 NOTE — PROVIDER CONTACT NOTE (HYPOGLYCEMIA EVENT) - NS PROVIDER CONTACT BACKGROUND-HYPO
Age: 89y    Gender: Female    POCT Blood Glucose:116 (01-27-25 @ 17:00)    110 mg/dL (01-28-25 @ 09:02)  79 mg/dL (01-28-25 @ 08:09)  74 mg/dL (01-28-25 @ 07:37)  73 mg/dL (01-28-25 @ 07:08)  108 mg/dL (01-27-25 @ 22:38)  117 mg/dL (01-27-25 @ 21:23)  116 mg/dL (01-27-25 @ 16:43)  121 mg/dL (01-27-25 @ 16:23)      eMAR:  insulin glargine Injectable (LANTUS)   5 Unit(s) SubCutaneous (01-27-25 @ 22:52)    insulin lispro Injectable (ADMELOG)   2 Unit(s) SubCutaneous (01-27-25 @ 16:48)   2 Unit(s) SubCutaneous (01-27-25 @ 11:58)

## 2025-01-29 NOTE — PROGRESS NOTE ADULT - SUBJECTIVE AND OBJECTIVE BOX
[facility] Division of Hospital Medicine  Arcelia Sidhu MD  Available via MS Teams      SUBJECTIVE / OVERNIGHT EVENTS: Overnight pt with some hallucinations. CTH negative. ABG showing mild hypercarbia     ADDITIONAL REVIEW OF SYSTEMS:    MEDICATIONS  (STANDING):  apixaban 2.5 milliGRAM(s) Oral every 12 hours  carvedilol 12.5 milliGRAM(s) Oral every 12 hours  chlorhexidine 4% Liquid 1 Application(s) Topical <User Schedule>  cholecalciferol 2000 Unit(s) Oral daily  dextrose 5%. 1000 milliLiter(s) (100 mL/Hr) IV Continuous <Continuous>  dextrose 5%. 1000 milliLiter(s) (50 mL/Hr) IV Continuous <Continuous>  dextrose 50% Injectable 25 Gram(s) IV Push once  dextrose 50% Injectable 12.5 Gram(s) IV Push once  dextrose 50% Injectable 25 Gram(s) IV Push once  dorzolamide 2% Ophthalmic Solution 1 Drop(s) Both EYES <User Schedule>  epoetin oliver (PROCRIT) Injectable 4000 Unit(s) IV Push <User Schedule>  fluticasone propionate/ salmeterol 250-50 MICROgram(s) Diskus 1 Dose(s) Inhalation two times a day  glucagon  Injectable 1 milliGRAM(s) IntraMuscular once  hydrALAZINE 50 milliGRAM(s) Oral two times a day  insulin lispro (ADMELOG) corrective regimen sliding scale   SubCutaneous three times a day before meals  insulin lispro (ADMELOG) corrective regimen sliding scale   SubCutaneous at bedtime  montelukast 10 milliGRAM(s) Oral daily  NIFEdipine XL 60 milliGRAM(s) Oral daily  polyethylene glycol 3350 17 Gram(s) Oral daily  sevelamer carbonate 800 milliGRAM(s) Oral three times a day with meals    MEDICATIONS  (PRN):  acetaminophen     Tablet .. 650 milliGRAM(s) Oral every 6 hours PRN Temp greater or equal to 38C (100.4F), Mild Pain (1 - 3)  albuterol/ipratropium for Nebulization 3 milliLiter(s) Nebulizer every 6 hours PRN Shortness of Breath and/or Wheezing  dextrose Oral Gel 15 Gram(s) Oral once PRN Blood Glucose LESS THAN 70 milliGRAM(s)/deciliter  senna 2 Tablet(s) Oral at bedtime PRN Constipation  sodium chloride 0.9% Bolus. 100 milliLiter(s) IV Bolus every 5 minutes PRN SBP LESS THAN or EQUAL to 90 mmHg  sodium chloride 0.9% lock flush 10 milliLiter(s) IV Push every 1 hour PRN Pre/post blood products, medications, blood draw, and to maintain line patency      I&O's Summary    28 Jan 2025 07:01  -  29 Jan 2025 07:00  --------------------------------------------------------  IN: 780 mL / OUT: 1300 mL / NET: -520 mL        PHYSICAL EXAM:  Vital Signs Last 24 Hrs  T(C): 36.4 (29 Jan 2025 13:09), Max: 36.9 (29 Jan 2025 00:18)  T(F): 97.5 (29 Jan 2025 13:09), Max: 98.4 (29 Jan 2025 00:18)  HR: 69 (29 Jan 2025 13:09) (62 - 92)  BP: 108/61 (29 Jan 2025 13:09) (97/42 - 117/65)  BP(mean): --  RR: 18 (29 Jan 2025 13:09) (16 - 18)  SpO2: 93% (29 Jan 2025 13:09) (91% - 97%)    Parameters below as of 29 Jan 2025 13:09  Patient On (Oxygen Delivery Method): nasal cannula        CONSTITUTIONAL: Well-groomed, but lethargic  EYES: No conjunctival or scleral injection, non-icteric  ENMT: Oral mucosa with moist membranes  RESPIRATORY: B/l crackles   CARDIOVASCULAR: +S1S2, RRR, systolic murmur; pedal pulses full and symmetric; chronic BLE lymphedema, ACE wraps in place  GASTROINTESTINAL: Soft, nontender, but notably larger/more distended than prior, +BS throughout, no rebound/guarding  MUSCULOSKELETAL: no digital clubbing or cyanosis  SKIN: No rashes or ulcers noted  PSYCHIATRIC: Aler      LABS:                        8.9    6.30  )-----------( 240      ( 29 Jan 2025 07:13 )             29.7     01-29    139  |  100  |  46[H]  ----------------------------<  94  4.5   |  26  |  2.72[H]    Ca    9.3      29 Jan 2025 07:11  Phos  4.6     01-29  Mg     2.5     01-29      PTT - ( 28 Jan 2025 07:17 )  PTT:41.9 sec      Urinalysis Basic - ( 29 Jan 2025 07:11 )    Color: x / Appearance: x / SG: x / pH: x  Gluc: 94 mg/dL / Ketone: x  / Bili: x / Urobili: x   Blood: x / Protein: x / Nitrite: x   Leuk Esterase: x / RBC: x / WBC x   Sq Epi: x / Non Sq Epi: x / Bacteria: x            RADIOLOGY & ADDITIONAL TESTS:  New Imaging Personally Reviewed Today:  New Electrocardiogram Personally Reviewed Today:  Other Results Reviewed Today:   Prior or Outpatient Records Reviewed Today with Summary:    COORDINATION OF CARE:  Consultant Communication and Details of Discussion (where applicable):

## 2025-01-29 NOTE — PROGRESS NOTE ADULT - PROBLEM SELECTOR PLAN 3
Hgb low. Iron sat ok but ferritin low.   Started on EPO 4k with HD.  Can give Venofer.    If you have any questions, please feel free to contact me.  Larry Alcala MD  Nephrology Fellow  [email or web address] / [employer] Teams (Preferred)  (Please check the on-call schedule to reach the appropriate Nephrology Fellow)

## 2025-01-29 NOTE — PROGRESS NOTE ADULT - PROBLEM SELECTOR PLAN 7
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

## 2025-01-29 NOTE — PROGRESS NOTE ADULT - SUBJECTIVE AND OBJECTIVE BOX
[facility] Division of Kidney Diseases & Hypertension  FOLLOW UP NOTE  [phone]--------------------------------------------------------------------------------    Chief Complaint: LUIS FERNANDO on CKD, now on HD    24 hour events/subjective: Patient seen and examined earlier today. Appears comfortable, more alert today. SOB improved. No HA, fevers/chills, CP, abdominal pain.    PAST HISTORY  --------------------------------------------------------------------------------  No significant changes to PMH, PSH, FHx, SHx, unless otherwise noted    ALLERGIES & MEDICATIONS  --------------------------------------------------------------------------------  Allergies  Levaquin (Rash)    Intolerances    Standing Inpatient Medications  apixaban 2.5 milliGRAM(s) Oral every 12 hours  carvedilol 12.5 milliGRAM(s) Oral every 12 hours  chlorhexidine 4% Liquid 1 Application(s) Topical <User Schedule>  cholecalciferol 2000 Unit(s) Oral daily  dextrose 5%. 1000 milliLiter(s) IV Continuous <Continuous>  dextrose 5%. 1000 milliLiter(s) IV Continuous <Continuous>  dextrose 50% Injectable 25 Gram(s) IV Push once  dextrose 50% Injectable 12.5 Gram(s) IV Push once  dextrose 50% Injectable 25 Gram(s) IV Push once  dorzolamide 2% Ophthalmic Solution 1 Drop(s) Both EYES <User Schedule>  epoetin oliver (PROCRIT) Injectable 4000 Unit(s) IV Push <User Schedule>  fluticasone propionate/ salmeterol 250-50 MICROgram(s) Diskus 1 Dose(s) Inhalation two times a day  glucagon  Injectable 1 milliGRAM(s) IntraMuscular once  hydrALAZINE 50 milliGRAM(s) Oral two times a day  insulin lispro (ADMELOG) corrective regimen sliding scale   SubCutaneous three times a day before meals  insulin lispro (ADMELOG) corrective regimen sliding scale   SubCutaneous at bedtime  montelukast 10 milliGRAM(s) Oral daily  NIFEdipine XL 60 milliGRAM(s) Oral daily  polyethylene glycol 3350 17 Gram(s) Oral daily  sevelamer carbonate 800 milliGRAM(s) Oral three times a day with meals    PRN Inpatient Medications  acetaminophen     Tablet .. 650 milliGRAM(s) Oral every 6 hours PRN  albuterol/ipratropium for Nebulization 3 milliLiter(s) Nebulizer every 6 hours PRN  dextrose Oral Gel 15 Gram(s) Oral once PRN  senna 2 Tablet(s) Oral at bedtime PRN  sodium chloride 0.9% Bolus. 100 milliLiter(s) IV Bolus every 5 minutes PRN  sodium chloride 0.9% lock flush 10 milliLiter(s) IV Push every 1 hour PRN    REVIEW OF SYSTEMS  --------------------------------------------------------------------------------  Gen: No fevers/chills  Head/Eyes/Ears: No HA  Respiratory: +SOB improved  CV: No CP  GI: No abdominal pain, diarrhea, nausea  : No dysuria, hematuria  MSK: No edema    VITALS/PHYSICAL EXAM  --------------------------------------------------------------------------------  T(C): 36.4 (01-29-25 @ 13:09), Max: 36.9 (01-29-25 @ 00:18)  HR: 69 (01-29-25 @ 13:09) (62 - 92)  BP: 108/61 (01-29-25 @ 13:09) (97/42 - 117/65)  RR: 18 (01-29-25 @ 13:09) (16 - 18)  SpO2: 93% (01-29-25 @ 13:09) (91% - 97%)  Wt(kg): --    01-28-25 @ 07:01  -  01-29-25 @ 07:00  --------------------------------------------------------  IN: 780 mL / OUT: 1300 mL / NET: -520 mL    Physical Exam:  Gen: NAD  HEENT: Anicteric  Pulm: Crackles B/L, on NC  CV: RRR, S1S2  Abd: +BS, soft, nontender/nondistended  Extremities: No pitting edema noted, chronic lymphedema  Neuro: Awake  Skin: Warm  Vascular access: R-IJ non-tunneled dialysis catheter    LABS/STUDIES  --------------------------------------------------------------------------------              8.9    6.30  >-----------<  240      [01-29-25 @ 07:13]              29.7     139  |  100  |  46  ----------------------------<  94      [01-29-25 @ 07:11]  4.5   |  26  |  2.72        Ca     9.3     [01-29-25 @ 07:11]      Mg     2.5     [01-29-25 @ 07:11]      Phos  4.6     [01-29-25 @ 07:11]    PTT: 41.9       [01-28-25 @ 07:17]    Creatinine Trend:  SCr 2.72 [01-29 @ 07:11]  SCr 3.84 [01-28 @ 07:18]  SCr 4.87 [01-27 @ 07:03]  SCr 4.69 [01-26 @ 07:31]  SCr 4.51 [01-25 @ 08:29]    Iron 65, TIBC 244, %sat 27      [01-25-25 @ 08:29]  Ferritin 53      [01-25-25 @ 08:29]    HBsAg Nonreact      [01-27-25 @ 12:09]

## 2025-01-29 NOTE — PROGRESS NOTE ADULT - PROBLEM SELECTOR PLAN 1
We received the picture of her wound via email. See telephone documentation for picture and further documentation.    Patient with LUIS FERNANDO on CKD iso CRS with hypervolemia noted on exam. Varghese FANG/Sunrise reviewed. Patient with CKD4 iso CHF, HTN and DM. Follows with Dr. Galarza last seen 3/26/24. Scr fluctuates between 2.4-2.8 (eGFR: 16-19), last outpt Scr increased to 3.26 (eGFR: 13) on 10/7/24. Admission Scr elevated 3.0 (1/14), improved to 2.86 (1/15) and then progressively increased to 3.9 (1/20). Labs also significant for rising pro- (1/14) -> 4483 (1/20). UA w/ proteinuria and hematuria. Kidney sonogram on 1/20 with B/L renal cysts, echogenic, ~10cm in size. CXR w/ R pleural calcifications and thickening (chronic finding), w/ lower lung opacities. Home meds include Lasix 80mg QD w/ additional 40mg PRN.     Scr noted to be ~2.8-3 initially. On 1/19 Scr increased to 3.6 and progressively worsened to 4.8 and BUN keegan to 118 today. She was initiated on IV diuretics on 1/23/25. Received PO diuretics on 1/25 with resumption of IV Lasix 80mg BID as she became progressively more fluid overloaded and uremic. Underwent HD cath placement on 1/27 followed by 1st HD. Continue on Phoslo. Consider decreasing dose of Hydralazine given soft BP. Plan for 3rd consecutive HD treatment today then will continue on MWF schedule.    Patient and family leaning toward PD (other daughter will do PD for mother however lives in [place]). Will need teaching outpatient with nephrologist Dr. Galarza. Would recommend rehab with in-center HD. Monitor labs and urine output. Avoid nephrotoxins. Dose medications as per eGFR. Discussed with primary team. Patient with LUIS FERNANDO on CKD iso CRS with hypervolemia noted on exam. Varghese FANG/Sunrise reviewed. Patient with CKD4 iso CHF, HTN and DM. Follows with Dr. Galarza last seen 3/26/24. Scr fluctuates between 2.4-2.8 (eGFR: 16-19), last outpt Scr increased to 3.26 (eGFR: 13) on 10/7/24. Admission Scr elevated 3.0 (1/14), improved to 2.86 (1/15) and then progressively increased to 3.9 (1/20). Labs also significant for rising pro- (1/14) -> 4483 (1/20). UA w/ proteinuria and hematuria. Kidney sonogram on 1/20 with B/L renal cysts, echogenic, ~10cm in size. CXR w/ R pleural calcifications and thickening (chronic finding), w/ lower lung opacities. Home meds include Lasix 80mg QD w/ additional 40mg PRN.     Scr noted to be ~2.8-3 initially. On 1/19 Scr increased to 3.6 and progressively worsened to 4.8 and BUN keegan to 118 . She was initiated on IV diuretics on 1/23/25. Received PO diuretics on 1/25 with resumption of IV Lasix 80mg BID as she became progressively more fluid overloaded and uremic over the weekend.   Likely ESRD now     Underwent HD cath placement on 1/27 followed by 1st HD. Her 2nd treatment yesterday went off well.  Continue on Phoslo. Consider decreasing dose /stopping of Hydralazine  given soft BP. Plan for 3rd consecutive HD treatment today then will continue on MWF schedule.    Patient and family leaning toward PD (other daughter will do PD for mother however lives in [place]). Will need teaching outpatient with nephrologist Dr. Galarza. Would recommend rehab with in-center HD. Monitor labs and urine output. Avoid nephrotoxins. Dose medications as per eGFR. Discussed with primary team.

## 2025-01-29 NOTE — PROGRESS NOTE ADULT - SUBJECTIVE AND OBJECTIVE BOX
Interval history:    No events overnight night.  Daughter Antoinette at bedside. Per Antoinette, she has noted some brief period of confusion and hallucination yesterday but quickly returns to baseline. No other complains from the patient. She has tolerated HD session well.     VITALS  Vital Signs Last 24 Hrs  T(C): 36.4 (29 Jan 2025 13:09), Max: 36.9 (29 Jan 2025 00:18)  T(F): 97.5 (29 Jan 2025 13:09), Max: 98.4 (29 Jan 2025 00:18)  HR: 69 (29 Jan 2025 13:09) (67 - 92)  BP: 108/61 (29 Jan 2025 13:09) (97/42 - 117/65)  BP(mean): --  RR: 18 (29 Jan 2025 13:09) (16 - 18)  SpO2: 93% (29 Jan 2025 13:09) (91% - 95%)    Parameters below as of 29 Jan 2025 13:09  Patient On (Oxygen Delivery Method): nasal cannula      Physical examination:    GENERAL: Vitals stable. In acute distress, AOx3, on oxygen via nasal cannula  HEAD: Atraumatic, Normocephalic.  NECK: Supple, + JVD. +RIL catheter  CHEST/LUNG: +crackles  HEART: regular rate and rhythm murmurs, S3 or S4 gallops   ABDOMEN: Soft, nontender, nondistended.   EXTREMITIES: No edema. 2+ Peripheral Pulse, no skin discoloration    MEDICATIONS:    MEDICATIONS  (STANDING):  apixaban 2.5 milliGRAM(s) Oral every 12 hours  carvedilol 12.5 milliGRAM(s) Oral every 12 hours  chlorhexidine 4% Liquid 1 Application(s) Topical <User Schedule>  cholecalciferol 2000 Unit(s) Oral daily  dextrose 5%. 1000 milliLiter(s) (100 mL/Hr) IV Continuous <Continuous>  dextrose 5%. 1000 milliLiter(s) (50 mL/Hr) IV Continuous <Continuous>  dextrose 50% Injectable 25 Gram(s) IV Push once  dextrose 50% Injectable 12.5 Gram(s) IV Push once  dextrose 50% Injectable 25 Gram(s) IV Push once  dorzolamide 2% Ophthalmic Solution 1 Drop(s) Both EYES <User Schedule>  epoetin oliver (PROCRIT) Injectable 4000 Unit(s) IV Push <User Schedule>  fluticasone propionate/ salmeterol 250-50 MICROgram(s) Diskus 1 Dose(s) Inhalation two times a day  glucagon  Injectable 1 milliGRAM(s) IntraMuscular once  hydrALAZINE 50 milliGRAM(s) Oral two times a day  insulin lispro (ADMELOG) corrective regimen sliding scale   SubCutaneous three times a day before meals  insulin lispro (ADMELOG) corrective regimen sliding scale   SubCutaneous at bedtime  montelukast 10 milliGRAM(s) Oral daily  NIFEdipine XL 60 milliGRAM(s) Oral daily  polyethylene glycol 3350 17 Gram(s) Oral daily  sevelamer carbonate 800 milliGRAM(s) Oral three times a day with meals      Pertient labs personally reviewed by me    CBC:            8.9    6.30  )-----------( 240      ( 01-29-25 @ 07:13 )             29.7       Chem:         ( 01-29-25 @ 07:11 )    139  |  100  |  46[H]  ----------------------------<  94  4.5   |  26  |  2.72[H]      Cardiac testing personally reviewed by me  Tele: reviewed    TTE 1/23/2025     1. Left ventricular cavity is normal in size. Left ventricular wall thickness is normal. Left ventricular systolic function is normal with an ejection fraction of 65 % by Laureano's method of disks. There are no regional wall motion abnormalities seen.   2. The left ventricular diastolic function is indeterminate, with indeterminate left ventricular filling pressure.   3. Mildly enlarged right ventricular cavity size, with normal wall thickness, and normal right ventricular systolic function.   4. The right atrium is severely dilated.   5. No significant valvular disease.   6. No pericardial effusion seen.   7. Estimated pulmonary artery systolic pressure is 39 mmHg.   8. Compared to the transthoracic echocardiogram performed on 10/12/2021, there have been no significant interval changes.

## 2025-01-29 NOTE — PROGRESS NOTE ADULT - PROBLEM SELECTOR PLAN 1
LUIS FERNANDO on CKD4 now requiring dialysis due to uremia  s/p RIJ jose robertoley placement in IR 1/27 and 1st HD session   Per renal plan for HD today  Family interested in potential PD (daughter to do PD for mother) in the long run - will need teaching with o/p nephrologist Dr. Galarza; per nephro recommending rehab with in-center HD for patient

## 2025-01-30 NOTE — PROGRESS NOTE ADULT - PROBLEM SELECTOR PLAN 3
Hgb low. Iron sat ok but ferritin low.   Started on EPO 4k with HD.  Can give Venofer.    If you have any questions, please feel free to contact me.  Larry Alcala MD  Nephrology Fellow  [phone] / [employer] Teams (Preferred)  (Please check the on-call schedule to reach the appropriate Nephrology Fellow)

## 2025-01-30 NOTE — DIETITIAN NUTRITION RISK NOTIFICATION - TREATMENT: THE FOLLOWING DIET HAS BEEN RECOMMENDED
Diet, Regular:   Consistent Carbohydrate {No Snacks} (CSTCHO)  For patients receiving Renal Replacement - No Protein Restr, No Conc K, No Conc Phos, Low Sodium (RENAL)  Low Sodium  Supplement Feeding Modality:  Oral  Nepro Cans or Servings Per Day:  1       Frequency:  Daily (01-26-25 @ 16:38) [Active]

## 2025-01-30 NOTE — PROGRESS NOTE ADULT - SUBJECTIVE AND OBJECTIVE BOX
[facility] Division of Kidney Diseases & Hypertension  FOLLOW UP NOTE  [phone]--------------------------------------------------------------------------------    Chief Complaint: LUIS FERNANDO on CKD, now on HD    24 hour events/subjective: Patient seen and examined earlier today. Appears comfortable, more alert today. Reports improvement in SOB. No HA, fevers/chills, CP, abdominal pain.    PAST HISTORY  --------------------------------------------------------------------------------  No significant changes to PMH, PSH, FHx, SHx, unless otherwise noted    ALLERGIES & MEDICATIONS  --------------------------------------------------------------------------------  Allergies  Levaquin (Rash)    Intolerances    Standing Inpatient Medications  apixaban 2.5 milliGRAM(s) Oral every 12 hours  buMETAnide IVPB 4 milliGRAM(s) IV Intermittent daily  carvedilol 12.5 milliGRAM(s) Oral every 12 hours  chlorhexidine 4% Liquid 1 Application(s) Topical <User Schedule>  cholecalciferol 2000 Unit(s) Oral daily  dextrose 5%. 1000 milliLiter(s) IV Continuous <Continuous>  dextrose 5%. 1000 milliLiter(s) IV Continuous <Continuous>  dextrose 50% Injectable 25 Gram(s) IV Push once  dextrose 50% Injectable 12.5 Gram(s) IV Push once  dextrose 50% Injectable 25 Gram(s) IV Push once  dorzolamide 2% Ophthalmic Solution 1 Drop(s) Both EYES <User Schedule>  epoetin oliver (PROCRIT) Injectable 4000 Unit(s) IV Push <User Schedule>  fluticasone propionate/ salmeterol 250-50 MICROgram(s) Diskus 1 Dose(s) Inhalation two times a day  glucagon  Injectable 1 milliGRAM(s) IntraMuscular once  hydrALAZINE 50 milliGRAM(s) Oral two times a day  insulin lispro (ADMELOG) corrective regimen sliding scale   SubCutaneous three times a day before meals  insulin lispro (ADMELOG) corrective regimen sliding scale   SubCutaneous at bedtime  montelukast 10 milliGRAM(s) Oral daily  NIFEdipine XL 60 milliGRAM(s) Oral daily  polyethylene glycol 3350 17 Gram(s) Oral daily  sevelamer carbonate 800 milliGRAM(s) Oral three times a day with meals    PRN Inpatient Medications  acetaminophen     Tablet .. 650 milliGRAM(s) Oral every 6 hours PRN  albuterol/ipratropium for Nebulization 3 milliLiter(s) Nebulizer every 6 hours PRN  dextrose Oral Gel 15 Gram(s) Oral once PRN  senna 2 Tablet(s) Oral at bedtime PRN  sodium chloride 0.9% Bolus. 100 milliLiter(s) IV Bolus every 5 minutes PRN  sodium chloride 0.9% lock flush 10 milliLiter(s) IV Push every 1 hour PRN    REVIEW OF SYSTEMS  --------------------------------------------------------------------------------  Gen: No fevers/chills  Head/Eyes/Ears: No HA  Respiratory: +SOB improved  CV: No CP  GI: No abdominal pain, diarrhea, nausea  : No dysuria, hematuria  MSK: No edema    VITALS/PHYSICAL EXAM  --------------------------------------------------------------------------------  T(C): 36.8 (01-30-25 @ 12:45), Max: 36.8 (01-30-25 @ 12:45)  HR: 65 (01-30-25 @ 12:45) (61 - 126)  BP: 120/63 (01-30-25 @ 12:45) (100/55 - 120/63)  RR: 18 (01-30-25 @ 12:45) (16 - 18)  SpO2: 96% (01-30-25 @ 12:45) (95% - 100%)  Wt(kg): --    01-29-25 @ 07:01  -  01-30-25 @ 07:00  --------------------------------------------------------  IN: 0 mL / OUT: 1000 mL / NET: -1000 mL    Physical Exam:  Gen: NAD  HEENT: Anicteric  Pulm: Crackles B/L, on NC  CV: RRR, S1S2  Abd: +BS, soft, nontender/nondistended  Extremities: No pitting edema noted, chronic lymphedema  Neuro: Awake  Skin: Warm  Vascular access: R-IJ non-tunneled dialysis catheter    LABS/STUDIES  --------------------------------------------------------------------------------              8.9    5.77  >-----------<  220      [01-30-25 @ 07:10]              29.8     140  |  102  |  26  ----------------------------<  131      [01-30-25 @ 07:12]  4.2   |  27  |  2.64        Ca     9.2     [01-30-25 @ 07:12]      Mg     2.3     [01-30-25 @ 07:12]      Phos  3.7     [01-30-25 @ 07:12]    Creatinine Trend:  SCr 2.64 [01-30 @ 07:12]  SCr 2.72 [01-29 @ 07:11]  SCr 3.84 [01-28 @ 07:18]  SCr 4.87 [01-27 @ 07:03]  SCr 4.69 [01-26 @ 07:31]    Iron 65, TIBC 244, %sat 27      [01-25-25 @ 08:29]  Ferritin 53      [01-25-25 @ 08:29]    HBsAg Nonreact      [01-27-25 @ 12:09]

## 2025-01-30 NOTE — CONSULT NOTE ADULT - ASSESSMENT
89F with HTN, DM2, AFib, HFpEF, PVD, BRASH syndrome admitted with sepsis from lower extremity cellulitis with course complicated by LUIS FERNANDO on CKD4 requiring new dialysis. (Taken from internal medicine note). Palliative consulted for assistance with GOC.

## 2025-01-30 NOTE — PROGRESS NOTE ADULT - SUBJECTIVE AND OBJECTIVE BOX
[facility]-- WOUND TEAM -- FOLLOW UP NOTE  --------------------------------------------------------------------------------    24 hour events/subjective:    alert  afebrile  tolerating po w/o n/v  incontinent  no c/o drainage, odor, pain  tolerating ace wrapping  Daughters at bedside- visualized wounds and all questions answered to their expressed satisfaction      Diet:  Diet, Regular:   Consistent Carbohydrate No Snacks (CSTCHO)  For patients receiving Renal Replacement - No Protein Restr, No Conc K, No Conc Phos, Low Sodium (RENAL)  Low Sodium  Supplement Feeding Modality:  Oral  Nepro Cans or Servings Per Day:  1 Frequency:  Daily (01-26-25 @ 16:38)      ROS: General/ SKIN/ MSK/ Neuro/ GI see HPI  all other systems negative      ALLERGIES & MEDICATIONS  --------------------------------------------------------------------------------  Allergies  Levaquin (Rash)        STANDING INPATIENT MEDICATIONS  apixaban 2.5 milliGRAM(s) Oral every 12 hours  buMETAnide IVPB 4 milliGRAM(s) IV Intermittent daily  carvedilol 12.5 milliGRAM(s) Oral every 12 hours  chlorhexidine 4% Liquid 1 Application(s) Topical <User Schedule>  cholecalciferol 2000 Unit(s) Oral daily  dextrose 5%. 1000 milliLiter(s) IV Continuous <Continuous>  dextrose 5%. 1000 milliLiter(s) IV Continuous <Continuous>  dextrose 50% Injectable 25 Gram(s) IV Push once  dextrose 50% Injectable 12.5 Gram(s) IV Push once  dextrose 50% Injectable 25 Gram(s) IV Push once  dorzolamide 2% Ophthalmic Solution 1 Drop(s) Both EYES <User Schedule>  epoetin oliver (PROCRIT) Injectable 4000 Unit(s) IV Push <User Schedule>  fluticasone propionate/ salmeterol 250-50 MICROgram(s) Diskus 1 Dose(s) Inhalation two times a day  glucagon  Injectable 1 milliGRAM(s) IntraMuscular once  hydrALAZINE 50 milliGRAM(s) Oral two times a day  insulin lispro (ADMELOG) corrective regimen sliding scale   SubCutaneous three times a day before meals  insulin lispro (ADMELOG) corrective regimen sliding scale   SubCutaneous at bedtime  montelukast 10 milliGRAM(s) Oral daily  NIFEdipine XL 60 milliGRAM(s) Oral daily  polyethylene glycol 3350 17 Gram(s) Oral daily  sevelamer carbonate 800 milliGRAM(s) Oral three times a day with meals      PRN INPATIENT MEDICATION  acetaminophen Tablet 650 milliGRAM(s) Oral every 6 hours PRN  albuterol/ipratropium for Nebulization 3 milliLiter(s) Nebulizer every 6 hours PRN  dextrose Oral Gel 15 Gram(s) Oral once PRN  senna 2 Tablet(s) Oral at bedtime PRN  sodium chloride 0.9% Bolus. 100 milliLiter(s) IV Bolus every 5 minutes PRN  sodium chloride 0.9% lock flush 10 milliLiter(s) IV Push every 1 hour PRN        VITALS/PHYSICAL EXAM  --------------------------------------------------------------------------------  T(C): 36.8 (01-30-25 @ 12:45), Max: 36.8 (01-30-25 @ 12:45)  HR: 65 (01-30-25 @ 12:45) (61 - 126)  BP: 120/63 (01-30-25 @ 12:45) (100/55 - 120/63)  RR: 18 (01-30-25 @ 12:45) (16 - 18)  SpO2: 96% (01-30-25 @ 12:45) (95% - 100%)  Wt(kg): --        NAD, Alert, Obese, frail,  WD/ WN/ WG,  [facility] P500 bed  HEENT:  NC/AT, EOMI, sclera clear, mucosa moist, throat clear, trachea midline, neck supple  Respiratory: nonlabored w/ equal chest rise  Gastrointestinal: soft NT/ND   Neurology: weakened strength & sensation grossly intact  Psych: calm/ appropriate  Musculoskeletal:  limited stiff / p/FROM, no deformities/ contractures  Vascular: BLE equally cool,  no cyanosis, clubbing, nor acute ischemia        BLE edema equal         no BLE DP/PT pulses palpable         BLE hemosiderin staining and hypopigmented intact skin      Rt calf/ achilles & Lateral Lt leg wounds       partial thickness moist pink       w/ scant serosanguinous weeping        no blistering    No odor, erythema, increased warmth, tenderness, induration, fluctuance, nor crepitus  Skin:  moist w/ good turgor  Sacrum stage 3 pressure injury w/ hyperpigmented intact skin of buttocks / ischium     sacral wound 0.5cm x 0.5cm x 0.1cm      moist granular      no blistering  or active drainage  No odor, erythema, increased warmth, tenderness, induration, fluctuance, nor crepitus      LABS/ CULTURES/ RADIOLOGY:              8.9    5.77  >-----------<  220      [01-30-25 @ 07:10]              29.8     140  |  102  |  26  ----------------------------<  131      [01-30-25 @ 07:12]  4.2   |  27  |  2.64        Ca     9.2     [01-30-25 @ 07:12]      Mg     2.3     [01-30-25 @ 07:12]      Phos  3.7     [01-30-25 @ 07:12]      CAPILLARY BLOOD GLUCOSE  POCT Blood Glucose.: 165 mg/dL (30 Jan 2025 11:40)  POCT Blood Glucose.: 128 mg/dL (30 Jan 2025 07:30)  POCT Blood Glucose.: 168 mg/dL (29 Jan 2025 22:09)  POCT Blood Glucose.: 122 mg/dL (29 Jan 2025 16:47)      A1C with Estimated Average Glucose Result: 5.9 % (01-15-25 @ 06:35)

## 2025-01-30 NOTE — PROGRESS NOTE ADULT - SUBJECTIVE AND OBJECTIVE BOX
[facility] Division of Hospital Medicine  Arcelia Sidhu MD  Available via MS Teams      SUBJECTIVE / OVERNIGHT EVENTS: No acute events overnight    ADDITIONAL REVIEW OF SYSTEMS:    MEDICATIONS  (STANDING):  apixaban 2.5 milliGRAM(s) Oral every 12 hours  buMETAnide IVPB 4 milliGRAM(s) IV Intermittent daily  carvedilol 12.5 milliGRAM(s) Oral every 12 hours  chlorhexidine 4% Liquid 1 Application(s) Topical <User Schedule>  cholecalciferol 2000 Unit(s) Oral daily  dextrose 5%. 1000 milliLiter(s) (50 mL/Hr) IV Continuous <Continuous>  dextrose 5%. 1000 milliLiter(s) (100 mL/Hr) IV Continuous <Continuous>  dextrose 50% Injectable 25 Gram(s) IV Push once  dextrose 50% Injectable 12.5 Gram(s) IV Push once  dextrose 50% Injectable 25 Gram(s) IV Push once  dorzolamide 2% Ophthalmic Solution 1 Drop(s) Both EYES <User Schedule>  epoetin oliver (PROCRIT) Injectable 4000 Unit(s) IV Push <User Schedule>  fluticasone propionate/ salmeterol 250-50 MICROgram(s) Diskus 1 Dose(s) Inhalation two times a day  glucagon  Injectable 1 milliGRAM(s) IntraMuscular once  hydrALAZINE 50 milliGRAM(s) Oral two times a day  insulin lispro (ADMELOG) corrective regimen sliding scale   SubCutaneous at bedtime  insulin lispro (ADMELOG) corrective regimen sliding scale   SubCutaneous three times a day before meals  montelukast 10 milliGRAM(s) Oral daily  NIFEdipine XL 60 milliGRAM(s) Oral daily  polyethylene glycol 3350 17 Gram(s) Oral daily  sevelamer carbonate 800 milliGRAM(s) Oral three times a day with meals    MEDICATIONS  (PRN):  acetaminophen     Tablet .. 650 milliGRAM(s) Oral every 6 hours PRN Temp greater or equal to 38C (100.4F), Mild Pain (1 - 3)  albuterol/ipratropium for Nebulization 3 milliLiter(s) Nebulizer every 6 hours PRN Shortness of Breath and/or Wheezing  dextrose Oral Gel 15 Gram(s) Oral once PRN Blood Glucose LESS THAN 70 milliGRAM(s)/deciliter  senna 2 Tablet(s) Oral at bedtime PRN Constipation  sodium chloride 0.9% Bolus. 100 milliLiter(s) IV Bolus every 5 minutes PRN SBP LESS THAN or EQUAL to 90 mmHg  sodium chloride 0.9% lock flush 10 milliLiter(s) IV Push every 1 hour PRN Pre/post blood products, medications, blood draw, and to maintain line patency      I&O's Summary    29 Jan 2025 07:01  -  30 Jan 2025 07:00  --------------------------------------------------------  IN: 0 mL / OUT: 1000 mL / NET: -1000 mL        PHYSICAL EXAM:  Vital Signs Last 24 Hrs  T(C): 37.6 (30 Jan 2025 16:45), Max: 37.6 (30 Jan 2025 16:45)  T(F): 99.6 (30 Jan 2025 16:45), Max: 99.6 (30 Jan 2025 16:45)  HR: 77 (30 Jan 2025 17:30) (63 - 126)  BP: 113/55 (30 Jan 2025 17:30) (105/56 - 120/63)  BP(mean): --  RR: 18 (30 Jan 2025 17:30) (18 - 18)  SpO2: 98% (30 Jan 2025 17:30) (91% - 98%)    Parameters below as of 30 Jan 2025 17:30  Patient On (Oxygen Delivery Method): nasal cannula  O2 Flow (L/min): 6    CONSTITUTIONAL: Well-groomed, but lethargic  EYES: No conjunctival or scleral injection, non-icteric  ENMT: Oral mucosa with moist membranes  RESPIRATORY: B/l crackles   CARDIOVASCULAR: +S1S2, RRR, systolic murmur; pedal pulses full and symmetric; chronic BLE lymphedema, ACE wraps in place  GASTROINTESTINAL: Soft, nontender, but notably larger/more distended than prior, +BS throughout, no rebound/guarding  MUSCULOSKELETAL: no digital clubbing or cyanosis  SKIN: No rashes or ulcers noted  PSYCHIATRIC: Alert    LABS:                        8.9    5.77  )-----------( 220      ( 30 Jan 2025 07:10 )             29.8     01-30    140  |  102  |  26[H]  ----------------------------<  131[H]  4.2   |  27  |  2.64[H]    Ca    9.2      30 Jan 2025 07:12  Phos  3.7     01-30  Mg     2.3     01-30            Urinalysis Basic - ( 30 Jan 2025 07:12 )    Color: x / Appearance: x / SG: x / pH: x  Gluc: 131 mg/dL / Ketone: x  / Bili: x / Urobili: x   Blood: x / Protein: x / Nitrite: x   Leuk Esterase: x / RBC: x / WBC x   Sq Epi: x / Non Sq Epi: x / Bacteria: x            RADIOLOGY & ADDITIONAL TESTS:  New Imaging Personally Reviewed Today:  New Electrocardiogram Personally Reviewed Today:  Other Results Reviewed Today:   Prior or Outpatient Records Reviewed Today with Summary:    COORDINATION OF CARE:  Consultant Communication and Details of Discussion (where applicable):

## 2025-01-30 NOTE — PROGRESS NOTE ADULT - PROBLEM SELECTOR PLAN 1
LUIS FERNANDO on CKD4 now requiring dialysis due to uremia  s/p RIJ shiley placement in IR 1/27 and 1st HD session   Per renal plan for HD today  Family interested in potential PD (daughter to do PD for mother) in the long run - will need teaching with o/p nephrologist Dr. Galarza; per nephro recommending rehab with in-center HD for patient  IR consulted for permacath  Bumex 4mg IV qd per nephro

## 2025-01-30 NOTE — PROGRESS NOTE ADULT - PROBLEM SELECTOR PLAN 1
Patient with LUIS FERNANDO on CKD iso CRS with hypervolemia noted on exam. Varghese FANG/Sunrise reviewed. Patient with CKD4 iso CHF, HTN and DM. Follows with Dr. Galarza last seen 3/26/24. Scr fluctuates between 2.4-2.8 (eGFR: 16-19), last outpt Scr increased to 3.26 (eGFR: 13) on 10/7/24. Admission Scr elevated 3.0 (1/14), improved to 2.86 (1/15) and then progressively increased to 3.9 (1/20). Labs also significant for rising pro- (1/14) -> 4483 (1/20). UA w/ proteinuria and hematuria. Kidney sonogram on 1/20 with B/L renal cysts, echogenic, ~10cm in size. CXR w/ R pleural calcifications and thickening (chronic finding), w/ lower lung opacities. Home meds include Lasix 80mg QD w/ additional 40mg PRN.     Scr noted to be ~2.8-3 initially. On 1/19 Scr increased to 3.6 and progressively worsened to 4.8 and BUN keegan to 118. She was initiated on IV diuretics on 1/23/25. Received PO diuretics on 1/25 with resumption of IV Lasix 80mg BID as she became progressively more fluid overloaded and uremic over the weekend.     Likely ESRD now. On MWF schedule.  Underwent HD cath placement on 1/27 followed by 1st HD. Her 2nd treatment yesterday went off well. Continue on Phoslo. Consider decreasing dose/stopping of Hydralazine given soft BP. Next HD on 1/31.    Patient and family leaning toward PD (other daughter will do PD for mother however lives in [place]). Will need teaching outpatient with nephrologist Dr. Galarza. Would recommend rehab with in-center HD. Monitor labs and urine output. Avoid nephrotoxins. Dose medications as per eGFR. Discussed with primary team. Patient with LUIS FERNANDO on CKD iso CRS with hypervolemia noted on exam. Varghese FANG/Sunrise reviewed. Patient with CKD4 iso CHF, HTN and DM. Follows with Dr. Galarza last seen 3/26/24. Scr fluctuates between 2.4-2.8 (eGFR: 16-19), last outpt Scr increased to 3.26 (eGFR: 13) on 10/7/24. Admission Scr elevated 3.0 (1/14), improved to 2.86 (1/15) and then progressively increased to 3.9 (1/20). Labs also significant for rising pro- (1/14) -> 4483 (1/20). UA w/ proteinuria and hematuria. Kidney sonogram on 1/20 with B/L renal cysts, echogenic, ~10cm in size. CXR w/ R pleural calcifications and thickening (chronic finding), w/ lower lung opacities. Home meds include Lasix 80mg QD w/ additional 40mg PRN.     Scr noted to be ~2.8-3 initially. On 1/19 Scr increased to 3.6 and progressively worsened to 4.8 and BUN keegan to 118. She was initiated on IV diuretics on 1/23/25. Received PO diuretics on 1/25 with resumption of IV Lasix 80mg BID as she became progressively more fluid overloaded and uremic over the weekend.     Underwent HD cath placement on 1/27 followed by 1st HD. Completed 3 treatments  Likely ESRD now. On MWF schedule.  Continue on Phoslo. Consider decreasing dose/stopping of Hydralazine given soft BP. Next HD on 1/31.  Can give bumex 4 mg IV x 1 dose today  Please schedule for PC    Patient and family leaning toward PD (other daughter will do PD for mother however lives in [place]). Will need teaching outpatient with nephrologist Dr. Galarza. Would recommend rehab with in-center HD. Monitor labs and urine output. Avoid nephrotoxins. Dose medications as per eGFR. Discussed with primary team.

## 2025-01-30 NOTE — CONSULT NOTE ADULT - CONVERSATION DETAILS
Spoke with pt initially introduced myself and the role of palliative care. Pt identified she is amenable with current level of care.   Spoke with pt's daughter and sister via phone. Family inquiring about palliative supports in the community. After further discussion family shared they were hopeful for more help at home and home visits. Shared Pao/pall office outpt as well as option of house calls that can be explored further with d/c planners.   Discussed role of palliative care, reviewed pt has an advanced illness therefore palliative can be involved in the care of a pt with an advanced illness, discussed role will be ongoing goc and symptom management. At this time pt does not demonstrate symptoms. Pao/Pall address and phone number provided in the event family interested in f/u.   At this time goals are for pt to remain full code, with dispo to [facility]AI.

## 2025-01-30 NOTE — CONSULT NOTE ADULT - SUBJECTIVE AND OBJECTIVE BOX
Date of Service: 01-30-25 @ 14:24   utilized for entirety of visit Karson [id number]  HPI: 89F with HTN, DM2, AFib, HFpEF, PVD, BRASH syndrome admitted with sepsis from lower extremity cellulitis with course complicated by LUIS FERNANDO on CKD4 requiring new dialysis. (Taken from internal medicine note). Palliative consulted for assistance with GOC.       PERTINENT PM/SXH:   Benign essential hypertension    Diabetes mellitus    Peripheral vascular disease    Personal history of asbestosis    Spinal stenosis    HTN (hypertension)    DM (diabetes mellitus)    TIA (transient ischemic attack)    Pulmonary fibrosis    Peripheral edema    Degenerative arthritis of right knee    Degenerative arthritis of left knee    Osteoporosis    KENNETH (obstructive sleep apnea)    Hypertension    Chronic kidney disease (CKD)    T2DM (type 2 diabetes mellitus)    PVD (peripheral vascular disease)      H/O abdominal hysterectomy    S/P hysterectomy    S/P spinal fusion    S/P cataract surgery    History of hysterectomy      FAMILY HISTORY:      ITEMS NOT CHECKED ARE NOT PRESENT    SOCIAL HISTORY:   Significant other/partner[ ]  Children[ x]  Church/Spirituality:  Substance hx:  [ ]   Tobacco hx:  [ ]   Alcohol hx: [ ]   Home Opioid hx:  [ ] I-Stop Reference No:  Living Situation: [x ]Home  [ ]Long term care  [ ]Rehab [ ]Other    ADVANCE DIRECTIVES:    DNR/MOLST  [ ]  Living Will  [ ]   DECISION MAKER(s):  [ ] Health Care Proxy(s)  [ x] Surrogate(s)  [ ] Guardian           Name(s): Phone Number(s):  pt's daughter Antoinette   BASELINE (I)ADL(s) (prior to admission):  [place]: [ ]Total  [x ] Moderate [ ]Dependent    Allergies    Levaquin (Rash)    Intolerances    MEDICATIONS  (STANDING):  apixaban 2.5 milliGRAM(s) Oral every 12 hours  buMETAnide IVPB 4 milliGRAM(s) IV Intermittent daily  carvedilol 12.5 milliGRAM(s) Oral every 12 hours  chlorhexidine 4% Liquid 1 Application(s) Topical <User Schedule>  cholecalciferol 2000 Unit(s) Oral daily  dextrose 5%. 1000 milliLiter(s) (100 mL/Hr) IV Continuous <Continuous>  dextrose 5%. 1000 milliLiter(s) (50 mL/Hr) IV Continuous <Continuous>  dextrose 50% Injectable 25 Gram(s) IV Push once  dextrose 50% Injectable 12.5 Gram(s) IV Push once  dextrose 50% Injectable 25 Gram(s) IV Push once  dorzolamide 2% Ophthalmic Solution 1 Drop(s) Both EYES <User Schedule>  epoetin oliver (PROCRIT) Injectable 4000 Unit(s) IV Push <User Schedule>  fluticasone propionate/ salmeterol 250-50 MICROgram(s) Diskus 1 Dose(s) Inhalation two times a day  glucagon  Injectable 1 milliGRAM(s) IntraMuscular once  hydrALAZINE 50 milliGRAM(s) Oral two times a day  insulin lispro (ADMELOG) corrective regimen sliding scale   SubCutaneous three times a day before meals  insulin lispro (ADMELOG) corrective regimen sliding scale   SubCutaneous at bedtime  montelukast 10 milliGRAM(s) Oral daily  NIFEdipine XL 60 milliGRAM(s) Oral daily  polyethylene glycol 3350 17 Gram(s) Oral daily  sevelamer carbonate 800 milliGRAM(s) Oral three times a day with meals    MEDICATIONS  (PRN):  acetaminophen     Tablet .. 650 milliGRAM(s) Oral every 6 hours PRN Temp greater or equal to 38C (100.4F), Mild Pain (1 - 3)  albuterol/ipratropium for Nebulization 3 milliLiter(s) Nebulizer every 6 hours PRN Shortness of Breath and/or Wheezing  dextrose Oral Gel 15 Gram(s) Oral once PRN Blood Glucose LESS THAN 70 milliGRAM(s)/deciliter  senna 2 Tablet(s) Oral at bedtime PRN Constipation  sodium chloride 0.9% Bolus. 100 milliLiter(s) IV Bolus every 5 minutes PRN SBP LESS THAN or EQUAL to 90 mmHg  sodium chloride 0.9% lock flush 10 milliLiter(s) IV Push every 1 hour PRN Pre/post blood products, medications, blood draw, and to maintain line patency    PRESENT SYMPTOMS: [ ]Unable to self-report see CPOT, PAINADs, RDOS  Source if other than patient:  [ ]Family   [ ]Team     Pain: [ ]yes [ x]no  QOL impact -   Location -                    Aggravating factors -  Quality -  Radiation -  Timing-  Severity (0-10 scale):  Minimal acceptable level (0-10 scale):       Dyspnea:                           [ ]Mild [ ]Moderate [ ]Severe None   Anxiety:                             [ ]Mild [ ]Moderate [ ]Severe  Fatigue:                             [ ]Mild [ ]Moderate [ ]Severe  Nausea:                             [ ]Mild [ ]Moderate [ ]Severe  Loss of appetite:              [ ]Mild [ ]Moderate [ ]Severe  Constipation:                    [ ]Mild [ ]Moderate [ ]Severe    PCSSQ [Palliative Care Spiritual Screening Question]   Severity (0-10):  Score of 4 or > indicate consideration of Chaplaincy referral.  Chaplaincy Referral: [x ] yes [ ] refused [x ] following    Caregiver [place]? : [ ] yes [ ] no [ ] deferred:  Social work referral [ ] Patient & Family Centered Care Referral [ ]     Anticipatory Grief Present?: [ ] yes [ ] no  [ ] deferred: Palliative Social work referral [ ]  Patient & Family Centered Care Referral [ ]       Other Symptoms:  [ x]All other review of systems negative   [ ] Unable to obtain due to poor mentation    PHYSICAL EXAM:  Vital Signs Last 24 Hrs  T(C): 36.8 (30 Jan 2025 12:45), Max: 36.8 (30 Jan 2025 12:45)  T(F): 98.3 (30 Jan 2025 12:45), Max: 98.3 (30 Jan 2025 12:45)  HR: 65 (30 Jan 2025 12:45) (61 - 126)  BP: 120/63 (30 Jan 2025 12:45) (100/55 - 120/63)  BP(mean): --  RR: 18 (30 Jan 2025 12:45) (16 - 18)  SpO2: 96% (30 Jan 2025 12:45) (95% - 100%)    Parameters below as of 30 Jan 2025 12:45  Patient On (Oxygen Delivery Method): nasal cannula  O2 Flow (L/min): 6   I&O's Summary    29 Jan 2025 07:01  -  30 Jan 2025 07:00  --------------------------------------------------------  IN: 0 mL / OUT: 1000 mL / NET: -1000 mL        GENERAL:  [x]Alert  [x]Oriented x 3  [ ]Lethargic  [ ]Cachexia  [ ]Unarousable  [x]Verbal  [ ]Non-Verbal  Behavioral:   [ ]Anxiety  [ ]Delirium [ ]Agitation [ ]Other  HEENT:  [x]Normal   [ ]Dry mouth   [ ]ET Tube/Trach  [ ]Oral lesions  PULMONARY:   [ ]Clear [ ]Tachypnea  [ ]Audible excessive secretions   [ ]Rhonchi        [ ]Right [ ]Left [ ]Bilateral  [ ]Crackles        [ ]Right [ ]Left [ ]Bilateral  [ ]Wheezing     [ ]Right [ ]Left [ ]Bilateral  [x ]Diminished BS [ ] Right [ ]Left [ x]Bilateral  CARDIOVASCULAR:    [x]Regular [ ]Irregular [ ]Tachy  [ ]Saul [ ]Murmur [ ]Other  GASTROINTESTINAL:  [x]Soft  [ ]Distended   [x]+BS  [x]Non tender [ ]Tender  [ ]PEG [ ]OGT/ NGT   Last BM:    GENITOURINARY:  [ ]Normal [ ]Incontinent   [ ]Oliguria/Anuria   [ ]Yang  MUSCULOSKELETAL:   [ ]Normal   [x]Weakness  [x ]Bed/Wheelchair bound [ ]Edema  NEUROLOGIC:   [x]No focal deficits  [ ] Cognitive impairment  [ ] Dysphagia [ ]Dysarthria [ ] Paresis [ ]Other   SKIN:   [x]Normal  [ ]Rash   [ ]Pressure ulcer(s) [ ]y [ ]n present on admission    CRITICAL CARE:  [ ] Shock Present  [ ]Septic [ ]Cardiogenic [ ]Neurologic [ ]Hypovolemic  [ ]  Vasopressors [ ]  Inotropes   [ ]Respiratory failure present [ ]Mechanical ventilation [ ]Non-invasive ventilatory support [ ]High flow    [ ]Acute  [ ]Chronic [ ]Hypoxic  [ ]Hypercarbic [ ]Other  [ ]Other organ failure     LABS:                        8.9    5.77  )-----------( 220      ( 30 Jan 2025 07:10 )             29.8   01-30    140  |  102  |  26[H]  ----------------------------<  131[H]  4.2   |  27  |  2.64[H]    Ca    9.2      30 Jan 2025 07:12  Phos  3.7     01-30  Mg     2.3     01-30        Urinalysis Basic - ( 30 Jan 2025 07:12 )    Color: x / Appearance: x / SG: x / pH: x  Gluc: 131 mg/dL / Ketone: x  / Bili: x / Urobili: x   Blood: x / Protein: x / Nitrite: x   Leuk Esterase: x / RBC: x / WBC x   Sq Epi: x / Non Sq Epi: x / Bacteria: x      RADIOLOGY & ADDITIONAL STUDIES:  < from: CT Head No Cont (01.28.25 @ 19:00) >  ACC: [id number] EXAM:  CT BRAIN   ORDERED BY: SHAUNA HANKS     PROCEDURE DATE:  01/28/2025          INTERPRETATION:  CLINICAL INFORMATION: AMS/hallucinating/lethargy    COMPARISON: Head CT 9/9/2015    CONTRAST:  IV Contrast: None  .    TECHNIQUE:  Serial axial images were obtained from the skull base to the   vertex using multi-slice helical technique. Sagittal and coronal   reformats were obtained.    FINDINGS:    VENTRICLES AND SULCI: Age appropriate involutional changes.  INTRA-AXIAL: No mass effect, acute hemorrhage, or midline shift.  There   are periventricular and subcortical white matter hypodensities,   consistent with microvascular type changes.  EXTRA-AXIAL: No mass or fluid collection. Basal cisterns are normal in   appearance.    VISUALIZED SINUSES:  Scattered mucosal thickening.  TYMPANOMASTOID CAVITIES:  Clear.  VISUALIZED ORBITS: Bilateral lens replacement.  CALVARIUM: Intact.    MISCELLANEOUS: None.      IMPRESSION:  No acute intracranial hemorrhage, mass effect, or midline shift.        --- End of Report ---        < end of copied text >    PROTEIN CALORIE MALNUTRITION PRESENT: [ ]mild [ ]moderate [ ]severe [ ]underweight [ ]morbid obesity  https://www.andeal.org/vault/2440/web/files/ONC/Table_Clinical%20Characteristics%20to%20Document%20Malnutrition-White%20JV%20et%20al%202012.pdf    Height (cm): 149.9 (01-14-25 @ 20:45)  Weight (kg): 70.261 (01-14-25 @ 20:45)  BMI (kg/m2): 31.3 (01-14-25 @ 20:45)    [ x]PPSV2 < or = to 30% [ ]significant weight loss  [ ]poor nutritional intake  [ ]anasarca[ ]Artificial Nutrition      Other REFERRALS:  [ ]Hospice  [ ]Child Life  [ ]Social Work  [ ]Case management [ ]Holistic Therapy     Care Coordination Assessment 201 [C. Provider] (01-15-25 @ 11:53)      Palliative Performance Scale:  http://npcrc.org/files/news/palliative_performance_scale_ppsv2.pdf  (Ctrl +  left click to view)  Respiratory Distress Observation Tool:  https://homecareinformation.net/handouts/hen/Respiratory_Distress_Observation_Scale.pdf (Ctrl +  left click to view)  PAINAD Score:  http://geriatrictoolkit.[facility]/cog/painad.pdf (Ctrl +  left click to view)

## 2025-01-30 NOTE — CONSULT NOTE ADULT - PROBLEM SELECTOR RECOMMENDATION 4
will sign off as goals are established   case discussed with primary team   Can be reached by TEAMS M-F 9-5 Swathi Wright Any other time please page [phone] if needed

## 2025-01-30 NOTE — CONSULT NOTE ADULT - PROBLEM SELECTOR RECOMMENDATION 9
Pt w/ likely non oliguric LUIS FERNANDO on CKD iso CRS, hypervolemia noted on exam. Varghese FANG/Sunrise reviewed. Pt w. CKD4 iso cardiorenal syndrome, HTN and DM. Follows with Dr. Perera last seen 3/26/24. Scr labile btn 2.4-2.8 (eGFR: 16-19), last outpt Scr increased to 3.26 (eGFR: 13) on 10/7/24. On admission Scr elevated 3.0 (1/14), improved to 2.86 (1/15) and then progressively increased to 3.9 today (1/20). Labs also significant for rising pro- (1/14) -> 4483 (1/20). UA w/ proteinuria and hematuria. CXR w/ R pleural calcifications and thickening, w/ lower lung opacities. Hypervolemia noted on exam (LE edema and requiring NC), bibasilar crackles. Home meds include lasix 80mg qd w/ additional 40mg PRN. Lasix held on admission and resumed on 1/17. Agree with diuretic therapy for now, start lasix 20mg IVP BID. Cardiology and pulmonary consult. , if more than 300cc consider douglas catheter placement. Check renal US and PVR BID. Recommend to rpt UA, and check UPCR. Continue home sodium bicarb. Holding parameters on all antihypertensive agents. Monitor labs and urine output. Avoid nephrotoxins. Dose medications as per eGFR.    If you have any questions, please feel free to contact me  Praneeth Joy  Nephrology Fellow  FIZZA/Page [phone]  (After 5pm or on weekends please page the on-call fellow)
resolved s/p ABX   c/w wound care

## 2025-01-30 NOTE — CHART NOTE - NSCHARTNOTEFT_GEN_A_CORE
NUTRITION FOLLOW UP NOTE    PATIENT SEEN FOR: Dietitian consult received for "nutrition services / assessment"    SOURCE: [] Patient  [x] Current Medical Record  [] RN  [x] Family/support person at bedside  [x] Patient unavailable/inappropriate  [] Other:    CHART REVIEWED/EVENTS NOTED.  [] No changes to nutrition care plan to note  [x] Nutrition Status:  - PMH: Chronic kidney disease stage IV, T2DM  - Per internal medicine note , "now requiring new dialysis, s/p ARMEN Altamirano placement  and 1st HD session"   - Patient visited today (). Patient [place] speaking, and noted to be disoriented during visit. Patient's daughters present at bedside and able to provide nutrition history. Per daughters, Patient adhering to a low sodium, and low sugar diet prior to admission. Daughters report Patient with normal/ fair appetite, having 2 meals daily prior to admission. NKFA or intolerances reported. Patient's daughters deny micronutrient supplementation prior to admission. Hx of T2DM noted, Patient's daughters report she routinely checks her fingersticks.    - Nutrition focused physical exam performed per daughters' consent, mild/ moderate subcutaneous fat loss and mild muscle wasting observed     DIET ORDER:   Diet, Regular:   Consistent Carbohydrate {No Snacks} (CSTCHO)  For patients receiving Renal Replacement - No Protein Restr, No Conc K, No Conc Phos, Low Sodium (RENAL)  Low Sodium  Supplement Feeding Modality:  Oral  Nepro Cans or Servings Per Day:  1       Frequency:  Daily (25)      CURRENT DIET ORDER IS:  [] Appropriate:  [] Inadequate:  [x] Other: Please see below for recommendations     NUTRITION INTAKE/PROVISION:  [x] PO: Per nursing flowsheets, fluctuating poor/ adequate p.o. intake during admission, %. Patient's daughters report Patient with poor p.o. during admission, reports difficulty chewing current consistencies related to poor dentition. Offered Soft and Bite Sized Diet order, Patient's daughters accepting. Interdisciplinary team to be made aware. Patient's daughters report Patient consuming Nepro Shake, offered additional oral nutrition shake to optimize p.o. intake, daughters accepting.      ANTHROPOMETRICS:  Drug Dosing Weight  Height (cm): 149.9 (2025 20:45)  Weight (kg): 70.261 (2025 20:45)  BMI (kg/m2): 31.3 (2025 20:45)    Weights:   Daily Weight in k.7 (), Weight in k.7 (), Weight in k (), Weight in k (), Weight in k.2 (), Weight in k.2 (), Weight in k.8 ()     - RD notes mild weight fluctuations during admission. Weights obtained via bed scale, question accuracy.     MEDICATIONS:  MEDICATIONS  (STANDING):  apixaban 2.5 milliGRAM(s) Oral every 12 hours  carvedilol 12.5 milliGRAM(s) Oral every 12 hours  chlorhexidine 4% Liquid 1 Application(s) Topical <User Schedule>  cholecalciferol 2000 Unit(s) Oral daily  dextrose 5%. 1000 milliLiter(s) (50 mL/Hr) IV Continuous <Continuous>  dextrose 5%. 1000 milliLiter(s) (100 mL/Hr) IV Continuous <Continuous>  dextrose 50% Injectable 25 Gram(s) IV Push once  dextrose 50% Injectable 12.5 Gram(s) IV Push once  dextrose 50% Injectable 25 Gram(s) IV Push once  dorzolamide 2% Ophthalmic Solution 1 Drop(s) Both EYES <User Schedule>  epoetin oliver (PROCRIT) Injectable 4000 Unit(s) IV Push <User Schedule>  fluticasone propionate/ salmeterol 250-50 MICROgram(s) Diskus 1 Dose(s) Inhalation two times a day  glucagon  Injectable 1 milliGRAM(s) IntraMuscular once  hydrALAZINE 50 milliGRAM(s) Oral two times a day  insulin lispro (ADMELOG) corrective regimen sliding scale   SubCutaneous three times a day before meals  insulin lispro (ADMELOG) corrective regimen sliding scale   SubCutaneous at bedtime  montelukast 10 milliGRAM(s) Oral daily  NIFEdipine XL 60 milliGRAM(s) Oral daily  polyethylene glycol 3350 17 Gram(s) Oral daily  sevelamer carbonate 800 milliGRAM(s) Oral three times a day with meals    MEDICATIONS  (PRN):  acetaminophen     Tablet .. 650 milliGRAM(s) Oral every 6 hours PRN Temp greater or equal to 38C (100.4F), Mild Pain (1 - 3)  albuterol/ipratropium for Nebulization 3 milliLiter(s) Nebulizer every 6 hours PRN Shortness of Breath and/or Wheezing  dextrose Oral Gel 15 Gram(s) Oral once PRN Blood Glucose LESS THAN 70 milliGRAM(s)/deciliter  senna 2 Tablet(s) Oral at bedtime PRN Constipation  sodium chloride 0.9% Bolus. 100 milliLiter(s) IV Bolus every 5 minutes PRN SBP LESS THAN or EQUAL to 90 mmHg  sodium chloride 0.9% lock flush 10 milliLiter(s) IV Push every 1 hour PRN Pre/post blood products, medications, blood draw, and to maintain line patency      NUTRITIONALLY PERTINENT LABS:   Na140 mmol/L Glu 131 mg/dL[H] K+ 4.2 mmol/L Cr  2.64 mg/dL[H] BUN 26 mg/dL[H]  Phos 3.7 mg/dL  01-15-25 @ 06:35 a1c 5.9    A1C with Estimated Average Glucose Result: 5.9 % (01-15-25 @ 06:35)      Finger Sticks:  POCT Blood Glucose.: 165 mg/dL ( @ 11:40)  POCT Blood Glucose.: 128 mg/dL ( @ 07:30)  POCT Blood Glucose.: 168 mg/dL ( @ 22:09)  POCT Blood Glucose.: 122 mg/dL ( @ 16:47)      NUTRITIONALLY PERTINENT MEDICATIONS/LABS:  [x] Reviewed  [x] Relevant notes on medications/labs:  Pertinent Medications:   - Insulin (ADMELOG)  - Sevelamer Carbonate   - Cholecalciferol     Pertinent Labs:   - POCT  (H), Glucose 131 (H). Hyperglycemia noted during admission. HbA1c 5.9% (1/15), indicating good glycemic control  - BUN 26 (H), Cr 2.64 (H), GFR 17 (L)  - Hyperphosphatemia noted during admission, now resolved       EDEMA:  [x] Reviewed  [x] Relevant notes: Per nursing flowsheets, 2+ generalized edema noted     GI/ I&O:  [x] Reviewed  [] Relevant notes:  [x] Other: Bowel regimen ordered (Miralax, Senna)    SKIN:   [] No pressure injuries documented, per nursing flowsheet  [x] Pressure injury previously noted: Per wound care , "Sacral Stage 3 pressure injury"  [] Change in pressure injury documentation:  [] Other:    ESTIMATED NEEDS:  [] No change:  [x] Updated:  Energy: 1335-1558kcals/day (30-35kcal/kg)  Protein: 53-68g/day (1.2-1.5g/kg)  Fluid:   ml/day or [x] defer to team  Based on: Ideal body weight 44.5kg    NUTRITION DIAGNOSIS:  [x] Prior Dx: 1. Increased nutrient needs 2. Inadequate protein-energy intake  [x] New Dx: 3. Acute moderate malnutrition related to inadequate protein energy intake, advanced age as evidenced by Patient meeting < 75% of estimated energy requirement for > 7 days, mild subcutaneous fat loss, and mild muscle mass depletion    Goal: Patient to meet > 75% estimated energy requirements      EDUCATION:  [x] Yes: Reviewed the importance of high-protein foods, and oral nutrition supplement compliance to optimize nutrition and promote skin integrity. Patient's daughters demonstrated a fair-level of understanding. RD remains available should additional diet education be indicated.   [] Not appropriate/warranted    NUTRITION CARE PLAN:  1. Diet: Continue Consistent Carbohydrate, Renal Diet order  - Should Pt exhibit s/s of aspiration, recommend NPO diet order and order swallow evaluation to determine safest PO intake and consistencies   2. Supplements: Recommend Nepro Shakes Mixed Berry BID  3. Multivitamin/mineral supplementation: Recommend daily Nephro-Elsa to promote wound healing   4. Continue Cholecalciferol to prevent micronutrient deficiencies per team discretion    5. Monitor routine weights, nutrition and renal related labs, fingersticks, p.o. intake and tolerance, oral nutrition supplement compliance, and skin integrity   6. Malnutrition notification sticker placed in Patient's chart     [] Achieved - Continue current nutrition intervention(s)  [] Current medical condition precludes nutrition intervention at this time.    MONITORING AND EVALUATION:   RD remains available upon request and will follow up per protocol.    Alma Diallo, JAKY, CDN   Available on MS TEAMS

## 2025-01-31 NOTE — CONSULT NOTE ADULT - SUBJECTIVE AND OBJECTIVE BOX
HPI:   88 yo F (Chinese-speaking) with ILD (Dx 10yrs ago, uses portable O2 concentrator prn, followed by [facility] Pulmonologist in [place]), HTN, DM2, AFib, HFpEF, PVD, BRASH syndrome admitted with sepsis from lower extremity cellulitis with course complicated by LUIS FERNANDO on CKD4 requiring new dialysis and acute hypoxic respiratory failure.  Respiratory status and oxygen requirements have improved with volume removal with HD however patient remains on 4L NC and daughter (whom is a Family Medicine physician and was present at bedside, also spoke with other daughter over the phone) is concerned by her WOB.  Weaned from 6L NC yesterday.  S/p HD 1/30 w/ only 1L removed limited by tachycardia, but s/p successful 2L removal today. She is on Advair but not receiving any nebulizer treatments and reports her mother has been largely immobile during hospital stay.      PERTINENT PM/SXH:   Benign essential hypertension    Diabetes mellitus    Peripheral vascular disease    Personal history of asbestosis    Spinal stenosis    HTN (hypertension)    DM (diabetes mellitus)    TIA (transient ischemic attack)    Pulmonary fibrosis    Peripheral edema    Degenerative arthritis of right knee    Degenerative arthritis of left knee    Osteoporosis    KENNETH (obstructive sleep apnea)    Hypertension    Chronic kidney disease (CKD)    T2DM (type 2 diabetes mellitus)    PVD (peripheral vascular disease)      H/O abdominal hysterectomy    S/P hysterectomy    S/P spinal fusion    S/P cataract surgery    History of hysterectomy      FAMILY HISTORY:      ITEMS NOT CHECKED ARE NOT PRESENT    SOCIAL HISTORY:   Significant other/partner[ ]  Children[ x]  Jain/Spirituality:  Substance hx:  [ ]   Tobacco hx:  [ ]   Alcohol hx: [ ]   Home Opioid hx:  [ ] I-Stop Reference No:  Living Situation: [x ]Home  [ ]Long term care  [ ]Rehab [ ]Other    ADVANCE DIRECTIVES:    DNR/MOLST  [ ]  Living Will  [ ]   DECISION MAKER(s):  [ ] Health Care Proxy(s)  [ x] Surrogate(s)  [ ] Guardian           Name(s): Phone Number(s):  pt's daughter Antoinette MAHONEY (I)ADL(s) (prior to admission):  [place]: [ ]Total  [x ] Moderate [ ]Dependent    Allergies    Levaquin (Rash)    Intolerances    MEDICATIONS  (STANDING):  apixaban 2.5 milliGRAM(s) Oral every 12 hours  buMETAnide IVPB 4 milliGRAM(s) IV Intermittent daily  carvedilol 12.5 milliGRAM(s) Oral every 12 hours  chlorhexidine 4% Liquid 1 Application(s) Topical <User Schedule>  cholecalciferol 2000 Unit(s) Oral daily  dextrose 5%. 1000 milliLiter(s) (100 mL/Hr) IV Continuous <Continuous>  dextrose 5%. 1000 milliLiter(s) (50 mL/Hr) IV Continuous <Continuous>  dextrose 50% Injectable 25 Gram(s) IV Push once  dextrose 50% Injectable 12.5 Gram(s) IV Push once  dextrose 50% Injectable 25 Gram(s) IV Push once  dorzolamide 2% Ophthalmic Solution 1 Drop(s) Both EYES <User Schedule>  epoetin oliver (PROCRIT) Injectable 4000 Unit(s) IV Push <User Schedule>  fluticasone propionate/ salmeterol 250-50 MICROgram(s) Diskus 1 Dose(s) Inhalation two times a day  glucagon  Injectable 1 milliGRAM(s) IntraMuscular once  hydrALAZINE 50 milliGRAM(s) Oral two times a day  insulin lispro (ADMELOG) corrective regimen sliding scale   SubCutaneous three times a day before meals  insulin lispro (ADMELOG) corrective regimen sliding scale   SubCutaneous at bedtime  montelukast 10 milliGRAM(s) Oral daily  NIFEdipine XL 60 milliGRAM(s) Oral daily  polyethylene glycol 3350 17 Gram(s) Oral daily  sevelamer carbonate 800 milliGRAM(s) Oral three times a day with meals    MEDICATIONS  (PRN):  acetaminophen     Tablet .. 650 milliGRAM(s) Oral every 6 hours PRN Temp greater or equal to 38C (100.4F), Mild Pain (1 - 3)  albuterol/ipratropium for Nebulization 3 milliLiter(s) Nebulizer every 6 hours PRN Shortness of Breath and/or Wheezing  dextrose Oral Gel 15 Gram(s) Oral once PRN Blood Glucose LESS THAN 70 milliGRAM(s)/deciliter  senna 2 Tablet(s) Oral at bedtime PRN Constipation  sodium chloride 0.9% Bolus. 100 milliLiter(s) IV Bolus every 5 minutes PRN SBP LESS THAN or EQUAL to 90 mmHg  sodium chloride 0.9% lock flush 10 milliLiter(s) IV Push every 1 hour PRN Pre/post blood products, medications, blood draw, and to maintain line patency    PRESENT SYMPTOMS: [ ]Unable to self-report see CPOT, PAINADs, RDOS  Source if other than patient:  [ ]Family   [ ]Team     Pain: [ ]yes [ x]no  QOL impact -   Location -                    Aggravating factors -  Quality -  Radiation -  Timing-  Severity (0-10 scale):  Minimal acceptable level (0-10 scale):       Dyspnea:                           [ ]Mild [ ]Moderate [ ]Severe None   Anxiety:                             [ ]Mild [ ]Moderate [ ]Severe  Fatigue:                             [ ]Mild [ ]Moderate [ ]Severe  Nausea:                             [ ]Mild [ ]Moderate [ ]Severe  Loss of appetite:              [ ]Mild [ ]Moderate [ ]Severe  Constipation:                    [ ]Mild [ ]Moderate [ ]Severe    PCSSQ [Palliative Care Spiritual Screening Question]   Severity (0-10):  Score of 4 or > indicate consideration of Chaplaincy referral.  Chaplaincy Referral: [x ] yes [ ] refused [x ] following    Caregiver [place]? : [ ] yes [ ] no [ ] deferred:  Social work referral [ ] Patient & Family Centered Care Referral [ ]     Anticipatory Grief Present?: [ ] yes [ ] no  [ ] deferred: Palliative Social work referral [ ]  Patient & Family Centered Care Referral [ ]       Other Symptoms:  [ x]All other review of systems negative   [ ] Unable to obtain due to poor mentation    PHYSICAL EXAM:  Vital Signs Last 24 Hrs  T(C): 36.8 (30 Jan 2025 12:45), Max: 36.8 (30 Jan 2025 12:45)  T(F): 98.3 (30 Jan 2025 12:45), Max: 98.3 (30 Jan 2025 12:45)  HR: 65 (30 Jan 2025 12:45) (61 - 126)  BP: 120/63 (30 Jan 2025 12:45) (100/55 - 120/63)  BP(mean): --  RR: 18 (30 Jan 2025 12:45) (16 - 18)  SpO2: 96% (30 Jan 2025 12:45) (95% - 100%)    Parameters below as of 30 Jan 2025 12:45  Patient On (Oxygen Delivery Method): nasal cannula  O2 Flow (L/min): 6   I&O's Summary    29 Jan 2025 07:01  -  30 Jan 2025 07:00  --------------------------------------------------------  IN: 0 mL / OUT: 1000 mL / NET: -1000 mL        GENERAL:  [x]Alert  [x]Oriented x 3  [ ]Lethargic  [ ]Cachexia  [ ]Unarousable  [x]Verbal  [ ]Non-Verbal  Behavioral:   [ ]Anxiety  [ ]Delirium [ ]Agitation [ ]Other  HEENT:  [x]Normal   [ ]Dry mouth   [ ]ET Tube/Trach  [ ]Oral lesions  PULMONARY:   [ ]Clear [ ]Tachypnea  [ ]Audible excessive secretions   [ ]Rhonchi        [ ]Right [ ]Left [ ]Bilateral  [ ]Crackles        [ ]Right [ ]Left [ ]Bilateral  [ ]Wheezing     [ ]Right [ ]Left [ ]Bilateral  [x ]Diminished BS [ ] Right [ ]Left [ x]Bilateral  CARDIOVASCULAR:    [x]Regular [ ]Irregular [ ]Tachy  [ ]Saul [ ]Murmur [ ]Other  GASTROINTESTINAL:  [x]Soft  [ ]Distended   [x]+BS  [x]Non tender [ ]Tender  [ ]PEG [ ]OGT/ NGT   Last BM:    GENITOURINARY:  [ ]Normal [ ]Incontinent   [ ]Oliguria/Anuria   [ ]Yang  MUSCULOSKELETAL:   [ ]Normal   [x]Weakness  [x ]Bed/Wheelchair bound [ ]Edema  NEUROLOGIC:   [x]No focal deficits  [ ] Cognitive impairment  [ ] Dysphagia [ ]Dysarthria [ ] Paresis [ ]Other   SKIN:   [x]Normal  [ ]Rash   [ ]Pressure ulcer(s) [ ]y [ ]n present on admission    CRITICAL CARE:  [ ] Shock Present  [ ]Septic [ ]Cardiogenic [ ]Neurologic [ ]Hypovolemic  [ ]  Vasopressors [ ]  Inotropes   [ ]Respiratory failure present [ ]Mechanical ventilation [ ]Non-invasive ventilatory support [ ]High flow    [ ]Acute  [ ]Chronic [ ]Hypoxic  [ ]Hypercarbic [ ]Other  [ ]Other organ failure     LABS:                        8.9    5.77  )-----------( 220      ( 30 Jan 2025 07:10 )             29.8   01-30    140  |  102  |  26[H]  ----------------------------<  131[H]  4.2   |  27  |  2.64[H]    Ca    9.2      30 Jan 2025 07:12  Phos  3.7     01-30  Mg     2.3     01-30        Urinalysis Basic - ( 30 Jan 2025 07:12 )    Color: x / Appearance: x / SG: x / pH: x  Gluc: 131 mg/dL / Ketone: x  / Bili: x / Urobili: x   Blood: x / Protein: x / Nitrite: x   Leuk Esterase: x / RBC: x / WBC x   Sq Epi: x / Non Sq Epi: x / Bacteria: x      RADIOLOGY & ADDITIONAL STUDIES:  < from: CT Head No Cont (01.28.25 @ 19:00) >  ACC: [id number] EXAM:  CT BRAIN   ORDERED BY: SHAUNA HANKS     PROCEDURE DATE:  01/28/2025          INTERPRETATION:  CLINICAL INFORMATION: AMS/hallucinating/lethargy    COMPARISON: Head CT 9/9/2015    CONTRAST:  IV Contrast: None  .    TECHNIQUE:  Serial axial images were obtained from the skull base to the   vertex using multi-slice helical technique. Sagittal and coronal   reformats were obtained.    FINDINGS:    VENTRICLES AND SULCI: Age appropriate involutional changes.  INTRA-AXIAL: No mass effect, acute hemorrhage, or midline shift.  There   are periventricular and subcortical white matter hypodensities,   consistent with microvascular type changes.  EXTRA-AXIAL: No mass or fluid collection. Basal cisterns are normal in   appearance.    VISUALIZED SINUSES:  Scattered mucosal thickening.  TYMPANOMASTOID CAVITIES:  Clear.  VISUALIZED ORBITS: Bilateral lens replacement.  CALVARIUM: Intact.    MISCELLANEOUS: None.      IMPRESSION:  No acute intracranial hemorrhage, mass effect, or midline shift.        --- End of Report ---        < end of copied text >    PROTEIN CALORIE MALNUTRITION PRESENT: [ ]mild [ ]moderate [ ]severe [ ]underweight [ ]morbid obesity  https://www.andeal.org/vault/2440/web/files/ONC/Table_Clinical%20Characteristics%20to%20Document%20Malnutrition-White%20JV%20et%20al%202012.pdf    Height (cm): 149.9 (01-14-25 @ 20:45)  Weight (kg): 70.261 (01-14-25 @ 20:45)  BMI (kg/m2): 31.3 (01-14-25 @ 20:45)    [ x]PPSV2 < or = to 30% [ ]significant weight loss  [ ]poor nutritional intake  [ ]anasarca[ ]Artificial Nutrition      Other REFERRALS:  [ ]Hospice  [ ]Child Life  [ ]Social Work  [ ]Case management [ ]Holistic Therapy     Care Coordination Assessment 201 [C. Provider] (01-15-25 @ 11:53)      Palliative Performance Scale:  http://npcrc.org/files/news/palliative_performance_scale_ppsv2.pdf  (Ctrl +  left click to view)  Respiratory Distress Observation Tool:  https://homecareinformation.net/handouts/hen/Respiratory_Distress_Observation_Scale.pdf (Ctrl +  left click to view)  PAINAD Score:  http://geriatrictoolkit.[place].[facility]/cog/painad.pdf (Ctrl +  left click to view)

## 2025-01-31 NOTE — PROGRESS NOTE ADULT - PROBLEM SELECTOR PLAN 1
LUIS FERNANDO on CKD4 now requiring dialysis due to uremia  s/p RIJ shiley placement in IR 1/27 and 1st HD session   Per renal plan for HD today  Family interested in potential PD (daughter to do PD for mother) in the long run - will need teaching with o/p nephrologist Dr. Galarza; per nephro recommending rehab with in-center HD for patient  Bumex 4mg IV qd per nephro on non HD days  IR consulted for permacath, plan for monday 2/3, hold Eliquis on Sunday after AM dose and restart 24 hours after

## 2025-01-31 NOTE — CONSULT NOTE ADULT - SUBJECTIVE AND OBJECTIVE BOX
Interventional Radiology    Evaluate for Procedure: Tunneled HD catheter placement    HPI: 89F with HTN, DM2, AFib, HFpEF, PVD, BRASH syndrome admitted with sepsis from lower extremity cellulitis with course complicated by LUIS FERNANDO on CKD4 requiring new dialysis. Non tunneled HD catheter placed 1/27. IR now consulted for conversion of non tunneled to tunneled HD catheter.     Allergies: Levaquin (Rash)    Medications (Abx/Cardiac/Anticoagulation/Blood Products)  apixaban: 2.5 milliGRAM(s) Oral (01-30 @ 23:44)  buMETAnide IVPB: 132 mL/Hr IV Intermittent (01-30 @ 17:42)  carvedilol: 12.5 milliGRAM(s) Oral (01-30 @ 17:44)  hydrALAZINE: 50 milliGRAM(s) Oral (01-30 @ 17:41)    Data:  T(C): 36.8  HR: 66  BP: 119/65  RR: 18  SpO2: 97%    -WBC 5.77 / HgB 8.9 / Hct 29.8 / Plt 220  -Na 140 / Cl 102 / BUN 26 / Glucose 131  -K 4.2 / CO2 27 / Cr 2.64  -ALT -- / Alk Phos -- / T.Bili --  -INR -- / PTT 41.9    Radiology:     Assessment/Plan:   89F with HTN, DM2, AFib, HFpEF, PVD, BRASH syndrome admitted with sepsis from lower extremity cellulitis with course complicated by LUIS FERNANDO on CKD4 requiring new dialysis. Non tunneled HD catheter placed 1/27. IR now consulted for conversion of non tunneled to tunneled HD catheter.     - case reviewed and approved for conversion of non tunneled to tunneled HD catheter placement on Monday 2/3  - please place IR procedure order under DAVID Galloway (free text)   - STAT labs in AM (cbc,coags, bmp, T&S)  - hold Eliquis 24 hours prior to procedure with tentative plan to resume AC 24 hours post procedure, if no concern for bleeding  - NPO ON 2/2 at 11pm  - d/w primary team

## 2025-01-31 NOTE — PROGRESS NOTE ADULT - SUBJECTIVE AND OBJECTIVE BOX
Interval history:    No events overnight night.  She is hemodynamically stable and has no complains. Daughter  at bedside. Per daughter, she become tachycardiac occasionally during HD but otherwise tolerating it well. Due to HD today.     VITALS    Vital Signs Last 24 Hrs  T(C): 36.3 (31 Jan 2025 12:24), Max: 37.6 (30 Jan 2025 16:45)  T(F): 97.4 (31 Jan 2025 12:24), Max: 99.6 (30 Jan 2025 16:45)  HR: 75 (31 Jan 2025 12:24) (66 - 77)  BP: 123/49 (31 Jan 2025 12:24) (113/55 - 123/49)  BP(mean): --  RR: 18 (31 Jan 2025 12:24) (17 - 18)  SpO2: 96% (31 Jan 2025 12:24) (91% - 98%)    Parameters below as of 31 Jan 2025 12:24  Patient On (Oxygen Delivery Method): nasal cannula  O2 Flow (L/min): 4        Physical examination:    GENERAL: Vitals stable. In acute distress, AOx3, on oxygen via nasal cannula  HEAD: Atraumatic, Normocephalic.  NECK: Supple, + JVD. +RIL catheter  CHEST/LUNG: +crackles  HEART: regular rate and rhythm murmurs, S3 or S4 gallops   ABDOMEN: Soft, nontender, nondistended.   EXTREMITIES: , b/l dressing    MEDICATIONS:    MEDICATIONS  (STANDING):  apixaban 2.5 milliGRAM(s) Oral every 12 hours  buMETAnide IVPB 4 milliGRAM(s) IV Intermittent daily  carvedilol 12.5 milliGRAM(s) Oral every 12 hours  chlorhexidine 4% Liquid 1 Application(s) Topical <User Schedule>  cholecalciferol 2000 Unit(s) Oral daily  dextrose 5%. 1000 milliLiter(s) (100 mL/Hr) IV Continuous <Continuous>  dextrose 5%. 1000 milliLiter(s) (50 mL/Hr) IV Continuous <Continuous>  dextrose 50% Injectable 25 Gram(s) IV Push once  dextrose 50% Injectable 12.5 Gram(s) IV Push once  dextrose 50% Injectable 25 Gram(s) IV Push once  dorzolamide 2% Ophthalmic Solution 1 Drop(s) Both EYES <User Schedule>  epoetin oliver (PROCRIT) Injectable 8000 Unit(s) IV Push <User Schedule>  fluticasone propionate/ salmeterol 250-50 MICROgram(s) Diskus 1 Dose(s) Inhalation two times a day  glucagon  Injectable 1 milliGRAM(s) IntraMuscular once  hydrALAZINE 50 milliGRAM(s) Oral two times a day  insulin lispro (ADMELOG) corrective regimen sliding scale   SubCutaneous three times a day before meals  insulin lispro (ADMELOG) corrective regimen sliding scale   SubCutaneous at bedtime  montelukast 10 milliGRAM(s) Oral daily  NIFEdipine XL 60 milliGRAM(s) Oral daily  polyethylene glycol 3350 17 Gram(s) Oral daily  sevelamer carbonate 800 milliGRAM(s) Oral three times a day with meals    MEDICATIONS  (PRN):  acetaminophen     Tablet .. 650 milliGRAM(s) Oral every 6 hours PRN Temp greater or equal to 38C (100.4F), Mild Pain (1 - 3)  albuterol/ipratropium for Nebulization 3 milliLiter(s) Nebulizer every 6 hours PRN Shortness of Breath and/or Wheezing  dextrose Oral Gel 15 Gram(s) Oral once PRN Blood Glucose LESS THAN 70 milliGRAM(s)/deciliter  senna 2 Tablet(s) Oral at bedtime PRN Constipation  sodium chloride 0.9% Bolus. 100 milliLiter(s) IV Bolus every 5 minutes PRN SBP LESS THAN or EQUAL to 90 mmHg  sodium chloride 0.9% lock flush 10 milliLiter(s) IV Push every 1 hour PRN Pre/post blood products, medications, blood draw, and to maintain line patency        Pertinent labs personally reviewed by me    CBC:            8.6    7.41  )-----------( 223      ( 01-31-25 @ 12:49 )             28.3       Chem:         ( 01-31-25 @ 12:49 )    141  |  101  |  46[H]  ----------------------------<  190[H]  4.0   |  28  |  4.12[H]        Cardiac testing personally reviewed by me  Tele: reviewed    TTE 1/23/2025     1. Left ventricular cavity is normal in size. Left ventricular wall thickness is normal. Left ventricular systolic function is normal with an ejection fraction of 65 % by Laureano's method of disks. There are no regional wall motion abnormalities seen.   2. The left ventricular diastolic function is indeterminate, with indeterminate left ventricular filling pressure.   3. Mildly enlarged right ventricular cavity size, with normal wall thickness, and normal right ventricular systolic function.   4. The right atrium is severely dilated.   5. No significant valvular disease.   6. No pericardial effusion seen.   7. Estimated pulmonary artery systolic pressure is 39 mmHg.   8. Compared to the transthoracic echocardiogram performed on 10/12/2021, there have been no significant interval changes.

## 2025-01-31 NOTE — PROGRESS NOTE ADULT - PROBLEM SELECTOR PLAN 4
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  Increased to 6L NC  Repeat COVID/RSV/influenza negative, CXR 1/28 with small bilateral pleural effusions and pulmonary congestion    Acute hypoxic rep failure 2/2 volume overload vs worsening fibrosis  Pulm consulted, possibly worsening fibrosis  Diuresis per nephro

## 2025-01-31 NOTE — PROGRESS NOTE ADULT - PROBLEM SELECTOR PLAN 7
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  iron panel pending

## 2025-01-31 NOTE — CONSULT NOTE ADULT - TIME BILLING
Review of medical records, labs, imaging, coordination of care and did not include time spent teaching.
Agree with above assessment and plan.
Symptom assessment and management, supportive counseling, coordination of care

## 2025-01-31 NOTE — CONSULT NOTE ADULT - ASSESSMENT
88 yo F (Norwegian-speaking) with ILD (Dx 10yrs ago, uses portable O2 concentrator prn, followed by [facility] Pulmonologist in [place]), HTN, DM2, AFib, HFpEF, PVD, BRASH syndrome admitted with sepsis from lower extremity cellulitis with course complicated by LUIS FERNANDO on CKD4 requiring new dialysis and acute hypoxic respiratory failure. Daughter notes intermittent cough with difficulty clearing secretions.  Suspect likely some component of pulm edema may be contributing as well as atelectasis i/s/o underlying ILD.  No concern for infectious etiology at this time.      Recommend:  - repeat CXR  - start Albuterol nebs q6h  - start Hypersal q6h   - start Chest PT via bed or vest as tolerated  - provide with Aerobika, incentive spirometry  - continue Advair for now, may consider switching to Pulmicort BID if concern patient not able to engage in proper technique  - PT, OOB to chair as much as tolerated    Sintia Aldridge MD, MSCR

## 2025-01-31 NOTE — PROGRESS NOTE ADULT - PROBLEM SELECTOR PLAN 3
Hgb low. Iron sat ok but ferritin low.   Increased EPO to 8k with HD.  Can give Venofer.    If you have any questions, please feel free to contact me.  Larry Alcala MD  Nephrology Fellow  [phone] / [employer] Teams (Preferred)  (Please check the on-call schedule to reach the appropriate Nephrology Fellow) Hgb low. Iron sat ok but ferritin low.   Increased EPO to 8k with HD.  Please repeat iron studies    If you have any questions, please feel free to contact me.  Larry Alcala MD  Nephrology Fellow  [phone] / [employer] Teams (Preferred)  (Please check the on-call schedule to reach the appropriate Nephrology Fellow)

## 2025-01-31 NOTE — PROGRESS NOTE ADULT - SUBJECTIVE AND OBJECTIVE BOX
[facility] Division of Kidney Diseases & Hypertension  FOLLOW UP NOTE  [phone]--------------------------------------------------------------------------------    Chief Complaint: LUIS FERNANDO on CKD, now on HD    24 hour events/subjective: Patient seen and examined earlier today while on HD. Appears comfortable, more alert today, and tolerating treatment well. Reports improvement in SOB. No HA, fevers/chills, CP, abdominal pain.    PAST HISTORY  --------------------------------------------------------------------------------  No significant changes to PMH, PSH, FHx, SHx, unless otherwise noted    ALLERGIES & MEDICATIONS  --------------------------------------------------------------------------------  Allergies  Levaquin (Rash)    Intolerances    Standing Inpatient Medications  apixaban 2.5 milliGRAM(s) Oral every 12 hours  buMETAnide IVPB 4 milliGRAM(s) IV Intermittent daily  carvedilol 12.5 milliGRAM(s) Oral every 12 hours  chlorhexidine 4% Liquid 1 Application(s) Topical <User Schedule>  cholecalciferol 2000 Unit(s) Oral daily  dextrose 5%. 1000 milliLiter(s) IV Continuous <Continuous>  dextrose 5%. 1000 milliLiter(s) IV Continuous <Continuous>  dextrose 50% Injectable 25 Gram(s) IV Push once  dextrose 50% Injectable 12.5 Gram(s) IV Push once  dextrose 50% Injectable 25 Gram(s) IV Push once  dorzolamide 2% Ophthalmic Solution 1 Drop(s) Both EYES <User Schedule>  epoetin oliver (PROCRIT) Injectable 4000 Unit(s) IV Push <User Schedule>  fluticasone propionate/ salmeterol 250-50 MICROgram(s) Diskus 1 Dose(s) Inhalation two times a day  glucagon  Injectable 1 milliGRAM(s) IntraMuscular once  hydrALAZINE 50 milliGRAM(s) Oral two times a day  insulin lispro (ADMELOG) corrective regimen sliding scale   SubCutaneous three times a day before meals  insulin lispro (ADMELOG) corrective regimen sliding scale   SubCutaneous at bedtime  montelukast 10 milliGRAM(s) Oral daily  NIFEdipine XL 60 milliGRAM(s) Oral daily  polyethylene glycol 3350 17 Gram(s) Oral daily  sevelamer carbonate 800 milliGRAM(s) Oral three times a day with meals    PRN Inpatient Medications  acetaminophen     Tablet .. 650 milliGRAM(s) Oral every 6 hours PRN  albuterol/ipratropium for Nebulization 3 milliLiter(s) Nebulizer every 6 hours PRN  dextrose Oral Gel 15 Gram(s) Oral once PRN  senna 2 Tablet(s) Oral at bedtime PRN  sodium chloride 0.9% Bolus. 100 milliLiter(s) IV Bolus every 5 minutes PRN  sodium chloride 0.9% lock flush 10 milliLiter(s) IV Push every 1 hour PRN    REVIEW OF SYSTEMS  --------------------------------------------------------------------------------  Gen: No fevers/chills  Head/Eyes/Ears: No HA  Respiratory: +SOB improved  CV: No CP  GI: No abdominal pain, diarrhea, nausea  : No dysuria, hematuria  MSK: No edema    VITALS/PHYSICAL EXAM  --------------------------------------------------------------------------------  T(C): 36.3 (01-31-25 @ 12:24), Max: 37.6 (01-30-25 @ 16:45)  HR: 75 (01-31-25 @ 12:24) (66 - 77)  BP: 123/49 (01-31-25 @ 12:24) (113/55 - 123/49)  RR: 18 (01-31-25 @ 12:24) (17 - 18)  SpO2: 96% (01-31-25 @ 12:24) (91% - 98%)  Wt(kg): --    Physical Exam:  Gen: NAD  HEENT: Anicteric  Pulm: Crackles B/L, on NC  CV: RRR, S1S2  Abd: +BS, soft, nontender/nondistended  Extremities: No pitting edema noted, chronic lymphedema  Neuro: Awake  Skin: Warm  Vascular access: R-IJ non-tunneled dialysis catheter    LABS/STUDIES  --------------------------------------------------------------------------------              8.6    7.41  >-----------<  223      [01-31-25 @ 12:49]              28.3     140  |  102  |  26  ----------------------------<  131      [01-30-25 @ 07:12]  4.2   |  27  |  2.64        Ca     9.2     [01-30-25 @ 07:12]      Mg     2.3     [01-30-25 @ 07:12]      Phos  3.7     [01-30-25 @ 07:12]    Creatinine Trend:  SCr 2.64 [01-30 @ 07:12]  SCr 2.72 [01-29 @ 07:11]  SCr 3.84 [01-28 @ 07:18]  SCr 4.87 [01-27 @ 07:03]  SCr 4.69 [01-26 @ 07:31]    Iron 65, TIBC 244, %sat 27      [01-25-25 @ 08:29]  Ferritin 53      [01-25-25 @ 08:29]    HBsAg Nonreact      [01-27-25 @ 12:09]

## 2025-01-31 NOTE — PROGRESS NOTE ADULT - PROBLEM SELECTOR PLAN 1
Patient with LUIS FERNANDO on CKD iso CRS with hypervolemia noted on exam. Varghese FANG/Sunrise reviewed. Patient with CKD4 iso CHF, HTN and DM. Follows with Dr. Galarza last seen 3/26/24. Scr fluctuates between 2.4-2.8 (eGFR: 16-19), last outpt Scr increased to 3.26 (eGFR: 13) on 10/7/24. Admission Scr elevated 3.0 (1/14), improved to 2.86 (1/15) and then progressively increased to 3.9 (1/20). Labs also significant for rising pro- (1/14) -> 4483 (1/20). UA w/ proteinuria and hematuria. Kidney sonogram on 1/20 with B/L renal cysts, echogenic, ~10cm in size. CXR w/ R pleural calcifications and thickening (chronic finding), w/ lower lung opacities. Home meds include Lasix 80mg QD w/ additional 40mg PRN.     Scr noted to be ~2.8-3 initially. On 1/19 Scr increased to 3.6 and progressively worsened to 4.8 and BUN keegan to 118. She was initiated on IV diuretics on 1/23/25. Received PO diuretics on 1/25 with resumption of IV Lasix 80mg BID as she became progressively more fluid overloaded and uremic over the weekend.     Underwent HD cath placement on 1/27 followed by 1st HD. Completed 3 treatments  Likely ESRD now. On MWF schedule. Seen while on maintenance HD today.   Continue on Phoslo. Consider decreasing dose/stopping of Hydralazine given soft BP.   Next HD on 2/3 after placement of tunnelled dialysis catheter.    Patient and family leaning toward PD (other daughter will do PD for mother however lives in [place]). Will need teaching outpatient with nephrologist Dr. Galarza. Would recommend rehab with in-center HD. Monitor labs and urine output. Avoid nephrotoxins. Dose medications as per eGFR. Discussed with primary team. Patient with LUIS FERNANDO on CKD iso CRS with hypervolemia noted on exam. Varghese FANG/Sunrise reviewed. Patient with CKD4 iso CHF, HTN and DM. Follows with Dr. Galarza last seen 3/26/24. Scr fluctuates between 2.4-2.8 (eGFR: 16-19), last outpt Scr increased to 3.26 (eGFR: 13) on 10/7/24. Admission Scr elevated 3.0 (1/14), improved to 2.86 (1/15) and then progressively increased to 3.9 (1/20). Labs also significant for rising pro- (1/14) -> 4483 (1/20). UA w/ proteinuria and hematuria. Kidney sonogram on 1/20 with B/L renal cysts, echogenic, ~10cm in size. CXR w/ R pleural calcifications and thickening (chronic finding), w/ lower lung opacities. Home meds include Lasix 80mg QD w/ additional 40mg PRN.     Scr noted to be ~2.8-3 initially. On 1/19 Scr increased to 3.6 and progressively worsened to 4.8 and BUN keegan to 118. She was initiated on IV diuretics on 1/23/25. Received PO diuretics on 1/25 with resumption of IV Lasix 80mg BID as she became progressively more fluid overloaded and uremic over the weekend.     Underwent HD cath placement on 1/27 followed by 1st HD. Completed 3 treatments  Likely ESRD now. On MWF schedule. Seen while on maintenance HD today.   Continue on Phoslo. Consider decreasing dose of Hydralazine given soft BP. UF with HD  Please change diuretics to non HD days  Please check repeat CXR and consider pulm evaluation   Next HD on 2/3 after placement of tunnelled dialysis catheter.    Patient and family leaning toward PD (other daughter will do PD for mother however lives in [place]). Will need teaching outpatient with nephrologist Dr. Galarza. Would recommend rehab with in-center HD. Monitor labs and urine output. Avoid nephrotoxins. Dose medications as per eGFR. Discussed with primary team.

## 2025-01-31 NOTE — CONSULT NOTE ADULT - TIME-BASED BILLING (NON-CRITICAL CARE)
Pt is scheduled to see Cierra Bruno 7/17.   
Time-based billing (NON-critical care)

## 2025-01-31 NOTE — PROGRESS NOTE ADULT - SUBJECTIVE AND OBJECTIVE BOX
[facility] Division of Hospital Medicine  Arcelia Sidhu MD  Available via [employer] Teams      SUBJECTIVE / OVERNIGHT EVENTS: No acute events overnight. Pt with 4L NC. At home generally uses O2 prn     ADDITIONAL REVIEW OF SYSTEMS:    MEDICATIONS  (STANDING):  apixaban 2.5 milliGRAM(s) Oral every 12 hours  buMETAnide IVPB 4 milliGRAM(s) IV Intermittent <User Schedule>  chlorhexidine 4% Liquid 1 Application(s) Topical <User Schedule>  cholecalciferol 2000 Unit(s) Oral daily  dextrose 5%. 1000 milliLiter(s) (100 mL/Hr) IV Continuous <Continuous>  dextrose 5%. 1000 milliLiter(s) (50 mL/Hr) IV Continuous <Continuous>  dextrose 50% Injectable 25 Gram(s) IV Push once  dextrose 50% Injectable 12.5 Gram(s) IV Push once  dextrose 50% Injectable 25 Gram(s) IV Push once  dorzolamide 2% Ophthalmic Solution 1 Drop(s) Both EYES <User Schedule>  epoetin oliver (PROCRIT) Injectable 8000 Unit(s) IV Push <User Schedule>  fluticasone propionate/ salmeterol 250-50 MICROgram(s) Diskus 1 Dose(s) Inhalation two times a day  glucagon  Injectable 1 milliGRAM(s) IntraMuscular once  hydrALAZINE 25 milliGRAM(s) Oral three times a day  insulin lispro (ADMELOG) corrective regimen sliding scale   SubCutaneous three times a day before meals  insulin lispro (ADMELOG) corrective regimen sliding scale   SubCutaneous at bedtime  metoprolol tartrate 50 milliGRAM(s) Oral two times a day  montelukast 10 milliGRAM(s) Oral daily  NIFEdipine XL 60 milliGRAM(s) Oral daily  polyethylene glycol 3350 17 Gram(s) Oral daily  sevelamer carbonate 800 milliGRAM(s) Oral three times a day with meals    MEDICATIONS  (PRN):  acetaminophen     Tablet .. 650 milliGRAM(s) Oral every 6 hours PRN Temp greater or equal to 38C (100.4F), Mild Pain (1 - 3)  albuterol/ipratropium for Nebulization 3 milliLiter(s) Nebulizer every 6 hours PRN Shortness of Breath and/or Wheezing  dextrose Oral Gel 15 Gram(s) Oral once PRN Blood Glucose LESS THAN 70 milliGRAM(s)/deciliter  senna 2 Tablet(s) Oral at bedtime PRN Constipation  sodium chloride 0.9% Bolus. 100 milliLiter(s) IV Bolus every 5 minutes PRN SBP LESS THAN or EQUAL to 90 mmHg  sodium chloride 0.9% lock flush 10 milliLiter(s) IV Push every 1 hour PRN Pre/post blood products, medications, blood draw, and to maintain line patency      I&O's Summary      PHYSICAL EXAM:  Vital Signs Last 24 Hrs  T(C): 36.7 (31 Jan 2025 15:50), Max: 36.9 (30 Jan 2025 21:00)  T(F): 98.1 (31 Jan 2025 15:50), Max: 98.4 (30 Jan 2025 21:00)  HR: 61 (31 Jan 2025 15:50) (61 - 77)  BP: 115/47 (31 Jan 2025 15:50) (113/55 - 123/49)  BP(mean): --  RR: 18 (31 Jan 2025 15:50) (17 - 18)  SpO2: 96% (31 Jan 2025 15:50) (94% - 98%)    Parameters below as of 31 Jan 2025 15:50  Patient On (Oxygen Delivery Method): nasal cannula  O2 Flow (L/min): 4    CONSTITUTIONAL: NAD, well-developed, well-groomed, wearing NC  RESPIRATORY: Normal respiratory effort; Left base crackles  CARDIOVASCULAR: Regular rate and rhythm, normal S1 and S2  ABDOMEN: Nontender to palpation, normoactive bowel sounds, no rebound/guarding; No hepatosplenomegaly  MUSCULOSKELETAL:  Normal gait; no clubbing or cyanosis of digits; no joint swelling or tenderness to palpation  PSYCH: A+O to person, place, and time  SKIN: b/l lower legs wrapped    LABS:                        8.6    7.41  )-----------( 223      ( 31 Jan 2025 12:49 )             28.3     01-31    141  |  101  |  46[H]  ----------------------------<  190[H]  4.0   |  28  |  4.12[H]    Ca    9.0      31 Jan 2025 12:49  Phos  4.0     01-31  Mg     2.4     01-31            Urinalysis Basic - ( 31 Jan 2025 12:49 )    Color: x / Appearance: x / SG: x / pH: x  Gluc: 190 mg/dL / Ketone: x  / Bili: x / Urobili: x   Blood: x / Protein: x / Nitrite: x   Leuk Esterase: x / RBC: x / WBC x   Sq Epi: x / Non Sq Epi: x / Bacteria: x

## 2025-02-01 NOTE — PROGRESS NOTE ADULT - PROBLEM SELECTOR PLAN 4
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  Increased to 6L NC  Repeat COVID/RSV/influenza negative, CXR 1/28 with small bilateral pleural effusions and pulmonary congestion    Acute hypoxic rep failure 2/2 volume overload vs worsening fibrosis, diuesis per nephro  Pulm consulted, for possibly worsening fibrosis:  - repeat CXR pending read, looks similar to previous, improved vascular congestion  - Albuterol nebs q6h  - Hypersal q6h   - Chest PT via bed or vest as tolerated  - Aerobika, incentive spirometry  - continue Advair for now, may consider switching to Pulmicort BID if concern patient not able to engage in proper technique  - RVP pending  - PT, OOB to chair as much as tolerated

## 2025-02-01 NOTE — PROGRESS NOTE ADULT - PROBLEM SELECTOR PLAN 7
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  - repeat iron panel pending

## 2025-02-01 NOTE — PROGRESS NOTE ADULT - SUBJECTIVE AND OBJECTIVE BOX
[facility] Division of Hospital Medicine  Arcelia Sidhu MD  Available via MS Teams      SUBJECTIVE / OVERNIGHT EVENTS: Pt with cough per daughter, some sputum. Pt with some confusion overnight 1/31    ADDITIONAL REVIEW OF SYSTEMS:     MEDICATIONS  (STANDING):  apixaban 2.5 milliGRAM(s) Oral every 12 hours  buMETAnide IVPB 4 milliGRAM(s) IV Intermittent <User Schedule>  chlorhexidine 4% Liquid 1 Application(s) Topical <User Schedule>  cholecalciferol 2000 Unit(s) Oral daily  dextrose 5%. 1000 milliLiter(s) (100 mL/Hr) IV Continuous <Continuous>  dextrose 5%. 1000 milliLiter(s) (50 mL/Hr) IV Continuous <Continuous>  dextrose 50% Injectable 25 Gram(s) IV Push once  dextrose 50% Injectable 12.5 Gram(s) IV Push once  dextrose 50% Injectable 25 Gram(s) IV Push once  dorzolamide 2% Ophthalmic Solution 1 Drop(s) Both EYES <User Schedule>  epoetin oliver (PROCRIT) Injectable 8000 Unit(s) IV Push <User Schedule>  fluticasone propionate/ salmeterol 250-50 MICROgram(s) Diskus 1 Dose(s) Inhalation two times a day  glucagon  Injectable 1 milliGRAM(s) IntraMuscular once  hydrALAZINE 25 milliGRAM(s) Oral three times a day  insulin lispro (ADMELOG) corrective regimen sliding scale   SubCutaneous three times a day before meals  insulin lispro (ADMELOG) corrective regimen sliding scale   SubCutaneous at bedtime  melatonin 5 milliGRAM(s) Oral at bedtime  metoprolol tartrate 50 milliGRAM(s) Oral two times a day  montelukast 10 milliGRAM(s) Oral daily  NIFEdipine XL 60 milliGRAM(s) Oral daily  polyethylene glycol 3350 17 Gram(s) Oral daily  sevelamer carbonate 800 milliGRAM(s) Oral three times a day with meals  sodium chloride 3%  Inhalation 4 milliLiter(s) Inhalation every 6 hours    MEDICATIONS  (PRN):  acetaminophen     Tablet .. 650 milliGRAM(s) Oral every 6 hours PRN Temp greater or equal to 38C (100.4F), Mild Pain (1 - 3)  albuterol/ipratropium for Nebulization 3 milliLiter(s) Nebulizer every 6 hours PRN Shortness of Breath and/or Wheezing  dextrose Oral Gel 15 Gram(s) Oral once PRN Blood Glucose LESS THAN 70 milliGRAM(s)/deciliter  senna 2 Tablet(s) Oral at bedtime PRN Constipation  sodium chloride 0.65% Nasal 1 Spray(s) Both Nostrils four times a day PRN Nasal Congestion  sodium chloride 0.9% Bolus. 100 milliLiter(s) IV Bolus every 5 minutes PRN SBP LESS THAN or EQUAL to 90 mmHg  sodium chloride 0.9% lock flush 10 milliLiter(s) IV Push every 1 hour PRN Pre/post blood products, medications, blood draw, and to maintain line patency      I&O's Summary      PHYSICAL EXAM:  Vital Signs Last 24 Hrs  T(C): 36.7 (01 Feb 2025 11:27), Max: 37.3 (31 Jan 2025 20:50)  T(F): 98.1 (01 Feb 2025 11:27), Max: 99.1 (31 Jan 2025 20:50)  HR: 63 (01 Feb 2025 11:27) (61 - 79)  BP: 111/56 (01 Feb 2025 11:27) (111/56 - 120/46)  BP(mean): --  RR: 18 (01 Feb 2025 11:27) (18 - 18)  SpO2: 98% (01 Feb 2025 11:27) (94% - 98%)    Parameters below as of 01 Feb 2025 11:27  Patient On (Oxygen Delivery Method): nasal cannula  O2 Flow (L/min): 4    CONSTITUTIONAL: NAD, well-developed, well-groomed, wearing NC  RESPIRATORY: Normal respiratory effort; Left base crackles  CARDIOVASCULAR: Regular rate and rhythm, normal S1 and S2  ABDOMEN: Nontender to palpation, normoactive bowel sounds, no rebound/guarding; No hepatosplenomegaly  MUSCULOSKELETAL:  Normal gait; no clubbing or cyanosis of digits; no joint swelling or tenderness to palpation  PSYCH: A+O to person, place, and time  SKIN: b/l lower legs wrapped      LABS:                        9.7    7.74  )-----------( 218      ( 01 Feb 2025 07:18 )             32.1     01-31    141  |  101  |  46[H]  ----------------------------<  190[H]  4.0   |  28  |  4.12[H]    Ca    9.0      31 Jan 2025 12:49  Phos  4.0     01-31  Mg     2.4     01-31            Urinalysis Basic - ( 31 Jan 2025 12:49 )    Color: x / Appearance: x / SG: x / pH: x  Gluc: 190 mg/dL / Ketone: x  / Bili: x / Urobili: x   Blood: x / Protein: x / Nitrite: x   Leuk Esterase: x / RBC: x / WBC x   Sq Epi: x / Non Sq Epi: x / Bacteria: x

## 2025-02-02 NOTE — PROGRESS NOTE ADULT - SUBJECTIVE AND OBJECTIVE BOX
[facility] Division of Hospital Medicine  Arcelia Sidhu MD  Available via MS Teams      SUBJECTIVE / OVERNIGHT EVENTS: Pt with constipation. More fatigued than yesterday.    ADDITIONAL REVIEW OF SYSTEMS:     MEDICATIONS  (STANDING):  apixaban 2.5 milliGRAM(s) Oral two times a day  azithromycin   Tablet 500 milliGRAM(s) Oral daily  bisacodyl 5 milliGRAM(s) Oral once  buMETAnide IVPB 4 milliGRAM(s) IV Intermittent <User Schedule>  cefTRIAXone   IVPB 1000 milliGRAM(s) IV Intermittent every 24 hours  cefTRIAXone   IVPB      chlorhexidine 4% Liquid 1 Application(s) Topical <User Schedule>  cholecalciferol 2000 Unit(s) Oral daily  dextrose 5%. 1000 milliLiter(s) (100 mL/Hr) IV Continuous <Continuous>  dextrose 5%. 1000 milliLiter(s) (50 mL/Hr) IV Continuous <Continuous>  dextrose 50% Injectable 25 Gram(s) IV Push once  dextrose 50% Injectable 12.5 Gram(s) IV Push once  dextrose 50% Injectable 25 Gram(s) IV Push once  dorzolamide 2% Ophthalmic Solution 1 Drop(s) Both EYES <User Schedule>  epoetin oliver (PROCRIT) Injectable 8000 Unit(s) IV Push <User Schedule>  fluticasone propionate/ salmeterol 250-50 MICROgram(s) Diskus 1 Dose(s) Inhalation two times a day  glucagon  Injectable 1 milliGRAM(s) IntraMuscular once  hydrALAZINE 25 milliGRAM(s) Oral three times a day  insulin lispro (ADMELOG) corrective regimen sliding scale   SubCutaneous three times a day before meals  insulin lispro (ADMELOG) corrective regimen sliding scale   SubCutaneous at bedtime  melatonin 5 milliGRAM(s) Oral at bedtime  metoprolol tartrate 50 milliGRAM(s) Oral two times a day  montelukast 10 milliGRAM(s) Oral daily  NIFEdipine XL 60 milliGRAM(s) Oral daily  polyethylene glycol 3350 17 Gram(s) Oral daily  sevelamer carbonate 800 milliGRAM(s) Oral three times a day with meals  sodium chloride 3%  Inhalation 4 milliLiter(s) Inhalation every 6 hours    MEDICATIONS  (PRN):  acetaminophen     Tablet .. 650 milliGRAM(s) Oral every 6 hours PRN Temp greater or equal to 38C (100.4F), Mild Pain (1 - 3)  albuterol/ipratropium for Nebulization 3 milliLiter(s) Nebulizer every 6 hours PRN Shortness of Breath and/or Wheezing  dextrose Oral Gel 15 Gram(s) Oral once PRN Blood Glucose LESS THAN 70 milliGRAM(s)/deciliter  senna 2 Tablet(s) Oral at bedtime PRN Constipation  sodium chloride 0.65% Nasal 1 Spray(s) Both Nostrils four times a day PRN Nasal Congestion  sodium chloride 0.9% Bolus. 100 milliLiter(s) IV Bolus every 5 minutes PRN SBP LESS THAN or EQUAL to 90 mmHg  sodium chloride 0.9% lock flush 10 milliLiter(s) IV Push every 1 hour PRN Pre/post blood products, medications, blood draw, and to maintain line patency      I&O's Summary      PHYSICAL EXAM:  Vital Signs Last 24 Hrs  T(C): 36.6 (02 Feb 2025 12:10), Max: 36.9 (01 Feb 2025 21:05)  T(F): 97.9 (02 Feb 2025 12:10), Max: 98.4 (01 Feb 2025 21:05)  HR: 83 (02 Feb 2025 12:10) (71 - 90)  BP: 110/59 (02 Feb 2025 12:10) (95/51 - 112/62)  BP(mean): --  RR: 18 (02 Feb 2025 12:10) (18 - 18)  SpO2: 95% (02 Feb 2025 12:10) (93% - 99%)    Parameters below as of 02 Feb 2025 12:10  Patient On (Oxygen Delivery Method): nasal cannula  O2 Flow (L/min): 4      CONSTITUTIONAL: NAD, well-developed, well-groomed, wearing NC 4L  RESPIRATORY: Normal respiratory effort; Left base crackles  CARDIOVASCULAR: Regular rate and rhythm, normal S1 and S2  ABDOMEN: Nontender to palpation, normoactive bowel sounds, mild distension   PSYCH: A+O to person, place, and time  SKIN: b/l lower legs wrapped      LABS:                        9.3    6.68  )-----------( 223      ( 02 Feb 2025 10:21 )             30.5     02-02    139  |  100  |  49[H]  ----------------------------<  185[H]  4.5   |  27  |  4.36[H]    Ca    9.0      02 Feb 2025 10:22  Phos  4.5     02-02  Mg     2.4     02-02            Urinalysis Basic - ( 02 Feb 2025 10:22 )    Color: x / Appearance: x / SG: x / pH: x  Gluc: 185 mg/dL / Ketone: x  / Bili: x / Urobili: x   Blood: x / Protein: x / Nitrite: x   Leuk Esterase: x / RBC: x / WBC x   Sq Epi: x / Non Sq Epi: x / Bacteria: x        Culture - Sputum (collected 02 Feb 2025 07:11)  Source: .Sputum Sputum  Gram Stain (02 Feb 2025 12:26):    Numerous polymorphonuclear leukocytes per low power field    Few Squamous epithelial cells per low power field    Few Gram Positive Rods per oil power field    Few Gram Positive Cocci per oil power field      SARS-CoV-2: NotDetec (01 Feb 2025 13:12)

## 2025-02-02 NOTE — PROGRESS NOTE ADULT - PROBLEM SELECTOR PLAN 4
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  Increased to 6L NC  Repeat COVID/RSV/influenza negative, CXR 1/28 with small bilateral pleural effusions and pulmonary congestion    Acute hypoxic rep failure 2/2 volume overload vs worsening fibrosis, diuesis per nephro  Pulm consulted, for possibly worsening fibrosis:  - Albuterol nebs q6h  - Hypersal q6h   - Chest PT via bed or vest as tolerated  - Aerobika, incentive spirometry  - continue Advair for now, may consider switching to Pulmicort BID if concern patient not able to engage in proper technique  - CXR atelectasis vs PNA, pt with coughing started on CTX Azithro 2/1-  - FULL RVP pending  - PT, OOB to chair as much as tolerated

## 2025-02-02 NOTE — PROGRESS NOTE ADULT - PROBLEM SELECTOR PLAN 1
LUIS FERNANDO on CKD4 now requiring dialysis due to uremia  s/p RIJ shiley placement in IR 1/27 and 1st HD session   Per renal plan for HD today  Family interested in potential PD (daughter to do PD for mother) in the long run - will need teaching with o/p nephrologist Dr. Galarza; per nephro recommending rehab with in-center HD for patient  Bumex 4mg IV qd per nephro on non HD days  IR consulted for permacath, plan for Monday 2/3, hold Eliquis on Sunday after AM dose and restart 24 hours after permacath is placed

## 2025-02-03 NOTE — PROGRESS NOTE ADULT - PROBLEM SELECTOR PLAN 1
LUIS FERNANDO on CKD4 now requiring dialysis due to uremia  s/p RIJ shiley placement in IR 1/27 and 1st HD session   Per renal plan for HD today - got 1L removed 2/3  Family interested in potential PD (daughter to do PD for mother) in the long run - will need teaching with o/p nephrologist Dr. Galarza; per nephro recommending rehab with in-center HD for patient  Bumex 4mg IV qd per nephro on non HD days  IR consulted for permacath, plan for Monday 2/3 - however, desaturated and not medically optimized yet. will have to restart eliquis now

## 2025-02-03 NOTE — CHART NOTE - NSCHARTNOTEFT_GEN_A_CORE
Patient did not tolerate supine positioning for HD tunneled cath procedure. Patient desaturated transiently to 86% on 6LNC. Patient repositioned upright on nonrebreather with improved SpO2 to 94%. Discussion with proceduralist and family to delay until better medical optimization

## 2025-02-03 NOTE — PRE PROCEDURE NOTE - PRE PROCEDURE EVALUATION
Interventional Radiology Pre-Procedure Note    This is a 89y Female with ESRD needing tunneled dialysis catheter placement.     PAST MEDICAL & SURGICAL HISTORY:  Benign essential hypertension      Diabetes mellitus      Peripheral vascular disease      Personal history of asbestosis      Spinal stenosis      HTN (hypertension)      DM (diabetes mellitus)      TIA (transient ischemic attack)      Pulmonary fibrosis      Peripheral edema      Degenerative arthritis of right knee      Degenerative arthritis of left knee      Osteoporosis      KENNETH (obstructive sleep apnea)      Hypertension      Chronic kidney disease (CKD)      T2DM (type 2 diabetes mellitus)      PVD (peripheral vascular disease)      H/O abdominal hysterectomy      S/P hysterectomy      S/P spinal fusion      S/P cataract surgery  Right eye      History of hysterectomy      Vitals:Vital Signs Last 24 Hrs  T(C): 37.1 (03 Feb 2025 08:11), Max: 37.6 (03 Feb 2025 04:30)  T(F): 98.8 (03 Feb 2025 08:05), Max: 99.6 (03 Feb 2025 04:30)  HR: 70 (03 Feb 2025 08:11) (66 - 83)  BP: 112/56 (03 Feb 2025 08:11) (110/59 - 132/61)  BP(mean): 74 (03 Feb 2025 08:11) (74 - 74)  RR: 18 (03 Feb 2025 08:11) (18 - 18)  SpO2: 93% (03 Feb 2025 08:11) (91% - 95%)    Parameters below as of 03 Feb 2025 08:11    O2 Flow (L/min): 4      Allergies: Allergies    Levaquin (Rash)    Intolerances        Physical Exam:     Labs:                         8.8    6.54  )-----------( 239      ( 03 Feb 2025 07:24 )             29.5     02-03    136  |  100  |  66[H]  ----------------------------<  192[H]  5.2   |  25  |  5.29[H]    Ca    8.6      03 Feb 2025 07:24  Phos  5.3     02-03  Mg     2.4     02-02      PT/INR - ( 03 Feb 2025 07:24 )   PT: 14.6 sec;   INR: 1.27 ratio         PTT - ( 03 Feb 2025 07:24 )  PTT:36.2 sec    Procedure and risks discussed with patient's family and health care proxy daughter and she is agreeable to proceed.     Informed consent obtained. All questions and concerns have been addressed at this time.     
Interventional Radiology    HPI: 88 yo female with chronic Afib on Eliquis, CKD stage 4, PVD, chronic diastolic CHF, DM type 2, HTN, BRASH syndrome (Bradycardia, Renal Failure, AV blockade, Shock, and Hyperkalamia) now with acute on chronic kidney disease requiring dialysis. Last Eliquis 1/26 at 5am; Pt on heparin drip- which was held this morning. Presents to IR for non-tunneled hemodialysis catheter placement.    Allergies: Levaquin (Rash)    Medications (Abx/Cardiac/Anticoagulation/Blood Products)  apixaban: 2.5 milliGRAM(s) Oral (01-26 @ 05:40)  buMETAnide: 2 milliGRAM(s) Oral (01-27 @ 05:40)  carvedilol: 12.5 milliGRAM(s) Oral (01-27 @ 05:41)  furosemide   Injectable: 80 milliGRAM(s) IV Push (01-27 @ 05:43)  heparin  Infusion.: 1200 Unit(s)/Hr IV Continuous (01-26 @ 19:08)  hydrALAZINE: 50 milliGRAM(s) Oral (01-27 @ 05:41)  NIFEdipine XL: 60 milliGRAM(s) Oral (01-27 @ 05:41)    Data:    T(C): 36.4  HR: 67  BP: 122/62  RR: 18  SpO2: 94%    Exam  General: No acute distress  Chest: Non labored breathing  Abdomen: Non-distended  Extremities: No swelling, warm    -WBC 5.03 / HgB 8.8 / Hct 28.1 / Plt 214  -Na 136 / Cl 98 /  / Glucose 153  -K 5.2 / CO2 23 / Cr 4.87  -ALT -- / Alk Phos -- / T.Bili --  -INR1.32    Imaging: reviewed    Plan: 89y Female presents for Non-tunneled hemodialysis catheter placement.  -Risks/Benefits/alternatives explained with the patient and/or healthcare proxy and witnessed informed consent obtained.

## 2025-02-03 NOTE — PROGRESS NOTE ADULT - SUBJECTIVE AND OBJECTIVE BOX
CARDIOLOGY ATTENDING PROGRESS NOTE     S:    IR case for placement of tunneled catheter cancelled due to patient hypoxemia    O:    PE  Vital Signs Last 24 Hrs  T(C): 36.1 (2025 15:24), Max: 37.6 (2025 04:30)  T(F): 97 (2025 15:24), Max: 99.6 (2025 04:30)  HR: 54 (2025 15:24) (54 - 74)  BP: 94/44 (2025 15:24) (92/58 - 132/61)  BP(mean): 74 (2025 08:11) (74 - 74)  RR: 17 (2025 15:24) (17 - 20)  SpO2: 96% (2025 15:24) (89% - 98%)    Parameters below as of 2025 15:24  Patient On (Oxygen Delivery Method): nasal cannula    O2 Concentration (%): 6  I&O's Detail    2025 07:01  -  2025 07:00  --------------------------------------------------------  IN:  Total IN: 0 mL    OUT:    Voided (mL): 100 mL  Total OUT: 100 mL    Total NET: -100 mL      2025 07:01  -  2025 15:49  --------------------------------------------------------  IN:  Total IN: 0 mL    OUT:    Other (mL): 1000 mL  Total OUT: 1000 mL    Total NET: -1000 mL      Gen: moderate lethargy, NAD  HEENT: MMM, anicteric sclera  Neck: JVP not visualized  Cardiac: RRR,  systolic murmur   Lungs: decreased BS bilaterally  Abd: non distended  Ext: warm. Trace LE edema  Psych: grossly normal affect    Data:  I have personally reviewed all data:  Labs reviewed    CXR  25  IMPRESSION:  Chronic right pleural calcifications and small bilateral pleural   effusions without interval change.  Patchy left basilar opacity may represent atelectasis or pneumonia.    EK25 sinus bradycardia with frequent PACS in a pattern of atrial bigeminy  25: sinus bradycardia with PACs  25: sinus bradycardia with 1st AV block    Echo:  Personally reviewed  Normal LVEF  Grade II diastolic dysfunction  Moderately dilated RV with normal RV systolic function  Biatrial dilation  At least mild tricuspid regurgitation    Meds   MEDICATIONS  (STANDING):  azithromycin   Tablet 500 milliGRAM(s) Oral daily  buMETAnide IVPB 4 milliGRAM(s) IV Intermittent <User Schedule>  cefTRIAXone   IVPB 1000 milliGRAM(s) IV Intermittent every 24 hours  cefTRIAXone   IVPB      chlorhexidine 4% Liquid 1 Application(s) Topical <User Schedule>  cholecalciferol 2000 Unit(s) Oral daily  dextrose 5%. 1000 milliLiter(s) (100 mL/Hr) IV Continuous <Continuous>  dextrose 5%. 1000 milliLiter(s) (50 mL/Hr) IV Continuous <Continuous>  dextrose 50% Injectable 25 Gram(s) IV Push once  dextrose 50% Injectable 12.5 Gram(s) IV Push once  dextrose 50% Injectable 25 Gram(s) IV Push once  dorzolamide 2% Ophthalmic Solution 1 Drop(s) Both EYES <User Schedule>  epoetin oliver (PROCRIT) Injectable 8000 Unit(s) IV Push <User Schedule>  fluticasone propionate/ salmeterol 250-50 MICROgram(s) Diskus 1 Dose(s) Inhalation two times a day  glucagon  Injectable 1 milliGRAM(s) IntraMuscular once  hydrALAZINE 25 milliGRAM(s) Oral three times a day  insulin lispro (ADMELOG) corrective regimen sliding scale   SubCutaneous three times a day before meals  insulin lispro (ADMELOG) corrective regimen sliding scale   SubCutaneous at bedtime  melatonin 5 milliGRAM(s) Oral at bedtime  metoprolol tartrate 50 milliGRAM(s) Oral two times a day  montelukast 10 milliGRAM(s) Oral daily  NIFEdipine XL 60 milliGRAM(s) Oral daily  ondansetron Injectable 4 milliGRAM(s) IV Push every 4 hours  polyethylene glycol 3350 17 Gram(s) Oral daily  sevelamer carbonate 800 milliGRAM(s) Oral three times a day with meals  sodium chloride 3%  Inhalation 4 milliLiter(s) Inhalation every 6 hours    MEDICATIONS  (PRN):  acetaminophen     Tablet .. 650 milliGRAM(s) Oral every 6 hours PRN Temp greater or equal to 38C (100.4F), Mild Pain (1 - 3)  albuterol/ipratropium for Nebulization 3 milliLiter(s) Nebulizer every 6 hours PRN Shortness of Breath and/or Wheezing  dextrose Oral Gel 15 Gram(s) Oral once PRN Blood Glucose LESS THAN 70 milliGRAM(s)/deciliter  senna 2 Tablet(s) Oral at bedtime PRN Constipation  sodium chloride 0.65% Nasal 1 Spray(s) Both Nostrils four times a day PRN Nasal Congestion  sodium chloride 0.9% Bolus. 100 milliLiter(s) IV Bolus every 5 minutes PRN SBP LESS THAN or EQUAL to 90 mmHg  sodium chloride 0.9% lock flush 10 milliLiter(s) IV Push every 1 hour PRN Pre/post blood products, medications, blood draw, and to maintain line patency      A/P    ANI CHESTER is a 89y Female with a history ILD on PRN home O2, atrial fibrillation, HFpEF, CKDStage IV-V, PVD who presented with lower extremity wounds on 1/15/25. Due to concern for infection, the patient's home diuretic was held. Approximately /- the patient had an episode of shortness of breath and was deemed to be volume overloaded and placed on IV  diuresis. She also had intermittent hyperkalemia. The patient was not well responsive to diuresis and developed worsening renal function with uremia leading to commencement of HD with plan to transition to PD as an outpatient. On 2/3 the patient was taken to IR for placement of dialysis catheter but desaturated and the procedure was cancelled    #acute (on chronic(?)) respiratory failure  #LUIS FERNANDO on advanced CKD, now on HD  #encephalopathy, possibly due to uremia  #Heart failure with preserved ejection fraction  #atrial bigeminy  #RV dilation  #bradycardia    If the patient is to be dialyzed long term, she would likely benefit from adjustments in her current regimen, especially if she is getting hypotensive intermittently. Some of her EKGs from early in her hospital stay show bradycardia and the beta blockers are unlikely to be diminishing the PACs substantially.     She received midodrine today. If she will need support for HD, would favor reducing if not titrating off hydralazine all together if it is not needed for blood pressure control and then potentially reducing her beta blocker dose and uptitrating nifedipine    Her daughter was at bedside this morning and the patient was more lethargic per her report and seemed so on exam. Also with increased oxygen needs. Suspect these are related to uremia and worsening filling pressures. Renal indices are worse this morning. Her volume status is being maintained by dialysis. Hopefully her mental status and hypoxemia improves with dialysis today    We will continue to follow with you. Thank you for allowing us to participate in this patient's care.    The following were reviewed with personal interpretation as necessary and/or aforementioned: [x]outpatient/inpatient documention [x] EKG, [x] TTE [x] radiology [x] labs [x]telemetry      60 minutes were spent on this encounter in total. The necessity of the time spent during the encounter on this date of service was due to: Review of medical records, labs, imaging, coordination of care as outlines above done by me personally    Brent Thomas MD  [facility]    For [facility] Cardiovascular [facility]  service contact information, please go to [email or web address] ("[email or web address]" to login)

## 2025-02-03 NOTE — PROGRESS NOTE ADULT - PROBLEM SELECTOR PLAN 4
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  Increased to 6L NC  Repeat COVID/RSV/influenza negative, CXR 1/28 with small bilateral pleural effusions and pulmonary congestion    Acute hypoxic rep failure 2/2 volume overload vs worsening fibrosis, diuesis per nephro  Pulm consulted, for possibly worsening fibrosis:  - Albuterol nebs q6h  - Hypersal q6h   - Chest PT via bed or vest as tolerated  - Aerobika, incentive spirometry  - continue Advair for now, may consider switching to Pulmicort BID if concern patient not able to engage in proper technique  - CXR atelectasis vs PNA, pt with coughing started on CTX Azithro 2/1-  - FULL RVP negative   - PT, OOB to chair as much as tolerated

## 2025-02-03 NOTE — CHART NOTE - NSCHARTNOTEFT_GEN_A_CORE
After being placed on table, patient SaO2 went down to 84%. Seems to have lots of mucous. Discussed with anesthesia who would like patient better optimized prior to procedure. Placed call to team, awaiting call back.

## 2025-02-03 NOTE — PROGRESS NOTE ADULT - ATTENDING COMMENTS
Patient seen and examined on dialysis. Tolerating HD.   # LUIS FERNANDO - currently dialysis-dependent. Patient went to IR today to get tunneled dialysis catheter, however, the procedure was cancelled as she was unable to lay flat. She received nebulizer prior to dialysis.   Plan - dialysis today as planned.     # Hypervolemia - secondary to ESRS. IV bumex.   + UF today.   BP ranging between /61. To consider stopping reducing the dose of nifedipine to 30mg daily.     # Medication monitoring encounter: medication dose reviewed. Please dose medications to CrCl<15    The rest of the recommendations as per fellow's note.    Shellie Briones MD  Attending Nephrologist  [phone] or via [employer]

## 2025-02-03 NOTE — PROGRESS NOTE ADULT - SUBJECTIVE AND OBJECTIVE BOX
[facility] Division of Kidney Diseases & Hypertension  FOLLOW UP NOTE  [phone]--------------------------------------------------------------------------------  Chief Complaint: LUIS FERNANDO on CKD, now on HD    24 hour events/subjective:   Pt was seen and examined earlier today. No acute overnight events.  Pt denies SOB/ Constipation/ Diarrhea/ Nausea/ Vomiting/ abdominal pain/ chest pain/ tingling/ numbness.         PAST HISTORY  --------------------------------------------------------------------------------  No significant changes to PMH, PSH, FHx, SHx, unless otherwise noted    ALLERGIES & MEDICATIONS  --------------------------------------------------------------------------------  Allergies    Levaquin (Rash)    Intolerances      Standing Inpatient Medications  apixaban 2.5 milliGRAM(s) Oral every 12 hours  azithromycin   Tablet 500 milliGRAM(s) Oral daily  buMETAnide IVPB 4 milliGRAM(s) IV Intermittent <User Schedule>  cefTRIAXone   IVPB 1000 milliGRAM(s) IV Intermittent every 24 hours  cefTRIAXone   IVPB      chlorhexidine 4% Liquid 1 Application(s) Topical <User Schedule>  cholecalciferol 2000 Unit(s) Oral daily  dextrose 5%. 1000 milliLiter(s) IV Continuous <Continuous>  dextrose 5%. 1000 milliLiter(s) IV Continuous <Continuous>  dextrose 50% Injectable 25 Gram(s) IV Push once  dextrose 50% Injectable 12.5 Gram(s) IV Push once  dextrose 50% Injectable 25 Gram(s) IV Push once  dorzolamide 2% Ophthalmic Solution 1 Drop(s) Both EYES <User Schedule>  epoetin oliver (PROCRIT) Injectable 8000 Unit(s) IV Push <User Schedule>  fluticasone propionate/ salmeterol 250-50 MICROgram(s) Diskus 1 Dose(s) Inhalation two times a day  glucagon  Injectable 1 milliGRAM(s) IntraMuscular once  hydrALAZINE 25 milliGRAM(s) Oral three times a day  insulin lispro (ADMELOG) corrective regimen sliding scale   SubCutaneous three times a day before meals  insulin lispro (ADMELOG) corrective regimen sliding scale   SubCutaneous at bedtime  melatonin 5 milliGRAM(s) Oral at bedtime  metoprolol tartrate 50 milliGRAM(s) Oral two times a day  montelukast 10 milliGRAM(s) Oral daily  NIFEdipine XL 60 milliGRAM(s) Oral daily  ondansetron Injectable 4 milliGRAM(s) IV Push every 4 hours  polyethylene glycol 3350 17 Gram(s) Oral daily  sevelamer carbonate 800 milliGRAM(s) Oral three times a day with meals  sodium chloride 3%  Inhalation 4 milliLiter(s) Inhalation every 6 hours    PRN Inpatient Medications  acetaminophen     Tablet .. 650 milliGRAM(s) Oral every 6 hours PRN  albuterol/ipratropium for Nebulization 3 milliLiter(s) Nebulizer every 6 hours PRN  dextrose Oral Gel 15 Gram(s) Oral once PRN  senna 2 Tablet(s) Oral at bedtime PRN  sodium chloride 0.65% Nasal 1 Spray(s) Both Nostrils four times a day PRN  sodium chloride 0.9% Bolus. 100 milliLiter(s) IV Bolus every 5 minutes PRN  sodium chloride 0.9% lock flush 10 milliLiter(s) IV Push every 1 hour PRN      REVIEW OF SYSTEMS  --------------------------------------------------------------------------------  as noted above.     VITALS/PHYSICAL EXAM  --------------------------------------------------------------------------------  T(C): 36.4 (02-03-25 @ 16:00), Max: 37.6 (02-03-25 @ 04:30)  HR: 65 (02-03-25 @ 16:00) (54 - 74)  BP: 100/45 (02-03-25 @ 16:00) (92/58 - 132/61)  RR: 18 (02-03-25 @ 16:00) (17 - 20)  SpO2: 93% (02-03-25 @ 16:00) (89% - 98%)  Wt(kg): --  Height (cm): 149.9 (02-03-25 @ 08:11)  Weight (kg): 70.3 (02-03-25 @ 08:11)  BMI (kg/m2): 31.3 (02-03-25 @ 08:11)  BSA (m2): 1.65 (02-03-25 @ 08:11)      02-02-25 @ 07:01  -  02-03-25 @ 07:00  --------------------------------------------------------  IN: 0 mL / OUT: 100 mL / NET: -100 mL    02-03-25 @ 07:01  -  02-03-25 @ 17:12  --------------------------------------------------------  IN: 0 mL / OUT: 1000 mL / NET: -1000 mL      Physical Exam:  Gen: NAD  HEENT: Anicteric  Pulm: Crackles B/L, on NC  CV: RRR, S1S2  Abd: +BS, soft, nontender/nondistended  Extremities: No pitting edema noted, chronic lymphedema  Neuro: Awake  Skin: Warm  Vascular access: R-IJ non-tunneled dialysis catheter    LABS/STUDIES  --------------------------------------------------------------------------------              8.8    6.54  >-----------<  239      [02-03-25 @ 07:24]              29.5     136  |  100  |  66  ----------------------------<  192      [02-03-25 @ 07:24]  5.2   |  25  |  5.29        Ca     8.6     [02-03-25 @ 07:24]      Mg     2.4     [02-02-25 @ 10:22]      Phos  5.3     [02-03-25 @ 07:24]      PT/INR: PT 14.6 , INR 1.27       [02-03-25 @ 07:24]  PTT: 36.2       [02-03-25 @ 07:24]      Creatinine Trend:  SCr 5.29 [02-03 @ 07:24]  SCr 4.36 [02-02 @ 10:22]  SCr 3.43 [02-01 @ 15:51]  SCr 4.12 [01-31 @ 12:49]  SCr 2.64 [01-30 @ 07:12]    Iron 31, TIBC 224, %sat 14      [02-02-25 @ 10:22]  Ferritin 143      [02-02-25 @ 10:21]

## 2025-02-03 NOTE — PROGRESS NOTE ADULT - SUBJECTIVE AND OBJECTIVE BOX
Aria Egan MD  [facility] Division of Hospital Medicine    SUBJECTIVE / OVERNIGHT EVENTS:  - no events overnight, but this morning desaturated significantly. more lethargic. daughters at bedside.     MEDICATIONS  (STANDING):  azithromycin   Tablet 500 milliGRAM(s) Oral daily  buMETAnide IVPB 4 milliGRAM(s) IV Intermittent <User Schedule>  cefTRIAXone   IVPB 1000 milliGRAM(s) IV Intermittent every 24 hours  cefTRIAXone   IVPB      chlorhexidine 4% Liquid 1 Application(s) Topical <User Schedule>  cholecalciferol 2000 Unit(s) Oral daily  dextrose 5%. 1000 milliLiter(s) (100 mL/Hr) IV Continuous <Continuous>  dextrose 5%. 1000 milliLiter(s) (50 mL/Hr) IV Continuous <Continuous>  dextrose 50% Injectable 25 Gram(s) IV Push once  dextrose 50% Injectable 12.5 Gram(s) IV Push once  dextrose 50% Injectable 25 Gram(s) IV Push once  dorzolamide 2% Ophthalmic Solution 1 Drop(s) Both EYES <User Schedule>  epoetin oliver (PROCRIT) Injectable 8000 Unit(s) IV Push <User Schedule>  fluticasone propionate/ salmeterol 250-50 MICROgram(s) Diskus 1 Dose(s) Inhalation two times a day  glucagon  Injectable 1 milliGRAM(s) IntraMuscular once  hydrALAZINE 25 milliGRAM(s) Oral three times a day  insulin lispro (ADMELOG) corrective regimen sliding scale   SubCutaneous three times a day before meals  insulin lispro (ADMELOG) corrective regimen sliding scale   SubCutaneous at bedtime  melatonin 5 milliGRAM(s) Oral at bedtime  metoprolol tartrate 50 milliGRAM(s) Oral two times a day  montelukast 10 milliGRAM(s) Oral daily  NIFEdipine XL 60 milliGRAM(s) Oral daily  ondansetron Injectable 4 milliGRAM(s) IV Push every 4 hours  polyethylene glycol 3350 17 Gram(s) Oral daily  sevelamer carbonate 800 milliGRAM(s) Oral three times a day with meals  sodium chloride 3%  Inhalation 4 milliLiter(s) Inhalation every 6 hours    MEDICATIONS  (PRN):  acetaminophen     Tablet .. 650 milliGRAM(s) Oral every 6 hours PRN Temp greater or equal to 38C (100.4F), Mild Pain (1 - 3)  albuterol/ipratropium for Nebulization 3 milliLiter(s) Nebulizer every 6 hours PRN Shortness of Breath and/or Wheezing  dextrose Oral Gel 15 Gram(s) Oral once PRN Blood Glucose LESS THAN 70 milliGRAM(s)/deciliter  senna 2 Tablet(s) Oral at bedtime PRN Constipation  sodium chloride 0.65% Nasal 1 Spray(s) Both Nostrils four times a day PRN Nasal Congestion  sodium chloride 0.9% Bolus. 100 milliLiter(s) IV Bolus every 5 minutes PRN SBP LESS THAN or EQUAL to 90 mmHg  sodium chloride 0.9% lock flush 10 milliLiter(s) IV Push every 1 hour PRN Pre/post blood products, medications, blood draw, and to maintain line patency      I&O's Summary    02 Feb 2025 07:01  -  03 Feb 2025 07:00  --------------------------------------------------------  IN: 0 mL / OUT: 100 mL / NET: -100 mL    03 Feb 2025 07:01  -  03 Feb 2025 16:37  --------------------------------------------------------  IN: 0 mL / OUT: 1000 mL / NET: -1000 mL        PHYSICAL EXAM:  Vital Signs Last 24 Hrs  T(C): 36.4 (03 Feb 2025 16:00), Max: 37.6 (03 Feb 2025 04:30)  T(F): 97.6 (03 Feb 2025 16:00), Max: 99.6 (03 Feb 2025 04:30)  HR: 65 (03 Feb 2025 16:00) (54 - 74)  BP: 100/45 (03 Feb 2025 16:00) (92/58 - 132/61)  BP(mean): 74 (03 Feb 2025 08:11) (74 - 74)  RR: 18 (03 Feb 2025 16:00) (17 - 20)  SpO2: 93% (03 Feb 2025 16:00) (89% - 98%)    Parameters below as of 03 Feb 2025 16:00  Patient On (Oxygen Delivery Method): nasal cannula  O2 Flow (L/min): 6    CONSTITUTIONAL: NAD, well-developed, well-groomed, wearing NC 6L  RESPIRATORY: Normal respiratory effort; Left base crackles  CARDIOVASCULAR: Regular rate and rhythm, normal S1 and S2  ABDOMEN: Nontender to palpation, normoactive bowel sounds, mild distension   PSYCH: A+O to person, place, and time  SKIN: b/l lower legs wrapped      LABS:                        8.8    6.54  )-----------( 239      ( 03 Feb 2025 07:24 )             29.5     02-03    136  |  100  |  66[H]  ----------------------------<  192[H]  5.2   |  25  |  5.29[H]    Ca    8.6      03 Feb 2025 07:24  Phos  5.3     02-03  Mg     2.4     02-02      PT/INR - ( 03 Feb 2025 07:24 )   PT: 14.6 sec;   INR: 1.27 ratio         PTT - ( 03 Feb 2025 07:24 )  PTT:36.2 sec      Urinalysis Basic - ( 03 Feb 2025 07:24 )    Color: x / Appearance: x / SG: x / pH: x  Gluc: 192 mg/dL / Ketone: x  / Bili: x / Urobili: x   Blood: x / Protein: x / Nitrite: x   Leuk Esterase: x / RBC: x / WBC x   Sq Epi: x / Non Sq Epi: x / Bacteria: x        Culture - Sputum (collected 02 Feb 2025 07:11)  Source: .Sputum Sputum  Gram Stain (02 Feb 2025 12:26):    Numerous polymorphonuclear leukocytes per low power field    Few Squamous epithelial cells per low power field    Few Gram Positive Rods per oil power field    Few Gram Positive Cocci per oil power field  Preliminary Report (03 Feb 2025 08:47):    Commensal kalyani consistent with body site      SARS-CoV-2: NotDetec (01 Feb 2025 13:12)

## 2025-02-03 NOTE — PRE-ANESTHESIA EVALUATION ADULT - NSANTHPEFT_GEN_ALL_CORE
Speaking Coherently
on 2LNC  sitting upright     respiratory: crackles throughout   NAD  non-communicative

## 2025-02-04 NOTE — CHART NOTE - NSCHARTNOTEFT_GEN_A_CORE
[id number]  ANI HENRIK    Event Summary:  RN notified pt hypoxia to 80s% while on 6L NC, with cough/wheeze. Pt was given duoneb x1, and transitioned to NRB mask with improvement to >90%. Pt could not be weaned back to 6L. Attempted to transition to HFNC, but only able to sustain 89% on HFNC. Pt seen at bedside, not in acute distress. Pt's family at bedside.     RRT called for persistent hypoxia.      T(C): 36.9 (02-03-25 @ 20:25), Max: 37.6 (02-03-25 @ 04:30)  HR: 71 (02-04-25 @ 00:20) (54 - 73)  BP: 96/44 (02-03-25 @ 20:25) (92/58 - 132/61)  RR: 18 (02-03-25 @ 20:25) (17 - 20)  SpO2: 96% (02-04-25 @ 00:20) (89% - 98%)    Labs:  02-03    136  |  100  |  66[H]  ----------------------------<  192[H]  5.2   |  25  |  5.29[H]    Ca    8.6      03 Feb 2025 07:24  Phos  5.3     02-03  Mg     2.4     02-02  ABG - ( 03 Feb 2025 23:43 )  pH, Arterial: 7.26  pH, Blood: x     /  pCO2: 68    /  pO2: 63    / HCO3: 30    / Base Excess: 2.4   /  SaO2: 94.6        A/P:   Briefly, this is a  HPI:  Pt is an 89F w/ PMH of HTN, DM2, Afib on Eliquis, HFpEF, CKD4, PVD, BRASH Syndrome pw LE wounds.   Pt provides limited hx and unable to reach daughter ON. Reports chronic wounds previously managed w/ wound care. In the past 2 weeks, reports of new wounds to b/l LE. She otherwise denies any pain, no F/C, CP, SOB, abd pain, N/V, dysuria.   In the ED hypothermic w/ labs ~baseline. UA w/ bacteria though denies urianry symptoms and CXR w/ RLL scarring as noted on prior CXR. She was administered Vanc/Zosyn and 500cc IVF.    (15 Jose 2025 03:55)    Patient now seen post RRT for hypoxia; vital signs hemodynamically stable.     - See RRT note for full details  - Will continue to monitor patient/vitals and f/u labs  - Will check ABG in 1 hour s/p BiPAP  - Family made aware, at bedside  - Hospitalist made aware      Cuba Chacon PA-C   Medicine  [id number]  NAI HENRIK    Event Summary:  RN notified pt hypoxia to 80s% while on 6L NC, with cough/wheeze. Pt was given duoneb x1, and transitioned to NRB mask with improvement to >90%. Pt could not be weaned back to 6L. Attempted to transition to HFNC, but only able to sustain 89% on HFNC. Pt seen at bedside, not in acute distress. Pt's family at bedside.     RRT called for persistent hypoxia.      T(C): 36.9 (02-03-25 @ 20:25), Max: 37.6 (02-03-25 @ 04:30)  HR: 71 (02-04-25 @ 00:20) (54 - 73)  BP: 96/44 (02-03-25 @ 20:25) (92/58 - 132/61)  RR: 18 (02-03-25 @ 20:25) (17 - 20)  SpO2: 96% (02-04-25 @ 00:20) (89% - 98%)    Labs:  02-03    136  |  100  |  66[H]  ----------------------------<  192[H]  5.2   |  25  |  5.29[H]    Ca    8.6      03 Feb 2025 07:24  Phos  5.3     02-03  Mg     2.4     02-02  ABG - ( 03 Feb 2025 23:43 )  pH, Arterial: 7.26  pH, Blood: x     /  pCO2: 68    /  pO2: 63    / HCO3: 30    / Base Excess: 2.4   /  SaO2: 94.6        A/P:   Briefly, this is a  HPI:  Pt is an 89F w/ PMH of HTN, DM2, Afib on Eliquis, HFpEF, CKD4, PVD, BRASH Syndrome pw LE wounds.   Pt provides limited hx and unable to reach daughter ON. Reports chronic wounds previously managed w/ wound care. In the past 2 weeks, reports of new wounds to b/l LE. She otherwise denies any pain, no F/C, CP, SOB, abd pain, N/V, dysuria.   In the ED hypothermic w/ labs ~baseline. UA w/ bacteria though denies urianry symptoms and CXR w/ RLL scarring as noted on prior CXR. She was administered Vanc/Zosyn and 500cc IVF.    (15 Jose 2025 03:55)    Patient now seen post RRT for hypoxia; vital signs hemodynamically stable.     - See RRT note for full details  - Will continue to monitor patient/vitals and f/u labs  - Will check ABG in 1 hour s/p BiPAP  - Family made aware, at bedside  - Hospitalist made aware      RACHEL Torrez-C   Medicine  [id number]    ADDENDUM:  - Repeat ABG s/p BiPAP with worsening pH 7.20, pCO2 79.   - MICU consulted for new BiPAP -- transitioned to AVAPs  - Repeat ABG s/p AVAPs improving: pH 7.23, pCO2 76  - BP 82/42 manual --> 2nd RRT called, MICU c/s for poss pressor support, but repeat BP on zoll in 110s - at baseline.   - Pt is now DNR/DNI per family's request, also requesting palliative c/s, no vital signs check, no blood draws

## 2025-02-04 NOTE — CONSULT NOTE ADULT - CONSULT REASON
wound
CHF management
LUIS FERNANDO on CKD
Hypoxia
hypoxia/new BIPAP
nontunneled HD catheter placement
Tunneled HD catheter placement
GOC/family request

## 2025-02-04 NOTE — PROVIDER CONTACT NOTE (CRITICAL VALUE NOTIFICATION) - TEST AND RESULT REPORTED:
PCO2 76
pH 7.20, PCO2 79
Pt on heparin gtt 12ml/hr. aPTT 170.9. Pt on full heparin nomogram.
APTT 133.6

## 2025-02-04 NOTE — PROVIDER CONTACT NOTE (CRITICAL VALUE NOTIFICATION) - SITUATION
Pt on heparin gtt 12ml/hr. aPTT 170.9. Pt on full heparin nomogram.
pH 7.20, PCO2 79
PCO2 76
aPTT 133.6

## 2025-02-04 NOTE — CONSULT NOTE ADULT - SUBJECTIVE AND OBJECTIVE BOX
Patient:  ANI CHESTER  [id number]    CHIEF COMPLAINT:    HPI:    88 yo F (Martiniquais-speaking) with ILD (Dx 10yrs ago, uses portable O2 concentrator prn, followed by [facility] Pulmonologist in [place]), HTN, DM2, AFib, HFpEF, PVD, BRASH syndrome admitted with sepsis from lower extremity cellulitis with course complicated by LUIS FERNANDO on CKD4 requiring new dialysis and acute hypoxic respiratory failure with last HD 2/3 with fluid removal had RRT called 2/4am iso of hypoxia  Respiratory status and oxygen requirements have improved with volume removal with HD however patient remains on 4L NC and daughter (whom is a Family Medicine physician and was present at bedside, also spoke with other daughter over the phone) is concerned by her WOB.  Weaned from 6L NC yesterday.  S/p HD 1/30 w/ only 1L removed limited by tachycardia, but s/p successful 2L removal today. She is on Advair but not receiving any nebulizer treatments and reports her mother has been largely immobile during hospital stay.      PAST MEDICAL & SURGICAL HISTORY:  Benign essential hypertension      Diabetes mellitus      Peripheral vascular disease      Personal history of asbestosis      Spinal stenosis      HTN (hypertension)      DM (diabetes mellitus)      TIA (transient ischemic attack)      Pulmonary fibrosis      Peripheral edema      Degenerative arthritis of right knee      Degenerative arthritis of left knee      Osteoporosis      KENNETH (obstructive sleep apnea)      Hypertension      Chronic kidney disease (CKD)      T2DM (type 2 diabetes mellitus)      PVD (peripheral vascular disease)      H/O abdominal hysterectomy      S/P hysterectomy      S/P spinal fusion      S/P cataract surgery  Right eye      History of hysterectomy          FAMILY HISTORY:      SOCIAL HISTORY:    Allergies    Levaquin (Rash)    Intolerances        HOME MEDICATIONS:    REVIEW OF SYSTEMS:  [ ] Unable to assess ROS because ______  [ ] Negative except as stated in HPI  CONSTITUTIONAL: No fever, chills, night sweats, or fatigue  EYES: No eye pain, visual disturbances, or discharge  ENMT:  No difficulty hearing, tinnitus, vertigo; No sinus or throat pain  NECK: No pain or stiffness  BREASTS: No pain, masses, or nipple discharge  RESPIRATORY: No cough, wheezing, or hemoptysis; No shortness of breath  CARDIOVASCULAR: No chest pain, palpitations, dizziness, or leg swelling  GASTROINTESTINAL: No abdominal or epigastric pain. No nausea, vomiting, or hematemesis; No diarrhea or constipation. No melena or hematochezia.  GENITOURINARY: No dysuria, frequency, hematuria, or incontinence  NEUROLOGICAL: No headaches, memory loss, loss of strength, numbness, or tremors  SKIN: No itching, burning, rashes, or lesions   LYMPH NODES: No enlarged glands  ENDOCRINE: No heat or cold intolerance; No hair loss  MUSCULOSKELETAL: No joint pain or swelling; No muscle, back, or extremity pain  PSYCHIATRIC: No depression, anxiety, mood swings, or difficulty sleeping  HEME/LYMPH: No easy bruising, or bleeding gums  ALLERGY AND IMMUNOLOGIC: No hives or eczema    OBJECTIVE:  T(F): 98.4 (02-03-25 @ 20:25), Max: 99.6 (02-03-25 @ 04:30)  HR: 71 (02-04-25 @ 00:20) (54 - 73)  BP: 96/44 (02-03-25 @ 20:25) (92/58 - 132/61)  BP(mean): 74 (02-03-25 @ 08:11) (74 - 74)  ABP: --  ABP(mean): --  RR: 18 (02-03-25 @ 20:25) (17 - 20)  SpO2: 96% (02-04-25 @ 00:20) (89% - 98%)  CVP(mm Hg): --    I/O Summary 24H    IN: 0 mL / OUT: 100 mL / NET: -100 mL      CAPILLARY BLOOD GLUCOSE      POCT Blood Glucose.: 211 mg/dL (03 Feb 2025 23:30)      PHYSICAL EXAM:  GENERAL: NAD, lying in bed comfortably  HEAD:  Atraumatic, Normocephalic  EYES: EOMI, PERRLA, conjunctiva and sclera clear  ENT: Moist mucous membranes  NECK: Supple, No JVD  CHEST/LUNG: Clear to auscultation bilaterally; No rales, rhonchi, wheezing, or rubs. Unlabored respirations  HEART: Regular rate and rhythm; No murmurs, rubs, or gallops  ABDOMEN: Bowel sounds present; Soft, Nontender, Nondistended. No hepatomegaly  EXTREMITIES:  2+ Peripheral Pulses, brisk capillary refill. No clubbing, cyanosis, or edema  NERVOUS SYSTEM:  Alert & Oriented X3, speech clear. No deficits   MSK: FROM all 4 extremities, full and equal strength  SKIN: No rashes or lesions    HOSPITAL MEDICATIONS:  MEDICATIONS  (STANDING):  apixaban 2.5 milliGRAM(s) Oral every 12 hours  azithromycin   Tablet 500 milliGRAM(s) Oral daily  buMETAnide IVPB 4 milliGRAM(s) IV Intermittent <User Schedule>  cefTRIAXone   IVPB 1000 milliGRAM(s) IV Intermittent every 24 hours  cefTRIAXone   IVPB      chlorhexidine 4% Liquid 1 Application(s) Topical <User Schedule>  cholecalciferol 2000 Unit(s) Oral daily  dextrose 5%. 1000 milliLiter(s) (100 mL/Hr) IV Continuous <Continuous>  dextrose 5%. 1000 milliLiter(s) (50 mL/Hr) IV Continuous <Continuous>  dextrose 50% Injectable 25 Gram(s) IV Push once  dextrose 50% Injectable 12.5 Gram(s) IV Push once  dextrose 50% Injectable 25 Gram(s) IV Push once  dorzolamide 2% Ophthalmic Solution 1 Drop(s) Both EYES <User Schedule>  epoetin oliver (PROCRIT) Injectable 8000 Unit(s) IV Push <User Schedule>  fluticasone propionate/ salmeterol 250-50 MICROgram(s) Diskus 1 Dose(s) Inhalation two times a day  glucagon  Injectable 1 milliGRAM(s) IntraMuscular once  hydrALAZINE 10 milliGRAM(s) Oral three times a day  insulin lispro (ADMELOG) corrective regimen sliding scale   SubCutaneous three times a day before meals  insulin lispro (ADMELOG) corrective regimen sliding scale   SubCutaneous at bedtime  melatonin 5 milliGRAM(s) Oral at bedtime  metoprolol tartrate 50 milliGRAM(s) Oral two times a day  montelukast 10 milliGRAM(s) Oral daily  NIFEdipine XL 60 milliGRAM(s) Oral daily  piperacillin/tazobactam IVPB.- 3.375 Gram(s) IV Intermittent once  piperacillin/tazobactam IVPB.. 3.375 Gram(s) IV Intermittent every 12 hours  polyethylene glycol 3350 17 Gram(s) Oral daily  sevelamer carbonate 800 milliGRAM(s) Oral three times a day with meals  sodium chloride 3%  Inhalation 4 milliLiter(s) Inhalation every 6 hours  sodium chloride 7% Inhalation 4 milliLiter(s) Inhalation every 12 hours    MEDICATIONS  (PRN):  acetaminophen     Tablet .. 650 milliGRAM(s) Oral every 6 hours PRN Temp greater or equal to 38C (100.4F), Mild Pain (1 - 3)  albuterol/ipratropium for Nebulization 3 milliLiter(s) Nebulizer every 6 hours PRN Shortness of Breath and/or Wheezing  dextrose Oral Gel 15 Gram(s) Oral once PRN Blood Glucose LESS THAN 70 milliGRAM(s)/deciliter  senna 2 Tablet(s) Oral at bedtime PRN Constipation  sodium chloride 0.65% Nasal 1 Spray(s) Both Nostrils four times a day PRN Nasal Congestion  sodium chloride 0.9% Bolus. 100 milliLiter(s) IV Bolus every 5 minutes PRN SBP LESS THAN or EQUAL to 90 mmHg  sodium chloride 0.9% lock flush 10 milliLiter(s) IV Push every 1 hour PRN Pre/post blood products, medications, blood draw, and to maintain line patency      LABS:  CBC 02-04-25 @ 00:18                        8.8    6.20  )-----------( 214                   30.0     Hgb trend: 8.8 <-- , 8.8 <-- , 9.3 <-- , 9.7 <--   WBC trend: 6.20 <-- , 6.54 <-- , 6.68 <-- , 7.74 <--     CMP 02-04-25 @ 00:18    137  |  100  |  26[H]  ----------------------------<  209[H]  4.7   |  24  |  2.91[H]    Ca    8.9      02-04-25 @ 00:18  Phos  4.7     02-04  Mg     2.3     02-04    TPro  6.6  /  Alb  2.9[L]  /  TBili  0.2  /  DBili  x   /  AST  23  /  ALT  14  /  AlkPhos  156[H]     02-04    Serum Cr (eGFR) trend: 2.91 (15) <-- , 5.29 (7) <-- , 4.36 (9) <-- , 3.43 (12) <--     PT/INR - ( 03 Feb 2025 07:24 )   PT: 14.6 sec;   INR: 1.27 ratio    PTT - ( 03 Feb 2025 07:24 ):36.2 sec    ABG Trend:   02-03-25 @ 23:43 pH 7.26[L] | pCO2 68[H] | PO2 63[L] | HCO3 30[H] | FiO2 100    VBG Trend:       MICROBIOLOGY:       RADIOLOGY:  [ ] Reviewed and interpreted by me    EKG Patient:  ANI CHESTER  [id number]    CHIEF COMPLAINT:    HPI:    88 yo F (Cymro-speaking) with ILD (Dx 10yrs ago, uses portable O2 concentrator prn, followed by [facility] Pulmonologist in [place]), HTN, DM2, AFib, HFpEF, PVD, BRASH syndrome admitted with sepsis from lower extremity cellulitis with course complicated by LUIS FERNANDO on CKD4 requiring new dialysis and acute hypoxic respiratory failure with last HD 2/3 with fluid removal had RRT called 2/4am iso of hypoxia to 80% on 6LNC transitioned over to HFNC while on NRB continuing to be hypoxic. S/p bumex x2 and transitioned over to BIPAP pulling adequate volume at the time with improvement to SpO2 90%s.  ABG on 6LNC showing 7.26/68/63. MICU called iso of hypoxia and new BIPAP requirements.     On arrival at bedside- pt lethargic on BIPAP. On BIPAP pt pulling inadequate volumes. Transitioned over to AVAPS , RR 20, Max pressure 25, min presure 10 with some improvement of tidal volumes.       PAST MEDICAL & SURGICAL HISTORY:  Benign essential hypertension    Diabetes mellitus      Peripheral vascular disease      Personal history of asbestosis      Spinal stenosis      HTN (hypertension)      DM (diabetes mellitus)      TIA (transient ischemic attack)      Pulmonary fibrosis      Peripheral edema      Degenerative arthritis of right knee      Degenerative arthritis of left knee      Osteoporosis      KENNETH (obstructive sleep apnea)      Hypertension      Chronic kidney disease (CKD)      T2DM (type 2 diabetes mellitus)      PVD (peripheral vascular disease)      H/O abdominal hysterectomy      S/P hysterectomy      S/P spinal fusion      S/P cataract surgery  Right eye      History of hysterectomy          FAMILY HISTORY:      SOCIAL HISTORY:    Allergies    Levaquin (Rash)    Intolerances        HOME MEDICATIONS:    REVIEW OF SYSTEMS:  Unable to assess due to ROS     OBJECTIVE:  T(F): 98.4 (02-03-25 @ 20:25), Max: 99.6 (02-03-25 @ 04:30)  HR: 71 (02-04-25 @ 00:20) (54 - 73)  BP: 96/44 (02-03-25 @ 20:25) (92/58 - 132/61)  BP(mean): 74 (02-03-25 @ 08:11) (74 - 74)  ABP: --  ABP(mean): --  RR: 18 (02-03-25 @ 20:25) (17 - 20)  SpO2: 96% (02-04-25 @ 00:20) (89% - 98%)  CVP(mm Hg): --    I/O Summary 24H    IN: 0 mL / OUT: 100 mL / NET: -100 mL      CAPILLARY BLOOD GLUCOSE      POCT Blood Glucose.: 211 mg/dL (03 Feb 2025 23:30)      PHYSICAL EXAM:  GENERAL: NAD, lying in bed on AVAPS   HEAD:  Atraumatic, Normocephalic  EYES: EOMI, PERRLA, conjunctiva and sclera clear  ENT: Moist mucous membranes  NECK: Supple, No JVD  CHEST/LUNG: +decreased BS at bases HEART: Regular rate and rhythm; No murmurs, rubs, or gallops  ABDOMEN: Bowel sounds present; Soft, Nontender, Nondistended. No hepatomegaly  EXTREMITIES:  2+ Peripheral Pulses, brisk capillary refill. No clubbing, cyanosis, or edema  NERVOUS SYSTEM: lethargic   MSK: FROM all 4 extremities, full and equal strength  SKIN: No rashes or lesions    HOSPITAL MEDICATIONS:  MEDICATIONS  (STANDING):  apixaban 2.5 milliGRAM(s) Oral every 12 hours  azithromycin   Tablet 500 milliGRAM(s) Oral daily  buMETAnide IVPB 4 milliGRAM(s) IV Intermittent <User Schedule>  cefTRIAXone   IVPB 1000 milliGRAM(s) IV Intermittent every 24 hours  cefTRIAXone   IVPB      chlorhexidine 4% Liquid 1 Application(s) Topical <User Schedule>  cholecalciferol 2000 Unit(s) Oral daily  dextrose 5%. 1000 milliLiter(s) (100 mL/Hr) IV Continuous <Continuous>  dextrose 5%. 1000 milliLiter(s) (50 mL/Hr) IV Continuous <Continuous>  dextrose 50% Injectable 25 Gram(s) IV Push once  dextrose 50% Injectable 12.5 Gram(s) IV Push once  dextrose 50% Injectable 25 Gram(s) IV Push once  dorzolamide 2% Ophthalmic Solution 1 Drop(s) Both EYES <User Schedule>  epoetin oliver (PROCRIT) Injectable 8000 Unit(s) IV Push <User Schedule>  fluticasone propionate/ salmeterol 250-50 MICROgram(s) Diskus 1 Dose(s) Inhalation two times a day  glucagon  Injectable 1 milliGRAM(s) IntraMuscular once  hydrALAZINE 10 milliGRAM(s) Oral three times a day  insulin lispro (ADMELOG) corrective regimen sliding scale   SubCutaneous three times a day before meals  insulin lispro (ADMELOG) corrective regimen sliding scale   SubCutaneous at bedtime  melatonin 5 milliGRAM(s) Oral at bedtime  metoprolol tartrate 50 milliGRAM(s) Oral two times a day  montelukast 10 milliGRAM(s) Oral daily  NIFEdipine XL 60 milliGRAM(s) Oral daily  piperacillin/tazobactam IVPB.- 3.375 Gram(s) IV Intermittent once  piperacillin/tazobactam IVPB.. 3.375 Gram(s) IV Intermittent every 12 hours  polyethylene glycol 3350 17 Gram(s) Oral daily  sevelamer carbonate 800 milliGRAM(s) Oral three times a day with meals  sodium chloride 3%  Inhalation 4 milliLiter(s) Inhalation every 6 hours  sodium chloride 7% Inhalation 4 milliLiter(s) Inhalation every 12 hours    MEDICATIONS  (PRN):  acetaminophen     Tablet .. 650 milliGRAM(s) Oral every 6 hours PRN Temp greater or equal to 38C (100.4F), Mild Pain (1 - 3)  albuterol/ipratropium for Nebulization 3 milliLiter(s) Nebulizer every 6 hours PRN Shortness of Breath and/or Wheezing  dextrose Oral Gel 15 Gram(s) Oral once PRN Blood Glucose LESS THAN 70 milliGRAM(s)/deciliter  senna 2 Tablet(s) Oral at bedtime PRN Constipation  sodium chloride 0.65% Nasal 1 Spray(s) Both Nostrils four times a day PRN Nasal Congestion  sodium chloride 0.9% Bolus. 100 milliLiter(s) IV Bolus every 5 minutes PRN SBP LESS THAN or EQUAL to 90 mmHg  sodium chloride 0.9% lock flush 10 milliLiter(s) IV Push every 1 hour PRN Pre/post blood products, medications, blood draw, and to maintain line patency      LABS:  CBC 02-04-25 @ 00:18                        8.8    6.20  )-----------( 214                   30.0     Hgb trend: 8.8 <-- , 8.8 <-- , 9.3 <-- , 9.7 <--   WBC trend: 6.20 <-- , 6.54 <-- , 6.68 <-- , 7.74 <--     CMP 02-04-25 @ 00:18    137  |  100  |  26[H]  ----------------------------<  209[H]  4.7   |  24  |  2.91[H]    Ca    8.9      02-04-25 @ 00:18  Phos  4.7     02-04  Mg     2.3     02-04    TPro  6.6  /  Alb  2.9[L]  /  TBili  0.2  /  DBili  x   /  AST  23  /  ALT  14  /  AlkPhos  156[H]     02-04    Serum Cr (eGFR) trend: 2.91 (15) <-- , 5.29 (7) <-- , 4.36 (9) <-- , 3.43 (12) <--     PT/INR - ( 03 Feb 2025 07:24 )   PT: 14.6 sec;   INR: 1.27 ratio    PTT - ( 03 Feb 2025 07:24 ):36.2 sec    ABG Trend:   02-03-25 @ 23:43 pH 7.26[L] | pCO2 68[H] | PO2 63[L] | HCO3 30[H] | FiO2 100    VBG Trend:       MICROBIOLOGY:       RADIOLOGY:  [ ] Reviewed and interpreted by me    EKG

## 2025-02-04 NOTE — CONSULT NOTE ADULT - ASSESSMENT
89F with HTN, DM2, AFib, HFpEF, PVD, BRASH syndrome admitted with sepsis from lower extremity cellulitis with course complicated by LUIS FERNANDO on CKD4 requiring new dialysis tolerating fluid removal with RRT called iso of hypoxia now requiring BIPAP and escalating to AVAPS. MICU consulted iso of acute hypoxic hypercarbic respiratory failure with new AVAPS.         #acute hypoxic hypercarbic respiratory failure   # ILD  #COPD  - most likely 2/2 voluem overload vs worsening fibrosis   - RRT called iso of  hypoxia and increasing oxygen requirements s/p bumex x2 now transitioned to BIPAP   - on BIPAP not pulling adequate volumes on arrival   - switched to AVAPS with , RR 20, IPAP 10, EPAP 25 with improved tidal volumes however high PIP   - ABG on NC 7.26/68/63 --> on BIPAP ABG 7.20/79/180 however most likely worsening hypercarbia iso of BIPAP not pulling adequate tidal volumes   - check ABG 1hr after AVAPS   - CXR showing pulmonary fibrosis and some pleural effusion   - given pt's comorbidities and worsening ILD - spoke with family at bedside if pt's respiratory status cannot be optimized on AVAPS and pt cannot tolerate AVAPS/does not mentate iso of worsening hypercarbia. Explained possibility of intubation and if that is in line with pt's goals of care given that pt's comorbidities and pulmonary fibrosis and may not lead to meaningful recovery  if pt cannot tolerate  AVAPS   - Family is undecided at code status at this time. Primary team to follow up on code status   - cw AVAPS overnight   - trend ABG   - fu pulm in AM given RRT and new AVAPS     Pt is not a MICU candidate at this time. Please reconsult as needed.

## 2025-02-04 NOTE — PROGRESS NOTE ADULT - SUBJECTIVE AND OBJECTIVE BOX
CHIEF COMPLAINT:    Interval Events:  Worsening cough, mucus and hypoxia.     REVIEW OF SYSTEMS:    [] All other systems negative  [x] Unable to assess ROS because ________encephalopathy    OBJECTIVE:  ICU Vital Signs Last 24 Hrs  T(C): 37.2 (04 Feb 2025 12:07), Max: 37.2 (04 Feb 2025 12:07)  T(F): 98.9 (04 Feb 2025 12:07), Max: 98.9 (04 Feb 2025 12:07)  HR: 57 (04 Feb 2025 12:07) (54 - 91)  BP: 91/55 (04 Feb 2025 12:07) (82/42 - 100/45)  BP(mean): --  ABP: --  ABP(mean): --  RR: 18 (04 Feb 2025 12:07) (17 - 20)  SpO2: 100% (04 Feb 2025 12:07) (92% - 100%)    O2 Parameters below as of 04 Feb 2025 04:34  Patient On (Oxygen Delivery Method): AVAPS              02-03 @ 07:01  -  02-04 @ 07:00  --------------------------------------------------------  IN: 0 mL / OUT: 1000 mL / NET: -1000 mL      CAPILLARY BLOOD GLUCOSE      POCT Blood Glucose.: 179 mg/dL (04 Feb 2025 04:47)      PHYSICAL EXAM:  General:  Lethargic  opens eyes after vigurous stimulation. Not taking any breaths on AVAPs (o% patient triggered) even when rate lowered to 4.   Respiratory:  Comfortable on BipapReduced breath sounds on right but otherwise CTA   Cardiovascular: rapid rate.   Abdomen: Soft  Extremities: edmao  Skin: Skin break down  Neurological: Lethargic, opens eyes to vigorous stimulation  Psychiatry:    HOSPITAL MEDICATIONS:  Standing Meds:  apixaban 2.5 milliGRAM(s) Oral every 12 hours  buMETAnide IVPB 4 milliGRAM(s) IV Intermittent <User Schedule>  chlorhexidine 4% Liquid 1 Application(s) Topical <User Schedule>  cholecalciferol 2000 Unit(s) Oral daily  dextrose 5%. 1000 milliLiter(s) IV Continuous <Continuous>  dextrose 5%. 1000 milliLiter(s) IV Continuous <Continuous>  dextrose 50% Injectable 25 Gram(s) IV Push once  dextrose 50% Injectable 12.5 Gram(s) IV Push once  dextrose 50% Injectable 25 Gram(s) IV Push once  dorzolamide 2% Ophthalmic Solution 1 Drop(s) Both EYES <User Schedule>  epoetin oliver (PROCRIT) Injectable 8000 Unit(s) IV Push <User Schedule>  fluticasone propionate/ salmeterol 250-50 MICROgram(s) Diskus 1 Dose(s) Inhalation two times a day  glucagon  Injectable 1 milliGRAM(s) IntraMuscular once  insulin lispro (ADMELOG) corrective regimen sliding scale   SubCutaneous three times a day before meals  insulin lispro (ADMELOG) corrective regimen sliding scale   SubCutaneous at bedtime  melatonin 5 milliGRAM(s) Oral at bedtime  metoprolol tartrate 50 milliGRAM(s) Oral two times a day  montelukast 10 milliGRAM(s) Oral daily  piperacillin/tazobactam IVPB.. 3.375 Gram(s) IV Intermittent every 12 hours  polyethylene glycol 3350 17 Gram(s) Oral daily  sevelamer carbonate 800 milliGRAM(s) Oral three times a day with meals  sodium chloride 3%  Inhalation 4 milliLiter(s) Inhalation every 6 hours  sodium chloride 7% Inhalation 4 milliLiter(s) Inhalation every 12 hours      PRN Meds:  acetaminophen     Tablet .. 650 milliGRAM(s) Oral every 6 hours PRN  albuterol/ipratropium for Nebulization 3 milliLiter(s) Nebulizer every 6 hours PRN  bisacodyl Suppository 10 milliGRAM(s) Rectal daily PRN  dextrose Oral Gel 15 Gram(s) Oral once PRN  glycopyrrolate Injectable 0.4 milliGRAM(s) IV Push every 6 hours PRN  HYDROmorphone  Injectable 0.3 milliGRAM(s) IV Push every 2 hours PRN  HYDROmorphone  Injectable 0.3 milliGRAM(s) IV Push every 2 hours PRN  HYDROmorphone  Injectable 0.2 milliGRAM(s) IV Push every 2 hours PRN  LORazepam   Injectable 0.2 milliGRAM(s) IV Push every 2 hours PRN  senna 2 Tablet(s) Oral at bedtime PRN  sodium chloride 0.65% Nasal 1 Spray(s) Both Nostrils four times a day PRN  sodium chloride 0.9% Bolus. 100 milliLiter(s) IV Bolus every 5 minutes PRN  sodium chloride 0.9% lock flush 10 milliLiter(s) IV Push every 1 hour PRN      LABS:                        8.8    6.20  )-----------( 214      ( 04 Feb 2025 00:18 )             30.0     Hgb Trend: 8.8<--, 8.8<--, 9.3<--, 9.7<--, 8.6<--  02-04    137  |  100  |  26[H]  ----------------------------<  209[H]  4.7   |  24  |  2.91[H]    Ca    8.9      04 Feb 2025 00:18  Phos  4.7     02-04  Mg     2.3     02-04    TPro  6.6  /  Alb  2.9[L]  /  TBili  0.2  /  DBili  x   /  AST  23  /  ALT  14  /  AlkPhos  156[H]  02-04    Creatinine Trend: 2.91<--, 5.29<--, 4.36<--, 3.43<--, 4.12<--, 2.64<--  PT/INR - ( 03 Feb 2025 07:24 )   PT: 14.6 sec;   INR: 1.27 ratio         PTT - ( 03 Feb 2025 07:24 )  PTT:36.2 sec  Urinalysis Basic - ( 04 Feb 2025 00:18 )    Color: x / Appearance: x / SG: x / pH: x  Gluc: 209 mg/dL / Ketone: x  / Bili: x / Urobili: x   Blood: x / Protein: x / Nitrite: x   Leuk Esterase: x / RBC: x / WBC x   Sq Epi: x / Non Sq Epi: x / Bacteria: x      Arterial Blood Gas:  02-04 @ 03:54  7.23/76/95/32/98.5/1.9  ABG lactate: --  Arterial Blood Gas:  02-04 @ 01:33  7.20/79/178/31/99.5/0.5  ABG lactate: --  Arterial Blood Gas:  02-03 @ 23:43  7.26/68/63/30/94.6/2.4  ABG lactate: --        MICROBIOLOGY:     RADIOLOGY:  [ ] Reviewed and interpreted by me    PULMONARY FUNCTION TESTS:    EKG:

## 2025-02-04 NOTE — RAPID RESPONSE TEAM SUMMARY - NSADDTLFINDINGSRRT_GEN_ALL_CORE
Upon initial inspection, pt is saturating 88% on 100% HFNC. Patient is lethargic, AAXo3, but arousable to strong sternal rub. Initial vitals 102/59, T 98.0 rectally, HR 78, RR 20 on 88% HFNC 100% 60L.    Patient's previous gas compared. ABG drawn, CBC/CMP drawn. Patient's saturation increased to 93% with good waveform. ABG came back with increase of PCO2 to 67 from 47, PO2 60 on max HFNC. BiPAP started iso hypercarbia and hypoxemia. Bumex 4mg IV given. Abx changed to Zosyn to cover for HAP iso PNA possibility after prolonged hospitalization.    Pt's saturation increased to 98%, other vitals stable. RRT ended with plan to check ABG 1 hour after BiPAP initiation.    Additional RRT called at 04:45 for BP 82/42 on auscultation. Zoll [id number] showing /60. ABg with mild improvement of PCO2 from 78 to 76 after changing to AVAPS. RRT ended, pt made DNR/DNI and avoid pressors. Palliative team to be called for PCU transfer discussion.

## 2025-02-04 NOTE — PROVIDER CONTACT NOTE (CHANGE IN STATUS NOTIFICATION) - ASSESSMENT
unable to assess the pt's mentation based on lethargy. pt is primarily Slovak speaking and the family member at bedside translates. No s/s of cp, dizziness or sob or other discomfort. IV site assessed and remains WDL.
BP auscultated 82/42. pt on AVAPS.

## 2025-02-04 NOTE — PROGRESS NOTE ADULT - PROBLEM SELECTOR PLAN 1
LUIS FERNANDO on CKD4 now requiring dialysis due to uremia  s/p RIGET shiley placement in IR 1/27 and 1st HD session   Per renal plan for HD today - got 1L removed 2/3  Family interested in potential PD (daughter to do PD for mother) in the long run - will need teaching with o/p nephrologist Dr. Galarza; per nephro recommending rehab with in-center HD for patient  Bumex 4mg IV qd per nephro on non HD days  IR consulted for permacath, plan for Monday 2/3 - however, desaturated and not medically optimized yet.   famiyl opting for comfort based care - defer dialysis

## 2025-02-04 NOTE — PROGRESS NOTE ADULT - PROBLEM SELECTOR PLAN 1
pt on AVAPs at this time   goals now are for comfort, discussed need for ongoing goc r/t AVAPS home w/ home PT

## 2025-02-04 NOTE — CONSULT NOTE ADULT - CONSULT REQUESTED DATE/TIME
31-Jan-2025 07:49
26-Jan-2025 13:11
31-Jan-2025 12:25
04-Feb-2025 01:47
15-Jose-2025 18:25
20-Jan-2025 13:44
20-Jan-2025 14:37
30-Jan-2025

## 2025-02-04 NOTE — PROGRESS NOTE ADULT - SUBJECTIVE AND OBJECTIVE BOX
Aria Egan MD  [facility] Division of Hospital Medicine    SUBJECTIVE / OVERNIGHT EVENTS:  - overnight events noted, family at bedside, opting for comfort based care    MEDICATIONS  (STANDING):  chlorhexidine 4% Liquid 1 Application(s) Topical <User Schedule>  piperacillin/tazobactam IVPB.. 3.375 Gram(s) IV Intermittent every 12 hours    MEDICATIONS  (PRN):  acetaminophen  Suppository .. 650 milliGRAM(s) Rectal every 6 hours PRN Temp greater or equal to 38C (100.4F), Mild Pain (1 - 3)  bisacodyl Suppository 10 milliGRAM(s) Rectal daily PRN Constipation  glycopyrrolate Injectable 0.4 milliGRAM(s) IV Push every 6 hours PRN excessive audible secretions  HYDROmorphone  Injectable 0.3 milliGRAM(s) IV Push every 1 hour PRN Severe Pain (7 - 10)  HYDROmorphone  Injectable 0.3 milliGRAM(s) IV Push every 1 hour PRN dyspnea/respiratory distress  HYDROmorphone  Injectable 0.2 milliGRAM(s) IV Push every 1 hour PRN Moderate Pain (4 - 6)  LORazepam   Injectable 0.2 milliGRAM(s) IV Push every 1 hour PRN anxiety, agitation      I&O's Summary    03 Feb 2025 07:01  -  04 Feb 2025 07:00  --------------------------------------------------------  IN: 0 mL / OUT: 1000 mL / NET: -1000 mL        PHYSICAL EXAM:  Vital Signs Last 24 Hrs  T(C): 37.1 (04 Feb 2025 13:45), Max: 37.2 (04 Feb 2025 12:07)  T(F): 98.8 (04 Feb 2025 13:45), Max: 98.9 (04 Feb 2025 12:07)  HR: 57 (04 Feb 2025 14:01) (57 - 91)  BP: 101/51 (04 Feb 2025 13:45) (82/42 - 101/51)  BP(mean): --  RR: 20 (04 Feb 2025 13:45) (18 - 20)  SpO2: 100% (04 Feb 2025 14:01) (92% - 100%)    Parameters below as of 04 Feb 2025 13:45  Patient On (Oxygen Delivery Method): AVAAPS      CONSTITUTIONAL: lethargic   RESPIRATORY: Normal respiratory effort: does not seem to be in distress     LABS:                        8.8    6.20  )-----------( 214      ( 04 Feb 2025 00:18 )             30.0     02-04    137  |  100  |  26[H]  ----------------------------<  209[H]  4.7   |  24  |  2.91[H]    Ca    8.9      04 Feb 2025 00:18  Phos  4.7     02-04  Mg     2.3     02-04    TPro  6.6  /  Alb  2.9[L]  /  TBili  0.2  /  DBili  x   /  AST  23  /  ALT  14  /  AlkPhos  156[H]  02-04    PT/INR - ( 03 Feb 2025 07:24 )   PT: 14.6 sec;   INR: 1.27 ratio         PTT - ( 03 Feb 2025 07:24 )  PTT:36.2 sec      Urinalysis Basic - ( 04 Feb 2025 00:18 )    Color: x / Appearance: x / SG: x / pH: x  Gluc: 209 mg/dL / Ketone: x  / Bili: x / Urobili: x   Blood: x / Protein: x / Nitrite: x   Leuk Esterase: x / RBC: x / WBC x   Sq Epi: x / Non Sq Epi: x / Bacteria: x        Culture - Legionella (collected 03 Feb 2025 07:27)  Source: .Legionella  Preliminary Report (04 Feb 2025 13:04):    Culture in progress    Culture - Sputum (collected 02 Feb 2025 07:11)  Source: .Sputum Sputum  Gram Stain (02 Feb 2025 12:26):    Numerous polymorphonuclear leukocytes per low power field    Few Squamous epithelial cells per low power field    Few Gram Positive Rods per oil power field    Few Gram Positive Cocci per oil power field  Final Report (03 Feb 2025 19:42):    Commensal kalyani consistent with body site      SARS-CoV-2: NotDetec (01 Feb 2025 13:12)

## 2025-02-04 NOTE — PROGRESS NOTE ADULT - PROBLEM SELECTOR PLAN 3
see goc note above  pt DNR/I Trial of NIV (on AVAPS)  goals are for comfort measures only/symptom directed care  pt pending transfer to PCU   surrogates are pt's children Antoinette and Hari muñoz and  consult placed

## 2025-02-04 NOTE — PROGRESS NOTE ADULT - PROBLEM SELECTOR PLAN 4
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  Increased to 6L NC  Repeat COVID/RSV/influenza negative, CXR 1/28 with small bilateral pleural effusions and pulmonary congestion    Acute hypoxic rep failure 2/2 volume overload vs worsening fibrosis, diuesis per nephro  Pulm consulted, for possibly worsening fibrosis:  - Albuterol nebs q6h  - Hypersal q6h   - Chest PT via bed or vest as tolerated  - Aerobika, incentive spirometry  - continue Advair for now, may consider switching to Pulmicort BID if concern patient not able to engage in proper technique  - CXR atelectasis vs PNA, pt with coughing started on CTX Azithro 2/1-  - FULL RVP negative   - PT, OOB to chair as much as tolerated  - now with worsening hypoxia despite maximal treatment - DNR/DNI, PCU transfer

## 2025-02-04 NOTE — PROVIDER CONTACT NOTE (CRITICAL VALUE NOTIFICATION) - BACKGROUND
Pt is a 89 y.o female, admitted for sepsis 2/2 worsening b/l LE wounds/cellulitis.
Patient admitted for open wound
89F PMH afib, PVD, BRASH syndrome
Pt admitted with LE wounds ,placed on heparin drip holding eliquis for IR today for Shiley placement. Hx of afib

## 2025-02-04 NOTE — CONSULT NOTE ADULT - PROVIDER SPECIALTY LIST ADULT
Nephrology
Pulmonology
Intervent Radiology
Intervent Radiology
MICU
Wound Care
Cardiology
Palliative Care

## 2025-02-04 NOTE — PROGRESS NOTE ADULT - PROBLEM SELECTOR PLAN 4
will continue to follow for goc  case discussed with primary team  for symptoms ordered  Dilaudid 0.3 mg IV q2h prn dyspnea or tachypnea  Dilaudid 0.3 mg IV q2h prn severe pain and Dilaudid 0.2 mg IV q2h prn moderate pain  Ativan 0.2mg IV q2h prn agitation   Glycopyrrolate 0.4 mg IV q6h prn copious oral secretions   Dulcolax suppository daily prn constipation   Acetaminophen suppository 650 mg q6h prn fever  Zofran 4 mg IV q8h prn nausea or vomiting   please page palliative if symptoms unmanaged  Can be reached by TEAMS M-F 9-5 Swathi Wright Any other time please page [phone] if needed   please page palliative if symptoms unmanaged

## 2025-02-04 NOTE — PROVIDER CONTACT NOTE (CHANGE IN STATUS NOTIFICATION) - ACTION/TREATMENT ORDERED:
provider is notified and aware. rapid was called for hypotension
rapid was called. refer to rapid note

## 2025-02-04 NOTE — PROGRESS NOTE ADULT - SUBJECTIVE AND OBJECTIVE BOX
SUBJECTIVE AND OBJECTIVE: pt seen and examined at bedside. pt lethargic on exam with avaps    Indication for Geriatrics and Palliative Care Services/INTERVAL HPI: GOC/symptoms     OVERNIGHT EVENTS: o/n pt RRT for hypoxia and hypotension, started on AVAPS, goals transitioned to no further escalation of care.     DNR on chart:DNI: Trial NIV  DNI: Trial NIV      Allergies    Levaquin (Rash)    Intolerances    MEDICATIONS  (STANDING):  apixaban 2.5 milliGRAM(s) Oral every 12 hours  buMETAnide IVPB 4 milliGRAM(s) IV Intermittent <User Schedule>  chlorhexidine 4% Liquid 1 Application(s) Topical <User Schedule>  cholecalciferol 2000 Unit(s) Oral daily  dextrose 5%. 1000 milliLiter(s) (100 mL/Hr) IV Continuous <Continuous>  dextrose 5%. 1000 milliLiter(s) (50 mL/Hr) IV Continuous <Continuous>  dextrose 50% Injectable 25 Gram(s) IV Push once  dextrose 50% Injectable 12.5 Gram(s) IV Push once  dextrose 50% Injectable 25 Gram(s) IV Push once  dorzolamide 2% Ophthalmic Solution 1 Drop(s) Both EYES <User Schedule>  epoetin oliver (PROCRIT) Injectable 8000 Unit(s) IV Push <User Schedule>  fluticasone propionate/ salmeterol 250-50 MICROgram(s) Diskus 1 Dose(s) Inhalation two times a day  glucagon  Injectable 1 milliGRAM(s) IntraMuscular once  insulin lispro (ADMELOG) corrective regimen sliding scale   SubCutaneous three times a day before meals  insulin lispro (ADMELOG) corrective regimen sliding scale   SubCutaneous at bedtime  melatonin 5 milliGRAM(s) Oral at bedtime  metoprolol tartrate 50 milliGRAM(s) Oral two times a day  montelukast 10 milliGRAM(s) Oral daily  piperacillin/tazobactam IVPB.. 3.375 Gram(s) IV Intermittent every 12 hours  polyethylene glycol 3350 17 Gram(s) Oral daily  sevelamer carbonate 800 milliGRAM(s) Oral three times a day with meals  sodium chloride 3%  Inhalation 4 milliLiter(s) Inhalation every 6 hours  sodium chloride 7% Inhalation 4 milliLiter(s) Inhalation every 12 hours    MEDICATIONS  (PRN):  acetaminophen     Tablet .. 650 milliGRAM(s) Oral every 6 hours PRN Temp greater or equal to 38C (100.4F), Mild Pain (1 - 3)  albuterol/ipratropium for Nebulization 3 milliLiter(s) Nebulizer every 6 hours PRN Shortness of Breath and/or Wheezing  bisacodyl Suppository 10 milliGRAM(s) Rectal daily PRN Constipation  dextrose Oral Gel 15 Gram(s) Oral once PRN Blood Glucose LESS THAN 70 milliGRAM(s)/deciliter  glycopyrrolate Injectable 0.4 milliGRAM(s) IV Push every 6 hours PRN excessive audible secretions  HYDROmorphone  Injectable 0.3 milliGRAM(s) IV Push every 2 hours PRN Severe Pain (7 - 10)  HYDROmorphone  Injectable 0.3 milliGRAM(s) IV Push every 2 hours PRN dyspnea/respiratory distress  HYDROmorphone  Injectable 0.2 milliGRAM(s) IV Push every 2 hours PRN Moderate Pain (4 - 6)  LORazepam   Injectable 0.2 milliGRAM(s) IV Push every 2 hours PRN anxiety, agitation  senna 2 Tablet(s) Oral at bedtime PRN Constipation  sodium chloride 0.65% Nasal 1 Spray(s) Both Nostrils four times a day PRN Nasal Congestion  sodium chloride 0.9% Bolus. 100 milliLiter(s) IV Bolus every 5 minutes PRN SBP LESS THAN or EQUAL to 90 mmHg  sodium chloride 0.9% lock flush 10 milliLiter(s) IV Push every 1 hour PRN Pre/post blood products, medications, blood draw, and to maintain line patency      ITEMS UNCHECKED ARE NOT PRESENT    PRESENT SYMPTOMS: [ x]Unable to self-report - see [ ] CPOT [x ] PAINADS [x ] RDOS  Source if other than patient:  [ ]Family   [ x]Team     Pain:  [ ]yes [ ]no  QOL impact -   Location -                    Aggravating factors -  Quality -  Radiation -  Timing-  Severity (0-10 scale):  Minimal acceptable level (0-10 scale):     CPOT:    https://www.sccm.org/getattachment/jno33k63-1j4z-1y4t-9u2q-7755c3092h9c/Critical-Care-Pain-Observation-Tool-(CPOT)    PAINAD Score: See PAINAD tool and score below       Dyspnea:                           [ ]Mild [ ]Moderate [ ]Severe    RDOS: See RDOS tool and score below   0 to 2  minimal or no respiratory distress   3  mild distress  4 to 6 moderate distress  >7 severe distress      Anxiety:                             [ ]Mild [ ]Moderate [ ]Severe  Fatigue:                             [ ]Mild [ ]Moderate [ ]Severe  Nausea:                             [ ]Mild [ ]Moderate [ ]Severe  Loss of appetite:              [ ]Mild [ ]Moderate [ ]Severe  Constipation:                    [ ]Mild [ ]Moderate [ ]Severe    PCSSQ[Palliative Care Spiritual Screening Question]   Severity (0-10):  Score of 4 or > indicate consideration of Chaplaincy referral.  Chaplaincy Referral: [x ] yes [ ] refused [x ] following [ ] Deferred     Caregiver Point? : [ ] yes [ ] no [ ] Deferred [ ] Declined             Social work referral [ ] Patient & Family Centered Care Referral [ ]     Anticipatory Grief present?:  [x] yes [ ] no  [ ] Deferred                  Social work referral [ ] Chaplaincy Referral [ x]    		  Other Symptoms:  [x ]All other review of systems negative     Palliative Performance Status Version 2:   See PPSv2 tool and score below         PHYSICAL EXAM:  Vital Signs Last 24 Hrs  T(C): 37.2 (04 Feb 2025 12:07), Max: 37.2 (04 Feb 2025 12:07)  T(F): 98.9 (04 Feb 2025 12:07), Max: 98.9 (04 Feb 2025 12:07)  HR: 57 (04 Feb 2025 12:07) (54 - 91)  BP: 91/55 (04 Feb 2025 12:07) (82/42 - 100/45)  BP(mean): --  RR: 18 (04 Feb 2025 12:07) (17 - 20)  SpO2: 100% (04 Feb 2025 12:07) (92% - 100%)    Parameters below as of 04 Feb 2025 04:34  Patient On (Oxygen Delivery Method): AVAPS     I&O's Summary    03 Feb 2025 07:01  -  04 Feb 2025 07:00  --------------------------------------------------------  IN: 0 mL / OUT: 1000 mL / NET: -1000 mL       GENERAL: [ ]Cachexia    [ ]Alert  [ ]Oriented x   [ x]Lethargic  [ ]Unarousable  [ ]Verbal  [ ]Non-Verbal  Behavioral:   [ ]Anxiety  [ ]Delirium [ ]Agitation [ ]Other  HEENT:  [ ]Normal   [x ]Dry mouth   [ ]ET Tube/Trach  [ ]Oral lesions  PULMONARY:   [ ]Clear [ ]Tachypnea  [ ]Audible excessive secretions   [ ]Rhonchi        [ ]Right [ ]Left [ ]Bilateral  [ ]Crackles        [ ]Right [ ]Left [ ]Bilateral  [ ]Wheezing     [ ]Right [ ]Left [ ]Bilateral  [x ]Diminished BS [ ] Right [ ]Left [x ]Bilateral  CARDIOVASCULAR:    [x ]Regular [ ]Irregular [ ]Tachy  [ ]Saul [ ]Murmur [ ]Other  GASTROINTESTINAL:  [ x]Soft  [ ]Distended   [ x]+BS  [x ]Non tender [ ]Tender  [ ]Other [ ]PEG [ ]OGT/ NGT   Last BM:   GENITOURINARY:  [ ]Normal [x ]Incontinent   [ ]Oliguria/Anuria   [ ]Yang  MUSCULOSKELETAL:   [ ]Normal   [ x]Weakness  [x ]Bed/Wheelchair bound [ ]Edema  NEUROLOGIC:   [ ]No focal deficits  [x ] Cognitive impairment  [ ] Dysphagia [ ]Dysarthria [ ] Paresis [ ]Other   SKIN:   [ ]Normal  [ ]Rash  [ ]Other  [ ]Pressure ulcer(s) [ ]y [ ]n present on admission    CRITICAL CARE:  [ ]Shock Present  [ ]Septic [ ]Cardiogenic [ ]Neurologic [ ]Hypovolemic  [ ]Vasopressors [ ]Inotropes  [ ]Respiratory failure present [ ]Mechanical Ventilation [ ]Non-invasive ventilatory support [ ]High-Flow   [ ]Acute  [ ]Chronic [ ]Hypoxic  [ ]Hypercarbic [ ]Other  [ ]Other organ failure     LABS:                        8.8    6.20  )-----------( 214      ( 04 Feb 2025 00:18 )             30.0   02-04    137  |  100  |  26[H]  < from: Xray Chest 1 View- PORTABLE-Urgent (Xray Chest 1 View- PORTABLE-Urgent .) (02.03.25 @ 23:52) >    ACC: [id number] EXAM:  XR CHEST PORTABLE URGENT 1V   ORDERED BY: PAOLA GALLOWAY     PROCEDURE DATE:  02/03/2025          INTERPRETATION:  HISTORY: Hypoxia    TECHNIQUE: A single AP view of the chest was obtained.    COMPARISON: 2 1 tubes and 25    FINDINGS/  IMPRESSION: The cardiac silhouette is normal in size. The patient's chin   somewhat obscures the bilateral lung apices. Unchanged right IJ approach   catheter with tip in the superior vena cava. Unchanged chronic right   pleural calcifications. Bilateral small pleural effusions and/or   bibasilar atelectasis, slightly larger on the left, unchanged. No new   consolidation is seen.    --- End of Report ---            CHARLY ROWELL MD; Attending Radiologist  This document has been electronically signed. Feb 4 2025 11:04AM    < end of copied text >  ----------------------------<  209[H]  4.7   |  24  |  2.91[H]    Ca    8.9      04 Feb 2025 00:18  Phos  4.7     02-04  Mg     2.3     02-04    TPro  6.6  /  Alb  2.9[L]  /  TBili  0.2  /  DBili  x   /  AST  23  /  ALT  14  /  AlkPhos  156[H]  02-04  PT/INR - ( 03 Feb 2025 07:24 )   PT: 14.6 sec;   INR: 1.27 ratio         PTT - ( 03 Feb 2025 07:24 )  PTT:36.2 sec    Urinalysis Basic - ( 04 Feb 2025 00:18 )    Color: x / Appearance: x / SG: x / pH: x  Gluc: 209 mg/dL / Ketone: x  / Bili: x / Urobili: x   Blood: x / Protein: x / Nitrite: x   Leuk Esterase: x / RBC: x / WBC x   Sq Epi: x / Non Sq Epi: x / Bacteria: x      RADIOLOGY & ADDITIONAL STUDIES:  < from: Xray Chest 1 View- PORTABLE-Urgent (Xray Chest 1 View- PORTABLE-Urgent .) (02.03.25 @ 23:52) >    ACC: [id number] EXAM:  XR CHEST PORTABLE URGENT 1V   ORDERED BY: PAOLA GALLOWAY     PROCEDURE DATE:  02/03/2025          INTERPRETATION:  HISTORY: Hypoxia    TECHNIQUE: A single AP view of the chest was obtained.    COMPARISON: 2 1 tubes and 25    FINDINGS/  IMPRESSION: The cardiac silhouette is normal in size. The patient's chin   somewhat obscures the bilateral lung apices. Unchanged right IJ approach   catheter with tip in the superior vena cava. Unchanged chronic right   pleural calcifications. Bilateral small pleural effusions and/or   bibasilar atelectasis, slightly larger on the left, unchanged. No new   consolidation is seen.    --- End of Report ---            CHARLY ROWELL MD; Attending Radiologist  This document has been electronically signed. Feb 4 2025 11:04AM    < end of copied text >    Protein Calorie Malnutrition Present: [ ]mild [ ]moderate [ ]severe [ ]underweight [ ]morbid obesity  https://www.andeal.org/vault/2440/web/files/ONC/Table_Clinical%20Characteristics%20to%20Document%20Malnutrition-White%20JV%20et%20al%202012.pdf    Height (cm): 149.9 (02-03-25 @ 08:11)  Weight (kg): 70.3 (02-03-25 @ 08:11)  BMI (kg/m2): 31.3 (02-03-25 @ 08:11)    [ x]PPSV2 < or = 30%  [ ]significant weight loss [ ]poor nutritional intake [ ]anasarca[ ]Artificial Nutrition    Other REFERRALS:  [ ]Hospice  [ ]Child Life  [ ]Social Work  [ ]Case management [ ]Holistic Therapy     Goals of Care Document:

## 2025-02-04 NOTE — RAPID RESPONSE TEAM SUMMARY - NSSITUATIONBACKGROUNDRRT_GEN_ALL_CORE
89F with HTN, DM2, AFib, HFpEF, PVD, BRASH syndrome admitted with sepsis from lower extremity cellulitis with course complicated by LUIS FERNANDO on CKD4 requiring new dialysis. Ccb possible PNA

## 2025-02-04 NOTE — CONSULT NOTE ADULT - ATTENDING COMMENTS
89F HTN, T2DM, Chronic A.Fib, HFpEF, BRASH Syndrome, PVD, CKD4, ILD, Asbestos Exposure admitted for LExt Cellulitis, ARF iso CKD4 initiating New IHD started last week (last HD yesterday) in RRT for Progressive Hypoxia switching from HFO2 to BiPAP and ICU Consult requested for New BiPAP.  - Lethargic and minimally responsive to Verbal Stimuli   - BiPAP setting switched to AVAPs for poor Spont-TV < 250cc   - AVAPS 20RR, 25/8, 100%FiO2 with Spont-TV >350cc   - Family at bedside In discussion about Code and Intubation Option   - No Volume Overload/APE noted   - Continue supportive care awaiting Family decision     Patient seen and examined with ICU Resident/Fellow at bedside after lab data, medical records and radiology reports reviewed. I have read and agreeable in general with resident's Documented Note, Assessment and Management Plan which reflected my opinions from bedside round and discussion.

## 2025-02-04 NOTE — PROVIDER CONTACT NOTE (CRITICAL VALUE NOTIFICATION) - ACTION/TREATMENT ORDERED:
Heparin nomogram followed, stopped for 1 hr, resume at 9cc/hr. No s/s of bleeding
Provider aware and notified. Heparin gtt to be paused for 1 hr as per protocol. aPTT to be drawn in 1 hr. Plan of care ongoing.
Per provider, patient changed to AVAPS machine by respiratory therapist. Repeat blood gas ordered. Continue with plan of care.
provider is notified and aware. no new orders at this time. RN continues to monitor

## 2025-02-05 NOTE — PROGRESS NOTE ADULT - PROBLEM SELECTOR PLAN 1
pt on AVAPs at this time   goals now are for comfort, discussed need for ongoing goc r/t AVAPS currently on HFNC 60L/100%     -family agreeable to discontinuation of AVAPS at night   -comfort focused care   -iv dilaudid 0.3 mg q1h prn dyspnea

## 2025-02-05 NOTE — PROGRESS NOTE ADULT - PROBLEM SELECTOR PLAN 3
see goc note above  pt DNR/I Trial of NIV (on AVAPS)  goals are for comfort measures only/symptom directed care  pt pending transfer to PCU   surrogates are pt's children Antoinette and Hari muñoz and  consult placed no further HD as goals are in line with comfort care

## 2025-02-05 NOTE — PROGRESS NOTE ADULT - PROBLEM SELECTOR PLAN 2
no further HD as goals are in line with comfort care -iv dilaudid 0.3 mg q1h prn dyspnea (2 required in 24h 7a-7a period)

## 2025-02-05 NOTE — PROGRESS NOTE ADULT - CONVERSATION/DISCUSSION
Chaplaincy Referral Diagnosis/Prognosis/Treatment Options/Chaplaincy Referral/Palliative Care Referral

## 2025-02-05 NOTE — PROGRESS NOTE ADULT - ATTENDING COMMENTS
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 Course c/b LUIS FERNANDO with new dialysis start and AHRF requiring AVAPS.      Patient assessment and plan discussed on interdisciplinary team rounds today.    Family initially showed interest in involving patient in conversation but they did not wish to share information that she is at end of life - they agree that she probably does not realize this but also believe it would only cause her harm and would cause her to want to give up. they also do not think she would want to know this information and has already prepared her affairs for end of life. palliative team expressed our goal to provide information to patients when they're asking. we agreed that our team will inform family if patient is asking for more information about this. while in the room the patient demonstrated limited awareness/insight into her condition, including her more chronic conditions. She also mentioned not having a lot of energy to talk today and emphasized making decisions with her family, in particular her daughter Antoinette whom she verbally indicated as her HCP. she gave us permission to speak outside the room with them. They confirmed wish for comfort given concern for suffering while patient was receiving care on the floors and given that things like dialysis would likely not help her achieve her goal of going home. Family also aligned with not replacing AVAPS given improved comfort on HFNC.

## 2025-02-05 NOTE — PROGRESS NOTE ADULT - SUBJECTIVE AND OBJECTIVE BOX
[facility]-- WOUND TEAM -- FOLLOW UP NOTE  --------------------------------------------------------------------------------    24 hour events/subjective:    alert  afebrile  tolerating po w/o n/v  incontinent  no c/o drainage, odor, pain  pt now in PCU requested to assist w/ comfort measures   Daughters at bedside- visualized wounds and all questions answered to their expressed satisfaction      ROS: pt unable to offer    ALLERGIES & MEDICATIONS  --------------------------------------------------------------------------------  Allergies  Levaquin (Rash)      STANDING INPATIENT MEDICATIONS  chlorhexidine 4% Liquid 1 Application(s) Topical <User Schedule>  melatonin 5 milliGRAM(s) Oral at bedtime  piperacillin/tazobactam IVPB.. 3.375 Gram(s) IV Intermittent every 12 hours      PRN INPATIENT MEDICATION  acetaminophen  Suppository .. 650 milliGRAM(s) Rectal every 6 hours PRN  bisacodyl Suppository 10 milliGRAM(s) Rectal daily PRN  glycopyrrolate Injectable 0.4 milliGRAM(s) IV Push every 6 hours PRN  HYDROmorphone  Injectable 0.3 milliGRAM(s) IV Push every 1 hour PRN  HYDROmorphone  Injectable 0.3 milliGRAM(s) IV Push every 1 hour PRN  HYDROmorphone  Injectable 0.2 milliGRAM(s) IV Push every 1 hour PRN  LORazepam   Injectable 0.2 milliGRAM(s) IV Push every 1 hour PRN  ondansetron Injectable 4 milliGRAM(s) IV Push every 8 hours PRN  sodium chloride 0.65% Nasal 1 Spray(s) Both Nostrils every 6 hours PRN        VITALS/PHYSICAL EXAM  --------------------------------------------------------------------------------  T(C): 37.1 (02-05-25 @ 08:01), Max: 37.1 (02-05-25 @ 08:01)  HR: 76 (02-05-25 @ 15:19) (34 - 104)  BP: 110/53 (02-05-25 @ 08:01) (110/53 - 110/53)  RR: 20 (02-05-25 @ 15:19) (20 - 20)  SpO2: 95% (02-05-25 @ 15:19) (95% - 98%)  Wt(kg): --      NAD, Alert, Obese, frail,  WD/ WN/ WG,  [facility] [facility] P500 bed  HEENT:  NC/AT, EOMI, sclera clear, mucosa moist, throat clear, trachea midline, neck supple  Respiratory: nonlabored w/ equal chest rise  Gastrointestinal: soft NT/ND   Neurology: weakened strength & sensation grossly intact  Psych: calm/ appropriate  Musculoskeletal:  limited stiff / p/FROM, no deformities/ contractures  Vascular: BLE equally cool,  no cyanosis, clubbing, nor acute ischemia        BLE edema equal         no BLE DP/PT pulses palpable         BLE hemosiderin staining and hypopigmented intact skin      Rt calf/ achilles & Lateral Lt leg wounds       dried newly healed hypopigmented skin          dry flakey no blistering  or drainage  No odor, erythema, increased warmth, tenderness, induration, fluctuance, nor crepitus  Skin:  moist w/ good turgor  Sacrum stage DTI w/ hyperpigmented intact skin of buttocks / ischium and moist partial thickness wound     sacral wound 1.5m x 0.5cm x 0cm      no blistering  or active drainage  No odor, erythema, increased warmth, tenderness, induration, fluctuance, nor crepitus        LABS/ CULTURES/ RADIOLOGY:              8.8    6.20  >-----------<  214      [02-04-25 @ 00:18]              30.0     137  |  100  |  26  ----------------------------<  209      [02-04-25 @ 00:18]  4.7   |  24  |  2.91        Ca     8.9     [02-04-25 @ 00:18]      Mg     2.3     [02-04-25 @ 00:18]      Phos  4.7     [02-04-25 @ 00:18]    TPro  6.6  /  Alb  2.9  /  TBili  0.2  /  DBili  x   /  AST  23  /  ALT  14  /  AlkPhos  156  [02-04-25 @ 00:18]      A1C with Estimated Average Glucose Result: 5.9 % (01-15-25 @ 06:35)

## 2025-02-05 NOTE — PROGRESS NOTE ADULT - PROBLEM SELECTOR PLAN 4
will continue to follow for goc  case discussed with primary team  for symptoms ordered  Dilaudid 0.3 mg IV q2h prn dyspnea or tachypnea  Dilaudid 0.3 mg IV q2h prn severe pain and Dilaudid 0.2 mg IV q2h prn moderate pain  Ativan 0.2mg IV q2h prn agitation   Glycopyrrolate 0.4 mg IV q6h prn copious oral secretions   Dulcolax suppository daily prn constipation   Acetaminophen suppository 650 mg q6h prn fever  Zofran 4 mg IV q8h prn nausea or vomiting   please page palliative if symptoms unmanaged  Can be reached by TEAMS M-F 9-5 Swathi Wright Any other time please page [phone] if needed   please page palliative if symptoms unmanaged -zofran prn

## 2025-02-05 NOTE — PROGRESS NOTE ADULT - PROBLEM SELECTOR PROBLEM 2
[facility] - Surgery  General Surgery  Discharge Summary      Patient Name: Domonique Kellogg  MRN: [id number]  Admission Date: 11/1/2022  Hospital Length of Stay: 4 days  Discharge Date and Time:  11/06/2022 3:26 PM  Attending Physician: Cheo Hamm MD   Discharging Provider: Deborah Bruner MD  Primary Care Provider: Nicolas Marlow MD    HPI:   No notes on file    Procedure(s) (LRB):  APPENDECTOMY, LAPAROSCOPIC (N/A)  REPAIR, HERNIA, UMBILICAL, INCARCERATED, AGE 5 YEARS OR OLDER      Indwelling Lines/Drains at time of discharge:   Lines/Drains/Airways     Central Venous Catheter Line  Duration           Port A Cath Single Lumen 12/03/21 1539 right internal jugular 337 days              Hospital Course: Domonique Kellogg underwent repair of a fat-containing incarcerated umbilical hernia and appendectomy on Wednesday 11/2. A drain was left in the appendectomy operative bed because there was a purulent fluid collection. He was treated with IV abx postop with appropriate resolution of his leukocytosis. His drain was removed on POD3 after only putting out serous drainage. He was tolerating a regular diet, ambulating independently, and pain was controlled with prn pain meds. He will follow up with dr hamm in two weeks for a postop check. Postop instructions were provided to the patient and his wife.       Goals of Care Treatment Preferences:  Code Status: Full Code    Health care agent: Estrellita Kellogg  [facility] agent number: [phone]                   Consults:   Consults (From admission, onward)        Status Ordering Provider     Inpatient consult to Hematology/Oncology  Once        Provider:  (Not yet assigned)    Completed STUART BOLAND     Inpatient consult to General Surgery  Once        Provider:  (Not yet assigned)    Completed MOE MYRICK          Significant Diagnostic Studies: Labs:   CBC   Recent Labs   Lab 11/05/22  0449 11/06/22  0652   WBC 13.79* 12.48   HGB 8.5* 8.0*   HCT  26.5* 23.8*    191       Pending Diagnostic Studies:     Procedure Component Value Units Date/Time    Specimen to Pathology, Surgery General Surgery [[id number]] Collected: 11/02/22 2056    Order Status: Sent Lab Status: In process Updated: 11/03/22 1214    Specimen: Tissue         Final Active Diagnoses:    Diagnosis Date Noted POA    Neuropathy [G62.9] 11/02/2022 Yes    GERD (gastroesophageal reflux disease) [K21.9] 11/02/2022 Yes    Umbilical hernia [K42.9] 11/02/2022 Yes    Deep vein thrombosis (DVT) of femoral vein of left lower extremity [I82.412] 08/16/2022 Yes    Benign prostatic hyperplasia with urinary frequency [N40.1, R35.0] 01/13/2022 Yes    Portal vein thrombosis [I81] 12/21/2021 Yes    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    Specialty: General Surgery  Why: post op appendectomy and hernia repair  Contact information:  [facility]  [facility] [address]  [phone]                       Patient Instructions:      Lifting restrictions   Order Comments: No heavy lifting greater than 10 pounds (about the weight of a gallon of milk) for 6 week postoperatively. This is to prevent new/recurrent hernia at your incision sites while they heal.     No driving until:   Order Comments: While taking narcotic pain medications.     Notify your health care provider if you experience any of the following:  temperature >100.4     Notify your health care provider if you experience any of the following:  persistent nausea and vomiting or diarrhea     Notify your health care provider if you experience any of the following:  severe  "uncontrolled pain     Notify your health care provider if you experience any of the following:  redness, tenderness, or signs of infection (pain, swelling, redness, odor or green/yellow discharge around incision site)     Shower on day dressing removed (No bath)   Order Comments: You may SHOWER over your incisions with your usual soap and water. If you have gauze/tape dressings in place, you should remove them prior to showering. If you have Dermabond (skin glue) or white "Steri strips" in place, these will eventually peel off on their own after 1-2 weeks. NO SUBMERSION of your incisions (bath, pool, hot tub, etc) until your postop appointment. This allows your incisions to heal and to avoid a wound infection.     Medications:  Reconciled Home Medications:      Medication List      START taking these medications    ciprofloxacin HCl 500 MG tablet  Commonly known as: CIPRO  Take 1.5 tablets (750 mg total) by mouth every 8 (eight) hours. for 3 days     metroNIDAZOLE 500 MG tablet  Commonly known as: FLAGYL  Take 1 tablet (500 mg total) by mouth every 8 (eight) hours. for 3 days     oxyCODONE 5 MG immediate release tablet  Commonly known as: ROXICODONE  Take 1 tablet (5 mg total) by mouth every 4 (four) hours as needed.     polyethylene glycol 17 gram Pwpk  Commonly known as: GLYCOLAX  Take 17 g by mouth once daily. for 24 days  Start taking on: November 7, 2022        CHANGE how you take these medications    finasteride 5 mg tablet  Commonly known as: PROSCAR  Take 1 tablet (5 mg total) by mouth once daily.  What changed: when to take this     hydroCHLOROthiazide 25 MG tablet  Commonly known as: HYDRODIURIL  TAKE 1 TABLET (25 MG TOTAL) BY MOUTH ONCE DAILY.  What changed: when to take this     potassium chloride SA 20 MEQ tablet  Commonly known as: K-DUR,KLOR-CON  Take 2 tablets (40 mEq total) by mouth once daily.  What changed: when to take this     pravastatin 40 MG tablet  Commonly known as: PRAVACHOL  Take 1 tablet " (40 mg total) by mouth once daily.  What changed: when to take this     RABEprazole 20 mg tablet  Commonly known as: ACIPHEX  Take 1 tablet (20 mg total) by mouth once daily.  What changed: when to take this     tamsulosin 0.4 mg Cap  Commonly known as: FLOMAX  TAKE 1 CAPSULE EVERY DAY  What changed:   · how much to take  · when to take this        CONTINUE taking these medications    acetaminophen 650 MG Tbsr  Commonly known as: TYLENOL  Take by mouth daily as needed.     apixaban 5 mg Tab  Commonly known as: ELIQUIS  Take 1 tablet (5 mg total) by mouth 2 (two) times daily.     diltiaZEM 2% Lidocaine 5% Cream  Apply pea size amount topically 3 (three) times daily.     DULoxetine 20 MG capsule  Commonly known as: CYMBALTA  Take 1 capsule (20 mg total) by mouth once daily.     hydrocortisone 1 % cream  Apply topically 2 (two) times daily. for 7 days     magnesium oxide 400 mg (241.3 mg magnesium) tablet  Commonly known as: MAG-OX  Take 400 mg by mouth every evening.     multivitamin with minerals tablet  Take 1 tablet by mouth every morning.     ondansetron 4 MG tablet  Commonly known as: ZOFRAN  Take 1 tablet (4 mg total) by mouth every 8 (eight) hours as needed for Nausea.     prochlorperazine 10 MG tablet  Commonly known as: COMPAZINE  Take 1 tablet (10 mg total) by mouth every 6 (six) hours as needed (nausea).     promethazine 12.5 MG Tab  Commonly known as: PHENERGAN  Take 1 tablet (12.5 mg total) by mouth every 6 (six) hours as needed (nausea).          Time spent on the discharge of patient: 10 minutes    Deborah Bruner MD  General Surgery  [facility] - Surgery   Stage 4 chronic kidney disease Dyspnea

## 2025-02-05 NOTE — PROGRESS NOTE ADULT - TIME BILLING
- Reviewing, and interpreting labs and testing.  - Independently obtaining a review of systems and performing a physical exam  - Reviewing consultant documentation/recommendations in addition to discussing plan of care with consultants.  - Counselling and educating patient and family regarding interpretation of aforementioned items and plan of care.
- Reviewing, and interpreting labs and testing.  - Independently obtaining a review of systems and performing a physical exam  - Reviewing consultant documentation/recommendations in addition to discussing plan of care with consultants.  - Counselling and educating patient and family regarding interpretation of aforementioned items and plan of care.
- Ordering, reviewing, and interpreting labs and imaging.  - Independently obtaining  performing a physical exam  -  discussing plan of care with consultants.  - Counselling and educating  family regarding interpretation of aforementioned items and plan of care.
- Reviewing, and interpreting labs and testing.  - Independently obtaining a review of systems and performing a physical exam  - Reviewing consultant documentation/recommendations in addition to discussing plan of care with consultants.  - Counselling and educating patient and family regarding interpretation of aforementioned items and plan of care.
- Reviewing, and interpreting labs and testing.  - Independently obtaining a review of systems and performing a physical exam  - Reviewing consultant documentation/recommendations in addition to discussing plan of care with consultants.  - Counselling and educating patient and family regarding interpretation of aforementioned items and plan of care.
Symptom assessment and management, supportive counseling, coordination of care
- Ordering, reviewing, and interpreting labs, and imaging.  - Independently obtaining a review of systems and performing a physical exam  - Reviewing consultant documentation  - Counselling and educating family regarding interpretation of aforementioned items and plan of care.
- Ordering, reviewing, and interpreting labs, testing, and imaging.  - Independently obtaining a review of systems and performing a physical exam  - Reviewing consultant documentation/recommendations in addition to discussing plan of care with consultants.  - Counselling and educating patient and family regarding interpretation of aforementioned items and plan of care.
- Reviewing, and interpreting labs and testing.  - Independently obtaining a review of systems and performing a physical exam  - Reviewing consultant documentation/recommendations in addition to discussing plan of care with consultants.  - Counselling and educating patient and family regarding interpretation of aforementioned items and plan of care.
- Ordering, reviewing, and interpreting labs and imaging  - Independently obtaining a review of systems and performing a physical exam  - Reviewing consultant documentation/recommendations in addition to discussing plan of care with consultants.  - Counselling and educating family regarding interpretation of aforementioned items and plan of care.
- Ordering, reviewing, and interpreting labs  - Independently obtaining a review of systems and performing a physical exam  - Reviewing consultant documentation  - Counselling and educating family regarding interpretation of aforementioned items and plan of care.
Time spent on review of vitals, physical exam, documentation, and discussion of plan of care with patient, patient family, and multidisciplinary team.
- Ordering, reviewing, and interpreting labs, testing, and imaging.  - Independently obtaining a review of systems and performing a physical exam  - Reviewing consultant documentation/recommendations in addition to discussing plan of care with consultants.  - Counselling and educating patient and family regarding interpretation of aforementioned items and plan of care.
minutes spent on total encounter. The necessity of the time spent during the encounter on this date of service was due to:     Total time spent including the following  [x ] Physical chart review and documentation   An extensive review of the physical chart, electronic health record, and documentation was conducted to obtain collateral information including but not limited to:   - Current inpatient records (ED, H&P, primary team, and consultants)   - Inpatient values/results (CBC, CMP)   - Prior inpatient records    - Current or proposed treatment plans   - Pharmacotherapy review   [ x]discussion with the interdisciplinary palliative care team - e.g. RN, , clinicians, trainees,   [ x]discussion with the patient/family/decision maker  [x ]Physical Exam and/or review of systems   [x ]Formulation of assessment and plan   [x ]Evaluating for response to treatment and side effects of opioids or benzodiazepines.  [ x]Review of care coordination documentation.

## 2025-02-05 NOTE — PROGRESS NOTE ADULT - SUBJECTIVE AND OBJECTIVE BOX
GAP TEAM PALLIATIVE CARE UNIT PROGRESS NOTE:      [  ] Patient on hospice program.    INDICATION FOR PALLIATIVE CARE UNIT SERVICES/Interval HPI: Symptom management in a patient who is comfort measures.       OVERNIGHT EVENTS: arrived to PCU over night.  no acute events overnight. 24h (7a-7a) prn medication use reviewed: iv dilaudid 0.3 mg x 2 for dyspnea     DNR on chart: DNI: Trial NIV  DNI: Trial NIV    Allergies: Levaquin (Rash)    Intolerances    MEDICATIONS  (STANDING):  chlorhexidine 4% Liquid 1 Application(s) Topical <User Schedule>  melatonin 5 milliGRAM(s) Oral at bedtime  piperacillin/tazobactam IVPB.. 3.375 Gram(s) IV Intermittent every 12 hours    MEDICATIONS  (PRN):  acetaminophen  Suppository .. 650 milliGRAM(s) Rectal every 6 hours PRN Temp greater or equal to 38C (100.4F), Mild Pain (1 - 3)  bisacodyl Suppository 10 milliGRAM(s) Rectal daily PRN Constipation  glycopyrrolate Injectable 0.4 milliGRAM(s) IV Push every 6 hours PRN excessive audible secretions  HYDROmorphone  Injectable 0.3 milliGRAM(s) IV Push every 1 hour PRN Severe Pain (7 - 10)  HYDROmorphone  Injectable 0.3 milliGRAM(s) IV Push every 1 hour PRN dyspnea/respiratory distress  HYDROmorphone  Injectable 0.2 milliGRAM(s) IV Push every 1 hour PRN Moderate Pain (4 - 6)  LORazepam   Injectable 0.2 milliGRAM(s) IV Push every 1 hour PRN anxiety, agitation    ITEMS UNCHECKED ARE NOT PRESENT    PRESENT SYMPTOMS: [ ]Unable to self-report see PAINAD, RDOS below  Source if other than patient:  [ ]Family   [ ]Team     Pain: [ ] yes [x ] no  QOL impact -   Location -                    Aggravating factors -  Quality -  Radiation -  Timing-  Severity (0-10 scale):  Minimal acceptable level (0-10 scale):     Dyspnea:                           [ ]Mild [ ]Moderate [ ]Severe  Anxiety:                             [ ]Mild [ ]Moderate [ ]Severe  Fatigue:                             [ ]Mild [ ]Moderate [ ]Severe  Nausea:                             [ ]Mild [ ]Moderate [ ]Severe  Loss of appetite:              [ ]Mild [ ]Moderate [ ]Severe  Constipation:                    [ ]Mild [ ]Moderate [ ]Severe    PCSSQ [Palliative Care Spiritual Screening Question]   Severity (0-10):  Score of 4 or > indicate consideration of Chaplaincy referral.  Chaplaincy Referral: [x ] yes-  for communion [ ] refused [ ] following [ ] deferred  Caregiver [place]? : [ ] yes [x]] no [ ] deferred:  Social work referral [ ] Patient & Family Centered Care Referral [ ]   Anticipatory Grief present?:  [ ] yes [ ] no [ x] deferred:  Social work referral [ ] Patient & Family Centered Care Referral [ ]  	  Other Symptoms:  [x ]All other review of systems negative     PHYSICAL EXAM:   Vital Signs Last 24 Hrs  T(C): 37.1 (05 Feb 2025 08:01), Max: 37.1 (05 Feb 2025 08:01)  T(F): 98.7 (05 Feb 2025 08:01), Max: 98.7 (05 Feb 2025 08:01)  HR: 66 (05 Feb 2025 09:24) (34 - 104)  BP: 110/53 (05 Feb 2025 08:01) (110/53 - 110/53)  BP(mean): --  RR: 20 (05 Feb 2025 09:23) (20 - 20)  SpO2: 95% (05 Feb 2025 09:24) (95% - 100%)    Parameters below as of 05 Feb 2025 09:23  Patient On (Oxygen Delivery Method): nasal cannula, high flow  O2 Flow (L/min): 60  O2 Concentration (%): 100 I&O's Summary    GENERAL: [ ] Cachexia  [x ]Alert  [x ]Oriented - to person, place  [ ]Lethargic  [ ]Unarousable  [x ]Verbal  [ ]Non-Verbal  Behavioral:   [ ] Anxiety  [ ] Delirium [ ] Agitation [ ] Other  HEENT:  [x ]Normal   [ ]Dry mouth   [ ]ET Tube/Trach  [ ]Oral lesions  PULMONARY:   [ x]Clear [ ]Tachypnea  [ ]Audible excessive secretions   [ ]Rhonchi        [ ]Right [ ]Left [ ]Bilateral  [ ]Crackles        [ ]Right [ ]Left [ ]Bilateral  [ ]Wheezing     [ ]Right [ ]Left [ ]Bilateral  [ ]Diminished BS [ ]Right [ ]Left [ ]Bilateral    CARDIOVASCULAR:    [x ]Regular [ ]Irregular [ ]Tachy  [ ]Saul [ ]Murmur [ ]Other  GASTROINTESTINAL:  [x ]Soft  [ ]Distended   [ x]+BS  [x ]Non tender [ ]Tender  [ ]Other [ ]PEG [ ]OGT/ NGT   Last BM:  2/4  GENITOURINARY:  [ ]Normal [ ] Incontinent   [ ]Oliguria/Anuria   [x ]Yang  MUSCULOSKELETAL:   [ ]Normal   [ x]Weakness  [x ]Bed/Wheelchair bound [x ]Edema  NEUROLOGIC:   [ ]No focal deficits  [x ] Cognitive impairment  [ ] Dysphagia [ ]Dysarthria [ ] Paresis [ ]Other   SKIN:   [ ]Normal  [ ]Rash  [x ]Other-ecchymosis   [x ]Pressure ulcer(s)  [x ]y [ ]n  Present on admission  - stage 2 sacral ulcers - RN assessment reviewed     CRITICAL CARE:  [ ] Shock Present  [ ]Septic [ ]Cardiogenic [ ]Neurologic [ ]Hypovolemic  [ ]  Vasopressors [ ]  Inotropes   [ ] Respiratory failure present [ ] Mechanical Ventilation [ ] Non-invasive ventilatory support [ ] High-Flow  [ ] Acute  [ ] Chronic [x ] Hypoxic  [ ] Hypercarbic [ ] Other  [x ] Other organ failure -renal     LABS:                        8.8    6.20  )-----------( 214      ( 04 Feb 2025 00:18 )             30.0   02-04    137  |  100  |  26[H]  ----------------------------<  209[H]  4.7   |  24  |  2.91[H]    Ca    8.9      04 Feb 2025 00:18  Phos  4.7     02-04  Mg     2.3     02-04    TPro  6.6  /  Alb  2.9[L]  /  TBili  0.2  /  DBili  x   /  AST  23  /  ALT  14  /  AlkPhos  156[H]  02-04      Urinalysis Basic - ( 04 Feb 2025 00:18 )    Color: x / Appearance: x / SG: x / pH: x  Gluc: 209 mg/dL / Ketone: x  / Bili: x / Urobili: x   Blood: x / Protein: x / Nitrite: x   Leuk Esterase: x / RBC: x / WBC x   Sq Epi: x / Non Sq Epi: x / Bacteria: x      RADIOLOGY & ADDITIONAL STUDIES:  < from: Xray Chest 1 View- PORTABLE-Urgent (Xray Chest 1 View- PORTABLE-Urgent .) (02.03.25 @ 23:52) >  IMPRESSION: The cardiac silhouette is normal in size. The patient's chin   somewhat obscures the bilateral lung apices. Unchanged right IJ approach   catheter with tip in the superior vena cava. Unchanged chronic right   pleural calcifications. Bilateral small pleural effusions and/or   bibasilar atelectasis, slightly larger on the left, unchanged. No new   consolidation is seen.    --- End of Report ---    < end of copied text >  < from: Xray Chest 1 View- PORTABLE-Routine (Xray Chest 1 View- PORTABLE-Routine .) (02.01.25 @ 10:21) >  IMPRESSION:  Chronic right pleural calcifications and small bilateral pleural   effusions without interval change.  Patchy left basilar opacity may represent atelectasis or pneumonia.    --- End of Report ---    < end of copied text >  < from: CT Head No Cont (01.28.25 @ 19:00) >  IMPRESSION:  No acute intracranial hemorrhage, mass effect, or midline shift.    < end of copied text >  < from: US Renal (01.20.25 @ 13:52) >    IMPRESSION:  Bilateral renal cysts.  Bilateral echogenic renal parenchyma, suggesting underlying medical renal   parenchymal disease.        --- End of Report ---    < end of copied text >  < from: [facility] Duplex Lower Ext Vein Scan, Bilat (01.14.25 @ 23:46) >  IMPRESSION:  No evidence of deep venous thrombosis in either lower extremity.    < end of copied text >      PROTEIN CALORIE MALNUTRITION: [ ] mild [ ] moderate [ ] severe  [ ] underweight [ ] morbid obesity    https://www.andeal.org/vault/2440/web/files/ONC/Table_Clinical%20Characteristics%20to%20Document%20Malnutrition-White%20JV%20et%20al%202012.pdf    Height (cm): 149.9 (02-03-25 @ 08:11)  Weight (kg): 70.3 (02-03-25 @ 08:11)  BMI (kg/m2): 31.3 (02-03-25 @ 08:11)    [x ] PPSV2 < or = 30% [ ] significant weight loss [ ] poor nutritional intake [ ] anasarca   Artificial Nutrition [ ]     Other REFERRALS:    [ ] Hospice  [ ]Child Life  [ ]Social Work  [ ]Case management [ ]Holistic Therapy [ ] Physical Therapy [ ] Dietary     Progress Notes - Care Coordination [C. Provider] (02-03-25 @ 12:39)      Palliative Performance Scale:  http://npcrc.org/files/news/palliative_performance_scale_ppsv2.pdf  (Ctrl +  left click to view)  Respiratory Distress Observation Tool:  https://homecareinformation.net/handouts/hen/Respiratory_Distress_Observation_Scale.pdf (Ctrl +  left click to view)  PAINAD Score:  http://geriatrictoolkit.[place].[facility]/cog/painad.pdf (Ctrl +  left click to view)

## 2025-02-06 NOTE — PROGRESS NOTE ADULT - SUBJECTIVE AND OBJECTIVE BOX
GAP TEAM PALLIATIVE CARE UNIT PROGRESS NOTE:      [  ] Patient on hospice program.    INDICATION FOR PALLIATIVE CARE UNIT SERVICES/Interval HPI:  Symptom management in a patient who is comfort measures.   Family would like to liberate from the HFNC     OVERNIGHT EVENTS:  no acute events overnight. 24h (7a-7a) prn medication use reviewed: iv dilaudid 0.3 mg x 3 for dyspnea ; iv ativan 0.25 mg x 2 anxiety ; iv glyco x 1     DNR on chart: DNI: Trial NIV  DNI: Trial NIV      Allergies    Levaquin (Rash)    Intolerances    MEDICATIONS  (STANDING):  chlorhexidine 4% Liquid 1 Application(s) Topical <User Schedule>    MEDICATIONS  (PRN):  acetaminophen  Suppository .. 650 milliGRAM(s) Rectal every 6 hours PRN Temp greater or equal to 38C (100.4F), Mild Pain (1 - 3)  bisacodyl Suppository 10 milliGRAM(s) Rectal daily PRN Constipation  glycopyrrolate Injectable 0.4 milliGRAM(s) IV Push every 6 hours PRN excessive audible secretions  HYDROmorphone  Injectable 0.3 milliGRAM(s) IV Push every 1 hour PRN Severe Pain (7 - 10)  HYDROmorphone  Injectable 0.2 milliGRAM(s) IV Push every 1 hour PRN Moderate Pain (4 - 6)  HYDROmorphone  Injectable 2 milliGRAM(s) IV Push every 30 minutes PRN dyspnea/tachypnea  LORazepam   Injectable 1 milliGRAM(s) IV Push every 30 minutes PRN anxiety, agitation  ondansetron Injectable 4 milliGRAM(s) IV Push every 8 hours PRN Nausea and/or Vomiting    ITEMS UNCHECKED ARE NOT PRESENT    PRESENT SYMPTOMS: [x ]Unable to self-report see PAINAD, RDOS below  Source if other than patient:  [x ]Family   [x ]Team     Pain: [ ] yes [ ] no  QOL impact -   Location -                    Aggravating factors -  Quality -  Radiation -  Timing-  Severity (0-10 scale):  Minimal acceptable level (0-10 scale):     Dyspnea:                           [ ]Mild [ ]Moderate [x ]Severe  Anxiety:                             [ ]Mild [ ]Moderate [ ]Severe  Fatigue:                             [ ]Mild [ ]Moderate [ ]Severe  Nausea:                             [ ]Mild [ ]Moderate [ ]Severe  Loss of appetite:              [ ]Mild [ ]Moderate [ ]Severe  Constipation:                    [ ]Mild [ ]Moderate [ ]Severe    PCSSQ [Palliative Care Spiritual Screening Question]   Severity (0-10):  Score of 4 or > indicate consideration of Chaplaincy referral.  Chaplaincy Referral: [x ] yes-  following  [ ] refused [ ] following [ ] deferred    Caregiver [place]? : [ ] yes [ x] no [ ] deferred:  Social work referral [ ] Patient & Family Centered Care Referral [ ]   Anticipatory Grief present?:  [x ] yes [ ] no [ ] deferred:  Social work referral [x ] Patient & Family Centered Care Referral [ ]  	  Other Symptoms:  [ ]All other review of systems negative   [x] patient unable to self report due to altered mental status     PHYSICAL EXAM:   Vital Signs Last 24 Hrs  T(C): 36.6 (06 Feb 2025 07:59), Max: 36.6 (06 Feb 2025 07:59)  T(F): 97.8 (06 Feb 2025 07:59), Max: 97.8 (06 Feb 2025 07:59)  HR: 99 (06 Feb 2025 08:38) (70 - 99)  BP: 127/68 (06 Feb 2025 07:59) (127/68 - 127/68)  BP(mean): --  RR: 20 (06 Feb 2025 08:38) (20 - 22)  SpO2: 91% (06 Feb 2025 08:38) (91% - 95%)    Parameters below as of 06 Feb 2025 08:38  Patient On (Oxygen Delivery Method): nasal cannula, high flow  O2 Flow (L/min): 60  O2 Concentration (%): 100 I&O's Summary    GENERAL: [ ] Cachexia  [ ]Alert  [ ]Oriented   [ ]Lethargic  [x ]Unarousable  [ ]Verbal  [ ]Non-Verbal  Behavioral:   [ ] Anxiety  [ ] Delirium [ ] Agitation [ ] Other  HEENT:  [ ]Normal   [x ]Dry mouth   [ ]ET Tube/Trach  [ ]Oral lesions  PULMONARY:   [x ]Clear [ ]Tachypnea  [ ]Audible excessive secretions   [ ]Rhonchi        [ ]Right [ ]Left [ ]Bilateral  [ ]Crackles        [ ]Right [ ]Left [ ]Bilateral  [ ]Wheezing     [ ]Right [ ]Left [ ]Bilateral  [ ]Diminished BS [ ]Right [ ]Left [ ]Bilateral    CARDIOVASCULAR:    [x ]Regular [ ]Irregular [ ]Tachy  [ ]Saul [ ]Murmur [ ]Other  GASTROINTESTINAL:  [x ]Soft  [x ]Distended   [x ]+BS  [x ]Non tender [ ]Tender  [ ]Other [ ]PEG [ ]OGT/ NGT   Last BM:  2/5  GENITOURINARY:  [ ]Normal [ ] Incontinent   [ ]Oliguria/Anuria   [x ]Yang- anuric   MUSCULOSKELETAL:   [ ]Normal   [ ]Weakness  [x ]Bed/Wheelchair bound [ ]Edema  NEUROLOGIC:   [ ]No focal deficits  [x ] Cognitive impairment  [ ] Dysphagia [ ]Dysarthria [ ] Paresis [ ]Other   SKIN:   [ ]Normal  [ ]Rash  [x ]Other- ecchymosis   [x ]Pressure ulcer(s)  [ x]y [ ]n  Present on admission  - stage 2 sacral ulcers - RN assessment reviewed     CRITICAL CARE:  [ ] Shock Present  [ ]Septic [ ]Cardiogenic [ ]Neurologic [ ]Hypovolemic  [ ]  Vasopressors [ ]  Inotropes   [ ] Respiratory failure present [ ] Mechanical Ventilation [ ] Non-invasive ventilatory support [x ] High-Flow  [ ] Acute  [ ] Chronic [x ] Hypoxic  [ ] Hypercarbic [ ] Other  [x ] Other organ failure - renal     LABS: reviewed     RADIOLOGY & ADDITIONAL STUDIES: reviewed     PROTEIN CALORIE MALNUTRITION: [ ] mild [ ] moderate [ ] severe  [ ] underweight [ ] morbid obesity    https://www.andeal.org/vault/2440/web/files/ONC/Table_Clinical%20Characteristics%20to%20Document%20Malnutrition-White%20JV%20et%20al%202012.pdf    Height (cm): 149.9 (02-03-25 @ 08:11)  Weight (kg): 70.3 (02-03-25 @ 08:11)  BMI (kg/m2): 31.3 (02-03-25 @ 08:11)    [x ] PPSV2 < or = 30% [ ] significant weight loss [ ] poor nutritional intake [ ] anasarca   Artificial Nutrition [ ]     Other REFERRALS:    [ ] Hospice  [ ]Child Life  [ ]Social Work  [ ]Case management [ ]Holistic Therapy [ ] Physical Therapy [ ] Dietary     Progress Notes - Care Coordination [C. Provider] (02-05-25 @ 16:14)    Palliative Performance Scale:  http://npcrc.org/files/news/palliative_performance_scale_ppsv2.pdf  (Ctrl +  left click to view)  Respiratory Distress Observation Tool:  https://homecareinformation.net/handouts/hen/Respiratory_Distress_Observation_Scale.pdf (Ctrl +  left click to view)  PAINAD Score:  http://geriatrictoolkit.[facility]/cog/painad.pdf (Ctrl +  left click to view)   GAP TEAM PALLIATIVE CARE UNIT PROGRESS NOTE:      [  ] Patient on hospice program.    INDICATION FOR PALLIATIVE CARE UNIT SERVICES/Interval HPI:  Symptom management in a patient who is comfort measures.   Family would like to liberate from the HFNC     OVERNIGHT EVENTS:  no acute events overnight. 24h (7a-7a) prn medication use reviewed: iv dilaudid 0.3 mg x 3 for dyspnea ; iv ativan 0.25 mg x 2 anxiety ; iv glyco x 1     DNR on chart: DNI: Trial NIV  DNI: Trial NIV      Allergies    Levaquin (Rash)    Intolerances    MEDICATIONS  (STANDING):  chlorhexidine 4% Liquid 1 Application(s) Topical <User Schedule>    MEDICATIONS  (PRN):  acetaminophen  Suppository .. 650 milliGRAM(s) Rectal every 6 hours PRN Temp greater or equal to 38C (100.4F), Mild Pain (1 - 3)  bisacodyl Suppository 10 milliGRAM(s) Rectal daily PRN Constipation  glycopyrrolate Injectable 0.4 milliGRAM(s) IV Push every 6 hours PRN excessive audible secretions  HYDROmorphone  Injectable 0.3 milliGRAM(s) IV Push every 1 hour PRN Severe Pain (7 - 10)  HYDROmorphone  Injectable 0.2 milliGRAM(s) IV Push every 1 hour PRN Moderate Pain (4 - 6)  HYDROmorphone  Injectable 2 milliGRAM(s) IV Push every 30 minutes PRN dyspnea/tachypnea  LORazepam   Injectable 1 milliGRAM(s) IV Push every 30 minutes PRN anxiety, agitation  ondansetron Injectable 4 milliGRAM(s) IV Push every 8 hours PRN Nausea and/or Vomiting    ITEMS UNCHECKED ARE NOT PRESENT    PRESENT SYMPTOMS: [x ]Unable to self-report see PAINAD, RDOS below  Source if other than patient:  [x ]Family   [x ]Team     Pain: [ ] yes [ ] no  QOL impact -   Location -                    Aggravating factors -  Quality -  Radiation -  Timing-  Severity (0-10 scale):  Minimal acceptable level (0-10 scale):     Dyspnea:                           [ ]Mild [ ]Moderate [x ]Severe  Anxiety:                             [ ]Mild [ ]Moderate [ ]Severe  Fatigue:                             [ ]Mild [ ]Moderate [ ]Severe  Nausea:                             [ ]Mild [ ]Moderate [ ]Severe  Loss of appetite:              [ ]Mild [ ]Moderate [ ]Severe  Constipation:                    [ ]Mild [ ]Moderate [ ]Severe    PCSSQ [Palliative Care Spiritual Screening Question]   Severity (0-10):  Score of 4 or > indicate consideration of Chaplaincy referral.  Chaplaincy Referral: [x ] yes-  following  [ ] refused [ ] following [ ] deferred    Caregiver [place]? : [x ] yes [ ] no [ ] deferred:  Social work referral [ ] Patient & Family Centered Care Referral [ ]   Anticipatory Grief present?:  [x ] yes [ ] no [ ] deferred:  Social work referral [x ] Patient & Family Centered Care Referral [ ]  	  Other Symptoms:  [ ]All other review of systems negative   [x] patient unable to self report due to altered mental status     PHYSICAL EXAM:   Vital Signs Last 24 Hrs  T(C): 36.6 (06 Feb 2025 07:59), Max: 36.6 (06 Feb 2025 07:59)  T(F): 97.8 (06 Feb 2025 07:59), Max: 97.8 (06 Feb 2025 07:59)  HR: 99 (06 Feb 2025 08:38) (70 - 99)  BP: 127/68 (06 Feb 2025 07:59) (127/68 - 127/68)  BP(mean): --  RR: 20 (06 Feb 2025 08:38) (20 - 22)  SpO2: 91% (06 Feb 2025 08:38) (91% - 95%)    Parameters below as of 06 Feb 2025 08:38  Patient On (Oxygen Delivery Method): nasal cannula, high flow  O2 Flow (L/min): 60  O2 Concentration (%): 100 I&O's Summary    GENERAL: [ ] Cachexia  [ ]Alert  [ ]Oriented   [ ]Lethargic  [x ]Unarousable  [ ]Verbal  [ ]Non-Verbal  Behavioral:   [ ] Anxiety  [ ] Delirium [ ] Agitation [ ] Other  HEENT:  [ ]Normal   [x ]Dry mouth   [ ]ET Tube/Trach  [ ]Oral lesions  PULMONARY:   [x ]Clear [x ]Tachypnea  [ ]Audible excessive secretions   [ ]Rhonchi        [ ]Right [ ]Left [ ]Bilateral  [ ]Crackles        [ ]Right [ ]Left [ ]Bilateral  [ ]Wheezing     [ ]Right [ ]Left [ ]Bilateral  [ ]Diminished BS [ ]Right [ ]Left [ ]Bilateral    [x] inc wob  CARDIOVASCULAR:    [x ]Regular [ ]Irregular [ ]Tachy  [ ]Saul [ ]Murmur [ ]Other  GASTROINTESTINAL:  [x ]Soft  [x ]Distended   [x ]+BS  [x ]Non tender [ ]Tender  [ ]Other [ ]PEG [ ]OGT/ NGT   Last BM:  2/5  GENITOURINARY:  [ ]Normal [ ] Incontinent   [ ]Oliguria/Anuria   [x ]Yang- anuric   MUSCULOSKELETAL:   [ ]Normal   [ ]Weakness  [x ]Bed/Wheelchair bound [ ]Edema  NEUROLOGIC:   [ ]No focal deficits  [x ] Cognitive impairment  [ ] Dysphagia [ ]Dysarthria [ ] Paresis [ ]Other   SKIN:   [ ]Normal  [ ]Rash  [x ]Other- ecchymosis   [x ]Pressure ulcer(s)  [ x]y [ ]n  Present on admission  - stage 2 sacral ulcers - RN assessment reviewed     CRITICAL CARE:  [ ] Shock Present  [ ]Septic [ ]Cardiogenic [ ]Neurologic [ ]Hypovolemic  [ ]  Vasopressors [ ]  Inotropes   [ x] Respiratory failure present [ ] Mechanical Ventilation [ ] Non-invasive ventilatory support [x ] High-Flow  [ ] Acute  [ ] Chronic [x ] Hypoxic  [ ] Hypercarbic [ ] Other  [x ] Other organ failure - renal     LABS: reviewed     RADIOLOGY & ADDITIONAL STUDIES: reviewed     PROTEIN CALORIE MALNUTRITION: [ ] mild [ ] moderate [ ] severe  [ ] underweight [ ] morbid obesity    https://www.andeal.org/vault/2440/web/files/ONC/Table_Clinical%20Characteristics%20to%20Document%20Malnutrition-White%20JV%20et%20al%386578.pdf    Height (cm): 149.9 (02-03-25 @ 08:11)  Weight (kg): 70.3 (02-03-25 @ 08:11)  BMI (kg/m2): 31.3 (02-03-25 @ 08:11)    [x ] PPSV2 < or = 30% [ ] significant weight loss [ ] poor nutritional intake [ ] anasarca   Artificial Nutrition [ ]     Other REFERRALS:    [ ] Hospice  [ ]Child Life  [ ]Social Work  [ ]Case management [ ]Holistic Therapy [ ] Physical Therapy [ ] Dietary     Progress Notes - Care Coordination [C. Provider] (02-05-25 @ 16:14)    Palliative Performance Scale:  http://npcrc.org/files/news/palliative_performance_scale_ppsv2.pdf  (Ctrl +  left click to view)  Respiratory Distress Observation Tool:  https://[email or web address] [email or web address]/handouts/hen/Respiratory_Distress_Observation_Scale.pdf (Ctrl +  left click to view)  PAINAD Score:  http://geriatrictoolkit.[facility]/cog/painad.pdf (Ctrl +  left click to view)

## 2025-02-06 NOTE — PROGRESS NOTE ADULT - NUTRITIONAL ASSESSMENT
This patient has been assessed with a concern for Malnutrition and has been determined to have a diagnosis/diagnoses of Moderate protein-calorie malnutrition.  
This patient has been assessed with a concern for Malnutrition and has been determined to have a diagnosis/diagnoses of Moderate protein-calorie malnutrition.    This patient is being managed with:   Diet Regular-  Consistent Carbohydrate {No Snacks} (CSTCHO)  For patients receiving Renal Replacement - No Protein Restr No Conc K No Conc Phos Low Sodium (RENAL)  Low Sodium  Supplement Feeding Modality:  Oral  [place]epro Cans or Servings Per Day:  1       Frequency:  Daily  Entered: Jan 26 2025  4:38PM    The following pending diet order is being considered for treatment of Moderate protein-calorie malnutrition:  Diet Regular-  Consistent Carbohydrate {No Snacks} (CSTCHO)  For patients receiving Renal Replacement - No Protein Restr No Conc K No Conc Phos Low Sodium (RENAL)  Low Sodium  Supplement Feeding Modality:  Oral  Nepro Cans or Servings Per Day:  1       Frequency:  Two Times a day  Entered: Jan 30 2025 12:27PM  
This patient has been assessed with a concern for Malnutrition and has been determined to have a diagnosis/diagnoses of Moderate protein-calorie malnutrition.    This patient is being managed with:   Diet Regular-  Consistent Carbohydrate {No Snacks} (CSTCHO)  For patients receiving Renal Replacement - No Protein Restr No Conc K No Conc Phos Low Sodium (RENAL)  Low Sodium  Supplement Feeding Modality:  Oral  N[place]ro Cans or Servings Per Day:  1       Frequency:  Daily  Entered: Jan 26 2025  4:38PM    The following pending diet order is being considered for treatment of Moderate protein-calorie malnutrition:  Diet Regular-  Consistent Carbohydrate {No Snacks} (CSTCHO)  For patients receiving Renal Replacement - No Protein Restr No Conc K No Conc Phos Low Sodium (RENAL)  Low Sodium  Supplement Feeding Modality:  Oral  Nepro Cans or Servings Per Day:  1       Frequency:  Two Times a day  Entered: Jan 30 2025 12:27PM  
This patient has been assessed with a concern for Malnutrition and has been determined to have a diagnosis/diagnoses of Moderate protein-calorie malnutrition.    This patient is being managed with:   Diet Soft and Bite Sized-  Consistent Carbohydrate {No Snacks} (CSTCHO)  For patients receiving Renal Replacement - No Protein Restr No Conc K No Conc Phos Low Sodium (RENAL)  Supplement Feeding Modality:  Oral  Nepro Cans or Servings Per Day:  1       Frequency:  Daily  Entered: Feb  3 2025 11:22AM    The following pending diet order is being considered for treatment of Moderate protein-calorie malnutrition:  Diet Regular-  Consistent Carbohydrate {No Snacks} (CSTCHO)  For patients receiving Renal Replacement - No Protein Restr No Conc K No Conc Phos Low Sodium (RENAL)  Low Sodium  Supplement Feeding Modality:  Oral  Nepro Cans or Servings Per Day:  1       Frequency:  Two Times a day  Entered: Jan 30 2025 12:27PM  
This patient has been assessed with a concern for Malnutrition and has been determined to have a diagnosis/diagnoses of Moderate protein-calorie malnutrition.    This patient is being managed with:   Diet Soft and Bite Sized-  Consistent Carbohydrate {No Snacks} (CSTCHO)  For patients receiving Renal Replacement - No Protein Restr No Conc K No Conc Phos Low Sodium (RENAL)  Supplement Feeding Modality:  Oral  Nepro Cans or Servings Per Day:  1       Frequency:  Daily  Entered: Feb  3 2025 11:22AM    The following pending diet order is being considered for treatment of Moderate protein-calorie malnutrition:  Diet Regular-  Consistent Carbohydrate {No Snacks} (CSTCHO)  For patients receiving Renal Replacement - No Protein Restr No Conc K No Conc Phos Low Sodium (RENAL)  Low Sodium  Supplement Feeding Modality:  Oral  Nepro Cans or Servings Per Day:  1       Frequency:  Two Times a day  Entered: Jan 30 2025 12:27PM  
This patient has been assessed with a concern for Malnutrition and has been determined to have a diagnosis/diagnoses of Moderate protein-calorie malnutrition.    This patient is being managed with:   Diet Regular-  Consistent Carbohydrate {No Snacks} (CSTCHO)  For patients receiving Renal Replacement - No Protein Restr No Conc K No Conc Phos Low Sodium (RENAL)  Low Sodium  Supplement Feeding Modality:  Oral  Nepro Cans or Servings Per Day:  1       Frequency:  Daily  Entered: Jan 26 2025  4:38PM    The following pending diet order is being considered for treatment of Moderate protein-calorie malnutrition:  Diet Regular-  Consistent Carbohydrate {No Snacks} (CSTCHO)  For patients receiving Renal Replacement - No Protein Restr No Conc K No Conc Phos Low Sodium (RENAL)  Low Sodium  Supplement Feeding Modality:  Oral  Nepro Cans or Servings Per Day:  1       Frequency:  Two Times a day  Entered: Jan 30 2025 12:27PM  
This patient has been assessed with a concern for Malnutrition and has been determined to have a diagnosis/diagnoses of Moderate protein-calorie malnutrition.    This patient is being managed with:   Diet Regular-  Consistent Carbohydrate {No Snacks} (CSTCHO)  For patients receiving Renal Replacement - No Protein Restr No Conc K No Conc Phos Low Sodium (RENAL)  Low Sodium  Supplement Feeding Modality:  Oral  Nepro Cans or Servings Per Day:  1       Frequency:  Daily  Entered: Jan 26 2025  4:38PM    The following pending diet order is being considered for treatment of Moderate protein-calorie malnutrition:  Diet Regular-  Consistent Carbohydrate {No Snacks} (CSTCHO)  For patients receiving Renal Replacement - No Protein Restr No Conc K No Conc Phos Low Sodium (RENAL)  Low Sodium  Supplement Feeding Modality:  Oral  Nepro Cans or Servings Per Day:  1       Frequency:  Two Times a day  Entered: Jan 30 2025 12:27PM  
This patient has been assessed with a concern for Malnutrition and has been determined to have a diagnosis/diagnoses of Moderate protein-calorie malnutrition.    This patient is being managed with:   Diet Soft and Bite Sized-  Consistent Carbohydrate {No Snacks} (CSTCHO)  For patients receiving Renal Replacement - No Protein Restr No Conc K No Conc Phos Low Sodium (RENAL)  Supplement Feeding Modality:  Oral  Nepro Cans or Servings Per Day:  1       Frequency:  Daily  Entered: Feb  3 2025 11:22AM    The following pending diet order is being considered for treatment of Moderate protein-calorie malnutrition:  Diet Regular-  Consistent Carbohydrate {No Snacks} (CSTCHO)  For patients receiving Renal Replacement - No Protein Restr No Conc K No Conc Phos Low Sodium (RENAL)  Low Sodium  Supplement Feeding Modality:  Oral  Nepro Cans or Servings Per Day:  1       Frequency:  Two Times a day  Entered: Jan 30 2025 12:27PM  
This patient has been assessed with a concern for Malnutrition and has been determined to have a diagnosis/diagnoses of Moderate protein-calorie malnutrition.    This patient is being managed with:   Diet Regular-  Consistent Carbohydrate {No Snacks} (CSTCHO)  For patients receiving Renal Replacement - No Protein Restr No Conc K No Conc Phos Low Sodium (RENAL)  Low Sodium  Supplement Feeding Modality:  Oral  Nepro Cans or Servings Per Day:  1       Frequency:  Daily  Entered: Jan 26 2025  4:38PM    The following pending diet order is being considered for treatment of Moderate protein-calorie malnutrition:  Diet Regular-  Consistent Carbohydrate {No Snacks} (CSTCHO)  For patients receiving Renal Replacement - No Protein Restr No Conc K No Conc Phos Low Sodium (RENAL)  Low Sodium  Supplement Feeding Modality:  Oral  Nepro Cans or Servings Per Day:  1       Frequency:  Two Times a day  Entered: Jan 30 2025 12:27PM  
This patient has been assessed with a concern for Malnutrition and has been determined to have a diagnosis/diagnoses of Moderate protein-calorie malnutrition.    This patient is being managed with:   Diet Soft and Bite Sized-  Consistent Carbohydrate {No Snacks} (CSTCHO)  For patients receiving Renal Replacement - No Protein Restr No Conc K No Conc Phos Low Sodium (RENAL)  Supplement Feeding Modality:  Oral  Nepro Cans or Servings Per Day:  1       Frequency:  Daily  Entered: Feb  3 2025 11:22AM    The following pending diet order is being considered for treatment of Moderate protein-calorie malnutrition:  Diet Regular-  Consistent Carbohydrate {No Snacks} (CSTCHO)  For patients receiving Renal Replacement - No Protein Restr No Conc K No Conc Phos Low Sodium (RENAL)  Low Sodium  Supplement Feeding Modality:  Oral  Nepro Cans or Servings Per Day:  1       Frequency:  Two Times a day  Entered: Jan 30 2025 12:27PM

## 2025-02-06 NOTE — PROGRESS NOTE ADULT - PROBLEM SELECTOR PROBLEM 6
Type 2 diabetes mellitus
PVD (peripheral vascular disease)
Chronic atrial fibrillation
Type 2 diabetes mellitus
Chronic atrial fibrillation
Type 2 diabetes mellitus
Type 2 diabetes mellitus
Chronic atrial fibrillation
Chronic atrial fibrillation
Type 2 diabetes mellitus
Type 2 diabetes mellitus
Nausea & vomiting
Type 2 diabetes mellitus
Chronic atrial fibrillation
Encounter for palliative care

## 2025-02-06 NOTE — PROGRESS NOTE ADULT - PROBLEM SELECTOR PLAN 8
- continue home eliquis
DVT ppx: eliquis  Diet: CC, renal    Eventual dc to [facility] with dialysis  Plan discussed with daughter at bedside - requesting palliative consult, consult pending  1/28 some hallucinations on CTH negative
DVT ppx: eliquis  Diet: CC, renal    Eventual dc to [facility] with dialysis  Plan discussed with daughter at bedside - requesting palliative consult today.
DVT ppx: on heparin gtt  Diet: CC, renal    Eventual dc to [facility] with dialysis  Plan discussed with 2 daughters at bedside.
DVT ppx: Eliquis 2.5mg BID  Diet: CCC  Dispo: stable for discharge with out follow up    Discussed with the patient's daughter, Antoinette, on 1/17.  Plan for [facility].
DVT ppx: eliquis  Diet: CC, renal    Eventual dc to [facility] with dialysis  Plan discussed with daughter at bedside 2/2. One of the dtrs is a FM physician   1/28 some hallucinations on CTH negative  1/31 some confusion overnight. Possible sundowning. Frequent reorientation.
DVT ppx: eliquis  Diet: CC, renal    Eventual dc to [facility] with dialysis  Plan discussed with daughter at bedside 2/3 and 2/4. One of the dtrs is a FM physician   1/28 some hallucinations on CTH negative  1/31 some confusion overnight. Possible sundowning. Frequent reorientation.
DVT ppx: on heparin ggt  Diet: CC, renal    Eventual dc to [facility] with dialysis  Discussed with daughter on 1/26 at bedside
- continue home eliquis
DVT ppx: eliquis  Diet: CC, renal    Eventual dc to [facility] with dialysis  Plan discussed with daughter at bedside 2/1. One of the daughters is FM physician in [place].   1/28 some hallucinations on CTH negative  1/31 some confusion overnight. Possible sundowning. Frequent reorientation.
- continue home eliquis
- continue home eliquis
Transferred to pcu for symptom management and end of life care.  Family agreeable to compassionate removal of HFNC.      The patient's and family members present at the bedside. Educated as to what to expect.  Questions answered.  Emotional support provided.   Compassionate removal of HFNC to RA order placed as per family wishes.   offered, and family desired.   Pre medicated with Dilaudid, Ativan and Robinul. HFNC removed by the RN to room air.  The patient's daughters, son, grandchildren, primary RN, present for removal of HFNC.      Emotional support provided to the family.   Will continue to monitor.
DVT ppx: eliquis  Diet: CC, renal    Eventual dc to [facility] with dialysis  Plan discussed with daughter at bedside 2/2. One of the dtrs is a FM physician   1/28 some hallucinations on CTH negative  1/31 some confusion overnight. Possible sundowning. Frequent reorientation.
DVT ppx: eliquis  Diet: CC, renal    Eventual dc to [facility] with dialysis  Plan discussed with daughters at bedside   1/28 some hallucinations on CTH negative
DVT ppx: eliquis  Diet: CC, renal    Eventual dc to [facility] with dialysis  Plan discussed with daughter at bedside 1/31 1/28 some hallucinations on CTH negative
- continue home eliquis
- continue home eliquis

## 2025-02-06 NOTE — PROVIDER CONTACT NOTE (OTHER) - RECOMMENDATIONS
Contact provider
Contact provider
notify provider. bladder scan the pt
patient pronounced dead at 1704  Family at bedside at time of death
Contact provider
Contact provider

## 2025-02-06 NOTE — PROGRESS NOTE ADULT - ATTENDING COMMENTS
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 Course c/b LUIS FERNANDO with new dialysis start and AHRF requiring AVAPS.      Patient assessment and plan discussed on interdisciplinary team rounds today.    Patient with ams and increased wob this morning that worsened initially despite prn opioids and ativan. responded after received 2mg of IV hydromorphone. family confirmed wish to prioritize comfort and alignment with palliative removal of the high flow nasal cannula once additional family arrived. in afternoon, patient seen with more shallow breathing, premedicated with 1mg of IV hydromorphone and hfnc removed without complications. family at bedside when patient .

## 2025-02-06 NOTE — PROGRESS NOTE ADULT - PROBLEM SELECTOR PROBLEM 8
Medication management
Prophylactic measure
PVD (peripheral vascular disease)
Prophylactic measure
PVD (peripheral vascular disease)
Prophylactic measure
Encounter for palliative care
Prophylactic measure
Prophylactic measure
PVD (peripheral vascular disease)
Medication management
PVD (peripheral vascular disease)
Prophylactic measure
PVD (peripheral vascular disease)
Prophylactic measure
PVD (peripheral vascular disease)
PVD (peripheral vascular disease)

## 2025-02-06 NOTE — PROVIDER CONTACT NOTE (OTHER) - ACTION/TREATMENT ORDERED:
provider is notified and aware. bladder scan approved
ACP Denisha at bedside, ABG ordered, RN accompanying pt to CT head
Chest xray ordered, Duoneb treatment given per order. Pt on  satting 94% on 6L NC. MD Watkins at bedside
See patient expiration note
Will order bowel regimen or suppository
ACP Elisabeth Short notified, IV lasix administered. Hydralazine and coreg held per providers orders.

## 2025-02-06 NOTE — PROGRESS NOTE ADULT - PROBLEM SELECTOR PROBLEM 7
Anemia in chronic kidney disease (CKD)
Type 2 diabetes mellitus
Anemia in chronic kidney disease (CKD)
Anemia in chronic kidney disease (CKD)
Type 2 diabetes mellitus
Anemia in chronic kidney disease (CKD)
Type 2 diabetes mellitus
Type 2 diabetes mellitus
Anemia in chronic kidney disease (CKD)
PVD (peripheral vascular disease)
PVD (peripheral vascular disease)
Medication management
Anemia in chronic kidney disease (CKD)
Advanced care planning/counseling discussion
Anemia in chronic kidney disease (CKD)
Anemia in chronic kidney disease (CKD)
Type 2 diabetes mellitus

## 2025-02-06 NOTE — PROGRESS NOTE ADULT - ASSESSMENT
Mrs. Skinner is a 89F w HTN, DM2, AF on eliquis, HFpEF, CKD4, PVD presenting with ADHF and worsening LUIS FERNANDO with uremia. Started on HD via tunneled catheter on 1/27th. TTE showing normal LVEF 65%, mildly enlarged RV with normal function, mild pulm HTN 38 mmhg, no significant valves disease. Clinically stable. Some period of confusion/hallucination per daughter CTH negative. Discharge planning to rehabilitation center    -Cont hydralazine, nifedipine, coreg 12.5 mg BID   - Continue Eliquis for AC   high intensity statin  -- HD per renal  - Strict I/O monitoring  -Continue hemodynamic and telemetry monitoring  -Monitor electrolytes and replace as needed  - Cardiology follow up as outpatient with Dr. Fall.     Will sign off Please call back if any questions or concerns.     Selvin Nielson MD  Attending Cardiologist     Non-invasive Cardiology/Advanced Cardiac Imaging  [facility]     
Pt w/ likely non oliguric LUIS FERNANDO on CKD iso CRS, hypervolemia noted on exam. 
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      BLE adaptic dressings QD   BLE elevation & Compression  Consider ZULEYKA/PVR as pt w/o palpable pulses  BLE Duplex no DVT  BLE Xray no fx disloc fb or OM, (+)chronic changes  Moisturize intact skin w/ SWEEN cream BID  Nutrition Consult for optimization       encourage high quality protein, [place]/ prosource, MVI & Vit C to promote wound healing  Hyperglycemia -  ADA diet and FS w/ ISS  Continue turning and positioning w/ offloading assistive devices as per protocol  Buttocks/ Sacrum continue w/  TRIAD BID and prn soiling        Continue w/ attends under pads and Pericare w/purewick care as per protocol  Waffle Cushion to chair when oob to chair  Continue w/ low air loss pressure redistribution bed surface   Pt will need Group 2 mattress on hospital bed and ROHO cushion for wheel chair upon discharge home  Care as per medicine, will follow w/ you  Upon discharge f/u as outpatient at [facility] [address] [place] [address] [phone]  Seen w/ attng and D/w team & RN  Thank you for this consult  Alba Fuentes PA-C CWS [id number]  Nights/ Weekends/ Holidays please call:  General Surgery Consult pager [phone]) for emergencies  Wound PT for multilayer leg wrapping or VAC issues ([phone])   I spent 35 minutes face to face w/ this pt of which more than 50% of the time was spent counseling & coordinating care of this pt. 
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    Buttocks/ Sacrum continue w/TRIAD BID and prn soiling        Continue w/ attends under pads and Pericare w/purewick care as per protocol  BLE elevation   BLE Duplex no DVT  BLE Xray no fx disloc fb or OM, (+)chronic changes  Moisturize intact skin w/ SWEEN cream BID  Nutrition Consult for optimization as in line w/ GOC       encourage high quality protein, [place]/ prosource, MVI & Vit C to promote wound healing  Hyperglycemia -  ADA diet and FS w/ ISS  Continue turning and positioning w/ offloading assistive devices as per protocol  Waffle Cushion to chair when oob to chair  Continue w/ low air loss pressure redistribution bed surface   Pt will need Group 2 mattress on hospital bed and ROHO cushion for wheel chair upon discharge home  Care as per medicine, will follow w/ you  Upon discharge f/u as outpatient at [facility] [address] [facility] [phone]  Seen w/ attng and D/w team & RN  Thank you for this consult  Alba Fuentes PA-C CWS [id number]  Nights/ Weekends/ Holidays please call:  General Surgery Consult pager [phone]) for emergencies  Wound PT for multilayer leg wrapping or VAC issues (x [phone])   I spent 35 minutes face to face w/ this pt of which more than 50% of the time was spent counseling & coordinating care of this pt.   
89F with HTN, DM2, AFib, HFpEF, PVD, BRASH syndrome admitted with sepsis from lower extremity cellulitis with course complicated by LUIS FERNANDO on CKD4 requiring new dialysis. Ccb possible PNA
88 yo F (Swedish-speaking) with Asbestosis and large pleural paques,  ILD (Dx 10yrs ago, uses portable O2 concentrator prn, followed by [facility] Pulmonologist in [place]), HTN, DM2, AFib, HFpEF, PVD, BRASH syndrome admitted with sepsis from lower extremity cellulitis with course complicated by LUIS FERNANDO on CKD4 requiring new dialysis and acute hypoxic respiratory failure. Daughter notes intermittent cough with difficulty clearing secretions.  Suspect likely some component of pulm edema may be contributing as well as atelectasis i/s/o underlying ILD.  Had sudden decompensation over the last 24-48 hours. Unclear if developed mucus plug or acute infection but now concerning ly on AVAPS with out any respiratory effort and significant lethargy.   She is very comfortable on AVAPS ? too comfortable?, hopefully her apnea is due to over ventilation and alkalossi, will lower rate to 12, and hope she wakes up.  Goals have shifted to comfort and PCU.   If wakes up or shows distress with AVAPS woud remove  for now would remove when patient is awake, to give breaks to eat/drink and clear airwy  palliative care follow up /pcu eval
89F w/ PMH of HTN, DM2, Afib on Eliquis, HFpEF, CKD4, PVD, BRASH Syndrome pw LE wounds found to be hypothermic likely multifactorial i/s/o age and possible infectious etiology d/t cellulitis of LLE.
89F with HTN, DM2, AFib, HFpEF, PVD, BRASH syndrome admitted with sepsis from lower extremity cellulitis with course complicated by LUIS FERNANDO on CKD4 requiring new dialysis. (Taken from internal medicine note). Palliative consulted for assistance with GOC. 
89F with HTN, DM2, AFib, HFpEF, PVD, BRASH syndrome admitted with sepsis from lower extremity cellulitis with course complicated by LUIS FERNANDO on CKD4 requiring new dialysis. Ccb possible PNA
Mrs. Skinner is a 89F w HTN, DM2, AF on eliquis, HFpEF, CKD4, PVD presenting with ADHF and worsening LUIS FERNANDO with uremia. Starting HD today via tunneled catheter. Seem tachypneic however has no complains. TTE showing normal LVEF 65%, mildly enlarged RV with normal function, mild pulm HTN 38 mmhg, no significant valves disease.    - HD per renal  -Cont hydralazine, nifedipine, coreg 12.5 mg BID   - Continue heparin gtt fo AC. Transition to Eliquis once all surgical procedures are completed.    high intensity statin   Continue Lasix PRN  - Strict I/O monitoring  -Continue hemodynamic and telemetry monitoring  -Monitor electrolytes and replace as needed  - Need cardiology follow up as outpatient.     Please call back if any questions or concerns.     Selvin Nielson MD  Attending Cardiologist     Non-invasive Cardiology/Advanced Cardiac Imaging  [facility]     
Patient w/ LUIS FERNANDO on CKD. Now initiated on HD.
Pt w/ likely non oliguric LUIS FERNANDO on CKD iso CRS, hypervolemia noted on exam. 
89F w/ PMH of HTN, DM2, Afib on Eliquis, HFpEF, CKD4, PVD, BRASH Syndrome pw LE wounds found to be hypothermic likely multifactorial i/s/o age and possible infectious etiology d/t cellulitis of LLE      Wound Consult requested to assist w/ management of:  BLE PVD w/ stasis dermatitis   BLE lymphedema  Sacral Stage 3 pressure injury    BLE elevation & Compression  Consider ZULEYKA/PVR  BLE Duplex no DVT  BLE Xray no fx disloc fb or OM, (+)chronic changes  Moisturize intact skin w/ SWEEN cream BID  Nutrition Consult for optimization       encourage high quality protein, [place]/ prosource, MVI & Vit C to promote wound healing  Hyperglycemia -  ADA diet and FS w/ ISS  Continue turning and positioning w/ offloading assistive devices as per protocol  Buttocks/ Sacrum  TRIAD BID and prn soiling        Continue w/ attends under pads and Pericare w/purewick care as per protocol  Waffle Cushion to chair when oob to chair  Continue w/ low air loss pressure redistribution bed surface   Pt will need Group 2 mattress on hospital bed and ROHO cushion for wheel chair upon discharge home  Care as per medicine, will follow w/ you  Upon discharge f/u as outpatient at [facility] [address] [place] [address] [phone]  Seen w/ attng and D/w team & RN  Thank you for this consult  Alba Fuentes PA-C CWS [id number]  Nights/ Weekends/ Holidays please call:  General Surgery Consult pager ([phone]) for emergencies  Wound PT for multilayer leg wrapping or VAC issues ([phone])   I spent 35 minutes face to face w/ this pt of which more than 50% of the time was spent counseling & coordinating care of this pt. 
89F with HTN, DM2, AFib, HFpEF, PVD, BRASH syndrome admitted with sepsis from lower extremity cellulitis with course complicated by LUIS FERNANDO on CKD4 requiring new dialysis.
Mrs. Skinner is a 89F w HTN, DM2, AF on eliquis, HFpEF, CKD4, PVD presenting with ADHF and worsening LUIS FERNANDO with uremia. Started on HD on 1/27th. TTE showing normal LVEF 65%, mildly enlarged RV with normal function, mild pulm HTN 38 mmhg, no significant valves disease. No further episodes of confusion/hallucination per daughter. CTH negative. Having episodes of increase HR during HD. Otherwise, clinically stable.    - Can change coreg to metoprolol 25 mg BID.   - Continue nifedipine. May decrease hydralazine to 25 mg TID  - Continue Eliquis for AC  - high intensity statin  -- HD per renal. Planned for tunneled catheter on 2/2th  - Strict I/O monitoring  -Continue hemodynamic and telemetry monitoring  -Monitor electrolytes and replace as needed  - Cardiology follow up as outpatient with Dr. Fall.     Please call back if any questions or concerns.     Selvin Nielson MD  Attending Cardiologist     Non-invasive Cardiology/Advanced Cardiac Imaging  [facility]     
Patient w/ LUIS FERNANDO on CKD. Now initiated on HD.
89F with HTN, DM2, AFib, HFpEF, PVD, BRASH syndrome admitted with sepsis from lower extremity cellulitis with course complicated by LUIS FERNANDO on CKD4 requiring new dialysis.
Pt w/ LUIS FERNANDO on CKD.
89F w/ PMH of HTN, DM2, Afib on Eliquis, HFpEF, CKD4, PVD, BRASH Syndrome pw LE wounds found to be hypothermic likely multifactorial i/s/o age and possible infectious etiology d/t cellulitis of LLE.
89F w/ PMH of HTN, DM2, Afib on Eliquis, HFpEF, CKD4, PVD, BRASH Syndrome pw LE wounds found to be hypothermic likely multifactorial i/s/o age and possible infectious etiology d/t cellulitis of LLE.
Patient w/ LUIS FERNANDO on CKD. Now initiated on HD.    LUIS FERNANDO on CKD:  Patient with LUIS FERNANDO on CKD iso CRS with hypervolemia noted on exam. [facility] reviewed. Patient with CKD4 iso CHF, HTN and DM. Follows with Dr. Galarza last seen 3/26/24. Scr fluctuates between 2.4-2.8 (eGFR: 16-19), last outpt Scr increased to 3.26 (eGFR: 13) on 10/7/24. Admission Scr elevated 3.0 (1/14), improved to 2.86 (1/15) and then progressively increased to 3.9 (1/20). Labs also significant for rising pro- (1/14) -> 4483 (1/20). UA w/ proteinuria and hematuria. Kidney sonogram on 1/20 with B/L renal cysts, echogenic, ~10cm in size.   CXR w/ R pleural calcifications and thickening (chronic finding), w/ lower lung opacities. Home meds include Lasix 80mg QD w/ additional 40mg PRN.     Scr noted to be ~2.8-3 initially. On 1/19 Scr increased to 3.6 and progressively worsened to 4.8 and BUN increased to 118.   She was initiated on IV diuretics on 1/23/25. Received PO diuretics on 1/25 with resumption of IV Lasix 80mg BID as she became progressively more fluid overloaded and uremic over the weekend.     Underwent HD cath placement on 1/27 followed by 1st HD.  Likely ESRD now.   On MWF schedule. HD today.   Continue on Phoslo.   UF with HD  Diuretics on non- HD days.   Plan for placement of tunnelled HD cath.     Patient and family leaning toward PD (other daughter will do PD for mother however lives in [place]).   Will need teaching outpatient with nephrologist Dr. Galarza.   Would recommend rehab with in-center HD.   Monitor labs and urine output. Avoid nephrotoxins. Dose medications as per eGFR. Discussed with primary team.    Anemia:  repeat iron sat is low.   Please give venofer 100mg for 10 doses.   Will resume Epo after Iron stores are good.     MBD:  C/w phoslo  Renal diet.        Wait for final reccs from attending.       
89F w/ PMH of HTN, DM2, Afib on Eliquis, HFpEF, CKD4, PVD, BRASH Syndrome pw LE wounds found to be hypothermic likely multifactorial i/s/o age and possible infectious etiology d/t cellulitis of LLE.
Pt w/ LUIS FERNANDO on CKD.
89F w/ PMH of HTN, DM2, Afib on Eliquis, HFpEF, CKD4, PVD, BRASH Syndrome pw LE wounds found to be hypothermic likely multifactorial i/s/o age and possible infectious etiology d/t cellulitis of LLE
Patient w/ LUIS FERNANDO on CKD. Now initiated on HD.
Pt w/ LUIS FERNANDO on CKD.
89F w/ PMH of HTN, DM2, Afib on Eliquis, HFpEF, CKD4, PVD, BRASH Syndrome pw LE wounds found to be hypothermic likely multifactorial i/s/o age and possible infectious etiology d/t cellulitis of LLE.
Patient w/ LUIS FERNANDO on CKD. Now initiated on HD.
89F with HTN, DM2, AFib, HFpEF, PVD, BRASH syndrome admitted with sepsis from lower extremity cellulitis with course complicated by LUIS FERNANDO on CKD4 requiring new dialysis.
89F with HTN, DM2, AFib, HFpEF, PVD, BRASH syndrome admitted with sepsis from lower extremity cellulitis with course complicated by LUIS FERNANDO on CKD4 requiring new dialysis. (Taken from internal medicine note). Palliative consulted for assistance with GOC. 
89F w/ PMH of HTN, DM2, Afib on Eliquis, HFpEF, CKD4, PVD, BRASH Syndrome pw LE wounds found to be hypothermic likely multifactorial i/s/o age and possible infectious etiology d/t cellulitis of LLE.
89F with HTN, DM2, AFib, HFpEF, PVD, BRASH syndrome admitted with sepsis from lower extremity cellulitis with course complicated by LUIS FERNANDO on CKD4 requiring new dialysis. (Taken from internal medicine note). Palliative consulted for assistance with GOC. 
89F with HTN, DM2, AFib, HFpEF, PVD, BRASH syndrome admitted with sepsis from lower extremity cellulitis with course complicated by LUIS FERNANDO on CKD4 requiring new dialysis.
89F w/ PMH of HTN, DM2, Afib on Eliquis, HFpEF, CKD4, PVD, BRASH Syndrome pw LE wounds found to be hypothermic likely multifactorial i/s/o age and possible infectious etiology d/t cellulitis of LLE.
89F w/ PMH of HTN, DM2, Afib on Eliquis, HFpEF, CKD4, PVD, BRASH Syndrome pw LE wounds found to be hypothermic likely multifactorial i/s/o age and possible infectious etiology d/t cellulitis of LLE.
89F with HTN, DM2, AFib, HFpEF, PVD, BRASH syndrome admitted with sepsis from lower extremity cellulitis with course complicated by LUIS FERNANDO on CKD4 requiring new dialysis.
89F w/ PMH of HTN, DM2, Afib on Eliquis, HFpEF, CKD4, PVD, BRASH Syndrome pw LE wounds found to be hypothermic likely multifactorial i/s/o age and possible infectious etiology d/t cellulitis of LLE.
89F with HTN, DM2, AFib, HFpEF, PVD, BRASH syndrome admitted with sepsis from lower extremity cellulitis with course complicated by LUIS FERNANDO on CKD4 requiring new dialysis.
89F with HTN, DM2, AFib, HFpEF, PVD, BRASH syndrome admitted with sepsis from lower extremity cellulitis with course complicated by LUIS FERNANDO on CKD4 requiring new dialysis.
89F with HTN, DM2, AFib, HFpEF, PVD, BRASH syndrome admitted with sepsis from lower extremity cellulitis with course complicated by LUIS FERNANDO on CKD4 requiring new dialysis. Ccb possible PNA

## 2025-02-06 NOTE — PROGRESS NOTE ADULT - PROBLEM SELECTOR PROBLEM 5
Stage 4 chronic kidney disease
Chronic atrial fibrillation
History of COPD
Chronic atrial fibrillation
History of COPD
Stage 4 chronic kidney disease
Stage 4 chronic kidney disease
Chronic atrial fibrillation
Stage 4 chronic kidney disease
Stage 4 chronic kidney disease
Type 2 diabetes mellitus
Chronic atrial fibrillation
Stage 4 chronic kidney disease
Stage 4 chronic kidney disease
Chronic atrial fibrillation
Advanced care planning/counseling discussion
Stage 4 chronic kidney disease

## 2025-02-06 NOTE — CHART NOTE - NSCHARTNOTESELECT_GEN_ALL_CORE
Diuresis/Event Note
Event Note
Event Note
Nutrition Services
DME Needs/Event Note
Event Note
Hospitalist 2nd touch/Event Note
Nutrition Services

## 2025-02-06 NOTE — PROGRESS NOTE ADULT - PROBLEM SELECTOR PLAN 1
currently on HFNC 60L/100% and tachypneic     -family agreeable to removal of HFNC  -comfort focused care   -iv dilaudid 1mg mg q30min prn dyspnea

## 2025-02-06 NOTE — PROGRESS NOTE ADULT - CONVERSATION DETAILS
Spoke with family at bedside. Reviewed o/n events: family endorsed at this stage their goals for Ryanne are to prioritize comfort. Reviewed plan of care: deescalation of blood draws, fs, HD, no further work up or addition of medications that are not related to symptom management. They were in agreement. Discussed pleasure feeds if she was awake, alert, and wanted to eat, a feeding tube is not in line with family's goals of care. Pt appeared comfortable on exam with AVAPs. Discussed deescalation of AVAPS will be an ongoing discussion as pt lethargic which is a contraindication and if she appears uncomfortable we will continue to discuss. Family was amenable to continuing GOC regarding AVAPs.   Reviewed symptom directed care. Discussed the ordering of prn medications for pain, anxiety, sob and utilizing the lowest dose to treat the symptom. They were amenable to the plan of care to focus on pt's comfort and symptom management.   Discussed PCU as a transitional unit to prioritize symptoms. Discussed this is a temporary unit where symptom management will continue to be discussed. Family amenable to transfer to PCU as goals are for comfort.    and chaplaincy consulted as pt is [employer] and spiritual support will be beneficial for family.   Palliative contact info provided, emotional support provided.
At bedside, PCU team was met with the patient, her daughter Elizabeth Kapoor, and her son Hari, and granddaughter Garima.   PCU team informed the patient lived with her daughter Elizabeth prior to admissions; Elizabeth is the patient's primary caregiver. The family was agreeble to the PCU team to assess the patient's decision making capacity.  The patient had difficulties recalling the details of her admission.  The patient asked about next steps regarding continued dialysis treatment.  The family asked to the PCU to not disclose this information.  The patient gave PCU team permission to continue discussion with her children.      In the family room, the patient's children expressed they did not want their mother to know she was dying, as they expressed "she will lose her will".  PCU team explained the importance to assess decision making capacity and honoring patient autonomy; however, we expressed understanding of the delicacy of this situation. The patient stated she wanted Elizabeth to be her HCP; Elizabeth and her siblings were in agreement, as they stated, they make decisions as a family.  Elizabeth expressed her mother is afraid to die.  The children did state that their mother stated she did not want to be resuscitated or intubated. Hari expressed his mother being on dialysis is not quality of life.  He shared quality of life is not back and forth trips to the dialysis center and stated his mother is not ambulatory and dependent on Elizabeth for care.      Family is agreeable to discontinuation of AVAPS at night and would like to continue care with HFNC.
Discussed change in clinical status with increased work of breathing requiring medication. Discussed removal of HFNC to allow for natural passing which family aligned with. Discussed potential signs/changes at end of life and prognosis.

## 2025-02-06 NOTE — PROVIDER CONTACT NOTE (OTHER) - DATE AND TIME:
06-Feb-2025 17:04
23-Jan-2025 17:57
28-Jan-2025 16:45
27-Jan-2025 10:51
28-Jan-2025 09:30
02-Feb-2025 20:36

## 2025-02-06 NOTE — PROVIDER CONTACT NOTE (OTHER) - ASSESSMENT
No Response to external Stimuli  No spontaneous respirations  No apical Heart rate  Negative papillary response
pt A&Ox3 legally blind. VSS. No events on tele. No complaints of cp, dizziness or sob or other discomfort. IV site assessed and remains WDL.
Pt AOx4, VS as charted on 2L. No s/s distress noted. Pt denies SOB, dizziness, denies chest pain.
Pt is AxOx3, all VSS, pt has not had BM since 1/23. Abdomen is distended But soft, patient denies pain in abdomen. Pt has been having low PO intake over last few days per daughter.
Patient is AxOx3, all VSS, pt satting 94% on 6L NC. No pain or SOB. Per daughter, patient is coughing today. Duoneb given per order. Patient not coughing on RN assessment, no dyspnea or SOB. Pt is slightly more lethargic than past days per daughter, and has been having less PO intake.
Patient is currently AxOx2-3, patient is baseline AxOx3-4. Patient able to state name and , place, time, however stating that she is seeing "flowers and women in coats in front of her, boxes stacked up in her room, and her mother there with her." Patient having hallucinations at times however oriented about hospital stay. No distress, no pain or SOB. Patient NS on tele, on 6L NC satting 92%. Patient neurologically stable, no changes or deficits

## 2025-02-06 NOTE — PROGRESS NOTE ADULT - PROBLEM SELECTOR PLAN 5
- Heparin gtt held this am for Adarsh barrow.  Will resume AC this evening.  - c/w home carvedilol
- Hold home oral hypoglycemics  - c/w ASHANTI  - Diabetic education  - CC diet  - A1C 5.9
- Resumed on home eliquis post shiley placement, now holding for permacath placement, restart 24 hours after placement   - c/w home carvedilol changed to lopressor 50mg bid
-restart eliquis given patient wont be getting permacath today or until hypoxa resolves   - c/w home carvedilol changed to lopressor 50mg bid
- caution with nephrotoxic medications   - Renally dose all medications  - nephrology consulted for LUISF ERNANDO
- caution with nephrotoxic medications   - Renally dose all medications  - continue home lasix 80mg daily. Giving an extra IV lasix 40mg today for SOB
-restart eliquis given patient wont be getting permacath today or until hypoxa resolves   - c/w home carvedilol changed to lopressor 50mg bid
- Resumed on home eliquis post shiley placement  - c/w home carvedilol
- caution with nephrotoxic medications   - LUIS FERNANDO on CKD4 in setting of cardiorenal syndrome  - baseline Cr fluctuates between 2.4-2.8  - Renally dose all medications  - renal US without hydro, b/l renal cysts, (echogenic ~10 cm in size)  - c/w diuresis as noted above
History of COPD and history of calcifications of the right lung  - reviewed prior pulm evaluation. The calcifications are thought to be d/t prior hx of infection. 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
- Resumed on home eliquis post shiley placement  - c/w home carvedilol changed to lopressor 50mg bid
- Resumed on home eliquis post shiley placement  - c/w home carvedilol
- Resumed on home eliquis post shiley placement  - c/w home carvedilol
- Avoid nephrotoxic medications   - Renally dose all medications
- Resumed on home eliquis post shiley placement  - c/w home carvedilol changed to lopressor 50mg bid
- switch home apixaban to heparin ggt in anticipation of Shiley placement  - c/w home carvedilol
- Avoid nephrotoxic medications   - Renally dose all medications
- caution with nephrotoxic medications   - LUIS FERNANDO on CKD4 in setting of cardiorenal syndrome  - baseline Cr fluctuates between 2.4-2.8  - Renally dose all medications  - renal US without hydro, b/l renal cysts, (echogenic ~10 cm in size)  - c/w diuresis as noted above
no further HD as goals are in line with comfort care
HCP: Elizabeth (daughter) - formed completed. copy placed in chart, original given to Elizabeth     - called for communion
- caution with nephrotoxic medications   - LUIS FERNANDO on CKD4 in setting of cardiorenal syndrome  - baseline Cr fluctuates between 2.4-2.8  - Renally dose all medications  - renal US without hydro, b/l renal cysts, (echogenic ~10 cm in size)  - c/w diuresis as noted above
History of COPD and history of calcifications of the right lung  - reviewed prior pulm evaluation. The calcifications are thought to be d/t prior hx of infection. 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

## 2025-02-06 NOTE — PROVIDER CONTACT NOTE (OTHER) - SITUATION
Patient hasn't had a BM since 1/23
Patient having AMS, hallucinations
Pt bp 96/44, IV lasix, hydralazine, coreg ordered.
Pt more lethargic, complaining of cough, poor PO intake
pt's family is concerned that the pt did not void in 2 days
Patient without spontaneous respirations or pulse

## 2025-02-06 NOTE — DISCHARGE NOTE FOR THE EXPIRED PATIENT - HOSPITAL COURSE
89 yo F (Macedonian-speaking) with ILD (Dx 10yrs ago, uses portable O2 concentrator prn, followed by [facility] Pulmonologist in [place]), HTN, DM2, AFib, HFpEF, PVD, BRASH syndrome admitted with sepsis from lower extremity cellulitis with course complicated by LUIS FERNANDO on CKD4 requiring new dialysis and acute hypoxic respiratory failure with last HD 2/3 with fluid removal had RRT called 2/4am iso of hypoxia to 80% on 6LNC transitioned over to HFNC while on NRB continuing to be hypoxic. S/p bumex x2 and transitioned over to BIPAP pulling adequate volume at the time with improvement to SpO2 90%s.  ABG on 6LNC showing 7.26/68/63. MICU called iso of hypoxia and new BIPAP requirements.  Patient unable to tolerate permacath placement due to acute hypoxic respiratory failure. dependent on HFNC  Family wanted to transitioned to comfort focused care.  Patient liberated from HFNC.  Transitioned peacefully in PCU surrounded by family.

## 2025-02-06 NOTE — PROGRESS NOTE ADULT - PROBLEM SELECTOR PLAN 4
iv dilaudid 0.3 mg q1h prn dyspnea (3 required in 24h 7a-7a period)  After 7am- required several escalating doses of iv dilaudid for tachypnea (RR>24)    -plan for compassionate removal of HFNC  -iv dilaudid 0.3mg increased to 2 mg q30 min prn dyspnea

## 2025-02-06 NOTE — PROGRESS NOTE ADULT - PROBLEM SELECTOR PLAN 7
HCP: Elizabeth (daughter) - formed completed. copy placed in chart, original given to Elizabeth     - called for communion

## 2025-02-06 NOTE — CHART NOTE - NSCHARTNOTEFT_GEN_A_CORE
Interim Note    Chart reviewed, events noted. Pt now in PCU, comfort measures only, ordered for pleasure feeds. Per RN staff, pt inappropriate for nutrition follow up at this time.    RD remains available.    Linda Crowell MS, RD, CDN, CNSC available via [facility] [employer]

## 2025-02-06 NOTE — PROGRESS NOTE ADULT - PROBLEM SELECTOR PLAN 6
- c/w Eliquis 2.5mg BID  - c/w home coreg
a1c 5.9 (1/15/25)  decreased PO intake, was transitioned to basal/bolus regimen given hyperglycemia in past   transition back to SSI coverage given BG 70s-100 over past 2 days  c/w consistent carb diet
a1c 5.9 (1/15/25)  decreased PO intake, was transitioned to basal/bolus regimen given hyperglycemia in past   transition back to SSI coverage given BG 70s-100   c/w consistent carb diet
- c/w Eliquis 2.5mg BID  - c/w home coreg
-zofran prn
Hyperglycemia over past few days  Started on basal bolus, titrate to glucose 140-180  c/w consistent carb diet
a1c 5.9 (1/15/25)  decreased PO intake, was transitioned to basal/bolus regimen given hyperglycemia in past   transition back to SSI coverage given BG 70s-100   c/w consistent carb diet
- Hold home oral hypoglycemics  - c/w ASHANTI  - Diabetic education  - CC diet  - A1C 5.9
a1c 5.9 (1/15/25)  decreased PO intake, was transitioned to basal/bolus regimen given hyperglycemia in past   transition back to SSI coverage given BG 70s-100   c/w consistent carb diet
a1c 5.9 (1/15/25)  decreased PO intake, was transitioned to basal/bolus regimen given hyperglycemia in past   transition back to SSI coverage given BG 70s-100 over past 2 days  c/w consistent carb diet
Hyperglycemia over past few days  Start basal bolus, titrate to glucose 140-180  Change diet to consistent carb
- Hold home oral hypoglycemics  - c/w ASHANTI  - Diabetic education  - CC diet  - A1C 5.9
a1c 5.9 (1/15/25)  decreased PO intake, was transitioned to basal/bolus regimen given hyperglycemia in past   transition back to SSI coverage given BG 70s-100   c/w consistent carb diet
- c/w Eliquis 2.5mg BID  - c/w home coreg
a1c 5.9 (1/15/25)  decreased PO intake, was transitioned to basal/bolus regimen given hyperglycemia in past   transition back to SSI coverage given BG 70s-100   c/w consistent carb diet
a1c 5.9 (1/15/25)  decreased PO intake, was transitioned to basal/bolus regimen given hyperglycemia in past   transition back to SSI coverage given BG 70s-100   c/w consistent carb diet
- c/w Eliquis 2.5mg BID  - c/w home coreg
Transferred to pcu for symptom management and end of life care.  Family agreeable to discontinuing AVAPS at night to promote comfort.  continue care with HFNC at this time with iv prn medications to optimize comfort     Disposition pending clinical course.

## 2025-02-06 NOTE — PROVIDER CONTACT NOTE (OTHER) - REASON
Patient expiration
pt's family is concerned that the pt did not void in 2 days
BP 96/44
Patient having AMS, hallucinations
Patient hasn't had BM since 1/23
Pt more lethargic, complaining of cough, poor PO intake

## 2025-02-06 NOTE — PROGRESS NOTE ADULT - REASON FOR ADMISSION
LE wounds

## 2025-02-06 NOTE — PROGRESS NOTE ADULT - PROBLEM SELECTOR PROBLEM 1
Acute kidney injury superimposed on CKD
ILD (interstitial lung disease)
Acute kidney injury superimposed on CKD
Acute kidney injury superimposed on CKD
Stage 4 chronic kidney disease
Acute kidney injury superimposed on CKD
Acute kidney injury superimposed on CKD
Cellulitis of left lower extremity
Cellulitis of left lower extremity
Acute kidney injury superimposed on CKD
Stage 4 chronic kidney disease
Acute kidney injury superimposed on CKD
Shortness of breath
Acute kidney injury superimposed on CKD
Stage 4 chronic kidney disease
ILD (interstitial lung disease)
Shortness of breath
Stage 4 chronic kidney disease
Stage 4 chronic kidney disease
Shortness of breath
ILD (interstitial lung disease)
Cellulitis of left lower extremity
Stage 4 chronic kidney disease
Stage 4 chronic kidney disease
Shortness of breath
Stage 4 chronic kidney disease
Stage 4 chronic kidney disease
Shortness of breath
Shortness of breath
Stage 4 chronic kidney disease
Shortness of breath

## 2025-02-11 LAB
CULTURE RESULTS: SIGNIFICANT CHANGE UP
SPECIMEN SOURCE: SIGNIFICANT CHANGE UP
